# Patient Record
Sex: FEMALE | Race: WHITE | Employment: FULL TIME | URBAN - METROPOLITAN AREA
[De-identification: names, ages, dates, MRNs, and addresses within clinical notes are randomized per-mention and may not be internally consistent; named-entity substitution may affect disease eponyms.]

---

## 2017-04-27 RX ORDER — DOXEPIN HYDROCHLORIDE 10 MG/1
CAPSULE ORAL
Qty: 90 CAPSULE | Refills: 2 | Status: SHIPPED | OUTPATIENT
Start: 2017-04-27 | End: 2018-01-02 | Stop reason: SDUPTHER

## 2017-05-02 ENCOUNTER — TELEPHONE (OUTPATIENT)
Dept: FAMILY MEDICINE CLINIC | Age: 57
End: 2017-05-02

## 2017-05-03 LAB
AVERAGE GLUCOSE: 134 MG/DL (ref 66–114)
CHOLESTEROL/HDL RATIO: 3.6
CHOLESTEROL: 134 MG/DL
HBA1C MFR BLD: 6.3 % (ref 4.2–5.8)
HDLC SERPL-MCNC: 37 MG/DL (ref 40–60)
LDL CHOLESTEROL CALCULATED: 30 MG/DL
LDL/HDL RATIO: 0.8
TRIGL SERPL-MCNC: 336 MG/DL
VLDLC SERPL CALC-MCNC: 67 MG/DL

## 2017-05-08 ENCOUNTER — OFFICE VISIT (OUTPATIENT)
Dept: FAMILY MEDICINE CLINIC | Age: 57
End: 2017-05-08

## 2017-05-08 VITALS
HEIGHT: 69 IN | SYSTOLIC BLOOD PRESSURE: 136 MMHG | DIASTOLIC BLOOD PRESSURE: 76 MMHG | WEIGHT: 237.4 LBS | BODY MASS INDEX: 35.16 KG/M2 | HEART RATE: 84 BPM | RESPIRATION RATE: 14 BRPM

## 2017-05-08 DIAGNOSIS — E11.9 CONTROLLED TYPE 2 DIABETES MELLITUS WITHOUT COMPLICATION, UNSPECIFIED LONG TERM INSULIN USE STATUS: Primary | ICD-10-CM

## 2017-05-08 DIAGNOSIS — M99.01 CERVICAL SOMATIC DYSFUNCTION: ICD-10-CM

## 2017-05-08 DIAGNOSIS — K75.81 NASH (NONALCOHOLIC STEATOHEPATITIS): ICD-10-CM

## 2017-05-08 DIAGNOSIS — E78.5 HYPERLIPIDEMIA, UNSPECIFIED HYPERLIPIDEMIA TYPE: ICD-10-CM

## 2017-05-08 DIAGNOSIS — E55.9 VITAMIN D DEFICIENCY: ICD-10-CM

## 2017-05-08 DIAGNOSIS — Z72.0 TOBACCO ABUSE: ICD-10-CM

## 2017-05-08 DIAGNOSIS — E66.9 OBESITY, UNSPECIFIED OBESITY SEVERITY, UNSPECIFIED OBESITY TYPE: ICD-10-CM

## 2017-05-08 DIAGNOSIS — N60.99 ATYPICAL DUCTAL HYPERPLASIA, BREAST: ICD-10-CM

## 2017-05-08 PROCEDURE — 4004F PT TOBACCO SCREEN RCVD TLK: CPT | Performed by: FAMILY MEDICINE

## 2017-05-08 PROCEDURE — G8417 CALC BMI ABV UP PARAM F/U: HCPCS | Performed by: FAMILY MEDICINE

## 2017-05-08 PROCEDURE — 99214 OFFICE O/P EST MOD 30 MIN: CPT | Performed by: FAMILY MEDICINE

## 2017-05-08 PROCEDURE — 3014F SCREEN MAMMO DOC REV: CPT | Performed by: FAMILY MEDICINE

## 2017-05-08 PROCEDURE — 3044F HG A1C LEVEL LT 7.0%: CPT | Performed by: FAMILY MEDICINE

## 2017-05-08 PROCEDURE — 3017F COLORECTAL CA SCREEN DOC REV: CPT | Performed by: FAMILY MEDICINE

## 2017-05-08 PROCEDURE — 99999 PR OSTEOPATHIC MANIP,1-2 BODY REGN: CPT | Performed by: FAMILY MEDICINE

## 2017-05-08 PROCEDURE — G8427 DOCREV CUR MEDS BY ELIG CLIN: HCPCS | Performed by: FAMILY MEDICINE

## 2017-05-08 RX ORDER — VARENICLINE TARTRATE 25 MG
KIT ORAL
Qty: 53 TABLET | Refills: 0 | Status: SHIPPED | OUTPATIENT
Start: 2017-05-08 | End: 2017-11-08

## 2017-05-08 RX ORDER — VARENICLINE TARTRATE 1 MG/1
1 TABLET, FILM COATED ORAL 2 TIMES DAILY
Qty: 60 TABLET | Refills: 2 | Status: SHIPPED | OUTPATIENT
Start: 2017-05-08 | End: 2017-11-08

## 2017-05-08 ASSESSMENT — ENCOUNTER SYMPTOMS
RESPIRATORY NEGATIVE: 1
GASTROINTESTINAL NEGATIVE: 1

## 2017-05-08 ASSESSMENT — PATIENT HEALTH QUESTIONNAIRE - PHQ9
2. FEELING DOWN, DEPRESSED OR HOPELESS: 0
SUM OF ALL RESPONSES TO PHQ QUESTIONS 1-9: 0
1. LITTLE INTEREST OR PLEASURE IN DOING THINGS: 0
SUM OF ALL RESPONSES TO PHQ9 QUESTIONS 1 & 2: 0

## 2017-05-12 ENCOUNTER — TELEPHONE (OUTPATIENT)
Dept: FAMILY MEDICINE CLINIC | Age: 57
End: 2017-05-12

## 2017-10-31 ENCOUNTER — TELEPHONE (OUTPATIENT)
Dept: FAMILY MEDICINE CLINIC | Age: 57
End: 2017-10-31

## 2017-11-02 LAB
ALBUMIN SERPL-MCNC: 3.7 G/DL (ref 3.2–5.3)
ALK PHOSPHATASE: 75 IU/L (ref 35–121)
ALT SERPL-CCNC: 27 IU/L (ref 5–59)
ANION GAP SERPL CALCULATED.3IONS-SCNC: 21 MMOL/L
AST SERPL-CCNC: 32 IU/L (ref 10–42)
AVERAGE GLUCOSE: 126 MG/DL (ref 66–114)
BILIRUB SERPL-MCNC: 0.4 MG/DL (ref 0.2–1.3)
BUN BLDV-MCNC: 8 MG/DL (ref 10–20)
CALCIUM SERPL-MCNC: 9.2 MG/DL (ref 8.7–10.8)
CHLORIDE BLD-SCNC: 98 MMOL/L (ref 95–111)
CHOLESTEROL/HDL RATIO: 3.6
CHOLESTEROL: 141 MG/DL
CO2: 23 MMOL/L (ref 21–32)
CREAT SERPL-MCNC: 0.6 MG/DL (ref 0.5–1.3)
CREATINE, URINE: 57.2 MG/DL
EGFR AFRICAN AMERICAN: 124
EGFR IF NONAFRICAN AMERICAN: 103
GLUCOSE: 115 MG/DL (ref 70–100)
HBA1C MFR BLD: 6 % (ref 4.2–5.8)
HDLC SERPL-MCNC: 39 MG/DL (ref 40–60)
LDL CHOLESTEROL CALCULATED: 47 MG/DL
LDL/HDL RATIO: 1.2
MICROALBUMIN/CREAT 24H UR: 19.1 MG/DL
MICROALBUMIN/CREAT UR-RTO: 334 MCG/MG
POTASSIUM SERPL-SCNC: 4.7 MMOL/L (ref 3.5–5.4)
SODIUM BLD-SCNC: 137 MMOL/L (ref 134–147)
TOTAL PROTEIN: 6.5 G/DL (ref 5.8–8)
TRIGL SERPL-MCNC: 275 MG/DL
TSH SERPL DL<=0.05 MIU/L-ACNC: 3.23 UIU/ML (ref 0.4–4.4)
VLDLC SERPL CALC-MCNC: 55 MG/DL

## 2017-11-08 ENCOUNTER — OFFICE VISIT (OUTPATIENT)
Dept: FAMILY MEDICINE CLINIC | Age: 57
End: 2017-11-08
Payer: COMMERCIAL

## 2017-11-08 VITALS
HEIGHT: 68 IN | DIASTOLIC BLOOD PRESSURE: 58 MMHG | WEIGHT: 235 LBS | RESPIRATION RATE: 14 BRPM | HEART RATE: 90 BPM | SYSTOLIC BLOOD PRESSURE: 134 MMHG | TEMPERATURE: 98.1 F | OXYGEN SATURATION: 96 % | BODY MASS INDEX: 35.61 KG/M2

## 2017-11-08 DIAGNOSIS — K75.81 NASH (NONALCOHOLIC STEATOHEPATITIS): ICD-10-CM

## 2017-11-08 DIAGNOSIS — E78.5 HYPERLIPIDEMIA, UNSPECIFIED HYPERLIPIDEMIA TYPE: ICD-10-CM

## 2017-11-08 DIAGNOSIS — E55.9 VITAMIN D DEFICIENCY: ICD-10-CM

## 2017-11-08 DIAGNOSIS — Z72.0 TOBACCO ABUSE: ICD-10-CM

## 2017-11-08 DIAGNOSIS — Z12.11 SCREEN FOR COLON CANCER: ICD-10-CM

## 2017-11-08 DIAGNOSIS — E11.9 CONTROLLED TYPE 2 DIABETES MELLITUS WITHOUT COMPLICATION, WITHOUT LONG-TERM CURRENT USE OF INSULIN (HCC): Primary | ICD-10-CM

## 2017-11-08 DIAGNOSIS — Z11.4 SCREENING FOR HIV (HUMAN IMMUNODEFICIENCY VIRUS): ICD-10-CM

## 2017-11-08 DIAGNOSIS — Z11.59 ENCOUNTER FOR HEPATITIS C SCREENING TEST FOR LOW RISK PATIENT: ICD-10-CM

## 2017-11-08 PROCEDURE — G8417 CALC BMI ABV UP PARAM F/U: HCPCS | Performed by: FAMILY MEDICINE

## 2017-11-08 PROCEDURE — 3044F HG A1C LEVEL LT 7.0%: CPT | Performed by: FAMILY MEDICINE

## 2017-11-08 PROCEDURE — G8427 DOCREV CUR MEDS BY ELIG CLIN: HCPCS | Performed by: FAMILY MEDICINE

## 2017-11-08 PROCEDURE — 4004F PT TOBACCO SCREEN RCVD TLK: CPT | Performed by: FAMILY MEDICINE

## 2017-11-08 PROCEDURE — 99214 OFFICE O/P EST MOD 30 MIN: CPT | Performed by: FAMILY MEDICINE

## 2017-11-08 PROCEDURE — 3017F COLORECTAL CA SCREEN DOC REV: CPT | Performed by: FAMILY MEDICINE

## 2017-11-08 PROCEDURE — G8484 FLU IMMUNIZE NO ADMIN: HCPCS | Performed by: FAMILY MEDICINE

## 2017-11-08 PROCEDURE — 3014F SCREEN MAMMO DOC REV: CPT | Performed by: FAMILY MEDICINE

## 2017-11-08 RX ORDER — TAMOXIFEN CITRATE 20 MG/1
20 TABLET ORAL DAILY
COMMUNITY
End: 2020-12-15

## 2017-11-08 ASSESSMENT — ENCOUNTER SYMPTOMS
GASTROINTESTINAL NEGATIVE: 1
RESPIRATORY NEGATIVE: 1

## 2017-11-08 NOTE — PROGRESS NOTES
(LIPITOR) 40 MG tablet TAKE 1 TABLET NIGHTLY 11/3/16  Yes [facility] Plants, DO   Multiple Vitamins-Minerals (THERAPEUTIC MULTIVITAMIN-MINERALS) tablet Take 1 tablet by mouth daily   Yes Historical Provider, MD   aspirin 81 MG tablet Take 81 mg by mouth daily. Yes Historical Provider, MD   varenicline (CHANTIX STARTING MONTH PAK) 0.5 MG X 11 & 1 MG X 42 tablet Take as directed on package.  5/8/17   Keo Plants, DO   varenicline (CHANTIX CONTINUING MONTH PAK) 1 MG tablet Take 1 tablet by mouth 2 times daily 5/8/17   Keo Plants, DO       Lab Results   Component Value Date    LABA1C 6.0 (H) 11/01/2017     No results found for: EAG    No components found for: CHLPL  Lab Results   Component Value Date    TRIG 275 (H) 11/01/2017    TRIG 336 (H) 05/02/2017    TRIG 279 (H) 10/26/2016     Lab Results   Component Value Date    HDL 39 (L) 11/01/2017    HDL 37 (L) 05/02/2017    HDL 38 (L) 10/26/2016     Lab Results   Component Value Date    LDLCALC 47 11/01/2017    LDLCALC 30 05/02/2017    LDLCALC 60 10/26/2016     Lab Results   Component Value Date    LABVLDL 55 (H) 11/01/2017    LABVLDL 67 (H) 05/02/2017    LABVLDL 56 (H) 10/26/2016         Chemistry        Component Value Date/Time     11/01/2017 0956    K 4.7 11/01/2017 0956    CL 98 11/01/2017 0956    CO2 23 11/01/2017 0956    BUN 8 (L) 11/01/2017 0956    CREATININE 0.6 11/01/2017 0956        Component Value Date/Time    CALCIUM 9.2 11/01/2017 0956    ALKPHOS 75 11/01/2017 0956    ALKPHOS 86 12/21/2016 0951    AST 32 11/01/2017 0956    ALT 27 11/01/2017 0956    BILITOT 0.4 11/01/2017 0956            Lab Results   Component Value Date    TSH 3.230 11/01/2017       Lab Results   Component Value Date    WBC 8.6 12/21/2016    HGB 15.0 12/21/2016    HCT 45.4 (H) 12/21/2016    MCV 84.1 12/21/2016     12/21/2016         Health Maintenance   Topic Date Due    Hepatitis C screen  1960    HIV screen  01/06/1974    Colon cancer screen colonoscopy  01/06/2009    Cervical cancer screen  12/13/2016    Flu vaccine (1) 09/01/2017    Diabetic foot exam  11/03/2017    Diabetic retinal exam  08/15/2018    Diabetic hemoglobin A1C test  11/01/2018    Diabetic microalbuminuria test  11/01/2018    Lipid screen  11/01/2018    Breast cancer screen  12/14/2018    DTaP/Tdap/Td vaccine (2 - Td) 05/04/2025    Pneumococcal med risk  Completed       Immunization History   Administered Date(s) Administered    Pneumococcal Polysaccharide ([id number]) 11/02/2015    Tdap (Boostrix, Adacel) 05/04/2015           Review of Systems   Constitutional: Negative. HENT: Negative. Respiratory: Negative. Cardiovascular: Negative. Gastrointestinal: Negative. Musculoskeletal: Negative. All other systems reviewed and are negative. Objective:   Physical Exam   Constitutional: She is oriented to person, place, and time. She appears well-developed and well-nourished. HENT:   Head: Normocephalic and atraumatic. Right Ear: Tympanic membrane normal.   Left Ear: Tympanic membrane normal.   Mouth/Throat: Oropharynx is clear and moist and mucous membranes are normal.   Neck: Carotid bruit is not present. Cardiovascular: Normal rate, regular rhythm and normal heart sounds. No murmur heard. Pulmonary/Chest: Effort normal and breath sounds normal.   Abdominal: Soft. Bowel sounds are normal.   Musculoskeletal: She exhibits no edema. Neurological: She is alert and oriented to person, place, and time. Skin: Skin is warm and dry. Psychiatric: She has a normal mood and affect. Her behavior is normal.   Nursing note and vitals reviewed. Assessment:      1. Controlled type 2 diabetes mellitus without complication, without long-term current use of insulin ([facility])  HM DIABETES FOOT EXAM    Hemoglobin A1C   2. Hyperlipidemia, unspecified hyperlipidemia type     3. THORNTON (nonalcoholic steatohepatitis)     4. Tobacco abuse     5.  Vitamin D deficiency 6. Class 2 obesity due to excess calories with serious comorbidity and body mass index (BMI) of 35.0 to 35.9 in adult     7. Screening for HIV (human immunodeficiency virus)  HIV Screen   8. Encounter for hepatitis C screening test for low risk patient  Hepatitis C Antibody   9. Screen for colon cancer  [facility] [address] [facility] Jett Masters MD ([place])           Plan:      -  Chronic medical problems stable  -  Continue current medications  -  Follow up with specialists as scheduled  -  Labs reviewed, look fine  -  Refer to GI for colonoscopy  -  Recheck labs 6 mos  -  RTO 6 mos    Kaycee POLLOCK received counseling on the following healthy behaviors: tobacco cessation    Patient given educational materials on Smoking Cessation    I have instructed Kaycee POLLOCK to complete a self tracking handout on Smoking and instructed them to bring it with them to her next appointment. Discussed use, benefit, and side effects of prescribed medications. Barriers to medication compliance addressed. All patient questions answered. Pt voiced understanding.

## 2017-11-08 NOTE — PATIENT INSTRUCTIONS
Patient Education        Diabetes Foot Health: Care Instructions  Your Care Instructions    When you have diabetes, your feet need extra care and attention. Diabetes can damage the nerve endings and blood vessels in your feet, making you less likely to notice when your feet are injured. Diabetes also limits your body's ability to fight infection and get blood to areas that need it. If you get a minor foot injury, it could become an ulcer or a serious infection. With good foot care, you can prevent most of these problems. Caring for your feet can be quick and easy. Most of the care can be done when you are bathing or getting ready for bed. Follow-up care is a key part of your treatment and safety. Be sure to make and go to all appointments, and call your doctor if you are having problems. Its also a good idea to know your test results and keep a list of the medicines you take. How can you care for yourself at home? · Keep your blood sugar close to normal by watching what and how much you eat, monitoring blood sugar, taking medicines if prescribed, and getting regular exercise. · Do not smoke. Smoking affects blood flow and can make foot problems worse. If you need help quitting, talk to your doctor about stop-smoking programs and medicines. These can increase your chances of quitting for good. · Eat a diet that is low in fats. High fat intake can cause fat to build up in your blood vessels and decrease blood flow. · Inspect your feet daily for blisters, cuts, cracks, or sores. If you cannot see well, use a mirror or have someone help you. · Take care of your feet:  [facility] your feet every day. Use warm (not hot) water. Check the water temperature with your wrists or other part of your body, not your feet. ¨ Dry your feet well. Pat them dry. Do not rub the skin on your feet too hard. Dry well between your toes.  If the skin on your feet stays moist, bacteria or a fungus can grow, which can lead to infection. ¨ Keep your skin soft. Use moisturizing skin cream to keep the skin on your feet soft and prevent calluses and cracks. But do not put the cream between your toes, and stop using any cream that causes a rash. ¨ Clean underneath your toenails carefully. Do not use a sharp object to clean underneath your toenails. Use the blunt end of a nail file or other rounded tool. ¨ Trim and file your toenails straight across to prevent ingrown toenails. Use a nail clipper, not scissors. Use an emery board to smooth the edges. · Change socks daily. Socks without seams are best, because seams often rub the feet. You can find socks for people with diabetes from specialty catalogs. · Look inside your shoes every day for things like gravel or torn linings, which could cause blisters or sores. · Buy shoes that fit well:  ¨ Look for shoes that have plenty of space around the toes. This helps prevent bunions and blisters. ¨ Try on shoes while wearing the kind of socks you will usually wear with the shoes. ¨ Avoid plastic shoes. They may rub your feet and cause blisters. Good shoes should be made of materials that are flexible and breathable, such as leather or cloth. ¨ Break in new shoes slowly by wearing them for no more than an hour a day for several days. Take extra time to check your feet for red areas, blisters, or other problems after you wear new shoes. · Do not go barefoot. Do not wear sandals, and do not wear shoes with very thin soles. Thin soles are easy to puncture. They also do not protect your feet from hot pavement or cold weather. · Have your doctor check your feet during each visit. If you have a foot problem, see your doctor. Do not try to treat an early foot problem at home. Home remedies or treatments that you can buy without a prescription (such as corn removers) can be harmful. · Always get early treatment for foot problems.  A minor irritation can lead to a major problem if not properly cared

## 2017-11-21 DIAGNOSIS — E11.9 CONTROLLED TYPE 2 DIABETES MELLITUS WITHOUT COMPLICATION, UNSPECIFIED LONG TERM INSULIN USE STATUS: ICD-10-CM

## 2017-11-21 DIAGNOSIS — E78.5 HYPERLIPIDEMIA, UNSPECIFIED HYPERLIPIDEMIA TYPE: ICD-10-CM

## 2017-11-21 RX ORDER — LOSARTAN POTASSIUM 25 MG/1
TABLET ORAL
Qty: 90 TABLET | Refills: 3 | Status: SHIPPED | OUTPATIENT
Start: 2017-11-21 | End: 2019-01-07 | Stop reason: SDUPTHER

## 2017-11-21 RX ORDER — ATORVASTATIN CALCIUM 40 MG/1
TABLET, FILM COATED ORAL
Qty: 90 TABLET | Refills: 3 | Status: SHIPPED | OUTPATIENT
Start: 2017-11-21 | End: 2019-01-07 | Stop reason: SDUPTHER

## 2017-12-04 DIAGNOSIS — E11.9 CONTROLLED TYPE 2 DIABETES MELLITUS WITHOUT COMPLICATION, UNSPECIFIED LONG TERM INSULIN USE STATUS: ICD-10-CM

## 2018-01-02 RX ORDER — DOXEPIN HYDROCHLORIDE 10 MG/1
CAPSULE ORAL
Qty: 90 CAPSULE | Refills: 3 | Status: SHIPPED | OUTPATIENT
Start: 2018-01-02 | End: 2018-11-24 | Stop reason: SDUPTHER

## 2018-05-01 ENCOUNTER — TELEPHONE (OUTPATIENT)
Dept: FAMILY MEDICINE CLINIC | Age: 58
End: 2018-05-01

## 2018-05-03 LAB
AVERAGE GLUCOSE: 154 MG/DL (ref 66–114)
HBA1C MFR BLD: 7 % (ref 4.2–5.8)
HEPATITIS C ANTIBODY: NEGATIVE
HIV 1,2 COMBO ANTIGEN/ANTIBODY: NORMAL

## 2018-05-09 ENCOUNTER — OFFICE VISIT (OUTPATIENT)
Dept: FAMILY MEDICINE CLINIC | Age: 58
End: 2018-05-09
Payer: OTHER GOVERNMENT

## 2018-05-09 VITALS
BODY MASS INDEX: 36.92 KG/M2 | HEIGHT: 68 IN | HEART RATE: 80 BPM | DIASTOLIC BLOOD PRESSURE: 64 MMHG | SYSTOLIC BLOOD PRESSURE: 136 MMHG | WEIGHT: 243.6 LBS | RESPIRATION RATE: 20 BRPM

## 2018-05-09 DIAGNOSIS — E11.9 CONTROLLED TYPE 2 DIABETES MELLITUS WITHOUT COMPLICATION, WITHOUT LONG-TERM CURRENT USE OF INSULIN (HCC): ICD-10-CM

## 2018-05-09 DIAGNOSIS — E66.9 OBESITY (BMI 30-39.9): ICD-10-CM

## 2018-05-09 DIAGNOSIS — Z72.0 TOBACCO ABUSE: ICD-10-CM

## 2018-05-09 DIAGNOSIS — K75.81 NASH (NONALCOHOLIC STEATOHEPATITIS): ICD-10-CM

## 2018-05-09 DIAGNOSIS — E78.49 OTHER HYPERLIPIDEMIA: ICD-10-CM

## 2018-05-09 DIAGNOSIS — E55.9 VITAMIN D DEFICIENCY: ICD-10-CM

## 2018-05-09 DIAGNOSIS — L30.9 DERMATITIS: Primary | ICD-10-CM

## 2018-05-09 PROCEDURE — 99214 OFFICE O/P EST MOD 30 MIN: CPT | Performed by: FAMILY MEDICINE

## 2018-05-09 RX ORDER — FLUOCINONIDE 0.5 MG/G
OINTMENT TOPICAL
Qty: 30 G | Refills: 2 | Status: SHIPPED | OUTPATIENT
Start: 2018-05-09 | End: 2018-11-12

## 2018-05-09 ASSESSMENT — PATIENT HEALTH QUESTIONNAIRE - PHQ9
1. LITTLE INTEREST OR PLEASURE IN DOING THINGS: 0
SUM OF ALL RESPONSES TO PHQ9 QUESTIONS 1 & 2: 0
SUM OF ALL RESPONSES TO PHQ QUESTIONS 1-9: 0
2. FEELING DOWN, DEPRESSED OR HOPELESS: 0

## 2018-05-09 ASSESSMENT — ENCOUNTER SYMPTOMS
RESPIRATORY NEGATIVE: 1
GASTROINTESTINAL NEGATIVE: 1

## 2018-05-11 ENCOUNTER — TELEPHONE (OUTPATIENT)
Dept: FAMILY MEDICINE CLINIC | Age: 58
End: 2018-05-11

## 2018-11-05 ENCOUNTER — TELEPHONE (OUTPATIENT)
Dept: FAMILY MEDICINE CLINIC | Age: 58
End: 2018-11-05

## 2018-11-05 NOTE — TELEPHONE ENCOUNTER
Spoke with pt regarding her upcoming appt on 11/12/18 at 11am.  Confirmed appt and reminded pt to get labs done.

## 2018-11-07 LAB
ALBUMIN SERPL-MCNC: 3.9 G/DL (ref 3.5–5.2)
ALK PHOSPHATASE: 93 U/L (ref 30–121)
ALT SERPL-CCNC: 28 U/L (ref 5–40)
ANION GAP SERPL CALCULATED.3IONS-SCNC: 17 MEQ/L (ref 10–19)
AST SERPL-CCNC: 38 U/L (ref 9–40)
AVERAGE GLUCOSE: 128 MG/DL (ref 66–114)
BILIRUB SERPL-MCNC: 0.4 MG/DL
BUN BLDV-MCNC: 15 MG/DL (ref 8–23)
CALCIUM SERPL-MCNC: 9.1 MG/DL (ref 8.5–10.5)
CHLORIDE BLD-SCNC: 99 MEQ/L (ref 95–107)
CHOLESTEROL/HDL RATIO: 5
CHOLESTEROL: 132 MG/DL
CO2: 25 MEQ/L (ref 19–31)
CREAT SERPL-MCNC: 1.1 MG/DL (ref 0.6–1.3)
CREATINE, URINE: 43 MG/DL (ref 28–217)
EGFR AFRICAN AMERICAN: 63.6 ML/MIN/1.73 M2
EGFR IF NONAFRICAN AMERICAN: 54.9 ML/MIN/1.73 M2
GLUCOSE: 109 MG/DL (ref 70–99)
HBA1C MFR BLD: 6.1 %
HDLC SERPL-MCNC: 26.2 MG/DL
MICROALBUMIN/CREAT 24H UR: 8.4 MG/DL (ref 1.2–40)
MICROALBUMIN/CREAT UR-RTO: 195 MG/G
POTASSIUM SERPL-SCNC: 4.3 MEQ/L (ref 3.5–5.4)
SODIUM BLD-SCNC: 141 MEQ/L (ref 135–146)
TOTAL PROTEIN: 6.7 G/DL (ref 6.1–8.3)
TRIGL SERPL-MCNC: 439 MG/DL
TSH SERPL DL<=0.05 MIU/L-ACNC: 3.46 UIU/ML (ref 0.4–4.1)

## 2018-11-12 ENCOUNTER — OFFICE VISIT (OUTPATIENT)
Dept: FAMILY MEDICINE CLINIC | Age: 58
End: 2018-11-12
Payer: OTHER GOVERNMENT

## 2018-11-12 VITALS
HEART RATE: 92 BPM | RESPIRATION RATE: 20 BRPM | SYSTOLIC BLOOD PRESSURE: 102 MMHG | HEIGHT: 68 IN | DIASTOLIC BLOOD PRESSURE: 62 MMHG | WEIGHT: 233.2 LBS | BODY MASS INDEX: 35.34 KG/M2

## 2018-11-12 DIAGNOSIS — E11.9 CONTROLLED TYPE 2 DIABETES MELLITUS WITHOUT COMPLICATION, WITHOUT LONG-TERM CURRENT USE OF INSULIN (HCC): Primary | ICD-10-CM

## 2018-11-12 DIAGNOSIS — K75.81 NASH (NONALCOHOLIC STEATOHEPATITIS): ICD-10-CM

## 2018-11-12 DIAGNOSIS — Z72.0 TOBACCO ABUSE: ICD-10-CM

## 2018-11-12 DIAGNOSIS — E55.9 VITAMIN D DEFICIENCY: ICD-10-CM

## 2018-11-12 DIAGNOSIS — E66.9 OBESITY (BMI 30-39.9): ICD-10-CM

## 2018-11-12 DIAGNOSIS — E78.00 PURE HYPERCHOLESTEROLEMIA: ICD-10-CM

## 2018-11-12 PROCEDURE — 99214 OFFICE O/P EST MOD 30 MIN: CPT | Performed by: FAMILY MEDICINE

## 2018-11-12 RX ORDER — VARENICLINE TARTRATE 1 MG/1
1 TABLET, FILM COATED ORAL 2 TIMES DAILY
Qty: 60 TABLET | Refills: 2 | Status: SHIPPED | OUTPATIENT
Start: 2018-11-12 | End: 2020-08-11

## 2018-11-12 RX ORDER — FENOFIBRATE 160 MG/1
160 TABLET ORAL DAILY
Qty: 90 TABLET | Refills: 3 | Status: SHIPPED | OUTPATIENT
Start: 2018-11-12 | End: 2020-01-28

## 2018-11-12 RX ORDER — FENOFIBRATE 160 MG/1
160 TABLET ORAL DAILY
Qty: 90 TABLET | Refills: 3 | Status: SHIPPED | OUTPATIENT
Start: 2018-11-12 | End: 2018-11-12 | Stop reason: SDUPTHER

## 2018-11-12 ASSESSMENT — ENCOUNTER SYMPTOMS
GASTROINTESTINAL NEGATIVE: 1
RESPIRATORY NEGATIVE: 1

## 2018-11-12 NOTE — PROGRESS NOTES
Subjective:      Patient ID: Alonna Sandhoff is a 60 y.o. female. HPI:    Chief Complaint   Patient presents with    6 Month Follow-Up     DM     6 month eval.    Sugars very well controlled. Lab Results   Component Value Date    LABA1C 6.1 (H) 11/06/2018     BP looks great. BP Readings from Last 3 Encounters:   11/12/18 102/62   06/14/18 (!) 188/66   05/09/18 136/64     Down 10 lbs since May. Continues to eat better and has cut back on her portions. Wt Readings from Last 3 Encounters:   11/12/18 233 lb 3.2 oz (105.8 kg)   06/14/18 239 lb (108.4 kg)   05/09/18 243 lb 9.6 oz (110.5 kg)     Continues to smoke. She is interested in Chantix. Patient Active Problem List   Diagnosis    DM type 2 (diabetes mellitus, type 2) ([facility] [address]    Hyperlipemia    THORNTON (nonalcoholic steatohepatitis)    Obesity    Tobacco abuse    Vitamin D deficiency    Seborrheic keratosis    Dyshidrotic eczema     Past Surgical History:   Procedure Laterality Date    BLADDER SUSPENSION      HERNIA REPAIR  10/17/2016    KNEE SURGERY      Right knee    MASTECTOMY, PARTIAL Left     TUBAL LIGATION      WISDOM TOOTH EXTRACTION       Prior to Admission medications    Medication Sig Start Date End Date Taking?  Authorizing Provider   doxepin (SINEQUAN) 10 MG capsule TAKE 1 CAPSULE NIGHTLY 1/2/18  Yes Jo Skelton DO   metFORMIN (GLUCOPHAGE) 1000 MG tablet TAKE 1 TABLET TWICE A DAY WITH MEALS 12/4/17  Yes Jo Skelton DO   atorvastatin (LIPITOR) 40 MG tablet TAKE 1 TABLET NIGHTLY 11/21/17  Yes Jo Skelton DO   losartan (COZAAR) 25 MG tablet TAKE 1 TABLET DAILY 11/21/17  Yes Jo Skelton DO   tamoxifen (NOLVADEX) 20 MG tablet Take 20 mg by mouth daily   Yes Savanna Napoles MD   Multiple Vitamins-Minerals (HAIR SKIN AND NAILS FORMULA PO) Take 1 capsule by mouth daily   Yes Historical Provider, MD   KRILL OIL PO Take 350 mg by mouth daily   Yes Historical Provider, MD   Multiple Vitamins-Minerals

## 2018-11-26 RX ORDER — DOXEPIN HYDROCHLORIDE 10 MG/1
CAPSULE ORAL
Qty: 90 CAPSULE | Refills: 3 | Status: SHIPPED | OUTPATIENT
Start: 2018-11-26 | End: 2019-11-19 | Stop reason: SDUPTHER

## 2019-01-07 DIAGNOSIS — E78.5 HYPERLIPIDEMIA, UNSPECIFIED HYPERLIPIDEMIA TYPE: ICD-10-CM

## 2019-01-07 DIAGNOSIS — E11.9 CONTROLLED TYPE 2 DIABETES MELLITUS WITHOUT COMPLICATION (HCC): ICD-10-CM

## 2019-01-07 RX ORDER — LOSARTAN POTASSIUM 25 MG/1
TABLET ORAL
Qty: 90 TABLET | Refills: 3 | Status: SHIPPED | OUTPATIENT
Start: 2019-01-07 | End: 2019-12-15 | Stop reason: SDUPTHER

## 2019-01-07 RX ORDER — ATORVASTATIN CALCIUM 40 MG/1
TABLET, FILM COATED ORAL
Qty: 90 TABLET | Refills: 3 | Status: SHIPPED | OUTPATIENT
Start: 2019-01-07 | End: 2019-11-07

## 2019-04-30 ENCOUNTER — TELEPHONE (OUTPATIENT)
Dept: FAMILY MEDICINE CLINIC | Age: 59
End: 2019-04-30

## 2019-05-02 LAB
AVERAGE GLUCOSE: 128 MG/DL (ref 66–114)
CHOLESTEROL/HDL RATIO: 6.2 (ref 1–5)
CHOLESTEROL: 168 MG/DL (ref 150–200)
HBA1C MFR BLD: 6.1 %
HDLC SERPL-MCNC: 27 MG/DL
LDL CHOLESTEROL CALCULATED: ABNORMAL MG/DL
LDL CHOLESTEROL DIRECT: 89 MG/DL
LDL/HDL RATIO: ABNORMAL
TRIGL SERPL-MCNC: 469 MG/DL (ref 27–150)
VLDLC SERPL CALC-MCNC: 94 MG/DL (ref 0–30)

## 2019-05-07 ENCOUNTER — OFFICE VISIT (OUTPATIENT)
Dept: FAMILY MEDICINE CLINIC | Age: 59
End: 2019-05-07
Payer: OTHER GOVERNMENT

## 2019-05-07 VITALS
DIASTOLIC BLOOD PRESSURE: 60 MMHG | BODY MASS INDEX: 36.9 KG/M2 | SYSTOLIC BLOOD PRESSURE: 116 MMHG | WEIGHT: 242.7 LBS | HEART RATE: 84 BPM | RESPIRATION RATE: 16 BRPM

## 2019-05-07 DIAGNOSIS — E66.9 OBESITY (BMI 30-39.9): ICD-10-CM

## 2019-05-07 DIAGNOSIS — E55.9 VITAMIN D DEFICIENCY: ICD-10-CM

## 2019-05-07 DIAGNOSIS — Z72.0 TOBACCO ABUSE: ICD-10-CM

## 2019-05-07 DIAGNOSIS — E78.5 HYPERLIPIDEMIA, UNSPECIFIED HYPERLIPIDEMIA TYPE: ICD-10-CM

## 2019-05-07 DIAGNOSIS — E11.9 CONTROLLED TYPE 2 DIABETES MELLITUS WITHOUT COMPLICATION, WITHOUT LONG-TERM CURRENT USE OF INSULIN (HCC): Primary | ICD-10-CM

## 2019-05-07 DIAGNOSIS — K75.81 NASH (NONALCOHOLIC STEATOHEPATITIS): ICD-10-CM

## 2019-05-07 PROCEDURE — 99214 OFFICE O/P EST MOD 30 MIN: CPT | Performed by: FAMILY MEDICINE

## 2019-05-07 RX ORDER — VARENICLINE TARTRATE 25 MG
KIT ORAL
Qty: 53 TABLET | Refills: 0 | Status: SHIPPED | OUTPATIENT
Start: 2019-05-07 | End: 2020-08-11

## 2019-05-07 ASSESSMENT — ENCOUNTER SYMPTOMS
GASTROINTESTINAL NEGATIVE: 1
RESPIRATORY NEGATIVE: 1

## 2019-05-07 ASSESSMENT — PATIENT HEALTH QUESTIONNAIRE - PHQ9
SUM OF ALL RESPONSES TO PHQ QUESTIONS 1-9: 0
1. LITTLE INTEREST OR PLEASURE IN DOING THINGS: 0
SUM OF ALL RESPONSES TO PHQ QUESTIONS 1-9: 0
SUM OF ALL RESPONSES TO PHQ9 QUESTIONS 1 & 2: 0
2. FEELING DOWN, DEPRESSED OR HOPELESS: 0

## 2019-05-07 NOTE — PATIENT INSTRUCTIONS
You may receive a survey about your visit with us today. The feedback from our patients helps us identify what is working well and where the service to all patients can be enhanced. Thank you! Tobacco Cessation Programs     Telephonic behavior modification  Caryn Mane ([phone])   Counseling service for those who are ready to quit using tobacco.     Available for uninsured [place] residents, [place] recipients, pregnant women, or patients whose health plans or employers are members of the [address] [place] [address] behavior modification   http://[place]. Quitlogix. org   Online support program to help patients through each step of the quitting process. Available 24 hours a day 7 days a week. Provides up to date researched based tool, step-by-step guides, and motivational messages. Online behavior modification   www.lungusa.org/stop-smoking/how-to-quit   HelpLine: [facility] (844-5023)   Email questions to:  [email or web address] org    Website offers resources to help tobacco users figure out their reasons for quitting and then take the big step of quitting for good. Hypnosis   Location: [facility], [place] [employer] [place], [place]   Contact: Davis Brand, PhD at [phone]   Hypnosis for tobacco cessation   Cost $225 for the initial session and $175 for each session afterwards. Most patients require 6-8 sessions. There is the option to submit through the patients insurance. Hypnosis and behavior modification   Location: [place] [address],  [facility] [address] [place] [employer] [place], [place]   Contact: Rochelle Banuelos, PhD at [phone]  [address] [place] [address] Counseling and hypnosis for nicotine addition   Cost: For uninsured patients:  Please call above phone number  Cost for insured patients depends on patients insurance plan.     Behavior modification   Location: [facility] [place], [address]: [place] [address] include four one-on-one appointments between the patient and a respiratory therapist.  The four appointments span over three weeks. The respiratory therapist schedules one of the appointments to occur 48 hours after the patients quit date.  Cost $100 total for the four sessions.   Tobacco cessation products are not included in the cost and are not provided by [facility].     Castro Poe

## 2019-05-07 NOTE — PROGRESS NOTES
mouth daily   Yes Vicenta Terry MD   Multiple Vitamins-Minerals (HAIR SKIN AND NAILS FORMULA PO) Take 1 capsule by mouth daily   Yes Historical Provider, MD   KRILL OIL PO Take 350 mg by mouth daily   Yes Historical Provider, MD   Multiple Vitamins-Minerals (THERAPEUTIC MULTIVITAMIN-MINERALS) tablet Take 1 tablet by mouth daily   Yes Historical Provider, MD   aspirin 81 MG tablet Take 81 mg by mouth daily.      Yes Historical Provider, MD   varenicline (CHANTIX CONTINUING MONTH FAN) 1 MG tablet Take 1 tablet by mouth 2 times daily 11/12/18   Justyn Beckham DO       Lab Results   Component Value Date    LABA1C 6.1 (H) 05/01/2019     No results found for: EAG    No components found for: CHLPL  Lab Results   Component Value Date    TRIG 469 (H) 05/01/2019    TRIG 439 (H) 11/06/2018    TRIG 275 (H) 11/01/2017     Lab Results   Component Value Date    HDL 27 (L) 05/01/2019    HDL 26.2 (L) 11/06/2018    HDL 39 (L) 11/01/2017     Lab Results   Component Value Date    LDLCALC See Note 05/01/2019    LDLCALC 47 11/01/2017    LDLCALC 30 05/02/2017     Lab Results   Component Value Date    LABVLDL 94 (H) 05/01/2019    LABVLDL 55 (H) 11/01/2017    LABVLDL 67 (H) 05/02/2017         Chemistry        Component Value Date/Time     11/06/2018 0941    K 4.3 11/06/2018 0941    CL 99 11/06/2018 0941    CO2 25 11/06/2018 0941    BUN 15 11/06/2018 0941    CREATININE 1.1 11/06/2018 0941        Component Value Date/Time    CALCIUM 9.1 11/06/2018 0941    ALKPHOS 93 11/06/2018 0941    ALKPHOS 86 12/21/2016 0951    AST 38 11/06/2018 0941    ALT 28 11/06/2018 0941    BILITOT 0.4 11/06/2018 0941            Lab Results   Component Value Date    TSH 3.46 11/06/2018       Lab Results   Component Value Date    WBC 8.6 12/21/2016    HGB 15.0 12/21/2016    HCT 45.4 (H) 12/21/2016    MCV 84.1 12/21/2016     12/21/2016         Health Maintenance   Topic Date Due    Shingles Vaccine (1 of 2) 01/06/2009    Cervical cancer screen 12/13/2016    Low dose CT lung screening  01/15/2017    Diabetic foot exam  11/08/2018    Breast cancer screen  12/18/2018    Flu vaccine (Season Ended) 09/01/2019    Diabetic microalbuminuria test  11/06/2019    Potassium monitoring  11/06/2019    Creatinine monitoring  11/06/2019    Diabetic retinal exam  04/26/2020    A1C test (Diabetic or Prediabetic)  05/01/2020    Lipid screen  05/01/2020    DTaP/Tdap/Td vaccine (2 - Td) 05/04/2025    Colon cancer screen colonoscopy  07/20/2028    Pneumococcal 0-64 years Vaccine  Completed    Hepatitis C screen  Completed    HIV screen  Completed       Immunization History   Administered Date(s) Administered    Pneumococcal Polysaccharide ([id number]) 11/02/2015    Tdap (Boostrix, Adacel) 05/04/2015         Review of Systems   Constitutional: Negative. HENT: Negative. Respiratory: Negative. Cardiovascular: Negative. Gastrointestinal: Negative. Musculoskeletal: Negative. All other systems reviewed and are negative. Objective:   Physical Exam   Constitutional: She is oriented to person, place, and time. She appears well-developed and well-nourished. HENT:   Head: Normocephalic and atraumatic. Right Ear: Tympanic membrane normal.   Left Ear: Tympanic membrane normal.   Mouth/Throat: Oropharynx is clear and moist and mucous membranes are normal.   Neck: Carotid bruit is not present. Cardiovascular: Normal rate, regular rhythm and normal heart sounds. No murmur heard. Pulmonary/Chest: Effort normal and breath sounds normal.   Abdominal: Soft. Bowel sounds are normal.   Musculoskeletal: She exhibits no edema. Neurological: She is alert and oriented to person, place, and time. Skin: Skin is warm and dry. Psychiatric: She has a normal mood and affect. Her behavior is normal.   Nursing note and vitals reviewed. Assessment:       Diagnosis Orders   1.  Controlled type 2 diabetes mellitus without complication, without long-term current use of insulin (HCC)   DIABETES FOOT EXAM    Comprehensive Metabolic Panel    Hemoglobin A1C    Microalbumin / Creatinine Urine Ratio   2. Hyperlipidemia, unspecified hyperlipidemia type  Lipid Panel    Comprehensive Metabolic Panel    TSH with Reflex   3. Tobacco abuse  varenicline (CHANTIX STARTING MONTH FAN) 0.5 MG X 11 & 1 MG X 42 tablet    CBC Auto Differential   4. THORNTON (nonalcoholic steatohepatitis)     5. Obesity (BMI 30-39.9)     6. Vitamin D deficiency             Plan:      -  Chronic medical problems stable  -  Labs reviewed, ok overall.   TG's > 400  -  Continue current medications  -  Dietary changes discussed  -  Follow up with specialists as scheduled, has upcoming appt with Dr. Silverio Renee labs 6 mos  -  RTO 6 mos          Laine Crouch DO

## 2019-05-07 NOTE — PROGRESS NOTES
Breast cancer screen  12/18/2018    Flu vaccine (Season Ended) 09/01/2019    Diabetic microalbuminuria test  11/06/2019    Potassium monitoring  11/06/2019    Creatinine monitoring  11/06/2019    Diabetic retinal exam  04/26/2020    A1C test (Diabetic or Prediabetic)  05/01/2020    Lipid screen  05/01/2020    DTaP/Tdap/Td vaccine (2 - Td) 05/04/2025    Colon cancer screen colonoscopy  07/20/2028    Pneumococcal 0-64 years Vaccine  Completed    Hepatitis C screen  Completed    HIV screen  Completed

## 2019-06-07 ENCOUNTER — HOSPITAL ENCOUNTER (OUTPATIENT)
Dept: WOMENS IMAGING | Age: 59
Discharge: HOME | End: 2019-06-07
Payer: OTHER GOVERNMENT

## 2019-06-07 ENCOUNTER — HOSPITAL ENCOUNTER (OUTPATIENT)
Dept: MAMMOGRAPHY | Age: 59
Discharge: HOME | End: 2019-06-07
Payer: OTHER GOVERNMENT

## 2019-06-07 DIAGNOSIS — Z00.6 ENCOUNTER FOR EXAMINATION FOR NORMAL COMPARISON OR CONTROL IN CLINICAL RESEARCH PROGRAM: ICD-10-CM

## 2019-06-07 DIAGNOSIS — Z12.39 BREAST CANCER SCREENING: ICD-10-CM

## 2019-06-07 PROCEDURE — 3209999900 MAM COMPARISON OF OUTSIDE IMAGES

## 2019-06-07 PROCEDURE — 77063 BREAST TOMOSYNTHESIS BI: CPT

## 2019-06-10 ENCOUNTER — HOSPITAL ENCOUNTER (OUTPATIENT)
Dept: MRI IMAGING | Age: 59
Discharge: HOME | End: 2019-06-10
Payer: OTHER GOVERNMENT

## 2019-06-10 DIAGNOSIS — N60.99 BENIGN MAMMARY DYSPLASIA, UNSPECIFIED LATERALITY: ICD-10-CM

## 2019-06-10 LAB — POC CREATININE WHOLE BLOOD: 0.7 MG/DL (ref 0.5–1.2)

## 2019-06-10 PROCEDURE — 77049 MRI BREAST C-+ W/CAD BI: CPT

## 2019-06-10 PROCEDURE — 6360000004 HC RX CONTRAST MEDICATION: Performed by: SPECIALIST

## 2019-06-10 PROCEDURE — 82565 ASSAY OF CREATININE: CPT

## 2019-06-10 PROCEDURE — A9579 GAD-BASE MR CONTRAST NOS,1ML: HCPCS | Performed by: SPECIALIST

## 2019-06-10 RX ADMIN — GADOTERIDOL 20 ML: 279.3 INJECTION, SOLUTION INTRAVENOUS at 12:08

## 2019-06-12 ENCOUNTER — HOSPITAL ENCOUNTER (OUTPATIENT)
Dept: WOMENS IMAGING | Age: 59
Discharge: HOME | End: 2019-06-12
Payer: OTHER GOVERNMENT

## 2019-06-12 DIAGNOSIS — R92.8 ABNORMAL MRI, BREAST: ICD-10-CM

## 2019-06-12 PROCEDURE — 76642 ULTRASOUND BREAST LIMITED: CPT

## 2019-10-29 ENCOUNTER — TELEPHONE (OUTPATIENT)
Dept: FAMILY MEDICINE CLINIC | Age: 59
End: 2019-10-29

## 2019-11-02 LAB
ABSOLUTE BASO #: 0 X10E9/L (ref 0–0.9)
ABSOLUTE EOS #: 0.3 X10E9/L (ref 0–0.4)
ABSOLUTE LYMPH #: 2.2 X10E9/L (ref 1–3.5)
ABSOLUTE MONO #: 0.7 X10E9/L (ref 0–0.9)
ABSOLUTE NEUT #: 6.7 X10E9/L (ref 1.5–6.6)
ALBUMIN SERPL-MCNC: 4 G/DL (ref 3.2–5.3)
ALK PHOSPHATASE: 52 U/L (ref 39–130)
ALT SERPL-CCNC: 21 U/L (ref 0–31)
ANION GAP SERPL CALCULATED.3IONS-SCNC: 11 MMOL/L (ref 4–12)
AST SERPL-CCNC: 23 U/L (ref 0–41)
AVERAGE GLUCOSE: 128 MG/DL
BASOPHILS RELATIVE PERCENT: 0.1 %
BILIRUB SERPL-MCNC: 0.6 MG/DL (ref 0.3–1.2)
BUN BLDV-MCNC: 14 MG/DL (ref 5–23)
CALCIUM SERPL-MCNC: 9.6 MG/DL (ref 8.5–10.5)
CHLORIDE BLD-SCNC: 100 MMOL/L (ref 98–109)
CHOLESTEROL/HDL RATIO: 6.6 (ref 1–5)
CHOLESTEROL: 166 MG/DL (ref 150–200)
CO2: 26 MMOL/L (ref 22–32)
CREAT SERPL-MCNC: 0.86 MG/DL (ref 0.4–1)
CREATINE, URINE: 41.63 MG/DL
EGFR AFRICAN AMERICAN: >60 ML/MIN/1.73SQ.M
EGFR IF NONAFRICAN AMERICAN: >60 ML/MIN/1.73SQ.M
EOSINOPHILS RELATIVE PERCENT: 2.6 %
GLUCOSE: 112 MG/DL (ref 65–99)
HBA1C MFR BLD: 6.1 % (ref 4.4–6.4)
HCT VFR BLD CALC: 51.3 % (ref 35–47)
HDLC SERPL-MCNC: 25 MG/DL
HEMOGLOBIN: 16.8 G/DL (ref 11.7–16)
LDL CHOLESTEROL CALCULATED: ABNORMAL MG/DL
LDL CHOLESTEROL DIRECT: 100 MG/DL
LDL/HDL RATIO: ABNORMAL
LYMPHOCYTE %: 22.7 %
MCH RBC QN AUTO: 29.7 PG (ref 26–33.5)
MCHC RBC AUTO-ENTMCNC: 32.7 G/DL (ref 32–36)
MCV RBC AUTO: 91 FL (ref 81–100)
MICROALBUMIN/CREAT 24H UR: 7 MG/DL (ref 0–1.9)
MICROALBUMIN/CREAT UR-RTO: 168.1 MG/G CREAT (ref 0–30)
MONOCYTES # BLD: 7.3 %
NEUTROPHILS RELATIVE PERCENT: 67.3 %
PDW BLD-RTO: 19.7 % (ref 11.5–14.7)
PLATELETS: ABNORMAL X10E9/L (ref 150–450)
PMV BLD AUTO: ABNORMAL FL (ref 7–12)
POTASSIUM SERPL-SCNC: 4.7 MMOL/L (ref 3.5–5)
RBC: 5.64 X10E12/L (ref 3.8–5.2)
SODIUM BLD-SCNC: 137 MMOL/L (ref 134–146)
TOTAL PROTEIN: 6.9 G/DL (ref 6–8)
TRIGL SERPL-MCNC: 418 MG/DL (ref 27–150)
TSH SERPL DL<=0.05 MIU/L-ACNC: 3.3 UIU/ML (ref 0.49–4.67)
VLDLC SERPL CALC-MCNC: 84 MG/DL (ref 0–30)
WBC: 9.9 X10E9/L (ref 4.8–10.8)

## 2019-11-07 ENCOUNTER — OFFICE VISIT (OUTPATIENT)
Dept: FAMILY MEDICINE CLINIC | Age: 59
End: 2019-11-07
Payer: OTHER GOVERNMENT

## 2019-11-07 VITALS
BODY MASS INDEX: 38.55 KG/M2 | WEIGHT: 245.6 LBS | DIASTOLIC BLOOD PRESSURE: 70 MMHG | SYSTOLIC BLOOD PRESSURE: 124 MMHG | HEIGHT: 67 IN | RESPIRATION RATE: 20 BRPM | HEART RATE: 80 BPM

## 2019-11-07 DIAGNOSIS — E66.9 OBESITY (BMI 30-39.9): ICD-10-CM

## 2019-11-07 DIAGNOSIS — E78.5 HYPERLIPIDEMIA, UNSPECIFIED HYPERLIPIDEMIA TYPE: ICD-10-CM

## 2019-11-07 DIAGNOSIS — E55.9 VITAMIN D DEFICIENCY: ICD-10-CM

## 2019-11-07 DIAGNOSIS — E78.00 PURE HYPERCHOLESTEROLEMIA: ICD-10-CM

## 2019-11-07 DIAGNOSIS — K75.81 NASH (NONALCOHOLIC STEATOHEPATITIS): ICD-10-CM

## 2019-11-07 DIAGNOSIS — Z72.0 TOBACCO ABUSE: ICD-10-CM

## 2019-11-07 DIAGNOSIS — E11.9 CONTROLLED TYPE 2 DIABETES MELLITUS WITHOUT COMPLICATION, WITHOUT LONG-TERM CURRENT USE OF INSULIN (HCC): Primary | ICD-10-CM

## 2019-11-07 PROCEDURE — 99214 OFFICE O/P EST MOD 30 MIN: CPT | Performed by: FAMILY MEDICINE

## 2019-11-07 RX ORDER — ROSUVASTATIN CALCIUM 20 MG/1
20 TABLET, COATED ORAL NIGHTLY
Qty: 90 TABLET | Refills: 3 | Status: SHIPPED | OUTPATIENT
Start: 2019-11-07 | End: 2019-12-02

## 2019-11-07 ASSESSMENT — ENCOUNTER SYMPTOMS
RESPIRATORY NEGATIVE: 1
GASTROINTESTINAL NEGATIVE: 1

## 2019-11-19 RX ORDER — DOXEPIN HYDROCHLORIDE 10 MG/1
CAPSULE ORAL
Qty: 90 CAPSULE | Refills: 4 | Status: SHIPPED | OUTPATIENT
Start: 2019-11-19 | End: 2020-11-11

## 2019-12-02 ENCOUNTER — TELEPHONE (OUTPATIENT)
Dept: FAMILY MEDICINE CLINIC | Age: 59
End: 2019-12-02

## 2019-12-02 DIAGNOSIS — E78.5 HYPERLIPIDEMIA, UNSPECIFIED HYPERLIPIDEMIA TYPE: ICD-10-CM

## 2019-12-02 RX ORDER — ATORVASTATIN CALCIUM 40 MG/1
TABLET, FILM COATED ORAL
Qty: 90 TABLET | Refills: 3 | Status: SHIPPED | OUTPATIENT
Start: 2019-12-02 | End: 2020-11-04

## 2019-12-02 RX ORDER — ATORVASTATIN CALCIUM 40 MG/1
TABLET, FILM COATED ORAL
Qty: 10 TABLET | Refills: 0 | Status: SHIPPED | OUTPATIENT
Start: 2019-12-02 | End: 2019-12-02 | Stop reason: SDUPTHER

## 2019-12-15 DIAGNOSIS — E11.9 CONTROLLED TYPE 2 DIABETES MELLITUS WITHOUT COMPLICATION (HCC): ICD-10-CM

## 2019-12-16 RX ORDER — LOSARTAN POTASSIUM 25 MG/1
TABLET ORAL
Qty: 90 TABLET | Refills: 4 | Status: SHIPPED | OUTPATIENT
Start: 2019-12-16 | End: 2021-03-09

## 2020-01-28 RX ORDER — FENOFIBRATE 160 MG/1
TABLET ORAL
Qty: 90 TABLET | Refills: 4 | Status: SHIPPED | OUTPATIENT
Start: 2020-01-28 | End: 2021-04-22

## 2020-06-02 ENCOUNTER — TELEPHONE (OUTPATIENT)
Dept: FAMILY MEDICINE CLINIC | Age: 60
End: 2020-06-02

## 2020-06-06 LAB
AVERAGE GLUCOSE: 134 MG/DL
CHOLESTEROL/HDL RATIO: 7.9 (ref 1–5)
CHOLESTEROL: 173 MG/DL (ref 150–200)
HBA1C MFR BLD: 6.3 % (ref 4.4–6.4)
HDLC SERPL-MCNC: 22 MG/DL
LDL CHOLESTEROL CALCULATED: ABNORMAL MG/DL
LDL CHOLESTEROL DIRECT: 95 MG/DL
LDL/HDL RATIO: ABNORMAL
TRIGL SERPL-MCNC: 505 MG/DL (ref 27–150)
VLDLC SERPL CALC-MCNC: 101 MG/DL (ref 0–30)

## 2020-06-11 ENCOUNTER — HOSPITAL ENCOUNTER (OUTPATIENT)
Dept: MRI IMAGING | Age: 60
Discharge: HOME | End: 2020-06-11
Payer: OTHER GOVERNMENT

## 2020-06-11 LAB — POC CREATININE WHOLE BLOOD: 1 MG/DL (ref 0.5–1.2)

## 2020-06-11 PROCEDURE — 82565 ASSAY OF CREATININE: CPT

## 2020-06-11 PROCEDURE — A9579 GAD-BASE MR CONTRAST NOS,1ML: HCPCS | Performed by: SPECIALIST

## 2020-06-11 PROCEDURE — 6360000004 HC RX CONTRAST MEDICATION: Performed by: SPECIALIST

## 2020-06-11 PROCEDURE — 77049 MRI BREAST C-+ W/CAD BI: CPT

## 2020-06-11 RX ADMIN — GADOTERIDOL 20 ML: 279.3 INJECTION, SOLUTION INTRAVENOUS at 16:32

## 2020-06-12 ENCOUNTER — OFFICE VISIT (OUTPATIENT)
Dept: FAMILY MEDICINE CLINIC | Age: 60
End: 2020-06-12
Payer: OTHER GOVERNMENT

## 2020-06-12 VITALS
OXYGEN SATURATION: 97 % | BODY MASS INDEX: 38.98 KG/M2 | HEART RATE: 103 BPM | RESPIRATION RATE: 16 BRPM | DIASTOLIC BLOOD PRESSURE: 70 MMHG | WEIGHT: 248.9 LBS | SYSTOLIC BLOOD PRESSURE: 118 MMHG | TEMPERATURE: 98 F

## 2020-06-12 PROCEDURE — 99214 OFFICE O/P EST MOD 30 MIN: CPT | Performed by: FAMILY MEDICINE

## 2020-06-12 SDOH — ECONOMIC STABILITY: TRANSPORTATION INSECURITY
IN THE PAST 12 MONTHS, HAS THE LACK OF TRANSPORTATION KEPT YOU FROM MEDICAL APPOINTMENTS OR FROM GETTING MEDICATIONS?: NO

## 2020-06-12 SDOH — ECONOMIC STABILITY: FOOD INSECURITY: WITHIN THE PAST 12 MONTHS, YOU WORRIED THAT YOUR FOOD WOULD RUN OUT BEFORE YOU GOT MONEY TO BUY MORE.: NEVER TRUE

## 2020-06-12 SDOH — ECONOMIC STABILITY: TRANSPORTATION INSECURITY
IN THE PAST 12 MONTHS, HAS LACK OF TRANSPORTATION KEPT YOU FROM MEETINGS, WORK, OR FROM GETTING THINGS NEEDED FOR DAILY LIVING?: NO

## 2020-06-12 SDOH — ECONOMIC STABILITY: FOOD INSECURITY: WITHIN THE PAST 12 MONTHS, THE FOOD YOU BOUGHT JUST DIDN'T LAST AND YOU DIDN'T HAVE MONEY TO GET MORE.: NEVER TRUE

## 2020-06-12 SDOH — ECONOMIC STABILITY: INCOME INSECURITY: HOW HARD IS IT FOR YOU TO PAY FOR THE VERY BASICS LIKE FOOD, HOUSING, MEDICAL CARE, AND HEATING?: NOT HARD AT ALL

## 2020-06-12 ASSESSMENT — PATIENT HEALTH QUESTIONNAIRE - PHQ9
1. LITTLE INTEREST OR PLEASURE IN DOING THINGS: 0
SUM OF ALL RESPONSES TO PHQ9 QUESTIONS 1 & 2: 0
2. FEELING DOWN, DEPRESSED OR HOPELESS: 0
SUM OF ALL RESPONSES TO PHQ QUESTIONS 1-9: 0
SUM OF ALL RESPONSES TO PHQ QUESTIONS 1-9: 0

## 2020-06-12 ASSESSMENT — ENCOUNTER SYMPTOMS
GASTROINTESTINAL NEGATIVE: 1
RESPIRATORY NEGATIVE: 1

## 2020-06-12 NOTE — PROGRESS NOTES
mg by mouth daily   Yes Historical Provider, MD   Multiple Vitamins-Minerals (THERAPEUTIC MULTIVITAMIN-MINERALS) tablet Take 1 tablet by mouth daily   Yes Historical Provider, MD   aspirin 81 MG tablet Take 81 mg by mouth daily. Yes Historical Provider, MD   varenicline (CHANTIX STARTING MONTH PAK) 0.5 MG X 11 & 1 MG X 42 tablet Take as directed on package.   Patient not taking: Reported on 11/7/2019 5/7/19   Jani Parson DO   varenicline (CHANTIX CONTINUING MONTH FAN) 1 MG tablet Take 1 tablet by mouth 2 times daily  Patient not taking: Reported on 11/7/2019 11/12/18   Jani Parson, DO   Multiple Vitamins-Minerals (HAIR SKIN AND NAILS FORMULA PO) Take 1 capsule by mouth daily    Historical Provider, MD       Lab Results   Component Value Date    LABA1C 6.3 06/05/2020     No results found for: EAG    No components found for: CHLPL  Lab Results   Component Value Date    TRIG 505 (H) 06/05/2020    TRIG 418 (H) 11/01/2019    TRIG 469 (H) 05/01/2019     Lab Results   Component Value Date    HDL 22 (L) 06/05/2020    HDL 25 (L) 11/01/2019    HDL 27 (L) 05/01/2019     Lab Results   Component Value Date    LDLCALC See Note 06/05/2020    [address] See Note 11/01/2019    [address] See Note 05/01/2019     Lab Results   Component Value Date    LABVLDL 101 (H) 06/05/2020    LABVLDL 84 (H) 11/01/2019    LABVLDL 94 (H) 05/01/2019         Chemistry        Component Value Date/Time     11/01/2019 0955    K 4.7 11/01/2019 0955     11/01/2019 0955    CO2 26 11/01/2019 0955    BUN 14 11/01/2019 0955    CREATININE 0.86 11/01/2019 0955        Component Value Date/Time    CALCIUM 9.6 11/01/2019 0955    ALKPHOS 52 11/01/2019 0955    ALKPHOS 86 12/21/2016 0951    AST 23 11/01/2019 0955    ALT 21 11/01/2019 0955    BILITOT 0.6 11/01/2019 0955            Lab Results   Component Value Date    TSH 3.30 11/01/2019       Lab Results   Component Value Date    WBC 9.9 11/01/2019    HGB 16.8 (H) 11/01/2019    HCT 51.3 (H)

## 2020-06-12 NOTE — PROGRESS NOTES
04/26/2020    Diabetic foot exam  05/07/2020    Breast cancer screen  06/07/2020    Flu vaccine (Season Ended) 09/01/2020    Diabetic microalbuminuria test  11/01/2020    Potassium monitoring  11/01/2020    Creatinine monitoring  11/01/2020    A1C test (Diabetic or Prediabetic)  06/05/2021    Lipid screen  06/05/2021    DTaP/Tdap/Td vaccine (2 - Td) 05/04/2025    Colon cancer screen colonoscopy  07/20/2028    Pneumococcal 0-64 years Vaccine  Completed    Hepatitis C screen  Completed    HIV screen  Completed    Hepatitis A vaccine  Aged Out    Hib vaccine  Aged Out    Meningococcal (ACWY) vaccine  Aged Out       Tobacco Use Visit Information:          Current Tobacco Use    Social History     Tobacco Use   Smoking Status Current Every Day Smoker    Packs/day: 1.50    Years: 30.00    Pack years: 45.00    Types: Cigarettes   Smokeless Tobacco Never Used     Ready to quit: No  Counseling given: Yes     Are you motivated to quit? - no  If yes, have you set a date to quite? - no    Have you tried any anti-smoking aids in the past? -  yes - chantix      1-800-QUIT-NOW (2-724.379.1493)     Medical History Review  Past Medical, Family, and Social History reviewed and does contribute to the patient presenting condition    Health Maintenance   Topic Date Due    Shingles Vaccine (1 of 2) 01/06/2009    Cervical cancer screen  12/13/2016    Low dose CT lung screening  01/15/2017    Diabetic retinal exam  04/26/2020    Diabetic foot exam  05/07/2020    Breast cancer screen  06/07/2020    Flu vaccine (Season Ended) 09/01/2020    Diabetic microalbuminuria test  11/01/2020    Potassium monitoring  11/01/2020    Creatinine monitoring  11/01/2020    A1C test (Diabetic or Prediabetic)  06/05/2021    Lipid screen  06/05/2021    DTaP/Tdap/Td vaccine (2 - Td) 05/04/2025    Colon cancer screen colonoscopy  07/20/2028    Pneumococcal 0-64 years Vaccine  Completed    Hepatitis C screen  Completed    HIV

## 2020-07-06 ENCOUNTER — TELEPHONE (OUTPATIENT)
Dept: FAMILY MEDICINE CLINIC | Age: 60
End: 2020-07-06

## 2020-07-06 NOTE — TELEPHONE ENCOUNTER
Patient calling due to cut leg 1 1/2 to 2 weeks ago on a passenger vehicle and is questioning if she needs a tetanus  shot now. She denies pain, redness or streaking on leg. States it is a little swollen.   Please advise

## 2020-08-28 ENCOUNTER — NURSE ONLY (OUTPATIENT)
Dept: LAB | Age: 60
End: 2020-08-28

## 2020-09-11 ENCOUNTER — OFFICE VISIT (OUTPATIENT)
Dept: FAMILY MEDICINE CLINIC | Age: 60
End: 2020-09-11
Payer: OTHER GOVERNMENT

## 2020-09-11 VITALS
RESPIRATION RATE: 24 BRPM | BODY MASS INDEX: 37.69 KG/M2 | WEIGHT: 247.9 LBS | TEMPERATURE: 97.1 F | HEART RATE: 108 BPM | SYSTOLIC BLOOD PRESSURE: 134 MMHG | DIASTOLIC BLOOD PRESSURE: 70 MMHG

## 2020-09-11 PROCEDURE — 99213 OFFICE O/P EST LOW 20 MIN: CPT | Performed by: FAMILY MEDICINE

## 2020-09-11 RX ORDER — PREDNISONE 20 MG/1
TABLET ORAL
Qty: 25 TABLET | Refills: 0 | Status: SHIPPED | OUTPATIENT
Start: 2020-09-11 | End: 2020-11-10

## 2020-09-11 RX ORDER — HYDROCODONE BITARTRATE AND ACETAMINOPHEN 5; 325 MG/1; MG/1
1 TABLET ORAL EVERY 8 HOURS PRN
Qty: 15 TABLET | Refills: 0 | Status: SHIPPED | OUTPATIENT
Start: 2020-09-11 | End: 2020-09-16

## 2020-09-11 NOTE — PROGRESS NOTES
MD   SUPREP BOWEL PREP KIT 17.5-3.13-1.6 GM/177ML SOLN Take 1 kit by mouth See Admin Instructions Use as directed 8/11/20   Georgette Rosenberg MD         Review of Systems   Constitutional: Negative. HENT: Negative. Respiratory: Negative. Cardiovascular: Negative. Gastrointestinal: Negative. Musculoskeletal: Positive for arthralgias (right knee pain), gait problem and joint swelling. All other systems reviewed and are negative. Objective:   Physical Exam  Vitals signs and nursing note reviewed. Constitutional:       Appearance: She is well-developed. HENT:      Head: Normocephalic and atraumatic. Right Ear: Tympanic membrane normal.      Left Ear: Tympanic membrane normal.   Cardiovascular:      Rate and Rhythm: Normal rate and regular rhythm. Heart sounds: Normal heart sounds. No murmur. Pulmonary:      Effort: Pulmonary effort is normal.      Breath sounds: Normal breath sounds. Abdominal:      General: Bowel sounds are normal.      Palpations: Abdomen is soft. Musculoskeletal:      Right knee: She exhibits decreased range of motion, swelling, effusion and abnormal meniscus. Tenderness found. Medial joint line tenderness noted. Skin:     General: Skin is warm and dry. Neurological:      Mental Status: She is alert and oriented to person, place, and time. Psychiatric:         Behavior: Behavior normal.         Assessment:       Diagnosis Orders   1. Acute pain of right knee  HYDROcodone-acetaminophen (NORCO) 5-325 MG per tablet   2.  Knee effusion, right  predniSONE (DELTASONE) 20 MG tablet           Plan:      -  Orders above  -  Gentle home exercises  -  RICE  -  RTO prn        Kingston Pitt DO

## 2020-09-11 NOTE — PROGRESS NOTES
Chronic Disease Visit Information    BP Readings from Last 3 Encounters:   09/11/20 134/70   08/11/20 (!) 144/71   06/12/20 118/70          Hemoglobin A1C (%)   Date Value   06/05/2020 6.3   11/01/2019 6.1   05/01/2019 6.1 (H)     LDL Calculated (mg/dL)   Date Value   06/05/2020 See Note     HDL   Date Value   06/05/2020 22 mg/dL (L)   02/04/2012 44 mg/dl   02/04/2012 44 mg/dl     BUN (mg/dL)   Date Value   11/01/2019 14     CREATININE (mg/dL)   Date Value   11/01/2019 0.86     Glucose (mg/dL)   Date Value   11/01/2019 112 (H)            Have you changed or started any medications since your last visit including any over-the-counter medicines, vitamins, or herbal medicines? no   Are you having any side effects from any of your medications? -  no  Have you stopped taking any of your medications? Is so, why? -  no    Have you seen any other physician or provider since your last visit? Yes - Records Obtained  Have you had any other diagnostic tests since your last visit? Yes - Records Obtained  Have you been seen in the emergency room and/or had an admission to a hospital since we last saw you? Yes - Records Obtained  Have you had your annual diabetic retinal (eye) exam? Yes - Records Requested  Have you had your routine dental cleaning in the past 6 months? yes - 06/2020    Have you activated your [employer] account? If not, what are your barriers?  Yes     Patient Care Team:  Devon Arciniega DO as PCP - General (Family Medicine)  Devon Arciniega DO as PCP [facility]  Chao Stearns MD as Consulting Physician (Gastroenterology)  Merrill Recinos MD as Consulting Physician (Hematology and Oncology)  Joanne Galicia MD (Obstetrics & Gynecology)         Medical History Review  Past Medical, Family, and Social History reviewed and does not contribute to the patient presenting condition    Health Maintenance   Topic Date Due    Shingles Vaccine (1 of 2) 01/06/2009    Cervical cancer screen  12/13/2016  Low dose CT lung screening  01/15/2017    Breast cancer screen  06/07/2020    Flu vaccine (1) 09/01/2020    Diabetic microalbuminuria test  11/01/2020    Potassium monitoring  11/01/2020    Creatinine monitoring  11/01/2020    A1C test (Diabetic or Prediabetic)  06/05/2021    Lipid screen  06/05/2021    Diabetic foot exam  06/12/2021    Diabetic retinal exam  07/22/2021    DTaP/Tdap/Td vaccine (2 - Td) 05/04/2025    Colon cancer screen colonoscopy  08/28/2030    Pneumococcal 0-64 years Vaccine  Completed    Hepatitis C screen  Completed    HIV screen  Completed    Hepatitis A vaccine  Aged Out    Hib vaccine  Aged Out    Meningococcal (ACWY) vaccine  Aged Out

## 2020-09-12 ASSESSMENT — ENCOUNTER SYMPTOMS
RESPIRATORY NEGATIVE: 1
GASTROINTESTINAL NEGATIVE: 1

## 2020-09-25 ENCOUNTER — OFFICE VISIT (OUTPATIENT)
Dept: FAMILY MEDICINE CLINIC | Age: 60
End: 2020-09-25
Payer: OTHER GOVERNMENT

## 2020-09-25 VITALS
SYSTOLIC BLOOD PRESSURE: 126 MMHG | BODY MASS INDEX: 38.09 KG/M2 | TEMPERATURE: 97.1 F | RESPIRATION RATE: 24 BRPM | HEART RATE: 92 BPM | WEIGHT: 250.5 LBS | DIASTOLIC BLOOD PRESSURE: 64 MMHG

## 2020-09-25 PROCEDURE — 20610 DRAIN/INJ JOINT/BURSA W/O US: CPT | Performed by: FAMILY MEDICINE

## 2020-09-25 PROCEDURE — 99213 OFFICE O/P EST LOW 20 MIN: CPT | Performed by: FAMILY MEDICINE

## 2020-09-25 RX ORDER — METHYLPREDNISOLONE ACETATE 80 MG/ML
80 INJECTION, SUSPENSION INTRA-ARTICULAR; INTRALESIONAL; INTRAMUSCULAR; SOFT TISSUE ONCE
Status: COMPLETED | OUTPATIENT
Start: 2020-09-25 | End: 2020-09-25

## 2020-09-25 RX ADMIN — METHYLPREDNISOLONE ACETATE 80 MG: 80 INJECTION, SUSPENSION INTRA-ARTICULAR; INTRALESIONAL; INTRAMUSCULAR; SOFT TISSUE at 10:40

## 2020-09-25 NOTE — PROGRESS NOTES
Subjective:      Patient ID: Reed Son is a 63 y.o. female. HPI:    Chief Complaint   Patient presents with    Knee Pain     right     Pt here for follow up on her right knee. Was doing really well no the steroids but pain and swelling returned. Still with swelling, decreased flexion and pain with sitting and walking. Knee feels like it wants to give out. Patient Active Problem List   Diagnosis    DM type 2 (diabetes mellitus, type 2) ([facility] [address]    Hyperlipemia    THORNTON (nonalcoholic steatohepatitis)    Obesity    Tobacco abuse    Vitamin D deficiency    Seborrheic keratosis    Dyshidrotic eczema     Past Surgical History:   Procedure Laterality Date    BLADDER SUSPENSION      HERNIA REPAIR  10/17/2016    KNEE SURGERY      Right knee    MASTECTOMY, PARTIAL Left     TUBAL LIGATION      WISDOM TOOTH EXTRACTION       Prior to Admission medications    Medication Sig Start Date End Date Taking?  Authorizing Provider   predniSONE (DELTASONE) 20 MG tablet Take 3 po daily for 4 days, 2 po daily for 4 days, 1 po daily for 4 days, 1/2 po daily for 2 days 9/11/20  Yes Bisi Venegas DO   fenofibrate 160 MG tablet TAKE 1 TABLET DAILY 1/28/20  Yes Bisi Venegas DO   losartan (COZAAR) 25 MG tablet TAKE 1 TABLET DAILY 12/16/19  Yes Bisi Venegas DO   metFORMIN (GLUCOPHAGE) 1000 MG tablet TAKE 1 TABLET TWICE A DAY WITH MEALS 12/16/19  Yes Bisi Venegas DO   atorvastatin (LIPITOR) 40 MG tablet TAKE 1 TABLET NIGHTLY 12/2/19  Yes Bisi Venegas DO   doxepin (SINEQUAN) 10 MG capsule TAKE 1 CAPSULE NIGHTLY 11/19/19  Yes Bisi Venegas DO   acetaminophen (TYLENOL) 325 MG tablet Take 650 mg by mouth as needed for Pain   Yes Historical Provider, MD   tamoxifen (NOLVADEX) 20 MG tablet Take 20 mg by mouth daily   Yes Olegario Reza MD   KRILL OIL PO Take 350 mg by mouth daily   Yes Historical Provider, MD   Multiple Vitamins-Minerals (THERAPEUTIC MULTIVITAMIN-MINERALS) tablet Take 1 tablet by mouth daily   Yes Historical Provider, MD   aspirin 81 MG tablet Take 81 mg by mouth daily. Yes Historical Provider, MD   HYDROcodone-acetaminophen (NORCO)  MG per tablet Take 1 tablet by mouth every 6 hours as needed for Pain. Historical Provider, MD [address] KIT 17.5-3.13-1.6 GM/177ML SOLN Take 1 kit by mouth See Admin Instructions Use as directed  Patient not taking: Reported on 9/25/2020 8/11/20   Viral Akins MD         Review of Systems   Constitutional: Negative. HENT: Negative. Respiratory: Negative. Cardiovascular: Negative. Gastrointestinal: Negative. Musculoskeletal: Positive for arthralgias (right knee pain), gait problem and joint swelling. All other systems reviewed and are negative. Objective:   Physical Exam  Vitals signs and nursing note reviewed. Constitutional:       Appearance: She is well-developed. HENT:      Head: Normocephalic and atraumatic. Right Ear: Tympanic membrane normal.      Left Ear: Tympanic membrane normal.   Cardiovascular:      Rate and Rhythm: Normal rate and regular rhythm. Heart sounds: Normal heart sounds. No murmur. Pulmonary:      Effort: Pulmonary effort is normal.      Breath sounds: Normal breath sounds. Abdominal:      General: Bowel sounds are normal.      Palpations: Abdomen is soft. Musculoskeletal:      Right knee: She exhibits decreased range of motion, swelling, effusion and abnormal meniscus. Tenderness found. Medial joint line tenderness noted. Skin:     General: Skin is warm and dry. Neurological:      Mental Status: She is alert and oriented to person, place, and time. Psychiatric:         Behavior: Behavior normal.         Assessment:       Diagnosis Orders   1. Knee effusion, right  methylPREDNISolone acetate (DEPO-MEDROL) injection 80 mg    [address]10 - OR DRAIN/INJECT LARGE JOINT/BURSA   2. Acute pain of right knee             Plan:      -  Risks/benefits discussed.   After patient consent obtained, the Right infrapatellar region of the knee was cleansed and anesthetized with ethyl chloride. The knee was then injected with DepoMedrol 80mg mixed with 2cc of Marcaine.   Ice then applied for 10 minutes  -  RTO prn          Louie Walker DO

## 2020-09-25 NOTE — PROGRESS NOTES
01/15/2017    Breast cancer screen  06/07/2020    Flu vaccine (1) 09/01/2020    Diabetic microalbuminuria test  11/01/2020    Potassium monitoring  11/01/2020    Creatinine monitoring  11/01/2020    Statin Therapy  12/02/2020    A1C test (Diabetic or Prediabetic)  06/05/2021    Lipid screen  06/05/2021    Diabetic foot exam  06/12/2021    Diabetic retinal exam  07/22/2021    DTaP/Tdap/Td vaccine (2 - Td) 05/04/2025    Colon cancer screen colonoscopy  08/28/2030    Pneumococcal 0-64 years Vaccine  Completed    Hepatitis C screen  Completed    HIV screen  Completed    Hepatitis A vaccine  Aged Out    Hib vaccine  Aged Out    Meningococcal (ACWY) vaccine  Aged Out

## 2020-09-25 NOTE — PATIENT INSTRUCTIONS
You may receive a survey regarding the care you received during your visit. Your input is valuable to us. We encourage you to complete and return your survey. We hope you will choose us in the future for your healthcare needs. Tobacco Cessation Programs     Telephonic behavior modification  Lisa Junior ([phone])   Counseling service for those who are ready to quit using tobacco.     Available for uninsured [place] residents, [place] recipients, pregnant women, or patients whose health plans or employers are members of the [address] [place] [address] behavior modification   http://[place]. Quitlogix. org   Online support program to help patients through each step of the quitting process. Available 24 hours a day 7 days a week. Provides up to date researched based tool, step-by-step guides, and motivational messages. Online behavior modification   www.lungusa.org/stop-smoking/how-to-quit   HelpLine: [facility] (351-7516)   Email questions to:  Carly@[employer]. org    Website offers resources to help tobacco users figure out their reasons for quitting and then take the big step of quitting for good. Hypnosis   Location: [facility]"[employer] [place] [address], [place]   Contact: Lasha Pappas, PhD at [phone]   Hypnosis for tobacco cessation   Cost $225 for the initial session and $175 for each session afterwards. Most patients require 6-8 sessions. There is the option to submit through the patients insurance. Hypnosis and behavior modification   Location: [facility] [place], [place]   Contact: Shane Newberry, PhD at [phone]  [facility] Counseling and hypnosis for nicotine addition   Cost: For uninsured patients:  Please call above phone number  Cost for insured patients depends on patients insurance plan.     Behavior modification   Location: [facility], [address] [facility] [address] [place], [address]: [place] [address] include four one-on-one appointments between the patient and a respiratory therapist.  The four appointments span over three weeks. The respiratory therapist schedules one of the appointments to occur 48 hours after the patients quit date.  Cost $100 total for the four sessions.   Tobacco cessation products are not included in the cost and are not provided by [facility].     Berta Emanuel

## 2020-09-26 ENCOUNTER — HOSPITAL ENCOUNTER (EMERGENCY)
Age: 60
Discharge: HOME | End: 2020-09-26
Payer: OTHER GOVERNMENT

## 2020-09-26 VITALS
SYSTOLIC BLOOD PRESSURE: 192 MMHG | HEIGHT: 68 IN | OXYGEN SATURATION: 95 % | RESPIRATION RATE: 22 BRPM | WEIGHT: 250 LBS | TEMPERATURE: 97.2 F | DIASTOLIC BLOOD PRESSURE: 77 MMHG | BODY MASS INDEX: 37.89 KG/M2 | HEART RATE: 114 BPM

## 2020-09-26 PROCEDURE — 99212 OFFICE O/P EST SF 10 MIN: CPT

## 2020-09-26 PROCEDURE — 99213 OFFICE O/P EST LOW 20 MIN: CPT | Performed by: NURSE PRACTITIONER

## 2020-09-26 RX ORDER — HYDROCODONE BITARTRATE AND ACETAMINOPHEN 10; 325 MG/1; MG/1
1 TABLET ORAL EVERY 6 HOURS PRN
COMMUNITY
End: 2020-12-15

## 2020-09-26 ASSESSMENT — PAIN DESCRIPTION - PAIN TYPE: TYPE: ACUTE PAIN

## 2020-09-26 ASSESSMENT — ENCOUNTER SYMPTOMS
VOMITING: 0
NAUSEA: 0

## 2020-09-26 ASSESSMENT — PAIN SCALES - GENERAL: PAINLEVEL_OUTOF10: 10

## 2020-09-26 ASSESSMENT — PAIN DESCRIPTION - DESCRIPTORS: DESCRIPTORS: ACHING

## 2020-09-26 ASSESSMENT — PAIN DESCRIPTION - FREQUENCY: FREQUENCY: CONTINUOUS

## 2020-09-26 ASSESSMENT — PAIN DESCRIPTION - PROGRESSION: CLINICAL_PROGRESSION: NOT CHANGED

## 2020-09-26 ASSESSMENT — PAIN DESCRIPTION - ORIENTATION: ORIENTATION: RIGHT

## 2020-09-26 NOTE — ED TRIAGE NOTES
Patient presents to [facility] with complaints of right knee pain that's been on going for 2+ months. Patient reports she has hydrocodone that  prescribed and that its not working along with steroids. Patient reports getting an injection in her right knee yesterday.

## 2020-09-27 NOTE — ED PROVIDER NOTES
[address]  Urgent Care Encounter       CHIEF COMPLAINT       Chief Complaint   Patient presents with    Knee Pain     right       Nurses Notes reviewed and I agree except as noted in the HPI. HISTORY OF PRESENT ILLNESS   Sofi Dee is a 63 y.o. female who presents with complaints of right knee pain. Knee pain has been ongoing for the last 3 weeks. She initially saw her PCP who prescribed Norco and prednisone. Pain did resolve  after 2 or 3 days prednisone. She returned to her PCP yesterday due to return of the pain. She was given a corticosteroid injection in the knee at that time. She has continue to use the Norco and states that it is not helping with the pain. She does report increased pain with walking. The history is provided by the patient. REVIEW OF SYSTEMS     Review of Systems   Constitutional: Negative for fever. Gastrointestinal: Negative for nausea and vomiting. Musculoskeletal: Positive for arthralgias (Right knee pain). Skin: Negative for wound. Neurological: Negative for weakness and numbness. PAST MEDICAL HISTORY         Diagnosis Date    Hx of breast biopsy Jan 8 2016    Hyperlipidemia     Type II or unspecified type diabetes mellitus without mention of complication, not stated as uncontrolled        SURGICALHISTORY     Patient  has a past surgical history that includes knee surgery; Tubal ligation; bladder suspension; [place] tooth extraction; Mastectomy, partial (Left); and hernia repair (10/17/2016). CURRENT MEDICATIONS       Discharge Medication List as of 9/26/2020  7:16 PM      CONTINUE these medications which have NOT CHANGED    Details   HYDROcodone-acetaminophen (NORCO)  MG per tablet Take 1 tablet by mouth every 6 hours as needed for Pain. Historical Med      fenofibrate 160 MG tablet TAKE 1 TABLET DAILY, Disp-90 tablet, R-4Normal      losartan (COZAAR) 25 MG tablet TAKE 1 TABLET DAILY, Disp-90 tablet, R-4Normal 22, SpO2: 95 %,Estimated body mass index is 38.01 kg/m² as calculated from the following:    Height as of this encounter: 5' 8\" (1.727 m). Weight as of this encounter: 250 lb (113.4 kg). ,No LMP recorded. Patient is postmenopausal.    Physical Exam  Vitals signs and nursing note reviewed. Constitutional:       General: She is not in acute distress. Appearance: She is well-developed. HENT:      Head: Normocephalic and atraumatic. Pulmonary:      Effort: Pulmonary effort is normal. No respiratory distress. Musculoskeletal:      Right knee: She exhibits normal range of motion, no swelling, no LCL laxity and no MCL laxity. Tenderness found. Medial joint line tenderness noted. Skin:     General: Skin is warm and dry. Neurological:      General: No focal deficit present. Mental Status: She is alert and oriented to person, place, and time. Psychiatric:         Mood and Affect: Mood normal.         Speech: Speech normal.         Behavior: Behavior normal. Behavior is cooperative. DIAGNOSTIC RESULTS     Labs:No results found for this visit on 09/26/20. IMAGING:    No orders to display         EKG:      URGENT CARE COURSE:     Vitals:    09/26/20 1851   BP: (!) 192/77   Pulse: 114   Resp: 22   Temp: 97.2 °F (36.2 °C)   TempSrc: Tympanic   SpO2: 95%   Weight: 250 lb (113.4 kg)   Height: 5' 8\" (1.727 m)       Medications - No data to display         PROCEDURES:  None    FINAL IMPRESSION      1. Acute pain of right knee          DISPOSITION/ PLAN     Patient presents with acute pain of the right knee. According to her PCP note there is a meniscus injury suspected. Patient had a corticosteroid injection yesterday. Patient advised prosper these injections can take several days to take effectt and that she should continue the current management of Norco, ice and elevation. Patient also advised that she could use ibuprofen or naproxen as desired for several days to help with pain.   She does not

## 2020-09-28 ENCOUNTER — TELEPHONE (OUTPATIENT)
Dept: FAMILY MEDICINE CLINIC | Age: 60
End: 2020-09-28

## 2020-09-28 ASSESSMENT — ENCOUNTER SYMPTOMS
GASTROINTESTINAL NEGATIVE: 1
RESPIRATORY NEGATIVE: 1

## 2020-10-06 ENCOUNTER — PATIENT MESSAGE (OUTPATIENT)
Dept: FAMILY MEDICINE CLINIC | Age: 60
End: 2020-10-06

## 2020-10-06 NOTE — TELEPHONE ENCOUNTER
From: Prudence Gutierrez  To: Joshua Clarke DO  Sent: 10/6/2020 9:26 AM EDT  Subject: Visit Gearldine Bracken Dr Randeen Moritz    The shot didn't work as well as the pills. I had bad pain until last Wednesday, Tuesday night I did a firm massage on my knee which made it better till Saturday. Last night I had to take a pain pill just to get some sleep. So I guess it's time for the next step.

## 2020-10-07 ENCOUNTER — TELEPHONE (OUTPATIENT)
Dept: FAMILY MEDICINE CLINIC | Age: 60
End: 2020-10-07

## 2020-10-07 RX ORDER — OXYCODONE HYDROCHLORIDE AND ACETAMINOPHEN 5; 325 MG/1; MG/1
1 TABLET ORAL EVERY 8 HOURS PRN
Qty: 15 TABLET | Refills: 0 | Status: SHIPPED | OUTPATIENT
Start: 2020-10-07 | End: 2020-10-26 | Stop reason: SDUPTHER

## 2020-10-07 NOTE — TELEPHONE ENCOUNTER
Provider has ordered PT to be done at [facility]. Pt has humana miguel this required a PA.  form complete and faxed to [phone]. PA pending. Pt notified via my chart message.

## 2020-10-08 ENCOUNTER — HOSPITAL ENCOUNTER (OUTPATIENT)
Dept: GENERAL RADIOLOGY | Age: 60
Discharge: HOME | End: 2020-10-08
Payer: OTHER GOVERNMENT

## 2020-10-08 ENCOUNTER — HOSPITAL ENCOUNTER (OUTPATIENT)
Age: 60
Discharge: HOME | End: 2020-10-08
Payer: OTHER GOVERNMENT

## 2020-10-08 PROCEDURE — 73564 X-RAY EXAM KNEE 4 OR MORE: CPT

## 2020-10-08 NOTE — TELEPHONE ENCOUNTER
Fax received from [address] [place] [address] stating that PT visits do not require authorization.  Called and updated the patient, she stated that she has her eval on Monday 10/12/20

## 2020-10-26 ENCOUNTER — OFFICE VISIT (OUTPATIENT)
Dept: FAMILY MEDICINE CLINIC | Age: 60
End: 2020-10-26
Payer: OTHER GOVERNMENT

## 2020-10-26 ENCOUNTER — TELEPHONE (OUTPATIENT)
Dept: FAMILY MEDICINE CLINIC | Age: 60
End: 2020-10-26

## 2020-10-26 VITALS
RESPIRATION RATE: 20 BRPM | OXYGEN SATURATION: 98 % | HEART RATE: 113 BPM | TEMPERATURE: 96.4 F | DIASTOLIC BLOOD PRESSURE: 68 MMHG | SYSTOLIC BLOOD PRESSURE: 138 MMHG

## 2020-10-26 PROCEDURE — 96372 THER/PROPH/DIAG INJ SC/IM: CPT | Performed by: FAMILY MEDICINE

## 2020-10-26 PROCEDURE — 99214 OFFICE O/P EST MOD 30 MIN: CPT | Performed by: FAMILY MEDICINE

## 2020-10-26 RX ORDER — METHYLPREDNISOLONE ACETATE 80 MG/ML
80 INJECTION, SUSPENSION INTRA-ARTICULAR; INTRALESIONAL; INTRAMUSCULAR; SOFT TISSUE ONCE
Status: COMPLETED | OUTPATIENT
Start: 2020-10-26 | End: 2020-10-26

## 2020-10-26 RX ORDER — TIZANIDINE HYDROCHLORIDE 4 MG/1
4 CAPSULE, GELATIN COATED ORAL 3 TIMES DAILY PRN
Qty: 30 CAPSULE | Refills: 0 | Status: SHIPPED | OUTPATIENT
Start: 2020-10-26 | End: 2020-12-15

## 2020-10-26 RX ORDER — METHYLPREDNISOLONE ACETATE 40 MG/ML
40 INJECTION, SUSPENSION INTRA-ARTICULAR; INTRALESIONAL; INTRAMUSCULAR; SOFT TISSUE ONCE
Status: COMPLETED | OUTPATIENT
Start: 2020-10-26 | End: 2020-10-26

## 2020-10-26 RX ORDER — OXYCODONE HYDROCHLORIDE AND ACETAMINOPHEN 5; 325 MG/1; MG/1
1 TABLET ORAL EVERY 8 HOURS PRN
Qty: 15 TABLET | Refills: 0 | Status: SHIPPED | OUTPATIENT
Start: 2020-10-26 | End: 2020-11-09 | Stop reason: SDUPTHER

## 2020-10-26 RX ADMIN — METHYLPREDNISOLONE ACETATE 40 MG: 40 INJECTION, SUSPENSION INTRA-ARTICULAR; INTRALESIONAL; INTRAMUSCULAR; SOFT TISSUE at 12:21

## 2020-10-26 RX ADMIN — METHYLPREDNISOLONE ACETATE 80 MG: 80 INJECTION, SUSPENSION INTRA-ARTICULAR; INTRALESIONAL; INTRAMUSCULAR; SOFT TISSUE at 12:21

## 2020-10-26 ASSESSMENT — ENCOUNTER SYMPTOMS
BACK PAIN: 1
RESPIRATORY NEGATIVE: 1
GASTROINTESTINAL NEGATIVE: 1

## 2020-10-26 NOTE — TELEPHONE ENCOUNTER
Patient has a pinched nerve in her right hip. She stated it hurts to walk or lay down. She stated she had to sit on the side of the bed for the last two nights. Symptoms started Saturday and are getting worse.  Please advise

## 2020-10-26 NOTE — PATIENT INSTRUCTIONS
You may receive a survey regarding the care you received during your visit. Your input is valuable to us. We encourage you to complete and return your survey. We hope you will choose us in the future for your healthcare needs. Tobacco Cessation Programs     Telephonic behavior modification  Sonny Copeland ([phone])   Counseling service for those who are ready to quit using tobacco.     Available for uninsured [place] residents, [place] recipients, pregnant women, or patients whose health plans or employers are members of the [address] [place] [address] behavior modification   http://[place]. Quitlogix. org   Online support program to help patients through each step of the quitting process. Available 24 hours a day 7 days a week. Provides up to date researched based tool, step-by-step guides, and motivational messages. Online behavior modification   www.lungusa.org/stop-smoking/how-to-quit   HelpLine: 1-800-LUNG-[place] (280-0825)   Email questions to:  Adenike@[employer]. org    Website offers resources to help tobacco users figure out their reasons for quitting and then take the big step of quitting for good. Hypnosis   [facility] [address] [facility] [address], [place]   Contact: Vanessa Casarez, PhD at [phone]   Hypnosis for tobacco cessation   Cost $225 for the initial session and $175 for each session afterwards. Most patients require 6-8 sessions. There is the option to submit through the patients insurance. Hypnosis and behavior modification   Location: [place] [address],  [facility] [address] [place] [address] [address], [place]   Contact: Bautista Aldana, PhD at [phone]  [facility] Counseling and hypnosis for nicotine addition   Cost: For uninsured patients:  Please call above phone number  Cost for insured patients depends on patients insurance plan.     Behavior modification   Location: [facility], [address] [facility] [address] [place] [address], [address]: [address] four one-on-one

## 2020-10-26 NOTE — PROGRESS NOTES
Visit Information    Have you changed or started any medications since your last visit including any over-the-counter medicines, vitamins, or herbal medicines? Yes   Are you having any side effects from any of your medications? -  no  Have you stopped taking any of your medications? Is so, why? -  yes -     Have you seen any other physician or provider since your last visit? No  Have you had any other diagnostic tests since your last visit? No  Have you been seen in the emergency room and/or had an admission to a hospital since we last saw you? No  Have you had your routine dental cleaning in the past 6 months? na    Have you activated your [employer] account? If not, what are your barriers?  Yes     Patient Care Team:  Leanna Samuels DO as PCP - General (Family Medicine)  eLanna Samuels DO as PCP - [facility]  Jeremy Butler MD as Consulting Physician (Gastroenterology)  Humera Romano MD as Consulting Physician (Hematology and Oncology)  Jorge Luis Garrido MD (Obstetrics & Gynecology)    Medical History Review  Past Medical, Family, and Social History reviewed and does contribute to the patient presenting condition    Health Maintenance   Topic Date Due    Shingles Vaccine (1 of 2) 01/06/2009    Cervical cancer screen  12/13/2016    Low dose CT lung screening  01/15/2017    Breast cancer screen  06/07/2020    Flu vaccine (1) 09/01/2020    Diabetic microalbuminuria test  11/01/2020    Potassium monitoring  11/01/2020    Creatinine monitoring  11/01/2020    A1C test (Diabetic or Prediabetic)  06/05/2021    Lipid screen  06/05/2021    Diabetic foot exam  06/12/2021    Diabetic retinal exam  07/22/2021    DTaP/Tdap/Td vaccine (2 - Td) 05/04/2025    Colon cancer screen colonoscopy  08/28/2030    Pneumococcal 0-64 years Vaccine  Completed    Hepatitis C screen  Completed    HIV screen  Completed    Hepatitis A vaccine  Aged Out    Hib vaccine  Aged Out    Meningococcal (ACWY) vaccine  Aged Out       Per orders of Dr. Aster Silver, injection of Depo-Medrol 2 ML given IM in right dorsogluteal  by Angelita Menendez. Patient instructed to report any adverse reaction to me immediately. Administrations This Visit     methylPREDNISolone acetate (DEPO-MEDROL) injection 40 mg     Admin Date  10/26/2020  12:21 Action  Given Dose  40 mg Route  Intramuscular Site  Dorsogluteal Right Administered By  Didi Macdonald CMA ([address] [place] [address])    Ordering Provider:  Mahad Heaton DO    NDC:  1054-9489-28    Lot#:  [id number]    :  [employer]. Patient Supplied?:  No    Comments:  Ex date: 7/31/2021          methylPREDNISolone acetate (DEPO-MEDROL) injection 80 mg     Admin Date  10/26/2020  12:21 Action  Given Dose  80 mg Route  Intramuscular Site  Dorsogluteal Right Administered By  Didi Macdonald CMA ([facility])    Ordering Provider:  Mahad Heaton DO    NDC:  0375-5291-51    Lot#:  [id number]    :  [employer].     Patient Supplied?:  No    Comments:  Ex date: 8/31/2021

## 2020-10-26 NOTE — PROGRESS NOTES
Subjective:      Patient ID: Dea Bradshaw is a 63 y.o. female. HPI:    Chief Complaint   Patient presents with    Hip Pain     right side, positive for groin pain, also with low back back radiating into right thigh and foot, denies numbness/tingling  taking prescribed pain meds. PT also is doing PT for her right knee     Flu Vaccine     today      Pt here for right-sided low back pain, right groin pain, right knee pain for 2 days. Unable to bear weight without aid of crutch. No numbness or tingling noted. Pain will start in her back and radiate all the way down to her foot. Involves whole foot other than big toe. NKI. No recent fall. Pain started shortly after a PT session. Continues to have significant pain and swelling in her right knee. Taking Percocet for pain but this is not helping her sleep. Have to sit up at side of bed due to pain. Patient Active Problem List   Diagnosis    DM type 2 (diabetes mellitus, type 2) ([facility] [address]    Hyperlipemia    THORNTON (nonalcoholic steatohepatitis)    Obesity    Tobacco abuse    Vitamin D deficiency    Seborrheic keratosis    Dyshidrotic eczema     Past Surgical History:   Procedure Laterality Date    BLADDER SUSPENSION      COLONOSCOPY  08/28/2020    HERNIA REPAIR  10/17/2016    KNEE SURGERY      Right knee    MASTECTOMY, PARTIAL Left     TUBAL LIGATION      WISDOM TOOTH EXTRACTION       Prior to Admission medications    Medication Sig Start Date End Date Taking?  Authorizing Provider   fenofibrate 160 MG tablet TAKE 1 TABLET DAILY 1/28/20  Yes Shanon Bolus, DO   losartan (COZAAR) 25 MG tablet TAKE 1 TABLET DAILY 12/16/19  Yes Shanon Bolus, DO   metFORMIN (GLUCOPHAGE) 1000 MG tablet TAKE 1 TABLET TWICE A DAY WITH MEALS 12/16/19  Yes Shanon Bolus, DO   atorvastatin (LIPITOR) 40 MG tablet TAKE 1 TABLET NIGHTLY 12/2/19  Yes Shanon Bolus, DO   doxepin (SINEQUAN) 10 MG capsule TAKE 1 CAPSULE NIGHTLY 11/19/19  Yes Mahad Heaton DO   acetaminophen (TYLENOL) 325 MG tablet Take 650 mg by mouth as needed for Pain   Yes Historical Provider, MD   tamoxifen (NOLVADEX) 20 MG tablet Take 20 mg by mouth daily   Yes Savannah Bradford MD   KRILL OIL PO Take 350 mg by mouth daily   Yes Historical Provider, MD   Multiple Vitamins-Minerals (THERAPEUTIC MULTIVITAMIN-MINERALS) tablet Take 1 tablet by mouth daily   Yes Historical Provider, MD   aspirin 81 MG tablet Take 81 mg by mouth daily. Yes Historical Provider, MD   HYDROcodone-acetaminophen (NORCO)  MG per tablet Take 1 tablet by mouth every 6 hours as needed for Pain. Historical Provider, MD   predniSONE (DELTASONE) 20 MG tablet Take 3 po daily for 4 days, 2 po daily for 4 days, 1 po daily for 4 days, 1/2 po daily for 2 days  Patient not taking: Reported on 10/26/2020 9/11/20   Mahad Heaton DO         Review of Systems   Constitutional: Negative. HENT: Negative. Respiratory: Negative. Cardiovascular: Negative. Gastrointestinal: Negative. Musculoskeletal: Positive for arthralgias (right hip and right knee pain), back pain and gait problem. Neurological: Negative for weakness and numbness. All other systems reviewed and are negative. Objective:   Physical Exam  Vitals signs and nursing note reviewed. Constitutional:       General: She is not in acute distress. Appearance: Normal appearance. She is well-developed. HENT:      Head: Normocephalic and atraumatic. Right Ear: Tympanic membrane normal.      Left Ear: Tympanic membrane normal.   Eyes:      Conjunctiva/sclera: Conjunctivae normal.   Neck:      Musculoskeletal: Neck supple. Cardiovascular:      Rate and Rhythm: Normal rate and regular rhythm. Heart sounds: Normal heart sounds. No murmur. Pulmonary:      Effort: Pulmonary effort is normal.      Breath sounds: Normal breath sounds. No wheezing, rhonchi or rales. Abdominal:      General: There is no distension. Musculoskeletal:      Right hip: She exhibits decreased range of motion. She exhibits no bony tenderness and no deformity. Right knee: She exhibits decreased range of motion, swelling, effusion and abnormal meniscus. Tenderness found. Medial joint line and lateral joint line tenderness noted. Lumbar back: She exhibits decreased range of motion, tenderness and pain. Back:    Skin:     General: Skin is warm and dry. Findings: No rash (on exposed surfaces). Neurological:      General: No focal deficit present. Mental Status: She is alert. Psychiatric:         Attention and Perception: Attention normal.         Mood and Affect: Mood normal.         Speech: Speech normal.         Behavior: Behavior normal. Behavior is cooperative. Thought Content: Thought content normal.         Judgment: Judgment normal.         Assessment:       Diagnosis Orders   1. Acute right hip pain     2. Acute back pain with sciatica, right  methylPREDNISolone acetate (DEPO-MEDROL) injection 80 mg    methylPREDNISolone acetate (DEPO-MEDROL) injection 40 mg    tiZANidine (ZANAFLEX) 4 MG capsule   3. Acute pain of right knee  oxyCODONE-acetaminophen (PERCOCET) 5-325 MG per tablet    MRI KNEE RIGHT WO CONTRAST   4. Internal derangement of right knee  MRI KNEE RIGHT WO CONTRAST   5.  Knee effusion, right  MRI KNEE RIGHT WO CONTRAST           Plan:      -  Orders above  -  Continue PT  -  RTO prn for now        Lanette Garvey, DO

## 2020-10-28 ENCOUNTER — TELEPHONE (OUTPATIENT)
Dept: FAMILY MEDICINE CLINIC | Age: 60
End: 2020-10-28

## 2020-10-29 NOTE — TELEPHONE ENCOUNTER
[facility] fax received MRI right knee does NOT require a PA. Referral/approval will be scanned into media.

## 2020-10-29 NOTE — TELEPHONE ENCOUNTER
Pt informed via my chart message that MRI right knee requires no pa with [employer]/.   MRI right knee is scheduled for 11/9/2020 at 4:30 pm.

## 2020-11-04 RX ORDER — ATORVASTATIN CALCIUM 40 MG/1
TABLET, FILM COATED ORAL
Qty: 90 TABLET | Refills: 3 | Status: SHIPPED | OUTPATIENT
Start: 2020-11-04 | End: 2021-11-01

## 2020-11-09 ENCOUNTER — HOSPITAL ENCOUNTER (OUTPATIENT)
Dept: MRI IMAGING | Age: 60
Discharge: HOME | End: 2020-11-09
Payer: OTHER GOVERNMENT

## 2020-11-09 ENCOUNTER — PATIENT MESSAGE (OUTPATIENT)
Dept: FAMILY MEDICINE CLINIC | Age: 60
End: 2020-11-09

## 2020-11-09 PROCEDURE — 73721 MRI JNT OF LWR EXTRE W/O DYE: CPT

## 2020-11-09 RX ORDER — OXYCODONE HYDROCHLORIDE AND ACETAMINOPHEN 5; 325 MG/1; MG/1
1 TABLET ORAL EVERY 8 HOURS PRN
Qty: 15 TABLET | Refills: 0 | Status: SHIPPED | OUTPATIENT
Start: 2020-11-09 | End: 2020-12-03 | Stop reason: SDUPTHER

## 2020-11-09 NOTE — TELEPHONE ENCOUNTER
From: Denita Warner  To: Naz So DO  Sent: 11/9/2020 8:05 AM EST  Subject: Prescription Question    Can I get another prescription for the last pain medication you gave me. I take it at night when the pain will not let me sleep. I tried melatonin 5 mg it doesn't work neither does the Tylenol.

## 2020-11-10 ENCOUNTER — TELEPHONE (OUTPATIENT)
Dept: FAMILY MEDICINE CLINIC | Age: 60
End: 2020-11-10

## 2020-11-10 ENCOUNTER — NURSE TRIAGE (OUTPATIENT)
Dept: OTHER | Facility: CLINIC | Age: 60
End: 2020-11-10

## 2020-11-10 ENCOUNTER — VIRTUAL VISIT (OUTPATIENT)
Dept: FAMILY MEDICINE CLINIC | Age: 60
End: 2020-11-10
Payer: OTHER GOVERNMENT

## 2020-11-10 PROCEDURE — 99213 OFFICE O/P EST LOW 20 MIN: CPT | Performed by: FAMILY MEDICINE

## 2020-11-10 RX ORDER — CLONAZEPAM 1 MG/1
1 TABLET ORAL NIGHTLY
Qty: 10 TABLET | Refills: 0 | Status: SHIPPED | OUTPATIENT
Start: 2020-11-10 | End: 2020-12-15

## 2020-11-10 RX ORDER — METHYLPREDNISOLONE 4 MG/1
TABLET ORAL
Qty: 1 KIT | Refills: 0 | Status: SHIPPED | OUTPATIENT
Start: 2020-11-10 | End: 2020-11-16

## 2020-11-10 NOTE — TELEPHONE ENCOUNTER
Received call from [place] in [facility]. Call soft transferred to [facility] [place] in [address] to schedule appointment. Attention Provider: Thank you for allowing me to participate in the care of your patient. The  patient was connected to triage in response to information provided to the [facility]. Please do not respond through this encounter as the response is not directed to a shared pool. Reason for Disposition   Diabetes mellitus or a weak immune system (e.g., HIV positive, cancer chemotherapy, transplant patient)    Answer Assessment - Initial Assessment Questions  1. LOCATION: \"Which ear is involved? \"      Left ear    2. ONSET: \"When did the ear start hurting\"       Last night    3. SEVERITY: \"How bad is the pain? \"  (Scale 1-10; mild, moderate or severe)    - MILD (1-3): doesn't interfere with normal activities     - MODERATE (4-7): interferes with normal activities or awakens from sleep     - SEVERE (8-10): excruciating pain, unable to do any normal activities       7/10    4. URI SYMPTOMS: \"Do you have a runny nose or cough? \"      Smoker, chronic cough and chronic sinus    5. FEVER: \"Do you have a fever? \" If so, ask: \"What is your temperature, how was it measured, and when did it start? \"      Denies    6. CAUSE: \"Have you been swimming recently? \", \"How often do you use Q-TIPS? \", \"Have you had any recent air travel or scuba diving? \"      Unsure    7. OTHER SYMPTOMS: \"Do you have any other symptoms? \" (e.g., headache, stiff neck, dizziness, vomiting, runny nose, decreased hearing)      Hurts to open mouth, move jaw, to chew. 8. PREGNANCY: \"Is there any chance you are pregnant? \" \"When was your last menstrual period? \"      N/a    Protocols used: EARACHE-ADULT-[place]

## 2020-11-10 NOTE — PROGRESS NOTES
Subjective:      Patient ID: Iftikhar Robles is a 63 y.o. female. HPI:    Chief Complaint   Patient presents with    Jaw Pain       Kaycee MARIS Black (:  1960) has requested an audio/video evaluation for the following concern(s):    Pt presents today with c/o left jaw pain at the TMJ for the last 2 days. Denies clicking. The jaw region is swollen per pt. No fevers. No rash. No ST. No ear drainage. Does not grind her teeth that she is aware of. Patient Active Problem List   Diagnosis    DM type 2 (diabetes mellitus, type 2) ([facility] [address]    Hyperlipemia    THORNTON (nonalcoholic steatohepatitis)    Obesity    Tobacco abuse    Vitamin D deficiency    Seborrheic keratosis    Dyshidrotic eczema     Past Surgical History:   Procedure Laterality Date    BLADDER SUSPENSION      COLONOSCOPY  2020    HERNIA REPAIR  10/17/2016    KNEE SURGERY      Right knee    MASTECTOMY, PARTIAL Left     TUBAL LIGATION      WISDOM TOOTH EXTRACTION       Prior to Admission medications    Medication Sig Start Date End Date Taking? Authorizing Provider   methylPREDNISolone (MEDROL, FAN,) 4 MG tablet As directed 11/10/20 11/16/20 Yes Emilee Knapp DO   clonazePAM (KLONOPIN) 1 MG tablet Take 1 tablet by mouth nightly for 10 days. 11/10/20 11/20/20 Yes Emilee Knapp DO   oxyCODONE-acetaminophen (PERCOCET) 5-325 MG per tablet Take 1 tablet by mouth every 8 hours as needed for Pain for up to 5 days. 20  Emilee Knapp DO   atorvastatin (LIPITOR) 40 MG tablet TAKE 1 TABLET NIGHTLY 20   Emilee Knapp DO   tiZANidine (ZANAFLEX) 4 MG capsule Take 1 capsule by mouth 3 times daily as needed for Muscle spasms 10/26/20   Emilee Knapp DO   HYDROcodone-acetaminophen [facility])  MG per tablet Take 1 tablet by mouth every 6 hours as needed for Pain.     Historical Provider, MD   fenofibrate 160 MG tablet TAKE 1 TABLET DAILY 20   Emilee Knapp DO   losartan (COZAAR) 25 MG tablet TAKE 1 TABLET DAILY 12/16/19   [place] Flash, DO   metFORMIN (GLUCOPHAGE) 1000 MG tablet TAKE 1 TABLET TWICE A DAY WITH MEALS 12/16/19   [place] Flash, DO   doxepin [facility]) 10 MG capsule TAKE 1 CAPSULE NIGHTLY 11/19/19   [place] Flash, DO   acetaminophen (TYLENOL) 325 MG tablet Take 650 mg by mouth as needed for Pain    Historical Provider, MD   tamoxifen (NOLVADEX) 20 MG tablet Take 20 mg by mouth daily    Yessenia Bhatia MD   KRILL OIL PO Take 350 mg by mouth daily    Historical Provider, MD   Multiple Vitamins-Minerals (THERAPEUTIC MULTIVITAMIN-MINERALS) tablet Take 1 tablet by mouth daily    Historical Provider, MD   aspirin 81 MG tablet Take 81 mg by mouth daily. Historical Provider, MD         Review of Systems   Constitutional: Negative. HENT: Negative. Jaw pain     Respiratory: Negative. Cardiovascular: Negative. Gastrointestinal: Negative. Musculoskeletal: Negative. All other systems reviewed and are negative. Objective:   Physical Exam  Constitutional:       General: She is not in acute distress. Appearance: Normal appearance. She is well-developed. She is not ill-appearing. HENT:      Head: Normocephalic and atraumatic. Right Ear: External ear normal.      Left Ear: External ear normal.   Eyes:      Conjunctiva/sclera: Conjunctivae normal.   Pulmonary:      Effort: Pulmonary effort is normal. No respiratory distress. Skin:     Findings: No rash (on exposed surfaces). Neurological:      Mental Status: She is alert and oriented to person, place, and time. Psychiatric:         Mood and Affect: Mood normal.         Behavior: Behavior normal.         Thought Content: Thought content normal.         Judgment: Judgment normal.         Assessment:       Diagnosis Orders   1.  TMJ capsulitis  methylPREDNISolone (MEDROL, FAN,) 4 MG tablet    clonazePAM (KLONOPIN) 1 MG tablet           Plan:      -  Orders above  -  Soft foods  - Will see in office if no improvement    Due to this being a TeleHealth encounter (During [address] emergency), evaluation of the following organ systems was limited: Vitals/Constitutional/EENT/Resp/CV/GI//MS/Neuro/Skin/Heme-Lymph-Imm. Pursuant to the emergency declaration under the [facility], [address] [place] [address] and the [employer] Act, this Virtual Visit was conducted with patient's (and/or legal guardian's) consent, to reduce the patient's risk of exposure to COVID-19 and provide necessary medical care. The patient (and/or legal guardian) has also been advised to contact this office for worsening conditions or problems, and seek emergency medical treatment and/or call 911 if deemed necessary. Patient identification was verified at the start of the visit: Yes    Total time spent for this encounter: Not billed by time    Services were provided through a video synchronous discussion virtually to substitute for in-person clinic visit. Patient and provider were located at their individual homes.           Ben Rico DO

## 2020-11-10 NOTE — TELEPHONE ENCOUNTER
[facility] referral form complete and faxed to [facility] at [phone] for margarita BEE for OIO Dr. Luis Leo. Pt's aware that [facility] will call her to schedule on authorization is received.

## 2020-11-11 ASSESSMENT — ENCOUNTER SYMPTOMS
GASTROINTESTINAL NEGATIVE: 1
RESPIRATORY NEGATIVE: 1

## 2020-12-01 ENCOUNTER — TELEPHONE (OUTPATIENT)
Dept: FAMILY MEDICINE CLINIC | Age: 60
End: 2020-12-01

## 2020-12-01 NOTE — TELEPHONE ENCOUNTER
Spoke with pt she called [facility] who states they NEVER received out referral auth form. Explained to pt that referral was faxed 11/1/2020 and again on 11/23/2020. Referral faxed for the THIRD time to [facility] at [phone].

## 2020-12-01 NOTE — TELEPHONE ENCOUNTER
PATIENT: YAIMA GONCALVES   PROVIDER: [address] REFERRALS AND AUTHORIZATIONS   BPX:[phone]  OR THE WOMAN SAYS THAT YOU CAN ALSO SEND THROUGH THE PROVIDER PORTAL

## 2020-12-01 NOTE — TELEPHONE ENCOUNTER
Spoke with patient and I asked her to contact her insurance regarding referral to [address] [place], [place] [address]. Patient states she will go online and check on this. She said she saw Dr Anna Bragg a few weeks ago for her knee. She couldn't wait on insurance to approve this or not and is scheduled for upcoming surgery in January with him for her knee.

## 2020-12-02 RX ORDER — HYDROCODONE BITARTRATE AND ACETAMINOPHEN 10; 325 MG/1; MG/1
1 TABLET ORAL EVERY 6 HOURS PRN
OUTPATIENT
Start: 2020-12-02

## 2020-12-02 NOTE — TELEPHONE ENCOUNTER
Pt called office left vm stating she spoke with her insurance and was advised Dr. Toshia Saez is not in her network.

## 2020-12-02 NOTE — TELEPHONE ENCOUNTER
Patient requesting refill of [place] to [facility].   Please Refill if appropriate

## 2020-12-03 RX ORDER — OXYCODONE HYDROCHLORIDE AND ACETAMINOPHEN 5; 325 MG/1; MG/1
1 TABLET ORAL EVERY 8 HOURS PRN
Qty: 15 TABLET | Refills: 0 | Status: SHIPPED | OUTPATIENT
Start: 2020-12-03 | End: 2020-12-08

## 2020-12-11 LAB
ABSOLUTE BASO #: 0.1 X10E9/L (ref 0–0.2)
ABSOLUTE EOS #: 0.1 X10E9/L (ref 0–0.4)
ABSOLUTE LYMPH #: 1.8 X10E9/L (ref 1–3.5)
ABSOLUTE MONO #: 0.7 X10E9/L (ref 0–0.9)
ABSOLUTE NEUT #: 7.5 X10E9/L (ref 1.5–6.6)
ALBUMIN SERPL-MCNC: 3.5 G/DL (ref 3.2–5.3)
ALK PHOSPHATASE: 45 U/L (ref 39–130)
ALT SERPL-CCNC: 14 U/L (ref 0–31)
ANION GAP SERPL CALCULATED.3IONS-SCNC: 10 MMOL/L (ref 5–15)
AST SERPL-CCNC: 17 U/L (ref 0–41)
AVERAGE GLUCOSE: 123 MG/DL
BASOPHILS RELATIVE PERCENT: 0.9 %
BILIRUB SERPL-MCNC: 0.5 MG/DL (ref 0.3–1.2)
BUN BLDV-MCNC: 11 MG/DL (ref 5–27)
CALCIUM SERPL-MCNC: 9.3 MG/DL (ref 8.5–10.5)
CHLORIDE BLD-SCNC: 98 MMOL/L (ref 98–109)
CHOLESTEROL/HDL RATIO: 4.9 (ref 1–5)
CHOLESTEROL: 142 MG/DL (ref 150–200)
CO2: 28 MMOL/L (ref 22–32)
CREAT SERPL-MCNC: 0.64 MG/DL (ref 0.4–1)
EGFR AFRICAN AMERICAN: >60 ML/MIN/1.73SQ.M
EGFR IF NONAFRICAN AMERICAN: >60 ML/MIN/1.73SQ.M
EOSINOPHILS RELATIVE PERCENT: 1.1 %
GLUCOSE: 113 MG/DL (ref 65–99)
HBA1C MFR BLD: 5.9 % (ref 4.4–6.4)
HCT VFR BLD CALC: 47 % (ref 35–47)
HDLC SERPL-MCNC: 29 MG/DL
HEMOGLOBIN: 15.3 G/DL (ref 11.7–15.5)
LDL CHOLESTEROL CALCULATED: 60 MG/DL
LDL/HDL RATIO: 2.1
LYMPHOCYTE %: 17.6 %
MCH RBC QN AUTO: 27.1 PG (ref 27–34)
MCHC RBC AUTO-ENTMCNC: 32.4 G/DL (ref 32–36)
MCV RBC AUTO: 84 FL (ref 80–100)
MONOCYTES # BLD: 6.6 %
NEUTROPHILS RELATIVE PERCENT: 73.8 %
PDW BLD-RTO: 19.1 % (ref 11.5–15)
PLATELETS: ABNORMAL X10E9/L (ref 150–450)
PMV BLD AUTO: ABNORMAL FL (ref 7–12)
POTASSIUM SERPL-SCNC: 4.4 MMOL/L (ref 3.5–5)
RBC: 5.62 X10E12/L (ref 3.8–5.2)
SODIUM BLD-SCNC: 136 MMOL/L (ref 134–146)
TOTAL PROTEIN: 6.4 G/DL (ref 6–8)
TRIGL SERPL-MCNC: 266 MG/DL (ref 27–150)
TSH SERPL DL<=0.05 MIU/L-ACNC: 3.24 UIU/ML (ref 0.49–4.67)
VLDLC SERPL CALC-MCNC: 53 MG/DL (ref 0–30)
WBC: 10.2 X10E9/L (ref 4–11)

## 2020-12-15 ENCOUNTER — OFFICE VISIT (OUTPATIENT)
Dept: FAMILY MEDICINE CLINIC | Age: 60
End: 2020-12-15
Payer: OTHER GOVERNMENT

## 2020-12-15 VITALS
RESPIRATION RATE: 22 BRPM | WEIGHT: 236.7 LBS | SYSTOLIC BLOOD PRESSURE: 112 MMHG | HEART RATE: 108 BPM | BODY MASS INDEX: 35.99 KG/M2 | DIASTOLIC BLOOD PRESSURE: 56 MMHG | TEMPERATURE: 96.4 F

## 2020-12-15 PROCEDURE — 99214 OFFICE O/P EST MOD 30 MIN: CPT | Performed by: FAMILY MEDICINE

## 2020-12-15 PROCEDURE — 90686 IIV4 VACC NO PRSV 0.5 ML IM: CPT | Performed by: FAMILY MEDICINE

## 2020-12-15 PROCEDURE — 90471 IMMUNIZATION ADMIN: CPT | Performed by: FAMILY MEDICINE

## 2020-12-15 RX ORDER — OXYCODONE HYDROCHLORIDE AND ACETAMINOPHEN 5; 325 MG/1; MG/1
1 TABLET ORAL EVERY 8 HOURS PRN
COMMUNITY
End: 2020-12-15 | Stop reason: SDUPTHER

## 2020-12-15 RX ORDER — DOXEPIN HYDROCHLORIDE 10 MG/1
1 CAPSULE ORAL DAILY
COMMUNITY
Start: 2020-11-13 | End: 2021-02-11

## 2020-12-15 RX ORDER — TRIAMCINOLONE ACETONIDE 0.1 %
PASTE (GRAM) DENTAL
Qty: 5 G | Refills: 1 | Status: SHIPPED | OUTPATIENT
Start: 2020-12-15 | End: 2020-12-22

## 2020-12-15 RX ORDER — OXYCODONE HYDROCHLORIDE AND ACETAMINOPHEN 5; 325 MG/1; MG/1
1 TABLET ORAL EVERY 8 HOURS PRN
Qty: 40 TABLET | Refills: 0 | Status: SHIPPED | OUTPATIENT
Start: 2020-12-15 | End: 2021-10-07 | Stop reason: SDUPTHER

## 2020-12-15 ASSESSMENT — ENCOUNTER SYMPTOMS
RESPIRATORY NEGATIVE: 1
GASTROINTESTINAL NEGATIVE: 1

## 2020-12-15 NOTE — PROGRESS NOTES
Subjective:      Patient ID: Prudence Gutierrez is a 63 y.o. female. HPI:    Chief Complaint   Patient presents with    6 Month Follow-Up    Knee Pain     right    Diabetes    Discuss Labs    Flu Vaccine    Medication Refill     Percocet     6 month eval.      Pt continues to have issues with her right knee, has Ortho appt on 1/8/21. Continues to have pain and swelling. She continues to take the Percocet mainly at night. Sugars very well controlled. Lab Results   Component Value Date    LABA1C 5.9 12/10/2020    LABA1C 6.3 06/05/2020    LABA1C 6.1 11/01/2019     Lab Results   Component Value Date    GLUF 141 (H) 01/28/2016    LDLCALC 60 12/10/2020    CREATININE 0.64 12/10/2020     BP well controlled. BP Readings from Last 3 Encounters:   12/15/20 (!) 112/56   10/26/20 138/68   09/29/20 (!) 152/69     Weight is down 15 lbs, not hungry much due to her knee pain. Wt Readings from Last 3 Encounters:   12/15/20 236 lb 11.2 oz (107.4 kg)   09/29/20 251 lb (113.9 kg)   09/26/20 250 lb (113.4 kg)     Pt also has concerns about a lesion on her tongue that won't heal x 3 weeks. She is a smoker. Sees Dr. Adama Aggarwal on regular basis but he has not seen this spot. Patient Active Problem List   Diagnosis    DM type 2 (diabetes mellitus, type 2) ([facility] [address]    Hyperlipemia    THORNTON (nonalcoholic steatohepatitis)    Obesity    Tobacco abuse    Vitamin D deficiency    Seborrheic keratosis    Dyshidrotic eczema     Past Surgical History:   Procedure Laterality Date    BLADDER SUSPENSION      COLONOSCOPY  08/28/2020    HERNIA REPAIR  10/17/2016    KNEE SURGERY      Right knee    MASTECTOMY, PARTIAL Left     TUBAL LIGATION      WISDOM TOOTH EXTRACTION       Prior to Admission medications    Medication Sig Start Date End Date Taking?  Authorizing Provider   doxepin (SINEQUAN) 10 MG capsule Take 1 capsule by mouth daily 11/13/20  Yes Historical Provider, MD oxyCODONE-acetaminophen (PERCOCET) 5-325 MG per tablet Take 1 tablet by mouth every 8 hours as needed for Pain. Yes Historical Provider, MD   atorvastatin (LIPITOR) 40 MG tablet TAKE 1 TABLET NIGHTLY 11/4/20  Yes Arsenio Samaniego DO   HYDROcodone-acetaminophen [facility])  MG per tablet Take 1 tablet by mouth every 6 hours as needed for Pain. Yes Historical Provider, MD   fenofibrate 160 MG tablet TAKE 1 TABLET DAILY 1/28/20  Yes Arsenio Samaniego DO   losartan (COZAAR) 25 MG tablet TAKE 1 TABLET DAILY 12/16/19  Yes Arsenio Samaniego DO   metFORMIN (GLUCOPHAGE) 1000 MG tablet TAKE 1 TABLET TWICE A DAY WITH MEALS 12/16/19  Yes Arsenio Samaniego DO   acetaminophen (TYLENOL) 325 MG tablet Take 650 mg by mouth as needed for Pain   Yes Historical Provider, MD   KRILL OIL PO Take 350 mg by mouth daily   Yes Historical Provider, MD   Multiple Vitamins-Minerals (THERAPEUTIC MULTIVITAMIN-MINERALS) tablet Take 1 tablet by mouth daily   Yes Historical Provider, MD   aspirin 81 MG tablet Take 81 mg by mouth daily. Yes Historical Provider, MD   clonazePAM (KLONOPIN) 1 MG tablet Take 1 tablet by mouth nightly for 10 days.  11/10/20 11/20/20  Arsenio Samaniego DO   tiZANidine (ZANAFLEX) 4 MG capsule Take 1 capsule by mouth 3 times daily as needed for Muscle spasms  Patient not taking: Reported on 12/15/2020 10/26/20   Arsenio Samaniego DO   tamoxifen (NOLVADEX) 20 MG tablet Take 20 mg by mouth daily    Ari Calle MD     Patient Active Problem List   Diagnosis    DM type 2 (diabetes mellitus, type 2) ([facility] [address]    Hyperlipemia    THORNTON (nonalcoholic steatohepatitis)    Obesity    Tobacco abuse    Vitamin D deficiency    Seborrheic keratosis    Dyshidrotic eczema     Past Surgical History:   Procedure Laterality Date    BLADDER SUSPENSION      COLONOSCOPY  08/28/2020    HERNIA REPAIR  10/17/2016    KNEE SURGERY      Right knee    MASTECTOMY, PARTIAL Left     TUBAL LIGATION Lab Results   Component Value Date    HDL 29 (L) 12/10/2020    HDL 22 (L) 06/05/2020    HDL 25 (L) 11/01/2019     Lab Results   Component Value Date    LDLCALC 60 12/10/2020    [facility] See Note 06/05/2020    [facility] See Note 11/01/2019     Lab Results   Component Value Date    LABVLDL 53 (H) 12/10/2020    LABVLDL 101 (H) 06/05/2020    LABVLDL 84 (H) 11/01/2019         Chemistry        Component Value Date/Time     12/10/2020 1001    K 4.4 12/10/2020 1001    CL 98 12/10/2020 1001    CO2 28 12/10/2020 1001    BUN 11 12/10/2020 1001    CREATININE 0.64 12/10/2020 1001        Component Value Date/Time    CALCIUM 9.3 12/10/2020 1001    ALKPHOS 45 12/10/2020 1001    ALKPHOS 86 12/21/2016 0951    AST 17 12/10/2020 1001    ALT 14 12/10/2020 1001    BILITOT 0.5 12/10/2020 1001            Lab Results   Component Value Date    TSH 3.24 12/10/2020       Lab Results   Component Value Date    WBC 10.2 12/10/2020    HGB 15.3 12/10/2020    HCT 47.0 12/10/2020    MCV 84 12/10/2020    PLT SEE NOTE 12/10/2020         Health Maintenance   Topic Date Due    Shingles Vaccine (1 of 2) 01/06/2009    Cervical cancer screen  12/13/2016    Low dose CT lung screening  01/15/2017    Breast cancer screen  06/07/2020    Flu vaccine (1) 09/01/2020    Diabetic microalbuminuria test  11/01/2020    Diabetic foot exam  06/12/2021    Diabetic retinal exam  07/22/2021    A1C test (Diabetic or Prediabetic)  12/10/2021    Lipid screen  12/10/2021    Potassium monitoring  12/10/2021    Creatinine monitoring  12/10/2021    DTaP/Tdap/Td vaccine (2 - Td) 05/04/2025    Colon cancer screen colonoscopy  08/28/2030    Pneumococcal 0-64 years Vaccine  Completed    Hepatitis C screen  Completed    HIV screen  Completed    Hepatitis A vaccine  Aged Out    Hib vaccine  Aged Out    Meningococcal (ACWY) vaccine  Aged Out       Immunization History   Administered Date(s) Administered    Pneumococcal Polysaccharide ([id number]) 11/02/2015  Tdap (Boostrix, Adacel) 05/04/2015         Review of Systems   Constitutional: Negative. HENT: Negative. Oral lesion left side of tongue     Respiratory: Negative. Cardiovascular: Negative. Gastrointestinal: Negative. Musculoskeletal: Positive for arthralgias (right knee pain and swelling), gait problem and joint swelling. All other systems reviewed and are negative. Objective:   Physical Exam  Vitals signs and nursing note reviewed. Constitutional:       General: She is not in acute distress. Appearance: Normal appearance. She is well-developed. HENT:      Head: Normocephalic and atraumatic. Right Ear: Tympanic membrane normal.      Left Ear: Tympanic membrane normal.      Mouth/Throat:     Eyes:      Conjunctiva/sclera: Conjunctivae normal.   Neck:      Musculoskeletal: Neck supple. Cardiovascular:      Rate and Rhythm: Normal rate and regular rhythm. Heart sounds: Normal heart sounds. No murmur. Pulmonary:      Effort: Pulmonary effort is normal.      Breath sounds: Normal breath sounds. No wheezing, rhonchi or rales. Abdominal:      General: There is no distension. Musculoskeletal:      Right knee: She exhibits decreased range of motion, swelling, effusion and abnormal meniscus. Skin:     General: Skin is warm and dry. Findings: No rash (on exposed surfaces). Neurological:      General: No focal deficit present. Mental Status: She is alert. Psychiatric:         Attention and Perception: Attention normal.         Mood and Affect: Mood normal.         Speech: Speech normal.         Behavior: Behavior normal. Behavior is cooperative. Thought Content: Thought content normal.         Judgment: Judgment normal.         Assessment:       Diagnosis Orders   1. Internal derangement of right knee  oxyCODONE-acetaminophen (PERCOCET) 5-325 MG per tablet   2. Encounter for screening mammogram for breast cancer     3.  Knee effusion, right

## 2020-12-15 NOTE — PATIENT INSTRUCTIONS
You may receive a survey regarding the care you received during your visit. Your input is valuable to us. We encourage you to complete and return your survey. We hope you will choose us in the future for your healthcare needs. Tobacco Cessation Programs     Telephonic behavior modification  ? 1-800-QUIT-NOW (377-2303)  ? Counseling service for those who are ready to quit using tobacco.    ? Available for uninsured [place] residents, [place] recipients, pregnant women, or patients whose health plans or employers are members of the [address] [place] [address] behavior modification  ? http://[place]. Quitlogix. org  ? Online support program to help patients through each step of the quitting process. Available 24 hours a day 7 days a week. Provides up to date researched based tool, step-by-step guides, and motivational messages. Online behavior modification  ? www.lungusa.org/stop-smoking/how-to-quit  ? HelpLine: 1-800-LUNG-[place] (479-3402)  ? Email questions to:  Jarrett@[employer]. [employer]   ? Website offers resources to help tobacco users figure out their reasons for quitting and then take the big step of quitting for good. Hypnosis  ? Location: [address] [place] [address]  ? Contact: Michelle Sarkar, PhD at [phone]  ? Hypnosis for tobacco cessation  ? Cost $225 for the initial session and $175 for each session afterwards. Most patients require 6-8 sessions. There is the option to submit through the patients insurance. Hypnosis and behavior modification  ? [facility],  [facility] [place] [address]  ? Contact: Jomar Gr, PhD at [phone]  ? Counseling and hypnosis for nicotine addition  ? Cost: For uninsured patients:  Please call above phone number  Cost for insured patients depends on patients insurance plan. Behavior modification  ? Location: [address] [facility], [address] [place] [address]  ?  Contact: [phone] ? Services include four one-on-one appointments between the patient and a respiratory therapist.  The four appointments span over three weeks. The respiratory therapist schedules one of the appointments to occur 48 hours after the patients quit date. ? Cost $100 total for the four sessions.   Tobacco cessation products are not included in the cost and are not provided by [facility].     Dr. Edie Sarkar    [phone]

## 2020-12-15 NOTE — PROGRESS NOTES
Visit Information    Have you changed or started any medications since your last visit including any over-the-counter medicines, vitamins, or herbal medicines? yes - see updated med list   Are you having any side effects from any of your medications? -  no  Have you stopped taking any of your medications? Is so, why? -  yes - see updated med list    Have you seen any other physician or provider since your last visit? Yes - Records Requested  Have you had any other diagnostic tests since your last visit? Yes - Records Obtained  Have you been seen in the emergency room and/or had an admission to a hospital since we last saw you? No  Have you had your routine dental cleaning in the past 6 months? yes - 07/2020    Have you activated your [employer] account? If not, what are your barriers?  Yes     Patient Care Team:  Mahad Heaton DO as PCP - General (Family Medicine)  Mahad Heaton DO [facility]  Ronnie Romano MD as Consulting Physician (Gastroenterology)  Savannah Bradford MD as Consulting Physician (Hematology and Oncology)  Harmony Lemus MD (Obstetrics & Gynecology)    Medical History Review  Past Medical, Family, and Social History reviewed and does contribute to the patient presenting condition    Health Maintenance   Topic Date Due    Shingles Vaccine (1 of 2) 01/06/2009    Cervical cancer screen  12/13/2016    Low dose CT lung screening  01/15/2017    Breast cancer screen  06/07/2020    Flu vaccine (1) 09/01/2020    Diabetic microalbuminuria test  11/01/2020    Diabetic foot exam  06/12/2021    Diabetic retinal exam  07/22/2021    A1C test (Diabetic or Prediabetic)  12/10/2021    Lipid screen  12/10/2021    Potassium monitoring  12/10/2021    Creatinine monitoring  12/10/2021    DTaP/Tdap/Td vaccine (2 - Td) 05/04/2025    Colon cancer screen colonoscopy  08/28/2030    Pneumococcal 0-64 years Vaccine  Completed    Hepatitis C screen  Completed  HIV screen  Completed    Hepatitis A vaccine  Aged Out    Hib vaccine  Aged Out    Meningococcal (ACWY) vaccine  Aged Out       Immunizations Administered     Name Date Dose Route    Influenza, Quadv, IM, PF (6 mo and older Fluzone, Flulaval, Fluarix, and 3 yrs and older Afluria) 12/15/2020 0.5 mL Intramuscular    Site: Deltoid- Left    Lot: [id number]    NDC: 11526-521-91            After obtaining consent, and per orders of Dr. Janice Stephenson, injection of afluria 0.5ml given IM in Left deltoid by Tony Jones. Patient instructed to report any adverse reaction to me immediately. Patient tolerated well and without incident. VIS given to the patient.

## 2020-12-16 ENCOUNTER — HOSPITAL ENCOUNTER (OUTPATIENT)
Age: 60
Discharge: HOME | End: 2020-12-16
Payer: OTHER GOVERNMENT

## 2020-12-16 LAB
EKG ATRIAL RATE: 101 BPM
EKG P AXIS: 68 DEGREES
EKG P-R INTERVAL: 126 MS
EKG Q-T INTERVAL: 362 MS
EKG QRS DURATION: 116 MS
EKG QTC CALCULATION (BAZETT): 469 MS
EKG R AXIS: 77 DEGREES
EKG T AXIS: 45 DEGREES
EKG VENTRICULAR RATE: 101 BPM

## 2020-12-16 PROCEDURE — 93010 ELECTROCARDIOGRAM REPORT: CPT | Performed by: INTERNAL MEDICINE

## 2020-12-16 PROCEDURE — 93005 ELECTROCARDIOGRAM TRACING: CPT | Performed by: ORTHOPAEDIC SURGERY

## 2021-01-07 ENCOUNTER — APPOINTMENT (OUTPATIENT)
Dept: GENERAL RADIOLOGY | Age: 61
End: 2021-01-07
Payer: OTHER GOVERNMENT

## 2021-01-07 ENCOUNTER — HOSPITAL ENCOUNTER (EMERGENCY)
Age: 61
Discharge: HOME | End: 2021-01-07
Payer: OTHER GOVERNMENT

## 2021-01-07 VITALS
SYSTOLIC BLOOD PRESSURE: 130 MMHG | RESPIRATION RATE: 18 BRPM | DIASTOLIC BLOOD PRESSURE: 69 MMHG | TEMPERATURE: 97.5 F | OXYGEN SATURATION: 99 % | HEART RATE: 95 BPM

## 2021-01-07 DIAGNOSIS — R55 SYNCOPE AND COLLAPSE: ICD-10-CM

## 2021-01-07 DIAGNOSIS — S63.502A LEFT WRIST SPRAIN, INITIAL ENCOUNTER: Primary | ICD-10-CM

## 2021-01-07 PROCEDURE — 99213 OFFICE O/P EST LOW 20 MIN: CPT | Performed by: NURSE PRACTITIONER

## 2021-01-07 PROCEDURE — 99213 OFFICE O/P EST LOW 20 MIN: CPT

## 2021-01-07 PROCEDURE — L3809 WHFO W/O JOINTS PRE OTS: HCPCS

## 2021-01-07 PROCEDURE — 73110 X-RAY EXAM OF WRIST: CPT

## 2021-01-07 ASSESSMENT — ENCOUNTER SYMPTOMS
VOMITING: 0
NAUSEA: 0

## 2021-01-07 ASSESSMENT — PAIN DESCRIPTION - PROGRESSION: CLINICAL_PROGRESSION: NOT CHANGED

## 2021-01-07 ASSESSMENT — PAIN DESCRIPTION - DESCRIPTORS: DESCRIPTORS: SHARP

## 2021-01-07 ASSESSMENT — PAIN DESCRIPTION - PAIN TYPE: TYPE: ACUTE PAIN

## 2021-01-07 NOTE — ED PROVIDER NOTES
[address]  Urgent Care Encounter       CHIEF COMPLAINT       Chief Complaint   Patient presents with    Fall    Wrist Injury     left wrist, trying to catch herself falling from edge of bed - last night        Nurses Notes reviewed and I agree except as noted in the HPI. HISTORY OF PRESENT ILLNESS   Bri Caraballo is a 57 y.o. female who presents with a left wrist injury. The patient was sitting on the edge of her bed last night watching television and came lightheaded and had a brief syncopal episode causing her to fall off of the bed. She was sitting stationary for approximately 15 to 20 minutes prior to the episode. She states that everything went black for a brief second but she was aware prior to hitting the floor. She had meniscus surgery on the right knee 3 weeks ago. She does report taking a Percocet approximately 15 minutes prior to the incident. She was alert immediately and no reported seizure activity. There was no chest pain, pressure or fluttering in the chest prior to fall. No reports of headache or any more dizziness or lightheadedness. No extremity weakness other than related to the left wrist injury. The history is provided by the patient. REVIEW OF SYSTEMS     Review of Systems   Constitutional: Negative for chills and fever. Gastrointestinal: Negative for nausea and vomiting. Musculoskeletal:        Left wrist injury   Skin: Negative for wound. Neurological: Positive for dizziness and syncope. Negative for weakness, numbness and headaches.        PAST MEDICAL HISTORY         Diagnosis Date    Hx of breast biopsy Jan 8 2016    Hyperlipidemia     Type II or unspecified type diabetes mellitus without mention of complication, not stated as uncontrolled        SURGICALHISTORY Patient  has a past surgical history that includes knee surgery; Tubal ligation; bladder suspension; [place] tooth extraction; Mastectomy, partial (Left); hernia repair (10/17/2016); and Colonoscopy (08/28/2020). CURRENT MEDICATIONS       Discharge Medication List as of 1/7/2021 11:21 AM      CONTINUE these medications which have NOT CHANGED    Details   doxepin (SINEQUAN) 10 MG capsule Take 1 capsule by mouth dailyHistorical Med      atorvastatin (LIPITOR) 40 MG tablet TAKE 1 TABLET NIGHTLY, Disp-90 tablet,R-3Normal      fenofibrate 160 MG tablet TAKE 1 TABLET DAILY, Disp-90 tablet, R-4Normal      losartan (COZAAR) 25 MG tablet TAKE 1 TABLET DAILY, Disp-90 tablet, R-4Normal      metFORMIN (GLUCOPHAGE) 1000 MG tablet TAKE 1 TABLET TWICE A DAY WITH MEALS, Disp-180 tablet, R-4Normal      Acetaminophen 500 MG CAPS Take 1,000 mg by mouth as needed for Pain Historical Med      KRILL OIL PO Take 350 mg by mouth dailyHistorical Med      Multiple Vitamins-Minerals (THERAPEUTIC MULTIVITAMIN-MINERALS) tablet Take 1 tablet by mouth daily      aspirin 81 MG tablet Take 81 mg by mouth daily. ALLERGIES     Patient is is allergic to adhesive tape. Patients   Immunization History   Administered Date(s) Administered    Influenza, Quadv, IM, PF (6 mo and older Fluzone, Flulaval, Fluarix, and 3 yrs and older Afluria) 12/15/2020    Pneumococcal Polysaccharide ([id number]) 11/02/2015    Tdap (Boostrix, Adacel) 05/04/2015       FAMILY HISTORY     Patient's family history includes Breast Cancer in her sister; Cancer in her mother; Colon Polyps in her mother; Crohn's Disease in her mother; Dementia in her father and mother; Diabetes in her sister; Heart Disease in her brother; High Blood Pressure in her mother; High Cholesterol in her mother; Other in her father and mother; Pacemaker in her mother; Thyroid Disease in her mother.     SOCIAL HISTORY Patient  reports that she has been smoking cigarettes. She has a 45.00 pack-year smoking history. She has never used smokeless tobacco. She reports that she does not drink alcohol or use drugs. PHYSICAL EXAM     ED TRIAGE VITALS  BP: 130/69, Temp: 97.5 °F (36.4 °C), Pulse: 95, Resp: 18, SpO2: 99 %,Estimated body mass index is 35.99 kg/m² as calculated from the following:    Height as of 9/29/20: 5' 8\" (1.727 m). Weight as of 12/15/20: 236 lb 11.2 oz (107.4 kg). ,No LMP recorded. Patient is postmenopausal.    Physical Exam  Vitals signs and nursing note reviewed. Constitutional:       General: She is not in acute distress. Appearance: She is well-developed. HENT:      Head: Normocephalic and atraumatic. Pulmonary:      Effort: Pulmonary effort is normal. No respiratory distress. Musculoskeletal:      Left wrist: She exhibits decreased range of motion, tenderness and swelling. She exhibits no deformity. Skin:     General: Skin is warm and dry. Neurological:      General: No focal deficit present. Mental Status: She is alert and oriented to person, place, and time. Sensory: Sensation is intact. Motor: Motor function is intact. Comments: Ambulating with crutches    Psychiatric:         Mood and Affect: Mood normal.         Speech: Speech normal.         Behavior: Behavior normal. Behavior is cooperative. DIAGNOSTIC RESULTS     Labs:No results found for this visit on 01/07/21. IMAGING:    XR WRIST LEFT (MIN 3 VIEWS)   Final Result   Negative left wrist            **This report has been created using voice recognition software. It may contain minor errors which are inherent in voice recognition technology. **      Final report electronically signed by Dr. Jett Robin on 1/7/2021 11:01 AM            EKG:      URGENT CARE COURSE:     Vitals:    01/07/21 1013   BP: 130/69   Pulse: 95   Resp: 18   Temp: 97.5 °F (36.4 °C)   TempSrc: Temporal   SpO2: 99% Medications - No data to display         PROCEDURES:  None    FINAL IMPRESSION      1. Left wrist sprain, initial encounter    2. Syncope and collapse          DISPOSITION/ PLAN     Patient presents with a left wrist injury after a brief syncopal episode and fall yesterday. X-ray was negative for acute fracture dislocation. There was a area of concern and that I did speak directly to the radiologist to take a second look. Radiologist still believes area is not a fracture. Patient was placed in a Velcro wrist splint. Ice, elevate and rest.  Follow-up with family doctor or the [facility] [place] in the next week if not improved. Further instructions were outlined verbally and in the patient's discharge instructions. All the patient's questions were answered. The patient/parent agreed with the plan and was discharged from the [facility] in good condition.       PATIENT REFERRED TO:  DO Pamela Yu, [address] / [facility] [address]      DISCHARGE MEDICATIONS:  Discharge Medication List as of 1/7/2021 11:21 AM          Discharge Medication List as of 1/7/2021 11:21 AM          Discharge Medication List as of 1/7/2021 11:21 AM          DEREK Espino CNP    (Please note that portions of this note were completed with a voice recognition program. Efforts were made to edit the dictations but occasionally words are mis-transcribed.)         DEREK Espino CNP  01/08/21 0818

## 2021-01-08 ENCOUNTER — TELEPHONE (OUTPATIENT)
Dept: FAMILY MEDICINE CLINIC | Age: 61
End: 2021-01-08

## 2021-01-08 NOTE — TELEPHONE ENCOUNTER
Pt called she was seen at [facility] on 1/7/2021 given a dx of left wrist sprain. Pt states UC did not give her anything for pain. Asking if you will send a script to [facility] [address]. Pt will check with pharmacy after 4 pm today. Pt did receive a call regarding her left wrist xray ? DACC. Pt will call [facility] regarding. Pt also asking for PCP review. Pt aware that it will be Monday before a call back.

## 2021-01-17 ENCOUNTER — TELEPHONE (OUTPATIENT)
Dept: FAMILY MEDICINE CLINIC | Age: 61
End: 2021-01-17

## 2021-01-17 NOTE — TELEPHONE ENCOUNTER
On call note 1/16/2021- 1345. Pt called and reports that she is having ongoing issues with low back pain and sciatica. She reports that she had therapy yesterday and since then she can not sleep or rest because of the pain. She denies any bowel or bladder incontinence, but the pain is making her cry. Pt has been taking advil with no relief. Instructed pt to go to ER for evaluation since pain is worse and she is having trouble walking. Pt verbalized understanding and does have someone with her to take her.       Electronically signed by DEREK Cohen CNP on 1/17/2021 at 4:15 PM

## 2021-01-25 ENCOUNTER — OFFICE VISIT (OUTPATIENT)
Dept: FAMILY MEDICINE CLINIC | Age: 61
End: 2021-01-25
Payer: OTHER GOVERNMENT

## 2021-01-25 VITALS
HEART RATE: 72 BPM | SYSTOLIC BLOOD PRESSURE: 128 MMHG | RESPIRATION RATE: 16 BRPM | DIASTOLIC BLOOD PRESSURE: 62 MMHG | BODY MASS INDEX: 34.62 KG/M2 | TEMPERATURE: 97.1 F | WEIGHT: 227.7 LBS

## 2021-01-25 DIAGNOSIS — S60.221A CONTUSION OF RIGHT HAND, INITIAL ENCOUNTER: Primary | ICD-10-CM

## 2021-01-25 PROCEDURE — 99213 OFFICE O/P EST LOW 20 MIN: CPT | Performed by: FAMILY MEDICINE

## 2021-01-25 ASSESSMENT — PATIENT HEALTH QUESTIONNAIRE - PHQ9
2. FEELING DOWN, DEPRESSED OR HOPELESS: 1
1. LITTLE INTEREST OR PLEASURE IN DOING THINGS: 0
SUM OF ALL RESPONSES TO PHQ QUESTIONS 1-9: 1

## 2021-01-25 ASSESSMENT — ENCOUNTER SYMPTOMS
GASTROINTESTINAL NEGATIVE: 1
RESPIRATORY NEGATIVE: 1

## 2021-01-25 NOTE — PROGRESS NOTES
Visit Information    Have you changed or started any medications since your last visit including any over-the-counter medicines, vitamins, or herbal medicines? no   Are you having any side effects from any of your medications? -  no  Have you stopped taking any of your medications? Is so, why? -  no    Have you seen any other physician or provider since your last visit? No  Have you had any other diagnostic tests since your last visit? No  Have you been seen in the emergency room and/or had an admission to a hospital since we last saw you? No  Have you had your routine dental cleaning in the past 6 months? no    Have you activated your [email or web address] account? If not, what are your barriers?  Yes     Patient Care Team:  Rubio Hummel DO as PCP - General (Family Medicine)  Rubio Hummel DO as PCP - [facility] Provider  Donovan Waller MD as Consulting Physician (Gastroenterology)  Justin Faustin MD as Consulting Physician (Hematology and Oncology)  Alyssa Luna MD (Obstetrics & Gynecology)    Medical History Review  Past Medical, Family, and Social History reviewed and does not contribute to the patient presenting condition    Health Maintenance   Topic Date Due    Shingles Vaccine (1 of 2) 01/06/2009    Cervical cancer screen  12/13/2016    Low dose CT lung screening  01/15/2017    Breast cancer screen  06/07/2020    Diabetic microalbuminuria test  11/01/2020    Diabetic foot exam  06/12/2021    Diabetic retinal exam  07/22/2021    A1C test (Diabetic or Prediabetic)  12/10/2021    Lipid screen  12/10/2021    Potassium monitoring  12/10/2021    Creatinine monitoring  12/10/2021    DTaP/Tdap/Td vaccine (2 - Td) 05/04/2025    Colon cancer screen colonoscopy  08/28/2030    Flu vaccine  Completed    Pneumococcal 0-64 years Vaccine  Completed    Hepatitis C screen  Completed    HIV screen  Completed    Hepatitis A vaccine  Aged Out    Hib vaccine  Aged Out  Meningococcal (ACWY) vaccine  Aged Out

## 2021-01-25 NOTE — PROGRESS NOTES
Layne Varela (:  1960) is a 57 y.o. female,Established patient, here for evaluation of the following chief complaint(s):  Hand Problem (right, bug bite x 2, edema)        SUBJECTIVE/OBJECTIVE:  HPI:    Chief Complaint   Patient presents with    Hand Problem     right, bug bite x 2, edema     Pt here for possible bug bite x 2 on the right hand. This happened 2 days ago. Hand is noticeably swollen and bruised. It does not itch. Pt states that she stuck her hand down into her chair x 2 and felt a sharp pain. Did not look into it any further. Patient Active Problem List   Diagnosis    DM type 2 (diabetes mellitus, type 2) ([facility] [address]    Hyperlipemia    THORNTON (nonalcoholic steatohepatitis)    Obesity    Tobacco abuse    Vitamin D deficiency    Seborrheic keratosis    Dyshidrotic eczema     Past Surgical History:   Procedure Laterality Date    BLADDER SUSPENSION      COLONOSCOPY  2020    HERNIA REPAIR  10/17/2016    KNEE SURGERY      Right knee    MASTECTOMY, PARTIAL Left     TUBAL LIGATION      WISDOM TOOTH EXTRACTION       Prior to Admission medications    Medication Sig Start Date End Date Taking?  Authorizing Provider   doxepin (SINEQUAN) 10 MG capsule Take 1 capsule by mouth daily 20  Yes Historical Provider, MD   atorvastatin (LIPITOR) 40 MG tablet TAKE 1 TABLET NIGHTLY 20  Yes Roya Flattery, DO   fenofibrate 160 MG tablet TAKE 1 TABLET DAILY 20  Yes Roya Flattery, DO   losartan (COZAAR) 25 MG tablet TAKE 1 TABLET DAILY 19  Yes Roya Flattery, DO   metFORMIN (GLUCOPHAGE) 1000 MG tablet TAKE 1 TABLET TWICE A DAY WITH MEALS 19  Yes Roya Flattery, DO   Acetaminophen 500 MG CAPS Take 1,000 mg by mouth as needed for Pain    Yes Historical Provider, MD   KRILL OIL PO Take 350 mg by mouth daily   Yes Historical Provider, MD Multiple Vitamins-Minerals (THERAPEUTIC MULTIVITAMIN-MINERALS) tablet Take 1 tablet by mouth daily   Yes Historical Provider, MD   aspirin 81 MG tablet Take 81 mg by mouth daily. Yes Historical Provider, MD       Review of Systems   Constitutional: Negative. HENT: Negative. Respiratory: Negative. Cardiovascular: Negative. Gastrointestinal: Negative. Musculoskeletal: Positive for joint swelling (right hand swelling). All other systems reviewed and are negative. Physical Exam  Vitals signs and nursing note reviewed. Constitutional:       General: She is not in acute distress. Appearance: Normal appearance. She is well-developed. HENT:      Head: Normocephalic and atraumatic. Right Ear: Tympanic membrane normal.      Left Ear: Tympanic membrane normal.   Eyes:      Conjunctiva/sclera: Conjunctivae normal.   Neck:      Musculoskeletal: Neck supple. Cardiovascular:      Rate and Rhythm: Normal rate and regular rhythm. Heart sounds: Normal heart sounds. No murmur. Pulmonary:      Effort: Pulmonary effort is normal.      Breath sounds: Normal breath sounds. No wheezing, rhonchi or rales. Abdominal:      General: There is no distension. Musculoskeletal:      Right hand: She exhibits swelling (swelling and bruising noted to dorsal aspect of right hand. No erythema. No \"bite aixa\" noted. ). She exhibits normal range of motion, no tenderness and no bony tenderness. Skin:     General: Skin is warm and dry. Findings: No rash (on exposed surfaces). Neurological:      General: No focal deficit present. Mental Status: She is alert. Psychiatric:         Attention and Perception: Attention normal.         Mood and Affect: Mood normal.         Speech: Speech normal.         Behavior: Behavior normal. Behavior is cooperative. Thought Content:  Thought content normal.         Judgment: Judgment normal.     ASSESSMENT/PLAN: 1. Contusion of right hand, initial encounter    -  No sign of reaction to bug bite  -  Hand is swollen and bruised, likely hit hand on sharp object in the chair  -  Pt reassurance    Return if symptoms worsen or fail to improve. An electronic signature was used to authenticate this note.     --Candy Patterson, DO

## 2021-01-25 NOTE — PATIENT INSTRUCTIONS
You may receive a survey about your visit with us today. The feedback from our patients helps us identify what is working well and where the service to all patients can be enhanced. Thank you! Tobacco Cessation Programs     Telephonic behavior modification  ? 1-800-QUIT-NOW (734-1417)  ? Counseling service for those who are ready to quit using tobacco.    ? Available for uninsured [place] residents, [place] recipients, pregnant women, or patients whose health plans or employers are members of the [address] [place] [address] behavior modification  ? http://[place]. Quitlogix. org  ? Online support program to help patients through each step of the quitting process. Available 24 hours a day 7 days a week. Provides up to date researched based tool, step-by-step guides, and motivational messages. Online behavior modification  ? www.lungusa.org/stop-smoking/how-to-quit  ? HelpLine: 1-800-LUNG-[place] (338-0726)  ? Email questions to:  Vanessa@[employer]   ? Website offers resources to help tobacco users figure out their reasons for quitting and then take the big step of quitting for good. Hypnosis  ? Location: [address] [place] [address] [place]  ? Contact: Dalia Ortega, PhD at [phone]  ? Hypnosis for tobacco cessation  ? Cost $225 for the initial session and $175 for each session afterwards. Most patients require 6-8 sessions. There is the option to submit through the patients insurance. Hypnosis and behavior modification  ? Location: [place] [address],  [address] [place]  ? Contact: Janay Degroot, PhD at [phone]  ? Counseling and hypnosis for nicotine addition  ? Cost: For uninsured patients:  Please call above phone number  Cost for insured patients depends on patients insurance plan. Behavior modification  ? Location: [facility], [address] [place] [address] [address] [place]  ?  Contact: [phone] ? Services include four one-on-one appointments between the patient and a respiratory therapist.  The four appointments span over three weeks. The respiratory therapist schedules one of the appointments to occur 48 hours after the patients quit date. ? Cost $100 total for the four sessions.   Tobacco cessation products are not included in the cost and are not provided by [facility].

## 2021-02-11 RX ORDER — DOXEPIN HYDROCHLORIDE 10 MG/1
CAPSULE ORAL
Qty: 90 CAPSULE | Refills: 3 | Status: SHIPPED | OUTPATIENT
Start: 2021-02-11 | End: 2021-06-15 | Stop reason: SDUPTHER

## 2021-03-09 DIAGNOSIS — E11.9 CONTROLLED TYPE 2 DIABETES MELLITUS WITHOUT COMPLICATION (HCC): ICD-10-CM

## 2021-03-09 RX ORDER — LOSARTAN POTASSIUM 25 MG/1
TABLET ORAL
Qty: 90 TABLET | Refills: 3 | Status: SHIPPED | OUTPATIENT
Start: 2021-03-09 | End: 2021-08-21 | Stop reason: SDUPTHER

## 2021-03-18 ENCOUNTER — IMMUNIZATION (OUTPATIENT)
Dept: PRIMARY CARE CLINIC | Age: 61
End: 2021-03-18
Payer: OTHER GOVERNMENT

## 2021-03-18 PROCEDURE — 0001A PR IMM ADMN SARSCOV2 30MCG/0.3ML DIL RECON 1ST DOSE: CPT

## 2021-03-18 PROCEDURE — 91300 COVID-19, PFIZER VACCINE 30MCG/0.3ML DOSE: CPT

## 2021-03-19 ENCOUNTER — PATIENT MESSAGE (OUTPATIENT)
Dept: FAMILY MEDICINE CLINIC | Age: 61
End: 2021-03-19

## 2021-03-19 RX ORDER — METHYLPREDNISOLONE 4 MG/1
TABLET ORAL
Qty: 1 KIT | Refills: 0 | Status: SHIPPED | OUTPATIENT
Start: 2021-03-19 | End: 2021-03-25

## 2021-03-19 NOTE — TELEPHONE ENCOUNTER
From: Duke Armas  To: Herber Rosario DO  Sent: 3/19/2021 9:57 AM EDT  Subject: Non-Urgent Medical Question    I'm having problems with my right knee, the ortho told me with all the arthritis I may need steroid shots 4 times a year I was wondering if I couldn't try the pills first to see if that can get me over the hump I'm in my recovery.

## 2021-03-29 ENCOUNTER — OFFICE VISIT (OUTPATIENT)
Dept: FAMILY MEDICINE CLINIC | Age: 61
End: 2021-03-29
Payer: OTHER GOVERNMENT

## 2021-03-29 VITALS
HEART RATE: 104 BPM | TEMPERATURE: 96.4 F | SYSTOLIC BLOOD PRESSURE: 128 MMHG | OXYGEN SATURATION: 98 % | DIASTOLIC BLOOD PRESSURE: 78 MMHG | RESPIRATION RATE: 18 BRPM

## 2021-03-29 DIAGNOSIS — M17.0 BILATERAL PRIMARY OSTEOARTHRITIS OF KNEE: Primary | ICD-10-CM

## 2021-03-29 DIAGNOSIS — M51.36 DDD (DEGENERATIVE DISC DISEASE), LUMBAR: ICD-10-CM

## 2021-03-29 DIAGNOSIS — F51.01 PRIMARY INSOMNIA: ICD-10-CM

## 2021-03-29 DIAGNOSIS — M23.91 INTERNAL DERANGEMENT OF RIGHT KNEE: ICD-10-CM

## 2021-03-29 DIAGNOSIS — Z98.890 S/P ARTHROSCOPY OF RIGHT KNEE: ICD-10-CM

## 2021-03-29 PROCEDURE — 99214 OFFICE O/P EST MOD 30 MIN: CPT | Performed by: FAMILY MEDICINE

## 2021-03-29 PROCEDURE — 20610 DRAIN/INJ JOINT/BURSA W/O US: CPT | Performed by: FAMILY MEDICINE

## 2021-03-29 RX ORDER — METHYLPREDNISOLONE ACETATE 80 MG/ML
80 INJECTION, SUSPENSION INTRA-ARTICULAR; INTRALESIONAL; INTRAMUSCULAR; SOFT TISSUE ONCE
Status: COMPLETED | OUTPATIENT
Start: 2021-03-29 | End: 2021-03-29

## 2021-03-29 RX ADMIN — METHYLPREDNISOLONE ACETATE 80 MG: 80 INJECTION, SUSPENSION INTRA-ARTICULAR; INTRALESIONAL; INTRAMUSCULAR; SOFT TISSUE at 11:34

## 2021-03-29 RX ADMIN — METHYLPREDNISOLONE ACETATE 80 MG: 80 INJECTION, SUSPENSION INTRA-ARTICULAR; INTRALESIONAL; INTRAMUSCULAR; SOFT TISSUE at 11:35

## 2021-03-29 ASSESSMENT — ENCOUNTER SYMPTOMS
RESPIRATORY NEGATIVE: 1
GASTROINTESTINAL NEGATIVE: 1
BACK PAIN: 1

## 2021-03-29 NOTE — PROGRESS NOTES
Visit Information    Have you changed or started any medications since your last visit including any over-the-counter medicines, vitamins, or herbal medicines? no   Are you having any side effects from any of your medications? -  no  Have you stopped taking any of your medications? Is so, why? -  no    Have you seen any other physician or provider since your last visit? No  Have you had any other diagnostic tests since your last visit? No  Have you been seen in the emergency room and/or had an admission to a hospital since we last saw you? No  Have you had your routine dental cleaning in the past 6 months? na    Have you activated your [employer] account? If not, what are your barriers?  Yes     Patient Care Team:  Ryan Luna DO as PCP - General (Family Medicine)  Ryan Luna DO as PCP - [facility]  Jet Bahena MD as Consulting Physician (Gastroenterology)  Pee Hernandez MD as Consulting Physician (Hematology and Oncology)  Mariposa Rodney MD (Obstetrics & Gynecology)    Medical History Review  Past Medical, Family, and Social History reviewed and does contribute to the patient presenting condition    Health Maintenance   Topic Date Due    Shingles Vaccine (1 of 2) Never done    Cervical cancer screen  12/13/2016    Low dose CT lung screening  01/15/2017    Breast cancer screen  06/07/2020    Diabetic microalbuminuria test  11/01/2020    COVID-19 Vaccine (2 - Pfizer 2-dose series) 04/08/2021    Diabetic foot exam  06/12/2021    Diabetic retinal exam  07/22/2021    A1C test (Diabetic or Prediabetic)  12/10/2021    Lipid screen  12/10/2021    Potassium monitoring  12/10/2021    Creatinine monitoring  12/10/2021    DTaP/Tdap/Td vaccine (2 - Td) 05/04/2025    Colon cancer screen colonoscopy  08/28/2030    Flu vaccine  Completed    Pneumococcal 0-64 years Vaccine  Completed    Hepatitis C screen  Completed    HIV screen  Completed    Hepatitis A vaccine  Aged C/ Woodrow Saul 19 Hib vaccine  Aged Out    Meningococcal (ACWY) vaccine  Aged Out       After obtaining consent, [facility] Dr. Rosaura Romero performed an injection of 2 ml of Bupivacaine 0.5% NDC: 9548-6933-73, Lot -[id number], Ex date: 4/1/21 mixed with 1 ml of 80 mg Depo-Medrol each given in both left and right knee . Patient instructed to remain in clinic for 20 minutes afterwards, and to report any adverse reaction to me immediately. Pt tolerated injection well. Administrations This Visit     methylPREDNISolone acetate (DEPO-MEDROL) injection 80 mg     Admin Date  03/29/2021  11:34 Action  Given by Other Dose  80 mg Route  Intra-articular Site  Knee Left Administered By  Lisa Bright CMA ([address] [place] [address])    Ordering Provider: Melvin Menard DO    NDC: 4173-2975-52    Lot#: [id number]    : [employer]. Patient Supplied?: No           Admin Date  03/29/2021  11:35 Action  Given by Other Dose  80 mg Route  Intra-articular Site  Knee Right Administered By  Lisa Bright CMA ([facility])    Ordering Provider: Melvin Menard DO    NDC: 4353-4541-33    Lot#: [id number]    : [employer].     Patient Supplied?: No

## 2021-03-29 NOTE — PROGRESS NOTES
Sola No (:  1960) is a 57 y.o. female,Established patient, here for evaluation of the following chief complaint(s):  Lower Back Pain (with bilateral leg pain right worse then left, wakness,  present for past 6 months or so surgery on right knee 2020- pt taking OTC Tylenol and Aleeve prn ), Knee Pain (bilateral knees discuss steroid injections ), Discuss Medications (Melatonin ), and Orders (parking placard )      SUBJECTIVE/OBJECTIVE:  HPI:    Chief Complaint   Patient presents with    Lower Back Pain     with bilateral leg pain right worse then left, wakness,  present for past 6 months or so surgery on right knee 2020- pt taking OTC Tylenol and Aleeve prn     Knee Pain     bilateral knees discuss steroid injections     Discuss Medications     Melatonin     Orders     parking placard      Pt here with multiple concerns today. Pt has some LBP with radiation to both legs. This is a chronic issue but worse since her knee scope. Plans to see her Chiropractor today. Pain shoots down both legs, R>L. Some weakness noted. Pt continues to have bilateral knee pain. Recently had a scope on her right knee with little help. Some swelling noted. Not sleeping well. On doxepin and up to 20 mg of melatonin.     Patient Active Problem List   Diagnosis    DM type 2 (diabetes mellitus, type 2) ([facility] [address]    Hyperlipemia    THORNTON (nonalcoholic steatohepatitis)    Obesity    Tobacco abuse    Vitamin D deficiency    Seborrheic keratosis    Dyshidrotic eczema     Past Surgical History:   Procedure Laterality Date    BLADDER SUSPENSION      COLONOSCOPY  2020    HERNIA REPAIR  10/17/2016    KNEE SURGERY      Right knee    MASTECTOMY, PARTIAL Left     TUBAL LIGATION      WISDOM TOOTH EXTRACTION       Social History     Tobacco Use    Smoking status: Current Every Day Smoker     Packs/day: 1.50     Years: 30.00     Pack years: 45.00     Types: Cigarettes    Smokeless tobacco: Never Used Substance Use Topics    Alcohol use: No     Comment: monthly glass of wine    Drug use: No         Review of Systems   Constitutional: Negative. HENT: Negative. Respiratory: Negative. Cardiovascular: Negative. Gastrointestinal: Negative. Musculoskeletal: Positive for arthralgias (bl knee pain), back pain and gait problem. Psychiatric/Behavioral: Positive for sleep disturbance. All other systems reviewed and are negative. Physical Exam  Vitals signs and nursing note reviewed. Constitutional:       General: She is not in acute distress. Appearance: Normal appearance. She is well-developed. HENT:      Head: Normocephalic and atraumatic. Right Ear: Tympanic membrane normal.      Left Ear: Tympanic membrane normal.   Eyes:      Conjunctiva/sclera: Conjunctivae normal.   Neck:      Musculoskeletal: Neck supple. Cardiovascular:      Rate and Rhythm: Normal rate and regular rhythm. Heart sounds: Normal heart sounds. No murmur. Pulmonary:      Effort: Pulmonary effort is normal.      Breath sounds: Normal breath sounds. No wheezing, rhonchi or rales. Abdominal:      General: There is no distension. Musculoskeletal:      Right knee: She exhibits decreased range of motion and effusion. Tenderness found. Medial joint line tenderness noted. Left knee: She exhibits decreased range of motion and effusion. Tenderness found. Medial joint line tenderness noted. Lumbar back: She exhibits decreased range of motion, tenderness and pain. Skin:     General: Skin is warm and dry. Findings: No rash (on exposed surfaces). Neurological:      General: No focal deficit present. Mental Status: She is alert. Psychiatric:         Attention and Perception: Attention normal.         Mood and Affect: Mood normal.         Speech: Speech normal.         Behavior: Behavior normal. Behavior is cooperative. Thought Content:  Thought content normal.         Judgment: Judgment normal.         ASSESSMENT/PLAN:  1. Bilateral primary osteoarthritis of knee  -     methylPREDNISolone acetate (DEPO-MEDROL) injection 80 mg; 80 mg, Intra-articular, ONCE, Mon 3/29/21 at 1145, For 1 dose  -     methylPREDNISolone acetate (DEPO-MEDROL) injection 80 mg; 80 mg, Intra-articular, ONCE, Mon 3/29/21 at 1145, For 1 dose  -     39657 - VA DRAIN/INJECT LARGE JOINT/BURSA  2. DDD (degenerative disc disease), lumbar  -     Handicap placard  3. S/P arthroscopy of right knee  4. Internal derangement of right knee  5. Primary insomnia    -  Multiple concerns today, each addressed at length  -  Risks/benefits discussed. After patient consent obtained, the Bilateral infrapatellar region of the knee was cleansed and anesthetized with ethyl chloride. The knee was then injected with DepoMedrol 80mg mixed with 2cc of Marcaine. Ice then applied for 10 minutes  -  rx handicap placard  -  Recommend follow up with Ortho for knees if continues to cause her problems  -  Declines help with her back, has appt with her Chiropractor later today  -  In regards to #5, recommend limit melatonin to 3-5 mg nightly. Ok to continue doxepin. Recommend sleep eval, declines      Return if symptoms worsen or fail to improve. An electronic signature was used to authenticate this note.     --Andrew Calero DO

## 2021-04-08 ENCOUNTER — IMMUNIZATION (OUTPATIENT)
Dept: PRIMARY CARE CLINIC | Age: 61
End: 2021-04-08
Payer: OTHER GOVERNMENT

## 2021-04-08 PROCEDURE — 0002A COVID-19, PFIZER VACCINE 30MCG/0.3ML DOSE: CPT

## 2021-04-08 PROCEDURE — 91300 COVID-19, PFIZER VACCINE 30MCG/0.3ML DOSE: CPT

## 2021-04-22 DIAGNOSIS — E11.9 CONTROLLED TYPE 2 DIABETES MELLITUS WITHOUT COMPLICATION, WITHOUT LONG-TERM CURRENT USE OF INSULIN (HCC): ICD-10-CM

## 2021-04-22 RX ORDER — FENOFIBRATE 160 MG/1
TABLET ORAL
Qty: 90 TABLET | Refills: 3 | Status: SHIPPED | OUTPATIENT
Start: 2021-04-22 | End: 2022-04-18

## 2021-06-08 ENCOUNTER — TELEPHONE (OUTPATIENT)
Dept: FAMILY MEDICINE CLINIC | Age: 61
End: 2021-06-08

## 2021-06-08 NOTE — TELEPHONE ENCOUNTER
L/M for pt to call office. Called patient for pre visit planning. Pt has appt on 6/15/21 at 2pm.  Please remind pt of appt date and time and check to see if they had their lab work done.

## 2021-06-11 ENCOUNTER — HOSPITAL ENCOUNTER (OUTPATIENT)
Dept: MAMMOGRAPHY | Age: 61
Discharge: HOME | End: 2021-06-11
Payer: OTHER GOVERNMENT

## 2021-06-11 DIAGNOSIS — Z12.39 BREAST SCREENING: ICD-10-CM

## 2021-06-11 PROCEDURE — 77063 BREAST TOMOSYNTHESIS BI: CPT

## 2021-06-12 LAB
AVERAGE GLUCOSE: 100 MG/DL
CREATINE, URINE: 64.81 MG/DL
HBA1C MFR BLD: 5.1 % (ref 4.4–6.4)
MICROALBUMIN/CREAT 24H UR: 10 MG/DL (ref 0–1.9)
MICROALBUMIN/CREAT UR-RTO: 154.3 MG/G CREAT (ref 0–30)

## 2021-06-15 ENCOUNTER — OFFICE VISIT (OUTPATIENT)
Dept: FAMILY MEDICINE CLINIC | Age: 61
End: 2021-06-15
Payer: OTHER GOVERNMENT

## 2021-06-15 VITALS
DIASTOLIC BLOOD PRESSURE: 66 MMHG | BODY MASS INDEX: 30.26 KG/M2 | SYSTOLIC BLOOD PRESSURE: 124 MMHG | RESPIRATION RATE: 24 BRPM | WEIGHT: 199 LBS | HEART RATE: 92 BPM

## 2021-06-15 DIAGNOSIS — M51.36 DDD (DEGENERATIVE DISC DISEASE), LUMBAR: ICD-10-CM

## 2021-06-15 DIAGNOSIS — R25.2 MUSCLE CRAMPS: ICD-10-CM

## 2021-06-15 DIAGNOSIS — E11.9 CONTROLLED TYPE 2 DIABETES MELLITUS WITHOUT COMPLICATION, WITHOUT LONG-TERM CURRENT USE OF INSULIN (HCC): Primary | ICD-10-CM

## 2021-06-15 DIAGNOSIS — E78.5 DYSLIPIDEMIA: ICD-10-CM

## 2021-06-15 DIAGNOSIS — E55.9 VITAMIN D DEFICIENCY: ICD-10-CM

## 2021-06-15 DIAGNOSIS — G47.00 INSOMNIA, UNSPECIFIED TYPE: ICD-10-CM

## 2021-06-15 DIAGNOSIS — F51.01 PRIMARY INSOMNIA: ICD-10-CM

## 2021-06-15 DIAGNOSIS — M17.0 BILATERAL PRIMARY OSTEOARTHRITIS OF KNEE: ICD-10-CM

## 2021-06-15 DIAGNOSIS — Z72.0 TOBACCO ABUSE: ICD-10-CM

## 2021-06-15 DIAGNOSIS — K75.81 NASH (NONALCOHOLIC STEATOHEPATITIS): ICD-10-CM

## 2021-06-15 PROCEDURE — 99214 OFFICE O/P EST MOD 30 MIN: CPT | Performed by: FAMILY MEDICINE

## 2021-06-15 RX ORDER — LANOLIN ALCOHOL/MO/W.PET/CERES
3 CREAM (GRAM) TOPICAL DAILY
COMMUNITY

## 2021-06-15 RX ORDER — DOXEPIN HYDROCHLORIDE 10 MG/1
CAPSULE ORAL
Qty: 90 CAPSULE | Refills: 3
Start: 2021-06-15 | End: 2022-02-07

## 2021-06-15 SDOH — ECONOMIC STABILITY: FOOD INSECURITY: WITHIN THE PAST 12 MONTHS, YOU WORRIED THAT YOUR FOOD WOULD RUN OUT BEFORE YOU GOT MONEY TO BUY MORE.: NEVER TRUE

## 2021-06-15 SDOH — ECONOMIC STABILITY: FOOD INSECURITY: WITHIN THE PAST 12 MONTHS, THE FOOD YOU BOUGHT JUST DIDN'T LAST AND YOU DIDN'T HAVE MONEY TO GET MORE.: NEVER TRUE

## 2021-06-15 ASSESSMENT — SOCIAL DETERMINANTS OF HEALTH (SDOH): HOW HARD IS IT FOR YOU TO PAY FOR THE VERY BASICS LIKE FOOD, HOUSING, MEDICAL CARE, AND HEATING?: NOT HARD AT ALL

## 2021-06-15 ASSESSMENT — ENCOUNTER SYMPTOMS
RESPIRATORY NEGATIVE: 1
GASTROINTESTINAL NEGATIVE: 1

## 2021-06-15 NOTE — PROGRESS NOTES
Chronic Disease Visit Information    BP Readings from Last 3 Encounters:   03/29/21 128/78   01/25/21 128/62   01/07/21 130/69          Hemoglobin A1C (%)   Date Value   06/11/2021 5.1   12/10/2020 5.9   06/05/2020 6.3     LDL Calculated (mg/dL)   Date Value   12/10/2020 60     HDL   Date Value   12/10/2020 29 mg/dL (L)   02/04/2012 44 mg/dl   02/04/2012 44 mg/dl     BUN (mg/dL)   Date Value   12/10/2020 11     CREATININE (mg/dL)   Date Value   12/10/2020 0.64     Glucose (mg/dL)   Date Value   12/10/2020 113 (H)            Have you changed or started any medications since your last visit including any over-the-counter medicines, vitamins, or herbal medicines? Yes, see list  Are you having any side effects from any of your medications? -  no  Have you stopped taking any of your medications? Is so, why? -  no    Have you seen any other physician or provider since your last visit? No  Have you had any other diagnostic tests since your last visit? No  Have you been seen in the emergency room and/or had an admission to a hospital since we last saw you? No  Have you had your annual diabetic retinal (eye) exam? Yes - Records Obtained  Have you had your routine dental cleaning in the past 6 months? yes - 1/2021    Have you activated your [employer] account? If not, what are your barriers?  Yes    Patient Care Team:  Gisselle Vaughan DO as PCP - General (Family Medicine)  Gisselle Vaughan DO as PCP - [facility]  Rebekah Parada MD as Consulting Physician (Gastroenterology)  Dimitry Thakur MD as Consulting Physician (Hematology and Oncology)  Regino Bunch MD (Obstetrics & Gynecology)         Medical History Review  Past Medical, Family, and Social History reviewed and does contribute to the patient presenting condition    Health Maintenance   Topic Date Due    Shingles Vaccine (1 of 2) Never done    Cervical cancer screen  12/13/2016    Low dose CT lung screening  01/15/2017    Diabetic foot exam 06/12/2021    Diabetic retinal exam  07/22/2021    Lipid screen  12/10/2021    Potassium monitoring  12/10/2021    Creatinine monitoring  12/10/2021    A1C test (Diabetic or Prediabetic)  06/11/2022    Diabetic microalbuminuria test  06/11/2022    Breast cancer screen  06/11/2022    Pneumococcal 0-64 years Vaccine (2 of 2) 01/06/2024    DTaP/Tdap/Td vaccine (2 - Td or Tdap) 05/04/2025    Colon cancer screen colonoscopy  08/28/2030    Flu vaccine  Completed    COVID-19 Vaccine  Completed    Hepatitis C screen  Completed    HIV screen  Completed    Hepatitis A vaccine  Aged Out    Hib vaccine  Aged Out    Meningococcal (ACWY) vaccine  Aged Out

## 2021-06-15 NOTE — PROGRESS NOTES
6/15/2021    Kaycee Varela (:  1960) is a 57 y.o. female, here for a preventive medicine evaluation. Chief Complaint   Patient presents with    6 Month Follow-Up     6 month eval.  Doing well overall. Sugars well controlled. No lows. Lab Results   Component Value Date    LABA1C 5.1 2021    LABA1C 5.9 12/10/2020    LABA1C 6.3 2020     Lab Results   Component Value Date    GLUF 141 (H) 2016    LDLCALC 60 12/10/2020    CREATININE 0.64 12/10/2020     BP looks great. BP Readings from Last 3 Encounters:   06/15/21 124/66   21 128/78   21 128/62     Down 57 lbs so far. Has been trying to lose. Wt Readings from Last 3 Encounters:   06/15/21 199 lb (90.3 kg)   21 227 lb 11.2 oz (103.3 kg)   12/15/20 236 lb 11.2 oz (107.4 kg)       Patient Active Problem List   Diagnosis    DM type 2 (diabetes mellitus, type 2) ([facility] [address]    Hyperlipemia    THORNTON (nonalcoholic steatohepatitis)    Obesity    Tobacco abuse    Vitamin D deficiency    Seborrheic keratosis    Dyshidrotic eczema       Review of Systems   Constitutional: Negative. HENT: Negative. Respiratory: Negative. Cardiovascular: Negative. Gastrointestinal: Negative. Musculoskeletal: Negative. All other systems reviewed and are negative. Prior to Visit Medications    Medication Sig Taking?  Authorizing Provider   melatonin 3 MG TABS tablet Take 3 mg by mouth daily Yes Historical Provider, MD   Pyridoxine HCl (VITAMIN B6 PO) Take by mouth Yes Historical Provider, MD   MAGNESIUM PO Take by mouth Yes Historical Provider, MD   NONFORMULARY AF Beta Food Yes Historical Provider, MD   metFORMIN (GLUCOPHAGE) 1000 MG tablet TAKE 1/2 TABLET TWICE A DAY WITH MEALS Yes Janny Fernandez DO   doxepin (SINEQUAN) 10 MG capsule TAKE 2 CAPSULES NIGHTLY Yes Janny Fernandez DO   fenofibrate (TRIGLIDE) 160 MG tablet TAKE 1 TABLET DAILY Yes Janny Fernandez DO   losartan (COZAAR) 25 MG tablet TAKE 1 TABLET DAILY Yes Janny Fernandez DO   atorvastatin (LIPITOR) 40 MG tablet TAKE 1 TABLET NIGHTLY Yes Janny Fernandez DO   Acetaminophen 500 MG CAPS Take 1,000 mg by mouth as needed for Pain  Yes Historical Provider, MD   KRILL OIL PO Take 350 mg by mouth daily Yes Historical Provider, MD   Multiple Vitamins-Minerals (THERAPEUTIC MULTIVITAMIN-MINERALS) tablet Take 1 tablet by mouth daily Yes Historical Provider, MD   aspirin 81 MG tablet Take 81 mg by mouth daily.    Yes Historical Provider, MD        Allergies   Allergen Reactions    Adhesive Tape        Past Medical History:   Diagnosis Date    Atypical ductal hyperplasia, breast     left breast, 2016, lumpectomy, Tamoxifen, Barbara    Hx of breast biopsy Jan 8 2016    Hyperlipidemia     Type II or unspecified type diabetes mellitus without mention of complication, not stated as uncontrolled        Past Surgical History:   Procedure Laterality Date    BLADDER SUSPENSION      BREAST BIOPSY Left     atypical hyperlasia, 2016, Barbara    BREAST LUMPECTOMY Left     atypical hyperplasia, 2016, Barbara    COLONOSCOPY  08/28/2020    HERNIA REPAIR  10/17/2016    KNEE SURGERY      Right knee    TUBAL LIGATION      WISDOM TOOTH EXTRACTION         Social History     Socioeconomic History    Marital status:      Spouse name: Conner Salter Number of children: 1    Years of education: 12    Highest education level: Associate degree: academic program   Occupational History    Not on file   Tobacco Use    Smoking status: Current Every Day Smoker     Packs/day: 1.50     Years: 49.00     Pack years: 73.50     Types: Cigarettes    Smokeless tobacco: Never Used   Vaping Use    Vaping Use: Never used   Substance and Sexual Activity    Alcohol use: No     Comment: monthly glass of wine    Drug use: No    Sexual activity: Not on file   Other Topics Concern    Not on file   Social History Narrative    Not on file     Social Determinants of Health     Financial Resource Strain: Low Risk     Difficulty of Paying Living Expenses: Not hard at all   Food Insecurity: No Food Insecurity    Worried About Running Out of Food in the Last Year: Never true    [place] of Food in the Last Year: Never true   Transportation Needs:     Lack of Transportation (Medical):  Lack of Transportation (Non-Medical):    Physical Activity:     Days of Exercise per Week:     Minutes of Exercise per Session:    Stress:     Feeling of Stress :    Social Connections:     Frequency of Communication with Friends and Family:     Frequency of Social Gatherings with Friends and Family:     Attends [employer] Services:     Active Member of Clubs or Organizations:     Attends Club or Organization Meetings:     Marital Status:    Intimate Partner Violence:     Fear of Current or Ex-Partner:     Emotionally Abused:     Physically Abused:     Sexually Abused:         Family History   Problem Relation Age of Onset    High Cholesterol Mother     High Blood Pressure Mother     Pacemaker Mother     Other Mother         Sundowners syndrome    Crohn's Disease Mother     Cancer Mother         Uterine and cervical and vagina    Thyroid Disease Mother     Dementia Mother     Colon Polyps Mother     Other Father         Pneumonia    Dementia Father     Diabetes Sister     Breast Cancer Sister     Heart Disease Brother     Colon Cancer Neg Hx        ADVANCE DIRECTIVE: N, <no information>    Vitals:    06/15/21 1354   BP: 124/66   Site: Left Upper Arm   Position: Sitting   Cuff Size: Large Adult   Pulse: 92   Resp: 24   Weight: 199 lb (90.3 kg)     Estimated body mass index is 30.26 kg/m² as calculated from the following:    Height as of 9/29/20: 5' 8\" (1.727 m). Weight as of this encounter: 199 lb (90.3 kg). Physical Exam  Vitals and nursing note reviewed. Constitutional:       General: She is not in acute distress. Appearance: Normal appearance. She is well-developed.    HENT: Head: Normocephalic and atraumatic. Right Ear: Tympanic membrane normal.      Left Ear: Tympanic membrane normal.   Eyes:      Conjunctiva/sclera: Conjunctivae normal.   Cardiovascular:      Rate and Rhythm: Normal rate and regular rhythm. Heart sounds: Normal heart sounds. No murmur heard. Pulmonary:      Effort: Pulmonary effort is normal.      Breath sounds: Normal breath sounds. No wheezing, rhonchi or rales. Abdominal:      General: There is no distension. Musculoskeletal:      Cervical back: Neck supple. Skin:     General: Skin is warm and dry. Findings: No rash (on exposed surfaces). Neurological:      General: No focal deficit present. Mental Status: She is alert. Psychiatric:         Attention and Perception: Attention normal.         Mood and Affect: Mood normal.         Speech: Speech normal.         Behavior: Behavior normal. Behavior is cooperative. Thought Content: Thought content normal.         Judgment: Judgment normal.         No flowsheet data found.     Lab Results   Component Value Date    CHOL 142 12/10/2020    CHOL 173 06/05/2020    CHOL 166 11/01/2019    CHOL 173 04/29/2014    CHOL 158 04/29/2013    CHOL 212 11/03/2012    CHOL 212 11/03/2012    TRIG 266 12/10/2020    TRIG 505 06/05/2020    TRIG 418 11/01/2019    HDL 29 12/10/2020    HDL 22 06/05/2020    HDL 25 11/01/2019    HDL 44 02/04/2012    HDL 44 02/04/2012    HDL 48 08/05/2011    LDLCALC 60 12/10/2020    LDLCALC See Note 06/05/2020    [address] See Note 11/01/2019    GLUF 141 01/28/2016    GLUCOSE 113 12/10/2020    LABA1C 5.1 06/11/2021    LABA1C 5.9 12/10/2020    LABA1C 6.3 06/05/2020       The 10-year ASCVD risk score (Karlene Carrasquillo et al., 2013) is: 16%    Values used to calculate the score:      Age: 57 years      Sex: Female      Is Non- : No      Diabetic: Yes      Tobacco smoker: Yes      Systolic Blood Pressure: 351 mmHg      Is BP treated: No      HDL Cholesterol: 29 mg/dL      Total Cholesterol: 142 mg/dL    Immunization History   Administered Date(s) Administered    COVID-19, Pfizer, PF, 30mcg/0.3mL 03/18/2021, 04/08/2021    Influenza, Quadv, IM, PF (6 mo and older Fluzone, Flulaval, Fluarix, and 3 yrs and older Afluria) 12/15/2020    Pneumococcal Polysaccharide ([id number]) 11/02/2015    Tdap (Boostrix, Adacel) 05/04/2015       Health Maintenance   Topic Date Due    Shingles Vaccine (1 of 2) Never done    Cervical cancer screen  12/13/2016    Low dose CT lung screening  01/15/2017    Diabetic foot exam  06/12/2021    Diabetic retinal exam  07/22/2021    Lipid screen  12/10/2021    Potassium monitoring  12/10/2021    Creatinine monitoring  12/10/2021    A1C test (Diabetic or Prediabetic)  06/11/2022    Diabetic microalbuminuria test  06/11/2022    Breast cancer screen  06/11/2022    Pneumococcal 0-64 years Vaccine (2 of 2) 01/06/2024    DTaP/Tdap/Td vaccine (2 - Td or Tdap) 05/04/2025    Colon cancer screen colonoscopy  08/28/2030    Flu vaccine  Completed    COVID-19 Vaccine  Completed    Hepatitis C screen  Completed    HIV screen  Completed    Hepatitis A vaccine  Aged Out    Hib vaccine  Aged Out    Meningococcal (ACWY) vaccine  Aged Out          ASSESSMENT/PLAN:  1. Controlled type 2 diabetes mellitus without complication, without long-term current use of insulin (HCC)  -     Comprehensive Metabolic Panel; Future  -     Hemoglobin A1C; Future  -     Microalbumin / Creatinine Urine Ratio; Future  2. Primary insomnia  3. DDD (degenerative disc disease), lumbar  4. Bilateral primary osteoarthritis of knee  5. Tobacco abuse  6. THORNTON (nonalcoholic steatohepatitis)  -     Comprehensive Metabolic Panel; Future  7. Vitamin D deficiency  8. Controlled type 2 diabetes mellitus without complication (HCC)  -     metFORMIN (GLUCOPHAGE) 1000 MG tablet; TAKE 1/2 TABLET TWICE A DAY WITH MEALS, Disp-180 tablet, R-3Adjust Sig  9.  Muscle cramps  -     CBC Auto Differential; Future  -     Magnesium; Future  10. Dyslipidemia  -     Lipid Panel w/ Reflex Direct LDL; Future  -     Comprehensive Metabolic Panel; Future  -     TSH with Reflex; Future  11. Insomnia, unspecified type  -     doxepin (SINEQUAN) 10 MG capsule; TAKE 2 CAPSULES NIGHTLY, Disp-90 capsule, R-3Adjust Sig    -  Chronic medical problems stable  -  Labs reviewed, look fine  -  Continue current medications, will lower her metformin at this time due to A1C of 5.1  -  Increase doxepin at her request  -  Follow up with specialists as scheduled  -  Recheck labs 6 mos      Return in about 6 months (around 12/15/2021) for DM2. An electronic signature was used to authenticate this note.     --Preet Honeycutt,  on 6/15/2021 at 5:00 PM

## 2021-07-13 ENCOUNTER — PATIENT MESSAGE (OUTPATIENT)
Dept: FAMILY MEDICINE CLINIC | Age: 61
End: 2021-07-13

## 2021-07-13 RX ORDER — BACLOFEN 10 MG/1
10 TABLET ORAL DAILY PRN
Qty: 30 TABLET | Refills: 0 | Status: SHIPPED | OUTPATIENT
Start: 2021-07-13

## 2021-07-13 NOTE — TELEPHONE ENCOUNTER
From: Bhavani Stacy  To: Alicia Cabrera DO  Sent: 7/13/2021 6:20 AM EDT  Subject: Non-Urgent Medical Question    Is there any kind of muscle relaxer I can take for these leg spasms? I can't take the magnesium it gives me loose bowls. My left knee hurts so bad I can't hardly walk. I'm at my wits end here.  I've tried everything I can think of other than potassium I've exercised drank water nothing I've tried has worked

## 2021-08-21 DIAGNOSIS — E11.9 CONTROLLED TYPE 2 DIABETES MELLITUS WITHOUT COMPLICATION (HCC): ICD-10-CM

## 2021-08-23 ENCOUNTER — NURSE TRIAGE (OUTPATIENT)
Dept: OTHER | Facility: CLINIC | Age: 61
End: 2021-08-23

## 2021-08-23 RX ORDER — LOSARTAN POTASSIUM 25 MG/1
TABLET ORAL
Qty: 90 TABLET | Refills: 3 | Status: SHIPPED | OUTPATIENT
Start: 2021-08-23

## 2021-08-23 NOTE — TELEPHONE ENCOUNTER
Received call from Noble Crowley at [facility] with [facility]. Brief description of triage: diarrhea, told by doctor to make virtual visit to discuss. States it is just liquid diarrhea now. Triage indicates for patient to go to [facility] [address] now or to office with PCP approval. Office is currently closed, tried both NP's for 2nd level triage with no success. Advised patient to go to [facility]. She states she will try and go to urgent care. Care advice provided, patient verbalizes understanding; denies any other questions or concerns; instructed to call back for any new or worsening symptoms. Attention Provider: Thank you for allowing me to participate in the care of your patient. The patient was connected to triage in response to information provided to the ECC. Please do not respond through this encounter as the response is not directed to a shared pool. Reason for Disposition   SEVERE diarrhea (e.g., 7 or more times / day more than normal) and age > 60 years    Answer Assessment - Initial Assessment Questions  1. DIARRHEA SEVERITY: \"How bad is the diarrhea? \" \"How many extra stools have you had in the past 24 hours than normal?\"     - NO DIARRHEA (SCALE 0)    - MILD (SCALE 1-3): Few loose or mushy BMs; increase of 1-3 stools over normal daily number of stools; mild increase in ostomy output. -  MODERATE (SCALE 4-7): Increase of 4-6 stools daily over normal; moderate increase in ostomy output. * SEVERE (SCALE 8-10; OR 'WORST POSSIBLE'): Increase of 7 or more stools daily over normal; moderate increase in ostomy output; incontinence. At least 8 last night, 4 today    2. ONSET: \"When did the diarrhea begin? \"       2-3 days, worse at night    3. BM CONSISTENCY: \"How loose or watery is the diarrhea? \"       Watery    4. VOMITING: \"Are you also vomiting? \" If so, ask: \"How many times in the past 24 hours? \"       No    5. ABDOMINAL PAIN: Omie Helling you having any abdominal pain? \" If yes: \"What does it feel like? \" (e.g., crampy, dull, intermittent, constant)       A little bit of cramping    6. ABDOMINAL PAIN SEVERITY: If present, ask: \"How bad is the pain? \"  (e.g., Scale 1-10; mild, moderate, or severe)    - MILD (1-3): doesn't interfere with normal activities, abdomen soft and not tender to touch     - MODERATE (4-7): interferes with normal activities or awakens from sleep, tender to touch     - SEVERE (8-10): excruciating pain, doubled over, unable to do any normal activities        Mild    7. ORAL INTAKE: If vomiting, \"Have you been able to drink liquids? \" \"How much fluids have you had in the past 24 hours? \"      N/A    8. HYDRATION: \"Any signs of dehydration? \" (e.g., dry mouth [not just dry lips], too weak to stand, dizziness, new weight loss) \"When did you last urinate? \"      Weak, still able to walk to bathroom    9. EXPOSURE: \"Have you traveled to a foreign country recently? \" \"Have you been exposed to anyone with diarrhea? \" \"Could you have eaten any food that was spoiled? \"      No    10. ANTIBIOTIC USE: \"Are you taking antibiotics now or have you taken antibiotics in the past 2 months? \"        No    11. OTHER SYMPTOMS: \"Do you have any other symptoms? \" (e.g., fever, blood in stool)        No    12. PREGNANCY: \"Is there any chance you are pregnant? \" \"When was your last menstrual period? \"        N/A    Protocols used: [id number]

## 2021-08-26 ENCOUNTER — VIRTUAL VISIT (OUTPATIENT)
Dept: FAMILY MEDICINE CLINIC | Age: 61
End: 2021-08-26
Payer: OTHER GOVERNMENT

## 2021-08-26 DIAGNOSIS — R10.84 GENERALIZED ABDOMINAL PAIN: ICD-10-CM

## 2021-08-26 DIAGNOSIS — R19.5 LOOSE STOOLS: Primary | ICD-10-CM

## 2021-08-26 PROCEDURE — 99213 OFFICE O/P EST LOW 20 MIN: CPT | Performed by: FAMILY MEDICINE

## 2021-08-26 RX ORDER — DIPHENOXYLATE HYDROCHLORIDE AND ATROPINE SULFATE 2.5; .025 MG/1; MG/1
1 TABLET ORAL 4 TIMES DAILY PRN
Qty: 20 TABLET | Refills: 0 | Status: SHIPPED | OUTPATIENT
Start: 2021-08-26 | End: 2021-08-31

## 2021-08-26 ASSESSMENT — ENCOUNTER SYMPTOMS
CONSTIPATION: 0
NAUSEA: 0
BLOOD IN STOOL: 0
DIARRHEA: 1
RESPIRATORY NEGATIVE: 1
VOMITING: 0
ABDOMINAL PAIN: 1

## 2021-08-26 NOTE — PROGRESS NOTES
Dahiana Varela (:  1960) is a 57 y.o. female,Established patient, here for evaluation of the following chief complaint(s): Abdominal Pain and Diarrhea                SUBJECTIVE/OBJECTIVE:  HPI:    Chief Complaint   Patient presents with    Abdominal Pain    Diarrhea       Pt presents today with c/o persistent loose stools with mucus for the last month. Occasional abdominal pain. Denies blood in urine. Appetite fine, weight stable per pt. No [place]. No recent abx. No recent travel. Some relief with Pepto and Imodium. Patient Active Problem List   Diagnosis    DM type 2 (diabetes mellitus, type 2) [facility] [address]    Hyperlipemia    THORNTON (nonalcoholic steatohepatitis)    Obesity    Tobacco abuse    Vitamin D deficiency    Seborrheic keratosis    Dyshidrotic eczema     Past Surgical History:   Procedure Laterality Date    BLADDER SUSPENSION      BREAST BIOPSY Left     atypical hyperlasia, , [facility]    BREAST LUMPECTOMY Left     atypical hyperplasia, , [facility]    COLONOSCOPY  2020    HERNIA REPAIR  10/17/2016    KNEE SURGERY      Right knee    TUBAL LIGATION      WISDOM TOOTH EXTRACTION       Social History     Tobacco Use    Smoking status: Current Every Day Smoker     Packs/day: 1.50     Years: 49.00     Pack years: 73.50     Types: Cigarettes    Smokeless tobacco: Never Used   Vaping Use    Vaping Use: Never used   Substance Use Topics    Alcohol use: No     Comment: monthly glass of wine    Drug use: No         Review of Systems   Constitutional: Negative. Negative for appetite change, chills and fever. HENT: Negative. Respiratory: Negative. Cardiovascular: Negative. Gastrointestinal: Positive for abdominal pain and diarrhea. Negative for blood in stool, constipation, nausea and vomiting. Musculoskeletal: Negative. All other systems reviewed and are negative. No flowsheet data found.      Physical Exam  Constitutional:       General: She is not in acute distress. Appearance: Normal appearance. She is well-developed. She is not ill-appearing. HENT:      Head: Normocephalic and atraumatic. Right Ear: External ear normal.      Left Ear: External ear normal.   Eyes:      Conjunctiva/sclera: Conjunctivae normal.   Pulmonary:      Effort: Pulmonary effort is normal. No respiratory distress. Skin:     Findings: No rash (on exposed surfaces). Neurological:      Mental Status: She is alert and oriented to person, place, and time. Psychiatric:         Mood and Affect: Mood normal.         Behavior: Behavior normal.         Thought Content: Thought content normal.         Judgment: Judgment normal.     ASSESSMENT/PLAN:  1. Loose stools  -     diphenoxylate-atropine (LOMOTIL) 2.5-0.025 MG per tablet; Take 1 tablet by mouth 4 times daily as needed for Diarrhea for up to 5 days. , Disp-20 tablet, R-0Normal  2. Generalized abdominal pain    -  Doubt infectious etiology, question med-related vs bacterial overgrowth?  -  rx Lomotil  -  Limit sugar and dairy in diet    Return if symptoms worsen or fail to improve. Plan stool culture at that time if no improvement in a few days              [facility], was evaluated through a synchronous (real-time) audio-video encounter. The patient (or guardian if applicable) is aware that this is a billable service. Verbal consent to proceed has been obtained within the past 12 months. The visit was conducted pursuant to the emergency declaration under the [facility], [address] [place] [address] authority and the [employer] General Act. Patient identification was verified, and a caregiver was present when appropriate. The patient was located in a state where the provider was credentialed to provide care. An electronic signature was used to authenticate this note.     --Hanna Mckeon DO

## 2021-08-31 ENCOUNTER — PATIENT MESSAGE (OUTPATIENT)
Dept: FAMILY MEDICINE CLINIC | Age: 61
End: 2021-08-31

## 2021-08-31 DIAGNOSIS — R19.5 LOOSE STOOLS: Primary | ICD-10-CM

## 2021-08-31 NOTE — TELEPHONE ENCOUNTER
From: Maryanne Meeks  To: Hanna Mckeon DO  Sent: 8/31/2021 11:12 AM EDT  Subject: Non-Urgent Medical Question    The medication has slowed it down and provided me with some much needed rest. The stool and gas now has an odor that it didn't have before. It is trying to form solids and they are small and there is no floaters. If I have to have it tested is there any way I can do it from home and take it to the rapid test office. Both my knees are hurting and I'm back on crutches. It's very hard for me to get around.

## 2021-09-07 ENCOUNTER — HOSPITAL ENCOUNTER (OUTPATIENT)
Age: 61
Discharge: HOME | End: 2021-09-07
Payer: OTHER GOVERNMENT

## 2021-09-07 DIAGNOSIS — K75.81 NASH (NONALCOHOLIC STEATOHEPATITIS): ICD-10-CM

## 2021-09-07 DIAGNOSIS — R25.2 MUSCLE CRAMPS: ICD-10-CM

## 2021-09-07 DIAGNOSIS — E11.9 CONTROLLED TYPE 2 DIABETES MELLITUS WITHOUT COMPLICATION, WITHOUT LONG-TERM CURRENT USE OF INSULIN (HCC): ICD-10-CM

## 2021-09-07 DIAGNOSIS — E78.5 DYSLIPIDEMIA: ICD-10-CM

## 2021-09-07 DIAGNOSIS — R19.5 LOOSE STOOLS: ICD-10-CM

## 2021-09-07 LAB
ADENOVIRUS F 40 41 PCR: NOT DETECTED
ALBUMIN SERPL-MCNC: 3.5 G/DL (ref 3.5–5.1)
ALP BLD-CCNC: 66 U/L (ref 38–126)
ALT SERPL-CCNC: 11 U/L (ref 11–66)
ANION GAP SERPL CALCULATED.3IONS-SCNC: 12 MEQ/L (ref 8–16)
ANISOCYTOSIS: PRESENT
AST SERPL-CCNC: 20 U/L (ref 5–40)
ASTROVIRUS PCR: NOT DETECTED
AVERAGE GLUCOSE: 93 MG/DL (ref 70–126)
BASOPHILS # BLD: 0.4 %
BASOPHILS ABSOLUTE: 0 THOU/MM3 (ref 0–0.1)
BILIRUB SERPL-MCNC: 0.3 MG/DL (ref 0.3–1.2)
BUN BLDV-MCNC: 11 MG/DL (ref 7–22)
C DIFF TOXIN/ANTIGEN: NEGATIVE
CALCIUM SERPL-MCNC: 9.4 MG/DL (ref 8.5–10.5)
CAMPYLOBACTER PCR: NOT DETECTED
CHLORIDE BLD-SCNC: 100 MEQ/L (ref 98–111)
CHOLESTEROL, TOTAL: 114 MG/DL (ref 100–199)
CLOSTRIDIUM DIFFICILE, PCR: NORMAL
CO2: 24 MEQ/L (ref 23–33)
CREAT SERPL-MCNC: 0.4 MG/DL (ref 0.4–1.2)
CREATININE, URINE: 80.8 MG/DL
CRYPTOSPORIDIUM PCR: NOT DETECTED
CYCLOSPORA CAYETANENSIS PCR: NOT DETECTED
E COLI 0157 PCR: NORMAL
E COLI ENTEROAGGREGATIVE PCR: NOT DETECTED
E COLI ENTEROPATHOGENIC PCR: NOT DETECTED
E COLI ENTEROTOXIGENIC PCR: NOT DETECTED
E COLI SHIGA LIKE TOXIN PCR: NOT DETECTED
E COLI SHIGELLA/ENTEROINVASIVE PCR: NOT DETECTED
E HISTOLYTICA GI FILM ARRAY: NOT DETECTED
EOSINOPHIL # BLD: 2.4 %
EOSINOPHILS ABSOLUTE: 0.2 THOU/MM3 (ref 0–0.4)
ERYTHROCYTE [DISTWIDTH] IN BLOOD BY AUTOMATED COUNT: 23 % (ref 11.5–14.5)
ERYTHROCYTE [DISTWIDTH] IN BLOOD BY AUTOMATED COUNT: 61.2 FL (ref 35–45)
GFR SERPL CREATININE-BSD FRML MDRD: > 90 ML/MIN/1.73M2
GIARDIA LAMBLIA PCR: NOT DETECTED
GLUCOSE BLD-MCNC: 98 MG/DL (ref 70–108)
HBA1C MFR BLD: 5.1 % (ref 4.4–6.4)
HCT VFR BLD CALC: 45.4 % (ref 37–47)
HDLC SERPL-MCNC: 33 MG/DL
HEMOGLOBIN: 13.1 GM/DL (ref 12–16)
HYPOCHROMIA: PRESENT
IMMATURE GRANS (ABS): 0.03 THOU/MM3 (ref 0–0.07)
IMMATURE GRANULOCYTES: 0.3 %
LDL CHOLESTEROL CALCULATED: 54 MG/DL
LYMPHOCYTES # BLD: 16.1 %
LYMPHOCYTES ABSOLUTE: 1.5 THOU/MM3 (ref 1–4.8)
MAGNESIUM: 1.4 MG/DL (ref 1.6–2.4)
MCH RBC QN AUTO: 22.7 PG (ref 26–33)
MCHC RBC AUTO-ENTMCNC: 28.9 GM/DL (ref 32.2–35.5)
MCV RBC AUTO: 78.7 FL (ref 81–99)
MICROALBUMIN UR-MCNC: 10.05 MG/DL
MICROALBUMIN/CREAT UR-RTO: 124 MG/G (ref 0–30)
MONOCYTES # BLD: 8.6 %
MONOCYTES ABSOLUTE: 0.8 THOU/MM3 (ref 0.4–1.3)
NOROVIRUS GI GII PCR: NOT DETECTED
NUCLEATED RED BLOOD CELLS: 0 /100 WBC
PLATELET # BLD: 484 THOU/MM3 (ref 130–400)
PLATELET ESTIMATE: ABNORMAL
PLESIOMONAS SHIGELLOIDES PCR: NOT DETECTED
PMV BLD AUTO: ABNORMAL FL (ref 9.4–12.4)
POIKILOCYTES: ABNORMAL
POTASSIUM SERPL-SCNC: 4.3 MEQ/L (ref 3.5–5.2)
RBC # BLD: 5.77 MILL/MM3 (ref 4.2–5.4)
ROTAVIRUS A PCR: NOT DETECTED
SALMONELLA PCR: NOT DETECTED
SAPOVIRUS PCR: NOT DETECTED
SCAN OF BLOOD SMEAR: NORMAL
SEG NEUTROPHILS: 72.2 %
SEGMENTED NEUTROPHILS ABSOLUTE COUNT: 6.8 THOU/MM3 (ref 1.8–7.7)
SODIUM BLD-SCNC: 136 MEQ/L (ref 135–145)
TOTAL PROTEIN: 7.1 G/DL (ref 6.1–8)
TRIGL SERPL-MCNC: 136 MG/DL (ref 0–199)
TSH SERPL DL<=0.05 MIU/L-ACNC: 1.48 UIU/ML (ref 0.4–4.2)
VIBRIO CHOLERAE PCR: NOT DETECTED
VIBRIO PCR: NOT DETECTED
WBC # BLD: 9.4 THOU/MM3 (ref 4.8–10.8)
YERSINIA ENTEROCOLITICA PCR: NOT DETECTED

## 2021-09-07 PROCEDURE — 0097U HC GI PTHGN MULT REV TRANS & AMP PRB TECH 22 TRGT: CPT

## 2021-09-07 PROCEDURE — 85025 COMPLETE CBC W/AUTO DIFF WBC: CPT

## 2021-09-07 PROCEDURE — 36415 COLL VENOUS BLD VENIPUNCTURE: CPT

## 2021-09-07 PROCEDURE — 80053 COMPREHEN METABOLIC PANEL: CPT

## 2021-09-07 PROCEDURE — 84443 ASSAY THYROID STIM HORMONE: CPT

## 2021-09-07 PROCEDURE — 83735 ASSAY OF MAGNESIUM: CPT

## 2021-09-07 PROCEDURE — 80061 LIPID PANEL: CPT

## 2021-09-07 PROCEDURE — 83036 HEMOGLOBIN GLYCOSYLATED A1C: CPT

## 2021-09-07 PROCEDURE — 87449 NOS EACH ORGANISM AG IA: CPT

## 2021-09-07 PROCEDURE — 82043 UR ALBUMIN QUANTITATIVE: CPT

## 2021-09-14 ENCOUNTER — HOSPITAL ENCOUNTER (OUTPATIENT)
Age: 61
Discharge: HOME | End: 2021-09-14
Payer: OTHER GOVERNMENT

## 2021-09-14 LAB
ANION GAP SERPL CALCULATED.3IONS-SCNC: 11 MEQ/L (ref 8–16)
BACTERIA: ABNORMAL /HPF
BILIRUBIN URINE: NEGATIVE
BLOOD, URINE: NEGATIVE
BUN BLDV-MCNC: 15 MG/DL (ref 7–22)
CALCIUM SERPL-MCNC: 9.8 MG/DL (ref 8.5–10.5)
CASTS 2: ABNORMAL /LPF
CASTS UA: ABNORMAL /LPF
CHARACTER, URINE: CLEAR
CHLORIDE BLD-SCNC: 100 MEQ/L (ref 98–111)
CO2: 25 MEQ/L (ref 23–33)
COLOR: YELLOW
CREAT SERPL-MCNC: 0.4 MG/DL (ref 0.4–1.2)
CRYSTALS, UA: ABNORMAL
EKG ATRIAL RATE: 98 BPM
EKG ATRIAL RATE: 98 BPM
EKG P AXIS: 75 DEGREES
EKG P AXIS: 76 DEGREES
EKG P-R INTERVAL: 126 MS
EKG P-R INTERVAL: 138 MS
EKG Q-T INTERVAL: 346 MS
EKG Q-T INTERVAL: 358 MS
EKG QRS DURATION: 102 MS
EKG QRS DURATION: 112 MS
EKG QTC CALCULATION (BAZETT): 441 MS
EKG QTC CALCULATION (BAZETT): 457 MS
EKG R AXIS: 86 DEGREES
EKG R AXIS: 88 DEGREES
EKG T AXIS: 32 DEGREES
EKG T AXIS: 40 DEGREES
EKG VENTRICULAR RATE: 98 BPM
EKG VENTRICULAR RATE: 98 BPM
EPITHELIAL CELLS, UA: ABNORMAL /HPF
ERYTHROCYTE [DISTWIDTH] IN BLOOD BY AUTOMATED COUNT: 23 % (ref 11.5–14.5)
ERYTHROCYTE [DISTWIDTH] IN BLOOD BY AUTOMATED COUNT: 61.8 FL (ref 35–45)
GFR SERPL CREATININE-BSD FRML MDRD: > 90 ML/MIN/1.73M2
GLUCOSE BLD-MCNC: 99 MG/DL (ref 70–108)
GLUCOSE URINE: NEGATIVE MG/DL
HCT VFR BLD CALC: 45.1 % (ref 37–47)
HEMOGLOBIN: 13.5 GM/DL (ref 12–16)
KETONES, URINE: NEGATIVE
LEUKOCYTE ESTERASE, URINE: ABNORMAL
MCH RBC QN AUTO: 23.5 PG (ref 26–33)
MCHC RBC AUTO-ENTMCNC: 29.9 GM/DL (ref 32.2–35.5)
MCV RBC AUTO: 78.6 FL (ref 81–99)
MISCELLANEOUS 2: ABNORMAL
MRSA SCREEN RT-PCR: NEGATIVE
NITRITE, URINE: NEGATIVE
PH UA: 6 (ref 5–9)
PLATELET # BLD: 472 THOU/MM3 (ref 130–400)
PMV BLD AUTO: ABNORMAL FL (ref 9.4–12.4)
POTASSIUM SERPL-SCNC: 4.1 MEQ/L (ref 3.5–5.2)
PROTEIN UA: NEGATIVE
RBC # BLD: 5.74 MILL/MM3 (ref 4.2–5.4)
RBC URINE: ABNORMAL /HPF
RENAL EPITHELIAL, UA: ABNORMAL
SODIUM BLD-SCNC: 136 MEQ/L (ref 135–145)
SPECIFIC GRAVITY, URINE: 1.01 (ref 1–1.03)
UROBILINOGEN, URINE: 0.2 EU/DL (ref 0–1)
WBC # BLD: 9.3 THOU/MM3 (ref 4.8–10.8)
WBC UA: ABNORMAL /HPF
YEAST: ABNORMAL

## 2021-09-14 PROCEDURE — 36415 COLL VENOUS BLD VENIPUNCTURE: CPT

## 2021-09-14 PROCEDURE — 81001 URINALYSIS AUTO W/SCOPE: CPT

## 2021-09-14 PROCEDURE — 87641 MR-STAPH DNA AMP PROBE: CPT

## 2021-09-14 PROCEDURE — 80048 BASIC METABOLIC PNL TOTAL CA: CPT

## 2021-09-14 PROCEDURE — 85027 COMPLETE CBC AUTOMATED: CPT

## 2021-09-14 PROCEDURE — 99211 OFF/OP EST MAY X REQ PHY/QHP: CPT

## 2021-09-14 PROCEDURE — 93005 ELECTROCARDIOGRAM TRACING: CPT | Performed by: ORTHOPAEDIC SURGERY

## 2021-09-16 ENCOUNTER — PATIENT MESSAGE (OUTPATIENT)
Dept: FAMILY MEDICINE CLINIC | Age: 61
End: 2021-09-16

## 2021-09-16 DIAGNOSIS — F41.9 ANXIETY: Primary | ICD-10-CM

## 2021-09-17 RX ORDER — LORAZEPAM 0.5 MG/1
0.5 TABLET ORAL EVERY 8 HOURS PRN
Qty: 30 TABLET | Refills: 0 | Status: SHIPPED | OUTPATIENT
Start: 2021-09-17 | End: 2021-09-27

## 2021-09-17 NOTE — TELEPHONE ENCOUNTER
From: Chanel Barlow  To: Mahad Heaton DO  Sent: 9/16/2021 8:21 PM EDT  Subject: Non-Urgent Medical Question    I received news this morning that my son Natalie Santiago was killed in an industrial accident. Can you give me something to help me get through the next week please.

## 2021-09-27 ENCOUNTER — OFFICE VISIT (OUTPATIENT)
Dept: FAMILY MEDICINE CLINIC | Age: 61
End: 2021-09-27
Payer: OTHER GOVERNMENT

## 2021-09-27 VITALS — HEART RATE: 96 BPM | RESPIRATION RATE: 24 BRPM | SYSTOLIC BLOOD PRESSURE: 112 MMHG | DIASTOLIC BLOOD PRESSURE: 60 MMHG

## 2021-09-27 DIAGNOSIS — Z01.818 PRE-OP EVALUATION: Primary | ICD-10-CM

## 2021-09-27 DIAGNOSIS — K75.81 NASH (NONALCOHOLIC STEATOHEPATITIS): ICD-10-CM

## 2021-09-27 DIAGNOSIS — E11.9 CONTROLLED TYPE 2 DIABETES MELLITUS WITHOUT COMPLICATION, WITHOUT LONG-TERM CURRENT USE OF INSULIN (HCC): ICD-10-CM

## 2021-09-27 DIAGNOSIS — Z72.0 TOBACCO ABUSE: ICD-10-CM

## 2021-09-27 DIAGNOSIS — M17.12 LOCALIZED OSTEOARTHRITIS OF LEFT KNEE: ICD-10-CM

## 2021-09-27 DIAGNOSIS — M51.36 DDD (DEGENERATIVE DISC DISEASE), LUMBAR: ICD-10-CM

## 2021-09-27 PROCEDURE — 99214 OFFICE O/P EST MOD 30 MIN: CPT | Performed by: FAMILY MEDICINE

## 2021-09-27 ASSESSMENT — ENCOUNTER SYMPTOMS
GASTROINTESTINAL NEGATIVE: 1
RESPIRATORY NEGATIVE: 1

## 2021-09-27 NOTE — PROGRESS NOTES
Kaycee Varela (:  1960) is a 57 y.o. female,Established patient, here for evaluation of the following chief complaint(s):  Pre-op Exam (left total knee Dr. Darylene Simple 21)        Subjective   SUBJECTIVE/OBJECTIVE:  HPI:    Chief Complaint   Patient presents with    Pre-op Exam     left total knee Dr. Darylene Simple 21     Pre-op evaluation. Scheduled for left TKA on 21. Denies CP, chest tightness, SOB. Unable to perform 4 METs due to her joint pain. BPs controlled. BP Readings from Last 3 Encounters:   21 112/60   06/15/21 124/66   21 128/78     No hx of general anesthesia complications. Sugars very well controlled. Lab Results   Component Value Date    LABA1C 5.1 2021    LABA1C 5.1 2021    LABA1C 5.9 12/10/2020     Lab Results   Component Value Date    GLUF 141 (H) 2016    LABMICR 10.05 2021    LDLCALC 54 2021    CREATININE 0.4 2021       Patient Active Problem List   Diagnosis    DM type 2 (diabetes mellitus, type 2) ([facility] [address]    Hyperlipemia    THORNTON (nonalcoholic steatohepatitis)    Obesity    Tobacco abuse    Vitamin D deficiency    Seborrheic keratosis    Dyshidrotic eczema     Past Surgical History:   Procedure Laterality Date    BLADDER SUSPENSION      BREAST BIOPSY [facility]     [facility]    BREAST LUMPECTOMY Left     atypical hyperplasia, 2016, [facility]    COLONOSCOPY  2020    HERNIA REPAIR  10/17/2016    KNEE SURGERY      Right knee    TUBAL LIGATION      WISDOM TOOTH EXTRACTION       Social History     Tobacco Use    Smoking status: Current Every Day Smoker     Packs/day: 1.50     Years: 49.00     Pack years: 73.50     Types: Cigarettes    Smokeless tobacco: Never Used   Vaping Use    Vaping Use: Never used   Substance Use Topics    Alcohol use: No     Comment: monthly glass of wine    Drug use: No         Review of Systems   Constitutional: Negative. HENT: Negative. Respiratory: Negative. Cardiovascular: Negative. Gastrointestinal: Negative. Musculoskeletal: Positive for arthralgias. All other systems reviewed and are negative. Objective   Physical Exam  Vitals and nursing note reviewed. Constitutional:       General: She is not in acute distress. Appearance: Normal appearance. She is well-developed. HENT:      Head: Normocephalic and atraumatic. Right Ear: Tympanic membrane normal.      Left Ear: Tympanic membrane normal.   Eyes:      Conjunctiva/sclera: Conjunctivae normal.   Cardiovascular:      Rate and Rhythm: Normal rate and regular rhythm. Heart sounds: Normal heart sounds. No murmur heard. Pulmonary:      Effort: Pulmonary effort is normal.      Breath sounds: Normal breath sounds. No wheezing, rhonchi or rales. Abdominal:      General: There is no distension. Musculoskeletal:      Cervical back: Neck supple. Skin:     General: Skin is warm and dry. Findings: No rash (on exposed surfaces). Neurological:      General: No focal deficit present. Mental Status: She is alert. Psychiatric:         Attention and Perception: Attention normal.         Mood and Affect: Mood normal.         Speech: Speech normal.         Behavior: Behavior normal. Behavior is cooperative. Thought Content: Thought content normal.         Judgment: Judgment normal.               ASSESSMENT/PLAN:  1. Pre-op evaluation  2. Localized osteoarthritis of left knee  3. Controlled type 2 diabetes mellitus without complication, without long-term current use of insulin ([address]  4. THORNTON (nonalcoholic steatohepatitis)  5. Tobacco abuse  6. DDD (degenerative disc disease), lumbar    -  Labs, EKG reviewed  -  Pt acceptable risk for surgery, copy of this note will be sen to Dr. Malcolm Shrestha for Keep known appt. An electronic signature was used to authenticate this note.     --Art Soto DO

## 2021-10-07 DIAGNOSIS — M23.91 INTERNAL DERANGEMENT OF RIGHT KNEE: ICD-10-CM

## 2021-10-07 RX ORDER — OXYCODONE HYDROCHLORIDE AND ACETAMINOPHEN 5; 325 MG/1; MG/1
1 TABLET ORAL EVERY 8 HOURS PRN
Qty: 30 TABLET | Refills: 0 | Status: SHIPPED | OUTPATIENT
Start: 2021-10-07 | End: 2021-10-25 | Stop reason: SDUPTHER

## 2021-10-11 ENCOUNTER — PATIENT MESSAGE (OUTPATIENT)
Dept: FAMILY MEDICINE CLINIC | Age: 61
End: 2021-10-11

## 2021-10-12 NOTE — TELEPHONE ENCOUNTER
From: Amira Menard  To: Linda Sellers DO  Sent: 10/11/2021 6:28 PM EDT  Subject: Non-Urgent Medical Question    Can you get me a prescription for   Hydrocortisone cream usp 2.5% at [address] please.  I'm broke out all along my right side and across my lower back    Thank you

## 2021-10-19 ENCOUNTER — HOSPITAL ENCOUNTER (OUTPATIENT)
Age: 61
Discharge: HOME | End: 2021-10-19
Payer: OTHER GOVERNMENT

## 2021-10-19 ENCOUNTER — HOSPITAL ENCOUNTER (OUTPATIENT)
Dept: GENERAL RADIOLOGY | Age: 61
Discharge: HOME | End: 2021-10-19
Payer: OTHER GOVERNMENT

## 2021-10-19 DIAGNOSIS — M17.12 ARTHRITIS OF KNEE, LEFT: ICD-10-CM

## 2021-10-19 PROCEDURE — 71046 X-RAY EXAM CHEST 2 VIEWS: CPT

## 2021-10-25 ENCOUNTER — PATIENT MESSAGE (OUTPATIENT)
Dept: FAMILY MEDICINE CLINIC | Age: 61
End: 2021-10-25

## 2021-10-25 DIAGNOSIS — M23.91 INTERNAL DERANGEMENT OF RIGHT KNEE: ICD-10-CM

## 2021-10-25 RX ORDER — OXYCODONE HYDROCHLORIDE AND ACETAMINOPHEN 5; 325 MG/1; MG/1
1 TABLET ORAL EVERY 8 HOURS PRN
Qty: 9 TABLET | Refills: 0 | Status: SHIPPED | OUTPATIENT
Start: 2021-10-25 | End: 2021-10-28

## 2021-10-25 NOTE — TELEPHONE ENCOUNTER
From: Ree Ronquillo  To: Francheska George DO  Sent: 10/25/2021 6:46 AM EDT  Subject: Non-Urgent Medical Question    [facility] shorted me 9 percoset and they need a prescription for the 9 they owe me. Could you do that for me please I only take one when I can't stand the pain. Plus Could you get me a 30 day script for Metformin 500 MG so I can take it to [facility] for my surgery they need the bottle to see the doses. Don't think they will take my word for the split 1000 MG.  Thank you

## 2021-11-01 DIAGNOSIS — E78.5 HYPERLIPIDEMIA, UNSPECIFIED HYPERLIPIDEMIA TYPE: ICD-10-CM

## 2021-11-01 RX ORDER — ATORVASTATIN CALCIUM 40 MG/1
TABLET, FILM COATED ORAL
Qty: 90 TABLET | Refills: 3 | Status: SHIPPED | OUTPATIENT
Start: 2021-11-01 | End: 2022-10-25

## 2021-12-08 ENCOUNTER — HOSPITAL ENCOUNTER (OUTPATIENT)
Age: 61
Discharge: HOME | End: 2021-12-08
Payer: OTHER GOVERNMENT

## 2021-12-08 LAB
ALBUMIN SERPL-MCNC: 3.9 G/DL (ref 3.5–5.1)
ALP BLD-CCNC: 104 U/L (ref 38–126)
ALT SERPL-CCNC: 11 U/L (ref 11–66)
ANION GAP SERPL CALCULATED.3IONS-SCNC: 12 MEQ/L (ref 8–16)
AST SERPL-CCNC: 19 U/L (ref 5–40)
AVERAGE GLUCOSE: 99 MG/DL (ref 70–126)
BILIRUB SERPL-MCNC: 0.4 MG/DL (ref 0.3–1.2)
BUN BLDV-MCNC: 16 MG/DL (ref 7–22)
CALCIUM SERPL-MCNC: 9.9 MG/DL (ref 8.5–10.5)
CHLORIDE BLD-SCNC: 101 MEQ/L (ref 98–111)
CHOLESTEROL, TOTAL: 147 MG/DL (ref 100–199)
CO2: 25 MEQ/L (ref 23–33)
CREAT SERPL-MCNC: 0.5 MG/DL (ref 0.4–1.2)
GFR SERPL CREATININE-BSD FRML MDRD: > 90 ML/MIN/1.73M2
GLUCOSE BLD-MCNC: 93 MG/DL (ref 70–108)
HBA1C MFR BLD: 5.3 % (ref 4.4–6.4)
HDLC SERPL-MCNC: 38 MG/DL
LDL CHOLESTEROL CALCULATED: 84 MG/DL
MAGNESIUM: 1.7 MG/DL (ref 1.6–2.4)
POTASSIUM SERPL-SCNC: 4.6 MEQ/L (ref 3.5–5.2)
SCAN OF BLOOD SMEAR: NORMAL
SODIUM BLD-SCNC: 138 MEQ/L (ref 135–145)
TOTAL PROTEIN: 7.2 G/DL (ref 6.1–8)
TRIGL SERPL-MCNC: 126 MG/DL (ref 0–199)
TSH SERPL DL<=0.05 MIU/L-ACNC: 1.76 UIU/ML (ref 0.4–4.2)

## 2021-12-08 PROCEDURE — 84443 ASSAY THYROID STIM HORMONE: CPT

## 2021-12-08 PROCEDURE — 85025 COMPLETE CBC W/AUTO DIFF WBC: CPT

## 2021-12-08 PROCEDURE — 80053 COMPREHEN METABOLIC PANEL: CPT

## 2021-12-08 PROCEDURE — 80061 LIPID PANEL: CPT

## 2021-12-08 PROCEDURE — 36415 COLL VENOUS BLD VENIPUNCTURE: CPT

## 2021-12-08 PROCEDURE — 83735 ASSAY OF MAGNESIUM: CPT

## 2021-12-08 PROCEDURE — 83036 HEMOGLOBIN GLYCOSYLATED A1C: CPT

## 2021-12-09 LAB
ANISOCYTOSIS: PRESENT
BASOPHILIA: ABNORMAL
BASOPHILS # BLD: 0.4 %
BASOPHILS ABSOLUTE: 0 THOU/MM3 (ref 0–0.1)
DACROCYTES: ABNORMAL
ELLIPTOCYTES: ABNORMAL
EOSINOPHIL # BLD: 4 %
EOSINOPHILS ABSOLUTE: 0.3 THOU/MM3 (ref 0–0.4)
ERYTHROCYTE [DISTWIDTH] IN BLOOD BY AUTOMATED COUNT: 22.7 % (ref 11.5–14.5)
ERYTHROCYTE [DISTWIDTH] IN BLOOD BY AUTOMATED COUNT: 64.9 FL (ref 35–45)
HCT VFR BLD CALC: 46.1 % (ref 37–47)
HEMOGLOBIN: 13.5 GM/DL (ref 12–16)
IMMATURE GRANS (ABS): 0.06 THOU/MM3 (ref 0–0.07)
IMMATURE GRANULOCYTES: 0.7 %
LYMPHOCYTES # BLD: 21.9 %
LYMPHOCYTES ABSOLUTE: 1.8 THOU/MM3 (ref 1–4.8)
MCH RBC QN AUTO: 23.9 PG (ref 26–33)
MCHC RBC AUTO-ENTMCNC: 29.3 GM/DL (ref 32.2–35.5)
MCV RBC AUTO: 81.4 FL (ref 81–99)
MONOCYTES # BLD: 7.6 %
MONOCYTES ABSOLUTE: 0.6 THOU/MM3 (ref 0.4–1.3)
NUCLEATED RED BLOOD CELLS: 0 /100 WBC
PATHOLOGIST REVIEW: ABNORMAL
PLATELET # BLD: 401 THOU/MM3 (ref 130–400)
PLATELET ESTIMATE: ABNORMAL
PMV BLD AUTO: 11.5 FL (ref 9.4–12.4)
POIKILOCYTES: ABNORMAL
RBC # BLD: 5.66 MILL/MM3 (ref 4.2–5.4)
SEG NEUTROPHILS: 65.4 %
SEGMENTED NEUTROPHILS ABSOLUTE COUNT: 5.3 THOU/MM3 (ref 1.8–7.7)
SPHEROCYTES: ABNORMAL
WBC # BLD: 8.1 THOU/MM3 (ref 4.8–10.8)

## 2021-12-15 ENCOUNTER — OFFICE VISIT (OUTPATIENT)
Dept: FAMILY MEDICINE CLINIC | Age: 61
End: 2021-12-15
Payer: OTHER GOVERNMENT

## 2021-12-15 VITALS
BODY MASS INDEX: 26.96 KG/M2 | HEART RATE: 104 BPM | RESPIRATION RATE: 20 BRPM | SYSTOLIC BLOOD PRESSURE: 122 MMHG | DIASTOLIC BLOOD PRESSURE: 54 MMHG | WEIGHT: 177.9 LBS | HEIGHT: 68 IN

## 2021-12-15 DIAGNOSIS — F41.9 ANXIETY: ICD-10-CM

## 2021-12-15 DIAGNOSIS — E78.5 HYPERLIPIDEMIA, UNSPECIFIED HYPERLIPIDEMIA TYPE: ICD-10-CM

## 2021-12-15 DIAGNOSIS — E11.9 CONTROLLED TYPE 2 DIABETES MELLITUS WITHOUT COMPLICATION, WITHOUT LONG-TERM CURRENT USE OF INSULIN (HCC): Primary | ICD-10-CM

## 2021-12-15 DIAGNOSIS — M51.36 DDD (DEGENERATIVE DISC DISEASE), LUMBAR: ICD-10-CM

## 2021-12-15 DIAGNOSIS — K75.81 NASH (NONALCOHOLIC STEATOHEPATITIS): ICD-10-CM

## 2021-12-15 DIAGNOSIS — Z72.0 TOBACCO ABUSE: ICD-10-CM

## 2021-12-15 PROCEDURE — 99214 OFFICE O/P EST MOD 30 MIN: CPT | Performed by: FAMILY MEDICINE

## 2021-12-15 RX ORDER — CYCLOBENZAPRINE HCL 10 MG
1 TABLET ORAL 3 TIMES DAILY PRN
Status: ON HOLD | COMMUNITY
Start: 2021-12-08 | End: 2022-05-13 | Stop reason: HOSPADM

## 2021-12-15 RX ORDER — OXYCODONE HYDROCHLORIDE AND ACETAMINOPHEN 5; 325 MG/1; MG/1
1 TABLET ORAL EVERY 6 HOURS PRN
Status: ON HOLD | COMMUNITY
End: 2022-05-13 | Stop reason: HOSPADM

## 2021-12-15 RX ORDER — METFORMIN HYDROCHLORIDE 500 MG/1
500 TABLET, EXTENDED RELEASE ORAL
Qty: 90 TABLET | Refills: 3 | Status: SHIPPED | OUTPATIENT
Start: 2021-12-15

## 2021-12-15 ASSESSMENT — ENCOUNTER SYMPTOMS
GASTROINTESTINAL NEGATIVE: 1
RESPIRATORY NEGATIVE: 1

## 2021-12-15 ASSESSMENT — PATIENT HEALTH QUESTIONNAIRE - PHQ9
2. FEELING DOWN, DEPRESSED OR HOPELESS: 0
SUM OF ALL RESPONSES TO PHQ9 QUESTIONS 1 & 2: 0
1. LITTLE INTEREST OR PLEASURE IN DOING THINGS: 0
SUM OF ALL RESPONSES TO PHQ QUESTIONS 1-9: 0

## 2021-12-15 NOTE — PATIENT INSTRUCTIONS
You may receive a survey regarding the care you received during your visit. Your input is valuable to us. We encourage you to complete and return your survey. We hope you will choose us in the future for your healthcare needs. Tobacco Cessation Programs     Telephonic behavior modification  Razcatie Darrian ([phone])   Counseling service for those who are ready to quit using tobacco.     Available for uninsured [place] residents, [employer] recipients, pregnant women, or patients whose health plans or employers are members of the [address] [place] [address] behavior modification   http://[place]. Quitlogix. org   Online support program to help patients through each step of the quitting process. Available 24 hours a day 7 days a week. Provides up to date researched based tool, step-by-step guides, and motivational messages. Online behavior modification   www.lungusa.org/stop-smoking/how-to-quit   HelpLine: [facility] (320-1525)   Email questions to:  [email or web address]. [employer]    Website offers resources to help tobacco users figure out their reasons for quitting and then take the big step of quitting for good. Hypnosis   Location: [facility], [address] [place] [address]   Contact: Asher Granda, PhD at [phone]   Hypnosis for tobacco cessation   Cost $225 for the initial session and $175 for each session afterwards. Most patients require 6-8 sessions. There is the option to submit through the patients insurance. Hypnosis and behavior modification   Location: [place] [address],  [facility] [place] [address]   Contact: Winnie Barragan, PhD at [phone]  [facility] and hypnosis for nicotine addition   Cost: For uninsured patients:  Please call above phone number  Cost for insured patients depends on patients insurance plan.     Behavior modification   Location: [facility], [address] [facility] [address] [place] [address], [address] [place] [address] Road: [place] [address] include four one-on-one appointments between the patient and a respiratory therapist.  The four appointments span over three weeks. The respiratory therapist schedules one of the appointments to occur 48 hours after the patients quit date.  Cost $100 total for the four sessions.   Tobacco cessation products are not included in the cost and are not provided by [facility].     Bart Best

## 2021-12-15 NOTE — PROGRESS NOTES
Wilfredo Ventura (:  1960) is a 57 y.o. female,Established patient, here for evaluation of the following chief complaint(s):  6 Month Follow-Up and Discuss Labs        Subjective   SUBJECTIVE/OBJECTIVE:  HPI:    Chief Complaint   Patient presents with    6 Month Follow-Up    Discuss Labs     6 month eval.  Doing well overall. Sugars well controlled. Lab Results   Component Value Date    LABA1C 5.3 2021    LABA1C 5.1 2021    LABA1C 5.1 2021     Lab Results   Component Value Date    GLUF 141 (H) 2016    LABMICR 10.05 2021    LDLCALC 84 2021    CREATININE 0.5 2021     BPs controlled. BP Readings from Last 3 Encounters:   12/15/21 (!) 122/54   21 112/60   06/15/21 124/66     Weight is down 50 lbs since January. Stable lately. Wt Readings from Last 3 Encounters:   12/15/21 177 lb 14.4 oz (80.7 kg)   06/15/21 199 lb (90.3 kg)   21 227 lb 11.2 oz (103.3 kg)     Patient Active Problem List   Diagnosis    DM type 2 (diabetes mellitus, type 2) ([facility] [address]    Hyperlipemia    THORNTON (nonalcoholic steatohepatitis)    Obesity    Tobacco abuse    Vitamin D deficiency    Seborrheic keratosis    Dyshidrotic eczema     Past Surgical History:   Procedure Laterality Date    BLADDER SUSPENSION      BREAST BIOPSY Left     [facility]    BREAST LUMPECTOMY Left     atypical hyperplasia, 2016, [facility]    COLONOSCOPY  2020    HERNIA REPAIR  10/17/2016    KNEE SURGERY      Right knee    TUBAL LIGATION      WISDOM TOOTH EXTRACTION       Social History     Tobacco Use    Smoking status: Current Every Day Smoker     Packs/day: 1.50     Years: 49.00     Pack years: 73.50     Types: Cigarettes    Smokeless tobacco: Never Used   Vaping Use    Vaping Use: Never used   Substance Use Topics    Alcohol use: No     Comment: monthly glass of wine    Drug use: No         Review of Systems   Constitutional: Negative. HENT: Negative.     Respiratory: Negative. Cardiovascular: Negative. Gastrointestinal: Negative. Musculoskeletal: Negative. All other systems reviewed and are negative. Objective   Physical Exam  Vitals and nursing note reviewed. Constitutional:       General: She is not in acute distress. Appearance: Normal appearance. She is well-developed. HENT:      Head: Normocephalic and atraumatic. Right Ear: Tympanic membrane normal.      Left Ear: Tympanic membrane normal.   Eyes:      Conjunctiva/sclera: Conjunctivae normal.   Cardiovascular:      Rate and Rhythm: Normal rate and regular rhythm. Heart sounds: Normal heart sounds. No murmur heard. Pulmonary:      Effort: Pulmonary effort is normal.      Breath sounds: Normal breath sounds. No wheezing, rhonchi or rales. Abdominal:      General: There is no distension. Musculoskeletal:      Cervical back: Neck supple. Skin:     General: Skin is warm and dry. Findings: No rash (on exposed surfaces). Neurological:      General: No focal deficit present. Mental Status: She is alert. Psychiatric:         Attention and Perception: Attention normal.         Mood and Affect: Mood normal.         Speech: Speech normal.         Behavior: Behavior normal. Behavior is cooperative. Thought Content: Thought content normal.         Judgment: Judgment normal.               ASSESSMENT/PLAN:  1. Controlled type 2 diabetes mellitus without complication, without long-term current use of insulin ([facility])  -      DIABETES FOOT EXAM  -     metFORMIN (GLUCOPHAGE-XR) 500 MG extended release tablet; Take 1 tablet by mouth daily (with breakfast), Disp-90 tablet, R-3Normal  -     Hemoglobin A1C; Future  2. Hyperlipidemia, unspecified hyperlipidemia type  3. Anxiety  4. Tobacco abuse  5. THORNTON (nonalcoholic steatohepatitis)  6.  DDD (degenerative disc disease), lumbar    -  Chronic medical problems stable  -  Labs reviewed, look fine  -  Continue current medications, will lower her metformin to 500 mg daily ER  -  Follow up with specialists as scheduled  -  Recheck A1C 6 mos    Return in about 6 months (around 6/15/2022) for DM2. An electronic signature was used to authenticate this note.     --Melecio Cosby, DO

## 2022-02-07 DIAGNOSIS — G47.00 INSOMNIA, UNSPECIFIED TYPE: ICD-10-CM

## 2022-02-07 RX ORDER — DOXEPIN HYDROCHLORIDE 10 MG/1
CAPSULE ORAL
Qty: 90 CAPSULE | Refills: 3 | Status: SHIPPED | OUTPATIENT
Start: 2022-02-07 | End: 2022-04-04 | Stop reason: SDUPTHER

## 2022-04-04 DIAGNOSIS — G47.00 INSOMNIA, UNSPECIFIED TYPE: ICD-10-CM

## 2022-04-04 RX ORDER — DOXEPIN HYDROCHLORIDE 10 MG/1
CAPSULE ORAL
Qty: 180 CAPSULE | Refills: 3 | Status: SHIPPED | OUTPATIENT
Start: 2022-04-04

## 2022-04-08 ENCOUNTER — HOSPITAL ENCOUNTER (OUTPATIENT)
Age: 62
Discharge: HOME | End: 2022-04-08
Payer: OTHER GOVERNMENT

## 2022-04-08 LAB
ANION GAP SERPL CALCULATED.3IONS-SCNC: 12 MEQ/L (ref 8–16)
BACTERIA: ABNORMAL /HPF
BILIRUBIN URINE: NEGATIVE
BLOOD, URINE: NEGATIVE
BUN BLDV-MCNC: 15 MG/DL (ref 7–22)
CALCIUM SERPL-MCNC: 10 MG/DL (ref 8.5–10.5)
CASTS 2: ABNORMAL /LPF
CASTS UA: ABNORMAL /LPF
CHARACTER, URINE: CLEAR
CHLORIDE BLD-SCNC: 101 MEQ/L (ref 98–111)
CO2: 23 MEQ/L (ref 23–33)
COLOR: YELLOW
CREAT SERPL-MCNC: 0.4 MG/DL (ref 0.4–1.2)
CRYSTALS, UA: ABNORMAL
EPITHELIAL CELLS, UA: ABNORMAL /HPF
ERYTHROCYTE [DISTWIDTH] IN BLOOD BY AUTOMATED COUNT: 21.5 % (ref 11.5–14.5)
ERYTHROCYTE [DISTWIDTH] IN BLOOD BY AUTOMATED COUNT: 63.2 FL (ref 35–45)
GFR SERPL CREATININE-BSD FRML MDRD: > 90 ML/MIN/1.73M2
GLUCOSE BLD-MCNC: 97 MG/DL (ref 70–108)
GLUCOSE URINE: NEGATIVE MG/DL
HCT VFR BLD CALC: 46.7 % (ref 37–47)
HEMOGLOBIN: 14.5 GM/DL (ref 12–16)
KETONES, URINE: NEGATIVE
LEUKOCYTE ESTERASE, URINE: NEGATIVE
MCH RBC QN AUTO: 26.4 PG (ref 26–33)
MCHC RBC AUTO-ENTMCNC: 31 GM/DL (ref 32.2–35.5)
MCV RBC AUTO: 84.9 FL (ref 81–99)
MISCELLANEOUS 2: ABNORMAL
MRSA SCREEN RT-PCR: NEGATIVE
NITRITE, URINE: NEGATIVE
PH UA: 5.5 (ref 5–9)
PLATELET # BLD: 359 THOU/MM3 (ref 130–400)
PMV BLD AUTO: 10.6 FL (ref 9.4–12.4)
POTASSIUM SERPL-SCNC: 4.7 MEQ/L (ref 3.5–5.2)
PROTEIN UA: ABNORMAL
RBC # BLD: 5.5 MILL/MM3 (ref 4.2–5.4)
RBC URINE: ABNORMAL /HPF
RENAL EPITHELIAL, UA: ABNORMAL
SODIUM BLD-SCNC: 136 MEQ/L (ref 135–145)
SPECIFIC GRAVITY, URINE: 1.01 (ref 1–1.03)
UROBILINOGEN, URINE: 0.2 EU/DL (ref 0–1)
WBC # BLD: 8.7 THOU/MM3 (ref 4.8–10.8)
WBC UA: ABNORMAL /HPF
YEAST: ABNORMAL

## 2022-04-08 PROCEDURE — 87641 MR-STAPH DNA AMP PROBE: CPT

## 2022-04-08 PROCEDURE — 99211 OFF/OP EST MAY X REQ PHY/QHP: CPT

## 2022-04-08 PROCEDURE — 85027 COMPLETE CBC AUTOMATED: CPT

## 2022-04-08 PROCEDURE — 36415 COLL VENOUS BLD VENIPUNCTURE: CPT

## 2022-04-08 PROCEDURE — 81001 URINALYSIS AUTO W/SCOPE: CPT

## 2022-04-08 PROCEDURE — 80048 BASIC METABOLIC PNL TOTAL CA: CPT

## 2022-04-18 ENCOUNTER — OFFICE VISIT (OUTPATIENT)
Dept: FAMILY MEDICINE CLINIC | Age: 62
End: 2022-04-18
Payer: OTHER GOVERNMENT

## 2022-04-18 VITALS
DIASTOLIC BLOOD PRESSURE: 70 MMHG | RESPIRATION RATE: 16 BRPM | OXYGEN SATURATION: 97 % | SYSTOLIC BLOOD PRESSURE: 124 MMHG | TEMPERATURE: 97.8 F | WEIGHT: 190.6 LBS | HEART RATE: 94 BPM | BODY MASS INDEX: 29.41 KG/M2

## 2022-04-18 DIAGNOSIS — K75.81 NASH (NONALCOHOLIC STEATOHEPATITIS): ICD-10-CM

## 2022-04-18 DIAGNOSIS — E66.3 OVERWEIGHT: ICD-10-CM

## 2022-04-18 DIAGNOSIS — M17.11 PRIMARY OSTEOARTHRITIS OF RIGHT KNEE: ICD-10-CM

## 2022-04-18 DIAGNOSIS — Z72.0 TOBACCO ABUSE: ICD-10-CM

## 2022-04-18 DIAGNOSIS — R25.2 MUSCLE CRAMPS: ICD-10-CM

## 2022-04-18 DIAGNOSIS — E78.5 HYPERLIPIDEMIA, UNSPECIFIED HYPERLIPIDEMIA TYPE: ICD-10-CM

## 2022-04-18 DIAGNOSIS — E11.9 CONTROLLED TYPE 2 DIABETES MELLITUS WITHOUT COMPLICATION, WITHOUT LONG-TERM CURRENT USE OF INSULIN (HCC): ICD-10-CM

## 2022-04-18 DIAGNOSIS — G47.00 INSOMNIA, UNSPECIFIED TYPE: ICD-10-CM

## 2022-04-18 DIAGNOSIS — Z01.818 PRE-OP EVALUATION: Primary | ICD-10-CM

## 2022-04-18 PROCEDURE — 99214 OFFICE O/P EST MOD 30 MIN: CPT | Performed by: FAMILY MEDICINE

## 2022-04-18 RX ORDER — FENOFIBRATE 160 MG/1
TABLET ORAL
Qty: 90 TABLET | Refills: 3 | Status: SHIPPED | OUTPATIENT
Start: 2022-04-18

## 2022-04-18 RX ORDER — TIZANIDINE 2 MG/1
2 TABLET ORAL 3 TIMES DAILY PRN
Qty: 30 TABLET | Refills: 2 | Status: SHIPPED | OUTPATIENT
Start: 2022-04-18

## 2022-04-18 ASSESSMENT — ENCOUNTER SYMPTOMS
RESPIRATORY NEGATIVE: 1
GASTROINTESTINAL NEGATIVE: 1

## 2022-04-18 NOTE — PROGRESS NOTES
reid Carrasco (:  1960) is a 60 y.o. female,Established patient, here for evaluation of the following chief complaint(s):  Pre-op Exam (5/3/22 IOS Dr. Malcolm Liu right total knee replacement pre-op testing new labs using EKG 2021 and chest xray 10/2021)        Subjective   SUBJECTIVE/OBJECTIVE:  HPI:    Chief Complaint   Patient presents with   [place]     5/3/22 IOS Dr. Malcolm iLu right total knee replacement pre-op testing new labs using EKG 2021 and chest xray 10/2021     Pre-op evaluation. Scheduled for right total knee replacement with Dr. Malcolm Liu on 5/3. Pt denies CP, chest tightness, SOB. Unable to perform 4 METs due to her knee pain. No recent stress or echo. BP Readings from Last 3 Encounters:   22 124/70   12/15/21 (!) 122/54   21 112/60     Denies hx of general anesthesia complications. Sugars well controlled.   Lab Results   Component Value Date    LABA1C 5.3 2021    LABA1C 5.1 2021    LABA1C 5.1 2021     Lab Results   Component Value Date    GLUF 141 (H) 2016    LABMICR 10.05 2021    LDLCALC 84 2021    CREATININE 0.4 2022         Patient Active Problem List   Diagnosis    DM type 2 (diabetes mellitus, type 2) ([facility] [address]    Hyperlipemia    THORNTON (nonalcoholic steatohepatitis)    Obesity    Tobacco abuse    Vitamin D deficiency    Seborrheic keratosis    Dyshidrotic eczema     Past Surgical History:   Procedure Laterality Date    BLADDER SUSPENSION      BREAST BIOPSY Left     [facility]las[facility], 2016, [facility]    BREAST LUMPECTOMY Left     atypical hyperplasia, 2016, [facility]    COLONOSCOPY  2020    HERNIA REPAIR  10/17/2016    KNEE SURGERY      Right knee    TUBAL LIGATION      WISDOM TOOTH EXTRACTION       Social History     Tobacco Use    Smoking status: Current Every Day Smoker     Packs/day: 1.50     Years: 49.00     Pack years: 73.50     Types: Cigarettes    Smokeless tobacco: Never Used   Vaping Use    Vaping Use: Never used   Substance Use Topics    Alcohol use: No     Comment: monthly glass of wine    Drug use: No     Prior to Admission medications    Medication Sig Start Date End Date Taking? Authorizing Provider   fenofibrate (TRIGLIDE) 160 MG tablet TAKE 1 TABLET DAILY 4/18/22  Yes Noemi Samano DO   tiZANidine (ZANAFLEX) 2 MG tablet Take 1 tablet by mouth 3 times daily as needed (muscle cramps) 4/18/22  Yes Noemi Samano DO   doxepin [facility] 10 MG capsule TAKE 2 CAPSULES NIGHTLY 4/4/22  Yes Noemi Samano DO   hydrocortisone 2.5 % cream Apply topically 2 times daily. 4/4/22  Yes Noemi Samano DO   metFORMIN (GLUCOPHAGE-XR) 500 MG extended release tablet Take 1 tablet by mouth daily (with breakfast) 12/15/21  Yes Noemi Samano DO   atorvastatin (LIPITOR) 40 MG tablet TAKE 1 TABLET NIGHTLY 11/1/21  Yes Noemi Samano DO   losartan (COZAAR) 25 MG tablet TAKE 1 TABLET DAILY 8/23/21  Yes Noemi Samano DO   Acetaminophen 500 MG CAPS Take 1,000 mg by mouth as needed for Pain    Yes Historical Provider, MD   KRILL OIL PO Take 350 mg by mouth daily   Yes Historical Provider, MD   Multiple Vitamins-Minerals (THERAPEUTIC MULTIVITAMIN-MINERALS) tablet Take 1 tablet by mouth daily   Yes Historical Provider, MD   aspirin 81 MG tablet Take 81 mg by mouth daily. Yes Historical Provider, MD   cyclobenzaprine (FLEXERIL) 10 MG tablet Take 1 tablet by mouth 3 times daily as needed  Patient not taking: Reported on 4/18/2022 12/8/21   Historical Provider, MD   oxyCODONE-acetaminophen (PERCOCET) 5-325 MG per tablet Take 1 tablet by mouth every 6 hours as needed for Pain.   Patient not taking: Reported on 4/18/2022    Historical Provider, MD   baclofen (LIORESAL) 10 MG tablet Take 1 tablet by mouth daily as needed (muscle spasms)  Patient not taking: Reported on 4/18/2022 7/13/21   Noemi Samano DO   melatonin 3 MG TABS tablet Take 3 mg by mouth daily  Patient not taking: Reported on 4/18/2022    Historical Provider, MD   Pyridoxine HCl (VITAMIN B6 PO) Take by mouth  Patient not taking: Reported on 4/18/2022    Historical Provider, MD   MAGNESIUM PO Take by mouth  Patient not taking: Reported on 4/18/2022    Historical Provider, MD   NONFORMULARY AF Beta Food  Patient not taking: Reported on 4/18/2022    Historical Provider, MD         Review of Systems   Constitutional: Negative. HENT: Negative. Respiratory: Negative. Cardiovascular: Negative. Gastrointestinal: Negative. Musculoskeletal: Positive for arthralgias (knee pain). All other systems reviewed and are negative. Objective   Physical Exam  Vitals and nursing note reviewed. Constitutional:       General: She is not in acute distress. Appearance: Normal appearance. She is well-developed. HENT:      Head: Normocephalic and atraumatic. Right Ear: Tympanic membrane normal.      Left Ear: Tympanic membrane normal.   Eyes:      Conjunctiva/sclera: Conjunctivae normal.   Cardiovascular:      Rate and Rhythm: Normal rate and regular rhythm. Heart sounds: Normal heart sounds. No murmur heard. Pulmonary:      Effort: Pulmonary effort is normal.      Breath sounds: Normal breath sounds. No wheezing, rhonchi or rales. Abdominal:      General: There is no distension. Musculoskeletal:      Cervical back: Neck supple. Skin:     General: Skin is warm and dry. Findings: No rash (on exposed surfaces). Neurological:      General: No focal deficit present. Mental Status: She is alert. Psychiatric:         Attention and Perception: Attention normal.         Mood and Affect: Mood normal.         Speech: Speech normal.         Behavior: Behavior normal. Behavior is cooperative. Thought Content: Thought content normal.         Judgment: Judgment normal.               ASSESSMENT/PLAN:  1. Pre-op evaluation  2. Primary osteoarthritis of right knee  3.  Controlled type 2 diabetes mellitus without complication, without long-term current use of insulin ([facility] [address]  4. Hyperlipidemia, unspecified hyperlipidemia type  5. Insomnia, unspecified type  6. THORNTON (nonalcoholic steatohepatitis)  7. Tobacco abuse  8. Overweight  9. Muscle cramps  -     tiZANidine (ZANAFLEX) 2 MG tablet; Take 1 tablet by mouth 3 times daily as needed (muscle cramps), Disp-30 tablet, R-2Normal    -  Labs reviewed, look fine  -  Previous EKG and CXR also reviewed  -  Pt acceptable risk for surgery, copy of this note will be sent to Dr. Sanket Valente    Return for Keep known appt. An electronic signature was used to authenticate this note.     --Eckert Curet, DO

## 2022-05-09 ENCOUNTER — TELEPHONE (OUTPATIENT)
Dept: FAMILY MEDICINE CLINIC | Age: 62
End: 2022-05-09

## 2022-05-09 ENCOUNTER — HOSPITAL ENCOUNTER (INPATIENT)
Age: 62
LOS: 17 days | Discharge: NURSING, REHAB OR LONG-TERM CARE | DRG: 467 | End: 2022-05-26
Attending: EMERGENCY MEDICINE | Admitting: ORTHOPAEDIC SURGERY
Payer: OTHER GOVERNMENT

## 2022-05-09 ENCOUNTER — APPOINTMENT (OUTPATIENT)
Dept: GENERAL RADIOLOGY | Age: 62
DRG: 467 | End: 2022-05-09
Payer: OTHER GOVERNMENT

## 2022-05-09 DIAGNOSIS — S72.061A CLOSED DISPLACED ARTICULAR FRACTURE OF HEAD OF RIGHT FEMUR, INITIAL ENCOUNTER (HCC): Primary | ICD-10-CM

## 2022-05-09 DIAGNOSIS — M97.8XXA PERIPROSTHETIC SUPRACONDYLAR FRACTURE OF FEMUR, INITIAL ENCOUNTER: ICD-10-CM

## 2022-05-09 DIAGNOSIS — Z96.659 PERIPROSTHETIC SUPRACONDYLAR FRACTURE OF FEMUR, INITIAL ENCOUNTER: ICD-10-CM

## 2022-05-09 PROBLEM — S72.061S: Status: ACTIVE | Noted: 2022-05-09

## 2022-05-09 LAB
ABO: NORMAL
AMMONIA: 66 UMOL/L (ref 11–60)
ANION GAP SERPL CALCULATED.3IONS-SCNC: 13 MEQ/L (ref 8–16)
ANTIBODY SCREEN: NORMAL
BASOPHILS # BLD: 0.3 %
BASOPHILS ABSOLUTE: 0 THOU/MM3 (ref 0–0.1)
BUN BLDV-MCNC: 28 MG/DL (ref 7–22)
CALCIUM SERPL-MCNC: 9.3 MG/DL (ref 8.5–10.5)
CHLORIDE BLD-SCNC: 100 MEQ/L (ref 98–111)
CO2: 20 MEQ/L (ref 23–33)
CREAT SERPL-MCNC: 0.5 MG/DL (ref 0.4–1.2)
EKG ATRIAL RATE: 116 BPM
EKG P AXIS: 70 DEGREES
EKG P-R INTERVAL: 116 MS
EKG Q-T INTERVAL: 328 MS
EKG QRS DURATION: 110 MS
EKG QTC CALCULATION (BAZETT): 455 MS
EKG R AXIS: 81 DEGREES
EKG T AXIS: 20 DEGREES
EKG VENTRICULAR RATE: 116 BPM
EOSINOPHIL # BLD: 1.5 %
EOSINOPHILS ABSOLUTE: 0.2 THOU/MM3 (ref 0–0.4)
ERYTHROCYTE [DISTWIDTH] IN BLOOD BY AUTOMATED COUNT: 18.5 % (ref 11.5–14.5)
ERYTHROCYTE [DISTWIDTH] IN BLOOD BY AUTOMATED COUNT: 57.8 FL (ref 35–45)
GFR SERPL CREATININE-BSD FRML MDRD: > 90 ML/MIN/1.73M2
GLUCOSE BLD-MCNC: 105 MG/DL (ref 70–108)
GLUCOSE BLD-MCNC: 115 MG/DL (ref 70–108)
GLUCOSE BLD-MCNC: 131 MG/DL (ref 70–108)
HCT VFR BLD CALC: 40.8 % (ref 37–47)
HEMOGLOBIN: 12.5 GM/DL (ref 12–16)
IMMATURE GRANS (ABS): 0.05 THOU/MM3 (ref 0–0.07)
IMMATURE GRANULOCYTES: 0.4 %
LYMPHOCYTES # BLD: 8.7 %
LYMPHOCYTES ABSOLUTE: 1.1 THOU/MM3 (ref 1–4.8)
MAGNESIUM: 1.5 MG/DL (ref 1.6–2.4)
MCH RBC QN AUTO: 26.6 PG (ref 26–33)
MCHC RBC AUTO-ENTMCNC: 30.6 GM/DL (ref 32.2–35.5)
MCV RBC AUTO: 86.8 FL (ref 81–99)
MONOCYTES # BLD: 9.9 %
MONOCYTES ABSOLUTE: 1.3 THOU/MM3 (ref 0.4–1.3)
NUCLEATED RED BLOOD CELLS: 0 /100 WBC
PHOSPHORUS: 2.9 MG/DL (ref 2.4–4.7)
PLATELET # BLD: 411 THOU/MM3 (ref 130–400)
PMV BLD AUTO: 10.9 FL (ref 9.4–12.4)
POTASSIUM REFLEX MAGNESIUM: 4.6 MEQ/L (ref 3.5–5.2)
RBC # BLD: 4.7 MILL/MM3 (ref 4.2–5.4)
RH FACTOR: NORMAL
SEG NEUTROPHILS: 79.2 %
SEGMENTED NEUTROPHILS ABSOLUTE COUNT: 10.2 THOU/MM3 (ref 1.8–7.7)
SODIUM BLD-SCNC: 133 MEQ/L (ref 135–145)
TROPONIN T: < 0.01 NG/ML
WBC # BLD: 12.9 THOU/MM3 (ref 4.8–10.8)

## 2022-05-09 PROCEDURE — 80048 BASIC METABOLIC PNL TOTAL CA: CPT

## 2022-05-09 PROCEDURE — 83735 ASSAY OF MAGNESIUM: CPT

## 2022-05-09 PROCEDURE — 82948 REAGENT STRIP/BLOOD GLUCOSE: CPT

## 2022-05-09 PROCEDURE — 99285 EMERGENCY DEPT VISIT HI MDM: CPT

## 2022-05-09 PROCEDURE — 82140 ASSAY OF AMMONIA: CPT

## 2022-05-09 PROCEDURE — 86901 BLOOD TYPING SEROLOGIC RH(D): CPT

## 2022-05-09 PROCEDURE — 93005 ELECTROCARDIOGRAM TRACING: CPT

## 2022-05-09 PROCEDURE — 86850 RBC ANTIBODY SCREEN: CPT

## 2022-05-09 PROCEDURE — 85025 COMPLETE CBC W/AUTO DIFF WBC: CPT

## 2022-05-09 PROCEDURE — 36415 COLL VENOUS BLD VENIPUNCTURE: CPT

## 2022-05-09 PROCEDURE — 6360000002 HC RX W HCPCS: Performed by: NURSE PRACTITIONER

## 2022-05-09 PROCEDURE — 86900 BLOOD TYPING SEROLOGIC ABO: CPT

## 2022-05-09 PROCEDURE — 2580000003 HC RX 258: Performed by: NURSE PRACTITIONER

## 2022-05-09 PROCEDURE — 6360000002 HC RX W HCPCS: Performed by: PODIATRIST

## 2022-05-09 PROCEDURE — 84484 ASSAY OF TROPONIN QUANT: CPT

## 2022-05-09 PROCEDURE — 93010 ELECTROCARDIOGRAM REPORT: CPT | Performed by: NUCLEAR MEDICINE

## 2022-05-09 PROCEDURE — 1200000000 HC SEMI PRIVATE

## 2022-05-09 PROCEDURE — 84100 ASSAY OF PHOSPHORUS: CPT

## 2022-05-09 PROCEDURE — 6820000001 HC L2 TRAUMA SURGERY EVALUATION: Performed by: ORTHOPAEDIC SURGERY

## 2022-05-09 PROCEDURE — 73564 X-RAY EXAM KNEE 4 OR MORE: CPT

## 2022-05-09 PROCEDURE — 99255 IP/OBS CONSLTJ NEW/EST HI 80: CPT

## 2022-05-09 PROCEDURE — 6370000000 HC RX 637 (ALT 250 FOR IP)

## 2022-05-09 RX ORDER — HYDROCODONE BITARTRATE AND ACETAMINOPHEN 5; 325 MG/1; MG/1
1 TABLET ORAL EVERY 4 HOURS PRN
Status: DISCONTINUED | OUTPATIENT
Start: 2022-05-09 | End: 2022-05-26 | Stop reason: HOSPADM

## 2022-05-09 RX ORDER — DOXEPIN HYDROCHLORIDE 10 MG/1
10 CAPSULE ORAL NIGHTLY
Status: DISCONTINUED | OUTPATIENT
Start: 2022-05-09 | End: 2022-05-26 | Stop reason: HOSPADM

## 2022-05-09 RX ORDER — ONDANSETRON 2 MG/ML
4 INJECTION INTRAMUSCULAR; INTRAVENOUS EVERY 6 HOURS PRN
Status: DISCONTINUED | OUTPATIENT
Start: 2022-05-09 | End: 2022-05-26 | Stop reason: HOSPADM

## 2022-05-09 RX ORDER — MORPHINE SULFATE 4 MG/ML
4 INJECTION, SOLUTION INTRAMUSCULAR; INTRAVENOUS
Status: DISCONTINUED | OUTPATIENT
Start: 2022-05-09 | End: 2022-05-26 | Stop reason: HOSPADM

## 2022-05-09 RX ORDER — SODIUM CHLORIDE 0.9 % (FLUSH) 0.9 %
5-40 SYRINGE (ML) INJECTION EVERY 12 HOURS SCHEDULED
Status: DISCONTINUED | OUTPATIENT
Start: 2022-05-09 | End: 2022-05-26 | Stop reason: HOSPADM

## 2022-05-09 RX ORDER — DEXTROSE MONOHYDRATE 50 MG/ML
100 INJECTION, SOLUTION INTRAVENOUS PRN
Status: DISCONTINUED | OUTPATIENT
Start: 2022-05-09 | End: 2022-05-26 | Stop reason: HOSPADM

## 2022-05-09 RX ORDER — FENOFIBRATE 160 MG/1
160 TABLET ORAL DAILY
Refills: 3 | Status: DISCONTINUED | OUTPATIENT
Start: 2022-05-10 | End: 2022-05-26 | Stop reason: HOSPADM

## 2022-05-09 RX ORDER — INSULIN LISPRO 100 [IU]/ML
0-6 INJECTION, SOLUTION INTRAVENOUS; SUBCUTANEOUS
Status: DISCONTINUED | OUTPATIENT
Start: 2022-05-09 | End: 2022-05-26 | Stop reason: HOSPADM

## 2022-05-09 RX ORDER — LOSARTAN POTASSIUM 25 MG/1
25 TABLET ORAL DAILY
Status: DISCONTINUED | OUTPATIENT
Start: 2022-05-10 | End: 2022-05-26 | Stop reason: HOSPADM

## 2022-05-09 RX ORDER — ENOXAPARIN SODIUM 100 MG/ML
40 INJECTION SUBCUTANEOUS DAILY
Status: DISCONTINUED | OUTPATIENT
Start: 2022-05-09 | End: 2022-05-10

## 2022-05-09 RX ORDER — MORPHINE SULFATE 2 MG/ML
2 INJECTION, SOLUTION INTRAMUSCULAR; INTRAVENOUS
Status: DISCONTINUED | OUTPATIENT
Start: 2022-05-09 | End: 2022-05-09 | Stop reason: SDUPTHER

## 2022-05-09 RX ORDER — MORPHINE SULFATE 2 MG/ML
2 INJECTION, SOLUTION INTRAMUSCULAR; INTRAVENOUS
Status: DISCONTINUED | OUTPATIENT
Start: 2022-05-09 | End: 2022-05-26 | Stop reason: HOSPADM

## 2022-05-09 RX ORDER — ATORVASTATIN CALCIUM 40 MG/1
40 TABLET, FILM COATED ORAL NIGHTLY
Status: DISCONTINUED | OUTPATIENT
Start: 2022-05-09 | End: 2022-05-26 | Stop reason: HOSPADM

## 2022-05-09 RX ORDER — ONDANSETRON 4 MG/1
4 TABLET, ORALLY DISINTEGRATING ORAL EVERY 8 HOURS PRN
Status: DISCONTINUED | OUTPATIENT
Start: 2022-05-09 | End: 2022-05-26 | Stop reason: HOSPADM

## 2022-05-09 RX ORDER — HYDROCODONE BITARTRATE AND ACETAMINOPHEN 5; 325 MG/1; MG/1
2 TABLET ORAL EVERY 4 HOURS PRN
Status: DISCONTINUED | OUTPATIENT
Start: 2022-05-09 | End: 2022-05-26 | Stop reason: HOSPADM

## 2022-05-09 RX ORDER — SODIUM CHLORIDE 9 MG/ML
INJECTION, SOLUTION INTRAVENOUS CONTINUOUS
Status: DISCONTINUED | OUTPATIENT
Start: 2022-05-09 | End: 2022-05-10 | Stop reason: SDUPTHER

## 2022-05-09 RX ORDER — MORPHINE SULFATE 2 MG/ML
4 INJECTION, SOLUTION INTRAMUSCULAR; INTRAVENOUS
Status: DISCONTINUED | OUTPATIENT
Start: 2022-05-09 | End: 2022-05-09 | Stop reason: SDUPTHER

## 2022-05-09 RX ORDER — SODIUM CHLORIDE 9 MG/ML
INJECTION, SOLUTION INTRAVENOUS PRN
Status: DISCONTINUED | OUTPATIENT
Start: 2022-05-09 | End: 2022-05-26 | Stop reason: HOSPADM

## 2022-05-09 RX ORDER — SODIUM CHLORIDE 0.9 % (FLUSH) 0.9 %
5-40 SYRINGE (ML) INJECTION PRN
Status: DISCONTINUED | OUTPATIENT
Start: 2022-05-09 | End: 2022-05-26 | Stop reason: HOSPADM

## 2022-05-09 RX ORDER — INSULIN LISPRO 100 [IU]/ML
0-3 INJECTION, SOLUTION INTRAVENOUS; SUBCUTANEOUS NIGHTLY
Status: DISCONTINUED | OUTPATIENT
Start: 2022-05-09 | End: 2022-05-26 | Stop reason: HOSPADM

## 2022-05-09 RX ADMIN — MORPHINE SULFATE 4 MG: 4 INJECTION, SOLUTION INTRAMUSCULAR; INTRAVENOUS at 22:57

## 2022-05-09 RX ADMIN — SODIUM CHLORIDE: 9 INJECTION, SOLUTION INTRAVENOUS at 18:11

## 2022-05-09 RX ADMIN — MORPHINE SULFATE 2 MG: 2 INJECTION, SOLUTION INTRAMUSCULAR; INTRAVENOUS at 20:49

## 2022-05-09 RX ADMIN — DOXEPIN HYDROCHLORIDE 10 MG: 10 CAPSULE ORAL at 22:57

## 2022-05-09 RX ADMIN — MORPHINE SULFATE 4 MG: 2 INJECTION, SOLUTION INTRAMUSCULAR; INTRAVENOUS at 13:08

## 2022-05-09 RX ADMIN — SODIUM CHLORIDE, PRESERVATIVE FREE 10 ML: 5 INJECTION INTRAVENOUS at 22:58

## 2022-05-09 RX ADMIN — ATORVASTATIN CALCIUM 40 MG: 40 TABLET, FILM COATED ORAL at 22:57

## 2022-05-09 ASSESSMENT — PAIN DESCRIPTION - FREQUENCY: FREQUENCY: CONTINUOUS

## 2022-05-09 ASSESSMENT — PAIN DESCRIPTION - LOCATION: LOCATION: KNEE

## 2022-05-09 ASSESSMENT — PAIN DESCRIPTION - ONSET: ONSET: ON-GOING

## 2022-05-09 ASSESSMENT — PAIN DESCRIPTION - DESCRIPTORS: DESCRIPTORS: THROBBING

## 2022-05-09 ASSESSMENT — PAIN SCALES - GENERAL
PAINLEVEL_OUTOF10: 10
PAINLEVEL_OUTOF10: 8
PAINLEVEL_OUTOF10: 8

## 2022-05-09 ASSESSMENT — ENCOUNTER SYMPTOMS
GASTROINTESTINAL NEGATIVE: 1
EYES NEGATIVE: 1
RESPIRATORY NEGATIVE: 1
ALLERGIC/IMMUNOLOGIC NEGATIVE: 1

## 2022-05-09 ASSESSMENT — PAIN DESCRIPTION - ORIENTATION: ORIENTATION: RIGHT

## 2022-05-09 NOTE — CONSULTS
times daily. 60 g 0    cyclobenzaprine (FLEXERIL) 10 MG tablet Take 1 tablet by mouth 3 times daily as needed (Patient not taking: Reported on 4/18/2022)      oxyCODONE-acetaminophen (PERCOCET) 5-325 MG per tablet Take 1 tablet by mouth every 6 hours as needed for Pain. (Patient not taking: Reported on 4/18/2022)      metFORMIN (GLUCOPHAGE-XR) 500 MG extended release tablet Take 1 tablet by mouth daily (with breakfast) 90 tablet 3    atorvastatin (LIPITOR) 40 MG tablet TAKE 1 TABLET NIGHTLY 90 tablet 3    losartan (COZAAR) 25 MG tablet TAKE 1 TABLET DAILY 90 tablet 3    baclofen (LIORESAL) 10 MG tablet Take 1 tablet by mouth daily as needed (muscle spasms) (Patient not taking: Reported on 4/18/2022) 30 tablet 0    melatonin 3 MG TABS tablet Take 3 mg by mouth daily (Patient not taking: Reported on 4/18/2022)      Pyridoxine HCl (VITAMIN B6 PO) Take by mouth (Patient not taking: Reported on 4/18/2022)      MAGNESIUM PO Take by mouth (Patient not taking: Reported on 4/18/2022)      NONFORMULARY AF Beta Food (Patient not taking: Reported on 4/18/2022)      Acetaminophen 500 MG CAPS Take 1,000 mg by mouth as needed for Pain       KRILL OIL PO Take 350 mg by mouth daily      Multiple Vitamins-Minerals (THERAPEUTIC MULTIVITAMIN-MINERALS) tablet Take 1 tablet by mouth daily      aspirin 81 MG tablet Take 81 mg by mouth daily. Allergies:    Adhesive tape    Social History:    reports that she has been smoking cigarettes. She has a 73.50 pack-year smoking history. She has never used smokeless tobacco. She reports that she does not drink alcohol and does not use drugs.     Family History:       Problem Relation Age of Onset    High Cholesterol Mother     High Blood Pressure Mother    Bethany Verduzco Pacemaker Mother     Other Mother         Sundowners syndrome    Crohn's Disease Mother     Cancer Mother         Uterine and cervical and vagina    Thyroid Disease Mother     Dementia Mother     Colon Polyps Mother     Other Father         Pneumonia    Dementia Father     Diabetes Sister     Breast Cancer Sister     Heart Disease Brother     Colon Cancer Neg Hx        Diet:  ADULT DIET; Regular  Diet NPO    Review of systems:   Pertinent positives as noted in the HPI. All other systems reviewed and negative. PHYSICAL EXAM:  /82   Pulse 58   Temp 98.4 °F (36.9 °C) (Oral)   Resp 18   Ht 5' 7.52\" (1.715 m)   Wt 195 lb (88.5 kg)   SpO2 93%   BMI 30.07 kg/m²   General appearance: No apparent distress, appears stated age and cooperative. HEENT: Normal cephalic, atraumatic without obvious deformity. Pupils equal, round, and reactive to light. Extra ocular muscles intact. Conjunctivae/corneas clear. Neck: Supple, with full range of motion. No jugular venous distention. Trachea midline. Respiratory:  Normal respiratory effort. Clear to auscultation, bilaterally without Rales/Wheezes/Rhonchi. Cardiovascular: Regular rate and rhythm with normal S1/S2 without murmurs, rubs or gallops. Abdomen: Soft, non-tender, non-distended with normal bowel sounds. Musculoskeletal:  No clubbing, cyanosis or edema bilaterally. Skin: Skin color, texture, turgor normal.  No rashes or lesions. Neurologic:  Neurovascularly intact without any focal sensory/motor deficits. Cranial nerves: II-XII intact, grossly non-focal.Finger to nose test WNL. Psychiatric: Alert and oriented, thought content appropriate, normal insight  Capillary Refill: Brisk,< 3 seconds   Peripheral Pulses: +2 palpable, equal bilaterally     Labs:   Recent Labs     05/09/22  1305   WBC 12.9*   HGB 12.5   HCT 40.8   *     Recent Labs     05/09/22  1305   *   K 4.6      CO2 20*   BUN 28*   CREATININE 0.5   CALCIUM 9.3     No results for input(s): AST, ALT, BILIDIR, BILITOT, ALKPHOS in the last 72 hours. No results for input(s): INR in the last 72 hours.   No results for input(s): Tiesha Thomas in the last 72 hours.    Urinalysis:    Lab Results   Component Value Date    NITRU NEGATIVE 04/08/2022    WBCUA 2-4 04/08/2022    BACTERIA FEW 04/08/2022    RBCUA 0-2 04/08/2022    BLOODU NEGATIVE 04/08/2022    GLUCOSEU NEGATIVE 04/08/2022       Radiology:   XR KNEE RIGHT (MIN 4 VIEWS)   Final Result   1. No acute displaced oblique fracture of the distal femur is seen. 2. Status post total right knee arthroplasty. **This report has been created using voice recognition software. It may contain minor errors which are inherent in voice recognition technology. **      Final report electronically signed by Dr Kenyetta Ingram on 5/9/2022 12:15 PM        XR KNEE RIGHT (MIN 4 VIEWS)    Result Date: 5/9/2022  PROCEDURE: XR KNEE RIGHT (MIN 4 VIEWS) CLINICAL INFORMATION: fall COMPARISON: Right knee radiographs 10/8/2020 TECHNIQUE: 4 views of the right knee FINDINGS: A total right knee arthroplasty has been performed. There is a displaced oblique fracture of the distal femur. Recent postsurgical changes of total knee arthroplasty is seen. Surgical staples are seen. The bones appear mildly demineralized. .     1. No acute displaced oblique fracture of the distal femur is seen. 2. Status post total right knee arthroplasty. **This report has been created using voice recognition software. It may contain minor errors which are inherent in voice recognition technology. ** Final report electronically signed by Dr Kenyetta Ingram on 5/9/2022 12:15 PM        [address]    Electronically signed by Gomez Villarreal PA-C on 5/9/2022 at 6:18 PM

## 2022-05-09 NOTE — ED PROVIDER NOTES
[place] ENCOUNTER          Pt Name: Nayana Patton  MRN: [id number]  [place] 1960  Date of evaluation: 5/9/2022  Treating Resident Physician: Tip Parsons DPM  Supervising Physician: Dr. Nay Finn MD    [address] [place] [address]       Chief Complaint   Patient presents with    Knee Pain     right      History obtained from the patient. HISTORY OF PRESENT ILLNESS    HPI  Nayana Patton is a 60 y.o. female who presents to the emergency department for evaluation of right knee pain. Patient states she underwent a right total knee arthroplasty with OIO under Dr. Hayden Benson last Tuesday. She states that yesterday she fell out of bed while transferring to her bedside commode. She states she heard a loud crack and has had 10/10 pain in her right knee since then. She denies numbness, tingling, chest pain, shortness of breath. The patient has no other acute complaints at this time. REVIEW OF SYSTEMS   Review of Systems   Constitutional: Negative. HENT: Negative. Eyes: Negative. Respiratory: Negative. Cardiovascular: Negative. Gastrointestinal: Negative. Endocrine: Negative. Genitourinary: Negative. Musculoskeletal: Positive for gait problem and joint swelling. Allergic/Immunologic: Negative. Hematological: Negative. Psychiatric/Behavioral: Negative.           PAST MEDICAL AND SURGICAL HISTORY     Past Medical History:   Diagnosis Date    Atypical ductal hyperplasia, breast     left breast, 2016, lumpectomy, Tamoxifen, [facility]    Hx of breast biopsy Jan 8 2016    Hyperlipidemia     Type II or unspecified type diabetes mellitus without mention of complication, not stated as uncontrolled      Past Surgical History:   Procedure Laterality Date    BLADDER SUSPENSION      BREAST BIOPSY Left     [facility]lasia[facility] 2016, [facility]    BREAST LUMPECTOMY Left     atypical hyperplasia, 2016, [facility]    COLONOSCOPY  08/28/2020    HERNIA REPAIR  10/17/2016    KNEE SURGERY      Right knee    TUBAL LIGATION      WISDOM TOOTH EXTRACTION           MEDICATIONS     Current Facility-Administered Medications:     morphine (PF) injection 2 mg, 2 mg, IntraVENous, Q2H PRN **OR** morphine (PF) injection 4 mg, 4 mg, IntraVENous, Q2H PRN, Anton Fontana, DPM, 4 mg at 05/09/22 1308    Current Outpatient Medications:     fenofibrate (TRIGLIDE) 160 MG tablet, TAKE 1 TABLET DAILY, Disp: 90 tablet, Rfl: 3    tiZANidine (ZANAFLEX) 2 MG tablet, Take 1 tablet by mouth 3 times daily as needed (muscle cramps), Disp: 30 tablet, Rfl: 2    doxepin (SINEQUAN) 10 MG capsule, TAKE 2 CAPSULES NIGHTLY, Disp: 180 capsule, Rfl: 3    hydrocortisone 2.5 % cream, Apply topically 2 times daily. , Disp: 60 g, Rfl: 0    cyclobenzaprine (FLEXERIL) 10 MG tablet, Take 1 tablet by mouth 3 times daily as needed (Patient not taking: Reported on 4/18/2022), Disp: , Rfl:     oxyCODONE-acetaminophen (PERCOCET) 5-325 MG per tablet, Take 1 tablet by mouth every 6 hours as needed for Pain.  (Patient not taking: Reported on 4/18/2022), Disp: , Rfl:     metFORMIN (GLUCOPHAGE-XR) 500 MG extended release tablet, Take 1 tablet by mouth daily (with breakfast), Disp: 90 tablet, Rfl: 3    atorvastatin (LIPITOR) 40 MG tablet, TAKE 1 TABLET NIGHTLY, Disp: 90 tablet, Rfl: 3    losartan (COZAAR) 25 MG tablet, TAKE 1 TABLET DAILY, Disp: 90 tablet, Rfl: 3    baclofen (LIORESAL) 10 MG tablet, Take 1 tablet by mouth daily as needed (muscle spasms) (Patient not taking: Reported on 4/18/2022), Disp: 30 tablet, Rfl: 0    melatonin 3 MG TABS tablet, Take 3 mg by mouth daily (Patient not taking: Reported on 4/18/2022), Disp: , Rfl:     Pyridoxine HCl (VITAMIN B6 PO), Take by mouth (Patient not taking: Reported on 4/18/2022), Disp: , Rfl:     MAGNESIUM PO, Take by mouth (Patient not taking: Reported on 4/18/2022), Disp: , Rfl:     NONFORMULARY, AF Beta Food (Patient not taking: Reported on 4/18/2022), Disp: , Rfl:     Acetaminophen 500 MG CAPS, Take 1,000 mg by mouth as needed for Pain , Disp: , Rfl:     KRILL OIL PO, Take 350 mg by mouth daily, Disp: , Rfl:     Multiple Vitamins-Minerals (THERAPEUTIC MULTIVITAMIN-MINERALS) tablet, Take 1 tablet by mouth daily, Disp: , Rfl:     aspirin 81 MG tablet, Take 81 mg by mouth daily. , Disp: , Rfl:       SOCIAL HISTORY     Social History     Social History Narrative    Not on file     Social History     Tobacco Use    Smoking status: Current Every Day Smoker     Packs/day: 1.50     Years: 49.00     Pack years: 73.50     Types: Cigarettes    Smokeless tobacco: Never Used   Vaping Use    Vaping Use: Never used   Substance Use Topics    Alcohol use: No     Comment: monthly glass of wine    Drug use: No         ALLERGIES     Allergies   Allergen Reactions    Adhesive Tape          FAMILY HISTORY     Family History   Problem Relation Age of Onset    High Cholesterol Mother     High Blood Pressure Mother     Pacemaker Mother     Other Mother         Sundowners syndrome    Crohn's Disease Mother     Cancer Mother         Uterine and cervical and vagina    Thyroid Disease Mother     Dementia Mother     Colon Polyps Mother     Other Father         Pneumonia    Dementia Father     Diabetes Sister     Breast Cancer Sister     Heart Disease Brother     Colon Cancer Neg Hx          PREVIOUS RECORDS   Previous records reviewed: This is this patient's first visit to [facility] ED, no previous records available on EMR. .        PHYSICAL EXAM     ED Triage Vitals [05/09/22 1140]   BP Temp Temp Source Pulse Resp SpO2 Height Weight   (!) 147/64 98.5 °F (36.9 °C) Oral 63 17 99 % -- --     Initial vital signs and nursing assessment reviewed and abnormal from elevated BP. There is no height or weight on file to calculate BMI. Pulsoximetry is normal per my interpretation.     Additional Vital Signs:  Vitals:    05/09/22 1307   BP: (!) 141/60   Pulse: 90   Resp: 20   Temp: SpO2: 93%       Physical Exam  Constitutional:       Appearance: Normal appearance. HENT:      Head: Normocephalic and atraumatic. Nose: Nose normal.      Mouth/Throat:      Mouth: Mucous membranes are moist.      Pharynx: Oropharynx is clear. Eyes:      Extraocular Movements: Extraocular movements intact. Conjunctiva/sclera: Conjunctivae normal.      Pupils: Pupils are equal, round, and reactive to light. Cardiovascular:      Rate and Rhythm: Normal rate and regular rhythm. Pulses: Normal pulses. Heart sounds: Normal heart sounds. Pulmonary:      Effort: Pulmonary effort is normal.      Breath sounds: Normal breath sounds. Abdominal:      Palpations: Abdomen is soft. Tenderness: There is no abdominal tenderness. Musculoskeletal:      Cervical back: Normal range of motion and neck supple. Right knee: Swelling present. Left knee: Normal.        Legs:       Comments: Well coapt surgical incision noted to anterior aspect of right knee. Intact staples noted. No evidence of dehiscence. Skin:     General: Skin is warm and dry. Neurological:      Mental Status: She is alert. MEDICAL DECISION MAKING   Initial Assessment:   1. Closed acute fracture of right femur, displaced. Post-operative sequela of recent total knee arthroplasty on right. Plan:    Morphine 4mg injection for pain.    CBC, BMP, EKG   XR Knee right   Knee immobilizer applied to RLE   Ad[facility] to [facility], [facility]        ED RESULTS   Laboratory results:  Labs Reviewed   CBC WITH AUTO DIFFERENTIAL - Abnormal; Notable for the following components:       Result Value    WBC 12.9 (*)     MCHC 30.6 (*)     RDW-CV 18.5 (*)     RDW-SD 57.8 (*)     Platelets 214 (*)     Segs Absolute 10.2 (*)     All other components within normal limits   BASIC METABOLIC PANEL W/ REFLEX TO MG FOR LOW K - Abnormal; Notable for the following components:    Sodium 133 (*)     CO2 20 (*)     BUN 28 (*)     All other components within normal limits   ANION GAP   GLOMERULAR FILTRATION RATE, ESTIMATED   TYPE AND SCREEN       Radiologic studies results:  XR KNEE RIGHT (MIN 4 VIEWS)   Final Result   1. No acute displaced oblique fracture of the distal femur is seen. 2. Status post total right knee arthroplasty. **This report has been created using voice recognition software. It may contain minor errors which are inherent in voice recognition technology. **      Final report electronically signed by Dr Gabriela Byrnes on 5/9/2022 12:15 PM          ED Medications administered this visit:   Medications   morphine (PF) injection 2 mg ( IntraVENous See Alternative 5/9/22 1308)     Or   morphine (PF) injection 4 mg (4 mg IntraVENous Given 5/9/22 1308)         ED COURSE        Admit to inpatient under Dr. Radha Brown. MEDICATION CHANGES     New Prescriptions    No medications on file         FINAL DISPOSITION     Final diagnoses:   Closed displaced articular fracture of head of right femur, initial encounter ([facility] [address]     Condition: condition: fair  Dispo: Admit to med/surg floor      This transcription was electronically signed. Parts of this transcriptions may have been dictated by use of voice recognition software and electronically transcribed, and parts may have been transcribed with the assistance of an ED scribe. The transcription may contain errors not detected in proofreading. Please refer to my supervising physician's documentation if my documentation differs. Electronically Signed: Xin Aggarwal DPM, 05/09/22, 2:08 PM         Margarito Mayers DPM  Resident  05/09/22 7643    Attending Supervising [de-identified] Attestation Statement  I performed a history and physical examination on the patient and discussed the management with the resident physician. I reviewed and agree with the findings and plan as documented in his note unless described otherwise below.   Pt presents to the ER after mechanical

## 2022-05-09 NOTE — ED NOTES
Pt to ED via EMS with c/o right knee pain. Pt reports they had a knee replacement 5/03 at [facility]. Pt reports they fell in their bathroom an hour PTA trying to get on their commode. Pt reports they heard a \"clicking\" in their knee. Pt VSS. Call light in reach. Will continue to monitor.       Sha Meléndez RN  05/09/22 [id number]

## 2022-05-09 NOTE — ED NOTES
Pt medicated for pain. External patterson placed. Knee immobilizer placed on pt. Pt updated on POC.      Gonzalo Corrales RN  05/09/22 0660

## 2022-05-09 NOTE — TELEPHONE ENCOUNTER
Pt called office stating she had a right total knee done by Dr. Kaylee Luke last week on Tuesday 5/3/22. She just fell in the bathroom \"and my knee popped out\". Pt was asking if she should go to [facility] ED or back to [facility]. Pt is going to [facility] right now to their UC for eval. STALIN.

## 2022-05-09 NOTE — PLAN OF CARE
Pt asleep, stated she was not in any pain, IV fluids started and VSS. Offered fluids and food, pt stated she will eat later. Some confusion reported to PA, however pt was aox3. [place] and bedding and gown changed. Will continue to monitor.

## 2022-05-09 NOTE — ED NOTES
Patient transported off the floor in stable condition to [facility]. Floor contacted prior.      Lauren Wilde  05/09/22 1548

## 2022-05-10 ENCOUNTER — ANESTHESIA (OUTPATIENT)
Dept: OPERATING ROOM | Age: 62
DRG: 467 | End: 2022-05-10
Payer: OTHER GOVERNMENT

## 2022-05-10 ENCOUNTER — APPOINTMENT (OUTPATIENT)
Dept: GENERAL RADIOLOGY | Age: 62
DRG: 467 | End: 2022-05-10
Payer: OTHER GOVERNMENT

## 2022-05-10 ENCOUNTER — ANESTHESIA EVENT (OUTPATIENT)
Dept: OPERATING ROOM | Age: 62
DRG: 467 | End: 2022-05-10
Payer: OTHER GOVERNMENT

## 2022-05-10 VITALS — SYSTOLIC BLOOD PRESSURE: 148 MMHG | TEMPERATURE: 98.6 F | OXYGEN SATURATION: 97 % | DIASTOLIC BLOOD PRESSURE: 70 MMHG

## 2022-05-10 LAB
ANION GAP SERPL CALCULATED.3IONS-SCNC: 14 MEQ/L (ref 8–16)
BASOPHILS # BLD: 0.3 %
BASOPHILS ABSOLUTE: 0 THOU/MM3 (ref 0–0.1)
BUN BLDV-MCNC: 19 MG/DL (ref 7–22)
CALCIUM SERPL-MCNC: 9.1 MG/DL (ref 8.5–10.5)
CHLORIDE BLD-SCNC: 102 MEQ/L (ref 98–111)
CO2: 20 MEQ/L (ref 23–33)
CREAT SERPL-MCNC: 0.4 MG/DL (ref 0.4–1.2)
EKG ATRIAL RATE: 116 BPM
EKG P AXIS: 58 DEGREES
EKG P-R INTERVAL: 122 MS
EKG Q-T INTERVAL: 344 MS
EKG QRS DURATION: 106 MS
EKG QTC CALCULATION (BAZETT): 478 MS
EKG R AXIS: 67 DEGREES
EKG T AXIS: 51 DEGREES
EKG VENTRICULAR RATE: 116 BPM
EOSINOPHIL # BLD: 1.9 %
EOSINOPHILS ABSOLUTE: 0.2 THOU/MM3 (ref 0–0.4)
ERYTHROCYTE [DISTWIDTH] IN BLOOD BY AUTOMATED COUNT: 18.3 % (ref 11.5–14.5)
ERYTHROCYTE [DISTWIDTH] IN BLOOD BY AUTOMATED COUNT: 57.4 FL (ref 35–45)
GFR SERPL CREATININE-BSD FRML MDRD: > 90 ML/MIN/1.73M2
GLUCOSE BLD-MCNC: 107 MG/DL (ref 70–108)
GLUCOSE BLD-MCNC: 107 MG/DL (ref 70–108)
GLUCOSE BLD-MCNC: 112 MG/DL (ref 70–108)
GLUCOSE BLD-MCNC: 122 MG/DL (ref 70–108)
HCT VFR BLD CALC: 40.4 % (ref 37–47)
HEMOGLOBIN: 12.3 GM/DL (ref 12–16)
IMMATURE GRANS (ABS): 0.06 THOU/MM3 (ref 0–0.07)
IMMATURE GRANULOCYTES: 0.6 %
LV EF: 65 %
LVEF MODALITY: NORMAL
LYMPHOCYTES # BLD: 10.6 %
LYMPHOCYTES ABSOLUTE: 1.1 THOU/MM3 (ref 1–4.8)
MCH RBC QN AUTO: 26.5 PG (ref 26–33)
MCHC RBC AUTO-ENTMCNC: 30.4 GM/DL (ref 32.2–35.5)
MCV RBC AUTO: 87.1 FL (ref 81–99)
MONOCYTES # BLD: 10.7 %
MONOCYTES ABSOLUTE: 1.1 THOU/MM3 (ref 0.4–1.3)
NUCLEATED RED BLOOD CELLS: 0 /100 WBC
PLATELET # BLD: 364 THOU/MM3 (ref 130–400)
PMV BLD AUTO: 10.6 FL (ref 9.4–12.4)
POTASSIUM SERPL-SCNC: 4.1 MEQ/L (ref 3.5–5.2)
RBC # BLD: 4.64 MILL/MM3 (ref 4.2–5.4)
SEG NEUTROPHILS: 75.9 %
SEGMENTED NEUTROPHILS ABSOLUTE COUNT: 7.6 THOU/MM3 (ref 1.8–7.7)
SODIUM BLD-SCNC: 136 MEQ/L (ref 135–145)
WBC # BLD: 10 THOU/MM3 (ref 4.8–10.8)

## 2022-05-10 PROCEDURE — 6360000002 HC RX W HCPCS: Performed by: NURSE PRACTITIONER

## 2022-05-10 PROCEDURE — 99232 SBSQ HOSP IP/OBS MODERATE 35: CPT

## 2022-05-10 PROCEDURE — 3700000001 HC ADD 15 MINUTES (ANESTHESIA): Performed by: ORTHOPAEDIC SURGERY

## 2022-05-10 PROCEDURE — 80048 BASIC METABOLIC PNL TOTAL CA: CPT

## 2022-05-10 PROCEDURE — 0SPC0JZ REMOVAL OF SYNTHETIC SUBSTITUTE FROM RIGHT KNEE JOINT, OPEN APPROACH: ICD-10-PCS | Performed by: ORTHOPAEDIC SURGERY

## 2022-05-10 PROCEDURE — 2500000003 HC RX 250 WO HCPCS: Performed by: REGISTERED NURSE

## 2022-05-10 PROCEDURE — 36415 COLL VENOUS BLD VENIPUNCTURE: CPT

## 2022-05-10 PROCEDURE — 2500000003 HC RX 250 WO HCPCS: Performed by: ORTHOPAEDIC SURGERY

## 2022-05-10 PROCEDURE — A4216 STERILE WATER/SALINE, 10 ML: HCPCS | Performed by: ORTHOPAEDIC SURGERY

## 2022-05-10 PROCEDURE — 6360000002 HC RX W HCPCS: Performed by: STUDENT IN AN ORGANIZED HEALTH CARE EDUCATION/TRAINING PROGRAM

## 2022-05-10 PROCEDURE — 3700000000 HC ANESTHESIA ATTENDED CARE: Performed by: ORTHOPAEDIC SURGERY

## 2022-05-10 PROCEDURE — 3600000014 HC SURGERY LEVEL 4 ADDTL 15MIN: Performed by: ORTHOPAEDIC SURGERY

## 2022-05-10 PROCEDURE — 2580000003 HC RX 258: Performed by: NURSE PRACTITIONER

## 2022-05-10 PROCEDURE — 82948 REAGENT STRIP/BLOOD GLUCOSE: CPT

## 2022-05-10 PROCEDURE — 2580000003 HC RX 258: Performed by: REGISTERED NURSE

## 2022-05-10 PROCEDURE — 2720000010 HC SURG SUPPLY STERILE: Performed by: ORTHOPAEDIC SURGERY

## 2022-05-10 PROCEDURE — C1776 JOINT DEVICE (IMPLANTABLE): HCPCS | Performed by: ORTHOPAEDIC SURGERY

## 2022-05-10 PROCEDURE — 6370000000 HC RX 637 (ALT 250 FOR IP): Performed by: NURSE PRACTITIONER

## 2022-05-10 PROCEDURE — C1713 ANCHOR/SCREW BN/BN,TIS/BN: HCPCS | Performed by: ORTHOPAEDIC SURGERY

## 2022-05-10 PROCEDURE — 1200000000 HC SEMI PRIVATE

## 2022-05-10 PROCEDURE — 85025 COMPLETE CBC W/AUTO DIFF WBC: CPT

## 2022-05-10 PROCEDURE — 6360000002 HC RX W HCPCS: Performed by: ORTHOPAEDIC SURGERY

## 2022-05-10 PROCEDURE — 6360000002 HC RX W HCPCS: Performed by: REGISTERED NURSE

## 2022-05-10 PROCEDURE — 7100000001 HC PACU RECOVERY - ADDTL 15 MIN: Performed by: ORTHOPAEDIC SURGERY

## 2022-05-10 PROCEDURE — 2709999900 HC NON-CHARGEABLE SUPPLY: Performed by: ORTHOPAEDIC SURGERY

## 2022-05-10 PROCEDURE — 2500000003 HC RX 250 WO HCPCS: Performed by: STUDENT IN AN ORGANIZED HEALTH CARE EDUCATION/TRAINING PROGRAM

## 2022-05-10 PROCEDURE — 3600000004 HC SURGERY LEVEL 4 BASE: Performed by: ORTHOPAEDIC SURGERY

## 2022-05-10 PROCEDURE — 7100000000 HC PACU RECOVERY - FIRST 15 MIN: Performed by: ORTHOPAEDIC SURGERY

## 2022-05-10 PROCEDURE — 73560 X-RAY EXAM OF KNEE 1 OR 2: CPT

## 2022-05-10 PROCEDURE — 93010 ELECTROCARDIOGRAM REPORT: CPT | Performed by: NUCLEAR MEDICINE

## 2022-05-10 PROCEDURE — 0SRC0J9 REPLACEMENT OF RIGHT KNEE JOINT WITH SYNTHETIC SUBSTITUTE, CEMENTED, OPEN APPROACH: ICD-10-PCS | Performed by: ORTHOPAEDIC SURGERY

## 2022-05-10 PROCEDURE — 2580000003 HC RX 258: Performed by: ORTHOPAEDIC SURGERY

## 2022-05-10 PROCEDURE — 93306 TTE W/DOPPLER COMPLETE: CPT

## 2022-05-10 DEVICE — BEARING TIB THK16MM KNEE UHMWPE ORTH SALV SYS: Type: IMPLANTABLE DEVICE | Site: KNEE | Status: FUNCTIONAL

## 2022-05-10 DEVICE — BUSHING FEM KNEE ARCM POLY LO FRIC INTFACE AXLE AND REINF: Type: IMPLANTABLE DEVICE | Site: KNEE | Status: FUNCTIONAL

## 2022-05-10 DEVICE — IMPLANTABLE DEVICE: Type: IMPLANTABLE DEVICE | Site: KNEE | Status: FUNCTIONAL

## 2022-05-10 DEVICE — AXLE FEM STD KNEE OSS: Type: IMPLANTABLE DEVICE | Site: KNEE | Status: FUNCTIONAL

## 2022-05-10 DEVICE — PIN FIX POLY LOK OSS: Type: IMPLANTABLE DEVICE | Site: KNEE | Status: FUNCTIONAL

## 2022-05-10 DEVICE — COMPONENT FEM KNEE YOKE REINF ORTH SALV SYS: Type: IMPLANTABLE DEVICE | Site: KNEE | Status: FUNCTIONAL

## 2022-05-10 DEVICE — BUSHING TIB POLY LO FRIC INTFACE OSS: Type: IMPLANTABLE DEVICE | Site: KNEE | Status: FUNCTIONAL

## 2022-05-10 DEVICE — RALLY MV AB BONE CEMENT 40 GRAMS
Type: IMPLANTABLE DEVICE | Site: KNEE | Status: FUNCTIONAL
Brand: RALLY

## 2022-05-10 RX ORDER — CEFAZOLIN SODIUM 1 G/3ML
INJECTION, POWDER, FOR SOLUTION INTRAMUSCULAR; INTRAVENOUS PRN
Status: DISCONTINUED | OUTPATIENT
Start: 2022-05-10 | End: 2022-05-10 | Stop reason: SDUPTHER

## 2022-05-10 RX ORDER — FENTANYL CITRATE 50 UG/ML
INJECTION, SOLUTION INTRAMUSCULAR; INTRAVENOUS PRN
Status: DISCONTINUED | OUTPATIENT
Start: 2022-05-10 | End: 2022-05-10 | Stop reason: SDUPTHER

## 2022-05-10 RX ORDER — VANCOMYCIN HYDROCHLORIDE 1 G/20ML
INJECTION, POWDER, LYOPHILIZED, FOR SOLUTION INTRAVENOUS PRN
Status: DISCONTINUED | OUTPATIENT
Start: 2022-05-10 | End: 2022-05-10 | Stop reason: ALTCHOICE

## 2022-05-10 RX ORDER — ROCURONIUM BROMIDE 10 MG/ML
INJECTION, SOLUTION INTRAVENOUS PRN
Status: DISCONTINUED | OUTPATIENT
Start: 2022-05-10 | End: 2022-05-10 | Stop reason: SDUPTHER

## 2022-05-10 RX ORDER — TRANEXAMIC ACID 100 MG/ML
INJECTION, SOLUTION INTRAVENOUS PRN
Status: DISCONTINUED | OUTPATIENT
Start: 2022-05-10 | End: 2022-05-10 | Stop reason: ALTCHOICE

## 2022-05-10 RX ORDER — SODIUM CHLORIDE 9 MG/ML
INJECTION, SOLUTION INTRAVENOUS PRN
Status: DISCONTINUED | OUTPATIENT
Start: 2022-05-10 | End: 2022-05-10 | Stop reason: HOSPADM

## 2022-05-10 RX ORDER — ONDANSETRON 2 MG/ML
4 INJECTION INTRAMUSCULAR; INTRAVENOUS EVERY 6 HOURS PRN
Status: DISCONTINUED | OUTPATIENT
Start: 2022-05-10 | End: 2022-05-10 | Stop reason: SDUPTHER

## 2022-05-10 RX ORDER — MEPERIDINE HYDROCHLORIDE 25 MG/ML
12.5 INJECTION INTRAMUSCULAR; INTRAVENOUS; SUBCUTANEOUS EVERY 5 MIN PRN
Status: DISCONTINUED | OUTPATIENT
Start: 2022-05-10 | End: 2022-05-10 | Stop reason: HOSPADM

## 2022-05-10 RX ORDER — ONDANSETRON 2 MG/ML
4 INJECTION INTRAMUSCULAR; INTRAVENOUS
Status: DISCONTINUED | OUTPATIENT
Start: 2022-05-10 | End: 2022-05-10 | Stop reason: HOSPADM

## 2022-05-10 RX ORDER — FENTANYL CITRATE 50 UG/ML
50 INJECTION, SOLUTION INTRAMUSCULAR; INTRAVENOUS EVERY 5 MIN PRN
Status: DISCONTINUED | OUTPATIENT
Start: 2022-05-10 | End: 2022-05-10 | Stop reason: HOSPADM

## 2022-05-10 RX ORDER — DEXAMETHASONE SODIUM PHOSPHATE 10 MG/ML
INJECTION, EMULSION INTRAMUSCULAR; INTRAVENOUS PRN
Status: DISCONTINUED | OUTPATIENT
Start: 2022-05-10 | End: 2022-05-10 | Stop reason: SDUPTHER

## 2022-05-10 RX ORDER — HYDRALAZINE HYDROCHLORIDE 20 MG/ML
10 INJECTION INTRAMUSCULAR; INTRAVENOUS
Status: DISCONTINUED | OUTPATIENT
Start: 2022-05-10 | End: 2022-05-10 | Stop reason: HOSPADM

## 2022-05-10 RX ORDER — SODIUM CHLORIDE 0.9 % (FLUSH) 0.9 %
5-40 SYRINGE (ML) INJECTION PRN
Status: DISCONTINUED | OUTPATIENT
Start: 2022-05-10 | End: 2022-05-10 | Stop reason: HOSPADM

## 2022-05-10 RX ORDER — PROPOFOL 10 MG/ML
INJECTION, EMULSION INTRAVENOUS PRN
Status: DISCONTINUED | OUTPATIENT
Start: 2022-05-10 | End: 2022-05-10 | Stop reason: SDUPTHER

## 2022-05-10 RX ORDER — CYCLOBENZAPRINE HCL 10 MG
10 TABLET ORAL 3 TIMES DAILY PRN
Status: DISCONTINUED | OUTPATIENT
Start: 2022-05-10 | End: 2022-05-26 | Stop reason: HOSPADM

## 2022-05-10 RX ORDER — KETOROLAC TROMETHAMINE 30 MG/ML
15 INJECTION, SOLUTION INTRAMUSCULAR; INTRAVENOUS EVERY 6 HOURS
Status: DISPENSED | OUTPATIENT
Start: 2022-05-10 | End: 2022-05-12

## 2022-05-10 RX ORDER — SODIUM CHLORIDE 9 MG/ML
INJECTION, SOLUTION INTRAVENOUS CONTINUOUS
Status: DISCONTINUED | OUTPATIENT
Start: 2022-05-10 | End: 2022-05-18

## 2022-05-10 RX ORDER — ONDANSETRON 2 MG/ML
INJECTION INTRAMUSCULAR; INTRAVENOUS PRN
Status: DISCONTINUED | OUTPATIENT
Start: 2022-05-10 | End: 2022-05-10 | Stop reason: SDUPTHER

## 2022-05-10 RX ORDER — TRANEXAMIC ACID 100 MG/ML
INJECTION, SOLUTION INTRAVENOUS PRN
Status: DISCONTINUED | OUTPATIENT
Start: 2022-05-10 | End: 2022-05-10 | Stop reason: SDUPTHER

## 2022-05-10 RX ORDER — SODIUM CHLORIDE 0.9 % (FLUSH) 0.9 %
5-40 SYRINGE (ML) INJECTION EVERY 12 HOURS SCHEDULED
Status: DISCONTINUED | OUTPATIENT
Start: 2022-05-10 | End: 2022-05-10 | Stop reason: HOSPADM

## 2022-05-10 RX ORDER — SODIUM CHLORIDE, SODIUM LACTATE, POTASSIUM CHLORIDE, CALCIUM CHLORIDE 600; 310; 30; 20 MG/100ML; MG/100ML; MG/100ML; MG/100ML
INJECTION, SOLUTION INTRAVENOUS CONTINUOUS PRN
Status: DISCONTINUED | OUTPATIENT
Start: 2022-05-10 | End: 2022-05-10 | Stop reason: SDUPTHER

## 2022-05-10 RX ORDER — DIPHENHYDRAMINE HYDROCHLORIDE 50 MG/ML
12.5 INJECTION INTRAMUSCULAR; INTRAVENOUS
Status: DISCONTINUED | OUTPATIENT
Start: 2022-05-10 | End: 2022-05-10 | Stop reason: HOSPADM

## 2022-05-10 RX ADMIN — Medication 60 MG: at 13:26

## 2022-05-10 RX ADMIN — SUGAMMADEX 200 MG: 100 INJECTION, SOLUTION INTRAVENOUS at 14:41

## 2022-05-10 RX ADMIN — ONDANSETRON 4 MG: 2 INJECTION INTRAMUSCULAR; INTRAVENOUS at 13:32

## 2022-05-10 RX ADMIN — SODIUM CHLORIDE, POTASSIUM CHLORIDE, SODIUM LACTATE AND CALCIUM CHLORIDE: 600; 310; 30; 20 INJECTION, SOLUTION INTRAVENOUS at 14:10

## 2022-05-10 RX ADMIN — DEXAMETHASONE SODIUM PHOSPHATE 5 MG: 10 INJECTION, EMULSION INTRAMUSCULAR; INTRAVENOUS at 13:32

## 2022-05-10 RX ADMIN — FENTANYL CITRATE 50 MCG: 50 INJECTION, SOLUTION INTRAMUSCULAR; INTRAVENOUS at 14:46

## 2022-05-10 RX ADMIN — FENTANYL CITRATE 50 MCG: 50 INJECTION, SOLUTION INTRAMUSCULAR; INTRAVENOUS at 13:47

## 2022-05-10 RX ADMIN — KETOROLAC TROMETHAMINE 15 MG: 30 INJECTION, SOLUTION INTRAMUSCULAR; INTRAVENOUS at 20:55

## 2022-05-10 RX ADMIN — PROPOFOL 150 MG: 10 INJECTION, EMULSION INTRAVENOUS at 13:26

## 2022-05-10 RX ADMIN — SODIUM CHLORIDE, PRESERVATIVE FREE 10 ML: 5 INJECTION INTRAVENOUS at 20:54

## 2022-05-10 RX ADMIN — TRANEXAMIC ACID 1000 MG: 1 INJECTION, SOLUTION INTRAVENOUS at 13:35

## 2022-05-10 RX ADMIN — SODIUM CHLORIDE: 9 INJECTION, SOLUTION INTRAVENOUS at 17:08

## 2022-05-10 RX ADMIN — MORPHINE SULFATE 4 MG: 4 INJECTION, SOLUTION INTRAMUSCULAR; INTRAVENOUS at 04:57

## 2022-05-10 RX ADMIN — KETOROLAC TROMETHAMINE 15 MG: 30 INJECTION, SOLUTION INTRAMUSCULAR; INTRAVENOUS at 17:10

## 2022-05-10 RX ADMIN — ROCURONIUM BROMIDE 40 MG: 50 INJECTION, SOLUTION INTRAVENOUS at 13:26

## 2022-05-10 RX ADMIN — FENTANYL CITRATE 50 MCG: 50 INJECTION, SOLUTION INTRAMUSCULAR; INTRAVENOUS at 15:29

## 2022-05-10 RX ADMIN — CEFAZOLIN 2000 MG: 1 INJECTION, POWDER, FOR SOLUTION INTRAMUSCULAR; INTRAVENOUS at 13:44

## 2022-05-10 RX ADMIN — MORPHINE SULFATE 4 MG: 4 INJECTION, SOLUTION INTRAMUSCULAR; INTRAVENOUS at 07:47

## 2022-05-10 RX ADMIN — ROCURONIUM BROMIDE 20 MG: 50 INJECTION, SOLUTION INTRAVENOUS at 13:53

## 2022-05-10 RX ADMIN — ROCURONIUM BROMIDE 10 MG: 50 INJECTION, SOLUTION INTRAVENOUS at 14:07

## 2022-05-10 RX ADMIN — FENTANYL CITRATE 50 MCG: 50 INJECTION, SOLUTION INTRAMUSCULAR; INTRAVENOUS at 15:12

## 2022-05-10 RX ADMIN — Medication 2000 MG: at 21:35

## 2022-05-10 RX ADMIN — ATORVASTATIN CALCIUM 40 MG: 40 TABLET, FILM COATED ORAL at 20:54

## 2022-05-10 RX ADMIN — ROCURONIUM BROMIDE 20 MG: 50 INJECTION, SOLUTION INTRAVENOUS at 14:27

## 2022-05-10 RX ADMIN — DOXEPIN HYDROCHLORIDE 10 MG: 10 CAPSULE ORAL at 20:54

## 2022-05-10 ASSESSMENT — PAIN SCALES - GENERAL
PAINLEVEL_OUTOF10: 8
PAINLEVEL_OUTOF10: 8
PAINLEVEL_OUTOF10: 7
PAINLEVEL_OUTOF10: 6

## 2022-05-10 ASSESSMENT — PULMONARY FUNCTION TESTS
PIF_VALUE: 20
PIF_VALUE: 5
PIF_VALUE: 20
PIF_VALUE: 19
PIF_VALUE: 21
PIF_VALUE: 12
PIF_VALUE: 22
PIF_VALUE: 13
PIF_VALUE: 12
PIF_VALUE: 21
PIF_VALUE: 19
PIF_VALUE: 17
PIF_VALUE: 1
PIF_VALUE: 20
PIF_VALUE: 20
PIF_VALUE: 21
PIF_VALUE: 20
PIF_VALUE: 0
PIF_VALUE: 20
PIF_VALUE: 21
PIF_VALUE: 20
PIF_VALUE: 1
PIF_VALUE: 1
PIF_VALUE: 20
PIF_VALUE: 20
PIF_VALUE: 21
PIF_VALUE: 17
PIF_VALUE: 20
PIF_VALUE: 21
PIF_VALUE: 21
PIF_VALUE: 20
PIF_VALUE: 19
PIF_VALUE: 20
PIF_VALUE: 1
PIF_VALUE: 20
PIF_VALUE: 1
PIF_VALUE: 24
PIF_VALUE: 20
PIF_VALUE: 6
PIF_VALUE: 19
PIF_VALUE: 1
PIF_VALUE: 20
PIF_VALUE: 14
PIF_VALUE: 0
PIF_VALUE: 20
PIF_VALUE: 20
PIF_VALUE: 19
PIF_VALUE: 19
PIF_VALUE: 20
PIF_VALUE: 20
PIF_VALUE: 19
PIF_VALUE: 21
PIF_VALUE: 20
PIF_VALUE: 21
PIF_VALUE: 1
PIF_VALUE: 21
PIF_VALUE: 20
PIF_VALUE: 20
PIF_VALUE: 25
PIF_VALUE: 20
PIF_VALUE: 17
PIF_VALUE: 6
PIF_VALUE: 20
PIF_VALUE: 10
PIF_VALUE: 23
PIF_VALUE: 20
PIF_VALUE: 20

## 2022-05-10 ASSESSMENT — PAIN DESCRIPTION - ORIENTATION: ORIENTATION: RIGHT

## 2022-05-10 ASSESSMENT — PAIN DESCRIPTION - LOCATION: LOCATION: KNEE

## 2022-05-10 ASSESSMENT — LIFESTYLE VARIABLES: SMOKING_STATUS: 1

## 2022-05-10 ASSESSMENT — PAIN DESCRIPTION - DESCRIPTORS: DESCRIPTORS: ACHING

## 2022-05-10 NOTE — OP NOTE
NONMODULAR ORTH - [id number]  BASEPLATE TIB Q45FE LNG KNEE UHMWPE STEM NONMODULAR ORTH  [employer] [id number] Right 1 Implanted   PIN FIX POLY AUGUST OSS - [id number]  PIN FIX POLY AUGUST OSS  [employer] [id number] Right 1 Implanted         Drains: * No LDAs found *    Findings: Confirmed    Detailed Description of Procedure: Indications  This is a 65-year-old female history of a fall. She had a previous total knee done a couple weeks ago. She sustained a supracondylar femur fracture. I was asked to take over her care. Discussed with the initial surgeon. Stated the bone quality is very poor. His recommendation would be a hinged implant. I agree. Discussed with patient elected to proceed. She also has a significant flexion contracture on both sides. The hope was to shorten up a little bit to allow that to be addressed as well. Narrative  Patient was taken the operating room underwent a general anesthetic. Right lower extremity was prepped draped normal sterile fashion. Timeout was taken consent was confirmed. He received 2 g of Ancef. Staples were removed. Deep skin sutures were removed as well. The patellar tendon had avulsed off the tibial tubercle as well. This we then finished the previous arthrotomy through the previous repair site. Identified the screw from the [facility] repair and that was removed. The bearing surface was then removed. We then remove the distal femoral component with the retained bone. We then freshen the cut on the femur. Made a salt cut around the tibial component and tapped this out as well. Elected to go with a 67 1 piece tibia. We tapped the trial into position. We then prepped the canal with a 14 mm reamer and placed a 13 x 150 stem on a 3 cm distal femoral replacement. Full extension was obtained with a 16mm bearing surface. Felt this to be satisfactory. We then took a #5 FiberWire weaved through the patellar tendon.   Made a drill hole

## 2022-05-10 NOTE — PROGRESS NOTES
Hospitalist Progress Note      Patient:  Aspen Varela    Unit/Bed:[facility] OR (General) POOL R*  YOB: 1960  MRN: [id number]   Acct: [de-identified]   PCP: Leilani Puri DO  Date of Admission: 5/9/2022    Assessment/Plan:    1. Displaced articular fracture of head of right femur:  · Patient is s/p right TKA 1 week ago. Primary to manage. Right total knee arthroplasty revision performed today. Pain control. 2. Preop risk assessment:  ? Patient with right distal femur fracture, set for surgery tomorrow with orthopedics. RCRI score was calculated she is a class I risk, 3.9% risk of developing cardiac arrest, MI, death 30 days following surgery. I discussed these risk with patient. Patient accepts these risk for surgery. ? Patient is intermittently confused on examination. However, neuro examination is WNL and patient is A&O x4 at the end of the exam.  Continue to assess mentation, may need MRI brain. Will obtain ammonia level with pt's history of THORNTON. ? EKG obtained revealing irregular rate and rhythm, may be artifact. Repeat EKG without signs of ischemia, troponin negative. Pt denies chest pain or SOB. Echo obtained. I recommend primary obtain cardiology consult before moving forward with surgical intervention. 3. NIDDMII:  · Continue holding home metformin, low-dose SSI. Hypoglycemia protocol in place, carb controlled diet. Accu-Cheks. 4.  Intermittent confusion, resolved:  · Patient's mentation appears to be much improved today, A&O x4, neurovascularly intact prior to surgery. Electrolytes WNL Patient's  at bedside noting that patient is usually not confused at home and that he feels that the pain medications that were initiated on admission are causing this intermittent confusion. Ammonia level was obtained, mildly elevated. Continue to monitor patient's mental status during course of stay.       Chief Complaint: Right knee  sodium chloride      dextrose       Scheduled Medications    sodium chloride flush  5-40 mL IntraVENous 2 times per day    ketorolac  15 mg IntraVENous Q6H    sodium chloride flush  5-40 mL IntraVENous 2 times per day    [Held by provider] enoxaparin  40 mg SubCUTAneous Daily    atorvastatin  40 mg Oral Nightly    doxepin  10 mg Oral Nightly    fenofibrate  160 mg Oral Daily    losartan  25 mg Oral Daily    insulin lispro  0-6 Units SubCUTAneous TID WC    insulin lispro  0-3 Units SubCUTAneous Nightly     PRN Meds: sodium chloride flush, sodium chloride, meperidine, HYDROmorphone, fentanNYL, ondansetron, diphenhydrAMINE, hydrALAZINE, cyclobenzaprine, sodium chloride flush, sodium chloride, ondansetron **OR** ondansetron, morphine **OR** morphine, HYDROcodone 5 mg - acetaminophen **OR** HYDROcodone 5 mg - acetaminophen, glucose, dextrose bolus **OR** dextrose bolus, glucagon (rDNA), dextrose      Intake/Output Summary (Last 24 hours) at 5/10/2022 1517  Last data filed at 5/10/2022 1459  Gross per 24 hour   Intake 1300 ml   Output 750 ml   Net 550 ml       Diet:  Diet NPO    Exam:  BP (!) 151/67   Pulse 101   Temp 97.3 °F (36.3 °C) (Temporal)   Resp 14   Ht 5' 7.52\" (1.715 m)   Wt 195 lb (88.5 kg)   SpO2 95%   BMI 30.07 kg/m²   General appearance: No apparent distress, appears stated age and cooperative. HEENT: Pupils equal, round, and reactive to light. Conjunctivae/corneas clear. Neck: Supple, with full range of motion. No jugular venous distention. Trachea midline. Respiratory:  Normal respiratory effort. Clear to auscultation, bilaterally without Rales/Wheezes/Rhonchi. Cardiovascular: Regular rate and rhythm with normal S1/S2 without murmurs, rubs or gallops. Abdomen: Soft, non-tender, non-distended with normal bowel sounds. Musculoskeletal: passive and active ROM x 4 extremities. Skin: Skin color, texture, turgor normal.  No rashes or lesions.   Neurologic:  Neurovascularly intact without any focal sensory/motor deficits. Cranial nerves: II-XII intact, grossly non-focal.  Psychiatric: Alert and oriented, thought content appropriate, normal insight  Capillary Refill: Brisk,< 3 seconds   Peripheral Pulses: +2 palpable, equal bilaterally     Labs:   Recent Labs     05/09/22  1305 05/10/22  0856   WBC 12.9* 10.0   HGB 12.5 12.3   HCT 40.8 40.4   * 364     Recent Labs     05/09/22  1305 05/09/22  2124 05/10/22  0856   *  --  136   K 4.6  --  4.1     --  102   CO2 20*  --  20*   BUN 28*  --  19   CREATININE 0.5  --  0.4   CALCIUM 9.3  --  9.1   PHOS  --  2.9  --      No results for input(s): AST, ALT, BILIDIR, BILITOT, ALKPHOS in the last 72 hours. No results for input(s): INR in the last 72 hours. No results for input(s): Johnsie Breath in the last 72 hours. Microbiology:    Blood culture #1: No results found for: BC    Blood culture #2:No results found for: Yamiletcolin Espinoza    Organism:No results found for: ORG    No results found for: LABGRAM    MRSA culture only:No results found for: [address] [place] [address]    Urine culture: No results found for: LABURIN    Respiratory culture: No results found for: CULTRESP    Aerobic and Anaerobic :  No results found for: LABAERO  No results found for: LABANAE    Urinalysis:      Lab Results   Component Value Date    NITRU NEGATIVE 04/08/2022    WBCUA 2-4 04/08/2022    BACTERIA FEW 04/08/2022    RBCUA 0-2 04/08/2022    BLOODU NEGATIVE 04/08/2022    GLUCOSEU NEGATIVE 04/08/2022       Radiology:  XR KNEE RIGHT (MIN 4 VIEWS)   Final Result   1. No acute displaced oblique fracture of the distal femur is seen. 2. Status post total right knee arthroplasty. **This report has been created using voice recognition software. It may contain minor errors which are inherent in voice recognition technology. **      Final report electronically signed by Dr Kenyetta Ingram on 5/9/2022 12:15 PM      XR KNEE RIGHT (1-2 VIEWS)    (Results Pending)     XR KNEE RIGHT (MIN 4 VIEWS)    Result Date: 5/9/2022  PROCEDURE: XR KNEE RIGHT (MIN 4 VIEWS) CLINICAL INFORMATION: fall COMPARISON: Right knee radiographs 10/8/2020 TECHNIQUE: 4 views of the right knee FINDINGS: A total right knee arthroplasty has been performed. There is a displaced oblique fracture of the distal femur. Recent postsurgical changes of total knee arthroplasty is seen. Surgical staples are seen. The bones appear mildly demineralized. .     1. No acute displaced oblique fracture of the distal femur is seen. 2. Status post total right knee arthroplasty. **This report has been created using voice recognition software. It may contain minor errors which are inherent in voice recognition technology. ** Final report electronically signed by Dr Kenyetta Ingram on 5/9/2022 12:15 PM      Electronically signed by Gomez Villarreal PA-C on 5/10/2022 at 3:17 PM

## 2022-05-10 NOTE — ANESTHESIA PRE PROCEDURE
Department of Anesthesiology  Preprocedure Note       Name:  Kuamr Patton   Age:  60 y.o.  :  1960                                          MRN:  [id number]         Date:  5/10/2022      Surgeon: Felipe Duong):  Joseph Fuller MD    Procedure: Procedure(s):  RIGHT KNEE TOTAL ARTHROPLASTY REVISION    Medications prior to admission:   Prior to Admission medications    Medication Sig Start Date End Date Taking? Authorizing Provider   fenofibrate (TRIGLIDE) 160 MG tablet TAKE 1 TABLET DAILY 22   Ltanya Liming, DO   tiZANidine (ZANAFLEX) 2 MG tablet Take 1 tablet by mouth 3 times daily as needed (muscle cramps) 22   Ltanya Liming, DO   doxepin [facility]) 10 MG capsule TAKE 2 CAPSULES NIGHTLY 22   Ltanya Liming, DO   hydrocortisone 2.5 % cream Apply topically 2 times daily. 22   Ltanya Liming, DO   cyclobenzaprine (FLEXERIL) 10 MG tablet Take 1 tablet by mouth 3 times daily as needed  Patient not taking: Reported on 2022   Historical Provider, MD   oxyCODONE-acetaminophen (PERCOCET) 5-325 MG per tablet Take 1 tablet by mouth every 6 hours as needed for Pain.   Patient not taking: Reported on 2022    Historical Provider, MD   metFORMIN (GLUCOPHAGE-XR) 500 MG extended release tablet Take 1 tablet by mouth daily (with breakfast) 12/15/21   Ltanya Liming, DO   atorvastatin (LIPITOR) 40 MG tablet TAKE 1 TABLET NIGHTLY 21   Ltanya Liming, DO   losartan (COZAAR) 25 MG tablet TAKE 1 TABLET DAILY 21   Ltanya Liming, DO   baclofen (LIORESAL) 10 MG tablet Take 1 tablet by mouth daily as needed (muscle spasms)  Patient not taking: Reported on 2022   Ltanya Liming, DO   melatonin 3 MG TABS tablet Take 3 mg by mouth daily  Patient not taking: Reported on 2022    Historical Provider, MD   Pyridoxine HCl (VITAMIN B6 PO) Take by mouth  Patient not taking: Reported on 2022    Historical Provider, MD   MAGNESIUM PO Take by mouth  Patient not taking: Reported on 4/18/2022    Historical Provider, MD   NONFORMULARY AF Beta Food  Patient not taking: Reported on 4/18/2022    Historical Provider, MD   Acetaminophen 500 MG CAPS Take 1,000 mg by mouth as needed for Pain     Historical Provider, MD   KRILL OIL PO Take 350 mg by mouth daily    Historical Provider, MD   Multiple Vitamins-Minerals (THERAPEUTIC MULTIVITAMIN-MINERALS) tablet Take 1 tablet by mouth daily    Historical Provider, MD   aspirin 81 MG tablet Take 81 mg by mouth daily.       Historical Provider, MD       Current medications:    Current Facility-Administered Medications   Medication Dose Route Frequency Provider Last Rate Last Admin    0.9 % sodium chloride infusion   IntraVENous Continuous Rico Raine, APRN -  mL/hr at 05/09/22 1811 New Bag at 05/09/22 1811    sodium chloride flush 0.9 % injection 5-40 mL  5-40 mL IntraVENous 2 times per day Rico Raine, APRN - CNP   10 mL at 05/09/22 2258    sodium chloride flush 0.9 % injection 5-40 mL  5-40 mL IntraVENous PRN Rico Raine, APRN - CNP        0.9 % sodium chloride infusion   IntraVENous PRN Rico Raine, APRN - CNP        ondansetron (ZOFRAN-ODT) disintegrating tablet 4 mg  4 mg Oral Q8H PRN Rico Raine, APRN - CNP        Or    ondansetron [facility]) injection 4 mg  4 mg IntraVENous Q6H PRN Rico Raine, APRN - CNP        [Held by provider] enoxaparin (LOVENOX) injection 40 mg  40 mg SubCUTAneous Daily Rico Raine, APRN - CNP        morphine (PF) injection 2 mg  2 mg IntraVENous Q2H PRN Rico Raine, APRN - CNP   2 mg at 05/09/22 2049    Or    morphine injection 4 mg  4 mg IntraVENous Q2H PRN Rico Raine, APRN - CNP   4 mg at 05/10/22 0747    HYDROcodone-acetaminophen (NORCO) 5-325 MG per tablet 1 tablet  1 tablet Oral Q4H PRN Rico Raine, APRN - CNP        Or    HYDROcodone-acetaminophen (NORCO) 5-325 MG per tablet 2 tablet  2 tablet Oral Q4H PRN Rico Raine, APRN - CNP        atorvastatin (LIPITOR) tablet 40 mg  40 mg Oral Nightly Rowland Frannie, PA-C   40 mg at 05/09/22 2257    doxepin (SINEQUAN) capsule 10 mg  10 mg Oral Nightly [facility]   10 mg at 05/09/22 2257    fenofibrate (TRIGLIDE) tablet 160 mg  160 mg Oral Daily [facility]        losartan (COZAAR) tablet 25 mg  25 mg Oral Daily [facility]        insulin lispro (HUMALOG) injection vial 0-6 Units  0-6 Units SubCUTAneous TID WC [facility]        insulin lispro (HUMALOG) injection vial 0-3 Units  0-3 Units SubCUTAneous Nightly [facility]        glucose chewable tablet 16 g  4 tablet Oral PRN [facility]        dextrose bolus 10% 125 mL  125 mL IntraVENous PRN [facility]        Or    dextrose bolus 10% 250 mL  250 mL IntraVENous PRN [facility]        glucagon (rDNA) injection 1 mg  1 mg IntraMUSCular PRN [facility]        dextrose 5 % solution  100 mL/hr IntraVENous PRN [facility]           Allergies: Allergies   Allergen Reactions    Adhesive Tape        Problem List:    Patient Active Problem List   Diagnosis Code    DM type 2 (diabetes mellitus, type 2) ([facility] [address] E11.9    Hyperlipemia E78.5    THORNTON (nonalcoholic steatohepatitis) K75.81    Obesity E66.9    Tobacco abuse Z72.0    Vitamin D deficiency E55.9    Seborrheic keratosis L82.1    Dyshidrotic eczema L30.1    Displaced articular fracture of head of right femur, sequela S72.061S    Neida-prosthetic supracondylar fracture of femur [id number]. Doug Monge       Past Medical History:        Diagnosis Date    Atypical ductal hyperplasia, breast     left breast, 2016, lumpectomy, Tamoxifen, LMH    Hx of breast biopsy Jan 8 2016    Hyperlipidemia     Type II or unspecified type diabetes mellitus without mention of complication, not stated as uncontrolled        Past Surgical History:        Procedure Laterality Date    BLADDER SUSPENSION      BREAST BIOPSY Left     atypical hyperlasia, 2016, [facility]    BREAST LUMPECTOMY Left     atypical hyperplasia, 2016, [facility]    COLONOSCOPY  08/28/2020    HERNIA REPAIR  10/17/2016    KNEE SURGERY      Right knee    TUBAL LIGATION      WISDOM TOOTH EXTRACTION         Social History:    Social History     Tobacco Use    Smoking status: Current Every Day Smoker     Packs/day: 1.50     Years: 49.00     Pack years: 73.50     Types: Cigarettes    Smokeless tobacco: Never Used   Substance Use Topics    Alcohol use: No     Comment: monthly glass of wine                                Ready to quit: Not Answered  Counseling given: Not Answered      Vital Signs (Current):   Vitals:    05/09/22 2327 05/10/22 0455 05/10/22 0527 05/10/22 0730   BP:  (!) 143/61  118/83   Pulse:  112  110   Resp: 20 18 18 18   Temp:  97.6 °F (36.4 °C)  99.3 °F (37.4 °C)   TempSrc:  Oral  Oral   SpO2:  97%  97%   Weight:       Height:                                                  BP Readings from Last 3 Encounters:   05/10/22 118/83   04/18/22 124/70   12/15/21 (!) 122/54       NPO Status:                                                                                 BMI:   Wt Readings from Last 3 Encounters:   05/09/22 195 lb (88.5 kg)   04/18/22 190 lb 9.6 oz (86.5 kg)   12/15/21 177 lb 14.4 oz (80.7 kg)     Body mass index is 30.07 kg/m².     CBC:   Lab Results   Component Value Date    WBC 10.0 05/10/2022    RBC 4.64 05/10/2022    RBC 5.62 12/10/2020    HGB 12.3 05/10/2022    HCT 40.4 05/10/2022    MCV 87.1 05/10/2022    RDW 19.1 12/10/2020     05/10/2022       CMP:   Lab Results   Component Value Date     05/10/2022    K 4.1 05/10/2022    K 4.6 05/09/2022     05/10/2022    CO2 20 05/10/2022    BUN 19 05/10/2022    CREATININE 0.4 05/10/2022    AGRATIO 1.8 12/21/2016    LABGLOM >90 05/10/2022    GLUCOSE 107 05/10/2022    GLUCOSE 113 12/10/2020    PROT 7.2 12/08/2021    CALCIUM 9.1 05/10/2022    BILITOT 0.4 12/08/2021    ALKPHOS 104 12/08/2021    AST 19 12/08/2021    ALT 11 12/08/2021       POC Tests:   Recent Labs     05/10/22  1050   POCGLU 112*       Coags:   Lab Results   Component Value Date    PROTIME 11.0 10/05/2016    INR 0.90 10/05/2016       HCG (If Applicable): No results found for: PREGTESTUR, PREGSERUM, HCG, HCGQUANT     ABGs: No results found for: PHART, PO2ART, RXS3IRC, XBG0DOT, BEART, T6CCTZQD     Type & Screen (If Applicable):  Lab Results   Component Value Date    LABRH POS 05/09/2022       Drug/Infectious Status (If Applicable):  Lab Results   Component Value Date    HEPCAB NEGATIVE 05/02/2018       COVID-19 Screening (If Applicable): No results found for: COVID19        Anesthesia Evaluation  Patient summary reviewed no history of anesthetic complications:   Airway: Mallampati: II  TM distance: >3 FB   Neck ROM: full  Mouth opening: > = 3 FB Dental:          Pulmonary:normal exam    (+) current smoker (1-2 ppd)    (-) COPD and asthma                           Cardiovascular:  Exercise tolerance: good (>4 METS),       (-) hypertension, past MI and CAD                Neuro/Psych:      (-) seizures and CVA           GI/Hepatic/Renal:   (+) GERD: well controlled, liver disease (THORNTON):,      (-) no renal disease       Endo/Other:    (+) DiabetesType II DM, , .    (-) hypothyroidism, hyperthyroidism               Abdominal:   (+) obese,           Vascular:     - DVT. Other Findings:             Anesthesia Plan      general     ASA 2     (GETA. PIV. Additional access can be obtained after induction if needed. Standard ASA monitors. IV/PO opioids and other adjuncts as needed for pain control. PACU post op for recovery.     Possible anesthetics complications were discussed with the patient, including but not limited to: PONV, damage to the airway and surrounding structures (teeth, lips, gums, tongue, etc.), adverse reactions to medicine, cardiac complications (MI, CHF, arrhythmias, etc.), respiratory complications (post-op ventilation, respiratory failure, etc.), neurologic complications (nerve damage, stroke, seizure), and death. The patient was given the opportunity to ask questions and all questions were answered to the patient's satisfaction. The patient is in agreement with the anesthetic plan.  )  Induction: intravenous. Anesthetic plan and risks discussed with patient. Plan discussed with CRNA.                   Julia Garcia DO   5/10/2022

## 2022-05-10 NOTE — H&P
(FLEXERIL) 10 MG tablet Take 1 tablet by mouth 3 times daily as needed  Patient not taking: Reported on 4/18/2022 12/8/21   Historical Provider, MD   oxyCODONE-acetaminophen (PERCOCET) 5-325 MG per tablet Take 1 tablet by mouth every 6 hours as needed for Pain. Patient not taking: Reported on 4/18/2022    Historical Provider, MD   metFORMIN (GLUCOPHAGE-XR) 500 MG extended release tablet Take 1 tablet by mouth daily (with breakfast) 12/15/21   Marely Clarke, DO   atorvastatin (LIPITOR) 40 MG tablet TAKE 1 TABLET NIGHTLY 11/1/21   Marely Sauls, DO   losartan (COZAAR) 25 MG tablet TAKE 1 TABLET DAILY 8/23/21   Marely Clarke, DO   baclofen (LIORESAL) 10 MG tablet Take 1 tablet by mouth daily as needed (muscle spasms)  Patient not taking: Reported on 4/18/2022 7/13/21   Marely Clarke, DO   melatonin 3 MG TABS tablet Take 3 mg by mouth daily  Patient not taking: Reported on 4/18/2022    Historical Provider, MD   Pyridoxine HCl (VITAMIN B6 PO) Take by mouth  Patient not taking: Reported on 4/18/2022    Historical Provider, MD   MAGNESIUM PO Take by mouth  Patient not taking: Reported on 4/18/2022    Historical Provider, MD   NONFORMULARY AF Beta Food  Patient not taking: Reported on 4/18/2022    Historical Provider, MD   Acetaminophen 500 MG CAPS Take 1,000 mg by mouth as needed for Pain     Historical Provider, MD   KRILL OIL PO Take 350 mg by mouth daily    Historical Provider, MD   Multiple Vitamins-Minerals (THERAPEUTIC MULTIVITAMIN-MINERALS) tablet Take 1 tablet by mouth daily    Historical Provider, MD   aspirin 81 MG tablet Take 81 mg by mouth daily.       Historical Provider, MD    Scheduled Meds:   sodium chloride flush  5-40 mL IntraVENous 2 times per day    [Held by provider] enoxaparin  40 mg SubCUTAneous Daily    atorvastatin  40 mg Oral Nightly    doxepin  10 mg Oral Nightly    fenofibrate  160 mg Oral Daily    losartan  25 mg Oral Daily    insulin lispro  0-6 Units SubCUTAneous TID WC    insulin lispro  0-3 Units SubCUTAneous Nightly     Continuous Infusions:   sodium chloride 100 mL/hr at 05/09/22 1811    sodium chloride      dextrose       PRN Meds:.sodium chloride flush, sodium chloride, ondansetron **OR** ondansetron, morphine **OR** morphine, HYDROcodone 5 mg - acetaminophen **OR** HYDROcodone 5 mg - acetaminophen, glucose, dextrose bolus **OR** dextrose bolus, glucagon (rDNA), dextrose    Allergies:  Adhesive tape    Social History:   Social History     Socioeconomic History    Marital status:      Spouse name: Olman Chen Number of children: 1    Years of education: 12    Highest education level: Associate degree: academic program   Occupational History    None   Tobacco Use    Smoking status: Current Every Day Smoker     Packs/day: 1.50     Years: 49.00     Pack years: 73.50     Types: Cigarettes    Smokeless tobacco: Never Used   Vaping Use    Vaping Use: Never used   Substance and Sexual Activity    Alcohol use: No     Comment: monthly glass of wine    Drug use: No    Sexual activity: None   Other Topics Concern    None   Social History Narrative    None     Social Determinants of Health     Financial Resource Strain: Low Risk     Difficulty of Paying Living Expenses: Not hard at all   Food Insecurity: No Food Insecurity    Worried About Running Out of Food in the Last Year: Never true    [place] of Food in the Last Year: Never true   Transportation Needs:     Lack of Transportation (Medical): Not on file    Lack of Transportation (Non-Medical):  Not on file   Physical Activity:     Days of Exercise per Week: Not on file    Minutes of Exercise per Session: Not on file   Stress:     Feeling of Stress : Not on file   Social Connections:     Frequency of Communication with Friends and Family: Not on file    Frequency of Social Gatherings with Friends and Family: Not on file    Attends Gnosticist Services: Not on file   [employer] of Clubs or Organizations: Not on file    Attends Club or Organization Meetings: Not on file    Marital Status: Not on file   Intimate Partner Violence:     Fear of Current or Ex-Partner: Not on file    Emotionally Abused: Not on file    Physically Abused: Not on file    Sexually Abused: Not on file   Housing Stability:     Unable to Pay for Housing in the Last Year: Not on file    Number of [place] in the Last Year: Not on file    Unstable Housing in the Last Year: Not on file     Social History     Tobacco Use   Smoking Status Current Every Day Smoker    Packs/day: 1.50    Years: 49.00    Pack years: 73.50    Types: Cigarettes   Smokeless Tobacco Never Used     Social History     Substance and Sexual Activity   Alcohol Use No    Comment: monthly glass of wine     Social History     Substance and Sexual Activity   Drug Use No       Family History:  Family History   Problem Relation Age of Onset    High Cholesterol Mother     High Blood Pressure Mother     Pacemaker Mother     Other Mother         Sundowners syndrome    Crohn's Disease Mother     Cancer Mother         Uterine and cervical and vagina    Thyroid Disease Mother     Dementia Mother     Colon Polyps Mother     Other Father         Pneumonia    Dementia Father     Diabetes Sister     Breast Cancer Sister     Heart Disease Brother     Colon Cancer Neg Hx        REVIEW OF SYSTEMS:  Constitutional: Denies any fever, chills. Derm: Denies any rash or skin color change. Eyes: Denies blurred or decreased in vision. Ent: Denies any tinnitus or vertigo. Resp: Denies any cough or shortness of breath. CV: Denies any syncope, palpitations or chest pain. GI:  Denies any abdominal pain, nausea, vomiting, constipation or diarrhea. : Denies any hematuria, hesitancy, or dysuria. Heme/Lymph: Denies any bleeding. Musculoskeletal: Positive for right knee pain  Neuro: Denies any dizziness, paresthesia or weakness.     PHYSICAL EXAM:  Patient Vitals for the past 24 hrs:   BP Temp Temp src Pulse Resp SpO2 Height Weight   05/10/22 0527     18      05/10/22 0455 (!) 143/61 97.6 °F (36.4 °C) Oral 112 18 97 %     05/09/22 2327     20      05/09/22 2256 (!) 153/66 98 °F (36.7 °C) Oral 110 20 92 %     05/09/22 2119     20      05/09/22 2030 (!) 109/91 98.6 °F (37 °C) Oral 65 18 95 %     05/09/22 1728       5' 7.52\" (1.715 m) 195 lb (88.5 kg)   05/09/22 1727        195 lb (88.5 kg)   05/09/22 1600 133/82 98.4 °F (36.9 °C) Oral 58 18      05/09/22 1400      93 %     05/09/22 1307 (!) 141/60   90 20 93 %     05/09/22 1140 (!) 147/64 98.5 °F (36.9 °C) Oral 63 17 99 %       General appearance:  Alert and oriented x 3. No apparent distress, appears stated age and cooperative. HEENT:  Normal cephalic, atraumatic without obvious deformity. Pupils equal, round, and reactive to light. Conjunctivae/corneas clear. Neck: Supple, with full range of motion. Trachea midline. Respiratory:  Normal respiratory effort. No audible Wheezes or Rhonchi. Cardiovascular:  Regular rate and rhythm. Musculoskeletal:  RLE flexed, wound approximated without drainage or erythema. Flex and extends toes and ankle. DP 2+. SILT. Skin: Skin color, texture, turgor normal.  No rashes or lesions. Neurologic:  Neurovascularly intact without any focal sensory/motor deficits. Sensation intact.        DATA:  CBC:   Lab Results   Component Value Date    WBC 12.9 05/09/2022    HGB 12.5 05/09/2022     05/09/2022     BMP:    Lab Results   Component Value Date     05/09/2022    K 4.6 05/09/2022     05/09/2022    CO2 20 05/09/2022    BUN 28 05/09/2022    CREATININE 0.5 05/09/2022    CALCIUM 9.3 05/09/2022    GLUCOSE 105 05/09/2022    GLUCOSE 113 12/10/2020     PT/INR:    Lab Results   Component Value Date    PROTIME 11.0 10/05/2016    INR 0.90 10/05/2016     Troponin:  No results found for: TROPONINI  No results for input(s): LIPASE, AMYLASE in the last 72 hours. No results for input(s): AST, ALT, BILIDIR, BILITOT, ALKPHOS in the last 72 hours. Radiology:  XR KNEE RIGHT (MIN 4 VIEWS)    Result Date: 5/9/2022  PROCEDURE: XR KNEE RIGHT (MIN 4 VIEWS) CLINICAL INFORMATION: fall COMPARISON: Right knee radiographs 10/8/2020 TECHNIQUE: 4 views of the right knee FINDINGS: A total right knee arthroplasty has been performed. There is a displaced oblique fracture of the distal femur. Recent postsurgical changes of total knee arthroplasty is seen. Surgical staples are seen. The bones appear mildly demineralized. .     1. No acute displaced oblique fracture of the distal femur is seen. 2. Status post total right knee arthroplasty. **This report has been created using voice recognition software. It may contain minor errors which are inherent in voice recognition technology. ** Final report electronically signed by Dr Sandra Edouard on 5/9/2022 12:15 PM      ASSESSMENT:Principal Problem:    Displaced articular fracture of head of right femur, sequela  Active Problems:    Neida-prosthetic supracondylar fracture of femur  Resolved Problems:    * No resolved hospital problems. *       PLAN as discussed with Dr. Princess Hoffmann:  Right knee periprosthetic supracondylar distal femur fracture s/p right TKA one week ago. RBAs of surgery discussed with the patient and her spouse. Medicine has ordered cardiology consult, however the patient and spouse do not think cardiology clearance is necessary prior to surgery. Patient is a 2 ppd smoker currently. Discussed risks of surgery including MI, pulmonary, bleeding, and death. Patient accepts the risks and would like to proceed with surgery for early ambulation and pain control. Continue NPO  Consent for revision right TKA.          Electronically signed by DEREK Eid Aas, CNP on 5/10/2022 at 7:29 AM

## 2022-05-10 NOTE — PROGRESS NOTES
Pt in preop holding area. Temp 97.4F, bilateral nares swabbed per protocol. Pt IV flushed and running.

## 2022-05-10 NOTE — PLAN OF CARE
Problem: Discharge Planning  Goal: Discharge to home or other facility with appropriate resources  Outcome: Progressing  Note: Pt plans home at discharge. Care manager and social working helping with discharge needs. Problem: Pain  Goal: Verbalizes/displays adequate comfort level or baseline comfort level  Outcome: Not Progressing  Note: Pt report pain at 5 on scale. Pt states oral/iv/im medication helping to achieve pain goal of a 0 on scale. Problem: Skin/Tissue Integrity  Goal: Absence of new skin breakdown  Description: 1. Monitor for areas of redness and/or skin breakdown  2. Assess vascular access sites hourly  3. Every 4-6 hours minimum:  Change oxygen saturation probe site  4. Every 4-6 hours:  If on nasal continuous positive airway pressure, respiratory therapy assess nares and determine need for appliance change or resting period. Outcome: Progressing  Note: No new skin issues identified during assessment this shift. Problem: Safety - Adult  Goal: Free from fall injury  Outcome: Progressing  Note: Pt using call light appropriately to call for assistance with ambulation to the bathroom and to chair. Pt is also compliant with use of non-skid slippers. Pt reports understanding of fall prevention when discussed. Care plan reviewed with patient. Patient verbalizes understanding of the plan of care and contribute to goal setting.

## 2022-05-10 NOTE — PROGRESS NOTES
1501 pt arrived to PACU, awakens to voice and denies pain. VSS  1506 accucheck 107  1512 c/o pain 8/10, medicated with 50 mcg fentanyl. XRAY at bedside  1517 pt resting, resp easy  1522 pt awake in bed moaning, states pain 3/10 and tolerable then easily drifts back to sleep.  VSS  1529 c/o pain 7/10, medicated with 50 mcg fentanyl  1534 pt resting, resp easy  1549 report given to Melany Seo transport to [facility]  pt resting, resp easy  1619 pt resting, resp easy  1634 transported to [facility] in stable condition

## 2022-05-10 NOTE — PROGRESS NOTES
Pt resting comfortably in rm on 3L NC, toradol given, pt sleeping in room, VSS, will continue to monitor

## 2022-05-11 LAB
ANION GAP SERPL CALCULATED.3IONS-SCNC: 11 MEQ/L (ref 8–16)
BASOPHILS # BLD: 0.3 %
BASOPHILS ABSOLUTE: 0 THOU/MM3 (ref 0–0.1)
BUN BLDV-MCNC: 28 MG/DL (ref 7–22)
CALCIUM SERPL-MCNC: 8.3 MG/DL (ref 8.5–10.5)
CHLORIDE BLD-SCNC: 105 MEQ/L (ref 98–111)
CO2: 21 MEQ/L (ref 23–33)
CREAT SERPL-MCNC: 0.6 MG/DL (ref 0.4–1.2)
EOSINOPHIL # BLD: 1.1 %
EOSINOPHILS ABSOLUTE: 0.1 THOU/MM3 (ref 0–0.4)
ERYTHROCYTE [DISTWIDTH] IN BLOOD BY AUTOMATED COUNT: 18 % (ref 11.5–14.5)
ERYTHROCYTE [DISTWIDTH] IN BLOOD BY AUTOMATED COUNT: 58.4 FL (ref 35–45)
GFR SERPL CREATININE-BSD FRML MDRD: > 90 ML/MIN/1.73M2
GLUCOSE BLD-MCNC: 102 MG/DL (ref 70–108)
GLUCOSE BLD-MCNC: 89 MG/DL (ref 70–108)
GLUCOSE BLD-MCNC: 94 MG/DL (ref 70–108)
GLUCOSE BLD-MCNC: 97 MG/DL (ref 70–108)
GLUCOSE BLD-MCNC: 97 MG/DL (ref 70–108)
HCT VFR BLD CALC: 32.2 % (ref 37–47)
HEMOGLOBIN: 9.8 GM/DL (ref 12–16)
IMMATURE GRANS (ABS): 0.06 THOU/MM3 (ref 0–0.07)
IMMATURE GRANULOCYTES: 0.7 %
LYMPHOCYTES # BLD: 15.2 %
LYMPHOCYTES ABSOLUTE: 1.4 THOU/MM3 (ref 1–4.8)
MCH RBC QN AUTO: 27.1 PG (ref 26–33)
MCHC RBC AUTO-ENTMCNC: 30.4 GM/DL (ref 32.2–35.5)
MCV RBC AUTO: 89 FL (ref 81–99)
MONOCYTES # BLD: 10.7 %
MONOCYTES ABSOLUTE: 1 THOU/MM3 (ref 0.4–1.3)
NUCLEATED RED BLOOD CELLS: 0 /100 WBC
PLATELET # BLD: 333 THOU/MM3 (ref 130–400)
PMV BLD AUTO: 10.6 FL (ref 9.4–12.4)
POTASSIUM SERPL-SCNC: 4.3 MEQ/L (ref 3.5–5.2)
RBC # BLD: 3.62 MILL/MM3 (ref 4.2–5.4)
SEG NEUTROPHILS: 72 %
SEGMENTED NEUTROPHILS ABSOLUTE COUNT: 6.5 THOU/MM3 (ref 1.8–7.7)
SODIUM BLD-SCNC: 137 MEQ/L (ref 135–145)
TROPONIN T: < 0.01 NG/ML
WBC # BLD: 9 THOU/MM3 (ref 4.8–10.8)

## 2022-05-11 PROCEDURE — 6360000002 HC RX W HCPCS: Performed by: NURSE PRACTITIONER

## 2022-05-11 PROCEDURE — 97530 THERAPEUTIC ACTIVITIES: CPT

## 2022-05-11 PROCEDURE — 97162 PT EVAL MOD COMPLEX 30 MIN: CPT

## 2022-05-11 PROCEDURE — 82948 REAGENT STRIP/BLOOD GLUCOSE: CPT

## 2022-05-11 PROCEDURE — 1200000000 HC SEMI PRIVATE

## 2022-05-11 PROCEDURE — 36415 COLL VENOUS BLD VENIPUNCTURE: CPT

## 2022-05-11 PROCEDURE — 6370000000 HC RX 637 (ALT 250 FOR IP): Performed by: NURSE PRACTITIONER

## 2022-05-11 PROCEDURE — 2580000003 HC RX 258: Performed by: NURSE PRACTITIONER

## 2022-05-11 PROCEDURE — 97110 THERAPEUTIC EXERCISES: CPT

## 2022-05-11 PROCEDURE — 99232 SBSQ HOSP IP/OBS MODERATE 35: CPT | Performed by: PHYSICIAN ASSISTANT

## 2022-05-11 PROCEDURE — 97166 OT EVAL MOD COMPLEX 45 MIN: CPT

## 2022-05-11 PROCEDURE — 84484 ASSAY OF TROPONIN QUANT: CPT

## 2022-05-11 PROCEDURE — 80048 BASIC METABOLIC PNL TOTAL CA: CPT

## 2022-05-11 PROCEDURE — 85025 COMPLETE CBC W/AUTO DIFF WBC: CPT

## 2022-05-11 RX ADMIN — DOXEPIN HYDROCHLORIDE 10 MG: 10 CAPSULE ORAL at 21:27

## 2022-05-11 RX ADMIN — RIVAROXABAN 10 MG: 10 TABLET, FILM COATED ORAL at 05:55

## 2022-05-11 RX ADMIN — KETOROLAC TROMETHAMINE 15 MG: 30 INJECTION, SOLUTION INTRAMUSCULAR; INTRAVENOUS at 16:48

## 2022-05-11 RX ADMIN — CYCLOBENZAPRINE 10 MG: 10 TABLET, FILM COATED ORAL at 21:27

## 2022-05-11 RX ADMIN — KETOROLAC TROMETHAMINE 15 MG: 30 INJECTION, SOLUTION INTRAMUSCULAR; INTRAVENOUS at 08:40

## 2022-05-11 RX ADMIN — KETOROLAC TROMETHAMINE 15 MG: 30 INJECTION, SOLUTION INTRAMUSCULAR; INTRAVENOUS at 03:45

## 2022-05-11 RX ADMIN — SODIUM CHLORIDE, PRESERVATIVE FREE 10 ML: 5 INJECTION INTRAVENOUS at 08:41

## 2022-05-11 RX ADMIN — MORPHINE SULFATE 4 MG: 4 INJECTION, SOLUTION INTRAMUSCULAR; INTRAVENOUS at 01:17

## 2022-05-11 RX ADMIN — CYCLOBENZAPRINE 10 MG: 10 TABLET, FILM COATED ORAL at 08:41

## 2022-05-11 RX ADMIN — SODIUM CHLORIDE, PRESERVATIVE FREE 10 ML: 5 INJECTION INTRAVENOUS at 21:27

## 2022-05-11 RX ADMIN — CYCLOBENZAPRINE 10 MG: 10 TABLET, FILM COATED ORAL at 01:17

## 2022-05-11 RX ADMIN — ATORVASTATIN CALCIUM 40 MG: 40 TABLET, FILM COATED ORAL at 21:27

## 2022-05-11 RX ADMIN — HYDROCODONE BITARTRATE AND ACETAMINOPHEN 2 TABLET: 5; 325 TABLET ORAL at 21:26

## 2022-05-11 RX ADMIN — FENOFIBRATE 160 MG: 160 TABLET ORAL at 08:40

## 2022-05-11 RX ADMIN — Medication 2000 MG: at 05:55

## 2022-05-11 RX ADMIN — SODIUM CHLORIDE, PRESERVATIVE FREE 10 ML: 5 INJECTION INTRAVENOUS at 16:48

## 2022-05-11 RX ADMIN — LOSARTAN POTASSIUM 25 MG: 25 TABLET, FILM COATED ORAL at 08:40

## 2022-05-11 ASSESSMENT — PAIN SCALES - GENERAL
PAINLEVEL_OUTOF10: 0
PAINLEVEL_OUTOF10: 4
PAINLEVEL_OUTOF10: 7
PAINLEVEL_OUTOF10: 6
PAINLEVEL_OUTOF10: 6

## 2022-05-11 ASSESSMENT — PAIN DESCRIPTION - LOCATION
LOCATION: HIP
LOCATION: KNEE

## 2022-05-11 ASSESSMENT — PAIN DESCRIPTION - DESCRIPTORS: DESCRIPTORS: ACHING;PRESSURE

## 2022-05-11 ASSESSMENT — PAIN DESCRIPTION - ORIENTATION
ORIENTATION: RIGHT
ORIENTATION: RIGHT

## 2022-05-11 NOTE — ANESTHESIA POSTPROCEDURE EVALUATION
Department of Anesthesiology  Postprocedure Note    Patient: Chanel Barlow  MRN: [id number]  YOB: 1960  Date of evaluation: 5/11/2022  Time:  9:18 AM     Procedure Summary     Date: 05/10/22 Room / Location: [address] [facility]    Anesthesia Start: 8309 Anesthesia Stop: 5749    Procedure: RIGHT KNEE TOTAL ARTHROPLASTY REVISION (Right Knee) Diagnosis: (Right Distal Femur Fracture)    Surgeons: Pablo Vincent MD Responsible Provider: Sherrine Koyanagi, DO    Anesthesia Type: general ASA Status: 2          Anesthesia Type: No value filed. Gary Phase I: Gary Score: 9    Gary Phase II:      Last vitals: Reviewed and per EMR flowsheets.        Anesthesia Post Evaluation    Patient location during evaluation: bedside  Patient participation: complete - patient participated  Level of consciousness: awake and alert  Airway patency: patent  Nausea & Vomiting: no vomiting and no nausea  Complications: no  Cardiovascular status: hemodynamically stable  Respiratory status: acceptable  Hydration status: stable

## 2022-05-11 NOTE — PLAN OF CARE
Problem: Discharge Planning  Goal: Discharge to home or other facility with appropriate resources  Outcome: Progressing  Flowsheets (Taken 5/11/2022 0237)  Discharge to home or other facility with appropriate resources:   Identify barriers to discharge with patient and caregiver   Refer to discharge planning if patient needs post-hospital services based on physician order or complex needs related to functional status, cognitive ability or social support system     Problem: Pain  Goal: Verbalizes/displays adequate comfort level or baseline comfort level  Outcome: Progressing  Flowsheets (Taken 5/11/2022 0237)  Verbalizes/displays adequate comfort level or baseline comfort level:   Encourage patient to monitor pain and request assistance   Assess pain using appropriate pain scale   Administer analgesics based on type and severity of pain and evaluate response     Problem: Skin/Tissue Integrity  Goal: Absence of new skin breakdown  Description: 1. Monitor for areas of redness and/or skin breakdown  2. Assess vascular access sites hourly  3. Every 4-6 hours minimum:  Change oxygen saturation probe site  4. Every 4-6 hours:  If on nasal continuous positive airway pressure, respiratory therapy assess nares and determine need for appliance change or resting period. Outcome: Progressing  Note: No new skin issues identified this shift.       Problem: Safety - Adult  Goal: Free from fall injury  Outcome: Progressing  Flowsheets (Taken 5/11/2022 0237)  Free From Fall Injury:   Instruct family/caregiver on patient safety   Based on caregiver fall risk screen, instruct family/caregiver to ask for assistance with transferring infant if caregiver noted to have fall risk factors     Problem: ABCDS Injury Assessment  Goal: Absence of physical injury  Outcome: Progressing  Flowsheets (Taken 5/11/2022 0237)  Absence of Physical Injury: Implement safety measures based on patient assessment     Problem: Chronic Conditions and Co-morbidities  Goal: Patient's chronic conditions and co-morbidity symptoms are monitored and maintained or improved  Outcome: Progressing  Flowsheets (Taken 5/11/2022 0237)  Care Plan - Patient's Chronic Conditions and Co-Morbidity Symptoms are Monitored and Maintained or Improved: Monitor and assess patient's chronic conditions and comorbid symptoms for stability, deterioration, or improvement   Care plan reviewed with patient. Patient verbalizes understanding of the plan of care and contribute to goal setting.

## 2022-05-11 NOTE — PROGRESS NOTES
Reviewed chart for SBIRT per trauma service. Per [facility], pt is Pt is intermittently confused. Unable to complete.

## 2022-05-11 NOTE — PROGRESS NOTES
[address] [id number]  INPATIENT PHYSICAL THERAPY  EVALUATION  [facility] ORTHOPEDICS 7K - 7K-02/002-A    Time In: 1016  Time Out: 1059  Timed Code Treatment Minutes: 28 Minutes  Minutes: 43          Date: 2022  Patient Name: Sharol Mcburney,  Gender:  female        MRN: [id number]  : 1960  (60 y.o.)      Referring Practitioner: DEREK Marie CNP  Diagnosis: displaced articular fracture of head of R femur  Additional Pertinent Hx: Per EMR \"The patient is a 60 y.o. medically comorbid female  who presents with right knee pain after a fall in her home. Patient underwent a right TKA a week ago, has been doing well. She was noted to have fallen out of her bed at home. She denies other injuries and no LOC. She denies SOB and CP. X-rays demonstrate the fracture. Spouse states that she has been ambulating on crutches for the past 8 months and primarily stays in her bed throughout the day. He also relates that she does not get out of bed for eating or bathroom. She uses a depends. Patient admits to 2 ppd smoking history. \" Pt is s/p RIGHT KNEE TOTAL ARTHROPLASTY REVISION tibial and femoral components completed by Dr. Werner Ritchie on 5/10. Restrictions/Precautions:  Restrictions/Precautions: General Precautions,Fall Risk,Weight Bearing  Right Lower Extremity Weight Bearing: Weight Bearing As Tolerated  Position Activity Restriction  Other position/activity restrictions: L knee flexion contracture    Subjective:  Chart Reviewed: Yes  Patient assessed for rehabilitation services?: Yes  Family / Caregiver Present: No  Subjective: Ok to see pt per nursing. Pt in bed with call light on, reports she needs to have a BM, educated on use of commode, pt agreed. Pt reports she had already went, however, willing to try and get to commode.     General:  Overall Orientation Status: Within Functional Limits  Orientation Level: Oriented X4  Overall Cognitive Status: Exceptions  Vision Exceptions: Wears glasses at all times  Vision  Vision: Impaired  Vision Exceptions: Wears glasses at all times  Hearing  Hearing: Within functional limits  Hearing: Within functional limits       Pain: 4/10: R knee    Vitals: Vitals not assessed per clinical judgement, see nursing flowsheet    Social/Functional History:    Lives With: Spouse  Type of Home: House  Home Layout: One level  Home Access: Stairs to enter with rails  Entrance Stairs - Number of Steps: 3 FABIO  Entrance Stairs - Rails: Left  Home Equipment: Crutches,Walker, rolling,Cane             ADL Assistance: Independent  Homemaking Assistance: Independent  Ambulation Assistance: Independent  Transfer Assistance: Independent    Active : No     Additional Comments: Pt reports prior to knee issues she was IND with all tasks, no use of AD. Pt recently required crutches, has been laying in bed more, and  has been completing cooking and cleaning tasks. OBJECTIVE:  Range of Motion:  Right Lower Extremity: Impaired - knee immobilizer applied, not assessed  Left Lower Extremity: Impaired - knee flexion contracture >30 degrees from extension    Strength:  Bilateral Lower Extremity: Impaired - grossly deconditioned, not assessed MMT    Balance:  Static Sitting Balance:  Supervision, Stand By Assistance, sitting on commode and EOB, increased time for completion of BM  Dynamic Sitting Balance: Stand By Assistance  Static Standing Balance: Moderate Assistance, X 1, with cues for safety, with verbal cues , with increased time for completion, standing to help doff brief prior to sitting, and donning brief after BM. Pt very FWD flexed posture with head over front of RW, very unsafe, max cues for improved upright posture with poor demo. Pt L knee flexion in standing and decreased WS onto R LE due to pain.       Bed Mobility:  Rolling to Right: Moderate Assistance, X 1, with head of bed flat, with rail, with verbal cues    Supine to Sit: Maximum Assistance, X 1, with head of bed raised, with rail, with verbal cues , with increased time for completion  Assist required for B LE and trunk support for sitting on EOB. Pt required mod A-CGA for scooting self to EOB to prepare for mobility  Transfers:  Sit to Stand: Moderate Assistance, Maximum Assistance, X 2, with increased time for completion, cues for hand placement, with verbal cues  Stand to Sit:Minimal Assistance, Moderate Assistance, X 1, with increased time for completion, cues for hand placement, with verbal cues  RW for support, max cues safety, increased time as pt finding reasons as to not stand during session. Pt with poor initiation of movement, once skilled nursing up, able to help assist to end of stance. Pt completed transfers from various surfaces and heights. Pt amb to chair, not able to advance R LE back enough to feel surface, tech helped assist with movement of chair towards pt to sit safely and decrease falls. Ambulation:  Moderate Assistance, X 1, + CGA-min A x1 with cues for safety, with verbal cues , with increased time for completion  Distance: 3 feet x2  Surface: Level Tile  Device:Rolling Walker  Gait Deviations: Forward Flexed Posture, Slow Dianna, Decreased Step Length Bilaterally, Decreased Weight Shift Right, Decreased Gait Speed, Decreased Heel Strike Bilaterally, Unsteady Gait and Decreased Terminal Knee Extension on L with knee flexion contracture  Max cues for safety, fair weight shift noted and assist required    Functional Outcome Measures: Completed  AM-[facility] Inpatient Mobility Raw Score : 9  AM-[facility] Inpatient T-Scale Score : 30.55    ASSESSMENT:  Activity Tolerance:  Patient tolerance of  treatment: fair. Treatment Initiated: Treatment and education initiated within context of evaluation.   Evaluation time included review of current medical information, gathering information related to past medical, social and functional history, completion of standardized testing, formal and informal observation of tasks, assessment of data and development of plan of care and goals. Treatment time included skilled education and facilitation of tasks to increase safety and independence with functional mobility for improved independence and quality of life. Assessment: Body Structures, Functions, Activity Limitations Requiring Skilled Therapeutic Intervention: Decreased functional mobility ,Decreased cognition,Decreased endurance,Increased pain,Decreased ROM,Decreased balance,Decreased posture,Decreased strength,Decreased safe awareness,Decreased body mechanics,Decreased coordination  Assessment: Pt IND with mobility prior with knee concerns. Pt reports recently has been in bed. Pt s/p R knee revision and has a L  knee flexion contracture limiting mobility. Pt requires 2 person assist for mobility tasks with use of RW for support. Pt is not safe to return home at this time, would benefit from therapy services following stay. Therapy Prognosis: Fair    Requires PT Follow-Up: Yes    Discharge Recommendations:  Discharge Recommendations: [address]    Patient Education:      .     Patient Education  Education Given To: Patient  Education Provided: Role of Therapy,Plan of Care,Equipment,Transfer Training,Fall Prevention Strategies,Precautions  Education Provided Comments: gradually straightening L knee  Education Method: Verbal  Education Outcome: Continued education needed,Verbalized understanding       Equipment Recommendations:  Equipment Needed: No    Plan:  Current Treatment Recommendations: Strengthening,Equipment evaluation, education, & procurement,Balance training,Gait training,Functional mobility training,Stair training,Neuromuscular re-education,Transfer training,Endurance training,Patient/Caregiver education & training,Therapeutic activities,Safety education & training,Home exercise program  Plan:  (6x O)    Goals:  Patient goals : \"get back to normal\"  Short Term Goals  Time Frame for Short term goals: by discharge  Short term goal 1: Pt will demo min A for bed mobility tasks with bed flat to progress towards PLOF. Short term goal 2: Pt will demo min A x2 for transfers with  RW for support and improved upright posture alignment to progress towards PLOF. Short term goal 3: Pt will amb for >20 feet with min A x1 and use of RW for support to progress towards PLOF. Short term goal 4: Pt will tolerate 10-20 reps of ther ex to increase overall mobility and improve ROM to improve gait mechanics. Short term goal 5: Pt will tolerate 2 min of standing tasks with improved upright posture with progress with overall mobillity and improve endurance. Long Term Goals  Time Frame for Long term goals : NA due to short ELOS    Following session, patient left in safe position with all fall risk precautions in place. In chair, all needs in reach, alarm on.

## 2022-05-11 NOTE — PROGRESS NOTES
Orthopaedic Progress Note      SUBJECTIVE:    Chief Complaint   Patient presents with    Knee Pain     right      POD 1 s/p revision right TKA  Seen in bed, knee immobilizer intact. Left leg flexion contracture.   hgb 9.9        Physical    Vitals:    05/11/22 0829   BP: (!) 119/52   Pulse: 81   Resp: 18   Temp: 98.6 °F (37 °C)   SpO2: 95%         OBJECTIVE  RLE dressing c/d/i. Soft compartments.  DP 2+ SILT      Data  CBC:   Lab Results   Component Value Date    WBC 9.0 05/11/2022    RBC 3.62 05/11/2022    RBC 5.62 12/10/2020    HGB 9.8 05/11/2022    HCT 32.2 05/11/2022    MCV 89.0 05/11/2022    MCH 27.1 05/11/2022    MCHC 30.4 05/11/2022    RDW 19.1 12/10/2020     05/11/2022    MPV 10.6 05/11/2022     BMP:    Lab Results   Component Value Date     05/11/2022    K 4.3 05/11/2022    K 4.6 05/09/2022     05/11/2022    CO2 21 05/11/2022    BUN 28 05/11/2022    LABALBU 3.9 12/08/2021    CREATININE 0.6 05/11/2022    CALCIUM 8.3 05/11/2022    LABGLOM >90 05/11/2022    GLUCOSE 89 05/11/2022    GLUCOSE 113 12/10/2020     Uric Acid:  No components found for: URIC  PT/INR:    Lab Results   Component Value Date    PROTIME 11.0 10/05/2016    INR 0.90 10/05/2016     PTT:  No results found for: APTT, PTT[APTT  Troponin:  No results found for: TROPONINI  Urine Culture:  No components found for: CURINE    Current Inpatient Medications    Current Facility-Administered Medications: 0.9 % sodium chloride infusion, , IntraVENous, Continuous  ketorolac (TORADOL) injection 15 mg, 15 mg, IntraVENous, Q6H  cyclobenzaprine (FLEXERIL) tablet 10 mg, 10 mg, Oral, TID PRN  rivaroxaban (XARELTO) tablet 10 mg, 10 mg, Oral, Daily  sodium chloride flush 0.9 % injection 5-40 mL, 5-40 mL, IntraVENous, 2 times per day  sodium chloride flush 0.9 % injection 5-40 mL, 5-40 mL, IntraVENous, PRN  0.9 % sodium chloride infusion, , IntraVENous, PRN  ondansetron (ZOFRAN-ODT) disintegrating tablet 4 mg, 4 mg, Oral, Q8H PRN **OR** ondansetron (ZOFRAN) injection 4 mg, 4 mg, IntraVENous, Q6H PRN  morphine (PF) injection 2 mg, 2 mg, IntraVENous, Q2H PRN **OR** morphine injection 4 mg, 4 mg, IntraVENous, Q2H PRN  HYDROcodone-acetaminophen (NORCO) 5-325 MG per tablet 1 tablet, 1 tablet, Oral, Q4H PRN **OR** HYDROcodone-acetaminophen (NORCO) 5-325 MG per tablet 2 tablet, 2 tablet, Oral, Q4H PRN  atorvastatin (LIPITOR) tablet 40 mg, 40 mg, Oral, Nightly  doxepin (SINEQUAN) capsule 10 mg, 10 mg, Oral, Nightly  fenofibrate (TRIGLIDE) tablet 160 mg, 160 mg, Oral, Daily  losartan (COZAAR) tablet 25 mg, 25 mg, Oral, Daily  insulin lispro (HUMALOG) injection vial 0-6 Units, 0-6 Units, SubCUTAneous, TID WC  insulin lispro (HUMALOG) injection vial 0-3 Units, 0-3 Units, SubCUTAneous, Nightly  glucose chewable tablet 16 g, 4 tablet, Oral, PRN  dextrose bolus 10% 125 mL, 125 mL, IntraVENous, PRN **OR** dextrose bolus 10% 250 mL, 250 mL, IntraVENous, PRN  glucagon (rDNA) injection 1 mg, 1 mg, IntraMUSCular, PRN  dextrose 5 % solution, 100 mL/hr, IntraVENous, PRN    . ASSESSMENT AND PLAN  SNF placement  WBAT RLE  Therapy  Pain control. DVT ppx.

## 2022-05-11 NOTE — PROGRESS NOTES
[address] 59  INPATIENT OCCUPATIONAL THERAPY  [facility] ORTHOPEDICS 7K  EVALUATION    Time:    Time In: 2733  Time Out: 1502  Timed Code Treatment Minutes: 14 Minutes  Minutes: 29          Date: 2022  Patient Name: Vilma Bailye,   Gender: female      MRN: [id number]  : 1960  (60 y.o.)  Referring Practitioner: XI Bowles CNP  Diagnosis: displaced articular fracture of head of R femur  Additional Pertinent Hx: Per EMR \"The patient is a 60 y.o. medically comorbid female  who presents with right knee pain after a fall in her home. Patient underwent a right TKA a week ago, has been doing well. She was noted to have fallen out of her bed at home. She denies other injuries and no LOC. She denies SOB and CP. X-rays demonstrate the fracture. Spouse states that she has been ambulating on crutches for the past 8 months and primarily stays in her bed throughout the day. He also relates that she does not get out of bed for eating or bathroom. She uses a depends. Patient admits to 2 ppd smoking history. \" Pt is s/p RIGHT KNEE TOTAL ARTHROPLASTY REVISION tibial and femoral components completed by Dr. Sarabjit Abdul on 5/10.     Restrictions/Precautions:  Restrictions/Precautions: General Precautions,Fall Risk,Weight Bearing  Right Lower Extremity Weight Bearing: Weight Bearing As Tolerated  Required Braces or Orthoses  Right Lower Extremity Brace: Knee Immobilizer  Position Activity Restriction  Other position/activity restrictions: L knee flexion contracture    Subjective  Chart Reviewed: Yes,Orders,Progress Notes,History and Physical  Patient assessed for rehabilitation services?: Yes  Family / Caregiver Present: Yes    Subjective: up in recliner & ready for back to bed    Pain: no number given/10: in R knee    Vitals: Vitals not assessed per clinical judgement, see nursing flowsheet    Social/Functional History:  Lives With: Spouse  Type of Home: House  Home Layout: One level  Home Access: Stairs to enter with rails  Entrance Stairs - Number of Steps: 3 FABIO  Entrance Stairs - Rails: Left  Home Equipment: Crutches,Walker, rolling,Cane (\"stand up walker\" to stand up taller)   Bathroom Shower/Tub: Tub/Shower unit  Bathroom Toilet: Standard  Bathroom Equipment: Shower chair       ADL Assistance: Needs assistance (A for LE dressing & bathing)  Homemaking Assistance: Independent  Ambulation Assistance: Independent  Transfer Assistance: Independent    Active : No  Patient's  Info:  provides transportation services     Additional Comments: Pt reports prior to knee issues she was IND with all tasks, no use of AD. Pt recently required crutches (house is too tight for a walker), has been laying in bed more, and  has been completing cooking and cleaning tasks. 2 year hx of B knee issues    VISION:WFL    HEARING:  WFL    COGNITION: Decreased Insight, Decreased Problem Solving and Decreased Safety Awareness    RANGE OF MOTION:  Bilateral Upper Extremity:  Impaired - shoulder flexion limited to 90 degrees, distal WFL    STRENGTH:  4-/5 grossly    SENSATION:   WFL    ADL:   Lower Extremity Dressing: Dependent. for adjusting slipper socks. BALANCE:  Sitting Balance:  Contact Guard Assistance. in prep for laying back down  Standing Balance: Moderate Assistance, X 2. initially then min A x 1 + CGA x 1    BED MOBILITY:  Sit to Supine: Moderate Assistance      TRANSFERS:  Sit to Stand: Moderate Assistance, X 2. from recliner  Stand to Sit: Minimal Assistance, X 1. to EOB  **noted heavy reliance on arm rests of chair  **extra time for 1/2 stand to 3/4 (forward flexed stand)    FUNCTIONAL MOBILITY:  Assistive Device: Rolling Walker  Assist Level: Moderate Assistance and X 1. Distance: 3ft from recliner to EOB   SBA of another for safety     Activity Tolerance:  Patient tolerance of  treatment: fair.         Assessment:  Assessment: Pt demo decreased ADL & functional mobility status over PLOF s/p fall with femur fx. Continued OT recommended to faciliate improvements in these performance areas & educate on adaptive strategies for increasing indep & safety with returning to ADL tasks. Performance deficits / Impairments: Decreased functional mobility ,Decreased endurance,Decreased ADL status,Decreased safe awareness,Decreased balance  Prognosis: Fair  REQUIRES OT FOLLOW-UP: Yes  Decision Making: Medium Complexity    Treatment Initiated: Treatment and education initiated within context of evaluation. Evaluation time included review of current medical information, gathering information related to past medical, social and functional history, completion of standardized testing, formal and informal observation of tasks, assessment of data and development of plan of care and goals. Treatment time included skilled education and facilitation of tasks to increase safety and independence with ADL's for improved functional independence and quality of life. Discharge Recommendations:  Subacute/Skilled Nursing Facility    Patient Education:     Patient Education  Education Given To: Susana Aceves  Education Provided: Role of Therapy,Plan of Care,Transfer Training,ADL Adaptive Strategies  Education Method: Demonstration,Verbal  Barriers to Learning: None  Education Outcome: Continued education needed    Equipment Recommendations: Other: Monitor pending progress    Plan:  Times per Week: 6x  Current Treatment Recommendations: Balance training,Functional mobility training,Endurance training,Safety education & training,Patient/Caregiver education & training,Self-Care / ADL. See long-term goal time frame for expected duration of plan of care. If no long-term goals established, a short length of stay is anticipated.     Goals:  Patient goals : go home  Short Term Goals  Time Frame for Short term goals: until discharge  Short Term Goal 1: Pt will complete various t/fs including BSC with mod A x 1  Short Term Goal 2: Pt will tolerate standing 2 min with CGA for increased ease of toileting routine  Short Term Goal 3: Pt will complete mobility to [facility] or bedside chair with RW, Min A, & min vcs for technique  Short Term Goal 4: Pt will complete LE dressing with mod A & LH AE  Long Term Goals  Time Frame for Long term goals : No LTG set d/t short ELOS         Following session, patient left in safe position with all fall risk precautions in place.

## 2022-05-11 NOTE — PLAN OF CARE
Problem: Discharge Planning  Goal: Discharge to home or other facility with appropriate resources  5/11/2022 1425 by Ernestina Gottlieb RN  Outcome: Progressing  Flowsheets (Taken 5/11/2022 0237 by Abigail Coon RN)  Discharge to home or other facility with appropriate resources:   Identify barriers to discharge with patient and caregiver   Refer to discharge planning if patient needs post-hospital services based on physician order or complex needs related to functional status, cognitive ability or social support system     Problem: Pain  Goal: Verbalizes/displays adequate comfort level or baseline comfort level  5/11/2022 1425 by Ernestina Gottlieb RN  Outcome: Progressing  Flowsheets (Taken 5/11/2022 1425)  Verbalizes/displays adequate comfort level or baseline comfort level:   Encourage patient to monitor pain and request assistance   Assess pain using appropriate pain scale     Problem: Skin/Tissue Integrity  Goal: Absence of new skin breakdown  Description: 1. Monitor for areas of redness and/or skin breakdown  2. Assess vascular access sites hourly  3. Every 4-6 hours minimum:  Change oxygen saturation probe site  4. Every 4-6 hours:  If on nasal continuous positive airway pressure, respiratory therapy assess nares and determine need for appliance change or resting period. 5/11/2022 1425 by Ernestina Gottlieb RN  Outcome: Progressing  Note: No new skin issues noted during this shift. See skin assessment. Patient requires assistance from staff to reposition every 2 hours as recommended.       Problem: Safety - Adult  Goal: Free from fall injury  5/11/2022 1425 by Ernestina Gottlieb RN  Outcome: Progressing  Flowsheets (Taken 5/11/2022 1425)  Free From Fall Injury: Prakash Cohen family/caregiver on patient safety     Problem: ABCDS Injury Assessment  Goal: Absence of physical injury  5/11/2022 1425 by Ernestina Gottlieb RN  Outcome: Progressing  Flowsheets (Taken 5/11/2022 1425)  Absence of Physical Injury: Implement safety measures based on patient assessment     Problem: Chronic Conditions and Co-morbidities  Goal: Patient's chronic conditions and co-morbidity symptoms are monitored and maintained or improved  5/11/2022 1425 by Tita Stein RN  Outcome: Progressing  Flowsheets (Taken 5/11/2022 1425)  Care Plan - Patient's Chronic Conditions and Co-Morbidity Symptoms are Monitored and Maintained or Improved: Monitor and assess patient's chronic conditions and comorbid symptoms for stability, deterioration, or improvement     Care plan reviewed with patient. Patient verbalize understanding of the plan of care and contribute to goal setting.

## 2022-05-11 NOTE — PROGRESS NOTES
Hospitalist Progress Note      Patient:  Elayne Clark    Unit/Bed:7K-02/002-A  YOB: 1960  MRN: [id number]   Acct: [de-identified]   PCP: Julio César Torre DO  Date of Admission: 5/9/2022    Assessment/Plan:    1. Right distal femur fracture, periprosthetic: S/P RKA revision, right. POD1. Pain Management per primary. PT/OT/SW. Pt recently had TKA primary one week ago. 2. NIDDMII: BS controlled; latest BS 94. Continue SSI. 3. Intermittent confusion, resolved. Hospitalist team will sign off at this time. Please feel free to contact us with any questions or concerns. Chief Complaint: Right knee pain     Subjective (past 24 hours):   No acute events overnight. Pt stated that she was having severe muscle spasms. Pt had just recently had Flexeril. Pt denies any SOB, CP, HA. Past medical history, family history, social history and allergies reviewed again and is unchanged since admission. ROS (All review of systems completed. Pertinent positives noted.  Otherwise All other systems reviewed and negative.)     Medications:  Reviewed    Infusion Medications    sodium chloride 100 mL/hr at 05/10/22 1708    sodium chloride      dextrose       Scheduled Medications    ketorolac  15 mg IntraVENous Q6H    rivaroxaban  10 mg Oral Daily    sodium chloride flush  5-40 mL IntraVENous 2 times per day    atorvastatin  40 mg Oral Nightly    doxepin  10 mg Oral Nightly    fenofibrate  160 mg Oral Daily    losartan  25 mg Oral Daily    insulin lispro  0-6 Units SubCUTAneous TID WC    insulin lispro  0-3 Units SubCUTAneous Nightly     PRN Meds: cyclobenzaprine, sodium chloride flush, sodium chloride, ondansetron **OR** ondansetron, morphine **OR** morphine, HYDROcodone 5 mg - acetaminophen **OR** HYDROcodone 5 mg - acetaminophen, glucose, dextrose bolus **OR** dextrose bolus, glucagon (rDNA), dextrose      Intake/Output Summary (Last 24 hours) at 5/11/2022 1228  Last data filed at 5/10/2022 2150  Gross per 24 hour   Intake 1520 ml   Output 300 ml   Net 1220 ml       Diet:  ADULT DIET; Regular; 4 carb choices (60 gm/meal)    Exam:  BP (!) 119/52   Pulse 81   Temp 98.6 °F (37 °C) (Oral)   Resp 18   Ht 5' 7.52\" (1.715 m)   Wt 195 lb (88.5 kg)   SpO2 95%   BMI 30.07 kg/m²   General appearance: No apparent distress, appears stated age and cooperative. HEENT: Pupils equal, round, and reactive to light. Conjunctivae/corneas clear. Neck: Supple, with full range of motion. No jugular venous distention. Trachea midline. Respiratory:  Normal respiratory effort. Clear to auscultation, bilaterally without Rales/Wheezes/Rhonchi. Cardiovascular: Regular rate and rhythm with normal S1/S2 without murmurs, rubs or gallops. Abdomen: Soft, non-tender, non-distended with normal bowel sounds. Musculoskeletal: passive and active ROM x 3 extremities. Dressing and brace intact. Skin: Skin color, texture, turgor normal.  No rashes or lesions. Neurologic:  Neurovascularly intact without any focal sensory/motor deficits.  Cranial nerves: II-XII intact, grossly non-focal.  Psychiatric: Alert and oriented, thought content appropriate, normal insight  Capillary Refill: Brisk,< 3 seconds   Peripheral Pulses: +2 palpable, equal bilaterally     Labs:   Recent Labs     05/09/22  1305 05/10/22  0856 05/11/22  0628   WBC 12.9* 10.0 9.0   HGB 12.5 12.3 9.8*   HCT 40.8 40.4 32.2*   * 364 333     Recent Labs     05/09/22  1305 05/09/22  2124 05/10/22  0856 05/11/22  0628   *  --  136 137   K 4.6  --  4.1 4.3     --  102 105   CO2 20*  --  20* 21*   BUN 28*  --  19 28*   CREATININE 0.5  --  0.4 0.6   CALCIUM 9.3  --  9.1 8.3*   PHOS  --  2.9  --   --      Urinalysis:      Lab Results   Component Value Date    NITRU NEGATIVE 04/08/2022    WBCUA 2-4 04/08/2022    BACTERIA FEW 04/08/2022    RBCUA 0-2 04/08/2022    BLOODU NEGATIVE 04/08/2022    GLUCOSEU NEGATIVE 04/08/2022       Radiology:  XR KNEE RIGHT (1-2 VIEWS)   Final Result       1. Revision of the right knee replacement with a new longstem prosthesis. 2. Small amount of air and fluid within the suprapatellar bursa consistent with recent surgery. .               **This report has been created using voice recognition software. It may contain minor errors which are inherent in voice recognition technology. **      Final report electronically signed by DR Katiuska Merino on 5/10/2022 4:00 PM      XR KNEE RIGHT (MIN 4 VIEWS)   Final Result   1. No acute displaced oblique fracture of the distal femur is seen. 2. Status post total right knee arthroplasty. **This report has been created using voice recognition software. It may contain minor errors which are inherent in voice recognition technology. **      Final report electronically signed by Dr Gabriela Byrnes on 5/9/2022 12:15 PM        Electronically signed by Elinor Fothergill, PA on 5/11/2022 at 12:28 PM

## 2022-05-12 LAB
ANION GAP SERPL CALCULATED.3IONS-SCNC: 13 MEQ/L (ref 8–16)
BUN BLDV-MCNC: 27 MG/DL (ref 7–22)
CALCIUM SERPL-MCNC: 8.7 MG/DL (ref 8.5–10.5)
CHLORIDE BLD-SCNC: 102 MEQ/L (ref 98–111)
CO2: 20 MEQ/L (ref 23–33)
CREAT SERPL-MCNC: 0.5 MG/DL (ref 0.4–1.2)
GFR SERPL CREATININE-BSD FRML MDRD: > 90 ML/MIN/1.73M2
GLUCOSE BLD-MCNC: 105 MG/DL (ref 70–108)
GLUCOSE BLD-MCNC: 110 MG/DL (ref 70–108)
GLUCOSE BLD-MCNC: 115 MG/DL (ref 70–108)
GLUCOSE BLD-MCNC: 72 MG/DL (ref 70–108)
GLUCOSE BLD-MCNC: 99 MG/DL (ref 70–108)
POTASSIUM SERPL-SCNC: 4 MEQ/L (ref 3.5–5.2)
SODIUM BLD-SCNC: 135 MEQ/L (ref 135–145)
TROPONIN T: < 0.01 NG/ML

## 2022-05-12 PROCEDURE — 6370000000 HC RX 637 (ALT 250 FOR IP): Performed by: NURSE PRACTITIONER

## 2022-05-12 PROCEDURE — 80048 BASIC METABOLIC PNL TOTAL CA: CPT

## 2022-05-12 PROCEDURE — 97535 SELF CARE MNGMENT TRAINING: CPT

## 2022-05-12 PROCEDURE — 6360000002 HC RX W HCPCS: Performed by: NURSE PRACTITIONER

## 2022-05-12 PROCEDURE — 1200000000 HC SEMI PRIVATE

## 2022-05-12 PROCEDURE — 36415 COLL VENOUS BLD VENIPUNCTURE: CPT

## 2022-05-12 PROCEDURE — 82948 REAGENT STRIP/BLOOD GLUCOSE: CPT

## 2022-05-12 PROCEDURE — 2580000003 HC RX 258: Performed by: NURSE PRACTITIONER

## 2022-05-12 PROCEDURE — 97530 THERAPEUTIC ACTIVITIES: CPT

## 2022-05-12 PROCEDURE — 97110 THERAPEUTIC EXERCISES: CPT

## 2022-05-12 PROCEDURE — 84484 ASSAY OF TROPONIN QUANT: CPT

## 2022-05-12 RX ADMIN — MORPHINE SULFATE 4 MG: 4 INJECTION, SOLUTION INTRAMUSCULAR; INTRAVENOUS at 05:47

## 2022-05-12 RX ADMIN — LOSARTAN POTASSIUM 25 MG: 25 TABLET, FILM COATED ORAL at 09:59

## 2022-05-12 RX ADMIN — DOXEPIN HYDROCHLORIDE 10 MG: 10 CAPSULE ORAL at 20:12

## 2022-05-12 RX ADMIN — HYDROCODONE BITARTRATE AND ACETAMINOPHEN 2 TABLET: 5; 325 TABLET ORAL at 01:51

## 2022-05-12 RX ADMIN — SODIUM CHLORIDE, PRESERVATIVE FREE 10 ML: 5 INJECTION INTRAVENOUS at 09:59

## 2022-05-12 RX ADMIN — KETOROLAC TROMETHAMINE 15 MG: 30 INJECTION, SOLUTION INTRAMUSCULAR; INTRAVENOUS at 09:59

## 2022-05-12 RX ADMIN — FENOFIBRATE 160 MG: 160 TABLET ORAL at 09:59

## 2022-05-12 RX ADMIN — RIVAROXABAN 10 MG: 10 TABLET, FILM COATED ORAL at 18:46

## 2022-05-12 RX ADMIN — SODIUM CHLORIDE, PRESERVATIVE FREE 10 ML: 5 INJECTION INTRAVENOUS at 20:12

## 2022-05-12 RX ADMIN — CYCLOBENZAPRINE 10 MG: 10 TABLET, FILM COATED ORAL at 01:51

## 2022-05-12 RX ADMIN — KETOROLAC TROMETHAMINE 15 MG: 30 INJECTION, SOLUTION INTRAMUSCULAR; INTRAVENOUS at 03:57

## 2022-05-12 RX ADMIN — HYDROCODONE BITARTRATE AND ACETAMINOPHEN 2 TABLET: 5; 325 TABLET ORAL at 18:48

## 2022-05-12 RX ADMIN — ATORVASTATIN CALCIUM 40 MG: 40 TABLET, FILM COATED ORAL at 20:12

## 2022-05-12 RX ADMIN — CYCLOBENZAPRINE 10 MG: 10 TABLET, FILM COATED ORAL at 18:48

## 2022-05-12 ASSESSMENT — PAIN SCALES - GENERAL
PAINLEVEL_OUTOF10: 4
PAINLEVEL_OUTOF10: 3

## 2022-05-12 NOTE — FLOWSHEET NOTE
Pt is a 62y. o. female, lying on her side in bed and in some distress, in 7K-02. Oleksandr Mariee was in considerable discomfort and pain; thus our visit was short. She shared that she had a knee replacement a few days ago, fell, broke her leg and needed the replacement, replaced again. She was teary-eyed as she briefly explained and then said 'I'm just uncomfortable'.  provided brief conversation, encouragement, nurtured hope and prayer-which was met with gratitude by Oleksandr Mariee.     05/11/22 2045   Encounter Summary   Encounter Overview/Reason  Initial Encounter   Service Provided For: Patient   Referral/Consult From: [facility]   Support System Unknown   Last Encounter  05/11/22   Complexity of Encounter Low   Begin Time 1930   End Time  1940   Total Time Calculated 10 min   Encounter    Type Initial Screen/Assessment   Assessment/Intervention/Outcome   Assessment Anxious; Coping;Concerns with suffering   Intervention Active listening;Prayer (assurance of)/[place]; Discussed illness injury and its impact   Outcome Expressed Gratitude;Coping;Receptive

## 2022-05-12 NOTE — PROGRESS NOTES
[facility]  Occupational Therapy  Daily Note  Time:   Time In: 3493  Time Out: 0809  Timed Code Treatment Minutes: 23 Minutes  Minutes: 23          Date: 2022  Patient Name: Sera Bautista,   Gender: female      Room: [facility]-A  MRN: [id number]  : 1960  (60 y.o.)  Referring Practitioner: XI Richter CNP  Diagnosis: displaced articular fracture of head of R femur  Additional Pertinent Hx: Per EMR \"The patient is a 60 y.o. medically comorbid female  who presents with right knee pain after a fall in her home. Patient underwent a right TKA a week ago, has been doing well. She was noted to have fallen out of her bed at home. She denies other injuries and no LOC. She denies SOB and CP. X-rays demonstrate the fracture. Spouse states that she has been ambulating on crutches for the past 8 months and primarily stays in her bed throughout the day. He also relates that she does not get out of bed for eating or bathroom. She uses a depends. Patient admits to 2 ppd smoking history. \" Pt is s/p RIGHT KNEE TOTAL ARTHROPLASTY REVISION tibial and femoral components completed by Dr. Kyra Back on 5/10. Restrictions/Precautions:  Restrictions/Precautions: General Precautions,Fall Risk,Weight Bearing  Right Lower Extremity Weight Bearing: Weight Bearing As Tolerated  Required Braces or Orthoses  Right Lower Extremity Brace: Knee Immobilizer  Position Activity Restriction  Other position/activity restrictions: L knee flexion contracture     SUBJECTIVE: Pt laying in bed upon arrival, pt agreeable to OT session, RN gave verbal approval for session     PAIN: 5/10: knee    Vitals: Nurse checked vitals prior to session    COGNITION: Slow Processing and Impaired Memory    ADL:   Grooming: Stand By Assistance. with combing hair sitting up in recliner  Bathing: Moderate Assistance. with completing franklin area while standing   Upper Extremity Dressing: Stand By Assistance.   to don/doff gown sitting at the EOB  Toileting: Dependent. due to pt incontinent of urine and using brief to urinate in, with pt requiring max A to don/doff brief. BALANCE:  Standing Balance: Minimal Assistance. with pt cued for forward flexed posture, with pt standing for 30 second increments     BED MOBILITY:  Supine to Sit: Moderate Assistance with the HOB lowered, and pt using railing to exit    TRANSFERS:  Sit to Stand: Moderate Assistance. with vc's to push off of the bed, and for flexed posture  Stand to Sit: Minimal Assistance. with vc's to shoot hips back into recliner    FUNCTIONAL MOBILITY:  Assistive Device: Rolling Walker  Assist Level:  Contact Guard Assistance. Distance: from the EOB to the recliner    ASSESSMENT:     Activity Tolerance:  Patient tolerance of  treatment: good. Discharge Recommendations: Continue to assess pending progress  Equipment Recommendations: Other: Monitor pending progress  Plan: Times per Week: 6x  Current Treatment Recommendations: Balance training,Functional mobility training,Endurance training,Safety education & training,Patient/Caregiver education & training,Self-Care / ADL    Patient Education  Patient Education: ADL's    Goals  Short Term Goals  Time Frame for Short term goals: until discharge  Short Term Goal 1: Pt will complete various t/fs including BSC with mod A x 1  Short Term Goal 2: Pt will tolerate standing 2 min with CGA for increased ease of toileting routine  Short Term Goal 3: Pt will complete mobility to [facility] or bedside chair with RW, Min A, & min vcs for technique  Short Term Goal 4: Pt will complete LE dressing with mod A & LH AE  Long Term Goals  Time Frame for Long term goals : No LTG set d/t short ELOS    Following session, patient left in safe position with all fall risk precautions in place.

## 2022-05-12 NOTE — PROGRESS NOTES
has been completing cooking and cleaning tasks. 2 year hx of B knee issues    Restrictions/Precautions:  Restrictions/Precautions: General Precautions,Fall Risk,Weight Bearing  Right Lower Extremity Weight Bearing: Weight Bearing As Tolerated  Required Braces or Orthoses  Right Lower Extremity Brace: Knee Immobilizer  Position Activity Restriction  Other position/activity restrictions: L knee flexion contracture     SUBJECTIVE: RN approved session. Patient sitting in bedside chair upon arrival and agreeable to therapy. Educated on knee immobilizer, POC and decreasing knee flexion contractures. PAIN: denies in sitting, reported knee immobilizer discomfort    Vitals: Vitals not assessed per clinical judgement, see nursing flowsheet    OBJECTIVE:  Bed Mobility:  Not Tested    Transfers:  Sit to Stand: Moderate Assistance, with increased time for completion, cues for hand placement, with verbal cues, from chair completed x3 trials, very forward flexed posture initially, left knee flexion contracture noted  Stand to Sit:Contact Guard Assistance    Ambulation:  Contact Guard Assistance  Distance: 4ft x2  Surface: Level Tile  Device:Rolling Walker  Gait Deviations: Forward Flexed Posture, Slow Dianna, Decreased Step Length Bilaterally, Decreased Weight Shift Right, Decreased Gait Speed, Decreased Heel Strike Bilaterally, Unsteady Gait and Decreased Terminal Knee Extension, left knee flexion contracture    Balance:  Static Standing Balance: Contact Guard Assistance  Dynamic Standing Balance: Contact Guard Assistance    *BUE support at RW, heavy reliance of support, able to complete standing weight shifts, heel raises, SLS on L and hip flexion on R    Exercise:  Patient was guided in 1 set(s) 10 reps of exercise to both lower extremities. Glut sets, Quad sets, Hip abduction/adduction, Straight leg raises, Standing heel/toe raises and (Standing hip flexion R only).   Exercises were completed for increased independence with functional mobility. Functional Outcome Measures: Completed  -PAC Inpatient Mobility Raw Score : 12  AM-PAC Inpatient T-Scale Score : 35.33    ASSESSMENT:  Assessment: Patient progressing toward established goals. Activity Tolerance:  Patient tolerance of  treatment: fair. Requires frequent rest breaks. And encouragement. Equipment Recommendations:Equipment Needed: No  Discharge Recommendations: Continue to assess pending progress, Subacte/Skilled Nursing Facility and Patient would benefit from continued PT at discharge  Plan: Current Treatment Recommendations: Strengthening,Equipment evaluation, education, & procurement,Balance training,Gait training,Functional mobility training,Stair training,Neuromuscular re-education,Transfer training,Endurance training,Patient/Caregiver education & training,Therapeutic activities,Safety education & training,Home exercise program  Plan:  (6x O)    Patient Education  Patient Education: Plan of Care, Precautions/Restrictions, Transfers, Reviewed Prior Education, Gait, Up in Chair for Madina Aguero, Verbal Exercise Instruction    Goals:  Patient goals : \"get back to normal\"  Short Term Goals  Time Frame for Short term goals: by discharge  Short term goal 1: Pt will demo min A for bed mobility tasks with bed flat to progress towards PLOF. Short term goal 2: Pt will demo min A x2 for transfers with  RW for support and improved upright posture alignment to progress towards PLOF. Short term goal 3: Pt will amb for >20 feet with min A x1 and use of RW for support to progress towards PLOF. Short term goal 4: Pt will tolerate 10-20 reps of ther ex to increase overall mobility and improve ROM to improve gait mechanics. Short term goal 5: Pt will tolerate 2 min of standing tasks with improved upright posture with progress with overall mobillity and improve endurance.   Long Term Goals  Time Frame for Long term goals : NA due to short ELOS    Following session, patient left in safe position with all fall risk precautions in place.

## 2022-05-12 NOTE — PLAN OF CARE
Problem: Discharge Planning  Goal: Discharge to home or other facility with appropriate resources  5/12/2022 0222 by Ingrid Perez RN  Outcome: Progressing  Flowsheets (Taken 5/11/2022 2000)  Discharge to home or other facility with appropriate resources:   Identify barriers to discharge with patient and caregiver   Identify discharge learning needs (meds, wound care, etc)     Problem: Pain  Goal: Verbalizes/displays adequate comfort level or baseline comfort level  5/12/2022 0222 by Ingrid Perez RN  Outcome: Progressing  Flowsheets (Taken 5/12/2022 0222)  Verbalizes/displays adequate comfort level or baseline comfort level:   Encourage patient to monitor pain and request assistance   Assess pain using appropriate pain scale   Administer analgesics based on type and severity of pain and evaluate response     Problem: Skin/Tissue Integrity  Goal: Absence of new skin breakdown  Description: 1. Monitor for areas of redness and/or skin breakdown  2. Assess vascular access sites hourly  3. Every 4-6 hours minimum:  Change oxygen saturation probe site  4. Every 4-6 hours:  If on nasal continuous positive airway pressure, respiratory therapy assess nares and determine need for appliance change or resting period. 5/12/2022 0222 by Ingrid Perez RN  Outcome: Progressing  Note: No new skin issues noted this shift.      Problem: Safety - Adult  Goal: Free from fall injury  5/12/2022 0222 by Ingrid Perez RN  Outcome: Progressing  Flowsheets (Taken 5/12/2022 0222)  Free From Fall Injury:   Instruct family/caregiver on patient safety   Based on caregiver fall risk screen, instruct family/caregiver to ask for assistance with transferring infant if caregiver noted to have fall risk factors     Problem: ABCDS Injury Assessment  Goal: Absence of physical injury  5/12/2022 0222 by Ingrid Perez RN  Outcome: Progressing  Flowsheets (Taken 5/12/2022 0222)  Absence of Physical Injury: Implement safety measures based on patient assessment     Problem: Chronic Conditions and Co-morbidities  Goal: Patient's chronic conditions and co-morbidity symptoms are monitored and maintained or improved  5/12/2022 0222 by Jitendra Carolina RN  Outcome: Progressing  Flowsheets (Taken 5/11/2022 2000)  Care Plan - Patient's Chronic Conditions and Co-Morbidity Symptoms are Monitored and Maintained or Improved: Monitor and assess patient's chronic conditions and comorbid symptoms for stability, deterioration, or improvement   Care plan reviewed with patient. Patient verbalizes understanding of the plan of care and contribute to goal setting.

## 2022-05-12 NOTE — CARE COORDINATION
5/12/22, 3:02 PM EDT    DISCHARGE PLANNING EVALUATION    Referral to [address] [place] [address] are out of network. [facility] and [facility] are out of network. Made a referral to [facility] patient's next choice.

## 2022-05-12 NOTE — PROGRESS NOTES
Orthopaedic Progress Note      SUBJECTIVE   Ms. Ann Holly is post op day # 2 s/p revision right TKA  Pt seen in chair , working with PT  Immobilizer in place to R knee , pt to wear when up ambulating, may remove in bed  Pt noted pain to R knee  Satisfactory post-op x-rays   Pt planning to go to ECF when accepted       Allergies:     Allergies as of 05/09/2022 - Fully Reviewed 05/09/2022   Allergen Reaction Noted    Adhesive tape  03/15/2017     Current Inpatient Medications:  Current Facility-Administered Medications: 0.9 % sodium chloride infusion, , IntraVENous, Continuous  ketorolac (TORADOL) injection 15 mg, 15 mg, IntraVENous, Q6H  cyclobenzaprine (FLEXERIL) tablet 10 mg, 10 mg, Oral, TID PRN  rivaroxaban (XARELTO) tablet 10 mg, 10 mg, Oral, Daily  sodium chloride flush 0.9 % injection 5-40 mL, 5-40 mL, IntraVENous, 2 times per day  sodium chloride flush 0.9 % injection 5-40 mL, 5-40 mL, IntraVENous, PRN  0.9 % sodium chloride infusion, , IntraVENous, PRN  ondansetron (ZOFRAN-ODT) disintegrating tablet 4 mg, 4 mg, Oral, Q8H PRN **OR** ondansetron (ZOFRAN) injection 4 mg, 4 mg, IntraVENous, Q6H PRN  morphine (PF) injection 2 mg, 2 mg, IntraVENous, Q2H PRN **OR** morphine injection 4 mg, 4 mg, IntraVENous, Q2H PRN  HYDROcodone-acetaminophen (NORCO) 5-325 MG per tablet 1 tablet, 1 tablet, Oral, Q4H PRN **OR** HYDROcodone-acetaminophen (NORCO) 5-325 MG per tablet 2 tablet, 2 tablet, Oral, Q4H PRN  atorvastatin (LIPITOR) tablet 40 mg, 40 mg, Oral, Nightly  doxepin (SINEQUAN) capsule 10 mg, 10 mg, Oral, Nightly  fenofibrate (TRIGLIDE) tablet 160 mg, 160 mg, Oral, Daily  losartan (COZAAR) tablet 25 mg, 25 mg, Oral, Daily  insulin lispro (HUMALOG) injection vial 0-6 Units, 0-6 Units, SubCUTAneous, TID WC  insulin lispro (HUMALOG) injection vial 0-3 Units, 0-3 Units, SubCUTAneous, Nightly  glucose chewable tablet 16 g, 4 tablet, Oral, PRN  dextrose bolus 10% 125 mL, 125 mL, IntraVENous, PRN **OR** dextrose bolus 10% 250 mL, 250 mL, IntraVENous, PRN  glucagon (rDNA) injection 1 mg, 1 mg, IntraMUSCular, PRN  dextrose 5 % solution, 100 mL/hr, IntraVENous, PRN    REVIEW OF SYSTEMS:  Constitutional: Denies any fever, chills. Derm: Denies any rash or skin color change. Musculoskeletal: Denies numbness and tingling , Pain to R knee. Neuro: Denies any dizziness, paresthesia or weakness. OBJECTIVE    Patient Vitals for the past 24 hrs:   BP Temp Temp src Pulse Resp SpO2   05/12/22 0843 (!) 128/57 98.1 °F (36.7 °C) Oral 81 16 97 %   05/12/22 0544 128/69 98 °F (36.7 °C) Oral 66 18 95 %   05/12/22 0221     16    05/11/22 2156     20    05/11/22 2125 (!) 115/43 98.2 °F (36.8 °C) Oral 83 20 93 %   05/11/22 1646 132/60 98.4 °F (36.9 °C) Oral 91 16 98 %     INTAKE/OUTPUT:    Intake/Output Summary (Last 24 hours) at 5/12/2022 1007  Last data filed at 5/12/2022 0957  Gross per 24 hour   Intake 780 ml   Output    Net 780 ml     I/O last 3 completed shifts: In: 880 [P.O.:880]  Out: -     PHYSICAL EXAM:  General appearance:  Alert and oriented x 3. No apparent distress, appears stated age and cooperative. Musculoskeletal: RLE:  Denies calf pain to palpation. Pt can flex and extend right toes. Right knee drsg dry and intact. No redness or drainage noted from incision site. Skin: Skin color normal.  No rashes or lesions. Neurologic:  Neurovascularly intact without any focal sensory/motor deficits. Sensation intact.        Data  CBC:   Lab Results   Component Value Date    WBC 9.0 05/11/2022    HGB 9.8 05/11/2022     05/11/2022     BMP:    Lab Results   Component Value Date     05/11/2022    K 4.3 05/11/2022    K 4.6 05/09/2022     05/11/2022    CO2 21 05/11/2022    BUN 28 05/11/2022    CREATININE 0.6 05/11/2022    CALCIUM 8.3 05/11/2022    GLUCOSE 89 05/11/2022    GLUCOSE 113 12/10/2020     Uric Acid:  No components found for: URIC  PT/INR:    Lab Results   Component Value Date    PROTIME 11.0 10/05/2016    INR 0.90 10/05/2016     Troponin:  No results found for: TROPONINI  Urine Culture:  No components found for: JULIA    ASSESSMENT:  Active Hospital Problems    Diagnosis Date Noted    Displaced articular fracture of head of right femur, sequela [S72.061S] 05/09/2022     Priority: Medium    Neida-prosthetic supracondylar fracture of femur [O79. [address]XX[address], [address] 05/09/2022     Priority: Medium       PLAN  1. Dry drsg changes as needed   2. WBAT RLE in knee immobilizer for 8 weeks   3. PT/OT to eval and treat   4. Continue current medical management   5. [id number] for discharge  When ok by other medical providers   6.  F/U in 3 weeks         Electronically signed by DEREK Joiner CNP on 5/12/2022 at 10:04 AM

## 2022-05-12 NOTE — CARE COORDINATION
DISCHARGE/PLANNING EVALUATION  5/12/22, 12:51 PM EDT    Reason for Referral: Needs ECF  Mental Status: Alert and oriented  Decision Making: Makes own decisions  Family/Social/Home Environment: Patient stated that she resides at home with her spouse. She stated that her home is one floor with a couple steps inside. She stated that she has a crutches, walker and cane that she uses. She stated that she has tub/shower with a seat and grab bars. Current Services including food security, transportation and housekeeping:  He stated that her spouse drives and does household tasks. Current Equipment: shower chair, walker, cane, crutches  Payment Source: [address] [place] [address]  Concerns or Barriers to Discharge: NA  Post acute provider list with quality measures, geographic area and applicable managed care information provided. Questions regarding selection process answered: Declined list    Teach Back Method used with Kaycee regarding care plan and options for discharge. Patient verbalize understanding of the plan of care and contribute to goal setting. Patient goals, treatment preferences and discharge plan: Patient stated that she plans an ECF stay. She stated that she would prefer [place] [id number], [facility].   Referral made to Heike at [facility] and [place].      Electronically signed by DYLAN Walter on 5/12/2022 at 12:51 PM

## 2022-05-13 LAB
ANION GAP SERPL CALCULATED.3IONS-SCNC: 12 MEQ/L (ref 8–16)
BUN BLDV-MCNC: 16 MG/DL (ref 7–22)
CALCIUM SERPL-MCNC: 8.3 MG/DL (ref 8.5–10.5)
CHLORIDE BLD-SCNC: 103 MEQ/L (ref 98–111)
CO2: 21 MEQ/L (ref 23–33)
CREAT SERPL-MCNC: 0.4 MG/DL (ref 0.4–1.2)
GFR SERPL CREATININE-BSD FRML MDRD: > 90 ML/MIN/1.73M2
GLUCOSE BLD-MCNC: 102 MG/DL (ref 70–108)
GLUCOSE BLD-MCNC: 131 MG/DL (ref 70–108)
GLUCOSE BLD-MCNC: 88 MG/DL (ref 70–108)
GLUCOSE BLD-MCNC: 90 MG/DL (ref 70–108)
GLUCOSE BLD-MCNC: 94 MG/DL (ref 70–108)
POTASSIUM SERPL-SCNC: 4.2 MEQ/L (ref 3.5–5.2)
SODIUM BLD-SCNC: 136 MEQ/L (ref 135–145)

## 2022-05-13 PROCEDURE — 97116 GAIT TRAINING THERAPY: CPT

## 2022-05-13 PROCEDURE — 2580000003 HC RX 258: Performed by: NURSE PRACTITIONER

## 2022-05-13 PROCEDURE — 97530 THERAPEUTIC ACTIVITIES: CPT

## 2022-05-13 PROCEDURE — 6370000000 HC RX 637 (ALT 250 FOR IP): Performed by: NURSE PRACTITIONER

## 2022-05-13 PROCEDURE — 82948 REAGENT STRIP/BLOOD GLUCOSE: CPT

## 2022-05-13 PROCEDURE — 97535 SELF CARE MNGMENT TRAINING: CPT

## 2022-05-13 PROCEDURE — 80048 BASIC METABOLIC PNL TOTAL CA: CPT

## 2022-05-13 PROCEDURE — 1200000000 HC SEMI PRIVATE

## 2022-05-13 PROCEDURE — 36415 COLL VENOUS BLD VENIPUNCTURE: CPT

## 2022-05-13 RX ORDER — RIVAROXABAN 10 MG/1
10 TABLET, FILM COATED ORAL EVERY 24 HOURS
Qty: 18 TABLET | Refills: 0 | Status: ON HOLD | OUTPATIENT
Start: 2022-05-13 | End: 2022-06-15 | Stop reason: SDUPTHER

## 2022-05-13 RX ORDER — HYDROCODONE BITARTRATE AND ACETAMINOPHEN 5; 325 MG/1; MG/1
1-2 TABLET ORAL
Qty: 42 TABLET | Refills: 0 | Status: SHIPPED | OUTPATIENT
Start: 2022-05-13 | End: 2022-05-20

## 2022-05-13 RX ADMIN — CYCLOBENZAPRINE 10 MG: 10 TABLET, FILM COATED ORAL at 04:10

## 2022-05-13 RX ADMIN — CYCLOBENZAPRINE 10 MG: 10 TABLET, FILM COATED ORAL at 15:12

## 2022-05-13 RX ADMIN — HYDROCODONE BITARTRATE AND ACETAMINOPHEN 1 TABLET: 5; 325 TABLET ORAL at 15:55

## 2022-05-13 RX ADMIN — HYDROCODONE BITARTRATE AND ACETAMINOPHEN 2 TABLET: 5; 325 TABLET ORAL at 00:24

## 2022-05-13 RX ADMIN — SODIUM CHLORIDE, PRESERVATIVE FREE 10 ML: 5 INJECTION INTRAVENOUS at 08:48

## 2022-05-13 RX ADMIN — CYCLOBENZAPRINE 10 MG: 10 TABLET, FILM COATED ORAL at 23:26

## 2022-05-13 RX ADMIN — LOSARTAN POTASSIUM 25 MG: 25 TABLET, FILM COATED ORAL at 08:48

## 2022-05-13 RX ADMIN — HYDROCODONE BITARTRATE AND ACETAMINOPHEN 1 TABLET: 5; 325 TABLET ORAL at 11:41

## 2022-05-13 RX ADMIN — SODIUM CHLORIDE, PRESERVATIVE FREE 10 ML: 5 INJECTION INTRAVENOUS at 20:19

## 2022-05-13 RX ADMIN — HYDROCODONE BITARTRATE AND ACETAMINOPHEN 2 TABLET: 5; 325 TABLET ORAL at 04:10

## 2022-05-13 RX ADMIN — HYDROCODONE BITARTRATE AND ACETAMINOPHEN 2 TABLET: 5; 325 TABLET ORAL at 23:26

## 2022-05-13 RX ADMIN — ATORVASTATIN CALCIUM 40 MG: 40 TABLET, FILM COATED ORAL at 20:19

## 2022-05-13 RX ADMIN — RIVAROXABAN 10 MG: 10 TABLET, FILM COATED ORAL at 16:43

## 2022-05-13 RX ADMIN — FENOFIBRATE 160 MG: 160 TABLET ORAL at 08:48

## 2022-05-13 RX ADMIN — DOXEPIN HYDROCHLORIDE 10 MG: 10 CAPSULE ORAL at 20:19

## 2022-05-13 ASSESSMENT — PAIN SCALES - GENERAL
PAINLEVEL_OUTOF10: 9
PAINLEVEL_OUTOF10: 5
PAINLEVEL_OUTOF10: 4
PAINLEVEL_OUTOF10: 7
PAINLEVEL_OUTOF10: 5
PAINLEVEL_OUTOF10: 9

## 2022-05-13 NOTE — CARE COORDINATION
5/13/22, 3:53 PM EDT    DISCHARGE ON GOING Karely Gamez 58 day: 4  Location: -02/002-A Reason for admit: Displaced articular fracture of head of right femur, sequela [S72.061S]  Closed displaced art[facility] of head of right [facility] [S72.061A]  Periprosthetic supracondylar fracture of femur, initial encounter [A80. Trenton Fishman   Procedure: 5/10 - RIGHT KNEE TOTAL ARTHROPLASTY REVISION tibial and femoral components by Dr Nellie Saldaña  Barriers to Discharge:  POD #3, total knee precautions, PT/OT, pain control. PCP: Stephanie Hutchinson DO  Readmission Risk Score: 12.3 ( )%  Patient Goals/Plan/Treatment Preferences: SW assisting with SNF placement. see Edgar Simpson note. Patient will be a precert.

## 2022-05-13 NOTE — CARE COORDINATION
5/13/22, 11:43 AM EDT    DISCHARGE PLANNING EVALUATION    SVEN received call from [address] with [facility], they are in network with pts insurance, however pts coverage is very minimal.  SVEN was advised that pts out-of-pocket could be up to or more than $300 per day. VSEN updated pt, discussed alternative POC. Pt states she cannot go home. Pt is aware of out of pocket cost and is willing to pay. SVEN updated Pat with [facility], they will discuss financials with her. Pt will require pre-cert.

## 2022-05-13 NOTE — PROGRESS NOTES
[facility]  INPATIENT PHYSICAL THERAPY  DAILY NOTE  [facility] ORTHOPEDICS 7K - 7K-02002-A    Time In: 1019  Time Out: 1045  Timed Code Treatment Minutes: 26 Minutes  Minutes: 26          Date: 2022  Patient Name: Jorge Obrien,  Gender:  female        MRN: [id number]  : 1960  (60 y.o.)     Referring Practitioner: DEREK Maldonado CNP  Diagnosis: displaced articular fracture of head of R femur  Additional Pertinent Hx: Per EMR \"The patient is a 60 y.o. medically comorbid female  who presents with right knee pain after a fall in her home. Patient underwent a right TKA a week ago, has been doing well. She was noted to have fallen out of her bed at home. She denies other injuries and no LOC. She denies SOB and CP. X-rays demonstrate the fracture. Spouse states that she has been ambulating on crutches for the past 8 months and primarily stays in her bed throughout the day. He also relates that she does not get out of bed for eating or bathroom. She uses a depends. Patient admits to 2 ppd smoking history. \" Pt is s/p RIGHT KNEE TOTAL ARTHROPLASTY REVISION tibial and femoral components completed by Dr. José Miguel Goldberg on 5/10. Prior Level of Function:  Lives With: Spouse  Type of Home: House  Home Layout: One level  Home Access: Stairs to enter with rails  Entrance Stairs - Number of Steps: 3 FABIO  Entrance Stairs - Rails: Left  Home Equipment: Crutches,Walker, rolling,Cane (\"stand up walker\" to stand up taller)   Bathroom Shower/Tub: Tub/Shower unit  Bathroom Toilet: Standard  Bathroom Equipment: Shower chair    ADL Assistance: Needs assistance (A for LE dressing & bathing)  Homemaking Assistance: Independent  Ambulation Assistance: Independent  Transfer Assistance: Independent  Active : No  Additional Comments: Pt reports prior to knee issues she was IND with all tasks, no use of AD.  Pt recently required crutches (house is too tight for a walker), has been laying in bed more, and  has been completing cooking and cleaning tasks. 2 year hx of B knee issues    Restrictions/Precautions:  Restrictions/Precautions: General Precautions,Fall Risk,Weight Bearing  Right Lower Extremity Weight Bearing: Weight Bearing As Tolerated  Required Braces or Orthoses  Right Lower Extremity Brace: Knee Immobilizer  Position Activity Restriction  Other position/activity restrictions: L knee flexion contracture     SUBJECTIVE: RN approved session. Patient in bed at arrival and agreeable to therapy. Patient had knee immobilizer donned at arrival.     PAIN: 3-4/10: L knee, increased with weight bearing however pain rating not given. Vitals: Vitals not assessed per clinical judgement, see nursing flowsheet    OBJECTIVE:  Bed Mobility:  Rolling to Left: Minimal Assistance, with head of bed raised, with rail, with increased time for completion, assist proved at left LE    Supine to Sit: Minimal Assistance, with head of bed raised, with rail, with increased time for completion, assist provided at left LE     Transfers:  Sit to Stand: Minimal Assistance, Moderate Assistance, with increased time for completion, cues for hand placement, x3 trials, patient had difficulty completing on first trial required 3-4 graded lifts prior to standing with mod assist x1. Patient was able to complete rest of trials with minimal assist, patient initatially stands with trunk flexion however is able to correct with cues. Stand to Sit:Minimal Assistance, X 1, with increased time for completion, cues for hand placement, Cues needed to slowly descend into chair with minimal return demonstration. Ambulation:  Contact Guard Assistance  Distance: 4'x2  Surface: Level Tile  Device:Rolling Walker  Gait Deviations: Forward Flexed Posture, Slow Dianna, Decreased Step Length Bilaterally, Decreased Weight Shift Right, Decreased Heel Strike Bilaterally, Unsteady Gait and left knee flexion contracture.      Balance:  Static Sitting Balance: Contact Guard Assistance  Dynamic Sitting Balance: Contact Guard Assistance, with weight shifting and cues to relax bilateral shoulders. *Patient completed 2 trials of standing balance at 1 minute and 1 minute and 30 seconds. Rest breaks needed between trials. Functional Outcome Measures: Completed  -PAC Inpatient Mobility Raw Score : 12  AM-PAC Inpatient T-Scale Score : 35.33    ASSESSMENT:  Assessment: Patient progressing toward established goals. Activity Tolerance:  Patient tolerance of  treatment: fair. Equipment Recommendations:Equipment Needed: No  Discharge Recommendations: Continue to assess pending progress, Subacte/Skilled Nursing Facility and Patient would benefit from continued PT at discharge  Plan: Current Treatment Recommendations: Strengthening,Equipment evaluation, education, & procurement,Balance training,Gait training,Functional mobility training,Stair training,Neuromuscular re-education,Transfer training,Endurance training,Patient/Caregiver education & training,Therapeutic activities,Safety education & training,Home exercise program  Plan:  (6x O)    Patient Education  Patient Education: Plan of Care    Goals:  Patient goals : \"get back to normal\"  Short Term Goals  Time Frame for Short term goals: by discharge  Short term goal 1: Pt will demo min A for bed mobility tasks with bed flat to progress towards PLOF. Short term goal 2: Pt will demo min A x2 for transfers with  RW for support and improved upright posture alignment to progress towards PLOF. Short term goal 3: Pt will amb for >20 feet with min A x1 and use of RW for support to progress towards PLOF. Short term goal 4: Pt will tolerate 10-20 reps of ther ex to increase overall mobility and improve ROM to improve gait mechanics. Short term goal 5: Pt will tolerate 2 min of standing tasks with improved upright posture with progress with overall mobillity and improve endurance.   Long Term Goals  Time Frame for Long term goals : NA due to short ELOS    Following session, patient left in safe position with all fall risk precautions in place.

## 2022-05-13 NOTE — PROGRESS NOTES
[facility]  Occupational Therapy  Daily Note  Time:    Time In: 0810  Time Out: 5066  Timed Code Treatment Minutes: 28 Minutes  Minutes: 28          Date: 2022  Patient Name: Mariaelena Jimenez,   Gender: female      [facility]  MRN: [id number]  : 1960  (60 y.o.)  Referring Practitioner: XI Burleson CNP  Diagnosis: displaced articular fracture of head of R femur  Additional Pertinent Hx: Per EMR \"The patient is a 60 y.o. medically comorbid female  who presents with right knee pain after a fall in her home. Patient underwent a right TKA a week ago, has been doing well. She was noted to have fallen out of her bed at home. She denies other injuries and no LOC. She denies SOB and CP. X-rays demonstrate the fracture. Spouse states that she has been ambulating on crutches for the past 8 months and primarily stays in her bed throughout the day. He also relates that she does not get out of bed for eating or bathroom. She uses a depends. Patient admits to 2 ppd smoking history. \" Pt is s/p RIGHT KNEE TOTAL ARTHROPLASTY REVISION tibial and femoral components completed by Dr. Mario Truong on 5/10. Restrictions/Precautions:  Restrictions/Precautions: General Precautions,Fall Risk,Weight Bearing  Right Lower Extremity Weight Bearing: Weight Bearing As Tolerated  Required Braces or Orthoses  Right Lower Extremity Brace: Knee Immobilizer  Position Activity Restriction  Other position/activity restrictions: L knee flexion contracture       SUBJECTIVE: Pt sitting EOB finishing up breakfast. Pt did not have knee immobilizer on.  When asked regarding, pt stating it was taken off d/t her getting sore on the back of her legs from the rods     PAIN: did not state/10: right knee, Pt had incraesed pain when attempting to don knee immobilizer while pt supine in bed d/t pt having difficulty extending hher knee enough probably from sitting EOB with knee flexed     Vitals: Vitals not assessed various t/fs including BSC with mod A x 1  Short Term Goal 2: Pt will tolerate standing 2 min with CGA for increased ease of toileting routine  Short Term Goal 3: Pt will complete mobility to [facility] or bedside chair with RW, Min A, & min vcs for technique  Short Term Goal 4: Pt will complete LE dressing with mod A & LH AE  Long Term Goals  Time Frame for Long term goals : No LTG set d/t short ELOS    Following session, patient left in safe position with all fall risk precautions in place.

## 2022-05-13 NOTE — DISCHARGE INSTR - COC
Continuity of Care Form    Patient Name: Ana Roman   :  1960  MRN:  [id number]    Admit date:  2022  Discharge date:  2022    Code Status Order: Full Code   Advance Directives:      Admitting Physician:  Michael Kimble MD  PCP: Jaimee Suarez DO    Discharging Nurse: JOSE EWIGN [facility] Unit/Room#: 7K-02/002-A  Discharging Unit Phone Number: [phone]    Emergency Contact:   Extended Emergency Contact Information  Primary Emergency Contact: Jose Roberto Solomon  Address: [address] [place] [address] [address]           [place], [place] [address] [address] Phone: [phone]  Relation: Spouse    Past Surgical History:  Past Surgical History:   Procedure Laterality Date    BLADDER SUSPENSION      BREAST BIOPSY Left     atypical hyperlasia, 2016, [facility]    BREAST LUMPECTOMY Left     atypical hyperplasia, 2016, [facility]    COLONOSCOPY  2020    HERNIA REPAIR  10/17/2016    KNEE SURGERY      Right knee    REVISION TOTAL KNEE ARTHROPLASTY Right 5/10/2022    RIGHT KNEE TOTAL ARTHROPLASTY REVISION performed by Chirstina Marie MD at [address] [facility] EXTRACTION         Immunization History:   Immunization History   Administered Date(s) Administered    COVID-19, Pfizer Purple top, DILUTE for use, 12+ yrs, 30mcg/0.3mL dose 2021, 2021, 2022    Influenza, Quadv, IM, PF (6 mo and older Fluzone, Flulaval, Fluarix, and 3 yrs and older Afluria) 12/15/2020    Pneumococcal Polysaccharide ([id number]) 2015    Tdap (Boostrix, Adacel) 2015       Active Problems:  Patient Active Problem List   Diagnosis Code    DM type 2 (diabetes mellitus, type 2) ([facility] [address] E11.9    Hyperlipemia E78.5    THORNTON (nonalcoholic steatohepatitis) K75.81    Obesity E66.9    Tobacco abuse Z72.0    Vitamin D deficiency E55.9    Seborrheic keratosis L82.1    Dyshidrotic eczema L30.1    Displaced articular fracture of head of right femur, sequela S72.061S    Neida-prosthetic supracondylar fracture of femur M97. 8WIH, [id number]       Isolation/Infection:   Isolation            No Isolation          Patient Infection Status       Infection Onset Added Last Indicated Last Indicated By Review Planned Expiration Resolved Resolved By    None active    Resolved    C-diff Rule Out 09/07/21 09/07/21 09/07/21 Clostridium Difficile Toxin/Antigen (Ordered)   09/07/21 Rule-Out Test Resulted            Nurse Assessment:  Last Vital Signs: BP (!) 116/52   Pulse 89   Temp 98.2 °F (36.8 °C) (Oral)   Resp 18   Ht 5' 7.52\" (1.715 m)   Wt 195 lb (88.5 kg)   SpO2 97%   BMI 30.07 kg/m²     Last documented pain score (0-10 scale): Pain Level: 9  Last Weight:   Wt Readings from Last 1 Encounters:   05/09/22 195 lb (88.5 kg)     Mental Status:  oriented and alert    IV Access:  - None    Nursing Mobility/ADLs:  Walking   Assisted  Transfer  Assisted  Bathing  Assisted  Dressing  Assisted  Toileting  Assisted  Feeding  Independent  Med Admin  Assisted  Med Delivery   whole    Wound Care Documentation and Therapy:  Incision 05/10/22 Knee Anterior;Right (Active)   Dressing Status Clean;Dry; Intact 05/13/22 0848   Incision Cleansed Cleansed with saline; Wound cleanser 05/10/22 1441   Dressing/Treatment Gauze dressing/dressing sponge; Ace wrap 05/13/22 0848   Closure Other (Comment) 05/12/22 0843   Margins Approximated 05/10/22 1700   Drainage Amount None 05/13/22 0848   Odor None 05/13/22 0848   Neida-incision Assessment Other (Comment) 05/12/22 0843   Number of days: 2        Elimination:  Continence: Bowel: Yes  Bladder: No  Urinary Catheter: None   Colostomy/Ileostomy/Ileal Conduit: No       Date of Last BM: 05/21/2022    Intake/Output Summary (Last 24 hours) at 5/13/2022 1002  Last data filed at 5/13/2022 0912  Gross per 24 hour   Intake 460 ml   Output --   Net 460 ml     I/O last 3 completed shifts:   In: 940 [P.O.:940]  Out: -     Safety Concerns:     History of Falls (last 30 days) and At Risk for Falls    Impairments/Disabilities:      None    Nutrition Therapy:  Current Nutrition Therapy:   - Oral Diet:  Carb Control 4 carbs/meal (1800kcals/day)    Routes of Feeding: Oral  Liquids: No Restrictions  Daily Fluid Restriction: no  Last Modified Barium Swallow with Video (Video Swallowing Test): not done    Treatments at the Time of Hospital Discharge:   Respiratory Treatments: NA  Oxygen Therapy:  is not on home oxygen therapy. Ventilator:    - No ventilator support    Rehab Therapies: Physical Therapy and Occupational Therapy  Weight Bearing Status/Restrictions: No weight bearing restrictions; knee immobilizer on when ambulating  Other Medical Equipment (for information only, NOT a DME order):  walker  Other Treatments: dry dressing changes daily and PRN    Patient's personal belongings (please select all that are sent with patient):  Glasses    RN SIGNATURE:  Electronically signed by Lamin Reyes RN on 5/26/22 at 12:06 PM EDT    CASE MANAGEMENT/SOCIAL WORK SECTION    Inpatient Status Date: 5/9/22    Readmission Risk Assessment Score:  Readmission Risk              Risk of Unplanned Readmission:  15           Discharging to Facility/ Agency   Name:  [facility]   Address: [address] [place] [address]  Phone: [phone]  Fax:[phone]    Dialysis Facility (if applicable)   Name:  Address:  Dialysis Schedule:  Phone:  Fax:    / signature: Electronically signed by DYLAN Rodas on 5/19/2022 at 10:22 AM     PHYSICIAN SECTION    Prognosis: Good    Condition at Discharge: Stable    Rehab Potential (if transferring to Rehab): Good    Recommended Labs or Other Treatments After Discharge: none    Physician Certification: I certify the above information and transfer of Fam Kraus  is necessary for the continuing treatment of the diagnosis listed and that she requires [facility] for less 30 days.      Update Admission H&P: No change in H&P    PHYSICIAN SIGNATURE:  Electronically signed by Chalo Larson PA-C on 5/26/22 at 10:02 AM EDT

## 2022-05-13 NOTE — PLAN OF CARE
Problem: Discharge Planning  Goal: Discharge to home or other facility with appropriate resources  Outcome: Progressing     Problem: Pain  Goal: Verbalizes/displays adequate comfort level or baseline comfort level  Outcome: Progressing  Flowsheets (Taken 5/13/2022 0924)  Verbalizes/displays adequate comfort level or baseline comfort level:   Encourage patient to monitor pain and request assistance   Assess pain using appropriate pain scale   Administer analgesics based on type and severity of pain and evaluate response   Notify Licensed Independent Practitioner if interventions unsuccessful or patient reports new pain   Consider cultural and social influences on pain and pain management   Implement non-pharmacological measures as appropriate and evaluate response  Note: Pt report pain at 4 on scale. Pt states oral/iv/im medication helping to achieve pain goal of a 2 on scale. Problem: Skin/Tissue Integrity  Goal: Absence of new skin breakdown  Description: 1. Monitor for areas of redness and/or skin breakdown  2. Assess vascular access sites hourly  3. Every 4-6 hours minimum:  Change oxygen saturation probe site  4. Every 4-6 hours:  If on nasal continuous positive airway pressure, respiratory therapy assess nares and determine need for appliance change or resting period.   Outcome: Progressing     Problem: Safety - Adult  Goal: Free from fall injury  Outcome: Progressing  Flowsheets (Taken 5/13/2022 0924)  Free From Fall Injury:   Instruct family/caregiver on patient safety   Based on caregiver fall risk screen, instruct family/caregiver to ask for assistance with transferring infant if caregiver noted to have fall risk factors     Problem: ABCDS Injury Assessment  Goal: Absence of physical injury  Outcome: Progressing

## 2022-05-13 NOTE — PROGRESS NOTES
Orthopaedic Progress Note      SUBJECTIVE   Ms. Agnieszka Sweeney is post op day # 3 s/p revision right TKA  Pt seen in chair , working with PT  Immobilizer in place to R knee , pt to wear when up ambulating, may remove in bed  Pt noted pain to R knee  Satisfactory post-op x-rays   Pt planning to go to ECF when accepted       Allergies:     Allergies as of 05/09/2022 - Fully Reviewed 05/09/2022   Allergen Reaction Noted    Adhesive tape  03/15/2017     Current Inpatient Medications:  Current Facility-Administered Medications: 0.9 % sodium chloride infusion, , IntraVENous, Continuous  cyclobenzaprine (FLEXERIL) tablet 10 mg, 10 mg, Oral, TID PRN  rivaroxaban (XARELTO) tablet 10 mg, 10 mg, Oral, Daily  sodium chloride flush 0.9 % injection 5-40 mL, 5-40 mL, IntraVENous, 2 times per day  sodium chloride flush 0.9 % injection 5-40 mL, 5-40 mL, IntraVENous, PRN  0.9 % sodium chloride infusion, , IntraVENous, PRN  ondansetron (ZOFRAN-ODT) disintegrating tablet 4 mg, 4 mg, Oral, Q8H PRN **OR** ondansetron (ZOFRAN) injection 4 mg, 4 mg, IntraVENous, Q6H PRN  morphine (PF) injection 2 mg, 2 mg, IntraVENous, Q2H PRN **OR** morphine injection 4 mg, 4 mg, IntraVENous, Q2H PRN  HYDROcodone-acetaminophen (NORCO) 5-325 MG per tablet 1 tablet, 1 tablet, Oral, Q4H PRN **OR** HYDROcodone-acetaminophen (NORCO) 5-325 MG per tablet 2 tablet, 2 tablet, Oral, Q4H PRN  atorvastatin (LIPITOR) tablet 40 mg, 40 mg, Oral, Nightly  doxepin (SINEQUAN) capsule 10 mg, 10 mg, Oral, Nightly  fenofibrate (TRIGLIDE) tablet 160 mg, 160 mg, Oral, Daily  losartan (COZAAR) tablet 25 mg, 25 mg, Oral, Daily  insulin lispro (HUMALOG) injection vial 0-6 Units, 0-6 Units, SubCUTAneous, TID WC  insulin lispro (HUMALOG) injection vial 0-3 Units, 0-3 Units, SubCUTAneous, Nightly  glucose chewable tablet 16 g, 4 tablet, Oral, PRN  dextrose bolus 10% 125 mL, 125 mL, IntraVENous, PRN **OR** dextrose bolus 10% 250 mL, 250 mL, IntraVENous, PRN  glucagon (rDNA) injection 1 mg, 1 mg, IntraMUSCular, PRN  dextrose 5 % solution, 100 mL/hr, IntraVENous, PRN    REVIEW OF SYSTEMS:  Constitutional: Denies any fever, chills. Derm: Denies any rash or skin color change. Musculoskeletal: Denies numbness and tingling , Pain to R knee. Neuro: Denies any dizziness, paresthesia or weakness. OBJECTIVE    Patient Vitals for the past 24 hrs:   BP Temp Temp src Pulse Resp SpO2   05/13/22 0845 (!) 116/52 98.2 °F (36.8 °C) Oral 89 18 97 %   05/13/22 0440     16    05/13/22 0400 125/70 98.1 °F (36.7 °C) Oral 81 18 94 %   05/13/22 0054     16    05/12/22 1950 (!) 123/47 98.2 °F (36.8 °C) Oral 90 18 92 %   05/12/22 1918     18    05/12/22 1605 (!) 118/58 97.5 °F (36.4 °C) Oral 84 16 96 %   05/12/22 1155 (!) 143/55   92 16 94 %     INTAKE/OUTPUT:    Intake/Output Summary (Last 24 hours) at 5/13/2022 1001  Last data filed at 5/13/2022 0912  Gross per 24 hour   Intake 460 ml   Output    Net 460 ml     I/O last 3 completed shifts: In: 940 [P.O.:940]  Out: -     PHYSICAL EXAM:  General appearance:  Alert and oriented x 3. No apparent distress, appears stated age and cooperative. Musculoskeletal:  Denies calf pain to palpation. Pt can flex and extend right toes. Right knee drsg dry and intact. No redness or drainage noted from incision site. Skin: Skin color normal.  No rashes or lesions. Neurologic:  Neurovascularly intact without any focal sensory/motor deficits. Sensation intact.        Data  CBC:   Lab Results   Component Value Date    WBC 9.0 05/11/2022    HGB 9.8 05/11/2022     05/11/2022     BMP:    Lab Results   Component Value Date     05/13/2022    K 4.2 05/13/2022    K 4.6 05/09/2022     05/13/2022    CO2 21 05/13/2022    BUN 16 05/13/2022    CREATININE 0.4 05/13/2022    CALCIUM 8.3 05/13/2022    GLUCOSE 88 05/13/2022    GLUCOSE 113 12/10/2020     Uric Acid:  No components found for: URIC  PT/INR:    Lab Results   Component Value Date    PROTIME 11.0 10/05/2016 INR 0.90 10/05/2016     Troponin:  No results found for: TROPONINI  Urine Culture:  No components found for: JULIA    ASSESSMENT:  Active Hospital Problems    Diagnosis Date Noted    Displaced articular fracture of head of right femur, sequela [S72.061S] 05/09/2022     Priority: Medium    Neida-prosthetic supracondylar fracture of femur [O92. 8XXA, [address] 05/09/2022     Priority: Medium       PLAN  1. Dry drsg changes as needed           2. WBAT RLE in knee immobilizer for 8 weeks   3. PT/OT to eval and treat   4. Continue current medical management   5. [id number] for discharge  When accepted   6.  F/U in 3 weeks       Electronically signed by DEREK Orourke CNP on 5/13/2022 at 10:00 AM

## 2022-05-14 LAB
ANION GAP SERPL CALCULATED.3IONS-SCNC: 10 MEQ/L (ref 8–16)
BUN BLDV-MCNC: 10 MG/DL (ref 7–22)
CALCIUM SERPL-MCNC: 8.8 MG/DL (ref 8.5–10.5)
CHLORIDE BLD-SCNC: 101 MEQ/L (ref 98–111)
CO2: 26 MEQ/L (ref 23–33)
CREAT SERPL-MCNC: 0.4 MG/DL (ref 0.4–1.2)
GFR SERPL CREATININE-BSD FRML MDRD: > 90 ML/MIN/1.73M2
GLUCOSE BLD-MCNC: 101 MG/DL (ref 70–108)
GLUCOSE BLD-MCNC: 104 MG/DL (ref 70–108)
GLUCOSE BLD-MCNC: 125 MG/DL (ref 70–108)
GLUCOSE BLD-MCNC: 134 MG/DL (ref 70–108)
GLUCOSE BLD-MCNC: 93 MG/DL (ref 70–108)
POTASSIUM SERPL-SCNC: 3.7 MEQ/L (ref 3.5–5.2)
SODIUM BLD-SCNC: 137 MEQ/L (ref 135–145)
TROPONIN T: < 0.01 NG/ML

## 2022-05-14 PROCEDURE — 6370000000 HC RX 637 (ALT 250 FOR IP): Performed by: NURSE PRACTITIONER

## 2022-05-14 PROCEDURE — 1200000000 HC SEMI PRIVATE

## 2022-05-14 PROCEDURE — 97116 GAIT TRAINING THERAPY: CPT

## 2022-05-14 PROCEDURE — 84484 ASSAY OF TROPONIN QUANT: CPT

## 2022-05-14 PROCEDURE — 97110 THERAPEUTIC EXERCISES: CPT

## 2022-05-14 PROCEDURE — 36415 COLL VENOUS BLD VENIPUNCTURE: CPT

## 2022-05-14 PROCEDURE — 2580000003 HC RX 258: Performed by: NURSE PRACTITIONER

## 2022-05-14 PROCEDURE — 82948 REAGENT STRIP/BLOOD GLUCOSE: CPT

## 2022-05-14 PROCEDURE — 80048 BASIC METABOLIC PNL TOTAL CA: CPT

## 2022-05-14 RX ADMIN — LOSARTAN POTASSIUM 25 MG: 25 TABLET, FILM COATED ORAL at 08:59

## 2022-05-14 RX ADMIN — RIVAROXABAN 10 MG: 10 TABLET, FILM COATED ORAL at 18:37

## 2022-05-14 RX ADMIN — DOXEPIN HYDROCHLORIDE 10 MG: 10 CAPSULE ORAL at 20:35

## 2022-05-14 RX ADMIN — ATORVASTATIN CALCIUM 40 MG: 40 TABLET, FILM COATED ORAL at 20:35

## 2022-05-14 RX ADMIN — HYDROCODONE BITARTRATE AND ACETAMINOPHEN 2 TABLET: 5; 325 TABLET ORAL at 05:17

## 2022-05-14 RX ADMIN — HYDROCODONE BITARTRATE AND ACETAMINOPHEN 2 TABLET: 5; 325 TABLET ORAL at 22:47

## 2022-05-14 RX ADMIN — SODIUM CHLORIDE, PRESERVATIVE FREE 10 ML: 5 INJECTION INTRAVENOUS at 08:59

## 2022-05-14 RX ADMIN — HYDROCODONE BITARTRATE AND ACETAMINOPHEN 2 TABLET: 5; 325 TABLET ORAL at 10:44

## 2022-05-14 RX ADMIN — FENOFIBRATE 160 MG: 160 TABLET ORAL at 08:59

## 2022-05-14 RX ADMIN — CYCLOBENZAPRINE 10 MG: 10 TABLET, FILM COATED ORAL at 10:44

## 2022-05-14 ASSESSMENT — PAIN SCALES - GENERAL
PAINLEVEL_OUTOF10: 4
PAINLEVEL_OUTOF10: 4
PAINLEVEL_OUTOF10: 3
PAINLEVEL_OUTOF10: 7
PAINLEVEL_OUTOF10: 4
PAINLEVEL_OUTOF10: 9
PAINLEVEL_OUTOF10: 3
PAINLEVEL_OUTOF10: 8

## 2022-05-14 ASSESSMENT — PAIN DESCRIPTION - LOCATION
LOCATION: KNEE;HIP
LOCATION: HIP
LOCATION: KNEE;HIP
LOCATION: KNEE;HIP

## 2022-05-14 ASSESSMENT — PAIN DESCRIPTION - ORIENTATION
ORIENTATION: RIGHT
ORIENTATION: RIGHT

## 2022-05-14 NOTE — PROGRESS NOTES
Orthopaedic Progress Note      SUBJECTIVE   Ms. Mac Collins is post op day # 4     Patient s/p revision right TKA. Patient seen resting in bed. Pain slowly improving. No new ortho concerns. Awaiting ECF precert. OBJECTIVE      Physical    VITALS:  BP (!) 127/59   Pulse 83   Temp 98.1 °F (36.7 °C) (Oral)   Resp 16   Ht 5' 7.52\" (1.715 m)   Wt 195 lb (88.5 kg)   SpO2 98%   BMI 30.07 kg/m²   I/O last 3 completed shifts: In: 1050 [P.O.:1050]  Out: 350 [Urine:350]      RLE:  Dressing dry and intact. Stiffness to right knee noted with difficulty getting to full extension. Calf soft and nontender. NVI. Intact distal pulses.       Data  CBC:   Lab Results   Component Value Date    WBC 9.0 05/11/2022    HGB 9.8 05/11/2022     05/11/2022     BMP:    Lab Results   Component Value Date     05/14/2022    K 3.7 05/14/2022    K 4.6 05/09/2022     05/14/2022    CO2 26 05/14/2022    BUN 10 05/14/2022    CREATININE 0.4 05/14/2022    CALCIUM 8.8 05/14/2022    GLUCOSE 101 05/14/2022    GLUCOSE 113 12/10/2020     Uric Acid:  No components found for: URIC  PT/INR:    Lab Results   Component Value Date    PROTIME 11.0 10/05/2016    INR 0.90 10/05/2016     Troponin:  No results found for: TROPONINI  Urine Culture:  No components found for: CURINE      Current Inpatient Medications    Current Facility-Administered Medications: 0.9 % sodium chloride infusion, , IntraVENous, Continuous  cyclobenzaprine (FLEXERIL) tablet 10 mg, 10 mg, Oral, TID PRN  rivaroxaban (XARELTO) tablet 10 mg, 10 mg, Oral, Daily  sodium chloride flush 0.9 % injection 5-40 mL, 5-40 mL, IntraVENous, 2 times per day  sodium chloride flush 0.9 % injection 5-40 mL, 5-40 mL, IntraVENous, PRN  0.9 % sodium chloride infusion, , IntraVENous, PRN  ondansetron (ZOFRAN-ODT) disintegrating tablet 4 mg, 4 mg, Oral, Q8H PRN **OR** ondansetron (ZOFRAN) injection 4 mg, 4 mg, IntraVENous, Q6H PRN  morphine (PF) injection 2 mg, 2 mg, IntraVENous, Q2H PRN **OR** morphine injection 4 mg, 4 mg, IntraVENous, Q2H PRN  HYDROcodone-acetaminophen (NORCO) 5-325 MG per tablet 1 tablet, 1 tablet, Oral, Q4H PRN **OR** HYDROcodone-acetaminophen (NORCO) 5-325 MG per tablet 2 tablet, 2 tablet, Oral, Q4H PRN  atorvastatin (LIPITOR) tablet 40 mg, 40 mg, Oral, Nightly  doxepin (SINEQUAN) capsule 10 mg, 10 mg, Oral, Nightly  fenofibrate (TRIGLIDE) tablet 160 mg, 160 mg, Oral, Daily  losartan (COZAAR) tablet 25 mg, 25 mg, Oral, Daily  insulin lispro (HUMALOG) injection vial 0-6 Units, 0-6 Units, SubCUTAneous, TID WC  insulin lispro (HUMALOG) injection vial 0-3 Units, 0-3 Units, SubCUTAneous, Nightly  glucose chewable tablet 16 g, 4 tablet, Oral, PRN  dextrose bolus 10% 125 mL, 125 mL, IntraVENous, PRN **OR** dextrose bolus 10% 250 mL, 250 mL, IntraVENous, PRN  glucagon (rDNA) injection 1 mg, 1 mg, IntraMUSCular, PRN  dextrose 5 % solution, 100 mL/hr, IntraVENous, PRN        PLAN    1)  Cont with current medical management  2)  When in bed, try to keep knee straight, if wanting to lay on side and unable to keep knee extended, then may need to wear knee immobilizer at those times.  Do not place anything under the knee when in bed  3)  Ice and elevation PRN  4)  PT/OT  5)  WBAT RLE  6)  Anticipating ECF upon discharge, awaiting precert    Rafael Lopez PA-C

## 2022-05-14 NOTE — PLAN OF CARE
Problem: Discharge Planning  Goal: Discharge to home or other facility with appropriate resources  Outcome: Progressing  Flowsheets (Taken 5/12/2022 0843 by Lena Cordero RN)  Discharge to home or other facility with appropriate resources: Arrange for needed discharge resources and transportation as appropriate     Problem: Pain  Goal: Verbalizes/displays adequate comfort level or baseline comfort level  Outcome: Progressing  Flowsheets (Taken 5/13/2022 0924 by Prem Larios RN)  Verbalizes/displays adequate comfort level or baseline comfort level:   Encourage patient to monitor pain and request assistance   Assess pain using appropriate pain scale   Administer analgesics based on type and severity of pain and evaluate response   Notify Licensed Independent Practitioner if interventions unsuccessful or patient reports new pain   Consider cultural and social influences on pain and pain management   Implement non-pharmacological measures as appropriate and evaluate response     Problem: Skin/Tissue Integrity  Goal: Absence of new skin breakdown  Description: 1. Monitor for areas of redness and/or skin breakdown  2. Assess vascular access sites hourly  3. Every 4-6 hours minimum:  Change oxygen saturation probe site  4. Every 4-6 hours:  If on nasal continuous positive airway pressure, respiratory therapy assess nares and determine need for appliance change or resting period.   Outcome: Progressing  Note: No new skin breakdown noted     Problem: Safety - Adult  Goal: Free from fall injury  Outcome: Progressing  Flowsheets (Taken 5/13/2022 0924 by Prem Larios RN)  Free From Fall Injury:   Instruct family/caregiver on patient safety   Based on caregiver fall risk screen, instruct family/caregiver to ask for assistance with transferring infant if caregiver noted to have fall risk factors     Problem: ABCDS Injury Assessment  Goal: Absence of physical injury  Outcome: Progressing  Flowsheets (Taken 5/12/2022 1030 by Oc Cherry, RN)  Absence of Physical Injury: Implement safety measures based on patient assessment     Problem: Chronic Conditions and Co-morbidities  Goal: Patient's chronic conditions and co-morbidity symptoms are monitored and maintained or improved  Outcome: Progressing  Flowsheets (Taken 5/12/2022 0843 by Oc Cherry, RN)  Care Plan - Patient's Chronic Conditions and Co-Morbidity Symptoms are Monitored and Maintained or Improved: Monitor and assess patient's chronic conditions and comorbid symptoms for stability, deterioration, or improvement

## 2022-05-14 NOTE — PROGRESS NOTES
[facility]  INPATIENT PHYSICAL THERAPY  DAILY NOTE  [facility] ORTHOPEDICS 7K - 7K-02/002-A    Time In: 9811  Time Out: 1238  Timed Code Treatment Minutes: 35 Minutes  Minutes: 33          Date: 2022  Patient Name: Ángel Pollard,  Gender:  female        MRN: [id number]  : 1960  (60 y.o.)     Referring Practitioner: DEREK Barajas CNP  Diagnosis: displaced articular fracture of head of R femur  Additional Pertinent Hx: Per EMR \"The patient is a 60 y.o. medically comorbid female  who presents with right knee pain after a fall in her home. Patient underwent a right TKA a week ago, has been doing well. She was noted to have fallen out of her bed at home. She denies other injuries and no LOC. She denies SOB and CP. X-rays demonstrate the fracture. Spouse states that she has been ambulating on crutches for the past 8 months and primarily stays in her bed throughout the day. He also relates that she does not get out of bed for eating or bathroom. She uses a depends. Patient admits to 2 ppd smoking history. \" Pt is s/p RIGHT KNEE TOTAL ARTHROPLASTY REVISION tibial and femoral components completed by Dr. Nickolas Calix on 5/10. Prior Level of Function:  Lives With: Spouse  Type of Home: House  Home Layout: One level  Home Access: Stairs to enter with rails  Entrance Stairs - Number of Steps: 3 FABIO  Entrance Stairs - Rails: Left  Home Equipment: Crutches,Walker, rolling,Cane (\"stand up walker\" to stand up taller)   Bathroom Shower/Tub: Tub/Shower unit  Bathroom Toilet: Standard  Bathroom Equipment: Shower chair    ADL Assistance: Needs assistance (A for LE dressing & bathing)  Homemaking Assistance: Independent  Ambulation Assistance: Independent  Transfer Assistance: Independent  Active : No  Additional Comments: Pt reports prior to knee issues she was IND with all tasks, no use of AD.  Pt recently required crutches (house is too tight for a walker), has been laying in bed more, and  has been completing cooking and cleaning tasks. 2 year hx of B knee issues    Restrictions/Precautions:  Restrictions/Precautions: General Precautions,Fall Risk,Weight Bearing  Right Lower Extremity Weight Bearing: Weight Bearing As Tolerated  Required Braces or Orthoses  Right Lower Extremity Brace: Knee Immobilizer  Position Activity Restriction  Other position/activity restrictions: L knee flexion contracture     SUBJECTIVE: RN approved session. Patient at EOB upon arrival and agreeable to therapy. During session pt needed dressing change. Following dressing change pt was placed in supine and knee immobilizer was placed. PAIN: Not Rated    Vitals: Vitals not assessed per clinical judgement, see nursing flowsheet    OBJECTIVE:  Bed Mobility:  Supine to Sit: Minimal Assistance, with head of bed flat, with increased time for completion, assist needed with RLE  Sit to Supine: Minimal Assistance, with head of bed flat, assist needed with RLE   Scooting: Stand By Assistance    Transfers:  Sit to Stand: Minimal Assistance, Moderate Assistance, with increased time for completion, cues for hand placement, x3 trials with Mod A needed initially, following inital stand pt only needed Min A  Stand to Sit:Contact [employer] Assistance    Ambulation:  [address], with verbal cues   Distance: 10 ft in room  Surface: Level Tile  Device:Rolling Walker  Gait Deviations: Forward Flexed Posture, Slow Dianna, Decreased Step Length Bilaterally, Decreased Weight Shift Bilaterally, Decreased Gait Speed and Decreased Heel Strike Bilaterally, Decreased Terminal Knee Extension Bilaterally with brace placed on RLE    Balance:  Static Standing Balance: Contact Guard Assistance, with verbal cues    -tactile and verbal cues needed for gluteal activation as well as knee extension    Exercise:  Patient was guided in 1 set(s) 15 reps of exercise to both lower extremities. Seated heel/toe raises and Long arc quads.   Exercises were completed for increased independence with functional mobility. Functional Outcome Measures: Completed  AM-PAC Inpatient Mobility Raw Score : 13  AM-PAC Inpatient T-Scale Score : 36.74    ASSESSMENT:  Assessment: Patient progressing toward established goals. Activity Tolerance:  Patient tolerance of  treatment: good. Equipment Recommendations:Equipment Needed: No  Discharge Recommendations: Continue to assess pending progress, Subacte/Skilled Nursing Facility and Patient would benefit from continued PT at discharge  Plan: Current Treatment Recommendations: Strengthening,Equipment evaluation, education, & procurement,Balance training,Gait training,Functional mobility training,Stair training,Neuromuscular re-education,Transfer training,Endurance training,Patient/Caregiver education & training,Therapeutic activities,Safety education & training,Home exercise program  Plan:  (6x O)    Patient Education  Patient Education: Plan of Care, Bed Mobility, Transfers, Gait, Verbal Exercise Instruction    Goals:  Patient goals : \"get back to normal\"  Short Term Goals  Time Frame for Short term goals: by discharge  Short term goal 1: Pt will demo min A for bed mobility tasks with bed flat to progress towards PLOF. Short term goal 2: Pt will demo min A x2 for transfers with  RW for support and improved upright posture alignment to progress towards PLOF. Short term goal 3: Pt will amb for >20 feet with min A x1 and use of RW for support to progress towards PLOF. Short term goal 4: Pt will tolerate 10-20 reps of ther ex to increase overall mobility and improve ROM to improve gait mechanics. Short term goal 5: Pt will tolerate 2 min of standing tasks with improved upright posture with progress with overall mobillity and improve endurance. Long Term Goals  Time Frame for Long term goals : NA due to short ELOS    Following session, patient left in safe position with all fall risk precautions in place.

## 2022-05-15 LAB
ANION GAP SERPL CALCULATED.3IONS-SCNC: 10 MEQ/L (ref 8–16)
BUN BLDV-MCNC: 10 MG/DL (ref 7–22)
CALCIUM SERPL-MCNC: 8.5 MG/DL (ref 8.5–10.5)
CHLORIDE BLD-SCNC: 101 MEQ/L (ref 98–111)
CO2: 22 MEQ/L (ref 23–33)
CREAT SERPL-MCNC: 0.5 MG/DL (ref 0.4–1.2)
GFR SERPL CREATININE-BSD FRML MDRD: > 90 ML/MIN/1.73M2
GLUCOSE BLD-MCNC: 108 MG/DL (ref 70–108)
GLUCOSE BLD-MCNC: 115 MG/DL (ref 70–108)
GLUCOSE BLD-MCNC: 122 MG/DL (ref 70–108)
GLUCOSE BLD-MCNC: 128 MG/DL (ref 70–108)
GLUCOSE BLD-MCNC: 273 MG/DL (ref 70–108)
POTASSIUM SERPL-SCNC: 4.1 MEQ/L (ref 3.5–5.2)
SODIUM BLD-SCNC: 133 MEQ/L (ref 135–145)
TROPONIN T: < 0.01 NG/ML

## 2022-05-15 PROCEDURE — 84484 ASSAY OF TROPONIN QUANT: CPT

## 2022-05-15 PROCEDURE — 6370000000 HC RX 637 (ALT 250 FOR IP): Performed by: NURSE PRACTITIONER

## 2022-05-15 PROCEDURE — 1200000000 HC SEMI PRIVATE

## 2022-05-15 PROCEDURE — 80048 BASIC METABOLIC PNL TOTAL CA: CPT

## 2022-05-15 PROCEDURE — 2580000003 HC RX 258: Performed by: NURSE PRACTITIONER

## 2022-05-15 PROCEDURE — 82948 REAGENT STRIP/BLOOD GLUCOSE: CPT

## 2022-05-15 PROCEDURE — 36415 COLL VENOUS BLD VENIPUNCTURE: CPT

## 2022-05-15 RX ADMIN — HYDROCODONE BITARTRATE AND ACETAMINOPHEN 2 TABLET: 5; 325 TABLET ORAL at 16:10

## 2022-05-15 RX ADMIN — LOSARTAN POTASSIUM 25 MG: 25 TABLET, FILM COATED ORAL at 07:53

## 2022-05-15 RX ADMIN — HYDROCODONE BITARTRATE AND ACETAMINOPHEN 2 TABLET: 5; 325 TABLET ORAL at 03:33

## 2022-05-15 RX ADMIN — SODIUM CHLORIDE, PRESERVATIVE FREE 10 ML: 5 INJECTION INTRAVENOUS at 07:53

## 2022-05-15 RX ADMIN — CYCLOBENZAPRINE 10 MG: 10 TABLET, FILM COATED ORAL at 08:58

## 2022-05-15 RX ADMIN — FENOFIBRATE 160 MG: 160 TABLET ORAL at 07:54

## 2022-05-15 RX ADMIN — HYDROCODONE BITARTRATE AND ACETAMINOPHEN 2 TABLET: 5; 325 TABLET ORAL at 12:00

## 2022-05-15 RX ADMIN — INSULIN LISPRO 3 UNITS: 100 INJECTION, SOLUTION INTRAVENOUS; SUBCUTANEOUS at 17:10

## 2022-05-15 RX ADMIN — ATORVASTATIN CALCIUM 40 MG: 40 TABLET, FILM COATED ORAL at 21:28

## 2022-05-15 RX ADMIN — HYDROCODONE BITARTRATE AND ACETAMINOPHEN 2 TABLET: 5; 325 TABLET ORAL at 07:54

## 2022-05-15 RX ADMIN — CYCLOBENZAPRINE 10 MG: 10 TABLET, FILM COATED ORAL at 17:12

## 2022-05-15 RX ADMIN — DOXEPIN HYDROCHLORIDE 10 MG: 10 CAPSULE ORAL at 21:28

## 2022-05-15 RX ADMIN — RIVAROXABAN 10 MG: 10 TABLET, FILM COATED ORAL at 17:11

## 2022-05-15 ASSESSMENT — PAIN DESCRIPTION - ORIENTATION: ORIENTATION: RIGHT

## 2022-05-15 ASSESSMENT — PAIN SCALES - GENERAL
PAINLEVEL_OUTOF10: 3
PAINLEVEL_OUTOF10: 5
PAINLEVEL_OUTOF10: 8
PAINLEVEL_OUTOF10: 7
PAINLEVEL_OUTOF10: 3
PAINLEVEL_OUTOF10: 3
PAINLEVEL_OUTOF10: 4
PAINLEVEL_OUTOF10: 4

## 2022-05-15 ASSESSMENT — PAIN DESCRIPTION - LOCATION: LOCATION: KNEE

## 2022-05-15 ASSESSMENT — PAIN - FUNCTIONAL ASSESSMENT
PAIN_FUNCTIONAL_ASSESSMENT: PREVENTS OR INTERFERES SOME ACTIVE ACTIVITIES AND ADLS
PAIN_FUNCTIONAL_ASSESSMENT: 0-10

## 2022-05-15 ASSESSMENT — PAIN DESCRIPTION - DESCRIPTORS: DESCRIPTORS: SHARP

## 2022-05-15 NOTE — PROGRESS NOTES
Orthopaedic Progress Note      SUBJECTIVE   Ms. Sarai Flores is post op day # 5     Patient s/p revision right TKA. Patient seen resting at side of bed, family at bedside. Pain slowly improving. No new ortho concerns. Awaiting ECF precert.         OBJECTIVE      Physical    VITALS:  BP (!) 133/50   Pulse 94   Temp 97.7 °F (36.5 °C) (Oral)   Resp 18   Ht 5' 7.52\" (1.715 m)   Wt 195 lb (88.5 kg)   SpO2 97%   BMI 30.07 kg/m²   I/O last 3 completed shifts: In: 4406 [P.O.:1750]  Out: 7245 [Urine:1750]      RLE:  Dressing dry and intact. Stiffness to right knee noted with difficulty getting to full extension. Calf soft and nontender. NVI. Intact distal pulses.     Data  CBC:   Lab Results   Component Value Date    WBC 9.0 05/11/2022    HGB 9.8 05/11/2022     05/11/2022     BMP:    Lab Results   Component Value Date     05/15/2022    K 4.1 05/15/2022    K 4.6 05/09/2022     05/15/2022    CO2 22 05/15/2022    BUN 10 05/15/2022    CREATININE 0.5 05/15/2022    CALCIUM 8.5 05/15/2022    GLUCOSE 122 05/15/2022    GLUCOSE 113 12/10/2020     Uric Acid:  No components found for: URIC  PT/INR:    Lab Results   Component Value Date    PROTIME 11.0 10/05/2016    INR 0.90 10/05/2016     Troponin:  No results found for: TROPONINI  Urine Culture:  No components found for: CURINE      Current Inpatient Medications    Current Facility-Administered Medications: 0.9 % sodium chloride infusion, , IntraVENous, Continuous  cyclobenzaprine (FLEXERIL) tablet 10 mg, 10 mg, Oral, TID PRN  rivaroxaban (XARELTO) tablet 10 mg, 10 mg, Oral, Daily  sodium chloride flush 0.9 % injection 5-40 mL, 5-40 mL, IntraVENous, 2 times per day  sodium chloride flush 0.9 % injection 5-40 mL, 5-40 mL, IntraVENous, PRN  0.9 % sodium chloride infusion, , IntraVENous, PRN  ondansetron (ZOFRAN-ODT) disintegrating tablet 4 mg, 4 mg, Oral, Q8H PRN **OR** ondansetron (ZOFRAN) injection 4 mg, 4 mg, IntraVENous, Q6H PRN  morphine (PF) injection 2 mg, 2 mg, IntraVENous, Q2H PRN **OR** morphine injection 4 mg, 4 mg, IntraVENous, Q2H PRN  HYDROcodone-acetaminophen (NORCO) 5-325 MG per tablet 1 tablet, 1 tablet, Oral, Q4H PRN **OR** HYDROcodone-acetaminophen (NORCO) 5-325 MG per tablet 2 tablet, 2 tablet, Oral, Q4H PRN  atorvastatin (LIPITOR) tablet 40 mg, 40 mg, Oral, Nightly  doxepin (SINEQUAN) capsule 10 mg, 10 mg, Oral, Nightly  fenofibrate (TRIGLIDE) tablet 160 mg, 160 mg, Oral, Daily  losartan (COZAAR) tablet 25 mg, 25 mg, Oral, Daily  insulin lispro (HUMALOG) injection vial 0-6 Units, 0-6 Units, SubCUTAneous, TID WC  insulin lispro (HUMALOG) injection vial 0-3 Units, 0-3 Units, SubCUTAneous, Nightly  glucose chewable tablet 16 g, 4 tablet, Oral, PRN  dextrose bolus 10% 125 mL, 125 mL, IntraVENous, PRN **OR** dextrose bolus 10% 250 mL, 250 mL, IntraVENous, PRN  glucagon (rDNA) injection 1 mg, 1 mg, IntraMUSCular, PRN  dextrose 5 % solution, 100 mL/hr, IntraVENous, PRN        PLAN    1)  Cont with current medical management  2)  Cont to encourage keeping leg straight when sleeping or when resting in bed  3)  Ice and elevation PRN  4)  PT/OT  5)  WBAT RLE  6)  Anticipating ECF upon discharge, awaiting precert     Carley Beaver PA-C

## 2022-05-15 NOTE — PLAN OF CARE
Problem: Discharge Planning  Goal: Discharge to home or other facility with appropriate resources  Outcome: Progressing  Flowsheets (Taken 5/15/2022 1509)  Discharge to home or other facility with appropriate resources: Arrange for needed discharge resources and transportation as appropriate  Note: Patient planning discharge to [facility] pending precert.  assisting patient with discharge needs. Problem: Pain  Goal: Verbalizes/displays adequate comfort level or baseline comfort level  Outcome: Progressing  Flowsheets (Taken 5/15/2022 1509)  Verbalizes/displays adequate comfort level or baseline comfort level:   Assess pain using appropriate pain scale   Encourage patient to monitor pain and request assistance   Administer analgesics based on type and severity of pain and evaluate response   Implement non-pharmacological measures as appropriate and evaluate response  Note: Patient is able to use 0-10 scale to verbalize pain. Patient verbalizing pain to right knee this shift. Patient taking flexeril and norco PRN and voices relief of pain. Ice applied to right knee PRN. Problem: Skin/Tissue Integrity  Goal: Absence of new skin breakdown  Description: 1. Monitor for areas of redness and/or skin breakdown  2. Assess vascular access sites hourly  3. Every 4-6 hours minimum:  Change oxygen saturation probe site  4. Every 4-6 hours:  If on nasal continuous positive airway pressure, respiratory therapy assess nares and determine need for appliance change or resting period. Outcome: Progressing  Note: No new skin breakdown noted to patient this shift. Patient with redness and excoriation to buttocks and franklin area. Patient assisted with franklin care and zinc paste applied to buttocks throughout the shift. Patient turned in bed every 2 hours when agreeable to prevent skin breakdown.       Problem: Chronic Conditions and Co-morbidities  Goal: Patient's chronic conditions and co-morbidity symptoms are monitored and maintained or improved  Outcome: Progressing  Flowsheets (Taken 5/15/2022 1509)  Care Plan - Patient's Chronic Conditions and Co-Morbidity Symptoms are Monitored and Maintained or Improved:   Monitor and assess patient's chronic conditions and comorbid symptoms for stability, deterioration, or improvement   Collaborate with multidisciplinary team to address chronic and comorbid conditions and prevent exacerbation or deterioration   Update acute care plan with appropriate goals if chronic or comorbid symptoms are exacerbated and prevent overall improvement and discharge     Problem: Safety - Adult  Goal: Free from fall injury  Outcome: Adequate for Discharge  Flowsheets (Taken 5/15/2022 1509)  Free From Fall Injury:   Based on caregiver fall risk screen, instruct family/caregiver to ask for assistance with transferring infant if caregiver noted to have fall risk factors   Instruct family/caregiver on patient safety  Note: Patient has not had any falls this shift. Patient compliant with using call light to ask for assistance. Hourly rounding in place. Bed alarm on. Problem: ABCDS Injury Assessment  Goal: Absence of physical injury  Outcome: Completed  Flowsheets (Taken 5/15/2022 1504)  Absence of Physical Injury: Implement safety measures based on patient assessment  Note: Patient has not had any physical injury this admission. Bed in lowest position with the wheels locked and call light in reach. Care plan reviewed with patient. Patient verbalizes understanding of plan of care and contributes to goal setting.

## 2022-05-16 LAB
ANION GAP SERPL CALCULATED.3IONS-SCNC: 19 MEQ/L (ref 8–16)
BUN BLDV-MCNC: 9 MG/DL (ref 7–22)
CALCIUM SERPL-MCNC: 9.4 MG/DL (ref 8.5–10.5)
CHLORIDE BLD-SCNC: 99 MEQ/L (ref 98–111)
CO2: 19 MEQ/L (ref 23–33)
CREAT SERPL-MCNC: 0.5 MG/DL (ref 0.4–1.2)
GFR SERPL CREATININE-BSD FRML MDRD: > 90 ML/MIN/1.73M2
GLUCOSE BLD-MCNC: 107 MG/DL (ref 70–108)
GLUCOSE BLD-MCNC: 121 MG/DL (ref 70–108)
GLUCOSE BLD-MCNC: 123 MG/DL (ref 70–108)
GLUCOSE BLD-MCNC: 124 MG/DL (ref 70–108)
GLUCOSE BLD-MCNC: 149 MG/DL (ref 70–108)
POTASSIUM SERPL-SCNC: 4.6 MEQ/L (ref 3.5–5.2)
SODIUM BLD-SCNC: 137 MEQ/L (ref 135–145)

## 2022-05-16 PROCEDURE — 6370000000 HC RX 637 (ALT 250 FOR IP): Performed by: NURSE PRACTITIONER

## 2022-05-16 PROCEDURE — 80048 BASIC METABOLIC PNL TOTAL CA: CPT

## 2022-05-16 PROCEDURE — 82948 REAGENT STRIP/BLOOD GLUCOSE: CPT

## 2022-05-16 PROCEDURE — 97110 THERAPEUTIC EXERCISES: CPT

## 2022-05-16 PROCEDURE — 1200000000 HC SEMI PRIVATE

## 2022-05-16 PROCEDURE — 94760 N-INVAS EAR/PLS OXIMETRY 1: CPT

## 2022-05-16 PROCEDURE — 97535 SELF CARE MNGMENT TRAINING: CPT

## 2022-05-16 PROCEDURE — 36415 COLL VENOUS BLD VENIPUNCTURE: CPT

## 2022-05-16 PROCEDURE — 97530 THERAPEUTIC ACTIVITIES: CPT

## 2022-05-16 RX ADMIN — FENOFIBRATE 160 MG: 160 TABLET ORAL at 10:07

## 2022-05-16 RX ADMIN — RIVAROXABAN 10 MG: 10 TABLET, FILM COATED ORAL at 17:58

## 2022-05-16 RX ADMIN — DOXEPIN HYDROCHLORIDE 10 MG: 10 CAPSULE ORAL at 20:24

## 2022-05-16 RX ADMIN — CYCLOBENZAPRINE 10 MG: 10 TABLET, FILM COATED ORAL at 20:24

## 2022-05-16 RX ADMIN — LOSARTAN POTASSIUM 25 MG: 25 TABLET, FILM COATED ORAL at 10:07

## 2022-05-16 RX ADMIN — HYDROCODONE BITARTRATE AND ACETAMINOPHEN 2 TABLET: 5; 325 TABLET ORAL at 04:10

## 2022-05-16 RX ADMIN — ATORVASTATIN CALCIUM 40 MG: 40 TABLET, FILM COATED ORAL at 20:24

## 2022-05-16 RX ADMIN — HYDROCODONE BITARTRATE AND ACETAMINOPHEN 2 TABLET: 5; 325 TABLET ORAL at 20:17

## 2022-05-16 ASSESSMENT — PAIN SCALES - GENERAL
PAINLEVEL_OUTOF10: 7
PAINLEVEL_OUTOF10: 0
PAINLEVEL_OUTOF10: 3
PAINLEVEL_OUTOF10: 7
PAINLEVEL_OUTOF10: 10
PAINLEVEL_OUTOF10: 3

## 2022-05-16 ASSESSMENT — PAIN DESCRIPTION - ORIENTATION
ORIENTATION: RIGHT
ORIENTATION: RIGHT

## 2022-05-16 ASSESSMENT — PAIN DESCRIPTION - ONSET
ONSET: ON-GOING
ONSET: ON-GOING

## 2022-05-16 ASSESSMENT — PAIN DESCRIPTION - LOCATION
LOCATION: KNEE
LOCATION: KNEE

## 2022-05-16 ASSESSMENT — PAIN DESCRIPTION - FREQUENCY
FREQUENCY: INTERMITTENT
FREQUENCY: INTERMITTENT

## 2022-05-16 ASSESSMENT — PAIN DESCRIPTION - PAIN TYPE: TYPE: SURGICAL PAIN

## 2022-05-16 ASSESSMENT — PAIN - FUNCTIONAL ASSESSMENT
PAIN_FUNCTIONAL_ASSESSMENT: ACTIVITIES ARE NOT PREVENTED
PAIN_FUNCTIONAL_ASSESSMENT: ACTIVITIES ARE NOT PREVENTED

## 2022-05-16 ASSESSMENT — PAIN DESCRIPTION - DESCRIPTORS
DESCRIPTORS: SHOOTING;ACHING
DESCRIPTORS: SHOOTING;ACHING

## 2022-05-16 NOTE — CARE COORDINATION
5/16/22, 2:32 PM EDT    DISCHARGE PLANNING EVALUATION    SW confirmed with Kate Gale with [facility], they have accepted pt and started pre-cert 7/16/62. Kate Gale stated that they have not heard from insurance.

## 2022-05-16 NOTE — PROGRESS NOTES
[facility]  INPATIENT PHYSICAL THERAPY  DAILY NOTE  [facility] ORTHOPEDICS 7K - 7K-02/002-A    Time In: 8684  Time Out: 0825  Timed Code Treatment Minutes: 34 Minutes  Minutes: 29          Date: 2022  Patient Name: Mariaelena Jimenez,  Gender:  female        MRN: [id number]  : 1960  (60 y.o.)     Referring Practitioner: DEREK Hernandez CNP  Diagnosis: displaced articular fracture of head of R femur  Additional Pertinent Hx: Per EMR \"The patient is a 60 y.o. medically comorbid female  who presents with right knee pain after a fall in her home. Patient underwent a right TKA a week ago, has been doing well. She was noted to have fallen out of her bed at home. She denies other injuries and no LOC. She denies SOB and CP. X-rays demonstrate the fracture. Spouse states that she has been ambulating on crutches for the past 8 months and primarily stays in her bed throughout the day. He also relates that she does not get out of bed for eating or bathroom. She uses a depends. Patient admits to 2 ppd smoking history. \" Pt is s/p RIGHT KNEE TOTAL ARTHROPLASTY REVISION tibial and femoral components completed by Dr. Mario Truong on 5/10. Prior Level of Function:  Lives With: Spouse  Type of Home: House  Home Layout: One level  Home Access: Stairs to enter with rails  Entrance Stairs - Number of Steps: 3 FABIO  Entrance Stairs - Rails: Left  Home Equipment: Crutches,Walker, rolling,Cane (\"stand up walker\" to stand up taller)   Bathroom Shower/Tub: Tub/Shower unit  Bathroom Toilet: Standard  Bathroom Equipment: Shower chair    ADL Assistance: Needs assistance (A for LE dressing & bathing)  Homemaking Assistance: Independent  Ambulation Assistance: Independent  Transfer Assistance: Independent  Active : No  Additional Comments: Pt reports prior to knee issues she was IND with all tasks, no use of AD.  Pt recently required crutches (house is too tight for a walker), has been laying in bed more, and  has been completing cooking and cleaning tasks. 2 year hx of B knee issues    Restrictions/Precautions:  Restrictions/Precautions: General Precautions,Fall Risk,Weight Bearing  Right Lower Extremity Weight Bearing: Weight Bearing As Tolerated  Required Braces or Orthoses  Right Lower Extremity Brace: Knee Immobilizer  Position Activity Restriction  Other position/activity restrictions: L knee flexion contracture     SUBJECTIVE: RN approved session. Patient standing with nursing staff upon arrival and agreeable to therapy. Patient had just finished with BS upon arrival, nursing staff sat patient on bed and this therapist took over from there. PAIN: 7/10: RLE    Vitals: Vitals not assessed per clinical judgement, see nursing flowsheet    OBJECTIVE:  Bed Mobility:  Not Tested    Transfers:  Sit to Stand: Moderate Assistance, X 1, with increased time for completion, cues for hand placement, with verbal cues  Stand to Sit:Minimal Assistance   *very forward flexed posture upon standing    Ambulation:  Contact Guard Assistance  Distance: ~8ft x2  Surface: Level Tile  Device:Rolling Walker  Gait Deviations: Forward Flexed Posture, Slow Dianna, Decreased Step Length on Left, Decreased Weight Shift Right, Decreased Gait Speed, Decreased Heel Strike Bilaterally, Unsteady Gait and Decreased Terminal Knee Extension    Exercise:  Patient was guided in 1 set(s) 10 reps of exercise to both lower extremities. Ankle pumps, Glut sets, Quad sets, Heelslides (L only), Hip abduction/adduction and Straight leg raises. Exercises were completed for increased independence with functional mobility. Functional Outcome Measures: Completed  -PAC Inpatient Mobility Raw Score : 13  -PAC Inpatient T-Scale Score : 36.74    ASSESSMENT:  Assessment: Patient progressing toward established goals. Activity Tolerance:  Patient tolerance of  treatment: fair.       Equipment Recommendations:Equipment Needed: No  Discharge Recommendations: Subacte/Skilled Nursing Facility and Patient would benefit from continued PT at discharge  Plan: Current Treatment Recommendations: Strengthening,Equipment evaluation, education, & procurement,Balance training,Gait training,Functional mobility training,Stair training,Neuromuscular re-education,Transfer training,Endurance training,Patient/Caregiver education & training,Therapeutic activities,Safety education & training,Home exercise program  Plan:  (6x O)    Patient Education  Patient Education: Plan of Care, Home Exercise Program, Precautions/Restrictions, Transfers, Gait, Up in Chair for All Meals, Verbal Exercise Instruction    Goals:  Patient goals : \"get back to normal\"  Short Term Goals  Time Frame for Short term goals: by discharge  Short term goal 1: Pt will demo min A for bed mobility tasks with bed flat to progress towards PLOF. Short term goal 2: Pt will demo min A x2 for transfers with  RW for support and improved upright posture alignment to progress towards PLOF. Short term goal 3: Pt will amb for >20 feet with min A x1 and use of RW for support to progress towards PLOF. Short term goal 4: Pt will tolerate 10-20 reps of ther ex to increase overall mobility and improve ROM to improve gait mechanics. Short term goal 5: Pt will tolerate 2 min of standing tasks with improved upright posture with progress with overall mobillity and improve endurance. Long Term Goals  Time Frame for Long term goals : NA due to short ELOS    Following session, patient left in safe position with all fall risk precautions in place.

## 2022-05-16 NOTE — PROGRESS NOTES
[facility]  Occupational Therapy  Daily Note  Time:    Time In: 09  Time Out: 1493  Timed Code Treatment Minutes: 24 Minutes  Minutes: 24          Date: 2022  Patient Name: Jorge Obrien,   Gender: female      Room: [facility]002-A  MRN: [id number]  : 1960  (60 y.o.)  Referring Practitioner: XI Crowley CNP  Diagnosis: displaced articular fracture of head of R femur  Additional Pertinent Hx: Per EMR \"The patient is a 60 y.o. medically comorbid female  who presents with right knee pain after a fall in her home. Patient underwent a right TKA a week ago, has been doing well. She was noted to have fallen out of her bed at home. She denies other injuries and no LOC. She denies SOB and CP. X-rays demonstrate the fracture. Spouse states that she has been ambulating on crutches for the past 8 months and primarily stays in her bed throughout the day. He also relates that she does not get out of bed for eating or bathroom. She uses a depends. Patient admits to 2 ppd smoking history. \" Pt is s/p RIGHT KNEE TOTAL ARTHROPLASTY REVISION tibial and femoral components completed by Dr. José Miguel Goldberg on 5/10. Restrictions/Precautions:  Restrictions/Precautions: General Precautions,Fall Risk,Weight Bearing  Right Lower Extremity Weight Bearing: Weight Bearing As Tolerated  Required Braces or Orthoses  Right Lower Extremity Brace: Knee Immobilizer  Position Activity Restriction  Other position/activity restrictions: L knee flexion contracture      SUBJECTIVE: Pt sitting at edge of bedside chair stating her right leg and foot were going numb when her feet were up so she had to put them down. Pt educated on completed ankle pumps to get blood circulating with symptoms lessening to increase participation in ADL tasks.      PAIN: did not state /10: right LE with pt requesting pain medication if she was allowed to have them     Vitals: Vitals not assessed per clinical judgement, see nursing flowsheet    COGNITION: WNL    ADL:   Grooming: Stand By Assistance. seated in chair after pt declining to complete in a standing position stating she didn't think she could stand that long without hanging on with both hands. Lower Extremity Dressing: Moderate Assistance. initiated education on use of LHAE - reacher and sock aid- to complete LB dressing of donning shorts and socks. Pt requiring assitance to complete . Pt stating her spouse will assist her at home to complete ADL tasks    BALANCE:  Sitting Balance:  Supervision. seated at edge of chair  Standing Balance: Minimal Assistance. with bilateral UE support on walker. Pt requiring increased time to bring trunk itno a upright position pushing with bilateral UEs and bringing right LE under her. Progressing to [address] when proper posture achieved. BED MOBILITY:  Not Tested    TRANSFERS:  Sit to Stand: Moderate Assistance. from bedside  Stand to Sit: Minimal Assistance. into chair    FUNCTIONAL MOBILITY:  Assistive Device: Rolling Walker  Assist Level:  Minimal Assistance. Distance: 3 steps fwd/bwd   Pt with increased complaints of pain causing decreased weight bearing on right LE        ASSESSMENT:     Activity Tolerance:  Patient tolerance of  treatment: fair. Increased pain iwht movement and weight bearing on right LE      Discharge Recommendations: Subacute/skilled nursing facility  Equipment Recommendations:  Other: Monitor pending progress  Plan: Times per Week: 6x  Current Treatment Recommendations: Balance training,Functional mobility training,Endurance training,Safety education & training,Patient/Caregiver education & training,Self-Care / ADL    Patient Education  Patient Education: use of LHAE     Goals  Short Term Goals  Time Frame for Short term goals: until discharge  Short Term Goal 1: Pt will complete various t/fs including BSC with mod A x 1  Short Term Goal 2: Pt will tolerate standing 2 min with CGA for increased ease of toileting routine  Short Term Goal 3: Pt will complete mobility to [facility] or bedside chair with RW, Min A, & min vcs for technique  Short Term Goal 4: Pt will complete LE dressing with mod A & LH AE  Long Term Goals  Time Frame for Long term goals : No LTG set d/t short ELOS    Following session, patient left in safe position with all fall risk precautions in place. normal sinus rhythm

## 2022-05-16 NOTE — CARE COORDINATION
5/16/22, 3:37 PM EDT    DISCHARGE ON GOING [facility]are[facility] Gamez 58 day: 7  7K02  Procedure: 5/10 - RIGHT KNEE TOTAL ARTHROPLASTY REVISION tibial and femoral components by Dr Sarabjit Abdul    Barriers to Discharge: POD # 6 await precert, PT/OT. Pain control.      Patient Goals/Plan/Treatment Preferences: await precert for Psychiatric

## 2022-05-16 NOTE — PLAN OF CARE
Problem: Discharge Planning  Goal: Discharge to home or other facility with appropriate resources  5/15/2022 2241 by ASHA Bui  Outcome: Progressing     Problem: Pain  Goal: Verbalizes/displays adequate comfort level or baseline comfort level  5/15/2022 2241 by ASHA Bui  Outcome: Progressing     Problem: Skin/Tissue Integrity  Goal: Absence of new skin breakdown  Description: 1. Monitor for areas of redness and/or skin breakdown  2. Assess vascular access sites hourly  3. Every 4-6 hours minimum:  Change oxygen saturation probe site  4. Every 4-6 hours:  If on nasal continuous positive airway pressure, respiratory therapy assess nares and determine need for appliance change or resting period. 5/15/2022 2241 by ASHA Bui  Outcome: Progressing     Problem: Safety - Adult  Goal: Free from fall injury  5/15/2022 2241 by ASHA Bui  Outcome: Progressing    Care plan reviewed with patient. Patient verbalize understanding of the plan of care and contribute to goal setting.

## 2022-05-16 NOTE — PLAN OF CARE
Problem: Discharge Planning  Goal: Discharge to home or other facility with appropriate resources  5/16/2022 1805 by Phillip Barragan RN  Outcome: Progressing  Flowsheets (Taken 5/16/2022 1805)  Discharge to home or other facility with appropriate resources: Identify barriers to discharge with patient and caregiver     Problem: Pain  Goal: Verbalizes/displays adequate comfort level or baseline comfort level  5/16/2022 1805 by Phillip Barragan RN  Outcome: Progressing  Flowsheets (Taken 5/16/2022 1805)  Verbalizes/displays adequate comfort level or baseline comfort level: Administer analgesics based on type and severity of pain and evaluate response     Problem: Skin/Tissue Integrity  Goal: Absence of new skin breakdown  Description: 1. Monitor for areas of redness and/or skin breakdown  2. Assess vascular access sites hourly  3. Every 4-6 hours minimum:  Change oxygen saturation probe site  4. Every 4-6 hours:  If on nasal continuous positive airway pressure, respiratory therapy assess nares and determine need for appliance change or resting period.   5/16/2022 1805 by Phillip Barragan RN  Outcome: Progressing  Note: No new skin breakdown this shift     Problem: Safety - Adult  Goal: Free from fall injury  5/16/2022 1805 by Phillip Barragan RN  Outcome: Progressing  Flowsheets (Taken 5/15/2022 1509 by Kristyn Quezada RN)  Free From Fall Injury:   Based on caregiver fall risk screen, instruct family/caregiver to ask for assistance with transferring infant if caregiver noted to have fall risk factors   Instruct family/caregiver on patient safety     Problem: Chronic Conditions and Co-morbidities  Goal: Patient's chronic conditions and co-morbidity symptoms are monitored and maintained or improved  5/16/2022 1805 by Phillip Barragan RN  Outcome: Progressing  Flowsheets (Taken 5/15/2022 1509 by Kristyn Quezada RN)  Care Plan - Patient's Chronic Conditions and Co-Morbidity Symptoms are Monitored and Maintained or Improved:   Monitor and assess patient's chronic conditions and comorbid symptoms for stability, deterioration, or improvement   Collaborate with multidisciplinary team to address chronic and comorbid conditions and prevent exacerbation or deterioration   Update acute care plan with appropriate goals if chronic or comorbid symptoms are exacerbated and prevent overall improvement and discharge   Care plan reviewed with patient. Patient verbalize understanding of the plan of care and contribute to goal setting.

## 2022-05-16 NOTE — DISCHARGE SUMMARY
Patient ID:  Ángel Pollard  [id number]  60 y.o.  1960    Admit date: 5/9/2022    Discharge date and time: No discharge date for patient encounter. Admitting Physician: Liz Gonzalez MD     Discharge Physician: Liz Gonzalez MD    Admission Diagnoses: Displaced articular fracture of head of right femur, sequela [S72.061S]  Closed displaced articular fracture of head of right femur, initial encounter ([facility] [address] [S72.061A]  Periprosthetic supracondylar fracture of femur, initial encounter [[address]    Discharge Diagnoses: Displaced articular fracture of head of right femur, sequela [S72.061S]  Closed displaced articular fracture of head of right femur, initial encounter ([facility] [address] [S72.061A]  Periprosthetic supracondylar fracture of femur, initial encounter [F82. Juanito Cruz Course: shawn is a 60year old female with a history of a fall sustaining a supracondylar right distal femur fracture. She was medically optimized for surgery and underwent a revision right TKA without complications. Post-operatively she worked with therapy and her pain was well controlled. She is ready for DC to SNF    Consults:  IM    Disposition: stable/good    Patient Instructions:      Medication List      START taking these medications    HYDROcodone-acetaminophen 5-325 MG per tablet  Commonly known as: Norco  Take 1-2 tablets by mouth every 4-6 hours as needed for Pain for up to 7 days.      Xarelto 10 MG Tabs tablet  Generic drug: rivaroxaban  Take 1 tablet by mouth every 24 hours        CONTINUE taking these medications    Acetaminophen 500 MG Caps     aspirin 81 MG tablet     atorvastatin 40 MG tablet  Commonly known as: LIPITOR  TAKE 1 TABLET NIGHTLY     baclofen 10 MG tablet  Commonly known as: LIORESAL  Take 1 tablet by mouth daily as needed (muscle spasms)     doxepin 10 MG capsule  Commonly known as: SINEQUAN  TAKE 2 CAPSULES NIGHTLY     fenofibrate 160 MG tablet  Commonly known as: TRIGLIDE  TAKE 1 TABLET DAILY     hydrocortisone 2.5 % cream  Apply topically 2 times daily.      KRILL OIL PO     losartan 25 MG tablet  Commonly known as: COZAAR  TAKE 1 TABLET DAILY     melatonin 3 MG Tabs tablet     metFORMIN 500 MG extended release tablet  Commonly known as: GLUCOPHAGE-XR  Take 1 tablet by mouth daily (with breakfast)     therapeutic multivitamin-minerals tablet     tiZANidine 2 MG tablet  Commonly known as: ZANAFLEX  Take 1 tablet by mouth 3 times daily as needed (muscle cramps)        STOP taking these medications    cyclobenzaprine 10 MG tablet  Commonly known as: FLEXERIL     MAGNESIUM PO     NONFORMULARY     oxyCODONE-acetaminophen 5-325 MG per tablet  Commonly known as: PERCOCET     VITAMIN B6 PO           Where to Get Your Medications      You can get these medications from any pharmacy    Bring a paper prescription for each of these medications  · HYDROcodone-acetaminophen 5-325 MG per tablet  · Xarelto 10 MG Tabs tablet       Activity: WBAT  Diet: regular  Wound Care: dry dressings prn    Follow-up Dr. José Miguel Goldberg    Signed:  DEREK Hutchinson - CNP  5/18/22  5009

## 2022-05-17 LAB
ANION GAP SERPL CALCULATED.3IONS-SCNC: 11 MEQ/L (ref 8–16)
BUN BLDV-MCNC: 10 MG/DL (ref 7–22)
CALCIUM SERPL-MCNC: 8.8 MG/DL (ref 8.5–10.5)
CHLORIDE BLD-SCNC: 104 MEQ/L (ref 98–111)
CO2: 22 MEQ/L (ref 23–33)
CREAT SERPL-MCNC: 0.4 MG/DL (ref 0.4–1.2)
GFR SERPL CREATININE-BSD FRML MDRD: > 90 ML/MIN/1.73M2
GLUCOSE BLD-MCNC: 120 MG/DL (ref 70–108)
GLUCOSE BLD-MCNC: 123 MG/DL (ref 70–108)
GLUCOSE BLD-MCNC: 129 MG/DL (ref 70–108)
GLUCOSE BLD-MCNC: 268 MG/DL (ref 70–108)
GLUCOSE BLD-MCNC: 97 MG/DL (ref 70–108)
POTASSIUM SERPL-SCNC: 4.9 MEQ/L (ref 3.5–5.2)
SODIUM BLD-SCNC: 137 MEQ/L (ref 135–145)
TROPONIN T: < 0.01 NG/ML

## 2022-05-17 PROCEDURE — 97535 SELF CARE MNGMENT TRAINING: CPT

## 2022-05-17 PROCEDURE — 97110 THERAPEUTIC EXERCISES: CPT

## 2022-05-17 PROCEDURE — 36415 COLL VENOUS BLD VENIPUNCTURE: CPT

## 2022-05-17 PROCEDURE — 82948 REAGENT STRIP/BLOOD GLUCOSE: CPT

## 2022-05-17 PROCEDURE — 84484 ASSAY OF TROPONIN QUANT: CPT

## 2022-05-17 PROCEDURE — 6370000000 HC RX 637 (ALT 250 FOR IP): Performed by: NURSE PRACTITIONER

## 2022-05-17 PROCEDURE — 94760 N-INVAS EAR/PLS OXIMETRY 1: CPT

## 2022-05-17 PROCEDURE — 80048 BASIC METABOLIC PNL TOTAL CA: CPT

## 2022-05-17 PROCEDURE — 97116 GAIT TRAINING THERAPY: CPT

## 2022-05-17 PROCEDURE — 1200000000 HC SEMI PRIVATE

## 2022-05-17 PROCEDURE — 97530 THERAPEUTIC ACTIVITIES: CPT

## 2022-05-17 RX ADMIN — HYDROCODONE BITARTRATE AND ACETAMINOPHEN 1 TABLET: 5; 325 TABLET ORAL at 23:02

## 2022-05-17 RX ADMIN — DOXEPIN HYDROCHLORIDE 10 MG: 10 CAPSULE ORAL at 21:05

## 2022-05-17 RX ADMIN — ATORVASTATIN CALCIUM 40 MG: 40 TABLET, FILM COATED ORAL at 21:05

## 2022-05-17 RX ADMIN — HYDROCODONE BITARTRATE AND ACETAMINOPHEN 2 TABLET: 5; 325 TABLET ORAL at 18:33

## 2022-05-17 RX ADMIN — CYCLOBENZAPRINE 10 MG: 10 TABLET, FILM COATED ORAL at 06:41

## 2022-05-17 RX ADMIN — FENOFIBRATE 160 MG: 160 TABLET ORAL at 09:05

## 2022-05-17 RX ADMIN — INSULIN LISPRO 3 UNITS: 100 INJECTION, SOLUTION INTRAVENOUS; SUBCUTANEOUS at 16:23

## 2022-05-17 RX ADMIN — HYDROCODONE BITARTRATE AND ACETAMINOPHEN 2 TABLET: 5; 325 TABLET ORAL at 10:18

## 2022-05-17 RX ADMIN — RIVAROXABAN 10 MG: 10 TABLET, FILM COATED ORAL at 18:41

## 2022-05-17 RX ADMIN — HYDROCODONE BITARTRATE AND ACETAMINOPHEN 2 TABLET: 5; 325 TABLET ORAL at 03:55

## 2022-05-17 RX ADMIN — HYDROCODONE BITARTRATE AND ACETAMINOPHEN 2 TABLET: 5; 325 TABLET ORAL at 14:36

## 2022-05-17 RX ADMIN — CYCLOBENZAPRINE 10 MG: 10 TABLET, FILM COATED ORAL at 23:02

## 2022-05-17 ASSESSMENT — PAIN DESCRIPTION - ORIENTATION
ORIENTATION: RIGHT

## 2022-05-17 ASSESSMENT — PAIN DESCRIPTION - ONSET: ONSET: ON-GOING

## 2022-05-17 ASSESSMENT — PAIN DESCRIPTION - LOCATION
LOCATION: LEG
LOCATION: KNEE
LOCATION: KNEE

## 2022-05-17 ASSESSMENT — PAIN DESCRIPTION - DESCRIPTORS
DESCRIPTORS: ACHING
DESCRIPTORS: ACHING;SHOOTING

## 2022-05-17 ASSESSMENT — PAIN SCALES - GENERAL
PAINLEVEL_OUTOF10: 6
PAINLEVEL_OUTOF10: 7
PAINLEVEL_OUTOF10: 5
PAINLEVEL_OUTOF10: 7
PAINLEVEL_OUTOF10: 7
PAINLEVEL_OUTOF10: 10
PAINLEVEL_OUTOF10: 3
PAINLEVEL_OUTOF10: 3

## 2022-05-17 ASSESSMENT — PAIN - FUNCTIONAL ASSESSMENT
PAIN_FUNCTIONAL_ASSESSMENT: ACTIVITIES ARE NOT PREVENTED
PAIN_FUNCTIONAL_ASSESSMENT: PREVENTS OR INTERFERES WITH MANY ACTIVE NOT PASSIVE ACTIVITIES

## 2022-05-17 ASSESSMENT — PAIN DESCRIPTION - PAIN TYPE: TYPE: ACUTE PAIN;SURGICAL PAIN

## 2022-05-17 ASSESSMENT — PAIN DESCRIPTION - FREQUENCY: FREQUENCY: CONTINUOUS

## 2022-05-17 NOTE — PLAN OF CARE
Problem: Discharge Planning  Goal: Discharge to home or other facility with appropriate resources  5/17/2022 0421 by Deyanira Lee RN  Outcome: Progressing  Flowsheets (Taken 5/16/2022 2016)  Discharge to home or other facility with appropriate resources: Identify barriers to discharge with patient and caregiver  5/16/2022 1805 by Mary Baldwin RN  Outcome: Progressing  Flowsheets (Taken 5/16/2022 1805)  Discharge to home or other facility with appropriate resources: Identify barriers to discharge with patient and caregiver  5/16/2022 1804 by Mary Baldwin RN  Outcome: Progressing     Problem: Pain  Goal: Verbalizes/displays adequate comfort level or baseline comfort level  5/17/2022 0421 by Deyanira Lee RN  Outcome: Progressing  Flowsheets (Taken 5/16/2022 1805 by Mary Baldwin RN)  Verbalizes/displays adequate comfort level or baseline comfort level: Administer analgesics based on type and severity of pain and evaluate response  5/16/2022 1805 by Mary Baldwin RN  Outcome: Progressing  Flowsheets (Taken 5/16/2022 1805)  Verbalizes/displays adequate comfort level or baseline comfort level: Administer analgesics based on type and severity of pain and evaluate response  5/16/2022 1804 by Mary Baldwin RN  Outcome: Progressing     Problem: Skin/Tissue Integrity  Goal: Absence of new skin breakdown  Description: 1. Monitor for areas of redness and/or skin breakdown  2. Assess vascular access sites hourly  3. Every 4-6 hours minimum:  Change oxygen saturation probe site  4. Every 4-6 hours:  If on nasal continuous positive airway pressure, respiratory therapy assess nares and determine need for appliance change or resting period.   5/17/2022 0421 by Deyanira Lee RN  Outcome: Progressing  5/16/2022 1805 by Mary Baldwin RN  Outcome: Progressing  Note: No new skin breakdown this shift  5/16/2022 1804 by Mary Baldwin RN  Outcome: Progressing     Problem: Safety - Adult  Goal: Free from fall injury  5/17/2022 0421 by Fernando Lawrence RN  Outcome: Progressing  Flowsheets (Taken 5/15/2022 1509 by Beau Taylor, RN)  Free From Fall Injury:   Based on caregiver fall risk screen, instruct family/caregiver to ask for assistance with transferring infant if caregiver noted to have fall risk factors   Instruct family/caregiver on patient safety  5/16/2022 1805 by Rowena Chirinos RN  Outcome: Progressing  [address] (Taken 5/15/2022 1509 by Beau Taylor, RN)  Free From Fall Injury:   Based on caregiver fall risk screen, instruct family/caregiver to ask for assistance with transferring infant if caregiver noted to have fall risk factors   Instruct family/caregiver on patient safety  5/16/2022 1804 by Rowena Chirinos RN  Outcome: Progressing     Problem: Chronic Conditions and Co-morbidities  Goal: Patient's chronic conditions and co-morbidity symptoms are monitored and maintained or improved  5/17/2022 0421 by Fernando Lawrence RN  Outcome: Progressing  Flowsheets (Taken 5/16/2022 2016)  Care Plan - Patient's Chronic Conditions and Co-Morbidity Symptoms are Monitored and Maintained or Improved: Monitor and assess patient's chronic conditions and comorbid symptoms for stability, deterioration, or improvement  5/16/2022 1805 by Rowena Chirinos RN  Outcome: Progressing  Flowsheets (Taken 5/15/2022 1509 by Beau Taylor, RN)  Care Plan - Patient's Chronic Conditions and Co-Morbidity Symptoms are Monitored and Maintained or Improved:   Monitor and assess patient's chronic conditions and comorbid symptoms for stability, deterioration, or improvement   Collaborate with multidisciplinary team to address chronic and comorbid conditions and prevent exacerbation or deterioration   Update acute care plan with appropriate goals if chronic or comorbid symptoms are exacerbated and prevent overall improvement and discharge  5/16/2022 1804 by Rowena Chirinos RN  Outcome: Progressing   Care plan reviewed with patient. Patient verbalize understanding of the plan of care and contribute to goal setting.

## 2022-05-17 NOTE — PROGRESS NOTES
Orthopedic Surgery      Orthopaedic Attending Note    Patient seen and evaluated  POD 7 s/p revision right TKA  Seen in bed  DC cancelled yesterday, awaiting insurance precert for SNF started on 5/13/22. No new ortho issues. Will continue current treatment plan and course.    Electronically signed by DEREK Doty CNP on 5/17/2022 at 8:52 AM

## 2022-05-17 NOTE — PROGRESS NOTES
overall. Maximum A to don knee immobilizer & Stand By Assistance pt used sock aid to don sock with min verbal cues provided for technique . BALANCE:  Sitting Balance:  Modified independent. EOB   Standing Balance: Contact Guard Assistance. With 2 UE support on RW     BED MOBILITY:  Supine to Sit: Minimal Assistance, X 1 A for RLE   Sit to Supine: Minimal Assistance, X 1 A for RLE     TRANSFERS:  Sit to Stand:  Minimal Assistance, X 1.    Stand to Sit: Minimal Assistance, X 1.      FUNCTIONAL MOBILITY:  Assistive Device: Rolling Walker  Assist Level:  Minimal Assistance    Distance: to/from bathroom door. No LOB noted      ASSESSMENT:     Activity Tolerance:  Patient tolerance of  treatment: fair. Discharge Recommendations: Subacute/skilled nursing facility and Patient would benefit from continued OT at discharge  Equipment Recommendations: Other: Monitor pending progress  Plan: Times per Week: 6x  Current Treatment Recommendations: Balance training,Functional mobility training,Endurance training,Safety education & training,Patient/Caregiver education & training,Self-Care / ADL    Patient Education  Patient Education: ADL's, Precautions and Importance of Increasing Activity    Goals  Short Term Goals  Time Frame for Short term goals: until discharge  Short Term Goal 1: Pt will complete various t/fs including BSC with mod A x 1  Short Term Goal 2: Pt will tolerate standing 2 min with CGA for increased ease of toileting routine  Short Term Goal 3: Pt will complete mobility to [facility] or bedside chair with RW, Min A, & min vcs for technique  Short Term Goal 4: Pt will complete LE dressing with mod A & LH AE  Long Term Goals  Time Frame for Long term goals : No LTG set d/t short ELOS    Following session, patient left in safe position with all fall risk precautions in place.

## 2022-05-17 NOTE — PROGRESS NOTES
has been completing cooking and cleaning tasks. 2 year hx of B knee issues    Restrictions/Precautions:  Restrictions/Precautions: General Precautions,Fall Risk,Weight Bearing  Right Lower Extremity Weight Bearing: Weight Bearing As Tolerated  Required Braces or Orthoses  Right Lower Extremity Brace: Knee Immobilizer  Position Activity Restriction  Other position/activity restrictions: L knee flexion contracture    SUBJECTIVE: RN approved session, pt is seated in recliner, agreeable to PT. PAIN: denies    Vitals: Vitals not assessed per clinical judgement, see nursing flowsheet    OBJECTIVE:  Bed Mobility:  Sit to Supine: Moderate Assistance     Transfers:  Sit to Stand: Minimal Assistance, X 1, with increased time for completion  Stand to Sit:Minimal Assistance, X 1    Ambulation:  Contact Guard Assistance  Distance: 12 feet  Surface: Level Tile  Device:Rolling Walker  Gait Deviations: Forward Flexed Posture, Slow Dianna, Decreased Step Length Bilaterally and Decreased Gait Speed  **R knee immobilizer on, L knee flexion throughout due to contracture (TKA last fall)    Exercise:  Patient was guided in 1 set(s) 10 reps of exercise to right lower extremities. Min A for hip abd/add, heel slides and max A for SLR. Ankle pumps, Quad sets, Heelslides, Hip abduction/adduction and Straight leg raises. Exercises were completed for increased independence with functional mobility.   Multiple verbal cues to initiate increased R quad activation during quad set, improvement noted, however, limited activation during SLR, static B knee ext stretching with manual pressure, R knee lacks ~10 degrees, gentle joint mob to L knee, pt ed on importance of positioning and stretching to improve flexibility    Functional Outcome Measures: Completed  AM-PAC Inpatient Mobility without Stair Climbing Raw Score : 14  AM-PAC Inpatient without Stair Climbing T-Scale Score : 40.85    ASSESSMENT:  Assessment: Patient progressing toward

## 2022-05-17 NOTE — CARE COORDINATION
5/17/22, 1:44 PM EDT    DISCHARGE ON GOING Karely Gamez 58 day: 8  Location: -02/002-A Reason for admit: Displaced articular fracture of head of right femur, sequela [S72.061S]  Closed displaced articular fracture of head of right femur, initial encounter ([facility].) [S72.061A]  Periprosthetic supracondylar fracture of femur, initial encounter [N37. 8XXA, Z96.659]   Procedure:   5/10 - RIGHT KNEE TOTAL ARTHROPLASTY REVISION tibial and femoral components by Dr Princess Hoffmann  Barriers to Discharge: POD 7. Await precert for ECF. PCP: Hortensia Buitrago DO  Readmission Risk Score: 12.7 ( )%  Patient Goals/Plan/Treatment Preferences: Plan [facility] if precert approved.

## 2022-05-18 LAB
ANION GAP SERPL CALCULATED.3IONS-SCNC: 15 MEQ/L (ref 8–16)
BUN BLDV-MCNC: 14 MG/DL (ref 7–22)
CALCIUM SERPL-MCNC: 9.3 MG/DL (ref 8.5–10.5)
CHLORIDE BLD-SCNC: 101 MEQ/L (ref 98–111)
CO2: 18 MEQ/L (ref 23–33)
CREAT SERPL-MCNC: 0.4 MG/DL (ref 0.4–1.2)
GFR SERPL CREATININE-BSD FRML MDRD: > 90 ML/MIN/1.73M2
GLUCOSE BLD-MCNC: 101 MG/DL (ref 70–108)
GLUCOSE BLD-MCNC: 105 MG/DL (ref 70–108)
GLUCOSE BLD-MCNC: 120 MG/DL (ref 70–108)
GLUCOSE BLD-MCNC: 299 MG/DL (ref 70–108)
GLUCOSE BLD-MCNC: 95 MG/DL (ref 70–108)
POTASSIUM SERPL-SCNC: 4.7 MEQ/L (ref 3.5–5.2)
SODIUM BLD-SCNC: 134 MEQ/L (ref 135–145)
TROPONIN T: < 0.01 NG/ML

## 2022-05-18 PROCEDURE — 97535 SELF CARE MNGMENT TRAINING: CPT

## 2022-05-18 PROCEDURE — 82948 REAGENT STRIP/BLOOD GLUCOSE: CPT

## 2022-05-18 PROCEDURE — 2500000003 HC RX 250 WO HCPCS: Performed by: ORTHOPAEDIC SURGERY

## 2022-05-18 PROCEDURE — 2580000003 HC RX 258: Performed by: NURSE PRACTITIONER

## 2022-05-18 PROCEDURE — 80048 BASIC METABOLIC PNL TOTAL CA: CPT

## 2022-05-18 PROCEDURE — 6370000000 HC RX 637 (ALT 250 FOR IP): Performed by: NURSE PRACTITIONER

## 2022-05-18 PROCEDURE — 1200000000 HC SEMI PRIVATE

## 2022-05-18 PROCEDURE — 84484 ASSAY OF TROPONIN QUANT: CPT

## 2022-05-18 PROCEDURE — 36415 COLL VENOUS BLD VENIPUNCTURE: CPT

## 2022-05-18 RX ADMIN — SODIUM CHLORIDE, PRESERVATIVE FREE 10 ML: 5 INJECTION INTRAVENOUS at 07:59

## 2022-05-18 RX ADMIN — ATORVASTATIN CALCIUM 40 MG: 40 TABLET, FILM COATED ORAL at 20:21

## 2022-05-18 RX ADMIN — CYCLOBENZAPRINE 10 MG: 10 TABLET, FILM COATED ORAL at 20:21

## 2022-05-18 RX ADMIN — HYDROCODONE BITARTRATE AND ACETAMINOPHEN 1 TABLET: 5; 325 TABLET ORAL at 20:21

## 2022-05-18 RX ADMIN — CYCLOBENZAPRINE 10 MG: 10 TABLET, FILM COATED ORAL at 13:23

## 2022-05-18 RX ADMIN — RIVAROXABAN 10 MG: 10 TABLET, FILM COATED ORAL at 18:48

## 2022-05-18 RX ADMIN — MICONAZOLE NITRATE: 20 POWDER TOPICAL at 18:28

## 2022-05-18 RX ADMIN — HYDROCODONE BITARTRATE AND ACETAMINOPHEN 1 TABLET: 5; 325 TABLET ORAL at 13:21

## 2022-05-18 RX ADMIN — DOXEPIN HYDROCHLORIDE 10 MG: 10 CAPSULE ORAL at 20:21

## 2022-05-18 RX ADMIN — LOSARTAN POTASSIUM 25 MG: 25 TABLET, FILM COATED ORAL at 07:58

## 2022-05-18 RX ADMIN — HYDROCODONE BITARTRATE AND ACETAMINOPHEN 2 TABLET: 5; 325 TABLET ORAL at 08:00

## 2022-05-18 RX ADMIN — INSULIN LISPRO 3 UNITS: 100 INJECTION, SOLUTION INTRAVENOUS; SUBCUTANEOUS at 18:53

## 2022-05-18 RX ADMIN — MICONAZOLE NITRATE: 20 POWDER TOPICAL at 22:47

## 2022-05-18 RX ADMIN — FENOFIBRATE 160 MG: 160 TABLET ORAL at 07:59

## 2022-05-18 ASSESSMENT — PAIN SCALES - GENERAL
PAINLEVEL_OUTOF10: 5
PAINLEVEL_OUTOF10: 4
PAINLEVEL_OUTOF10: 7
PAINLEVEL_OUTOF10: 5
PAINLEVEL_OUTOF10: 2
PAINLEVEL_OUTOF10: 2
PAINLEVEL_OUTOF10: 7

## 2022-05-18 ASSESSMENT — PAIN DESCRIPTION - DIRECTION: RADIATING_TOWARDS: R HIP

## 2022-05-18 ASSESSMENT — PAIN DESCRIPTION - LOCATION
LOCATION: LEG

## 2022-05-18 ASSESSMENT — PAIN DESCRIPTION - DESCRIPTORS
DESCRIPTORS: ACHING
DESCRIPTORS: ACHING

## 2022-05-18 ASSESSMENT — PAIN DESCRIPTION - ORIENTATION
ORIENTATION: RIGHT

## 2022-05-18 ASSESSMENT — PAIN DESCRIPTION - PAIN TYPE
TYPE: ACUTE PAIN;SURGICAL PAIN
TYPE: ACUTE PAIN;SURGICAL PAIN

## 2022-05-18 ASSESSMENT — PAIN - FUNCTIONAL ASSESSMENT
PAIN_FUNCTIONAL_ASSESSMENT: ACTIVITIES ARE NOT PREVENTED
PAIN_FUNCTIONAL_ASSESSMENT: PREVENTS OR INTERFERES SOME ACTIVE ACTIVITIES AND ADLS

## 2022-05-18 ASSESSMENT — PAIN DESCRIPTION - FREQUENCY
FREQUENCY: CONTINUOUS
FREQUENCY: CONTINUOUS

## 2022-05-18 ASSESSMENT — PAIN DESCRIPTION - ONSET: ONSET: ON-GOING

## 2022-05-18 NOTE — CARE COORDINATION
5/18/22, 1:19 PM EDT    DISCHARGE PLANNING EVALUATION      Spoke with [facility] admissions. [facility] has found that their [employer] has lapsed. [facility] may be an option. Will discuss with patient.

## 2022-05-18 NOTE — PROGRESS NOTES
Pulled two Norco and returned one Norco and gave one Earna Pack wanted this to be a waste and Elvira BARRY verified of the return. Call and spoke with the pharmacist concerning this issue.

## 2022-05-18 NOTE — CARE COORDINATION
5/18/22, 11:39 AM EDT    DISCHARGE ON GOING Karely Gamez 58 day: 9  7K02  Procedure: 5/10 - RIGHT KNEE TOTAL ARTHROPLASTY REVISION tibial and femoral components by Dr Elie Najera  Barriers to Discharge: continue with PT/OT      Patient Goals/Plan/Treatment Preferences: await precert for [facility]; hopefully today.

## 2022-05-18 NOTE — FLOWSHEET NOTE
05/18/22 1845   Treatment Team Notification   Reason for Communication Evaluate   [facility] [address]   Treatment Team Role Physician Assistant   Method of Communication Secure Message   Response Waiting for response   Notification Time [phone]   Patients right lower leg is red and warm to the touch and swollen with 2+ pitting edema with dressing change

## 2022-05-18 NOTE — PROGRESS NOTES
[address] [facility]  Occupational Therapy  Daily Note  Time:   Time In: 9292  Time Out: 4754  Timed Code Treatment Minutes: 28 Minutes  Minutes: 35          Date: 2022  Patient Name: Vilma Bailey,   Gender: female      Room: Pending sale to [facility]002-A  MRN: [id number]  : 1960  (60 y.o.)  Referring Practitioner: XI Bowles CNP  Diagnosis: displaced articular fracture of head of R femur  Additional Pertinent Hx: Per EMR \"The patient is a 60 y.o. medically comorbid female  who presents with right knee pain after a fall in her home. Patient underwent a right TKA a week ago, has been doing well. She was noted to have fallen out of her bed at home. She denies other injuries and no LOC. She denies SOB and CP. X-rays demonstrate the fracture. Spouse states that she has been ambulating on crutches for the past 8 months and primarily stays in her bed throughout the day. He also relates that she does not get out of bed for eating or bathroom. She uses a depends. Patient admits to 2 ppd smoking history. \" Pt is s/p RIGHT KNEE TOTAL ARTHROPLASTY REVISION tibial and femoral components completed by Dr. Sarabjit Abdul on 5/10. Restrictions/Precautions:  Restrictions/Precautions: General Precautions,Fall Risk,Weight Bearing  Right Lower Extremity Weight Bearing: Weight Bearing As Tolerated  Required Braces or Orthoses  Right Lower Extremity Brace: Knee Immobilizer  Position Activity Restriction  Other position/activity restrictions: L knee flexion contracture     SUBJECTIVE: RN okayed OT session. Pt. In room in bed agreeable to OT session. Pt. Incontinent of urine. PAIN: 7/10: RLE    Vitals: Nurse checked vitals prior to session    COGNITION: Decreased Insight, Decreased Problem Solving and Decreased Safety Awareness    ADL:   Grooming: Stand By Assistance and with set-up.  to complete oral care, comb hair, and wash face seated up in chair  Bathing: Stand By Assistance, Dependent and with set-up. Dependent to complete posterior franklin care, SBA to complete anterior franklin care while supine in chair  Upper Extremity Dressing: Minimal Assistance. Lower Extremity Dressing: Total Assistance to don knee immobilizer on RLE     Toileting:incontinent of urine. BALANCE:  Sitting Balance:  Stand By Assistance. while seated at EOB  Standing Balance: [address], Minimal Assistance, with cues for safety. BED MOBILITY:  Supine to Sit: Minimal Assistance, with verbal cues , with increased time for completion, assistance provided to guide RLE    Scooting: Stand By Assistance      TRANSFERS:  Sit to Stand:  Minimal Assistance, with increased time for completion, cues for hand placement, with verbal cues. from bed and recliner  Stand to Sit: [address], with increased time for completion, cues for hand placement, with verbal cues. FUNCTIONAL MOBILITY:  Assistive Device: Rolling Walker  Assist Level:  Contact Guard Assistance, with verbal cues  and with increased time for completion. Distance: ~ 3 feet slow pace, flexed posture         ASSESSMENT:     Activity Tolerance:  Patient tolerance of  treatment: fair. Discharge Recommendations: Subacute/skilled nursing facility and Patient would benefit from continued OT at discharge  Equipment Recommendations: Other: Monitor pending progress  Plan: Times per Week: 6x  Current Treatment Recommendations: Balance training,Functional mobility training,Endurance training,Safety education & training,Patient/Caregiver education & training,Self-Care / ADL    Patient Education  Patient Education: ADL's and Assistive Device Safety and safety with functional mobility and transfers.     Goals  Short Term Goals  Time Frame for Short term goals: until discharge  Short Term Goal 1: Pt will complete various t/fs including BSC with mod A x 1  Short Term Goal 2: Pt will tolerate standing 2 min with CGA for increased ease of toileting routine  Short Term Goal 3: Pt will complete mobility to [facility] or bedside chair with RW, Min A, & min vcs for technique  Short Term Goal 4: Pt will complete LE dressing with mod A & LH AE  Long Term Goals  Time Frame for Long term goals : No LTG set d/t short ELOS    Following session, patient left in safe position with all fall risk precautions in place.

## 2022-05-18 NOTE — PLAN OF CARE
Problem: Discharge Planning  Goal: Discharge to home or other facility with appropriate resources  5/18/2022 1422 by Iva Olivera RN  Outcome: Progressing  Note: Patient was asking about what facility she would be discharging to,  noted  and will be coming to speak with patient      Problem: Skin/Tissue Integrity  Goal: Absence of new skin breakdown  Description: 1. Monitor for areas of redness and/or skin breakdown  2. Assess vascular access sites hourly  3. Every 4-6 hours minimum:  Change oxygen saturation probe site  4. Every 4-6 hours:  If on nasal continuous positive airway pressure, respiratory therapy assess nares and determine need for appliance change or resting period. 5/18/2022 1422 by Iva Olivera RN  Outcome: Progressing  Note: No new skin breakdown noted     Problem: Safety - Adult  Goal: Free from fall injury  5/18/2022 1422 by Iva Olivera RN  Outcome: Progressing  Note: Patient uses call light appropriately, belongings with bedside table is within reach.  Uses gait belt, non skid socks and walker when up to bedside comode     Problem: Pain  Goal: Verbalizes/displays adequate comfort level or baseline comfort level  5/18/2022 1422 by Iva Olivera RN  Outcome: Adequate for Discharge  Note: Patient mainly has the most discomfort with stand and pivoting to bedside commode, Norco given and was effective  5/18/2022 0215 by Gaylin Lesches, RN  Outcome: Progressing  Flowsheets (Taken 5/18/2022 0215)  Verbalizes/displays adequate comfort level or baseline comfort level:   Encourage patient to monitor pain and request assistance   Assess pain using appropriate pain scale   Administer analgesics based on type and severity of pain and evaluate response

## 2022-05-18 NOTE — PLAN OF CARE
Problem: Discharge Planning  Goal: Discharge to home or other facility with appropriate resources  Outcome: Progressing  Flowsheets (Taken 5/18/2022 0215)  Discharge to home or other facility with appropriate resources:   Identify barriers to discharge with patient and caregiver   Arrange for needed discharge resources and transportation as appropriate   Identify discharge learning needs (meds, wound care, etc)     Problem: Pain  Goal: Verbalizes/displays adequate comfort level or baseline comfort level  Outcome: Progressing  Flowsheets (Taken 5/18/2022 0215)  Verbalizes/displays adequate comfort level or baseline comfort level:   Encourage patient to monitor pain and request assistance   Assess pain using appropriate pain scale   Administer analgesics based on type and severity of pain and evaluate response     Problem: Skin/Tissue Integrity  Goal: Absence of new skin breakdown  Description: 1. Monitor for areas of redness and/or skin breakdown  2. Assess vascular access sites hourly  3. Every 4-6 hours minimum:  Change oxygen saturation probe site  4. Every 4-6 hours:  If on nasal continuous positive airway pressure, respiratory therapy assess nares and determine need for appliance change or resting period. Outcome: Progressing     Problem: Safety - Adult  Goal: Free from fall injury  Outcome: Progressing    Care plan reviewed with patient and   Patient verbalize understanding of the plan of care and contribute to goal setting.

## 2022-05-19 ENCOUNTER — APPOINTMENT (OUTPATIENT)
Dept: INTERVENTIONAL RADIOLOGY/VASCULAR | Age: 62
DRG: 467 | End: 2022-05-19
Payer: OTHER GOVERNMENT

## 2022-05-19 LAB
ANION GAP SERPL CALCULATED.3IONS-SCNC: 11 MEQ/L (ref 8–16)
BUN BLDV-MCNC: 13 MG/DL (ref 7–22)
CALCIUM SERPL-MCNC: 8.9 MG/DL (ref 8.5–10.5)
CHLORIDE BLD-SCNC: 100 MEQ/L (ref 98–111)
CO2: 25 MEQ/L (ref 23–33)
CREAT SERPL-MCNC: 0.4 MG/DL (ref 0.4–1.2)
GFR SERPL CREATININE-BSD FRML MDRD: > 90 ML/MIN/1.73M2
GLUCOSE BLD-MCNC: 101 MG/DL (ref 70–108)
GLUCOSE BLD-MCNC: 108 MG/DL (ref 70–108)
GLUCOSE BLD-MCNC: 130 MG/DL (ref 70–108)
GLUCOSE BLD-MCNC: 93 MG/DL (ref 70–108)
GLUCOSE BLD-MCNC: 99 MG/DL (ref 70–108)
POTASSIUM SERPL-SCNC: 4.4 MEQ/L (ref 3.5–5.2)
SODIUM BLD-SCNC: 136 MEQ/L (ref 135–145)

## 2022-05-19 PROCEDURE — 97110 THERAPEUTIC EXERCISES: CPT

## 2022-05-19 PROCEDURE — 80048 BASIC METABOLIC PNL TOTAL CA: CPT

## 2022-05-19 PROCEDURE — 6370000000 HC RX 637 (ALT 250 FOR IP): Performed by: NURSE PRACTITIONER

## 2022-05-19 PROCEDURE — 1200000000 HC SEMI PRIVATE

## 2022-05-19 PROCEDURE — 97530 THERAPEUTIC ACTIVITIES: CPT

## 2022-05-19 PROCEDURE — 36415 COLL VENOUS BLD VENIPUNCTURE: CPT

## 2022-05-19 PROCEDURE — 97535 SELF CARE MNGMENT TRAINING: CPT

## 2022-05-19 PROCEDURE — 93970 EXTREMITY STUDY: CPT

## 2022-05-19 PROCEDURE — 82948 REAGENT STRIP/BLOOD GLUCOSE: CPT

## 2022-05-19 PROCEDURE — 97116 GAIT TRAINING THERAPY: CPT

## 2022-05-19 RX ADMIN — HYDROCODONE BITARTRATE AND ACETAMINOPHEN 1 TABLET: 5; 325 TABLET ORAL at 03:59

## 2022-05-19 RX ADMIN — DOXEPIN HYDROCHLORIDE 10 MG: 10 CAPSULE ORAL at 20:48

## 2022-05-19 RX ADMIN — ATORVASTATIN CALCIUM 40 MG: 40 TABLET, FILM COATED ORAL at 20:48

## 2022-05-19 RX ADMIN — RIVAROXABAN 10 MG: 10 TABLET, FILM COATED ORAL at 18:38

## 2022-05-19 RX ADMIN — HYDROCODONE BITARTRATE AND ACETAMINOPHEN 2 TABLET: 5; 325 TABLET ORAL at 18:38

## 2022-05-19 RX ADMIN — MICONAZOLE NITRATE: 20 POWDER TOPICAL at 20:48

## 2022-05-19 RX ADMIN — MICONAZOLE NITRATE: 20 POWDER TOPICAL at 08:49

## 2022-05-19 RX ADMIN — LOSARTAN POTASSIUM 25 MG: 25 TABLET, FILM COATED ORAL at 08:49

## 2022-05-19 RX ADMIN — FENOFIBRATE 160 MG: 160 TABLET ORAL at 08:49

## 2022-05-19 ASSESSMENT — PAIN DESCRIPTION - FREQUENCY: FREQUENCY: CONTINUOUS

## 2022-05-19 ASSESSMENT — PAIN DESCRIPTION - LOCATION: LOCATION: KNEE

## 2022-05-19 ASSESSMENT — PAIN SCALES - GENERAL
PAINLEVEL_OUTOF10: 0
PAINLEVEL_OUTOF10: 3
PAINLEVEL_OUTOF10: 5
PAINLEVEL_OUTOF10: 4

## 2022-05-19 ASSESSMENT — PAIN DESCRIPTION - PAIN TYPE: TYPE: ACUTE PAIN;SURGICAL PAIN

## 2022-05-19 ASSESSMENT — PAIN DESCRIPTION - DESCRIPTORS: DESCRIPTORS: ACHING

## 2022-05-19 ASSESSMENT — PAIN DESCRIPTION - ONSET: ONSET: ON-GOING

## 2022-05-19 ASSESSMENT — PAIN DESCRIPTION - ORIENTATION: ORIENTATION: LEFT

## 2022-05-19 ASSESSMENT — PAIN - FUNCTIONAL ASSESSMENT: PAIN_FUNCTIONAL_ASSESSMENT: PREVENTS OR INTERFERES WITH MANY ACTIVE NOT PASSIVE ACTIVITIES

## 2022-05-19 NOTE — PLAN OF CARE
injury  5/18/2022 1422 by Hannah Cesar, RN  Outcome: Progressing  Note: Patient uses call light appropriately, belongings with bedside table is within reach. Uses gait belt, non skid socks and walker when up to bedside comode       Care plan reviewed with patient and  Patient  verbalize understanding of the plan of care and contribute to goal setting.

## 2022-05-19 NOTE — PROGRESS NOTES
[facility]  Occupational Therapy  Daily Note  Time:   Time In: 826  Time Out: 4076  Timed Code Treatment Minutes: 30 Minutes  Minutes: 30          Date: 2022  Patient Name: Magnus Warner,   Gender: female      [facility]  MRN: [id number]  : 1960  (60 y.o.)  Referring Practitioner: XI Rushing CNP  Diagnosis: displaced articular fracture of head of R femur  Additional Pertinent Hx: Per EMR \"The patient is a 60 y.o. medically comorbid female  who presents with right knee pain after a fall in her home. Patient underwent a right TKA a week ago, has been doing well. She was noted to have fallen out of her bed at home. She denies other injuries and no LOC. She denies SOB and CP. X-rays demonstrate the fracture. Spouse states that she has been ambulating on crutches for the past 8 months and primarily stays in her bed throughout the day. He also relates that she does not get out of bed for eating or bathroom. She uses a depends. Patient admits to 2 ppd smoking history. \" Pt is s/p RIGHT KNEE TOTAL ARTHROPLASTY REVISION tibial and femoral components completed by Dr. Michela Finch on 5/10. Restrictions/Precautions:  Restrictions/Precautions: General Precautions,Fall Risk,Weight Bearing  Right Lower Extremity Weight Bearing: Weight Bearing As Tolerated  Required Braces or Orthoses  Right Lower Extremity Brace: Knee Immobilizer  Position Activity Restriction  Other position/activity restrictions: L knee flexion contracture     SUBJECTIVE: RN okayed OT session. Pt. In bed upon arrival pleasant and agreeable to participate. PAIN: no pain at rest, pain voiced in B knees with movement not rated RN aware    Vitals: Vitals not assessed per clinical judgement, see nursing flowsheet    COGNITION: Decreased Insight, Decreased Problem Solving and Decreased Safety Awareness    ADL:   Grooming: Stand By Assistance.   seated at EOB to complete washing face and to complete oral care and comb hair  Bathing: Stand By Assistance and with set-up.  while seated at EOB, Pt. completed front of franklin care while supine and total A required with posterior franklin care  Upper Extremity Dressing: Minimal Assistance. Lower Extremity Dressing: Maximum Assistance. to don depend. BALANCE:  Sitting Balance:  Stand By Assistance. while seated on EOB  Standing Balance: Contact Guard Assistance, with cues for safety, with verbal cues . BED MOBILITY:  Rolling to Left: Stand By Assistance    Rolling to Right: Stand By Assistance    Supine to Sit: Moderate Assistance to guide LE's out of bed  Sit to Supine: Moderate Assistance to guide LE's into bed  Scooting: Stand By Assistance, with increased time for completion      TRANSFERS:  Sit to Stand:  [employer], cues for hand placement, with verbal cues. Stand to Sit: Contact Guard Assistance. FUNCTIONAL MOBILITY:  RN requested Pt. To remain in bed awaiting vascular test      ASSESSMENT:     Activity Tolerance:  Patient tolerance of  treatment: fair. Discharge Recommendations: Subacute/skilled nursing facility and Patient would benefit from continued OT at discharge  Equipment Recommendations:  Other: Monitor pending progress  Plan: Times per Week: 6x  Current Treatment Recommendations: Balance training,Functional mobility training,Endurance training,Safety education & training,Patient/Caregiver education & training,Self-Care / ADL    Patient Education  Patient Education: ADL's and safety with mobility    Goals  Short Term Goals  Time Frame for Short term goals: until discharge  Short Term Goal 1: Pt will complete various t/fs including BSC with mod A x 1  Short Term Goal 2: Pt will tolerate standing 2 min with CGA for increased ease of toileting routine  Short Term Goal 3: Pt will complete mobility to [facility] or bedside chair with RW, Min A, & min vcs for technique  Short Term Goal 4: Pt will complete LE dressing with mod A & LH

## 2022-05-19 NOTE — CARE COORDINATION
5/19/22, 9:37 AM EDT    DISCHARGE PLANNING EVALUATION      Discussed ecf options with patient, she is requesting [facility]. Referral made, facility will start precert.

## 2022-05-19 NOTE — PROGRESS NOTES
Orthopaedic Progress Note      SUBJECTIVE   Ms. Mustapha Butler is post op day # 8     Patient notes revision right TKA history of preop contraction of bilateral knees . Noted to have retraction of bilateral knees at time of eval today     Awaiting insurance approval, possibly leonard dee       OBJECTIVE      Physical    VITALS:  BP (!) 120/50   Pulse 100   Temp 97.7 °F (36.5 °C) (Oral)   Resp 16   Ht 5' 7.52\" (1.715 m)   Wt 195 lb (88.5 kg)   SpO2 99%   BMI 30.07 kg/m²   I/O last 3 completed shifts: In: 1150 [P.O.:1140;  I.V.:10]  Out: 800 [Urine:800]      Dressing dry and intact   Swelling noted to right leg  Able to flex and ext toes and ankle         Data  CBC:   Lab Results   Component Value Date    WBC 9.0 05/11/2022    HGB 9.8 05/11/2022     05/11/2022     BMP:    Lab Results   Component Value Date     05/19/2022    K 4.4 05/19/2022    K 4.6 05/09/2022     05/19/2022    CO2 25 05/19/2022    BUN 13 05/19/2022    CREATININE 0.4 05/19/2022    CALCIUM 8.9 05/19/2022    GLUCOSE 101 05/19/2022    GLUCOSE 113 12/10/2020     Uric Acid:  No components found for: URIC  PT/INR:    Lab Results   Component Value Date    PROTIME 11.0 10/05/2016    INR 0.90 10/05/2016     Troponin:  No results found for: TROPONINI  Urine Culture:  No components found for: CURINE      Current Inpatient Medications    Current Facility-Administered Medications: miconazole (MICOTIN) 2 % powder, , Topical, BID  cyclobenzaprine (FLEXERIL) tablet 10 mg, 10 mg, Oral, TID PRN  rivaroxaban (XARELTO) tablet 10 mg, 10 mg, Oral, Daily  sodium chloride flush 0.9 % injection 5-40 mL, 5-40 mL, IntraVENous, 2 times per day  sodium chloride flush 0.9 % injection 5-40 mL, 5-40 mL, IntraVENous, PRN  0.9 % sodium chloride infusion, , IntraVENous, PRN  ondansetron (ZOFRAN-ODT) disintegrating tablet 4 mg, 4 mg, Oral, Q8H PRN **OR** ondansetron (ZOFRAN) injection 4 mg, 4 mg, IntraVENous, Q6H PRN  morphine (PF) injection 2 mg, 2 mg, IntraVENous,

## 2022-05-19 NOTE — PROGRESS NOTES
[facility]  INPATIENT PHYSICAL THERAPY  DAILY NOTE  [facility] ORTHOPEDICS 7K - 7K-02/002-A      Time In: 9885  Time Out: 1238  Timed Code Treatment Minutes: 44 Minutes  Minutes: 39          Date: 2022  Patient Name: Janelle Damon,  Gender:  female        MRN: [id number]  : 1960  (60 y.o.)     Referring Practitioner: DEREK Mckenzie CNP  Diagnosis: displaced articular fracture of head of R femur  Additional Pertinent Hx: Per EMR \"The patient is a 60 y.o. medically comorbid female  who presents with right knee pain after a fall in her home. Patient underwent a right TKA a week ago, has been doing well. She was noted to have fallen out of her bed at home. She denies other injuries and no LOC. She denies SOB and CP. X-rays demonstrate the fracture. Spouse states that she has been ambulating on crutches for the past 8 months and primarily stays in her bed throughout the day. He also relates that she does not get out of bed for eating or bathroom. She uses a depends. Patient admits to 2 ppd smoking history. \" Pt is s/p RIGHT KNEE TOTAL ARTHROPLASTY REVISION tibial and femoral components and patellar tendon repair completed by Dr. Yandel Welsh on 5/10. Prior Level of Function:  Lives With: Spouse  Type of Home: House  Home Layout: One level  Home Access: Stairs to enter with rails  Entrance Stairs - Number of Steps: 3 FABIO  Entrance Stairs - Rails: Left  Home Equipment: Crutches,Walker, rolling,Cane (\"stand up walker\" to stand up taller)   Bathroom Shower/Tub: Tub/Shower unit  Bathroom Toilet: Standard  Bathroom Equipment: Shower chair    ADL Assistance: Needs assistance (A for LE dressing & bathing)  Homemaking Assistance: Independent  Ambulation Assistance: Independent  Transfer Assistance: Independent  Active : No  Additional Comments: Pt reports prior to knee issues she was IND with all tasks, no use of AD.  Pt recently required crutches (house is too tight for a walker), has been laying in bed more, and  has been completing cooking and cleaning tasks. 2 year hx of B knee issues    Restrictions/Precautions:  Restrictions/Precautions: General Precautions,Fall Risk,Weight Bearing  Right Lower Extremity Weight Bearing: Weight Bearing As Tolerated  Required Braces or Orthoses  Right Lower Extremity Brace: Knee Immobilizer  Position Activity Restriction  Other position/activity restrictions: 5/19- ortho contacted for clarification of ROM at right LE due to patellar tendon repair - ok to work on extension however avoid flexion- per ortho ok to remove knee immobilizer while in bed- left knee flexion contracture       SUBJECTIVE: nursing ok'd therapy- he did clarify ROM restrictions with ortho and due to her patellar tendon repair they want to avoid knee flexion but ok'd to work on knee extension and she is ok to remove knee immobilizer while in bed  -on arrival pt on her side in a fetal position (we discussed importance of positioning and to try to avoid flexion of her right knee,     PAIN: 8-10/10: knees and w/ bed mobility she c/o of back pain     Vitals: Vitals not assessed per clinical judgement, see nursing flowsheet    OBJECTIVE:  Bed Mobility:  Rolling to Left: Minimal Assistance, with increased time for completion, and rolling from right sidelying to her back    Rolling to Right: Minimal Assistance, with rail, with increased time for completion   Supine to Sit: Moderate Assistance, with head of bed raised, with rail, with increased time for completion, encouragement for her to assist as much as she can   Scooting: Contact Guard Assistance    Transfers:  Sit to Stand: Moderate Assistance, from bed and min assist from commode   Stand to [address] for hand placement and to control descend    Ambulation:  Minimal Assistance  Distance: 5x1, 8x1  Surface: Level Tile  Device:Rolling Walker right knee immobilizer   Gait Deviations:   Forward Flexed Posture at hips and left mobility tasks with bed flat to progress towards PLOF. Short term goal 2: Pt will demo min A x2 for transfers with  RW for support and improved upright posture alignment to progress towards PLOF. Short term goal 3: Pt will amb for >20 feet with min A x1 and use of RW for support to progress towards PLOF. Short term goal 4: Pt will tolerate 10-20 reps of ther ex to increase overall mobility and improve ROM to improve gait mechanics. Short term goal 5: Pt will tolerate 2 min of standing tasks with improved upright posture with progress with overall mobillity and improve endurance. Long Term Goals  Time Frame for Long term goals : NA due to short ELOS    Following session, patient left in safe position with all fall risk precautions in place.

## 2022-05-20 LAB
ANION GAP SERPL CALCULATED.3IONS-SCNC: 10 MEQ/L (ref 8–16)
BUN BLDV-MCNC: 11 MG/DL (ref 7–22)
CALCIUM SERPL-MCNC: 9.2 MG/DL (ref 8.5–10.5)
CHLORIDE BLD-SCNC: 101 MEQ/L (ref 98–111)
CO2: 25 MEQ/L (ref 23–33)
CREAT SERPL-MCNC: 0.5 MG/DL (ref 0.4–1.2)
GFR SERPL CREATININE-BSD FRML MDRD: > 90 ML/MIN/1.73M2
GLUCOSE BLD-MCNC: 104 MG/DL (ref 70–108)
GLUCOSE BLD-MCNC: 111 MG/DL (ref 70–108)
GLUCOSE BLD-MCNC: 125 MG/DL (ref 70–108)
GLUCOSE BLD-MCNC: 135 MG/DL (ref 70–108)
GLUCOSE BLD-MCNC: 93 MG/DL (ref 70–108)
POTASSIUM SERPL-SCNC: 4.5 MEQ/L (ref 3.5–5.2)
SODIUM BLD-SCNC: 136 MEQ/L (ref 135–145)
TROPONIN T: < 0.01 NG/ML

## 2022-05-20 PROCEDURE — 97535 SELF CARE MNGMENT TRAINING: CPT

## 2022-05-20 PROCEDURE — 6370000000 HC RX 637 (ALT 250 FOR IP): Performed by: NURSE PRACTITIONER

## 2022-05-20 PROCEDURE — 97116 GAIT TRAINING THERAPY: CPT

## 2022-05-20 PROCEDURE — 80048 BASIC METABOLIC PNL TOTAL CA: CPT

## 2022-05-20 PROCEDURE — 84484 ASSAY OF TROPONIN QUANT: CPT

## 2022-05-20 PROCEDURE — 36415 COLL VENOUS BLD VENIPUNCTURE: CPT

## 2022-05-20 PROCEDURE — 97110 THERAPEUTIC EXERCISES: CPT

## 2022-05-20 PROCEDURE — 1200000000 HC SEMI PRIVATE

## 2022-05-20 PROCEDURE — 82948 REAGENT STRIP/BLOOD GLUCOSE: CPT

## 2022-05-20 PROCEDURE — 97530 THERAPEUTIC ACTIVITIES: CPT

## 2022-05-20 RX ADMIN — RIVAROXABAN 10 MG: 10 TABLET, FILM COATED ORAL at 18:03

## 2022-05-20 RX ADMIN — HYDROCODONE BITARTRATE AND ACETAMINOPHEN 2 TABLET: 5; 325 TABLET ORAL at 22:02

## 2022-05-20 RX ADMIN — CYCLOBENZAPRINE 10 MG: 10 TABLET, FILM COATED ORAL at 00:52

## 2022-05-20 RX ADMIN — ATORVASTATIN CALCIUM 40 MG: 40 TABLET, FILM COATED ORAL at 20:22

## 2022-05-20 RX ADMIN — HYDROCODONE BITARTRATE AND ACETAMINOPHEN 1 TABLET: 5; 325 TABLET ORAL at 04:21

## 2022-05-20 RX ADMIN — MICONAZOLE NITRATE: 20 POWDER TOPICAL at 20:23

## 2022-05-20 RX ADMIN — HYDROCODONE BITARTRATE AND ACETAMINOPHEN 2 TABLET: 5; 325 TABLET ORAL at 14:03

## 2022-05-20 RX ADMIN — DOXEPIN HYDROCHLORIDE 10 MG: 10 CAPSULE ORAL at 20:22

## 2022-05-20 RX ADMIN — FENOFIBRATE 160 MG: 160 TABLET ORAL at 08:28

## 2022-05-20 RX ADMIN — CYCLOBENZAPRINE 10 MG: 10 TABLET, FILM COATED ORAL at 20:22

## 2022-05-20 RX ADMIN — MICONAZOLE NITRATE: 20 POWDER TOPICAL at 08:28

## 2022-05-20 RX ADMIN — LOSARTAN POTASSIUM 25 MG: 25 TABLET, FILM COATED ORAL at 14:04

## 2022-05-20 ASSESSMENT — PAIN - FUNCTIONAL ASSESSMENT
PAIN_FUNCTIONAL_ASSESSMENT: PREVENTS OR INTERFERES SOME ACTIVE ACTIVITIES AND ADLS

## 2022-05-20 ASSESSMENT — PAIN DESCRIPTION - DESCRIPTORS
DESCRIPTORS: ACHING
DESCRIPTORS: ACHING;SHARP;SORE
DESCRIPTORS: CRAMPING;SORE

## 2022-05-20 ASSESSMENT — PAIN DESCRIPTION - LOCATION
LOCATION: KNEE
LOCATION: KNEE;HIP
LOCATION: KNEE

## 2022-05-20 ASSESSMENT — PAIN DESCRIPTION - ORIENTATION
ORIENTATION: RIGHT

## 2022-05-20 ASSESSMENT — PAIN DESCRIPTION - DIRECTION
RADIATING_TOWARDS: DOWN LEG
RADIATING_TOWARDS: RIGHT HIP

## 2022-05-20 ASSESSMENT — PAIN SCALES - GENERAL
PAINLEVEL_OUTOF10: 6
PAINLEVEL_OUTOF10: 7
PAINLEVEL_OUTOF10: 0
PAINLEVEL_OUTOF10: 4
PAINLEVEL_OUTOF10: 9
PAINLEVEL_OUTOF10: 1

## 2022-05-20 ASSESSMENT — PAIN DESCRIPTION - ONSET
ONSET: ON-GOING

## 2022-05-20 ASSESSMENT — PAIN DESCRIPTION - FREQUENCY
FREQUENCY: INTERMITTENT

## 2022-05-20 ASSESSMENT — PAIN DESCRIPTION - PAIN TYPE
TYPE: SURGICAL PAIN

## 2022-05-20 NOTE — PROGRESS NOTES
Orthopaedic Progress Note      SUBJECTIVE   Ms. Hollis Soriano is post op day # 9 s/p  revision right TKA history of preop contraction of bilateral knees . Noted to have retraction of bilateral knees   Pt seen in chair, states pain is improving  Pt improving with therapy  Still awaiting transfer to [facility] for further rehab  Pt may discharge when accepted    Pt tolerating knee immobilizer     Allergies:     Allergies as of 05/09/2022 - Fully Reviewed 05/09/2022   Allergen Reaction Noted    Adhesive tape  03/15/2017     Current Inpatient Medications:  Current Facility-Administered Medications: miconazole (MICOTIN) 2 % powder, , Topical, BID  cyclobenzaprine (FLEXERIL) tablet 10 mg, 10 mg, Oral, TID PRN  rivaroxaban (XARELTO) tablet 10 mg, 10 mg, Oral, Daily  sodium chloride flush 0.9 % injection 5-40 mL, 5-40 mL, IntraVENous, 2 times per day  sodium chloride flush 0.9 % injection 5-40 mL, 5-40 mL, IntraVENous, PRN  0.9 % sodium chloride infusion, , IntraVENous, PRN  ondansetron (ZOFRAN-ODT) disintegrating tablet 4 mg, 4 mg, Oral, Q8H PRN **OR** ondansetron (ZOFRAN) injection 4 mg, 4 mg, IntraVENous, Q6H PRN  morphine (PF) injection 2 mg, 2 mg, IntraVENous, Q2H PRN **OR** morphine injection 4 mg, 4 mg, IntraVENous, Q2H PRN  HYDROcodone-acetaminophen (NORCO) 5-325 MG per tablet 1 tablet, 1 tablet, Oral, Q4H PRN **OR** HYDROcodone-acetaminophen (NORCO) 5-325 MG per tablet 2 tablet, 2 tablet, Oral, Q4H PRN  atorvastatin (LIPITOR) tablet 40 mg, 40 mg, Oral, Nightly  doxepin (SINEQUAN) capsule 10 mg, 10 mg, Oral, Nightly  fenofibrate (TRIGLIDE) tablet 160 mg, 160 mg, Oral, Daily  losartan (COZAAR) tablet 25 mg, 25 mg, Oral, Daily  insulin lispro (HUMALOG) injection vial 0-6 Units, 0-6 Units, SubCUTAneous, TID WC  insulin lispro (HUMALOG) injection vial 0-3 Units, 0-3 Units, SubCUTAneous, Nightly  glucose chewable tablet 16 g, 4 tablet, Oral, PRN  dextrose bolus 10% 125 mL, 125 mL, IntraVENous, PRN **OR** dextrose bolus 10% 250 mL, 250 mL, IntraVENous, PRN  glucagon (rDNA) injection 1 mg, 1 mg, IntraMUSCular, PRN  dextrose 5 % solution, 100 mL/hr, IntraVENous, PRN    REVIEW OF SYSTEMS:  Constitutional: Denies any fever, chills. Derm: Denies any rash or skin color change. Musculoskeletal: Denies numbness and tingling RLE, Pain to R knee. Neuro: Denies any dizziness, paresthesia or weakness. OBJECTIVE    Patient Vitals for the past 24 hrs:   BP Temp Temp src Pulse Resp SpO2   05/20/22 0825 (!) 119/47 98 °F (36.7 °C) Oral 94 18 98 %   05/20/22 0326 (!) 100/53 98.1 °F (36.7 °C) Oral 77 18 95 %   05/19/22 1927 (!) 133/56 98.2 °F (36.8 °C) Oral 98 17 95 %   05/19/22 1527 (!) 126/46 98.2 °F (36.8 °C) Oral 106 18 100 %     INTAKE/OUTPUT:    Intake/Output Summary (Last 24 hours) at 5/20/2022 1214  Last data filed at 5/20/2022 0052  Gross per 24 hour   Intake 300 ml   Output 950 ml   Net -650 ml     I/O last 3 completed shifts: In: 700 [P.O.:700]  Out: 950 [Urine:950]    PHYSICAL EXAM:  General appearance:  Alert and oriented x 3. No apparent distress, appears stated age and cooperative. Musculoskeletal: RLE:  Denies calf pain to palpation. Pain to palpation R knee. Pt can flex and extend right toes. right knee drsg dry and intact. No redness or drainage noted from incision site. Skin: Skin color normal.  No rashes or lesions. Neurologic:  Neurovascularly intact without any focal sensory/motor deficits. Sensation intact.        Data  CBC:   Lab Results   Component Value Date    WBC 9.0 05/11/2022    HGB 9.8 05/11/2022     05/11/2022     BMP:    Lab Results   Component Value Date     05/20/2022    K 4.5 05/20/2022    K 4.6 05/09/2022     05/20/2022    CO2 25 05/20/2022    BUN 11 05/20/2022    CREATININE 0.5 05/20/2022    CALCIUM 9.2 05/20/2022    GLUCOSE 104 05/20/2022    GLUCOSE 113 12/10/2020     Uric Acid:  No components found for: URIC  PT/INR:    Lab Results   Component Value Date    PROTIME 11.0 10/05/2016    INR 0.90 10/05/2016     Troponin:  No results found for: TROPONINI  Urine Culture:  No components found for: JULIA    ASSESSMENT:  Active Hospital Problems    Diagnosis Date Noted    Displaced articular fracture of head of right femur, sequela [S72.061S] 05/09/2022     Priority: Medium    Neida-prosthetic supracondylar fracture of femur [U70. 8XXA, [address] 05/09/2022     Priority: Medium       PLAN  1. Dry drsg changes as needed   2. WBAT in knee immobilizer for 8 weeks   3. PT/OT to eval and treat   4. Continue current medical management   5. Ok for dc to ECF when accepted   6.  F/u 2 weeks       Electronically signed by DEREK Soto CNP on 5/20/2022 at 11:42 AM

## 2022-05-20 NOTE — PLAN OF CARE
Problem: Discharge Planning  Goal: Discharge to home or other facility with appropriate resources  Outcome: Progressing  Flowsheets (Taken 5/20/2022 0825)  Discharge to home or other facility with appropriate resources:   Identify discharge learning needs (meds, wound care, etc)   Identify barriers to discharge with patient and caregiver   Arrange for needed discharge resources and transportation as appropriate     Problem: Pain  Goal: Verbalizes/displays adequate comfort level or baseline comfort level  Outcome: Progressing  Flowsheets (Taken 5/19/2022 2111 by Deonte Rosales RN)  Verbalizes/displays adequate comfort level or baseline comfort level:   Encourage patient to monitor pain and request assistance   Assess pain using appropriate pain scale   Administer analgesics based on type and severity of pain and evaluate response     Problem: Skin/Tissue Integrity  Goal: Absence of new skin breakdown  Description: 1. Monitor for areas of redness and/or skin breakdown  2. Assess vascular access sites hourly  3. Every 4-6 hours minimum:  Change oxygen saturation probe site  4. Every 4-6 hours:  If on nasal continuous positive airway pressure, respiratory therapy assess nares and determine need for appliance change or resting period.   Outcome: Progressing     Problem: Safety - Adult  Goal: Free from fall injury  Outcome: Progressing  Flowsheets (Taken 5/20/2022 1540)  Free From Fall Injury: Instruct family/caregiver on patient safety     Problem: ABCDS Injury Assessment  Goal: Absence of physical injury  Recent Flowsheet Documentation  Taken 5/20/2022 1540 by Kyara Najera RN  Absence of Physical Injury: Implement safety measures based on patient assessment     Problem: Chronic Conditions and Co-morbidities  Goal: Patient's chronic conditions and co-morbidity symptoms are monitored and maintained or improved  Outcome: Progressing  Flowsheets (Taken 5/20/2022 0825)  Care Plan - Patient's Chronic Conditions and Co-Morbidity Symptoms are Monitored and Maintained or Improved:   Monitor and assess patient's chronic conditions and comorbid symptoms for stability, deterioration, or improvement   Collaborate with multidisciplinary team to address chronic and comorbid conditions and prevent exacerbation or deterioration   Update acute care plan with appropriate goals if chronic or comorbid symptoms are exacerbated and prevent overall improvement and discharge     Problem: Nutrition Deficit:  Goal: Optimize nutritional status  5/20/2022 1541 by Lisa Sanchez RN  Outcome: Progressing  Flowsheets (Taken 5/20/2022 1541)  Nutrient intake appropriate for improving, restoring, or maintaining nutritional needs:   Assess nutritional status and recommend course of action   Monitor oral intake, labs, and treatment plans   Recommend appropriate diets, oral nutritional supplements, and vitamin/mineral supplements  5/20/2022 1403 by Gian Delatorre RD, LD  Outcome: Progressing     Care plan reviewed with patient. Patient verbalizes understanding of the plan of care and contribute to goal setting.

## 2022-05-20 NOTE — PROGRESS NOTES
Comprehensive Nutrition Assessment    Type and Reason for Visit:  Initial,RD Nutrition Re-Screen/LOS    Nutrition Recommendations/Plan:   1. Weigh patient as able  2. Consider MVI  3. Diet as ordered  4. Additional protein daily (hard boiled eggs at breakfast)     Malnutrition Assessment:  Malnutrition Status: At risk for malnutrition (Comment) (05/20/22 1400)    Context:  Acute Illness     Findings of the 6 clinical characteristics of malnutrition:  Energy Intake:  Mild decrease in energy intake (Comment) (variable intake since admit)  Weight Loss:  Unable to assess (20% loss in 3 weeks however question if admission weight is stated or actual)     Body Fat Loss:  No significant body fat loss     Muscle Mass Loss:  No significant muscle mass loss    Fluid Accumulation:  Unable to assess     Strength:  Not Performed    Nutrition Assessment:      Pt. nutritionally compromised AEB poor appetite/ intake since admission. At risk for further nutrition compromise r/t increased nutrient needs to aid in surgical healing s/p right knee surgery and underlying medical condition (DM, HLD). Nutrition Related Findings:    Pt. Report/Treatments/Miscellaneous: Patient seen earlier today sitting in chair, reports poor intake of meals since admit, typically eats 3 meals/ day PTA and fairly good appetite/ intake, reports not fond of hospital food and all the food tastes the same, avoids aspartame because it causes her to vomit, reviewed importance of carbohydrate control diet, agreeable to additional protein source (hard boiled eggs)  GI Status: BM x 1 on 5/19  Pertinent Labs: Glucose 104, BUN 11, Creatinine 0.5, Potassium 4.5  Pertinent Meds: triglide, humalog    Wound Type: Surgical Incision (5/10/22 right knee total arthroplasty revision)       Current Nutrition Intake & Therapies:    Average Meal Intake: 26-50%,51-75%     ADULT DIET;  Regular; 4 carb choices (60 gm/meal)  ADULT ORAL NUTRITION SUPPLEMENT; Breakfast; Other Oral Supplement; Two Hard Boiled Eggs at Breakfast daily    Anthropometric Measures:  Height: 5' 7.52\" (171.5 cm)  Ideal Body Weight (IBW): 138 lbs (63 kg)    Admission Body Weight: 195 lb (88.5 kg) (5/9; ? actual versus stated weight; +2 pitting edema)  Current Body Weight: 195 lb (88.5 kg),     Current BMI (kg/m2): 30.1  Usual Body Weight:  (paitent reports UBW had been 256# until Fall 2019 when had tongue lesions and weight dropped to 170-185# and continues in that weight range)                       BMI Categories: Obese Class 1 (BMI 30.0-34. 9)    Estimated Daily Nutrient Needs:  Energy Requirements Based On: Kcal/kg  Weight Used for Energy Requirements: Admission (88kgm on 5/9)  Energy (kcal/day): 2245-2056 kcals (18-20)  Weight Used for Protein Requirements: Ideal (63kgm)  Protein (g/day):  grams (1.2-2)       Nutrition Diagnosis:   · Inadequate oral intake related to inadequate protein-energy intake as evidenced by intake 51-75% (or less of most meals since admit)      Nutrition Interventions:   Food and/or Nutrient Delivery: Continue Current Diet,Start Oral Nutrition Supplement  Nutrition Education/Counseling: Education initiated (reviewed importance of carbohydrate control diet to aid in blood sugar control, encouraged protein sources to aid in recovery process)  Coordination of Nutrition Care: Continue to monitor while inpatient       Goals:  Previous Goal Met: Progressing toward Goal(s)  Goals: PO intake 75% or greater,by next RD assessment       Nutrition Monitoring and Evaluation:   Behavioral-Environmental Outcomes: None Identified  Food/Nutrient Intake Outcomes: Food and Nutrient Intake  Physical Signs/Symptoms Outcomes: Biochemical Data,Fluid Status or Edema,Meal Time Behavior,Nutrition Focused Physical Findings,Skin,Weight,GI Status    Discharge Planning:     Too soon to determine     Karmen Schirmer, RD, LD  Contact: [phone]

## 2022-05-20 NOTE — PROGRESS NOTES
[address] [address]  INPATIENT PHYSICAL THERAPY  DAILY NOTE  [facility] ORTHOPEDICS 7K - 7K-02/002-A    Time In: 9836  Time Out: 0461  Timed Code Treatment Minutes: 45 Minutes  Minutes: 38          Date: 2022  Patient Name: Kali Trinh,  Gender:  female        MRN: [id number]  : 1960  (60 y.o.)     Referring Practitioner: DEREK Machado CNP  Diagnosis: displaced articular fracture of head of R femur  Additional Pertinent Hx: Per EMR \"The patient is a 60 y.o. medically comorbid female  who presents with right knee pain after a fall in her home. Patient underwent a right TKA a week ago, has been doing well. She was noted to have fallen out of her bed at home. She denies other injuries and no LOC. She denies SOB and CP. X-rays demonstrate the fracture. Spouse states that she has been ambulating on crutches for the past 8 months and primarily stays in her bed throughout the day. He also relates that she does not get out of bed for eating or bathroom. She uses a depends. Patient admits to 2 ppd smoking history. \" Pt is s/p RIGHT KNEE TOTAL ARTHROPLASTY REVISION tibial and femoral components and patellar tendon repair completed by Dr. Paul Huffman on 5/10. Prior Level of Function:  Lives With: Spouse  Type of Home: House  Home Layout: One level  Home Access: Stairs to enter with rails  Entrance Stairs - Number of Steps: 3 FABIO  Entrance Stairs - Rails: Left  Home Equipment: Crutches,Walker, rolling,Cane (\"stand up walker\" to stand up taller)   Bathroom Shower/Tub: Tub/Shower unit  Bathroom Toilet: Standard  Bathroom Equipment: Shower chair    ADL Assistance: Needs assistance (A for LE dressing & bathing)  Homemaking Assistance: Independent  Ambulation Assistance: Independent  Transfer Assistance: Independent  Active : No  Additional Comments: Pt reports prior to knee issues she was IND with all tasks, no use of AD.  Pt recently required crutches (house is too tight for a walker), has been laying in bed more, and  has been completing cooking and cleaning tasks. 2 year hx of B knee issues    Restrictions/Precautions:  Restrictions/Precautions: General Precautions,Fall Risk,Weight Bearing  Right Lower Extremity Weight Bearing: Weight Bearing As Tolerated  Required Braces or Orthoses  Right Lower Extremity Brace: Knee Immobilizer  Position Activity Restriction  Other position/activity restrictions: 5/19- ortho contacted for clarification of ROM at right LE due to patellar tendon repair - ok to work on extension however avoid flexion- ok to remove knee immobilizer while in bed- left knee flexion contracture     SUBJECTIVE: RN approved session. Pt sitting EOB with R knee flexed ~ 90deg upon entry. Much time spent educating pt on importance of keeping knee extended and wearing knee immobilizer to prevent further flexion contracture of R knee. Incr time spent educating on exercises to complete to assist with R knee extension ROM.      PAIN: 0/10: denies pain initially, does increase with activity but does not rate    Vitals: Vitals not assessed per clinical judgement, see nursing flowsheet    OBJECTIVE:  Bed Mobility:  Supine to Sit: Stand By Assistance, X 1, with head of bed flat  Sit to Supine: Stand By Assistance, X 1, with head of bed flat    *pt using LLE to assist RLE in/out of bed     Transfers:  Sit to Stand: [employer], X 1, with verbal cues  Stand to Sit:Contact Guard Assistance, X 1, with verbal cues    Ambulation:  Contact Guard Assistance, X 1, with verbal cues , with increased time for completion  Distance: 20'   Surface: Level Tile  Device:Rolling Walker  Gait Deviations:  Decreased Step Length on Left, Decreased Weight Shift Right, Decreased Gait Speed and Decreased Terminal Knee Extension  *Facilitation at RLE to encourage full knee extension with WBing    Balance:  Static Sitting Balance:  Supervision  Static Standing Balance: Contact Guard transfers with  RW for support and improved upright posture alignment to progress towards PLOF. Short term goal 3: Pt will amb for >20 feet with min A x1 and use of RW for support to progress towards PLOF. Short term goal 4: Pt will tolerate 10-20 reps of ther ex to increase overall mobility and improve ROM to improve gait mechanics. Short term goal 5: Pt will tolerate 2 min of standing tasks with improved upright posture with progress with overall mobillity and improve endurance. Long Term Goals  Time Frame for Long term goals : NA due to short ELOS    Following session, patient left in safe position with all fall risk precautions in place.     Jacklyn Corral PT, DPT

## 2022-05-20 NOTE — PLAN OF CARE
Nutrition Problem #1: Inadequate oral intake  Intervention: Food and/or Nutrient Delivery: Continue Current Diet,Start Oral Nutrition Supplement  Problem: Nutrition Deficit:  Goal: Optimize nutritional status  Outcome: Progressing

## 2022-05-20 NOTE — PLAN OF CARE
Problem: Discharge Planning  Goal: Discharge to home or other facility with appropriate resources  5/19/2022 2111 by Ngoc Montes RN  Outcome: Progressing  Flowsheets (Taken 5/19/2022 2111)  Discharge to home or other facility with appropriate resources:   Identify barriers to discharge with patient and caregiver   Arrange for needed discharge resources and transportation as appropriate   Identify discharge learning needs (meds, wound care, etc)  5/19/2022 1154 by Isaiah Benites RN  Outcome: Progressing  Flowsheets (Taken 5/18/2022 2039 by Ngoc Montes RN)  Discharge to home or other facility with appropriate resources:   Identify barriers to discharge with patient and caregiver   Arrange for needed discharge resources and transportation as appropriate   Identify discharge learning needs (meds, wound care, etc)     Problem: Pain  Goal: Verbalizes/displays adequate comfort level or baseline comfort level  5/19/2022 2111 by Ngoc Montes RN  Outcome: Progressing  Flowsheets (Taken 5/19/2022 2111)  Verbalizes/displays adequate comfort level or baseline comfort level:   Encourage patient to monitor pain and request assistance   Assess pain using appropriate pain scale   Administer analgesics based on type and severity of pain and evaluate response  5/19/2022 1154 by Isaiah Benites RN  Outcome: Progressing  Flowsheets (Taken 5/18/2022 2039 by Ngoc Montes RN)  Verbalizes/displays adequate comfort level or baseline comfort level:   Encourage patient to monitor pain and request assistance   Assess pain using appropriate pain scale   Administer analgesics based on type and severity of pain and evaluate response     Problem: Skin/Tissue Integrity  Goal: Absence of new skin breakdown  Description: 1. Monitor for areas of redness and/or skin breakdown  2. Assess vascular access sites hourly  3. Every 4-6 hours minimum:  Change oxygen saturation probe site  4.   Every 4-6 hours:  If on nasal continuous positive airway pressure, respiratory therapy assess nares and determine need for appliance change or resting period. 5/19/2022 2111 by Cecy Piña RN  Outcome: Progressing  Note: Keep skin dry and intact, apply anti moisture cream  5/19/2022 1154 by Ed Davila RN  Outcome: Progressing  Note: No new skin breakdown noted. External patterson when able. Micotin powder and epc cream to groin, buttocks continues. Turn q2 and prn. Problem: Safety - Adult  Goal: Free from fall injury  5/19/2022 2111 by Cecy Piña RN  Outcome: Progressing  Note: Bed alarm on , call light and bedside table within reach, educate patient on the importance of using the call light  5/19/2022 1154 by Ed Davila RN  Outcome: Progressing  Flowsheets (Taken 5/15/2022 1509 by Salvador Pina RN)  Free From Fall Injury:   Based on caregiver fall risk screen, instruct family/caregiver to ask for assistance with transferring infant if caregiver noted to have fall risk factors   Instruct family/caregiver on patient safety     Problem: Chronic Conditions and Co-morbidities  Goal: Patient's chronic conditions and co-morbidity symptoms are monitored and maintained or improved  5/19/2022 1154 by Ed Davila RN  Outcome: Progressing  Flowsheets (Taken 5/16/2022 2016 by Taran Kirkland RN)  Care Plan - Patient's Chronic Conditions and Co-Morbidity Symptoms are Monitored and Maintained or Improved: Monitor and assess patient's chronic conditions and comorbid symptoms for stability, deterioration, or improvement     Care plan reviewed with patient and   Patient verbalize understanding of the plan of care and contribute to goal setting.

## 2022-05-20 NOTE — CARE COORDINATION
5/20/22, 9:26 AM EDT    DISCHARGE ON GOING Karely Gamez 58 day: 11  7K02  Procedure: POD # 10 RIGHT KNEE TOTAL ARTHROPLASTY REVISION tibial and femoral components by Dr Christelle Hager  Barriers to Discharge: precert restarted for [facility] since [facility] had no current [address] [place] [address] contract. PT/OT continues. Patient Goals/Plan/Treatment Preferences: await precert to come back SW following.

## 2022-05-20 NOTE — PROGRESS NOTES
Solving and Decreased Safety Awareness    ADL:   Grooming: Stand By Assistance and with set-up.  to complete oral care and to comb hair  Lower Extremity Dressing: Stand By Assistance, with set-up and with verbal cues . with LHAE instruction. BALANCE:  Sitting Balance:  Stand By Assistance. seated on edge of recliner  Standing Balance: Contact Guard Assistance. stood x 2 mins Pt. demo difficulty attempting to complete unilateral reach    BED MOBILITY:  Not Tested    TRANSFERS:  Sit to Stand:  Contact Guard Assistance. Stand to Sit: Contact Guard Assistance. FUNCTIONAL MOBILITY:  Assistive Device: Rolling Walker  Assist Level:  Contact Guard Assistance and with verbal cues . Distance: to/from bathroom door, slow pace no LOB flexed posture vcs for upright stance       ADDITIONAL ACTIVITIES:  Pt. Instructed on LHAE use and trialed with LB dressing to don/doff shorts and socks. Pt. Demo good recall of education provided. ASSESSMENT:     Activity Tolerance:  Patient tolerance of  treatment: good. Discharge Recommendations: Subacute/skilled nursing facility and Patient would benefit from continued OT at discharge  Equipment Recommendations: Other: Monitor pending progress  Plan: Times per Week: 6x  Current Treatment Recommendations: Balance training,Functional mobility training,Endurance training,Safety education & training,Patient/Caregiver education & training,Self-Care / ADL    Patient Education  Patient Education: ADL's, Equipment Education, Importance of Increasing Activity and Assistive Device Safety and safety with functional mobility and transfers.      Goals  Short Term Goals  Time Frame for Short term goals: until discharge  Short Term Goal 1: Pt will complete various t/fs including BSC with mod A x 1  Short Term Goal 2: Pt will tolerate standing 2 min with CGA for increased ease of toileting routine  Short Term Goal 3: Pt will complete mobility to [facility] or bedside chair with RW, Min A, & min vcs for technique  Short Term Goal 4: Pt will complete LE dressing with mod A & LH AE  Long Term Goals  Time Frame for Long term goals : No LTG set d/t short ELOS    Following session, patient left in safe position with all fall risk precautions in place.

## 2022-05-21 LAB
ANION GAP SERPL CALCULATED.3IONS-SCNC: 13 MEQ/L (ref 8–16)
BUN BLDV-MCNC: 12 MG/DL (ref 7–22)
CALCIUM SERPL-MCNC: 9.2 MG/DL (ref 8.5–10.5)
CHLORIDE BLD-SCNC: 100 MEQ/L (ref 98–111)
CO2: 23 MEQ/L (ref 23–33)
CREAT SERPL-MCNC: 0.4 MG/DL (ref 0.4–1.2)
GFR SERPL CREATININE-BSD FRML MDRD: > 90 ML/MIN/1.73M2
GLUCOSE BLD-MCNC: 102 MG/DL (ref 70–108)
GLUCOSE BLD-MCNC: 115 MG/DL (ref 70–108)
GLUCOSE BLD-MCNC: 127 MG/DL (ref 70–108)
GLUCOSE BLD-MCNC: 158 MG/DL (ref 70–108)
GLUCOSE BLD-MCNC: 99 MG/DL (ref 70–108)
POTASSIUM SERPL-SCNC: 4.2 MEQ/L (ref 3.5–5.2)
SODIUM BLD-SCNC: 136 MEQ/L (ref 135–145)
TROPONIN T: < 0.01 NG/ML

## 2022-05-21 PROCEDURE — 82948 REAGENT STRIP/BLOOD GLUCOSE: CPT

## 2022-05-21 PROCEDURE — 1200000000 HC SEMI PRIVATE

## 2022-05-21 PROCEDURE — 84484 ASSAY OF TROPONIN QUANT: CPT

## 2022-05-21 PROCEDURE — 97110 THERAPEUTIC EXERCISES: CPT

## 2022-05-21 PROCEDURE — 6370000000 HC RX 637 (ALT 250 FOR IP): Performed by: NURSE PRACTITIONER

## 2022-05-21 PROCEDURE — 94760 N-INVAS EAR/PLS OXIMETRY 1: CPT

## 2022-05-21 PROCEDURE — 36415 COLL VENOUS BLD VENIPUNCTURE: CPT

## 2022-05-21 PROCEDURE — 80048 BASIC METABOLIC PNL TOTAL CA: CPT

## 2022-05-21 PROCEDURE — 97116 GAIT TRAINING THERAPY: CPT

## 2022-05-21 RX ADMIN — HYDROCODONE BITARTRATE AND ACETAMINOPHEN 1 TABLET: 5; 325 TABLET ORAL at 20:24

## 2022-05-21 RX ADMIN — FENOFIBRATE 160 MG: 160 TABLET ORAL at 10:37

## 2022-05-21 RX ADMIN — MICONAZOLE NITRATE: 20 POWDER TOPICAL at 20:21

## 2022-05-21 RX ADMIN — DOXEPIN HYDROCHLORIDE 10 MG: 10 CAPSULE ORAL at 20:21

## 2022-05-21 RX ADMIN — ATORVASTATIN CALCIUM 40 MG: 40 TABLET, FILM COATED ORAL at 20:21

## 2022-05-21 RX ADMIN — HYDROCODONE BITARTRATE AND ACETAMINOPHEN 1 TABLET: 5; 325 TABLET ORAL at 10:45

## 2022-05-21 RX ADMIN — INSULIN LISPRO 1 UNITS: 100 INJECTION, SOLUTION INTRAVENOUS; SUBCUTANEOUS at 17:40

## 2022-05-21 RX ADMIN — RIVAROXABAN 10 MG: 10 TABLET, FILM COATED ORAL at 17:39

## 2022-05-21 ASSESSMENT — PAIN DESCRIPTION - DESCRIPTORS
DESCRIPTORS: ACHING
DESCRIPTORS: ACHING

## 2022-05-21 ASSESSMENT — PAIN DESCRIPTION - FREQUENCY: FREQUENCY: CONTINUOUS

## 2022-05-21 ASSESSMENT — PAIN DESCRIPTION - ONSET: ONSET: ON-GOING

## 2022-05-21 ASSESSMENT — PAIN - FUNCTIONAL ASSESSMENT: PAIN_FUNCTIONAL_ASSESSMENT: PREVENTS OR INTERFERES SOME ACTIVE ACTIVITIES AND ADLS

## 2022-05-21 ASSESSMENT — PAIN SCALES - GENERAL
PAINLEVEL_OUTOF10: 2
PAINLEVEL_OUTOF10: 4
PAINLEVEL_OUTOF10: 2
PAINLEVEL_OUTOF10: 1

## 2022-05-21 ASSESSMENT — PAIN DESCRIPTION - ORIENTATION: ORIENTATION: RIGHT

## 2022-05-21 ASSESSMENT — PAIN DESCRIPTION - PAIN TYPE: TYPE: SURGICAL PAIN

## 2022-05-21 ASSESSMENT — PAIN DESCRIPTION - LOCATION
LOCATION: KNEE
LOCATION: HEAD

## 2022-05-21 ASSESSMENT — PAIN DESCRIPTION - DIRECTION: RADIATING_TOWARDS: DOWN LEG

## 2022-05-21 NOTE — PLAN OF CARE
Problem: Discharge Planning  Goal: Discharge to home or other facility with appropriate resources  Outcome: Progressing  Flowsheets (Taken 5/21/2022 1030)  Discharge to home or other facility with appropriate resources:   Identify barriers to discharge with patient and caregiver   Arrange for needed discharge resources and transportation as appropriate   Identify discharge learning needs (meds, wound care, etc)     Problem: Pain  Goal: Verbalizes/displays adequate comfort level or baseline comfort level  Outcome: Progressing  Flowsheets (Taken 5/21/2022 1030)  Verbalizes/displays adequate comfort level or baseline comfort level:   Encourage patient to monitor pain and request assistance   Assess pain using appropriate pain scale     Problem: Skin/Tissue Integrity  Goal: Absence of new skin breakdown  Description: 1. Monitor for areas of redness and/or skin breakdown  2. Assess vascular access sites hourly  3. Every 4-6 hours minimum:  Change oxygen saturation probe site  4. Every 4-6 hours:  If on nasal continuous positive airway pressure, respiratory therapy assess nares and determine need for appliance change or resting period.   Outcome: Progressing     Problem: Safety - Adult  Goal: Free from fall injury  Outcome: Progressing  Flowsheets (Taken 5/21/2022 1820)  Free From Fall Injury: Instruct family/caregiver on patient safety     Problem: Chronic Conditions and Co-morbidities  Goal: Patient's chronic conditions and co-morbidity symptoms are monitored and maintained or improved  Outcome: Progressing  Flowsheets (Taken 5/21/2022 1030)  Care Plan - Patient's Chronic Conditions and Co-Morbidity Symptoms are Monitored and Maintained or Improved:   Monitor and assess patient's chronic conditions and comorbid symptoms for stability, deterioration, or improvement   Collaborate with multidisciplinary team to address chronic and comorbid conditions and prevent exacerbation or deterioration   Update acute care plan with appropriate goals if chronic or comorbid symptoms are exacerbated and prevent overall improvement and discharge     Problem: Nutrition Deficit:  Goal: Optimize nutritional status  Outcome: Progressing     Problem: ABCDS Injury Assessment  Goal: Absence of physical injury  Recent Flowsheet Documentation  Taken 5/21/2022 1820 by Tamika Funez RN  Absence of Physical Injury: Implement safety measures based on patient assessment     Care plan reviewed with patient. Patient verbalizes understanding of the care plan and contributed to goal setting.

## 2022-05-21 NOTE — PLAN OF CARE
Pain controlled with PRN medications as stated by patient. Fall protocols remains in place with pt in agreement. Immobilizer remains in place as ordered. Pt turning and repositioning at tolerated and need to reduce pressure risk to bony prominences. Resting quietly in bed. Will continue to monitor.      Problem: Discharge Planning  Goal: Discharge to home or other facility with appropriate resources  5/21/2022 0048 by Diann Ingram RN  Outcome: Progressing  Flowsheets (Taken 5/20/2022 2024)  Discharge to home or other facility with appropriate resources:   Identify barriers to discharge with patient and caregiver   Arrange for needed discharge resources and transportation as appropriate  5/20/2022 1541 by Layla Mcgrath RN  Outcome: Progressing  Flowsheets (Taken 5/20/2022 0825)  Discharge to home or other facility with appropriate resources:   Identify discharge learning needs (meds, wound care, etc)   Identify barriers to discharge with patient and caregiver   Arrange for needed discharge resources and transportation as appropriate     Problem: Pain  Goal: Verbalizes/displays adequate comfort level or baseline comfort level  5/21/2022 0048 by Diann Ingram RN  Outcome: Progressing  Flowsheets (Taken 5/20/2022 2024)  Verbalizes/displays adequate comfort level or baseline comfort level:   Encourage patient to monitor pain and request assistance   Assess pain using appropriate pain scale   Administer analgesics based on type and severity of pain and evaluate response   Implement non-pharmacological measures as appropriate and evaluate response  5/20/2022 1541 by Layla Mcgrath RN  Outcome: Progressing  [address] (Taken 5/19/2022 2111 by Agustin Mcmanus RN)  Verbalizes/displays adequate comfort level or baseline comfort level:   Encourage patient to monitor pain and request assistance   Assess pain using appropriate pain scale   Administer analgesics based on type and severity of pain and evaluate response Problem: Skin/Tissue Integrity  Goal: Absence of new skin breakdown  Description: 1. Monitor for areas of redness and/or skin breakdown  2. Assess vascular access sites hourly  3. Every 4-6 hours minimum:  Change oxygen saturation probe site  4. Every 4-6 hours:  If on nasal continuous positive airway pressure, respiratory therapy assess nares and determine need for appliance change or resting period.   5/21/2022 0048 by Marge Holm RN  Outcome: Progressing  5/20/2022 1541 by Mayuri Martinez RN  Outcome: Progressing     Problem: Safety - Adult  Goal: Free from fall injury  5/21/2022 0048 by Marge Holm RN  Outcome: Progressing  Flowsheets (Taken 5/20/2022 2344)  Free From Fall Injury:   Tereza Galvan family/caregiver on patient safety   Based on caregiver fall risk screen, instruct family/caregiver to ask for assistance with transferring infant if caregiver noted to have fall risk factors  5/20/2022 1541 by Mayuri Martinez RN  Outcome: Progressing  Flowsheets (Taken 5/20/2022 1540)  Free From Fall Injury: Instruct family/caregiver on patient safety     Problem: Chronic Conditions and Co-morbidities  Goal: Patient's chronic conditions and co-morbidity symptoms are monitored and maintained or improved  5/21/2022 0048 by Marge Holm RN  Outcome: Progressing  Flowsheets (Taken 5/20/2022 2024)  Care Plan - Patient's Chronic Conditions and Co-Morbidity Symptoms are Monitored and Maintained or Improved:   Monitor and assess patient's chronic conditions and comorbid symptoms for stability, deterioration, or improvement   Collaborate with multidisciplinary team to address chronic and comorbid conditions and prevent exacerbation or deterioration   Update acute care plan with appropriate goals if chronic or comorbid symptoms are exacerbated and prevent overall improvement and discharge  5/20/2022 1541 by Mayuri Martinez RN  Outcome: Progressing  Flowsheets (Taken 5/20/2022 0825)  Care Plan - Patient's Chronic Conditions and Co-Morbidity Symptoms are Monitored and Maintained or Improved:   Monitor and assess patient's chronic conditions and comorbid symptoms for stability, deterioration, or improvement   Collaborate with multidisciplinary team to address chronic and comorbid conditions and prevent exacerbation or deterioration   Update acute care plan with appropriate goals if chronic or comorbid symptoms are exacerbated and prevent overall improvement and discharge     Problem: Nutrition Deficit:  Goal: Optimize nutritional status  5/21/2022 0048 by Herschel Klinefelter, RN  Outcome: Progressing  5/20/2022 1541 by Ryan Girard RN  Outcome: Progressing  Flowsheets (Taken 5/20/2022 1541)  Nutrient intake appropriate for improving, restoring, or maintaining nutritional needs:   Assess nutritional status and recommend course of action   Monitor oral intake, labs, and treatment plans   Recommend appropriate diets, oral nutritional supplements, and vitamin/mineral supplements  5/20/2022 1403 by Kiesha Sorensen, RD, LD  Outcome: Progressing

## 2022-05-21 NOTE — PROGRESS NOTES
Orthopaedic Progress Note      SUBJECTIVE     Ms. Decker January is post op day # 10 s/p  revision right TKA history of preop contraction of bilateral knees . Noted to have retraction of bilateral knees   Pt seen in bed, states pain is improving  Pt improving with therapy  Still awaiting transfer to [facility] for further rehab  Pt may discharge when accepted    Pt tolerating knee immobilizer       OBJECTIVE      Physical    VITALS:  BP (!) 111/52   Pulse 92   Temp 98 °F (36.7 °C) (Oral)   Resp 16   Ht 5' 7.52\" (1.715 m)   Wt 195 lb (88.5 kg)   SpO2 98%   BMI 30.07 kg/m²   I/O last 3 completed shifts:   In: 200 [P.O.:200]  Out: 1400 [Urine:1400]      Right knee with scant drainage on ace wrap, n/v intact to the right foot      Data  CBC:   Lab Results   Component Value Date    WBC 9.0 05/11/2022    HGB 9.8 05/11/2022     05/11/2022     BMP:    Lab Results   Component Value Date     05/21/2022    K 4.2 05/21/2022    K 4.6 05/09/2022     05/21/2022    CO2 23 05/21/2022    BUN 12 05/21/2022    CREATININE 0.4 05/21/2022    CALCIUM 9.2 05/21/2022    GLUCOSE 102 05/21/2022    GLUCOSE 113 12/10/2020     Uric Acid:  No components found for: URIC  PT/INR:    Lab Results   Component Value Date    PROTIME 11.0 10/05/2016    INR 0.90 10/05/2016     Troponin:  No results found for: TROPONINI  Urine Culture:  No components found for: CURINE      Current Inpatient Medications    Current Facility-Administered Medications: miconazole (MICOTIN) 2 % powder, , Topical, BID  cyclobenzaprine (FLEXERIL) tablet 10 mg, 10 mg, Oral, TID PRN  rivaroxaban (XARELTO) tablet 10 mg, 10 mg, Oral, Daily  sodium chloride flush 0.9 % injection 5-40 mL, 5-40 mL, IntraVENous, 2 times per day  sodium chloride flush 0.9 % injection 5-40 mL, 5-40 mL, IntraVENous, PRN  0.9 % sodium chloride infusion, , IntraVENous, PRN  ondansetron (ZOFRAN-ODT) disintegrating tablet 4 mg, 4 mg, Oral, Q8H PRN **OR** ondansetron (ZOFRAN) injection 4 mg, 4 mg, IntraVENous, Q6H PRN  morphine (PF) injection 2 mg, 2 mg, IntraVENous, Q2H PRN **OR** morphine injection 4 mg, 4 mg, IntraVENous, Q2H PRN  HYDROcodone-acetaminophen (NORCO) 5-325 MG per tablet 1 tablet, 1 tablet, Oral, Q4H PRN **OR** HYDROcodone-acetaminophen (NORCO) 5-325 MG per tablet 2 tablet, 2 tablet, Oral, Q4H PRN  atorvastatin (LIPITOR) tablet 40 mg, 40 mg, Oral, Nightly  doxepin (SINEQUAN) capsule 10 mg, 10 mg, Oral, Nightly  fenofibrate (TRIGLIDE) tablet 160 mg, 160 mg, Oral, Daily  losartan (COZAAR) tablet 25 mg, 25 mg, Oral, Daily  insulin lispro (HUMALOG) injection vial 0-6 Units, 0-6 Units, SubCUTAneous, TID WC  insulin lispro (HUMALOG) injection vial 0-3 Units, 0-3 Units, SubCUTAneous, Nightly  glucose chewable tablet 16 g, 4 tablet, Oral, PRN  dextrose bolus 10% 125 mL, 125 mL, IntraVENous, PRN **OR** dextrose bolus 10% 250 mL, 250 mL, IntraVENous, PRN  glucagon (rDNA) injection 1 mg, 1 mg, IntraMUSCular, PRN  dextrose 5 % solution, 100 mL/hr, IntraVENous, PRN        PLAN    Up with PT, WBAT  Awaiting precert for ECF  change ace wrap dressing today

## 2022-05-21 NOTE — PROGRESS NOTES
[address] [address]  INPATIENT PHYSICAL THERAPY  DAILY NOTE  [facility] ORTHOPEDICS 7K - 7K-02/002-A  Time In: 5212  Time Out: 8448  Timed Code Treatment Minutes: 34 Minutes  Minutes: 29          Date: 2022  Patient Name: Ezra Garcia,  Gender:  female        MRN: [id number]  : 1960  (60 y.o.)     Referring Practitioner: DEREK Carrington CNP  Diagnosis: displaced articular fracture of head of R femur  Additional Pertinent Hx: Per EMR \"The patient is a 60 y.o. medically comorbid female  who presents with right knee pain after a fall in her home. Patient underwent a right TKA a week ago, has been doing well. She was noted to have fallen out of her bed at home. She denies other injuries and no LOC. She denies SOB and CP. X-rays demonstrate the fracture. Spouse states that she has been ambulating on crutches for the past 8 months and primarily stays in her bed throughout the day. He also relates that she does not get out of bed for eating or bathroom. She uses a depends. Patient admits to 2 ppd smoking history. \" Pt is s/p RIGHT KNEE TOTAL ARTHROPLASTY REVISION tibial and femoral components and patellar tendon repair completed by Dr. Huyen Cesar on 5/10. Prior Level of Function:  Lives With: Spouse  Type of Home: House  Home Layout: One level  Home Access: Stairs to enter with rails  Entrance Stairs - Number of Steps: 3 FABIO  Entrance Stairs - Rails: Left  Home Equipment: Crutches,Walker, rolling,Cane (\"stand up walker\" to stand up taller)   Bathroom Shower/Tub: Tub/Shower unit  Bathroom Toilet: Standard  Bathroom Equipment: Shower chair    ADL Assistance: Needs assistance (A for LE dressing & bathing)  Homemaking Assistance: Independent  Ambulation Assistance: Independent  Transfer Assistance: Independent  Active : No  Additional Comments: Pt reports prior to knee issues she was IND with all tasks, no use of AD.  Pt recently required crutches (house is too tight for a walker), has been laying in bed more, and  has been completing cooking and cleaning tasks. 2 year hx of B knee issues    Restrictions/Precautions:  Restrictions/Precautions: General Precautions,Fall Risk,Weight Bearing  Right Lower Extremity Weight Bearing: Weight Bearing As Tolerated  Required Braces or Orthoses  Right Lower Extremity Brace: Knee Immobilizer  Position Activity Restriction  Other position/activity restrictions: 5/19- ortho contacted for clarification of ROM at right LE due to patellar tendon repair - ok to work on extension however avoid flexion- ok to remove knee immobilizer while in bed- left knee flexion contracture     SUBJECTIVE: RN approved PT tx session. Pt in chair upon PT arrival, agreeable to therapy. Pt states her \"inner thighs feel tight. \" Pt requested ice at end of session. PAIN: \"not that bad\"     Vitals: Vitals not assessed per clinical judgement, see nursing flowsheet    OBJECTIVE:  Bed Mobility:  Scooting: Stand By Assistance, with increased time for completion, for seated scooting    Transfers:  Sit to Stand: Minimal Assistance, X 1, with increased time for completion, with verbal cues, use of momentum  Stand to [address] [address], X 1, with increased time for completion, to/from chair with arms    Ambulation:  Contact Guard Assistance, X 1, with cues for safety, with verbal cues , with increased time for completion  Distance: 35'  Surface: Level Tile  Device:Rolling Walker  Gait Deviations:   Forward Flexed Posture, Slow Dianna, Decreased Step Length Bilaterally, Decreased Gait Speed, Unsteady Gait and Decreased Terminal Knee Extension  *several cues for upright posture--pt very kyphotic     Balance:  Static Sitting Balance:  Independent  Dynamic Sitting Balance: Independent  Static Standing Balance: Contact Guard Assistance  Dynamic Standing Balance: Contact Guard Assistance, with cues for safety, with verbal cues , with increased time for completion    Exercise:  Patient was guided in 1 set(s) 15 reps of exercise to both lower extremities. Ankle pumps, Quad sets with overpressure and hip adductor stretch (2 sets each x 30sec hold). Exercises were completed for increased independence with functional mobility. Encouraged pt to perform these exercises as HEP to improve outcomes. *towel roll left under bilateral ankles to promote increased knee extension     Functional Outcome Measures: Completed  AM-PAC Inpatient Mobility Raw Score : 15  AM-PAC Inpatient T-Scale Score : 39.45    ASSESSMENT:  Assessment: Patient progressing toward established goals. Activity Tolerance:  Patient tolerance of  treatment: good. Equipment Recommendations:Equipment Needed: No  Discharge Recommendations: Subacte/Skilled Nursing Facility  Plan: Current Treatment Recommendations: Strengthening,Equipment evaluation, education, & procurement,Balance training,Gait training,Functional mobility training,Stair training,Neuromuscular re-education,Transfer training,Endurance training,Patient/Caregiver education & training,Therapeutic activities,Safety education & training,Home exercise program  Plan:  (6x O)    Patient Education  Patient Education: Transfers, Reviewed Prior Education, Gait, Verbal Exercise Instruction,  - Patient Verbalized Understanding, - Patient Requires Continued Education    Goals:  Patient goals : \"get back to normal\"  Short Term Goals  Time Frame for Short term goals: by discharge  Short term goal 1: Pt will demo min A for bed mobility tasks with bed flat to progress towards PLOF. Short term goal 2: Pt will demo min A x2 for transfers with  RW for support and improved upright posture alignment to progress towards PLOF. Short term goal 3: Pt will amb for >20 feet with min A x1 and use of RW for support to progress towards PLOF. Short term goal 4: Pt will tolerate 10-20 reps of ther ex to increase overall mobility and improve ROM to improve gait mechanics.   Short term goal 5: Pt will tolerate 2 min of standing tasks with improved upright posture with progress with overall mobillity and improve endurance. Long Term Goals  Time Frame for Long term goals : NA due to short ELOS    Following session, patient left in safe position with all fall risk precautions in place.     Margarito Redd, PT, DPT

## 2022-05-22 LAB
ANION GAP SERPL CALCULATED.3IONS-SCNC: 12 MEQ/L (ref 8–16)
BUN BLDV-MCNC: 15 MG/DL (ref 7–22)
CALCIUM SERPL-MCNC: 8.8 MG/DL (ref 8.5–10.5)
CHLORIDE BLD-SCNC: 99 MEQ/L (ref 98–111)
CO2: 22 MEQ/L (ref 23–33)
CREAT SERPL-MCNC: 0.5 MG/DL (ref 0.4–1.2)
GFR SERPL CREATININE-BSD FRML MDRD: > 90 ML/MIN/1.73M2
GLUCOSE BLD-MCNC: 106 MG/DL (ref 70–108)
GLUCOSE BLD-MCNC: 111 MG/DL (ref 70–108)
GLUCOSE BLD-MCNC: 111 MG/DL (ref 70–108)
GLUCOSE BLD-MCNC: 152 MG/DL (ref 70–108)
GLUCOSE BLD-MCNC: 97 MG/DL (ref 70–108)
POTASSIUM SERPL-SCNC: 4.7 MEQ/L (ref 3.5–5.2)
SODIUM BLD-SCNC: 133 MEQ/L (ref 135–145)

## 2022-05-22 PROCEDURE — 6370000000 HC RX 637 (ALT 250 FOR IP): Performed by: NURSE PRACTITIONER

## 2022-05-22 PROCEDURE — 2580000003 HC RX 258: Performed by: NURSE PRACTITIONER

## 2022-05-22 PROCEDURE — 94760 N-INVAS EAR/PLS OXIMETRY 1: CPT

## 2022-05-22 PROCEDURE — 36415 COLL VENOUS BLD VENIPUNCTURE: CPT

## 2022-05-22 PROCEDURE — 1200000000 HC SEMI PRIVATE

## 2022-05-22 PROCEDURE — 82948 REAGENT STRIP/BLOOD GLUCOSE: CPT

## 2022-05-22 PROCEDURE — 97530 THERAPEUTIC ACTIVITIES: CPT

## 2022-05-22 PROCEDURE — 80048 BASIC METABOLIC PNL TOTAL CA: CPT

## 2022-05-22 RX ADMIN — DOXEPIN HYDROCHLORIDE 10 MG: 10 CAPSULE ORAL at 21:01

## 2022-05-22 RX ADMIN — RIVAROXABAN 10 MG: 10 TABLET, FILM COATED ORAL at 18:12

## 2022-05-22 RX ADMIN — FENOFIBRATE 160 MG: 160 TABLET ORAL at 08:40

## 2022-05-22 RX ADMIN — CYCLOBENZAPRINE 10 MG: 10 TABLET, FILM COATED ORAL at 03:03

## 2022-05-22 RX ADMIN — SODIUM CHLORIDE, PRESERVATIVE FREE 10 ML: 5 INJECTION INTRAVENOUS at 08:41

## 2022-05-22 RX ADMIN — MICONAZOLE NITRATE: 20 POWDER TOPICAL at 20:00

## 2022-05-22 RX ADMIN — HYDROCODONE BITARTRATE AND ACETAMINOPHEN 1 TABLET: 5; 325 TABLET ORAL at 21:01

## 2022-05-22 RX ADMIN — MICONAZOLE NITRATE: 20 POWDER TOPICAL at 08:40

## 2022-05-22 RX ADMIN — CYCLOBENZAPRINE 10 MG: 10 TABLET, FILM COATED ORAL at 15:49

## 2022-05-22 RX ADMIN — HYDROCODONE BITARTRATE AND ACETAMINOPHEN 2 TABLET: 5; 325 TABLET ORAL at 02:42

## 2022-05-22 RX ADMIN — ATORVASTATIN CALCIUM 40 MG: 40 TABLET, FILM COATED ORAL at 21:01

## 2022-05-22 ASSESSMENT — PAIN DESCRIPTION - DESCRIPTORS
DESCRIPTORS: ACHING

## 2022-05-22 ASSESSMENT — PAIN DESCRIPTION - LOCATION
LOCATION: KNEE

## 2022-05-22 ASSESSMENT — PAIN SCALES - GENERAL
PAINLEVEL_OUTOF10: 8
PAINLEVEL_OUTOF10: 8
PAINLEVEL_OUTOF10: 7
PAINLEVEL_OUTOF10: 0
PAINLEVEL_OUTOF10: 5
PAINLEVEL_OUTOF10: 4
PAINLEVEL_OUTOF10: 2

## 2022-05-22 ASSESSMENT — PAIN DESCRIPTION - ORIENTATION
ORIENTATION: RIGHT

## 2022-05-22 ASSESSMENT — PAIN DESCRIPTION - ONSET
ONSET: ON-GOING
ONSET: ON-GOING

## 2022-05-22 ASSESSMENT — PAIN DESCRIPTION - FREQUENCY
FREQUENCY: CONTINUOUS
FREQUENCY: CONTINUOUS

## 2022-05-22 ASSESSMENT — PAIN DESCRIPTION - PAIN TYPE
TYPE: SURGICAL PAIN
TYPE: SURGICAL PAIN

## 2022-05-22 NOTE — PLAN OF CARE
Problem: Discharge Planning  Goal: Discharge to home or other facility with appropriate resources  5/22/2022 0956 by Julianna Catalan RN  Outcome: Progressing  Flowsheets (Taken 5/22/2022 0800)  Discharge to home or other facility with appropriate resources: Identify barriers to discharge with patient and caregiver  5/21/2022 2220 by Quiana Littlejohn RN  Outcome: Progressing  Flowsheets  Taken 5/21/2022 2220  Discharge to home or other facility with appropriate resources: Identify barriers to discharge with patient and caregiver  Taken 5/21/2022 2013  Discharge to home or other facility with appropriate resources: Identify barriers to discharge with patient and caregiver     Problem: Pain  Goal: Verbalizes/displays adequate comfort level or baseline comfort level  5/22/2022 0956 by Julianna Catalan RN  Outcome: Progressing  5/21/2022 2220 by Quiana Littlejohn RN  Outcome: Progressing  Flowsheets  Taken 5/21/2022 2220  Verbalizes/displays adequate comfort level or baseline comfort level: Encourage patient to monitor pain and request assistance  Taken 5/21/2022 2013  Verbalizes/displays adequate comfort level or baseline comfort level: Encourage patient to monitor pain and request assistance     Problem: Skin/Tissue Integrity  Goal: Absence of new skin breakdown  Description: 1. Monitor for areas of redness and/or skin breakdown  2. Assess vascular access sites hourly  3. Every 4-6 hours minimum:  Change oxygen saturation probe site  4. Every 4-6 hours:  If on nasal continuous positive airway pressure, respiratory therapy assess nares and determine need for appliance change or resting period. 5/22/2022 0956 by Julianna Catalan RN  Outcome: Progressing  5/21/2022 2220 by Quiana Littlejohn RN  Outcome: Progressing  Note: No new skin damage noted. Pt able to reposition self in bed.       Problem: Safety - Adult  Goal: Free from fall injury  5/22/2022 0956 by Julianna Catalan RN  Outcome: Progressing  5/21/2022 2220 by

## 2022-05-22 NOTE — PROGRESS NOTES
PRN  ondansetron (ZOFRAN-ODT) disintegrating tablet 4 mg, 4 mg, Oral, Q8H PRN **OR** ondansetron (ZOFRAN) injection 4 mg, 4 mg, IntraVENous, Q6H PRN  morphine (PF) injection 2 mg, 2 mg, IntraVENous, Q2H PRN **OR** morphine injection 4 mg, 4 mg, IntraVENous, Q2H PRN  HYDROcodone-acetaminophen (NORCO) 5-325 MG per tablet 1 tablet, 1 tablet, Oral, Q4H PRN **OR** HYDROcodone-acetaminophen (NORCO) 5-325 MG per tablet 2 tablet, 2 tablet, Oral, Q4H PRN  atorvastatin (LIPITOR) tablet 40 mg, 40 mg, Oral, Nightly  doxepin (SINEQUAN) capsule 10 mg, 10 mg, Oral, Nightly  fenofibrate (TRIGLIDE) tablet 160 mg, 160 mg, Oral, Daily  [Held by provider] losartan (COZAAR) tablet 25 mg, 25 mg, Oral, Daily  insulin lispro (HUMALOG) injection vial 0-6 Units, 0-6 Units, SubCUTAneous, TID WC  insulin lispro (HUMALOG) injection vial 0-3 Units, 0-3 Units, SubCUTAneous, Nightly  glucose chewable tablet 16 g, 4 tablet, Oral, PRN  dextrose bolus 10% 125 mL, 125 mL, IntraVENous, PRN **OR** dextrose bolus 10% 250 mL, 250 mL, IntraVENous, PRN  glucagon (rDNA) injection 1 mg, 1 mg, IntraMUSCular, PRN  dextrose 5 % solution, 100 mL/hr, IntraVENous, PRN        PLAN    Awaiting ECF

## 2022-05-22 NOTE — PLAN OF CARE
Plan - Patient's Chronic Conditions and Co-Morbidity Symptoms are Monitored and Maintained or Improved: Monitor and assess patient's chronic conditions and comorbid symptoms for stability, deterioration, or improvement  Taken 5/21/2022 2013  Care Plan - Patient's Chronic Conditions and Co-Morbidity Symptoms are Monitored and Maintained or Improved: Monitor and assess patient's chronic conditions and comorbid symptoms for stability, deterioration, or improvement     Problem: Nutrition Deficit:  Goal: Optimize nutritional status  5/21/2022 2220 by Cesar Nguyen RN  Outcome: Progressing  Flowsheets (Taken 5/21/2022 2220)  Nutrient intake appropriate for improving, restoring, or maintaining nutritional needs:   Assess nutritional status and recommend course of action   Monitor oral intake, labs, and treatment plans   Care plan reviewed with patient. Patient verbalizes understanding of the plan of care and contribute to goal setting.

## 2022-05-22 NOTE — PROGRESS NOTES
flowsheet    COGNITION: WFL    ADL:   Lower Extremity Dressing: Moderate Assistance. for removing sling while in bed. BALANCE:  Standing Balance: Contact Guard Assistance. with RW, and BUE support on the walker with vc's for forward flexed posture    BED MOBILITY:  Sit to Supine: Stand By Assistance with pt using leg  to get back into bed    TRANSFERS:  Sit to Stand: Moderate Assistance. from the recliner  Stand to Sit: [address] to the EOB      FUNCTIONAL MOBILITY:  Assistive Device: Rolling Walker  Assist Level:  Contact Guard Assistance. Distance: HH distances       ADDITIONAL ACTIVITIES:  Pt noted to have ACE bandage heavily soiled from drainage from incision, with pt's RN notified, with assistance provided on changing dressing and re-wrapping leg    ASSESSMENT:     Activity Tolerance:  Patient tolerance of  treatment: good. Discharge Recommendations: Continue to assess pending progress  Equipment Recommendations: Other: Monitor pending progress  Plan: Times per Week: 6x  Current Treatment Recommendations: Balance training,Functional mobility training,Endurance training,Safety education & training,Patient/Caregiver education & training,Self-Care / ADL    Patient Education  Patient Education: ADL's    Goals  Short Term Goals  Time Frame for Short term goals: until discharge  Short Term Goal 1: Pt will complete various t/fs including BSC with mod A x 1  Short Term Goal 2: Pt will tolerate standing 2 min with CGA for increased ease of toileting routine  Short Term Goal 3: Pt will complete mobility to [facility] or bedside chair with RW, Min A, & min vcs for technique  Short Term Goal 4: Pt will complete LE dressing with mod A & LH AE  Long Term Goals  Time Frame for Long term goals : No LTG set d/t short ELOS    Following session, patient left in safe position with all fall risk precautions in place.

## 2022-05-23 LAB
ANION GAP SERPL CALCULATED.3IONS-SCNC: 13 MEQ/L (ref 8–16)
BUN BLDV-MCNC: 12 MG/DL (ref 7–22)
CALCIUM SERPL-MCNC: 9.2 MG/DL (ref 8.5–10.5)
CHLORIDE BLD-SCNC: 101 MEQ/L (ref 98–111)
CO2: 21 MEQ/L (ref 23–33)
CREAT SERPL-MCNC: 0.4 MG/DL (ref 0.4–1.2)
GFR SERPL CREATININE-BSD FRML MDRD: > 90 ML/MIN/1.73M2
GLUCOSE BLD-MCNC: 102 MG/DL (ref 70–108)
GLUCOSE BLD-MCNC: 108 MG/DL (ref 70–108)
GLUCOSE BLD-MCNC: 95 MG/DL (ref 70–108)
POTASSIUM SERPL-SCNC: 4.3 MEQ/L (ref 3.5–5.2)
SODIUM BLD-SCNC: 135 MEQ/L (ref 135–145)
TROPONIN T: < 0.01 NG/ML

## 2022-05-23 PROCEDURE — 6370000000 HC RX 637 (ALT 250 FOR IP): Performed by: NURSE PRACTITIONER

## 2022-05-23 PROCEDURE — 84484 ASSAY OF TROPONIN QUANT: CPT

## 2022-05-23 PROCEDURE — 97116 GAIT TRAINING THERAPY: CPT

## 2022-05-23 PROCEDURE — 2580000003 HC RX 258: Performed by: NURSE PRACTITIONER

## 2022-05-23 PROCEDURE — 97110 THERAPEUTIC EXERCISES: CPT

## 2022-05-23 PROCEDURE — 1200000000 HC SEMI PRIVATE

## 2022-05-23 PROCEDURE — 36415 COLL VENOUS BLD VENIPUNCTURE: CPT

## 2022-05-23 PROCEDURE — 82948 REAGENT STRIP/BLOOD GLUCOSE: CPT

## 2022-05-23 PROCEDURE — 97535 SELF CARE MNGMENT TRAINING: CPT

## 2022-05-23 PROCEDURE — 80048 BASIC METABOLIC PNL TOTAL CA: CPT

## 2022-05-23 RX ADMIN — HYDROCODONE BITARTRATE AND ACETAMINOPHEN 1 TABLET: 5; 325 TABLET ORAL at 11:09

## 2022-05-23 RX ADMIN — CYCLOBENZAPRINE 10 MG: 10 TABLET, FILM COATED ORAL at 23:00

## 2022-05-23 RX ADMIN — DOXEPIN HYDROCHLORIDE 10 MG: 10 CAPSULE ORAL at 21:01

## 2022-05-23 RX ADMIN — SODIUM CHLORIDE, PRESERVATIVE FREE 10 ML: 5 INJECTION INTRAVENOUS at 20:49

## 2022-05-23 RX ADMIN — HYDROCODONE BITARTRATE AND ACETAMINOPHEN 1 TABLET: 5; 325 TABLET ORAL at 21:00

## 2022-05-23 RX ADMIN — CYCLOBENZAPRINE 10 MG: 10 TABLET, FILM COATED ORAL at 00:10

## 2022-05-23 RX ADMIN — ATORVASTATIN CALCIUM 40 MG: 40 TABLET, FILM COATED ORAL at 21:00

## 2022-05-23 RX ADMIN — FENOFIBRATE 160 MG: 160 TABLET ORAL at 08:25

## 2022-05-23 RX ADMIN — MICONAZOLE NITRATE: 20 POWDER TOPICAL at 21:01

## 2022-05-23 RX ADMIN — HYDROCODONE BITARTRATE AND ACETAMINOPHEN 2 TABLET: 5; 325 TABLET ORAL at 04:34

## 2022-05-23 RX ADMIN — MICONAZOLE NITRATE: 20 POWDER TOPICAL at 08:25

## 2022-05-23 RX ADMIN — RIVAROXABAN 10 MG: 10 TABLET, FILM COATED ORAL at 18:29

## 2022-05-23 ASSESSMENT — PAIN DESCRIPTION - ORIENTATION
ORIENTATION: RIGHT;LEFT
ORIENTATION: LEFT;RIGHT
ORIENTATION: LOWER;MID

## 2022-05-23 ASSESSMENT — PAIN DESCRIPTION - DESCRIPTORS
DESCRIPTORS: ACHING;SHOOTING
DESCRIPTORS: ACHING
DESCRIPTORS: ACHING

## 2022-05-23 ASSESSMENT — PAIN SCALES - GENERAL
PAINLEVEL_OUTOF10: 1
PAINLEVEL_OUTOF10: 6
PAINLEVEL_OUTOF10: 4
PAINLEVEL_OUTOF10: 3
PAINLEVEL_OUTOF10: 2
PAINLEVEL_OUTOF10: 5
PAINLEVEL_OUTOF10: 5
PAINLEVEL_OUTOF10: 8
PAINLEVEL_OUTOF10: 4

## 2022-05-23 ASSESSMENT — PAIN DESCRIPTION - LOCATION
LOCATION: BACK
LOCATION: HIP
LOCATION: BACK;HIP
LOCATION: HIP;KNEE

## 2022-05-23 ASSESSMENT — PAIN DESCRIPTION - ONSET
ONSET: ON-GOING
ONSET: ON-GOING

## 2022-05-23 ASSESSMENT — PAIN DESCRIPTION - FREQUENCY
FREQUENCY: CONTINUOUS
FREQUENCY: CONTINUOUS

## 2022-05-23 ASSESSMENT — PAIN DESCRIPTION - PAIN TYPE
TYPE: ACUTE PAIN;SURGICAL PAIN
TYPE: ACUTE PAIN;SURGICAL PAIN

## 2022-05-23 NOTE — DISCHARGE INSTR - DIET

## 2022-05-23 NOTE — PLAN OF CARE
Problem: Discharge Planning  Goal: Discharge to home or other facility with appropriate resources  5/22/2022 2138 by Jyoti Snyder RN  Outcome: Progressing  Flowsheets (Taken 5/22/2022 2137)  Discharge to home or other facility with appropriate resources:   Identify barriers to discharge with patient and caregiver   Arrange for needed discharge resources and transportation as appropriate   Identify discharge learning needs (meds, wound care, etc)   Arrange for interpreters to assist at discharge as needed   Refer to discharge planning if patient needs post-hospital services based on physician order or complex needs related to functional status, cognitive ability or social support system     Problem: Pain  Goal: Verbalizes/displays adequate comfort level or baseline comfort level  5/22/2022 2138 by Jyoti Snyder RN  Outcome: Progressing  Flowsheets (Taken 5/22/2022 2137)  Verbalizes/displays adequate comfort level or baseline comfort level:   Encourage patient to monitor pain and request assistance   Assess pain using appropriate pain scale   Administer analgesics based on type and severity of pain and evaluate response   Implement non-pharmacological measures as appropriate and evaluate response   Consider cultural and social influences on pain and pain management   Notify Licensed Independent Practitioner if interventions unsuccessful or patient reports new pain     Problem: Skin/Tissue Integrity  Goal: Absence of new skin breakdown  Description: 1. Monitor for areas of redness and/or skin breakdown  2. Assess vascular access sites hourly  3. Every 4-6 hours minimum:  Change oxygen saturation probe site  4. Every 4-6 hours:  If on nasal continuous positive airway pressure, respiratory therapy assess nares and determine need for appliance change or resting period. 5/22/2022 2138 by Jyoti Snyder RN  Outcome: Progressing  Note: No new s/s of skin breakdown. Redness and excoriation to groin area.  Area cleansed with soap and water and micotin powder applied per orders. Pt voiced understanding of frequent position changes to prevent further skin breakdown. Problem: Safety - Adult  Goal: Free from fall injury  5/22/2022 2138 by Candy Mcnulty RN  Outcome: Progressing  Flowsheets (Taken 5/21/2022 2220 by Meghana Angulo RN)  Free From Fall Injury: Instruct family/caregiver on patient safety     Problem: Chronic Conditions and Co-morbidities  Goal: Patient's chronic conditions and co-morbidity symptoms are monitored and maintained or improved  5/22/2022 2138 by Candy Mcnulty RN  Outcome: Progressing  Flowsheets (Taken 5/22/2022 2138)  Care Plan - Patient's Chronic Conditions and Co-Morbidity Symptoms are Monitored and Maintained or Improved:   Monitor and assess patient's chronic conditions and comorbid symptoms for stability, deterioration, or improvement   Update acute care plan with appropriate goals if chronic or comorbid symptoms are exacerbated and prevent overall improvement and discharge    Care plan reviewed with patient. Patient verbalize understanding of the plan of care and contribute to goal setting.

## 2022-05-23 NOTE — PROGRESS NOTES
[facility]  Occupational Therapy  Daily Note  Time:   Time In: 403  Time Out: 0908  Timed Code Treatment Minutes: 26 Minutes  Minutes: 26          Date: 2022  Patient Name: Monico Ozuna,   Gender: female      Room: [facility]002-A  MRN: [id number]  : 1960  (60 y.o.)  Referring Practitioner: XI Cruz CNP  Diagnosis: displaced articular fracture of head of R femur  Additional Pertinent Hx: Per EMR \"The patient is a 60 y.o. medically comorbid female  who presents with right knee pain after a fall in her home. Patient underwent a right TKA a week ago, has been doing well. She was noted to have fallen out of her bed at home. She denies other injuries and no LOC. She denies SOB and CP. X-rays demonstrate the fracture. Spouse states that she has been ambulating on crutches for the past 8 months and primarily stays in her bed throughout the day. He also relates that she does not get out of bed for eating or bathroom. She uses a depends. Patient admits to 2 ppd smoking history. \" Pt is s/p RIGHT KNEE TOTAL ARTHROPLASTY REVISION tibial and femoral components completed by Dr. Davida Chowdhury on 5/10. Restrictions/Precautions:  Restrictions/Precautions: General Precautions,Fall Risk,Weight Bearing  Right Lower Extremity Weight Bearing: Weight Bearing As Tolerated  Required Braces or Orthoses  Right Lower Extremity Brace: Knee Immobilizer  Position Activity Restriction  Other position/activity restrictions: - ortho contacted for clarification of ROM at right LE due to patellar tendon repair - ok to work on extension however avoid flexion- ok to remove knee immobilizer while in bed- left knee flexion contracture     SUBJECTIVE: Patient seated EOB without knee immobilizer and R knee flexed. Patient educated regarding importance of knee immobilizer with patient stating \"I don't know what I am supposed to do because everyone tells me different\".   This therapist reiterated when patient should wear knee immobilizer as well as reasoning; verbalizing understanding; nursing notified regarding patient OOB without knee immobilizer. Patient agreeable to therapy this date. PAIN: 0/10:     Vitals: Vitals not assessed per clinical judgement, see nursing flowsheet    COGNITION: Decreased Recall, Decreased Insight, Decreased Problem Solving and Decreased Safety Awareness    ADL:   Lower Extremity Dressing: Maximum Assistance, X 1 and with increased time for completion. mohamud Knee immobilizer of R LE, demonstrates contracture of R knee, maximal assistance in order to mohamud brief  Toileting: Contact Guard Assistance, X 1, with set-up, with verbal cues  and with increased time for completion. verbal cues to initiate clothining management, rear periarea  Toilet Transfer: [employer]. BSC. BALANCE:  Sitting Balance:  Stand By Assistance, X 1, with cues for safety, with verbal cues , with increased time for completion. Standing Balance: Contact Guard Assistance, X 1, with cues for safety. RW, no LOB    BED MOBILITY:  Supine to Sit: Stand By Assistance, X 1, with head of bed flat, with rail    Sit to Supine: Moderate Assistance, X 1, with head of bed flat, with rail, with verbal cues , with increased time for completion trunk to upright position    TRANSFERS:  Sit to Stand:  Minimal Assistance, X 1, with increased time for completion, cues for hand placement, with verbal cues, bed elevated to stand, increased trunk/knee flexion to upright posture with verbal cues. Stand to Sit: Contact Guard Assistance, X 1, cues for hand placement, with verbal cues, to/from chair with arms. FUNCTIONAL MOBILITY:  Assistive Device: Rolling Walker  Assist Level:  Contact Guard Assistance, X 1, with verbal cues  and with increased time for completion.    Distance: x 2 feet x 2 trials  Unsteadiness, B knee flexion, no LOB     ADDITIONAL ACTIVITIES:  Patient completed BUE strengthening exercises with skilled education on HEP: completed x15 reps x1 set with a minimal resistance band in all joints and all planes in order to improve UE strength and activity tolerance required for BADL routine and toilet / shower transfers. Patient tolerated good, requiring minimal rest breaks. Patient also required minimal verbal/visual cues for technique. ASSESSMENT:     Activity Tolerance:  Patient tolerance of  treatment: fair. Discharge Recommendations: Subacute/skilled nursing facility  Equipment Recommendations: Other: Monitor pending progress  Plan: Times per Week: 6x  Current Treatment Recommendations: Balance training,Functional mobility training,Endurance training,Safety education & training,Patient/Caregiver education & training,Self-Care / ADL    Patient Education  Patient Education: Role of OT, Plan of Care, ADL's, Home Exercise Program, Precautions, Equipment Education, Reviewed Prior Education, Home Safety, Importance of Increasing Activity and Assistive Device Safety    Goals  Short Term Goals  Time Frame for Short term goals: until discharge  Short Term Goal 1: Pt will complete various t/fs including BSC with mod A x 1  Short Term Goal 2: Pt will tolerate standing 2 min with CGA for increased ease of toileting routine  Short Term Goal 3: Pt will complete mobility to [facility] or bedside chair with RW, Min A, & min vcs for technique  Short Term Goal 4: Pt will complete LE dressing with mod A & LH AE  Long Term Goals  Time Frame for Long term goals : No LTG set d/t short ELOS    Following session, patient left in safe position with all fall risk precautions in place.

## 2022-05-23 NOTE — PROGRESS NOTES
[address] [address]  INPATIENT PHYSICAL THERAPY  DAILY NOTE  [facility] ORTHOPEDICS 7K - 7K-02002-A    Time In: 1025  Time Out: 1048  Timed Code Treatment Minutes: 23 Minutes  Minutes: 23          Date: 2022  Patient Name: Eric Fontana,  Gender:  female        MRN: [id number]  : 1960  (60 y.o.)     Referring Practitioner: DEREK Orourke - CNP  Diagnosis: displaced articular fracture of head of R femur  Additional Pertinent Hx: Per EMR \"The patient is a 60 y.o. medically comorbid female  who presents with right knee pain after a fall in her home. Patient underwent a right TKA a week ago, has been doing well. She was noted to have fallen out of her bed at home. She denies other injuries and no LOC. She denies SOB and CP. X-rays demonstrate the fracture. Spouse states that she has been ambulating on crutches for the past 8 months and primarily stays in her bed throughout the day. He also relates that she does not get out of bed for eating or bathroom. She uses a depends. Patient admits to 2 ppd smoking history. \" Pt is s/p RIGHT KNEE TOTAL ARTHROPLASTY REVISION tibial and femoral components and patellar tendon repair completed by Dr. Elba Nazario on 5/10. Prior Level of Function:  Lives With: Spouse  Type of Home: House  Home Layout: One level  Home Access: Stairs to enter with rails  Entrance Stairs - Number of Steps: 3 FABIO  Entrance Stairs - Rails: Left  Home Equipment: Crutches,Walker, rolling,Cane (\"stand up walker\" to stand up taller)   Bathroom Shower/Tub: Tub/Shower unit  Bathroom Toilet: Standard  Bathroom Equipment: Shower chair    ADL Assistance: Needs assistance (A for LE dressing & bathing)  Homemaking Assistance: Independent  Ambulation Assistance: Independent  Transfer Assistance: Independent  Active : No  Additional Comments: Pt reports prior to knee issues she was IND with all tasks, no use of AD.  Pt recently required crutches (house is too tight for a walker), has been 3x 30 second holds w/ overpressure from PT. Functional Outcome Measures: Completed  AM-PAC Inpatient Mobility Raw Score : 15  AM-PAC Inpatient T-Scale Score : 39.45    ASSESSMENT:  Assessment: Patient progressing toward established goals. Activity Tolerance:  Patient tolerance of  treatment: good. Equipment Recommendations:Equipment Needed: No  Discharge Recommendations: Subacte/Skilled Nursing Facility  Plan: Current Treatment Recommendations: Strengthening,Equipment evaluation, education, & procurement,Balance training,Gait training,Functional mobility training,Stair training,Neuromuscular re-education,Transfer training,Endurance training,Patient/Caregiver education & training,Therapeutic activities,Safety education & training,Home exercise program  Plan:  (6x O)    Patient Education  Patient Education: Plan of Care, Precautions/Restrictions, Transfers    Goals:  Patient goals : \"get back to normal\"  Short Term Goals  Time Frame for Short term goals: by discharge  Short term goal 1: Pt will demo min A for bed mobility tasks with bed flat to progress towards PLOF. Short term goal 2: Pt will demo min A x2 for transfers with  RW for support and improved upright posture alignment to progress towards PLOF. Short term goal 3: Pt will amb for >20 feet with min A x1 and use of RW for support to progress towards PLOF. Short term goal 4: Pt will tolerate 10-20 reps of ther ex to increase overall mobility and improve ROM to improve gait mechanics. Short term goal 5: Pt will tolerate 2 min of standing tasks with improved upright posture with progress with overall mobillity and improve endurance. Long Term Goals  Time Frame for Long term goals : NA due to short ELOS    Following session, patient left in safe position with all fall risk precautions in place.

## 2022-05-23 NOTE — PROGRESS NOTES
Orthopedic progress note    Postop day #12 secondary to revision right total knee arthroplasty. Patient noted to have preop contractures of bilateral knees. Pain is improved. Awaiting ECF transfer. Plan for discharge today.     Will have patient follow-up next week for staple removal.

## 2022-05-23 NOTE — DISCHARGE SUMMARY
Physician Discharge Summary     Patient ID:  Sera Bautista  [id number]  60 y.o.  1960    Admit date: 5/9/2022    Discharge date and time: No discharge date for patient encounter. Admitting Physician: Gabriela Conway MD     Discharge Physician: Gabriela Conway MD    Admission Diagnoses: Displaced articular fracture of head of right femur, sequela [S72.061S]  Closed displaced articular fracture of head of right femur, initial encounter ([address] [S72.061A]  Periprosthetic supracondylar fracture of femur, initial encounter [[address]    Discharge Diagnoses: Displaced articular fracture of head of right femur, sequela [S72.061S]  Closed displaced articular fracture of head of right femur, initial encounter ([facility] [address] [S72.061A]  Periprosthetic supracondylar fracture of femur, initial encounter [[address], [address] Course: Patient was admitted on 5/10/2022 secondary to right knee periprosthetic femur fracture. Patient noted to have a past medical history of type 2 diabetes, hypertension. Patient underwent revision right total knee arthroplasty tibia and femoral components. Patellar tendon avulsion. Patient is noted to have contractures of bilateral knees. She did work with therapy postoperatively. Patient is to be weightbearing as tolerated in the knee immobilizer. The patient has a significant contracture. Patient refuses to wear. Consults: Internal medicine secondary to type 2 diabetes and hypertension.     Condition: Improved, revision right total knee    Treatments: Revision right total knee    Disposition: ECF at discharge    Patient Instructions:      Medication List      START taking these medications    Xarelto 10 MG Tabs tablet  Generic drug: rivaroxaban  Take 1 tablet by mouth every 24 hours        CONTINUE taking these medications    Acetaminophen 500 MG Caps     aspirin 81 MG tablet     atorvastatin 40 MG tablet  Commonly known as: LIPITOR  TAKE 1 TABLET NIGHTLY     baclofen 10 MG tablet  Commonly known as: LIORESAL  Take 1 tablet by mouth daily as needed (muscle spasms)     doxepin 10 MG capsule  Commonly known as: SINEQUAN  TAKE 2 CAPSULES NIGHTLY     fenofibrate 160 MG tablet  Commonly known as: TRIGLIDE  TAKE 1 TABLET DAILY     hydrocortisone 2.5 % cream  Apply topically 2 times daily. KRILL OIL PO     losartan 25 MG tablet  Commonly known as: COZAAR  TAKE 1 TABLET DAILY     melatonin 3 MG Tabs tablet     metFORMIN 500 MG extended release tablet  Commonly known as: GLUCOPHAGE-XR  Take 1 tablet by mouth daily (with breakfast)     therapeutic multivitamin-minerals tablet     tiZANidine 2 MG tablet  Commonly known as: ZANAFLEX  Take 1 tablet by mouth 3 times daily as needed (muscle cramps)        STOP taking these medications    cyclobenzaprine 10 MG tablet  Commonly known as: FLEXERIL     MAGNESIUM PO     NONFORMULARY     oxyCODONE-acetaminophen 5-325 MG per tablet  Commonly known as: PERCOCET     VITAMIN B6 PO        ASK your doctor about these medications    HYDROcodone-acetaminophen 5-325 MG per tablet  Commonly known as: Norco  Take 1-2 tablets by mouth every 4-6 hours as needed for Pain for up to 7 days. Ask about: Should I take this medication? Where to Get Your Medications      You can get these medications from any pharmacy    Bring a paper prescription for each of these medications  · HYDROcodone-acetaminophen 5-325 MG per tablet  · Xarelto 10 MG Tabs tablet       Activity: Weightbearing as tolerated in the immobilizer. Diet: regular  Wound Care:  Follow-up 3 weeks postoperatively change dressing daily and as needed    Follow-up 3 weeks  Signed:  Meryle Berthold, PA-C  5/23/2022  8:48 AM

## 2022-05-24 LAB
ANION GAP SERPL CALCULATED.3IONS-SCNC: 10 MEQ/L (ref 8–16)
BUN BLDV-MCNC: 13 MG/DL (ref 7–22)
CALCIUM SERPL-MCNC: 9.1 MG/DL (ref 8.5–10.5)
CHLORIDE BLD-SCNC: 102 MEQ/L (ref 98–111)
CO2: 24 MEQ/L (ref 23–33)
CREAT SERPL-MCNC: 0.4 MG/DL (ref 0.4–1.2)
GFR SERPL CREATININE-BSD FRML MDRD: > 90 ML/MIN/1.73M2
GLUCOSE BLD-MCNC: 101 MG/DL (ref 70–108)
GLUCOSE BLD-MCNC: 110 MG/DL (ref 70–108)
GLUCOSE BLD-MCNC: 112 MG/DL (ref 70–108)
GLUCOSE BLD-MCNC: 84 MG/DL (ref 70–108)
GLUCOSE BLD-MCNC: 95 MG/DL (ref 70–108)
POTASSIUM SERPL-SCNC: 4.2 MEQ/L (ref 3.5–5.2)
SODIUM BLD-SCNC: 136 MEQ/L (ref 135–145)

## 2022-05-24 PROCEDURE — 6370000000 HC RX 637 (ALT 250 FOR IP): Performed by: NURSE PRACTITIONER

## 2022-05-24 PROCEDURE — 80048 BASIC METABOLIC PNL TOTAL CA: CPT

## 2022-05-24 PROCEDURE — 97110 THERAPEUTIC EXERCISES: CPT

## 2022-05-24 PROCEDURE — 82948 REAGENT STRIP/BLOOD GLUCOSE: CPT

## 2022-05-24 PROCEDURE — 1200000000 HC SEMI PRIVATE

## 2022-05-24 PROCEDURE — 97116 GAIT TRAINING THERAPY: CPT

## 2022-05-24 PROCEDURE — 36415 COLL VENOUS BLD VENIPUNCTURE: CPT

## 2022-05-24 PROCEDURE — 97535 SELF CARE MNGMENT TRAINING: CPT

## 2022-05-24 RX ADMIN — DOXEPIN HYDROCHLORIDE 10 MG: 10 CAPSULE ORAL at 21:11

## 2022-05-24 RX ADMIN — HYDROCODONE BITARTRATE AND ACETAMINOPHEN 1 TABLET: 5; 325 TABLET ORAL at 13:41

## 2022-05-24 RX ADMIN — FENOFIBRATE 160 MG: 160 TABLET ORAL at 08:18

## 2022-05-24 RX ADMIN — MICONAZOLE NITRATE: 20 POWDER TOPICAL at 08:18

## 2022-05-24 RX ADMIN — MICONAZOLE NITRATE: 20 POWDER TOPICAL at 21:14

## 2022-05-24 RX ADMIN — HYDROCODONE BITARTRATE AND ACETAMINOPHEN 2 TABLET: 5; 325 TABLET ORAL at 21:11

## 2022-05-24 RX ADMIN — HYDROCODONE BITARTRATE AND ACETAMINOPHEN 2 TABLET: 5; 325 TABLET ORAL at 04:01

## 2022-05-24 RX ADMIN — ATORVASTATIN CALCIUM 40 MG: 40 TABLET, FILM COATED ORAL at 21:11

## 2022-05-24 RX ADMIN — CYCLOBENZAPRINE 10 MG: 10 TABLET, FILM COATED ORAL at 10:41

## 2022-05-24 RX ADMIN — RIVAROXABAN 10 MG: 10 TABLET, FILM COATED ORAL at 17:36

## 2022-05-24 ASSESSMENT — PAIN DESCRIPTION - FREQUENCY
FREQUENCY: CONTINUOUS
FREQUENCY: CONTINUOUS

## 2022-05-24 ASSESSMENT — PAIN DESCRIPTION - LOCATION
LOCATION: KNEE
LOCATION: KNEE

## 2022-05-24 ASSESSMENT — PAIN SCALES - GENERAL
PAINLEVEL_OUTOF10: 2
PAINLEVEL_OUTOF10: 2
PAINLEVEL_OUTOF10: 0
PAINLEVEL_OUTOF10: 7
PAINLEVEL_OUTOF10: 6
PAINLEVEL_OUTOF10: 8
PAINLEVEL_OUTOF10: 8

## 2022-05-24 ASSESSMENT — PAIN - FUNCTIONAL ASSESSMENT
PAIN_FUNCTIONAL_ASSESSMENT: PREVENTS OR INTERFERES SOME ACTIVE ACTIVITIES AND ADLS
PAIN_FUNCTIONAL_ASSESSMENT: ACTIVITIES ARE NOT PREVENTED

## 2022-05-24 ASSESSMENT — PAIN DESCRIPTION - ORIENTATION
ORIENTATION: RIGHT
ORIENTATION: RIGHT

## 2022-05-24 ASSESSMENT — PAIN DESCRIPTION - ONSET
ONSET: ON-GOING
ONSET: ON-GOING

## 2022-05-24 ASSESSMENT — PAIN DESCRIPTION - PAIN TYPE
TYPE: ACUTE PAIN;SURGICAL PAIN
TYPE: ACUTE PAIN;SURGICAL PAIN

## 2022-05-24 ASSESSMENT — PAIN DESCRIPTION - DESCRIPTORS
DESCRIPTORS: ACHING
DESCRIPTORS: SPASM
DESCRIPTORS: ACHING

## 2022-05-24 NOTE — PROGRESS NOTES
[facility]  INPATIENT PHYSICAL THERAPY  DAILY NOTE  [facility] ORTHOPEDICS 7K - 7K-02002-A    Time In: 0820  Time Out: 4733  Timed Code Treatment Minutes: 30 Minutes  Minutes: 30          Date: 2022  Patient Name: Jorge Obrien,  Gender:  female        MRN: [id number]  : 1960  (60 y.o.)     Referring Practitioner: DEREK Maldonado CNP  Diagnosis: displaced articular fracture of head of R femur  Additional Pertinent Hx: Per EMR \"The patient is a 60 y.o. medically comorbid female  who presents with right knee pain after a fall in her home. Patient underwent a right TKA a week ago, has been doing well. She was noted to have fallen out of her bed at home. She denies other injuries and no LOC. She denies SOB and CP. X-rays demonstrate the fracture. Spouse states that she has been ambulating on crutches for the past 8 months and primarily stays in her bed throughout the day. He also relates that she does not get out of bed for eating or bathroom. She uses a depends. Patient admits to 2 ppd smoking history. \" Pt is s/p RIGHT KNEE TOTAL ARTHROPLASTY REVISION tibial and femoral components and patellar tendon repair completed by Dr. José Miguel Goldberg on 5/10. Prior Level of Function:  Lives With: Spouse  Type of Home: House  Home Layout: One level  Home Access: Stairs to enter with rails  Entrance Stairs - Number of Steps: 3 FABIO  Entrance Stairs - Rails: Left  Home Equipment: Crutches,Walker, rolling,Cane (\"stand up walker\" to stand up taller)   Bathroom Shower/Tub: Tub/Shower unit  Bathroom Toilet: Standard  Bathroom Equipment: Shower chair    ADL Assistance: Needs assistance (A for LE dressing & bathing)  Homemaking Assistance: Independent  Ambulation Assistance: Independent  Transfer Assistance: Independent  Active : No  Additional Comments: Pt reports prior to knee issues she was IND with all tasks, no use of AD.  Pt recently required crutches (house is too tight for a walker), has been laying in bed more, and  has been completing cooking and cleaning tasks. 2 year hx of B knee issues    Restrictions/Precautions:  Restrictions/Precautions: General Precautions,Fall Risk,Weight Bearing  Right Lower Extremity Weight Bearing: Weight Bearing As Tolerated  Required Braces or Orthoses  Right Lower Extremity Brace: Knee Immobilizer  Position Activity Restriction  Other position/activity restrictions: 5/19- ortho contacted for clarification of ROM at right LE due to patellar tendon repair - ok to work on extension however avoid flexion- ok to remove knee immobilizer while in bed- left knee flexion contracture     SUBJECTIVE: RN approved session. Pt in bed pleasant and agreeable to therapy. Pt stated that she has no complaints this day. PAIN: 0/10: pt denied pain. Vitals: Vitals not assessed per clinical judgement, see nursing flowsheet    OBJECTIVE:  Bed Mobility:  Supine to Sit: Moderate Assistance, X 1, with head of bed raised, with rail, with verbal cues , with increased time for completion    Transfers:  Sit to Stand: Moderate Assistance, X 1, with increased time for completion  Stand to Sit:Minimal Assistance, X 1    Ambulation:  Contact Guard Assistance  Distance: 35'  Surface: Level Tile  Device:Rolling Walker  Gait Deviations:  Slow Dianna, Decreased Step Length Bilaterally, Decreased Weight Shift Right, Decreased Gait Speed, Decreased Heel Strike Bilaterally and Mild Path Deviations    Balance:  Static Sitting Balance:  Independent  Static Standing Balance: Contact Guard Assistance    Exercise:  *Terminal knee extension was performed to the R LE 5 x w/ 30 second holds. Heel slides were performed w/ the L LE 1 x 10 w/ 3 sec holds at extension. Functional Outcome Measures: Completed  AM-PAC Inpatient Mobility Raw Score : 14  AM-PAC Inpatient T-Scale Score : 38.1    ASSESSMENT:  Assessment: Patient progressing toward established goals.   Activity Tolerance:  Patient tolerance of treatment: good. Equipment Recommendations:Equipment Needed: No  Discharge Recommendations: Subacte/Skilled Nursing Facility  Plan: Current Treatment Recommendations: Strengthening,Equipment evaluation, education, & procurement,Balance training,Gait training,Functional mobility training,Stair training,Neuromuscular re-education,Transfer training,Endurance training,Patient/Caregiver education & training,Therapeutic activities,Safety education & training,Home exercise program  Plan:  (6x O)    Patient Education  Patient Education: Plan of Care, Home Exercise Program, Precautions/Restrictions, Bed Mobility, Transfers    Goals:  Patient goals : \"get back to normal\"  Short Term Goals  Time Frame for Short term goals: by discharge  Short term goal 1: Pt will demo min A for bed mobility tasks with bed flat to progress towards PLOF. Short term goal 2: Pt will demo min A x2 for transfers with  RW for support and improved upright posture alignment to progress towards PLOF. Short term goal 3: Pt will amb for >20 feet with min A x1 and use of RW for support to progress towards PLOF. Short term goal 4: Pt will tolerate 10-20 reps of ther ex to increase overall mobility and improve ROM to improve gait mechanics. Short term goal 5: Pt will tolerate 2 min of standing tasks with improved upright posture with progress with overall mobillity and improve endurance. Long Term Goals  Time Frame for Long term goals : NA due to short ELOS    Following session, patient left in safe position with all fall risk precautions in place.

## 2022-05-24 NOTE — PROGRESS NOTES
Right leg noted to be bent while lying on right side, attempt made to straighten leg, pt refused stating increased pain.

## 2022-05-24 NOTE — PROGRESS NOTES
Knees noted bent under pt during assessment encouraged to extend right leg into a straight position. Pt able to straighten with significant effort.

## 2022-05-24 NOTE — FLOWSHEET NOTE
Pt is a 62y. o. female, lying on her side in some discomfort, in 7K-02. Kaycee shared that the last two years have been rough. She shared that she lost her son to a work accident, had work on both knees and now the fall; she tearfully stated 'I wonder when this will stop, I want to live again'.  provided active listening, nurtured hope, encouragement, conversation around shared acquaintances and prayer. Which was met with tearful gratitude. 05/24/22 1914   Encounter Summary   Encounter Overview/Reason  Spiritual/Emotional Needs   Service Provided For: Patient   Referral/Consult From: [facility]   Support System Spouse; Family members   Complexity of Encounter Moderate   Begin Time 1840   End Time  1853   Total Time Calculated 13 min   Assessment/Intervention/Outcome   Assessment Coping;Concerns with suffering; Anxious   Intervention Active listening;Discussed illness injury and its impact; Explored/Affirmed feelings, thoughts, concerns;Prayer (assurance of)/[place];Nurtured Hope   Outcome Receptive; Expressed Gratitude;Engaged in conversation;Coping

## 2022-05-24 NOTE — PROGRESS NOTES
[facility] [facility]  Occupational Therapy  Daily Note  Time:   Time In: 903  Time Out: 926  Timed Code Treatment Minutes: 23 Minutes  Minutes: 23          Date: 2022  Patient Name: Parul Martínez,   Gender: female      Room: [facility]002-A  MRN: [id number]  : 1960  (60 y.o.)  Referring Practitioner: XI Johns CNP  Diagnosis: displaced articular fracture of head of R femur  Additional Pertinent Hx: Per EMR \"The patient is a 60 y.o. medically comorbid female  who presents with right knee pain after a fall in her home. Patient underwent a right TKA a week ago, has been doing well. She was noted to have fallen out of her bed at home. She denies other injuries and no LOC. She denies SOB and CP. X-rays demonstrate the fracture. Spouse states that she has been ambulating on crutches for the past 8 months and primarily stays in her bed throughout the day. He also relates that she does not get out of bed for eating or bathroom. She uses a depends. Patient admits to 2 ppd smoking history. \" Pt is s/p RIGHT KNEE TOTAL ARTHROPLASTY REVISION tibial and femoral components completed by Dr. Elie Najera on 5/10. Restrictions/Precautions:  Restrictions/Precautions: General Precautions,Fall Risk,Weight Bearing  Right Lower Extremity Weight Bearing: Weight Bearing As Tolerated  Required Braces or Orthoses  Right Lower Extremity Brace: Knee Immobilizer  Position Activity Restriction  Other position/activity restrictions: - ortho contacted for clarification of ROM at right LE due to patellar tendon repair - ok to work on extension however avoid flexion- ok to remove knee immobilizer while in bed- left knee flexion contracture     SUBJECTIVE: RN okayed OT session Pt. In room seated in chair and agreeable to particiapte.      PAIN: Denies    Vitals: Vitals not assessed per clinical judgement, see nursing flowsheet    COGNITION: Decreased Insight, Decreased Problem Solving and Decreased Safety Awareness    ADL:   Grooming: Contact Guard Assistance and with increased time for completion. Pt. stood at sink while completing grooming task x 4 mins Pt. completed o9ral care and combed veronica. BALANCE:  Standing Balance: Contact Guard Assistance. BED MOBILITY:  Not Tested    TRANSFERS:  Sit to Stand:  Contact Guard Assistance. Stand to Sit: Contact Guard Assistance. FUNCTIONAL MOBILITY:  Assistive Device: Rolling Walker  Assist Level:  Contact Guard Assistance. Distance: To and from bathroom  No LOB, flexed postture    ADDITIONAL ACTIVITIES:  Pt completed B UE exs with lightresistance band x 12 reps, x 1 set, with good  tolerance of exs, with  RBs throughout, and visual and verbal cues for tech with resistance band to improve strength and overall activity tolerance to support ADLs. ASSESSMENT:     Activity Tolerance:  Patient tolerance of  treatment: good. Discharge Recommendations: Subacute/skilled nursing facility  Equipment Recommendations: Other: Monitor pending progress  Plan: Times per Week: 6x  Current Treatment Recommendations: Balance training,Functional mobility training,Endurance training,Safety education & training,Patient/Caregiver education & training,Self-Care / ADL    Patient Education  Patient Education: ADL's, Home Exercise Program, Importance of Increasing Activity and Assistive Device Safety and safety with functional mobility and transfers.      Goals  Short Term Goals  Time Frame for Short term goals: until discharge  Short Term Goal 1: Pt will complete various t/fs including BSC with mod A x 1  Short Term Goal 2: Pt will tolerate standing 2 min with CGA for increased ease of toileting routine  Short Term Goal 3: Pt will complete mobility to [facility] or bedside chair with RW, Min A, & min vcs for technique  Short Term Goal 4: Pt will complete LE dressing with mod A & LH AE  Long Term Goals  Time Frame for Long term goals : No LTG set d/t short ELOS    Following session,

## 2022-05-24 NOTE — PROGRESS NOTES
Orthopaedic Progress Note      SUBJECTIVE   Ms. Brittany Vallejo is post op day # 15  S/p RIGHT KNEE TOTAL ARTHROPLASTY REVISION tibial and femoral components   Weightbearing as tolerated in the knee immobilizer for 8 weeks   Noted to have pre-op bilateral knee contractures   Swelling to right knee, incision dry and intact  Pt tolerating brace, working well with therapy   Pt awaiting ECF acceptance   She may go when accepted    Allergies:     Allergies as of 05/09/2022 - Fully Reviewed 05/09/2022   Allergen Reaction Noted    Adhesive tape  03/15/2017     Current Inpatient Medications:  Current Facility-Administered Medications: miconazole (MICOTIN) 2 % powder, , Topical, BID  cyclobenzaprine (FLEXERIL) tablet 10 mg, 10 mg, Oral, TID PRN  rivaroxaban (XARELTO) tablet 10 mg, 10 mg, Oral, Daily  sodium chloride flush 0.9 % injection 5-40 mL, 5-40 mL, IntraVENous, 2 times per day  sodium chloride flush 0.9 % injection 5-40 mL, 5-40 mL, IntraVENous, PRN  0.9 % sodium chloride infusion, , IntraVENous, PRN  ondansetron (ZOFRAN-ODT) disintegrating tablet 4 mg, 4 mg, Oral, Q8H PRN **OR** ondansetron (ZOFRAN) injection 4 mg, 4 mg, IntraVENous, Q6H PRN  morphine (PF) injection 2 mg, 2 mg, IntraVENous, Q2H PRN **OR** morphine injection 4 mg, 4 mg, IntraVENous, Q2H PRN  HYDROcodone-acetaminophen (NORCO) 5-325 MG per tablet 1 tablet, 1 tablet, Oral, Q4H PRN **OR** HYDROcodone-acetaminophen (NORCO) 5-325 MG per tablet 2 tablet, 2 tablet, Oral, Q4H PRN  atorvastatin (LIPITOR) tablet 40 mg, 40 mg, Oral, Nightly  doxepin (SINEQUAN) capsule 10 mg, 10 mg, Oral, Nightly  fenofibrate (TRIGLIDE) tablet 160 mg, 160 mg, Oral, Daily  [Held by provider] losartan (COZAAR) tablet 25 mg, 25 mg, Oral, Daily  insulin lispro (HUMALOG) injection vial 0-6 Units, 0-6 Units, SubCUTAneous, TID WC  insulin lispro (HUMALOG) injection vial 0-3 Units, 0-3 Units, SubCUTAneous, Nightly  glucose chewable tablet 16 g, 4 tablet, Oral, PRN  dextrose bolus 10% 125 mL, 125 13 05/24/2022    CREATININE 0.4 05/24/2022    CALCIUM 9.1 05/24/2022    GLUCOSE 101 05/24/2022    GLUCOSE 113 12/10/2020     Uric Acid:  No components found for: URIC  PT/INR:    Lab Results   Component Value Date    PROTIME 11.0 10/05/2016    INR 0.90 10/05/2016     Troponin:  No results found for: TROPONINI  Urine Culture:  No components found for: JULIA    ASSESSMENT:  Active Hospital Problems    Diagnosis Date Noted    Displaced articular fracture of head of right femur, sequela [S72.061S] 05/09/2022     Priority: Medium    Neida-prosthetic supracondylar fracture of femur [Z53. 8XXA, [address] 05/09/2022     Priority: Medium       PLAN  1. Dry drsg changes as needed   2. WBAT RLE in knee immobilizer   3. PT/OT to eval and treat   4. Continue current medical management   5. Discharge to [facility] when accepted  6.  Staples out in 1 weeks- f/u with Dr. Rebeca Bacon in 1 week       Electronically signed by DEREK Petersen - CNP on 5/24/2022 at 12:55 PM

## 2022-05-25 LAB
ANION GAP SERPL CALCULATED.3IONS-SCNC: 11 MEQ/L (ref 8–16)
BUN BLDV-MCNC: 11 MG/DL (ref 7–22)
CALCIUM SERPL-MCNC: 9.3 MG/DL (ref 8.5–10.5)
CHLORIDE BLD-SCNC: 102 MEQ/L (ref 98–111)
CO2: 24 MEQ/L (ref 23–33)
CREAT SERPL-MCNC: 0.4 MG/DL (ref 0.4–1.2)
GFR SERPL CREATININE-BSD FRML MDRD: > 90 ML/MIN/1.73M2
GLUCOSE BLD-MCNC: 104 MG/DL (ref 70–108)
GLUCOSE BLD-MCNC: 116 MG/DL (ref 70–108)
GLUCOSE BLD-MCNC: 116 MG/DL (ref 70–108)
GLUCOSE BLD-MCNC: 80 MG/DL (ref 70–108)
GLUCOSE BLD-MCNC: 95 MG/DL (ref 70–108)
POTASSIUM SERPL-SCNC: 4.4 MEQ/L (ref 3.5–5.2)
REASON FOR REJECTION: NORMAL
REJECTED TEST: NORMAL
SODIUM BLD-SCNC: 137 MEQ/L (ref 135–145)

## 2022-05-25 PROCEDURE — 97110 THERAPEUTIC EXERCISES: CPT

## 2022-05-25 PROCEDURE — 6370000000 HC RX 637 (ALT 250 FOR IP): Performed by: NURSE PRACTITIONER

## 2022-05-25 PROCEDURE — 1200000000 HC SEMI PRIVATE

## 2022-05-25 PROCEDURE — 82948 REAGENT STRIP/BLOOD GLUCOSE: CPT

## 2022-05-25 PROCEDURE — 97116 GAIT TRAINING THERAPY: CPT

## 2022-05-25 PROCEDURE — 36415 COLL VENOUS BLD VENIPUNCTURE: CPT

## 2022-05-25 PROCEDURE — 80048 BASIC METABOLIC PNL TOTAL CA: CPT

## 2022-05-25 RX ADMIN — CYCLOBENZAPRINE 10 MG: 10 TABLET, FILM COATED ORAL at 14:20

## 2022-05-25 RX ADMIN — DOXEPIN HYDROCHLORIDE 10 MG: 10 CAPSULE ORAL at 21:42

## 2022-05-25 RX ADMIN — ATORVASTATIN CALCIUM 40 MG: 40 TABLET, FILM COATED ORAL at 21:42

## 2022-05-25 RX ADMIN — HYDROCODONE BITARTRATE AND ACETAMINOPHEN 1 TABLET: 5; 325 TABLET ORAL at 10:27

## 2022-05-25 RX ADMIN — MICONAZOLE NITRATE: 20 POWDER TOPICAL at 10:27

## 2022-05-25 RX ADMIN — HYDROCODONE BITARTRATE AND ACETAMINOPHEN 1 TABLET: 5; 325 TABLET ORAL at 14:20

## 2022-05-25 RX ADMIN — HYDROCODONE BITARTRATE AND ACETAMINOPHEN 2 TABLET: 5; 325 TABLET ORAL at 02:37

## 2022-05-25 RX ADMIN — FENOFIBRATE 160 MG: 160 TABLET ORAL at 10:28

## 2022-05-25 RX ADMIN — HYDROCODONE BITARTRATE AND ACETAMINOPHEN 2 TABLET: 5; 325 TABLET ORAL at 21:39

## 2022-05-25 RX ADMIN — RIVAROXABAN 10 MG: 10 TABLET, FILM COATED ORAL at 17:16

## 2022-05-25 ASSESSMENT — PAIN SCALES - GENERAL
PAINLEVEL_OUTOF10: 3
PAINLEVEL_OUTOF10: 8
PAINLEVEL_OUTOF10: 6
PAINLEVEL_OUTOF10: 5
PAINLEVEL_OUTOF10: 0
PAINLEVEL_OUTOF10: 7
PAINLEVEL_OUTOF10: 3
PAINLEVEL_OUTOF10: 5
PAINLEVEL_OUTOF10: 0
PAINLEVEL_OUTOF10: 4
PAINLEVEL_OUTOF10: 0

## 2022-05-25 NOTE — PROGRESS NOTES
[facility]  INPATIENT PHYSICAL THERAPY  DAILY NOTE  [facility]TH[facility] 7K - [id number]-[id number]    Time In: 0761  Time Out: 1010  Timed Code Treatment Minutes: 27 Minutes  Minutes: 27          Date: 2022  Patient Name: Jorge Obrien,  Gender:  female        MRN: [id number]  : 1960  (60 y.o.)     Referring Practitioner: DEREK Maldonado CNP  Diagnosis: displaced articular fracture of head of R femur  Additional Pertinent Hx: Per EMR \"The patient is a 60 y.o. medically comorbid female  who presents with right knee pain after a fall in her home. Patient underwent a right TKA a week ago, has been doing well. She was noted to have fallen out of her bed at home. She denies other injuries and no LOC. She denies SOB and CP. X-rays demonstrate the fracture. Spouse states that she has been ambulating on crutches for the past 8 months and primarily stays in her bed throughout the day. He also relates that she does not get out of bed for eating or bathroom. She uses a depends. Patient admits to 2 ppd smoking history. \" Pt is s/p RIGHT KNEE TOTAL ARTHROPLASTY REVISION tibial and femoral components and patellar tendon repair completed by Dr. José Miguel Goldberg on 5/10. Prior Level of Function:  Lives With: Spouse  Type of Home: House  Home Layout: One level  Home Access: Stairs to enter with rails  Entrance Stairs - Number of Steps: 3 FABIO  Entrance Stairs - Rails: Left  Home Equipment: Crutches,Walker, rolling,Cane (\"stand up walker\" to stand up taller)   Bathroom Shower/Tub: Tub/Shower unit  Bathroom Toilet: Standard  Bathroom Equipment: Shower chair    ADL Assistance: Needs assistance (A for LE dressing & bathing)  Homemaking Assistance: Independent  Ambulation Assistance: Independent  Transfer Assistance: Independent  Active : No  Additional Comments: Pt reports prior to knee issues she was IND with all tasks, no use of AD.  Pt recently required crutches (house is too tight for a walker), has been laying in bed more, and  has been completing cooking and cleaning tasks. 2 year hx of B knee issues    Restrictions/Precautions:  Restrictions/Precautions: General Precautions,Fall Risk,Weight Bearing  Right Lower Extremity Weight Bearing: Weight Bearing As Tolerated  Required Braces or Orthoses  Right Lower Extremity Brace: Knee Immobilizer  Position Activity Restriction  Other position/activity restrictions: 5/19- ortho contacted for clarification of ROM at right LE due to patellar tendon repair - ok to work on extension however avoid flexion- ok to remove knee immobilizer while in bed- left knee flexion contracture     SUBJECTIVE: RN approved session. Pt in bed pleasant and agreeable to therapy. Pt reported no complaints this date. Pt's immobilizer was not on upon arrival, but stated that she was trying to straighten her leg this AM.    PAIN: 0/10: denies pain    Vitals: Vitals not assessed per clinical judgement, see nursing flowsheet    OBJECTIVE:  Bed Mobility:  Supine to Sit: Moderate Assistance, with head of bed raised, with rail, with increased time for completion    Transfers:  Sit to Stand: Minimal Assistance, with verbal cues  Stand to [place] [id number], with verbal cues    Ambulation:  Contact Guard Assistance  Distance: 35'  Surface: Level Tile  Device:Rolling Walker  Gait Deviations:  Slow Dianna, Decreased Step Length Bilaterally, Decreased Weight Shift Bilaterally, Decreased Gait Speed and Unsteady Gait    Balance:  Static Sitting Balance:  Independent  Static Standing Balance: Stand By Assistance    Exercise:  *1 set of 5 w/ 10 sec holds heelslides to L LE. *1 set of quad sets w/ 10 second holds to R LE.  *R knee extension stretches held for 30 second each 3x    Functional Outcome Measures: Completed  AM-PAC Inpatient Mobility Raw Score : 14  AM-PAC Inpatient T-Scale Score : 38.1    ASSESSMENT:  Assessment: Patient progressing toward established goals.   Activity Tolerance: Patient tolerance of  treatment: good. Reported fatigue in TIANNA LE at the end of session. Equipment Recommendations:Equipment Needed: No  Discharge Recommendations: Subacte/Skilled Nursing Facility  Plan: Current Treatment Recommendations: Strengthening,Equipment evaluation, education, & procurement,Balance training,Gait training,Functional mobility training,Stair training,Neuromuscular re-education,Transfer training,Endurance training,Patient/Caregiver education & training,Therapeutic activities,Safety education & training,Home exercise program  Plan:  (6x O)    Patient Education  Patient Education: Plan of Care, Precautions/Restrictions, Reviewed Prior Education    Goals:  Patient goals : \"get back to normal\"  Short Term Goals  Time Frame for Short term goals: by discharge  Short term goal 1: Pt will demo min A for bed mobility tasks with bed flat to progress towards PLOF. Short term goal 2: Pt will demo min A x2 for transfers with  RW for support and improved upright posture alignment to progress towards PLOF. Short term goal 3: Pt will amb for >20 feet with min A x1 and use of RW for support to progress towards PLOF. Short term goal 4: Pt will tolerate 10-20 reps of ther ex to increase overall mobility and improve ROM to improve gait mechanics. Short term goal 5: Pt will tolerate 2 min of standing tasks with improved upright posture with progress with overall mobillity and improve endurance. Long Term Goals  Time Frame for Long term goals : NA due to short ELOS    Following session, patient left in safe position with all fall risk precautions in place.

## 2022-05-25 NOTE — PROGRESS NOTES
Orthopaedic Progress Note      SUBJECTIVE   Ms. Sarai Flores is post op day # 15     Patient notes status post right revision total knee surgery was 5/10/2022. Still awaiting pre-CERT approval for admission at [facility] last note with a  was 5/23/2022  Patient is improving with physical therapy. Patient is to remain in knee immobilizer. Patient was noted to have flexion contractures preoperatively. Of bilateral knees. Patient noted to be in bed with knees flexed. Again had discussion with patient about continuing to use the knee immobilizer. OBJECTIVE      Physical    VITALS:  BP (!) 113/54   Pulse 75   Temp 97.8 °F (36.6 °C) (Oral)   Resp 16   Ht 5' 7.52\" (1.715 m)   Wt 195 lb (88.5 kg)   SpO2 94%   BMI 30.07 kg/m²   I/O last 3 completed shifts: In: 1240 [P.O.:1240]  Out: 650 [Urine:650]      Patient is neurovascular tach sensation intact dressing is dry and intact. No pain to palpation of the calf. Able to flex and extend toes at this time.       Data  CBC:   Lab Results   Component Value Date    WBC 9.0 05/11/2022    HGB 9.8 05/11/2022     05/11/2022     BMP:    Lab Results   Component Value Date     05/24/2022    K 4.2 05/24/2022    K 4.6 05/09/2022     05/24/2022    CO2 24 05/24/2022    BUN 13 05/24/2022    CREATININE 0.4 05/24/2022    CALCIUM 9.1 05/24/2022    GLUCOSE 101 05/24/2022    GLUCOSE 113 12/10/2020     Uric Acid:  No components found for: URIC  PT/INR:    Lab Results   Component Value Date    PROTIME 11.0 10/05/2016    INR 0.90 10/05/2016     Troponin:  No results found for: TROPONINI  Urine Culture:  No components found for: CURINE      Current Inpatient Medications    Current Facility-Administered Medications: miconazole (MICOTIN) 2 % powder, , Topical, BID  cyclobenzaprine (FLEXERIL) tablet 10 mg, 10 mg, Oral, TID PRN  rivaroxaban (XARELTO) tablet 10 mg, 10 mg, Oral, Daily  sodium chloride flush 0.9 % injection 5-40 mL, 5-40 mL, IntraVENous, 2 times per day  sodium chloride flush 0.9 % injection 5-40 mL, 5-40 mL, IntraVENous, PRN  0.9 % sodium chloride infusion, , IntraVENous, PRN  ondansetron (ZOFRAN-ODT) disintegrating tablet 4 mg, 4 mg, Oral, Q8H PRN **OR** ondansetron (ZOFRAN) injection 4 mg, 4 mg, IntraVENous, Q6H PRN  morphine (PF) injection 2 mg, 2 mg, IntraVENous, Q2H PRN **OR** morphine injection 4 mg, 4 mg, IntraVENous, Q2H PRN  HYDROcodone-acetaminophen (NORCO) 5-325 MG per tablet 1 tablet, 1 tablet, Oral, Q4H PRN **OR** HYDROcodone-acetaminophen (NORCO) 5-325 MG per tablet 2 tablet, 2 tablet, Oral, Q4H PRN  atorvastatin (LIPITOR) tablet 40 mg, 40 mg, Oral, Nightly  doxepin (SINEQUAN) capsule 10 mg, 10 mg, Oral, Nightly  fenofibrate (TRIGLIDE) tablet 160 mg, 160 mg, Oral, Daily  [Held by provider] losartan (COZAAR) tablet 25 mg, 25 mg, Oral, Daily  insulin lispro (HUMALOG) injection vial 0-6 Units, 0-6 Units, SubCUTAneous, TID WC  insulin lispro (HUMALOG) injection vial 0-3 Units, 0-3 Units, SubCUTAneous, Nightly  glucose chewable tablet 16 g, 4 tablet, Oral, PRN  dextrose bolus 10% 125 mL, 125 mL, IntraVENous, PRN **OR** dextrose bolus 10% 250 mL, 250 mL, IntraVENous, PRN  glucagon (rDNA) injection 1 mg, 1 mg, IntraMUSCular, PRN  dextrose 5 % solution, 100 mL/hr, IntraVENous, PRN        PLAN    Awaiting pre-CERT  Continue PT and OT  Plan for dressing change tomorrow.

## 2022-05-25 NOTE — PROGRESS NOTES
[facility]  Occupational Therapy  Daily Note  Time:   Time In: 2832  Time Out: 1500  Timed Code Treatment Minutes: 14 Minutes  Minutes: 14          Date: 2022  Patient Name: Fam Kraus,   Gender: female      Room: [facility]-A  MRN: [id number]  : 1960  (60 y.o.)  Referring Practitioner: XI Pathak CNP  Diagnosis: displaced articular fracture of head of R femur  Additional Pertinent Hx: Per EMR \"The patient is a 60 y.o. medically comorbid female  who presents with right knee pain after a fall in her home. Patient underwent a right TKA a week ago, has been doing well. She was noted to have fallen out of her bed at home. She denies other injuries and no LOC. She denies SOB and CP. X-rays demonstrate the fracture. Spouse states that she has been ambulating on crutches for the past 8 months and primarily stays in her bed throughout the day. He also relates that she does not get out of bed for eating or bathroom. She uses a depends. Patient admits to 2 ppd smoking history. \" Pt is s/p RIGHT KNEE TOTAL ARTHROPLASTY REVISION tibial and femoral components completed by Dr. Rebeca Bacon on 5/10. Restrictions/Precautions:  Restrictions/Precautions: General Precautions,Fall Risk,Weight Bearing  Right Lower Extremity Weight Bearing: Weight Bearing As Tolerated  Required Braces or Orthoses  Right Lower Extremity Brace: Knee Immobilizer  Position Activity Restriction  Other position/activity restrictions: - ortho contacted for clarification of ROM at right LE due to patellar tendon repair - ok to work on extension however avoid flexion- ok to remove knee immobilizer while in bed- left knee flexion contracture     SUBJECTIVE: RN okayed OT session. Pt. In room and agreeable to OT treatment. Pt. Reports recently returned to bed agreeable to in bed there ex. Only at this time.     PAIN: varies with movement no rated    Vitals: Vitals not assessed per clinical judgement, see nursing flowsheet    COGNITION: Decreased Insight, Decreased Problem Solving and Decreased Safety Awareness    ADL:   No ADL's completed this session. .      BED MOBILITY:  Rolling to Left: Stand By Assistance    Rolling to Right: Stand By Assistance    Supine to Sit: Stand By Assistance    Sit to Supine: Stand By Assistance        ADDITIONAL ACTIVITIES:  Pt completed B UE exs with light resistance band x 15 reps, x 1 set, with good  tolerance of exs, with  RBs throughout, and visual and verbal cues for tech with resistance band to increase strength and overall activity tolerance to support basic ADls. ASSESSMENT:     Activity Tolerance:  Patient tolerance of  treatment: good. Discharge Recommendations: Continue to assess pending progress and Subacute/skilled nursing facility  Equipment Recommendations: Other: Monitor pending progress  Plan: Times per Week: 6x  Current Treatment Recommendations: Balance training,Functional mobility training,Endurance training,Safety education & training,Patient/Caregiver education & training,Self-Care / ADL    Patient Education  Patient Education: Home Exercise Program and Importance of Increasing Activity       Goals  Short Term Goals  Time Frame for Short term goals: until discharge  Short Term Goal 1: Pt will complete various t/fs including BSC with mod A x 1  Short Term Goal 2: Pt will tolerate standing 2 min with CGA for increased ease of toileting routine  Short Term Goal 3: Pt will complete mobility to [facility] or bedside chair with RW, Min A, & min vcs for technique  Short Term Goal 4: Pt will complete LE dressing with mod A & LH AE  Long Term Goals  Time Frame for Long term goals : No LTG set d/t short ELOS    Following session, patient left in safe position with all fall risk precautions in place.

## 2022-05-26 VITALS
WEIGHT: 195 LBS | HEART RATE: 97 BPM | OXYGEN SATURATION: 95 % | SYSTOLIC BLOOD PRESSURE: 103 MMHG | DIASTOLIC BLOOD PRESSURE: 56 MMHG | TEMPERATURE: 97.7 F | HEIGHT: 68 IN | RESPIRATION RATE: 16 BRPM | BODY MASS INDEX: 29.55 KG/M2

## 2022-05-26 LAB
ANION GAP SERPL CALCULATED.3IONS-SCNC: 13 MEQ/L (ref 8–16)
BUN BLDV-MCNC: 10 MG/DL (ref 7–22)
CALCIUM SERPL-MCNC: 9 MG/DL (ref 8.5–10.5)
CHLORIDE BLD-SCNC: 103 MEQ/L (ref 98–111)
CO2: 21 MEQ/L (ref 23–33)
CREAT SERPL-MCNC: 0.5 MG/DL (ref 0.4–1.2)
GFR SERPL CREATININE-BSD FRML MDRD: > 90 ML/MIN/1.73M2
GLUCOSE BLD-MCNC: 133 MG/DL (ref 70–108)
GLUCOSE BLD-MCNC: 91 MG/DL (ref 70–108)
GLUCOSE BLD-MCNC: 94 MG/DL (ref 70–108)
POTASSIUM SERPL-SCNC: 4.1 MEQ/L (ref 3.5–5.2)
SODIUM BLD-SCNC: 137 MEQ/L (ref 135–145)

## 2022-05-26 PROCEDURE — 97110 THERAPEUTIC EXERCISES: CPT

## 2022-05-26 PROCEDURE — 80048 BASIC METABOLIC PNL TOTAL CA: CPT

## 2022-05-26 PROCEDURE — 97535 SELF CARE MNGMENT TRAINING: CPT

## 2022-05-26 PROCEDURE — 36415 COLL VENOUS BLD VENIPUNCTURE: CPT

## 2022-05-26 PROCEDURE — 82948 REAGENT STRIP/BLOOD GLUCOSE: CPT

## 2022-05-26 PROCEDURE — 97116 GAIT TRAINING THERAPY: CPT

## 2022-05-26 PROCEDURE — 6370000000 HC RX 637 (ALT 250 FOR IP): Performed by: NURSE PRACTITIONER

## 2022-05-26 RX ADMIN — MICONAZOLE NITRATE: 20 POWDER TOPICAL at 10:33

## 2022-05-26 RX ADMIN — HYDROCODONE BITARTRATE AND ACETAMINOPHEN 2 TABLET: 5; 325 TABLET ORAL at 03:28

## 2022-05-26 RX ADMIN — FENOFIBRATE 160 MG: 160 TABLET ORAL at 10:33

## 2022-05-26 RX ADMIN — HYDROCODONE BITARTRATE AND ACETAMINOPHEN 2 TABLET: 5; 325 TABLET ORAL at 14:08

## 2022-05-26 RX ADMIN — CYCLOBENZAPRINE 10 MG: 10 TABLET, FILM COATED ORAL at 14:08

## 2022-05-26 RX ADMIN — CYCLOBENZAPRINE 10 MG: 10 TABLET, FILM COATED ORAL at 00:47

## 2022-05-26 ASSESSMENT — PAIN SCALES - GENERAL
PAINLEVEL_OUTOF10: 10
PAINLEVEL_OUTOF10: 0
PAINLEVEL_OUTOF10: 7

## 2022-05-26 NOTE — PROGRESS NOTES
Orthopaedic Progress Note      SUBJECTIVE   Ms. Dara Castro is post op day # 12 s/p RIGHT KNEE TOTAL ARTHROPLASTY REVISION tibial and femoral components 5-10-22  Pt seen asleep in bed    Weightbearing as tolerated in the knee immobilizer for 8 weeks   Noted to have pre-op bilateral knee contractures   Swelling to right knee, incision dry and intact  Pt tolerating brace, working well with therapy   Pt awaiting ECF acceptance    She may go when accepted      Allergies:     Allergies as of 05/09/2022 - Fully Reviewed 05/09/2022   Allergen Reaction Noted    Adhesive tape  03/15/2017     Current Inpatient Medications:  Current Facility-Administered Medications: miconazole (MICOTIN) 2 % powder, , Topical, BID  cyclobenzaprine (FLEXERIL) tablet 10 mg, 10 mg, Oral, TID PRN  rivaroxaban (XARELTO) tablet 10 mg, 10 mg, Oral, Daily  sodium chloride flush 0.9 % injection 5-40 mL, 5-40 mL, IntraVENous, 2 times per day  sodium chloride flush 0.9 % injection 5-40 mL, 5-40 mL, IntraVENous, PRN  0.9 % sodium chloride infusion, , IntraVENous, PRN  ondansetron (ZOFRAN-ODT) disintegrating tablet 4 mg, 4 mg, Oral, Q8H PRN **OR** ondansetron (ZOFRAN) injection 4 mg, 4 mg, IntraVENous, Q6H PRN  morphine (PF) injection 2 mg, 2 mg, IntraVENous, Q2H PRN **OR** morphine injection 4 mg, 4 mg, IntraVENous, Q2H PRN  HYDROcodone-acetaminophen (NORCO) 5-325 MG per tablet 1 tablet, 1 tablet, Oral, Q4H PRN **OR** HYDROcodone-acetaminophen (NORCO) 5-325 MG per tablet 2 tablet, 2 tablet, Oral, Q4H PRN  atorvastatin (LIPITOR) tablet 40 mg, 40 mg, Oral, Nightly  doxepin (SINEQUAN) capsule 10 mg, 10 mg, Oral, Nightly  fenofibrate (TRIGLIDE) tablet 160 mg, 160 mg, Oral, Daily  [Held by provider] losartan (COZAAR) tablet 25 mg, 25 mg, Oral, Daily  insulin lispro (HUMALOG) injection vial 0-6 Units, 0-6 Units, SubCUTAneous, TID WC  insulin lispro (HUMALOG) injection vial 0-3 Units, 0-3 Units, SubCUTAneous, Nightly  glucose chewable tablet 16 g, 4 tablet, Oral, PRN  dextrose bolus 10% 125 mL, 125 mL, IntraVENous, PRN **OR** dextrose bolus 10% 250 mL, 250 mL, IntraVENous, PRN  glucagon (rDNA) injection 1 mg, 1 mg, IntraMUSCular, PRN  dextrose 5 % solution, 100 mL/hr, IntraVENous, PRN    REVIEW OF SYSTEMS:  Constitutional: Denies any fever, chills. Derm: Denies any rash or skin color change. Musculoskeletal: Denies numbness and tingling RLE, Pain to R knee. Neuro: Denies any dizziness, paresthesia or weakness. OBJECTIVE    Patient Vitals for the past 24 hrs:   BP Temp Temp src Pulse Resp SpO2   05/26/22 0358     16    05/26/22 0331 (!) 146/62 98.1 °F (36.7 °C) Oral 67 16    05/26/22 0044 (!) 135/49 98.1 °F (36.7 °C) Oral 89 16 100 %   05/25/22 2209     16    05/25/22 2026 (!) 119/58 97.7 °F (36.5 °C) Oral 88 16 100 %   05/25/22 1713 (!) 147/61 97.7 °F (36.5 °C) Oral 96 18 98 %   05/25/22 0915 126/60 97.9 °F (36.6 °C) Oral 85 16 97 %     INTAKE/OUTPUT:    Intake/Output Summary (Last 24 hours) at 5/26/2022 0813  Last data filed at 5/26/2022 0504  Gross per 24 hour   Intake 1050 ml   Output 500 ml   Net 550 ml     I/O last 3 completed shifts: In: 1400 [P.O.:1400]  Out: 1150 [Urine:1150]    PHYSICAL EXAM:  General appearance:  Alert and oriented x 3. No apparent distress, appears stated age and cooperative. Musculoskeletal: RLE:  Denies calf pain to palpation. good range of motion without deformity. Pt can flex and extend right toes. Right knee drsg dry and intact. No redness or drainage noted from incision site. Skin: Skin color normal.  No rashes or lesions. Neurologic:  Neurovascularly intact without any focal sensory/motor deficits. Sensation intact.        Data  CBC:   Lab Results   Component Value Date    WBC 9.0 05/11/2022    HGB 9.8 05/11/2022     05/11/2022     BMP:    Lab Results   Component Value Date     05/26/2022    K 4.1 05/26/2022    K 4.6 05/09/2022     05/26/2022    CO2 21 05/26/2022    BUN 10 05/26/2022    CREATININE 0.5 05/26/2022    CALCIUM 9.0 05/26/2022    GLUCOSE 94 05/26/2022    GLUCOSE 113 12/10/2020     Uric Acid:  No components found for: URIC  PT/INR:    Lab Results   Component Value Date    PROTIME 11.0 10/05/2016    INR 0.90 10/05/2016     Troponin:  No results found for: TROPONINI  Urine Culture:  No components found for: CURINE    ASSESSMENT:  Active Hospital Problems    Diagnosis Date Noted    Displaced articular fracture of head of right femur, sequela [S72.061S] 05/09/2022     Priority: Medium    Neida-prosthetic supracondylar fracture of femur [M65. 8XXA, [address] 05/09/2022     Priority: Medium       PLAN  1. Dry drsg changes as needed           2. WBAT RLE in knee immobilizer   3. PT/OT to eval and treat   4. Continue current medical management   5. Discharge to [facility] when accepted  6.  Staples out in 1 weeks- f/u with Dr. Trudi Jacobs in 1 week       Electronically signed by DEREK Robbins CNP on 5/26/2022 at 8:10 AM

## 2022-05-26 NOTE — PROGRESS NOTES
[facility]  Occupational Therapy  Daily Note  Time:   Time In: 957  Time Out: 1022  Timed Code Treatment Minutes: 25 Minutes  Minutes: 25          Date: 2022  Patient Name: Romeo Shaikh,   Gender: female      Room: [facility]-A  MRN: [id number]  : 1960  (60 y.o.)  Referring Practitioner: XI Urena CNP  Diagnosis: displaced articular fracture of head of R femur  Additional Pertinent Hx: Per EMR \"The patient is a 60 y.o. medically comorbid female  who presents with right knee pain after a fall in her home. Patient underwent a right TKA a week ago, has been doing well. She was noted to have fallen out of her bed at home. She denies other injuries and no LOC. She denies SOB and CP. X-rays demonstrate the fracture. Spouse states that she has been ambulating on crutches for the past 8 months and primarily stays in her bed throughout the day. He also relates that she does not get out of bed for eating or bathroom. She uses a depends. Patient admits to 2 ppd smoking history. \" Pt is s/p RIGHT KNEE TOTAL ARTHROPLASTY REVISION tibial and femoral components completed by Dr. Davey Christiansen on 5/10. Restrictions/Precautions:  Restrictions/Precautions: General Precautions,Fall Risk,Weight Bearing  Right Lower Extremity Weight Bearing: Weight Bearing As Tolerated  Required Braces or Orthoses  Right Lower Extremity Brace: Knee Immobilizer  Position Activity Restriction  Other position/activity restrictions: - ortho contacted for clarification of ROM at right LE due to patellar tendon repair - ok to work on extension however avoid flexion- ok to remove knee immobilizer while in bed- left knee flexion contracture     SUBJECTIVE: ASHA okayed OT session. Pt. In room and agreeable to participate.       PAIN: 3/10: BLE's    Vitals: Vitals not assessed per clinical judgement, see nursing flowsheet    COGNITION: Decreased Insight, Decreased Problem Solving and Decreased Safety Awareness    ADL:   Grooming: Contact Guard Assistance. to stand at sink x 4 mins to complete oral care and to comb hair with unilaterl reach, no LOB noated  Toileting: Moderate Assistance. to manage clothing and hygiene  Toilet Transfer: [address] Shawna Shukla BALANCE:  Sitting Balance:  Stand By Assistance. while seated on EOB  Standing Balance: Contact Guard Assistance. BED MOBILITY:  Supine to Sit: Stand By Assistance      TRANSFERS:  Sit to Stand:  Contact Guard Assistance/Minimal Assistance   Stand to Sit: [address] FUNCTIONAL MOBILITY:  Assistive Device: Rolling Walker  Assist Level:  Contact Guard Assistance and with verbal cues . Distance: To and from bathroom      ADDITIONAL ACTIVITIES:Pt completed B UE exs with light resistance band x 15 reps, x 1 set, with good tolerance of exs, with  RBs throughout, and verbal and visual cues for tech with resistance band to improve strength and activity tolerance to support basic ADls. ASSESSMENT:     Activity Tolerance:  Patient tolerance of  treatment: good. Discharge Recommendations: Subacute/skilled nursing facility  Equipment Recommendations: Other: Monitor pending progress  Plan: Times per Week: 6x  Current Treatment Recommendations: Balance training,Functional mobility training,Endurance training,Safety education & training,Patient/Caregiver education & training,Self-Care / ADL    Patient Education  Patient Education: ADL's, Home Exercise Program and Assistive Device Safety and safety with functional mobility and transfers.      Goals  Short Term Goals  Time Frame for Short term goals: until discharge  Short Term Goal 1: Pt will complete various t/fs including BSC with mod A x 1  Short Term Goal 2: Pt will tolerate standing 2 min with CGA for increased ease of toileting routine  Short Term Goal 3: Pt will complete mobility to [facility] or bedside chair with RW, Min A, & min vcs for technique  Short Term Goal 4: Pt will complete LE dressing with mod A & LH AE  Long Term Goals  Time Frame for Long term goals : No LTG set d/t short ELOS  Revised Short-Term Goals  Short Term Goals  Time Frame for Short term goals: until discharge  Short Term Goal 1: Pt will complete various t/fs including BSC with Stand By Assistance x 1  Short Term Goal 2: Pt will tolerate standing 5-6  min with CGA for increased ease of toileting routine  Short Term Goal 3: Pt will complete mobility to bathroom or bedside chair with RW, with Stand By Assistance & no vcs for technique  Short Term Goal 4: Pt will complete LE dressing with minimal A & LH AE  Long Term Goals  Time Frame for Long term goals : No LTG set d/t short ELOS  Following session, patient left in safe position with all fall risk precautions in place.

## 2022-05-26 NOTE — PROGRESS NOTES
Comprehensive Nutrition Assessment    Type and Reason for Visit:  Reassess,RD Nutrition Re-Screen/LOS    Nutrition Recommendations/Plan:   1. Weight patient as able   2. Continue diet as ordered  3. Continue hard boiled eggs at breakfast      Malnutrition Assessment:  Malnutrition Status: At risk for malnutrition (Comment) (PO intake has increased but remains varied) (05/26/22 0115)    Context:  Acute Illness     Findings of the 6 clinical characteristics of malnutrition:  Energy Intake:  No significant decrease in energy intake (since admit)  Weight Loss:  Unable to assess (stated weight 5/9)     Body Fat Loss:  No significant body fat loss     Muscle Mass Loss:  No significant muscle mass loss    Fluid Accumulation:  Unable to assess     Strength:  Not Performed    Nutrition Assessment:      Pt. nutritionally compromised AEB varied intake since admit. At risk for further nutrition compromise r/t increased nutrient needs to aid in surgical healing s/p right knee surgery and underlying medical condition (DM, HLD). Nutrition Related Findings:    Pt. Report/Treatments/Miscellaneous: Pt awaiting ECF acceptance per NP note. PO intake has remained varied throughout stay but has seemed to improve some with average intakes between 51-75% to %. (note: no updated weight since 5/9 stated weight). GI Status: BM 5/24  Pertinent Labs: 5/25-26 Glucose , BUN 10, Creatinine 0.5   Pertinent Meds: Lipitor, Humalog, triglide      Wound Type: Surgical Incision (5/10/22 right knee total arthroplasty revision)       Current Nutrition Intake & Therapies:    Average Meal Intake: %,51-75%,26-50%     ADULT DIET; Regular; 4 carb choices (60 gm/meal)  ADULT ORAL NUTRITION SUPPLEMENT; Breakfast; Other Oral Supplement;  Two Hard Boiled Eggs at Breakfast daily    Anthropometric Measures:  Height: 5' 7.52\" (171.5 cm)  Ideal Body Weight (IBW): 138 lbs (63 kg)    Admission Body Weight: 195 lb 1.7 oz (88.5 kg)  Current Body Weight: 195 lb 1.7 oz (88.5 kg) (RLE +1; LLE +2), 141.4 % IBW. Weight Source: Stated  Current BMI (kg/m2): 30.1  Usual Body Weight:  (sandrat reports UBW had been 256# until Fall 2019 when had tongue lesions and weight dropped to 170-185# and continues in that weight range)     Weight Adjustment For: No Adjustment                 BMI Categories: Obese Class 1 (BMI 30.0-34. 9)    Estimated Daily Nutrient Needs:  Energy Requirements Based On: Kcal/kg  Weight Used for Energy Requirements: Other (Comment) (stated 5/9 88.5 kg)  Energy (kcal/day): 7524-9906 (18-20 kcal/kg)  Weight Used for Protein Requirements:  (5/9 stated 88.5 kg)  Protein (g/day):  (1.2-2 g/kg)     Fluid (ml/day):      Nutrition Diagnosis:   · Inadequate oral intake related to inadequate protein-energy intake as evidenced by intake 51-75% (varied since admit from 1-25% to %)      Nutrition Interventions:   Food and/or Nutrient Delivery: Continue Oral Nutrition Supplement,Continue Current Diet  Nutrition Education/Counseling: No recommendation at this time  Coordination of Nutrition Care: Continue to monitor while inpatient       Goals:  Previous Goal Met: Progressing toward Goal(s)  Goals: PO intake 75% or greater,by next RD assessment       Nutrition Monitoring and Evaluation:   Behavioral-Environmental Outcomes: None Identified  Food/Nutrient Intake Outcomes: Food and Nutrient Intake,Vitamin/Mineral Intake  Physical Signs/Symptoms Outcomes: Biochemical Data,GI Status,Fluid Status or Edema,Skin,Weight    Discharge Planning:    No discharge needs at this time     Emily Burnett, [address]: [phone]

## 2022-05-26 NOTE — PROGRESS NOTES
[facility]  INPATIENT PHYSICAL THERAPY  DAILY NOTE  [facility] ORTHOPEDICS 7K - 7K-02002-A    Time In: 6344  Time Out: 1143  Timed Code Treatment Minutes: 24 Minutes  Minutes: 24          Date: 2022  Patient Name: Shira Brenner,  Gender:  female        MRN: [id number]  : 1960  (60 y.o.)     Referring Practitioner: DEREK Farley - CNP  Diagnosis: displaced articular fracture of head of R femur  Additional Pertinent Hx: Per EMR \"The patient is a 60 y.o. medically comorbid female  who presents with right knee pain after a fall in her home. Patient underwent a right TKA a week ago, has been doing well. She was noted to have fallen out of her bed at home. She denies other injuries and no LOC. She denies SOB and CP. X-rays demonstrate the fracture. Spouse states that she has been ambulating on crutches for the past 8 months and primarily stays in her bed throughout the day. He also relates that she does not get out of bed for eating or bathroom. She uses a depends. Patient admits to 2 ppd smoking history. \" Pt is s/p RIGHT KNEE TOTAL ARTHROPLASTY REVISION tibial and femoral components and patellar tendon repair completed by Dr. Anahi Sanchez on 5/10. Prior Level of Function:  Lives With: Spouse  Type of Home: House  Home Layout: One level  Home Access: Stairs to enter with rails  Entrance Stairs - Number of Steps: 3 FABIO  Entrance Stairs - Rails: Left  Home Equipment: Crutches,Walker, rolling,Cane (\"stand up walker\" to stand up taller)   Bathroom Shower/Tub: Tub/Shower unit  Bathroom Toilet: Standard  Bathroom Equipment: Shower chair    ADL Assistance: Needs assistance (A for LE dressing & bathing)  Homemaking Assistance: Independent  Ambulation Assistance: Independent  Transfer Assistance: Independent  Active : No  Additional Comments: Pt reports prior to knee issues she was IND with all tasks, no use of AD.  Pt recently required crutches (house is too tight for a walker), has been laying in bed more, and  has been completing cooking and cleaning tasks. 2 year hx of B knee issues    Restrictions/Precautions:  Restrictions/Precautions: General Precautions,Fall Risk,Weight Bearing  Right Lower Extremity Weight Bearing: Weight Bearing As Tolerated  Required Braces or Orthoses  Right Lower Extremity Brace: Knee Immobilizer  Position Activity Restriction  Other position/activity restrictions: 5/19- ortho contacted for clarification of ROM at right LE due to patellar tendon repair - ok to work on extension however avoid flexion- ok to remove knee immobilizer while in bed- left knee flexion contracture     SUBJECTIVE: RN approved session. Pt in chair pleasant and agreeable to therapy. Pt reported spasms in low back and hip and slight discomfort in TIANNA knees. PAIN: -/10: did not rate pain    Vitals: Vitals not assessed per clinical judgement, see nursing flowsheet    OBJECTIVE:  Bed Mobility:  Sit to Supine: Contact Guard Assistance, with head of bed raised, with rail, with increased time for completion     Transfers:  Sit to Stand: Minimal Assistance, with increased time for completion, with verbal cues  Stand to [place] [id number], cues for hand placement, uncontrolled descent    Ambulation:  Contact Guard Assistance  Distance: 40'  Surface: Level Tile  Device:Rolling Walker  Gait Deviations: Forward Flexed Posture, Slow Dianna, Decreased Step Length Bilaterally, Decreased Weight Shift Right, Decreased Gait Speed, Decreased Heel Strike Bilaterally and Unsteady Gait    Balance:  Static Sitting Balance:  Independent  Static Standing Balance: Stand By Assistance    Exercise:  Patient was guided in 1 set(s) 15 reps of exercise to both lower extremities. Heelslides, Seated marches, Seated heel/toe raises, Long arc quads, Seated isometric hip adduction and Seated abduction/adduction. Exercises were completed for increased independence with functional mobility.   *heel slides done on only L limb due to restrictions  *5x30 sec hold knee extension stretch of R knee. Functional Outcome Measures: Completed  AM-PAC Inpatient Mobility Raw Score : 13  AM-PAC Inpatient T-Scale Score : 36.74    ASSESSMENT:  Assessment: Patient progressing toward established goals. Activity Tolerance:  Patient tolerance of  treatment: good. Tolerated session well, reported spasms in low back and R hip during knee extension exercises. Equipment Recommendations:Equipment Needed: No  Discharge Recommendations: Subacte/Skilled Nursing Facility  Plan: Current Treatment Recommendations: Strengthening,Equipment evaluation, education, & procurement,Balance training,Gait training,Functional mobility training,Stair training,Neuromuscular re-education,Transfer training,Endurance training,Patient/Caregiver education & training,Therapeutic activities,Safety education & training,Home exercise program  Plan:  (6x O)    Patient Education  Patient Education: Plan of Care, Home Exercise Program, Precautions/Restrictions, Transfers    Goals:  Patient goals : \"get back to normal\"  Short Term Goals  Time Frame for Short term goals: by discharge  Short term goal 1: Pt will demo min A for bed mobility tasks with bed flat to progress towards PLOF. Short term goal 2: Pt will demo min A x2 for transfers with  RW for support and improved upright posture alignment to progress towards PLOF. Short term goal 3: Pt will amb for >20 feet with min A x1 and use of RW for support to progress towards PLOF. Short term goal 4: Pt will tolerate 10-20 reps of ther ex to increase overall mobility and improve ROM to improve gait mechanics. Short term goal 5: Pt will tolerate 2 min of standing tasks with improved upright posture with progress with overall mobillity and improve endurance.   Long Term Goals  Time Frame for Long term goals : NA due to short ELOS    Following session, patient left in safe position with all fall risk precautions in place.

## 2022-05-27 NOTE — ADT AUTH CERT
Neurologic:  Neurovascularly intact without any focal sensory/motor deficits. Sensation intact. MD Consults/Assessments & Plans:   1. Dry drsg changes as needed            2. WBAT RLE in knee immobilizer    3. PT/OT to eval and treat    4. Continue current medical management    5. Discharge to [facility] when accepted   6. Staples out in 1 weeks- f/u with Dr. Michela Finch in 1 week       Medications:   miconazole (MICOTIN) 2 % powder, , Topical, BID   cyclobenzaprine (FLEXERIL) tablet 10 mg, 10 mg, Oral, PRN x 1   rivaroxaban (XARELTO) tablet 10 mg, 10 mg, Oral, Daily   HYDROcodone-acetaminophen (NORCO) 5-325 MG per tablet 1 tablet, 1 tablet, Oral, PRN  x 1   HYDROcodone-acetaminophen (NORCO) 5-325 MG per tablet 2 tablet, 2 tablet, Oral, PRN x 2   atorvastatin (LIPITOR) tablet 40 mg, 40 mg, Oral, Nightly   doxepin (SINEQUAN) capsule 10 mg, 10 mg, Oral, Nightly   fenofibrate (TRIGLIDE) tablet 160 mg, 160 mg, Oral, Daily   [Held by provider] losartan (COZAAR) tablet 25 mg, 25 mg, Oral, Daily   insulin lispro (HUMALOG) injection vial 0-6 Units, 0-6 Units, SubCUTAneous, TID WC   insulin lispro (HUMALOG) injection vial 0-3 Units, 0-3 Units, SubCUTAneous, Nightly         PT: AM-PAC Inpatient Mobility Raw Score : 14   AM-PAC Inpatient T-Scale Score : 38.1       ASSESSMENT:   Assessment: Patient progressing toward established goals.    Activity Tolerance:  Patient tolerance of  treatment: good.       Equipment Recommendations:Equipment Needed: No   Discharge Recommendations: Subacte/Skilled Nursing Facility   /OT: Discharge Recommendations: Subacute/skilled nursing facility   /SLP/CM Assessments or Notes: none this date                 Knee Arthroplasty, Total - Care Day 12 (5/21/2022) by Julius Noble RN       Review Status Review Entered   Completed 5/27/2022 13:01      Criteria Review      Care Day: 12 Care Date: 5/21/2022 Level of Care: Inpatient Floor    Guideline Day 3    Clinical Status    (X) * Hemodynamic stability (X) * Adequate flexion    (X) * No evidence of lower extremity vascular or neurologic compromise    (X) * No evidence of postoperative or surgical site infection    (X) * Pain absent or managed    (X) * Voids urine    ( ) * Discharge plans and education understood    Activity    (X) * Ambulatory or acceptable for next level of care    5/27/2022 1:01 PM EDT by Jason Villareal      PT recommends SNF AM-PAC score 15    Routes    (X) * Oral hydration    (X) * Oral medications or regimen acceptable for next level of care    (X) * Oral diet or acceptable for next level of care    Interventions    (X) Physical therapy    (X) DVT prophylaxis    Medications    (X) * DVT prophylaxis for next level of care arranged    5/27/2022 1:01 PM EDT by Jason Villareal      Xarelto 10 mg po daily    (X) Oral analgesics as indicated    * Milestone   Additional Notes   DATE: 5/21/22      Vitals: 98.2      HR  99     R 17     111/52      98%       5/21/2022 06:29   Sodium: 136   Potassium: 4.2   Chloride: 100   CO2: 23   BUN,BUNPL: 12         Physical Exam:   Right knee with scant drainage on ace wrap, n/v intact to the right foot      MD Consults/Assessments & Plans:   Ms. Varela is post op day # 10 s/p  revision right TKA history of preop contraction of bilateral knees .  Noted to have retraction of bilateral knees    Pt seen in bed, states pain is improving   Pt improving with therapy   Still awaiting transfer to [facility] for further rehab   Pt may discharge when accepted     Pt tolerating knee immobilizer       Up with PT, WBAT   Awaiting precert for ECF   change ace wrap dressing today      Medications:   miconazole (MICOTIN) 2 % powder, , Topical, BID   cyclobenzaprine (FLEXERIL) tablet 10 mg, 10 mg, Oral, TID PRN   rivaroxaban (XARELTO) tablet 10 mg, 10 mg, Oral, Daily   HYDROcodone-acetaminophen (NORCO) 5-325 MG per tablet 1 tablet, 1 tablet, Oral, Q4H PRN  x 2   atorvastatin (LIPITOR) tablet 40 mg, 40 mg, Oral, Nightly   doxepin (SINEQUAN) capsule 10 mg, 10 mg, Oral, Nightly   fenofibrate (TRIGLIDE) tablet 160 mg, 160 mg, Oral, Daily   losartan (COZAAR) tablet 25 mg, 25 mg, Oral, Daily   insulin lispro (HUMALOG) injection vial 0-6 Units, 0-6 Units, SubCUTAneous, TID WC   insulin lispro (HUMALOG) injection vial 0-3 Units, 0-3 Units, SubCUTAneous, Nightly       PT: Device:Rolling Walker   Gait Deviations:  Forward Flexed Posture, Slow Dianna, Decreased Step Length Bilaterally, Decreased Gait Speed, Unsteady Gait and Decreased Terminal Knee Extension   *several cues for upright posture--pt very kyphotic

## 2022-06-09 PROBLEM — I10 PRIMARY HYPERTENSION: Status: ACTIVE | Noted: 2022-06-09

## 2022-06-10 ENCOUNTER — HOSPITAL ENCOUNTER (INPATIENT)
Age: 62
LOS: 12 days | Discharge: NURSING, REHAB OR LONG-TERM CARE | DRG: 463 | End: 2022-06-22
Attending: EMERGENCY MEDICINE | Admitting: ORTHOPAEDIC SURGERY
Payer: OTHER GOVERNMENT

## 2022-06-10 DIAGNOSIS — M00.9 INFECTION OF LEFT KNEE (HCC): Primary | ICD-10-CM

## 2022-06-10 DIAGNOSIS — Z96.651 HISTORY OF TOTAL KNEE REPLACEMENT, RIGHT: ICD-10-CM

## 2022-06-10 PROBLEM — T84.53XA INFECTION OF TOTAL RIGHT KNEE REPLACEMENT (HCC): Status: ACTIVE | Noted: 2022-06-10

## 2022-06-10 LAB
ANION GAP SERPL CALCULATED.3IONS-SCNC: 15 MEQ/L (ref 8–16)
BASOPHILS # BLD: 0.1 %
BASOPHILS ABSOLUTE: 0 THOU/MM3 (ref 0–0.1)
BUN BLDV-MCNC: 18 MG/DL (ref 7–22)
C-REACTIVE PROTEIN: 27 MG/DL (ref 0–1)
CALCIUM SERPL-MCNC: 9.3 MG/DL (ref 8.5–10.5)
CHLORIDE BLD-SCNC: 100 MEQ/L (ref 98–111)
CO2: 21 MEQ/L (ref 23–33)
CREAT SERPL-MCNC: 0.8 MG/DL (ref 0.4–1.2)
EOSINOPHIL # BLD: 0 %
EOSINOPHILS ABSOLUTE: 0 THOU/MM3 (ref 0–0.4)
ERYTHROCYTE [DISTWIDTH] IN BLOOD BY AUTOMATED COUNT: 17.2 % (ref 11.5–14.5)
ERYTHROCYTE [DISTWIDTH] IN BLOOD BY AUTOMATED COUNT: 53.6 FL (ref 35–45)
GFR SERPL CREATININE-BSD FRML MDRD: 72 ML/MIN/1.73M2
GLUCOSE BLD-MCNC: 91 MG/DL (ref 70–108)
HCT VFR BLD CALC: 42.2 % (ref 37–47)
HEMOGLOBIN: 13.2 GM/DL (ref 12–16)
IMMATURE GRANS (ABS): 0.06 THOU/MM3 (ref 0–0.07)
IMMATURE GRANULOCYTES: 0.4 %
LACTIC ACID, SEPSIS: 1.6 MMOL/L (ref 0.5–1.9)
LACTIC ACID, SEPSIS: 2.8 MMOL/L (ref 0.5–1.9)
LYMPHOCYTES # BLD: 7.5 %
LYMPHOCYTES ABSOLUTE: 1.1 THOU/MM3 (ref 1–4.8)
MCH RBC QN AUTO: 26.7 PG (ref 26–33)
MCHC RBC AUTO-ENTMCNC: 31.3 GM/DL (ref 32.2–35.5)
MCV RBC AUTO: 85.4 FL (ref 81–99)
MONOCYTES # BLD: 7.4 %
MONOCYTES ABSOLUTE: 1.1 THOU/MM3 (ref 0.4–1.3)
NUCLEATED RED BLOOD CELLS: 0 /100 WBC
OSMOLALITY CALCULATION: 273.4 MOSMOL/KG (ref 275–300)
PLATELET # BLD: 286 THOU/MM3 (ref 130–400)
PMV BLD AUTO: 11.6 FL (ref 9.4–12.4)
POTASSIUM SERPL-SCNC: 3.2 MEQ/L (ref 3.5–5.2)
RBC # BLD: 4.94 MILL/MM3 (ref 4.2–5.4)
SEDIMENTATION RATE, ERYTHROCYTE: 49 MM/HR (ref 0–20)
SEG NEUTROPHILS: 84.6 %
SEGMENTED NEUTROPHILS ABSOLUTE COUNT: 12.7 THOU/MM3 (ref 1.8–7.7)
SODIUM BLD-SCNC: 136 MEQ/L (ref 135–145)
WBC # BLD: 15 THOU/MM3 (ref 4.8–10.8)

## 2022-06-10 PROCEDURE — 2580000003 HC RX 258: Performed by: EMERGENCY MEDICINE

## 2022-06-10 PROCEDURE — 36415 COLL VENOUS BLD VENIPUNCTURE: CPT

## 2022-06-10 PROCEDURE — 87147 CULTURE TYPE IMMUNOLOGIC: CPT

## 2022-06-10 PROCEDURE — 80048 BASIC METABOLIC PNL TOTAL CA: CPT

## 2022-06-10 PROCEDURE — 87186 SC STD MICRODIL/AGAR DIL: CPT

## 2022-06-10 PROCEDURE — 83605 ASSAY OF LACTIC ACID: CPT

## 2022-06-10 PROCEDURE — 89050 BODY FLUID CELL COUNT: CPT

## 2022-06-10 PROCEDURE — 1200000000 HC SEMI PRIVATE

## 2022-06-10 PROCEDURE — 87040 BLOOD CULTURE FOR BACTERIA: CPT

## 2022-06-10 PROCEDURE — 86140 C-REACTIVE PROTEIN: CPT

## 2022-06-10 PROCEDURE — 6360000002 HC RX W HCPCS: Performed by: PHYSICIAN ASSISTANT

## 2022-06-10 PROCEDURE — 87205 SMEAR GRAM STAIN: CPT

## 2022-06-10 PROCEDURE — 93005 ELECTROCARDIOGRAM TRACING: CPT

## 2022-06-10 PROCEDURE — 6360000002 HC RX W HCPCS: Performed by: EMERGENCY MEDICINE

## 2022-06-10 PROCEDURE — 87070 CULTURE OTHR SPECIMN AEROBIC: CPT

## 2022-06-10 PROCEDURE — 0S9C3ZX DRAINAGE OF RIGHT KNEE JOINT, PERCUTANEOUS APPROACH, DIAGNOSTIC: ICD-10-PCS | Performed by: ORTHOPAEDIC SURGERY

## 2022-06-10 PROCEDURE — 85651 RBC SED RATE NONAUTOMATED: CPT

## 2022-06-10 PROCEDURE — 6370000000 HC RX 637 (ALT 250 FOR IP): Performed by: PHYSICIAN ASSISTANT

## 2022-06-10 PROCEDURE — 96365 THER/PROPH/DIAG IV INF INIT: CPT

## 2022-06-10 PROCEDURE — 89060 EXAM SYNOVIAL FLUID CRYSTALS: CPT

## 2022-06-10 PROCEDURE — 99254 IP/OBS CNSLTJ NEW/EST MOD 60: CPT

## 2022-06-10 PROCEDURE — 2580000003 HC RX 258: Performed by: PHYSICIAN ASSISTANT

## 2022-06-10 PROCEDURE — 87077 CULTURE AEROBIC IDENTIFY: CPT

## 2022-06-10 PROCEDURE — 87075 CULTR BACTERIA EXCEPT BLOOD: CPT

## 2022-06-10 PROCEDURE — 99285 EMERGENCY DEPT VISIT HI MDM: CPT

## 2022-06-10 PROCEDURE — 6370000000 HC RX 637 (ALT 250 FOR IP): Performed by: EMERGENCY MEDICINE

## 2022-06-10 PROCEDURE — 85025 COMPLETE CBC W/AUTO DIFF WBC: CPT

## 2022-06-10 RX ORDER — POTASSIUM CHLORIDE 20 MEQ/1
40 TABLET, EXTENDED RELEASE ORAL ONCE
Status: COMPLETED | OUTPATIENT
Start: 2022-06-10 | End: 2022-06-10

## 2022-06-10 RX ORDER — SODIUM CHLORIDE 9 MG/ML
INJECTION, SOLUTION INTRAVENOUS CONTINUOUS
Status: DISCONTINUED | OUTPATIENT
Start: 2022-06-10 | End: 2022-06-22 | Stop reason: HOSPADM

## 2022-06-10 RX ORDER — FENOFIBRATE 160 MG/1
160 TABLET ORAL DAILY
Refills: 3 | Status: DISCONTINUED | OUTPATIENT
Start: 2022-06-11 | End: 2022-06-22 | Stop reason: HOSPADM

## 2022-06-10 RX ORDER — MULTIVITAMIN WITH IRON
1 TABLET ORAL DAILY
Status: DISCONTINUED | OUTPATIENT
Start: 2022-06-11 | End: 2022-06-22 | Stop reason: HOSPADM

## 2022-06-10 RX ORDER — DOXEPIN HYDROCHLORIDE 10 MG/1
20 CAPSULE ORAL NIGHTLY
Status: DISCONTINUED | OUTPATIENT
Start: 2022-06-11 | End: 2022-06-22 | Stop reason: HOSPADM

## 2022-06-10 RX ORDER — ATORVASTATIN CALCIUM 40 MG/1
40 TABLET, FILM COATED ORAL NIGHTLY
Status: DISCONTINUED | OUTPATIENT
Start: 2022-06-11 | End: 2022-06-22 | Stop reason: HOSPADM

## 2022-06-10 RX ORDER — TIZANIDINE 4 MG/1
2 TABLET ORAL 3 TIMES DAILY PRN
Status: DISCONTINUED | OUTPATIENT
Start: 2022-06-10 | End: 2022-06-12 | Stop reason: SDUPTHER

## 2022-06-10 RX ORDER — ACETAMINOPHEN 325 MG/1
650 TABLET ORAL EVERY 4 HOURS PRN
Status: DISCONTINUED | OUTPATIENT
Start: 2022-06-10 | End: 2022-06-22 | Stop reason: HOSPADM

## 2022-06-10 RX ORDER — OXYCODONE HYDROCHLORIDE AND ACETAMINOPHEN 5; 325 MG/1; MG/1
2 TABLET ORAL EVERY 6 HOURS PRN
Status: DISCONTINUED | OUTPATIENT
Start: 2022-06-10 | End: 2022-06-22 | Stop reason: HOSPADM

## 2022-06-10 RX ORDER — LOSARTAN POTASSIUM 25 MG/1
25 TABLET ORAL DAILY
Status: DISCONTINUED | OUTPATIENT
Start: 2022-06-11 | End: 2022-06-22 | Stop reason: HOSPADM

## 2022-06-10 RX ORDER — OXYCODONE HYDROCHLORIDE AND ACETAMINOPHEN 5; 325 MG/1; MG/1
1 TABLET ORAL EVERY 6 HOURS PRN
Status: DISCONTINUED | OUTPATIENT
Start: 2022-06-10 | End: 2022-06-22 | Stop reason: HOSPADM

## 2022-06-10 RX ADMIN — POTASSIUM CHLORIDE 40 MEQ: 1500 TABLET, EXTENDED RELEASE ORAL at 17:28

## 2022-06-10 RX ADMIN — PIPERACILLIN AND TAZOBACTAM 3375 MG: 3; .375 INJECTION, POWDER, LYOPHILIZED, FOR SOLUTION INTRAVENOUS at 17:50

## 2022-06-10 RX ADMIN — ACETAMINOPHEN 650 MG: 325 TABLET ORAL at 21:32

## 2022-06-10 RX ADMIN — SODIUM CHLORIDE: 9 INJECTION, SOLUTION INTRAVENOUS at 15:41

## 2022-06-10 RX ADMIN — SODIUM CHLORIDE: 9 INJECTION, SOLUTION INTRAVENOUS at 21:29

## 2022-06-10 RX ADMIN — VANCOMYCIN HYDROCHLORIDE 1250 MG: 5 INJECTION, POWDER, LYOPHILIZED, FOR SOLUTION INTRAVENOUS at 23:39

## 2022-06-10 ASSESSMENT — PAIN DESCRIPTION - ORIENTATION
ORIENTATION: RIGHT
ORIENTATION: RIGHT

## 2022-06-10 ASSESSMENT — ENCOUNTER SYMPTOMS
VOMITING: 0
BACK PAIN: 0
SHORTNESS OF BREATH: 0
SORE THROAT: 0
CHEST TIGHTNESS: 0
SINUS PRESSURE: 0
RHINORRHEA: 0
NAUSEA: 0
WHEEZING: 0
ABDOMINAL PAIN: 0
COUGH: 0
CONSTIPATION: 0
VOICE CHANGE: 0
DIARRHEA: 0
TROUBLE SWALLOWING: 0

## 2022-06-10 ASSESSMENT — PAIN SCALES - GENERAL
PAINLEVEL_OUTOF10: 0
PAINLEVEL_OUTOF10: 6
PAINLEVEL_OUTOF10: 2

## 2022-06-10 ASSESSMENT — PAIN DESCRIPTION - DESCRIPTORS
DESCRIPTORS: ACHING;DULL
DESCRIPTORS: ACHING

## 2022-06-10 ASSESSMENT — PAIN DESCRIPTION - LOCATION
LOCATION: KNEE
LOCATION: KNEE

## 2022-06-10 ASSESSMENT — PAIN - FUNCTIONAL ASSESSMENT: PAIN_FUNCTIONAL_ASSESSMENT: 0-10

## 2022-06-10 NOTE — ED TRIAGE NOTES
Pt to ED via EMS from [facility] for further evaluation of R knee pain, swelling and drainage. Pt rprts had knee replacement surgery ont May 2nd and May 10th. Has started physical therapy and bearing weight and today noticed increase swelling and drainage. Pt rprts pain is 2/10 at this time. Rprts received a norco prior to coming to ER. Pt alert and oriented x4. Breathing easy and unlabored on RA.

## 2022-06-10 NOTE — ED NOTES
Pt requesting update on POC. DC or admit? Dr. Guerline Jarvis updated and now at bedside to update.       Lorie Escamilla RN  06/10/22 3242

## 2022-06-10 NOTE — ED PROVIDER NOTES
[address] [facility]        Room # 08/008A    CHIEF COMPLAINT    Chief Complaint   Patient presents with    Knee Pain    Wound Check       Nurses Notes reviewed and I agree except as noted in the HPI. HPI    Gisela Daniel is a 60 y.o. female who presents for evaluation of right knee drainage worse for the past 2 days. Patient had a right TKR 5/10/2022 by Dr. Amberly Roberts and the patient states that she been having some minimal drainage since surgery however it gotten worse after the staples have been taken out 2 weeks ago. Denies any fever no chills. Patient's having more pain on the right knee. For the past 2 days will drain has been worse and saturating even the knee immobilizers. Denies any shortness of breath or chest pain      REVIEW OF SYSTEMS    Review of Systems   Constitutional: Negative for appetite change, chills, diaphoresis, fatigue and fever. HENT: Negative for congestion, ear pain, postnasal drip, rhinorrhea, sinus pressure, sneezing, sore throat, trouble swallowing and voice change. Respiratory: Negative for cough, chest tightness, shortness of breath and wheezing. Cardiovascular: Negative for chest pain, palpitations and leg swelling. Gastrointestinal: Negative for abdominal pain, constipation, diarrhea, nausea and vomiting. Musculoskeletal: Negative for arthralgias, back pain, joint swelling, myalgias, neck pain and neck stiffness. Right knee pain with drainage   Neurological: Negative for dizziness, syncope, weakness, light-headedness, numbness and headaches. PAST MEDICAL HISTORY     has a past medical history of Atypical ductal hyperplasia, breast, Hx of breast biopsy, Hyperlipidemia, and Type II or unspecified type diabetes mellitus without mention of complication, not stated as uncontrolled. SURGICAL HISTORY   has a past surgical history that includes knee surgery;  Tubal ligation; bladder suspension; [place] tooth extraction; hernia repair (10/17/2016); Colonoscopy (2020); Breast lumpectomy (Left); Breast biopsy (Left); and Revision total knee arthroplasty (Right, 5/10/2022). CURRENT MEDICATIONS    Previous Medications    ACETAMINOPHEN 500 MG CAPS    Take 1,000 mg by mouth as needed for Pain     ASPIRIN 81 MG TABLET    Take 81 mg by mouth daily. ATORVASTATIN (LIPITOR) 40 MG TABLET    TAKE 1 TABLET NIGHTLY    BACLOFEN (LIORESAL) 10 MG TABLET    Take 1 tablet by mouth daily as needed (muscle spasms)    DOXEPIN (SINEQUAN) 10 MG CAPSULE    TAKE 2 CAPSULES NIGHTLY    FENOFIBRATE (TRIGLIDE) 160 MG TABLET    TAKE 1 TABLET DAILY    HYDROCORTISONE 2.5 % CREAM    Apply topically 2 times daily. KRILL OIL PO    Take 350 mg by mouth daily    LOSARTAN (COZAAR) 25 MG TABLET    TAKE 1 TABLET DAILY    MELATONIN 3 MG TABS TABLET    Take 3 mg by mouth daily     METFORMIN (GLUCOPHAGE-XR) 500 MG EXTENDED RELEASE TABLET    Take 1 tablet by mouth daily (with breakfast)    MULTIPLE VITAMINS-MINERALS (THERAPEUTIC MULTIVITAMIN-MINERALS) TABLET    Take 1 tablet by mouth daily    RIVAROXABAN (XARELTO) 10 MG TABS TABLET    Take 1 tablet by mouth every 24 hours    TIZANIDINE (ZANAFLEX) 2 MG TABLET    Take 1 tablet by mouth 3 times daily as needed (muscle cramps)       ALLERGIES    is allergic to adhesive tape. FAMILY HISTORY    She indicated that her mother is . She indicated that her father is . She indicated that both of her sisters are alive. She indicated that her brother is alive. She indicated that the status of her neg hx is unknown.   family history includes Breast Cancer in her sister; Cancer in her mother; Colon Polyps in her mother; Crohn's Disease in her mother; Dementia in her father and mother; Diabetes in her sister; Heart Disease in her brother; High Blood Pressure in her mother; High Cholesterol in her mother; Other in her father and mother; Pacemaker in her mother;  Thyroid Disease in her mother. SOCIAL HISTORY     reports that she has been smoking cigarettes. She has a 73.50 pack-year smoking history. She has never used smokeless tobacco. She reports that she does not drink alcohol and does not use drugs. PHYSICAL EXAM      INITIAL VITALS: BP (!) 94/42   Pulse 88   Temp 98.9 °F (37.2 °C) (Oral)   Resp 17   Ht 5' 7\" (1.702 m)   Wt 180 lb (81.6 kg)   SpO2 95%   BMI 28.19 kg/m² Estimated body mass index is 28.19 kg/m² as calculated from the following:    Height as of this encounter: 5' 7\" (1.702 m). Weight as of this encounter: 180 lb (81.6 kg). Physical Exam  Vitals reviewed. Constitutional:       Appearance: She is well-developed. HENT:      Head: Normocephalic and atraumatic. Right Ear: External ear normal.      Left Ear: External ear normal.      Nose: Nose normal.   Eyes:      General: No scleral icterus. Conjunctiva/sclera: Conjunctivae normal.      Pupils: Pupils are equal, round, and reactive to light. Neck:      Thyroid: No thyromegaly. Vascular: No JVD. Cardiovascular:      Rate and Rhythm: Normal rate and regular rhythm. Heart sounds: No murmur heard. No friction rub. Pulmonary:      Effort: Pulmonary effort is normal.      Breath sounds: Normal breath sounds. No wheezing or rales. Chest:      Chest wall: No tenderness. Abdominal:      General: Bowel sounds are normal.      Palpations: Abdomen is soft. There is no mass. Tenderness: There is no abdominal tenderness. Musculoskeletal:      Cervical back: Normal range of motion and neck supple. Lymphadenopathy:      Cervical: No cervical adenopathy. Skin:     Findings: No rash. Neurological:      Mental Status: She is alert and oriented to person, place, and time. Psychiatric:         Behavior: Behavior is cooperative.              MEDICAL DECISION MAKING    DIFFERENTIAL DIAGNOSIS:  Status post left total knee replacement, infected left knee, history of diabetes DIAGNOSTIC RESULTS      RADIOLOGY:    No orders to display       LABS:   Labs Reviewed   CBC WITH AUTO DIFFERENTIAL - Abnormal; Notable for the following components:       Result Value    WBC 15.0 (*)     MCHC 31.3 (*)     RDW-CV 17.2 (*)     RDW-SD 53.6 (*)     Segs Absolute 12.7 (*)     All other components within normal limits   BASIC METABOLIC PANEL - Abnormal; Notable for the following components:    Potassium 3.2 (*)     CO2 21 (*)     All other components within normal limits   LACTATE, SEPSIS - Abnormal; Notable for the following components:    Lactic Acid, Sepsis 2.8 (*)     All other components within normal limits   C-REACTIVE PROTEIN - Abnormal; Notable for the following components:    CRP 27.00 (*)     All other components within normal limits   SEDIMENTATION RATE - Abnormal; Notable for the following components:    Sed Rate 49 (*)     All other components within normal limits   OSMOLALITY - Abnormal; Notable for the following components:    Osmolality Calc 273.4 (*)     All other components within normal limits   GLOMERULAR FILTRATION RATE, ESTIMATED - Abnormal; Notable for the following components:    Est, Glom Filt Rate 72 (*)     All other components within normal limits   CULTURE, BLOOD 1   CULTURE, BLOOD 2   CULTURE, AEROBIC    Narrative:     Source: knee       Site: right          Current Antibiotics: none   LACTATE, SEPSIS   ANION GAP     All other unresulted laboratory test above are normal:    Vitals:    Vitals:    06/10/22 1409 06/10/22 1500 06/10/22 1600 06/10/22 1726   BP: (!) 100/46 (!) 112/46 (!) 104/48 (!) 94/42   Pulse: 56   88   Resp: 16   17   Temp: 98.9 °F (37.2 °C)      TempSrc: Oral      SpO2: 94% 94% 96% 95%   Weight: 180 lb (81.6 kg)      Height: 5' 7\" (1.702 m)          EMERGENCY DEPARTMENT COURSE:    Medications   0.9 % sodium chloride infusion ( IntraVENous Restarted 6/10/22 1727)   potassium chloride (KLOR-CON M) extended release tablet 40 mEq (40 mEq Oral Given 6/10/22 1728)   piperacillin-tazobactam (ZOSYN) 3,375 mg in dextrose 5 % 50 mL IVPB (mini-bag) (3,375 mg IntraVENous New Bag 6/10/22 1750)       The pt was seen and evaluated by me. Within the department, I observed the pt's vitalsigns to be within acceptable range. Laboratory studies were performed, results were reviewed with the patient. Within the department, the pt was treated with fluid hydration, potassium replacement and Zosyn. Patient while in the emergency room he has 1 diarrhea. The case was referred to orthopedic surgery, CRISTAL Bonilla came to the emergency room and admitted the patient. I observed the pt's condition to be hemodynamically stable during the duration of their stay. I explained my proposed course of treatment to the pt, and they were amenable to my decision. The patient is admitted to the orthopedic services    Controlled Substances Monitoring:        CRITICAL CARE:   None. CONSULTS:  CRISTAL Bonilla    PROCEDURES:  None. FINAL IMPRESSION       1. Infection of left knee ([address]    2. History of total knee replacement, right          DISPOSITION/PLAN  PATIENT REFERRED TO: Patient is admitted to orthopedic surgery by CRISTAL Bonilla        (Please note that portions of this note were completed with a voice recognition program and electronically transcribed. Efforts were [facility] edit the dictations but occasionally words are mis-transcribed . The transcription may contain errors not detected in proofreading.   This transcription was electronically signed.)     06/10/22 7:38 PM      Zollie Leyden, MD      Emergency room physician           Zollie Leyden, MD  06/10/22 1782

## 2022-06-10 NOTE — ED NOTES
Pt rprts increased pain to IV site. Some swelling noted. IV fluids stopped. Knee culture obtained and sent to lab.      Melvina Patel RN  06/10/22 3400

## 2022-06-10 NOTE — H&P
Department of Orthopedic Surgery  Physician Assistant Consult Note        Reason for Consult:  Rule right knee septic arthritis  Requesting Physician:  Lewayne Siemens, MD    CHIEF COMPLAINT:  Right knee pain and drainage from wound    History Obtained From:  patient, electronic medical record    HISTORY OF PRESENT ILLNESS:                The patient is a 60 y.o. female who presents with pain and increased drainage from her right knee incision. She is about 4 weeks out from her revision total knee arthroplasty after periprosthetic femur fracture. Patient states that the wound opened up a bit when they took out the staples. It has continued to drain from the wound and increased drainage and pain in the past 3-4 days. She is unsure of any fevers, denies chills. She is currently in a nursing home for rehab and is working in therapy. Wearing knee immobilizer as instructed. We were consulted for rule out of septic knee. Culture swabs from the wound were done by ED staff.      Past Medical History:        Diagnosis Date    Atypical ductal hyperplasia, breast     left breast, 2016, lumpectomy, Tamoxifen, LMH    Hx of breast biopsy Jan 8 2016    Hyperlipidemia     Type II or unspecified type diabetes mellitus without mention of complication, not stated as uncontrolled      Past Surgical History:        Procedure Laterality Date    BLADDER SUSPENSION      BREAST BIOPSY Left     atypical hyperlasia, 2016, [facility]    BREAST LUMPECTOMY Left     atypical hyperplasia, 2016, [facility]    COLONOSCOPY  08/28/2020    HERNIA REPAIR  10/17/2016    KNEE SURGERY      Right knee    REVISION TOTAL KNEE ARTHROPLASTY Right 5/10/2022    RIGHT KNEE TOTAL ARTHROPLASTY REVISION performed by Chris Moses MD at [address] [place] [address]       Current Medications:   Current Facility-Administered Medications: 0.9 % sodium chloride infusion, , IntraVENous, Continuous  Allergies:  Adhesive tape    Social History: TOBACCO:  Currently smokes. ETOH:  Never drank alcohol  Family History:       Problem Relation Age of Onset    High Cholesterol Mother     High Blood Pressure Mother    Jancarolyn Outhouse Pacemaker Mother     Other Mother         Sundowners syndrome    Crohn's Disease Mother     Cancer Mother         Uterine and cervical and vagina    Thyroid Disease Mother     Dementia Mother     Colon Polyps Mother     Other Father         Pneumonia    Dementia Father     Diabetes Sister     Breast Cancer Sister     Heart Disease Brother     Colon Cancer Neg Hx      REVIEW OF SYSTEMS:    CONSTITUTIONAL:  negative  MUSCULOSKELETAL:  positive for  arthralgias, pain, joint swelling and stiff joints    PHYSICAL EXAM:    VITALS:  BP (!) 94/42   Pulse 88   Temp 98.9 °F (37.2 °C) (Oral)   Resp 17   Ht 5' 7\" (1.702 m)   Wt 180 lb (81.6 kg)   SpO2 95%   BMI 28.19 kg/m²   CONSTITUTIONAL:  awake, alert, cooperative, mild to moderate distress and appears stated age  MUSCULOSKELETAL:  On exam of right knee there is a 2cm wound at the inferior aspect of the patient's total knee incision. There is active exudative drainage from the wound. There is a mild to moderate joint effusion with significant pain with any palpation generalized throughout the knee. There minimal pain with small arc ROM of right knee. Patient has a 30 degree flexion contracture right knee with some guarding at the terminal ranges of motion. Negative calf tenderness bilaterally. DF, PF, EHL 5/5 bilaterally. Pedal pulses palpable bilaterally.     DATA:    CBC:   Lab Results   Component Value Date    WBC 15.0 06/10/2022    RBC 4.94 06/10/2022    RBC 5.62 12/10/2020    HGB 13.2 06/10/2022    HCT 42.2 06/10/2022    MCV 85.4 06/10/2022    MCH 26.7 06/10/2022    MCHC 31.3 06/10/2022    RDW 19.1 12/10/2020     06/10/2022    MPV 11.6 06/10/2022     CMP:    Lab Results   Component Value Date     06/10/2022    K 3.2 06/10/2022    K 4.6 05/09/2022     06/10/2022 CO2 21 06/10/2022    BUN 18 06/10/2022    CREATININE 0.8 06/10/2022    AGRATIO 1.8 12/21/2016    LABGLOM 72 06/10/2022    GLUCOSE 91 06/10/2022    GLUCOSE 113 12/10/2020    PROT 7.2 12/08/2021    LABALBU 3.9 12/08/2021    CALCIUM 9.3 06/10/2022    BILITOT 0.4 12/08/2021    ALKPHOS 104 12/08/2021    AST 19 12/08/2021    ALT 11 12/08/2021     U/A:    Lab Results   Component Value Date    COLORU YELLOW 04/08/2022    PROTEINU TRACE 04/08/2022    PHUR 5.5 04/08/2022    WBCUA 2-4 04/08/2022    RBCUA 0-2 04/08/2022    YEAST NONE SEEN 04/08/2022    BACTERIA FEW 04/08/2022    LEUKOCYTESUR NEGATIVE 04/08/2022    UROBILINOGEN 0.2 04/08/2022    BILIRUBINUR NEGATIVE 04/08/2022    BLOODU NEGATIVE 04/08/2022    GLUCOSEU NEGATIVE 04/08/2022     Blood Culture:  No components found for: CBLOOD, CFUNGUSBL  Wound Culture:    Culture, Aerobic  Order: [id number]   Status: In process     Visible to patient: No (not released)     Next appt: None    Specimen Information: Joint, Knee         0 Result Notes    Component 6/10/22 1600   Gram Stain Result No segmented neutrophils observed. No epithelial cells observed. No organisms observed. Resulting Agency [facility]             Narrative  Performed by: Marcos Reis.: knee       Site: right          Current Antibiotics: none      Specimen Collected: 06/10/22 16:00 Last Resulted: 06/10/22 17:51                  IMPRESSION/RECOMMENDATIONS:    Right knee pain and draining wound s/p right revision total knee arthroplasty    Discussed patient findings with Dr. Basia Eckert. He agreed to admit patient. I will perform aspiration of right knee for aerobic and anaerobic cultures, gram stain, cell count, and crystals. No urgent surgical intervention warranted. Empiric antibiotics will be administered. Hospitalist will be consulted. Will await results of aspiration for further treatment recommendations. I discussed this patient and family.  She is amenable to the aspiration. Discussed possible need for further surgical intervention pending results. This will serve as patient's preoperative H&P in the event of a surgery.

## 2022-06-11 ENCOUNTER — ANESTHESIA EVENT (OUTPATIENT)
Dept: OPERATING ROOM | Age: 62
DRG: 463 | End: 2022-06-11
Payer: OTHER GOVERNMENT

## 2022-06-11 LAB
ANION GAP SERPL CALCULATED.3IONS-SCNC: 11 MEQ/L (ref 8–16)
BASOPHILS # BLD: 0.2 %
BASOPHILS ABSOLUTE: 0 THOU/MM3 (ref 0–0.1)
BUN BLDV-MCNC: 16 MG/DL (ref 7–22)
CALCIUM SERPL-MCNC: 8.7 MG/DL (ref 8.5–10.5)
CHARACTER,SYN.FL: ABNORMAL
CHLORIDE BLD-SCNC: 105 MEQ/L (ref 98–111)
CO2: 20 MEQ/L (ref 23–33)
COLOR FLUID: ABNORMAL
CREAT SERPL-MCNC: 0.4 MG/DL (ref 0.4–1.2)
CRYSTALS, FLUID: ABNORMAL
EKG ATRIAL RATE: 95 BPM
EKG P AXIS: 73 DEGREES
EKG P-R INTERVAL: 132 MS
EKG Q-T INTERVAL: 370 MS
EKG QRS DURATION: 128 MS
EKG QTC CALCULATION (BAZETT): 464 MS
EKG R AXIS: 69 DEGREES
EKG T AXIS: 48 DEGREES
EKG VENTRICULAR RATE: 95 BPM
EOSINOPHIL # BLD: 0.4 %
EOSINOPHILS ABSOLUTE: 0 THOU/MM3 (ref 0–0.4)
ERYTHROCYTE [DISTWIDTH] IN BLOOD BY AUTOMATED COUNT: 17.3 % (ref 11.5–14.5)
ERYTHROCYTE [DISTWIDTH] IN BLOOD BY AUTOMATED COUNT: 55.4 FL (ref 35–45)
GFR SERPL CREATININE-BSD FRML MDRD: > 90 ML/MIN/1.73M2
GLUCOSE BLD-MCNC: 114 MG/DL (ref 70–108)
GLUCOSE BLD-MCNC: 84 MG/DL (ref 70–108)
GLUCOSE BLD-MCNC: 94 MG/DL (ref 70–108)
GLUCOSE BLD-MCNC: 98 MG/DL (ref 70–108)
HCT VFR BLD CALC: 37.7 % (ref 37–47)
HEMOGLOBIN: 11.2 GM/DL (ref 12–16)
IMMATURE GRANS (ABS): 0.05 THOU/MM3 (ref 0–0.07)
IMMATURE GRANULOCYTES: 0.5 %
LYMPHOCYTES # BLD: 10.2 %
LYMPHOCYTES ABSOLUTE: 1 THOU/MM3 (ref 1–4.8)
MAGNESIUM: 1.4 MG/DL (ref 1.6–2.4)
MCH RBC QN AUTO: 26.2 PG (ref 26–33)
MCHC RBC AUTO-ENTMCNC: 29.7 GM/DL (ref 32.2–35.5)
MCV RBC AUTO: 88.1 FL (ref 81–99)
MONOCYTES # BLD: 7.9 %
MONOCYTES ABSOLUTE: 0.8 THOU/MM3 (ref 0.4–1.3)
MONONUCLEAR CELLS SYNOVIAL FLUID: 12.4 % (ref 0–74)
NUCLEATED RED BLOOD CELLS: 0 /100 WBC
PATHOLOGIST REVIEW: ABNORMAL
PLATELET # BLD: 210 THOU/MM3 (ref 130–400)
PMV BLD AUTO: 11.2 FL (ref 9.4–12.4)
POLYMORPHONUCLEAR CELLS SYNOVIAL FLUID: 87.6 % (ref 0–24)
POTASSIUM SERPL-SCNC: 3.3 MEQ/L (ref 3.5–5.2)
RBC # BLD: 4.28 MILL/MM3 (ref 4.2–5.4)
SEG NEUTROPHILS: 80.8 %
SEGMENTED NEUTROPHILS ABSOLUTE COUNT: 7.7 THOU/MM3 (ref 1.8–7.7)
SODIUM BLD-SCNC: 136 MEQ/L (ref 135–145)
TOTAL NUCLEATED CELLS SYNOVIAL: ABNORMAL /CUMM (ref 0–150)
TOTAL VOLUME RECEIVED SYNOVIAL: 15 ML
TROPONIN T: < 0.01 NG/ML
WBC # BLD: 9.5 THOU/MM3 (ref 4.8–10.8)

## 2022-06-11 PROCEDURE — 6360000002 HC RX W HCPCS: Performed by: PHYSICIAN ASSISTANT

## 2022-06-11 PROCEDURE — 6360000002 HC RX W HCPCS

## 2022-06-11 PROCEDURE — 6370000000 HC RX 637 (ALT 250 FOR IP): Performed by: PHYSICIAN ASSISTANT

## 2022-06-11 PROCEDURE — 99232 SBSQ HOSP IP/OBS MODERATE 35: CPT | Performed by: FAMILY MEDICINE

## 2022-06-11 PROCEDURE — 82948 REAGENT STRIP/BLOOD GLUCOSE: CPT

## 2022-06-11 PROCEDURE — 6370000000 HC RX 637 (ALT 250 FOR IP)

## 2022-06-11 PROCEDURE — 2580000003 HC RX 258: Performed by: EMERGENCY MEDICINE

## 2022-06-11 PROCEDURE — 2580000003 HC RX 258: Performed by: PHYSICIAN ASSISTANT

## 2022-06-11 PROCEDURE — 80048 BASIC METABOLIC PNL TOTAL CA: CPT

## 2022-06-11 PROCEDURE — 36415 COLL VENOUS BLD VENIPUNCTURE: CPT

## 2022-06-11 PROCEDURE — 85025 COMPLETE CBC W/AUTO DIFF WBC: CPT

## 2022-06-11 PROCEDURE — 93307 TTE W/O DOPPLER COMPLETE: CPT

## 2022-06-11 PROCEDURE — 84484 ASSAY OF TROPONIN QUANT: CPT

## 2022-06-11 PROCEDURE — 1200000000 HC SEMI PRIVATE

## 2022-06-11 PROCEDURE — 93010 ELECTROCARDIOGRAM REPORT: CPT | Performed by: INTERNAL MEDICINE

## 2022-06-11 PROCEDURE — 83735 ASSAY OF MAGNESIUM: CPT

## 2022-06-11 RX ORDER — DEXTROSE MONOHYDRATE 50 MG/ML
100 INJECTION, SOLUTION INTRAVENOUS PRN
Status: DISCONTINUED | OUTPATIENT
Start: 2022-06-11 | End: 2022-06-22 | Stop reason: HOSPADM

## 2022-06-11 RX ORDER — LACTOBACILLUS RHAMNOSUS GG 10B CELL
1 CAPSULE ORAL
Status: DISCONTINUED | OUTPATIENT
Start: 2022-06-12 | End: 2022-06-22 | Stop reason: HOSPADM

## 2022-06-11 RX ORDER — POTASSIUM CHLORIDE 20 MEQ/1
40 TABLET, EXTENDED RELEASE ORAL PRN
Status: DISCONTINUED | OUTPATIENT
Start: 2022-06-11 | End: 2022-06-22 | Stop reason: HOSPADM

## 2022-06-11 RX ORDER — INSULIN LISPRO 100 [IU]/ML
0-3 INJECTION, SOLUTION INTRAVENOUS; SUBCUTANEOUS NIGHTLY
Status: DISCONTINUED | OUTPATIENT
Start: 2022-06-11 | End: 2022-06-22 | Stop reason: HOSPADM

## 2022-06-11 RX ORDER — POTASSIUM CHLORIDE 7.45 MG/ML
10 INJECTION INTRAVENOUS PRN
Status: DISCONTINUED | OUTPATIENT
Start: 2022-06-11 | End: 2022-06-22 | Stop reason: HOSPADM

## 2022-06-11 RX ORDER — INSULIN LISPRO 100 [IU]/ML
0-6 INJECTION, SOLUTION INTRAVENOUS; SUBCUTANEOUS
Status: DISCONTINUED | OUTPATIENT
Start: 2022-06-11 | End: 2022-06-22 | Stop reason: HOSPADM

## 2022-06-11 RX ORDER — MAGNESIUM SULFATE IN WATER 40 MG/ML
2000 INJECTION, SOLUTION INTRAVENOUS ONCE
Status: COMPLETED | OUTPATIENT
Start: 2022-06-11 | End: 2022-06-11

## 2022-06-11 RX ADMIN — ACETAMINOPHEN 650 MG: 325 TABLET ORAL at 04:34

## 2022-06-11 RX ADMIN — FENOFIBRATE 160 MG: 160 TABLET ORAL at 11:39

## 2022-06-11 RX ADMIN — DOXEPIN HYDROCHLORIDE 20 MG: 10 CAPSULE ORAL at 01:03

## 2022-06-11 RX ADMIN — ATORVASTATIN CALCIUM 40 MG: 40 TABLET, FILM COATED ORAL at 20:29

## 2022-06-11 RX ADMIN — ATORVASTATIN CALCIUM 40 MG: 40 TABLET, FILM COATED ORAL at 01:03

## 2022-06-11 RX ADMIN — SODIUM CHLORIDE: 9 INJECTION, SOLUTION INTRAVENOUS at 11:41

## 2022-06-11 RX ADMIN — OXYCODONE AND ACETAMINOPHEN 2 TABLET: 5; 325 TABLET ORAL at 17:33

## 2022-06-11 RX ADMIN — OXYCODONE AND ACETAMINOPHEN 1 TABLET: 5; 325 TABLET ORAL at 01:48

## 2022-06-11 RX ADMIN — MAGNESIUM SULFATE HEPTAHYDRATE 2000 MG: 40 INJECTION, SOLUTION INTRAVENOUS at 07:22

## 2022-06-11 RX ADMIN — TIZANIDINE 2 MG: 4 TABLET ORAL at 01:47

## 2022-06-11 RX ADMIN — SODIUM CHLORIDE: 9 INJECTION, SOLUTION INTRAVENOUS at 23:18

## 2022-06-11 RX ADMIN — POTASSIUM CHLORIDE 40 MEQ: 1500 TABLET, EXTENDED RELEASE ORAL at 06:36

## 2022-06-11 RX ADMIN — VANCOMYCIN HYDROCHLORIDE 1250 MG: 5 INJECTION, POWDER, LYOPHILIZED, FOR SOLUTION INTRAVENOUS at 17:29

## 2022-06-11 RX ADMIN — OXYCODONE AND ACETAMINOPHEN 2 TABLET: 5; 325 TABLET ORAL at 11:38

## 2022-06-11 RX ADMIN — DOXEPIN HYDROCHLORIDE 20 MG: 10 CAPSULE ORAL at 20:29

## 2022-06-11 ASSESSMENT — PAIN SCALES - GENERAL
PAINLEVEL_OUTOF10: 3
PAINLEVEL_OUTOF10: 3
PAINLEVEL_OUTOF10: 9
PAINLEVEL_OUTOF10: 7
PAINLEVEL_OUTOF10: 6
PAINLEVEL_OUTOF10: 3

## 2022-06-11 ASSESSMENT — PAIN DESCRIPTION - DESCRIPTORS
DESCRIPTORS: ACHING
DESCRIPTORS: ACHING

## 2022-06-11 ASSESSMENT — PAIN DESCRIPTION - LOCATION
LOCATION: KNEE
LOCATION: KNEE

## 2022-06-11 ASSESSMENT — PAIN DESCRIPTION - ORIENTATION
ORIENTATION: RIGHT
ORIENTATION: RIGHT

## 2022-06-11 NOTE — PROGRESS NOTES
This RN and Sherrie yancey, in to clean patient. During bath, patient is tearful and crying and states, \"I don't want to do this anymore. I don't want to live any more. \"  Asked patient if she has any thoughts of wanting to hurt herself or end her life. Patient denies and states she is just frustrated with her current situation.

## 2022-06-11 NOTE — PROGRESS NOTES
[facility] Pharmacokinetic Monitoring Service - Vancomycin     Lisbeth Kirkland is a 60 y.o. female starting on vancomycin therapy for right knee post-op infection. Pharmacy consulted by Edgar De León PA-C for monitoring and adjustment. Target Concentration: Goal trough of 10-15 mg/L and AUC/CHETNA <500 mg*hr/L    Additional Antimicrobials: Zosyn x 1 in ED only    Pertinent Laboratory Values: Wt Readings from Last 1 Encounters:   06/10/22 180 lb (81.6 kg)     Temp Readings from Last 1 Encounters:   06/10/22 100.2 °F (37.9 °C) (Oral)     Estimated Creatinine Clearance: 79 mL/min (based on SCr of 0.8 mg/dL). Recent Labs     06/10/22  1428   CREATININE 0.8   WBC 15.0*     Procalcitonin: na    Pertinent Cultures:  Culture Date Source Results   6/10/22 Synovial fluid - rt knee Gr + cocci   6/10/22 Blood x 2    MRSA Nasal Swab: N/A. Non-respiratory infection. Plan:  Dosing recommendations based on Bayesian software  Start vancomycin 1250mg q18h  Anticipated AUC of 433 and trough concentration of 12.4 at steady state  Renal labs as indicated   Vancomycin concentration not ordered yet.   Pharmacy will continue to monitor patient and adjust therapy as indicated    Thank you for the consult,  Librada Snellen, [facility]  6/10/2022 10:14 PM

## 2022-06-11 NOTE — FLOWSHEET NOTE
18- Dr. Jacoby Walls sent [employer] message updating her that patient has been tearful this shift and stated she \"[doesn't] want to do this any more. \" Notified her that patient denies any suicidal ideation. This RN requested an order for a medication to help with these feelings. Also notified Dr. Jacoby Walls that the patient's family expressed concerns about her mental state as well. 26- Dr. Jacoby Walls returned message with the following: \" Unfortunately antidepressants take weeks to start working and we rarely start in hospital. I agree I thought she seemed down this morning but it was my first time meeting her. Let me think about it and look through her chart. Do you know if by chance she's ever taken anything that's worked? Does she even want a med for it? \"    9430- Updated Dr. Jacoby Walls that patient's PCP is Dr. Rober Sahni and that the patient states she has never taken any medication for that but is open to trying medication. This RN also requested an order for a probiotic for the patient, since she is on IV antibiotics. 56- Dr. Jacoby Walls entered order for probiotic. No other orders at this time.

## 2022-06-11 NOTE — FLOWSHEET NOTE
7339- Dr. Rico Nazario contacted regarding administration of scheduled losartan. Notified her that patient's blood pressure is 106/55.     3841- Dr. Rico Nazario returned message. Orders given to hold 0900 dose and she will add parameters.

## 2022-06-11 NOTE — CONSULTS
Hospitalist Consult Note        Patient:  Nilson Palma  YOB: 1960  Date of Service: 6/10/2022  MRN: [id number]   Acct:  [de-identified]   Primary Care Physician: Cherelle Kidd DO    Chief Complaint: Right knee drainage and pain  Reason for consult Medical management and cardiac risk assessment    Date of Service: Pt seen/examined in consultation on 6/10/2022     History Of Present Illness:      Paulo Himanshu y.o. female who we are asked to see/evaluate by Antonette Tate MD for medical management of hyperlipidemia and diabetes mellitus. Patient presents to [facility] ER with complaints of increased drainage from her right knee incision as well as worsening pain. Patient had a revision total knee arthroplasty approximately 4 weeks ago. Patient reports that she has had some drainage from the wound since that time, which did worsen over the last week after the staples were removed. Pain and drainage have significantly increased over the last 3 to 4 days. Patient therefore presented to [facility] ER and was admitted under the orthopedic service. Assessment and Plan:-  1. Postop surgical site infection s/p right revision TKA: Lactic acid 2.8 down to 1.6. WBC 15, CRP 27, sed rate 49. Patient was given Zosyn in ER. Preliminary gram stain showing gram positive cocci occurring singly and in pairs. Vancomycin started empirically by primary service. 2. Cardiac risk assessment:  EKG showing sinus rhythm with a right bundle branch block. New T wave inversions in V1 through V3 compared to EKG from May 10. Patient denies any chest pain or shortness of breath. Will obtain limited echo to evaluate LV function. If elevation in troponin or change on echo noted recommend cardiology consult for further cardiac clearance  3. Mild lactic acidosis, resolved: Lactic acid 2.8 on arrival.  Patient received IV fluids and improved to 1.6.  4. Mild hypokalemia: Potassium 3.2 on arrival.  Replacement ordered in ER. We will repeat BMP and replace as needed. Patient received IV fluids and repeat is down to 1.6.  5. NIDDMII: Glucose on arrival 91. Hemoglobin A1c from December 2021 is 5.3. Hold home metformin. SSI as needed. Hypoglycemia treatment protocol ordered. 6. History of hyperlipidemia: Continue home medications. Past Medical History:        Diagnosis Date    Atypical ductal hyperplasia, breast     left breast, 2016, lumpectomy, Tamoxifen, LMH    Hx of breast biopsy Jan 8 2016    Hyperlipidemia     Type II or unspecified type diabetes mellitus without mention of complication, not stated as uncontrolled        Past Surgical History:        Procedure Laterality Date    BLADDER SUSPENSION      BREAST BIOPSY Left     atypical hyperlasia, 2016, [facility]    BREAST LUMPECTOMY Left     atypical hyperplasia, 2016, [facility]    COLONOSCOPY  08/28/2020    HERNIA REPAIR  10/17/2016    KNEE SURGERY      Right knee    REVISION TOTAL KNEE ARTHROPLASTY Right 5/10/2022    RIGHT KNEE TOTAL ARTHROPLASTY REVISION performed by Husam Hopkins MD at [place] [address] Medications:   No current facility-administered medications on file prior to encounter. Current Outpatient Medications on File Prior to Encounter   Medication Sig Dispense Refill    rivaroxaban (XARELTO) 10 MG TABS tablet Take 1 tablet by mouth every 24 hours 18 tablet 0    fenofibrate (TRIGLIDE) 160 MG tablet TAKE 1 TABLET DAILY 90 tablet 3    tiZANidine (ZANAFLEX) 2 MG tablet Take 1 tablet by mouth 3 times daily as needed (muscle cramps) 30 tablet 2    doxepin (SINEQUAN) 10 MG capsule TAKE 2 CAPSULES NIGHTLY 180 capsule 3    hydrocortisone 2.5 % cream Apply topically 2 times daily.  60 g 0    metFORMIN (GLUCOPHAGE-XR) 500 MG extended release tablet Take 1 tablet by mouth daily (with breakfast) 90 tablet 3    atorvastatin (LIPITOR) 40 MG tablet TAKE 1 TABLET NIGHTLY 90 tablet 3    losartan (COZAAR) 25 MG tablet TAKE 1 TABLET DAILY 90 tablet 3    baclofen (LIORESAL) 10 MG tablet Take 1 tablet by mouth daily as needed (muscle spasms) 30 tablet 0    melatonin 3 MG TABS tablet Take 3 mg by mouth daily       Acetaminophen 500 MG CAPS Take 1,000 mg by mouth as needed for Pain       KRILL OIL PO Take 350 mg by mouth daily      Multiple Vitamins-Minerals (THERAPEUTIC MULTIVITAMIN-MINERALS) tablet Take 1 tablet by mouth daily      aspirin 81 MG tablet Take 81 mg by mouth daily. Allergies:    Adhesive tape    Social History:    reports that she has been smoking cigarettes. She has a 73.50 pack-year smoking history. She has never used smokeless tobacco. She reports that she does not drink alcohol and does not use drugs. Family History:       Problem Relation Age of Onset    High Cholesterol Mother     High Blood Pressure Mother    [employer] Pacemaker Mother     Other Mother         Sundowners syndrome    Crohn's Disease Mother     Cancer Mother         Uterine and cervical and vagina    Thyroid Disease Mother     Dementia Mother     Colon Polyps Mother     Other Father         Pneumonia    Dementia Father     Diabetes Sister     Breast Cancer Sister     Heart Disease Brother     Colon Cancer Neg Hx        Diet:  Diet NPO    Review of systems:   Pertinent positives as noted in the HPI. All other systems reviewed and negative. PHYSICAL EXAM:  BP (!) 125/57   Pulse 94   Temp 98.6 °F (37 °C) (Axillary)   Resp 16   Ht 5' 7\" (1.702 m)   Wt 180 lb (81.6 kg)   SpO2 95%   BMI 28.19 kg/m²   General appearance: No apparent distress, appears stated age and cooperative. HEENT: Normal cephalic, atraumatic without obvious deformity. Pupils equal, round, and reactive to light. Conjunctivae/corneas clear. Neck: Supple, with full range of motion. No jugular venous distention. Trachea midline. Respiratory:  Normal respiratory effort.  Clear to auscultation, bilaterally without Rales/Wheezes/Rhonchi. Cardiovascular: Regular rate and rhythm with normal S1/S2 without murmurs, rubs or gallops. Abdomen: Soft, non-tender, non-distended with normal bowel sounds. Musculoskeletal: Dressing to right knee is clean dry intact  Skin: Skin color, texture, turgor normal.  No rashes or lesions. Neurologic:  Neurovascularly intact without any focal sensory/motor deficits. Psychiatric: Alert and oriented, thought content appropriate, normal insight  Capillary Refill: Brisk,< 3 seconds   Peripheral Pulses: +2 palpable, equal bilaterally     Labs:   Recent Labs     06/10/22  1428   WBC 15.0*   HGB 13.2   HCT 42.2        Recent Labs     06/10/22  1428      K 3.2*      CO2 21*   BUN 18   CREATININE 0.8   CALCIUM 9.3     No results for input(s): AST, ALT, BILIDIR, BILITOT, ALKPHOS in the last 72 hours. No results for input(s): INR in the last 72 hours. No results for input(s): Margette Dec in the last 72 hours. Urinalysis:    Lab Results   Component Value Date    NITRU NEGATIVE 04/08/2022    WBCUA 2-4 04/08/2022    BACTERIA FEW 04/08/2022    RBCUA 0-2 04/08/2022    BLOODU NEGATIVE 04/08/2022    GLUCOSEU NEGATIVE 04/08/2022       Radiology:   No orders to display     No results found. EKG: Sinus rhythm with right bundle branch block      THANK YOU FOR THE CONSULT    Electronically signed by DEREK Durham CNP on 6/10/2022 at 11:45 PM    Patients clinical record, labs and radiological imaging reviewed. I discussed with the CNP in details. I agree with clinical findings , provisional diagnosis and management plan.       Miguel Bah MD.

## 2022-06-11 NOTE — PROCEDURES
Patient Name: Simi Cervantes Record Number: [id number]  Date: 6/10/2022   Time: 8:26 PM   Room/Bed: 08/[facility]  Arthrocentesis Procedure Note  Indication: Joint pain, joint swelling and to obtain joint fluid for diagnostic testing    Consent: The patient provided verbal consent for this procedure. Procedure: The right knee was positioned appropriately and the landmarks were identified. Local anesthesia was not performed. The area was then prepped and draped in the usual sterile fashion. A needle was then introduced into the joint space at which point 9 cc of bloody, turbid fluid was aspirated. A joint injection was not performed. The aspirated fluid was sent to the lab for appropriate testing, Aerobic and anaerobic cultures, gram stain, cell count, and crystals. A sterile dressing was then applied to the site. The patient tolerated the procedure well.     Complications: None    Electronically Signed by: @[email or web address]@

## 2022-06-11 NOTE — PROGRESS NOTES
Orthopaedic Progress Note      SUBJECTIVE   Ms. Samuel Adler is hospital stay day #2. Patient seen resting in bed. Cultures so far are show staph species. Anticipating surgery in AM for I&D possible stage I revision right knee. Medicine evaluating and clearing. No new ortho concerns. OBJECTIVE      Physical    VITALS:  BP (!) 116/57   Pulse 96   Temp 97.4 °F (36.3 °C) (Oral)   Resp 20   Ht 5' 7\" (1.702 m)   Wt 180 lb (81.6 kg)   SpO2 94%   BMI 28.19 kg/m²   No intake/output data recorded. RLE:  Slight effusion and swelling noted right knee. Generalized tenderness throughout palpation of the knee as well as increased pain with any attempt at ROM or manipulation of the knee. Calf soft and nontender. ROM ankle/toes intact. NVI. Intact distal pulses.       Data  CBC:   Lab Results   Component Value Date    WBC 9.5 06/11/2022    HGB 11.2 06/11/2022     06/11/2022     BMP:    Lab Results   Component Value Date     06/11/2022    K 3.3 06/11/2022    K 4.6 05/09/2022     06/11/2022    CO2 20 06/11/2022    BUN 16 06/11/2022    CREATININE 0.4 06/11/2022    CALCIUM 8.7 06/11/2022    GLUCOSE 94 06/11/2022    GLUCOSE 113 12/10/2020     Uric Acid:  No components found for: URIC  PT/INR:    Lab Results   Component Value Date    PROTIME 11.0 10/05/2016    INR 0.90 10/05/2016     Troponin:  No results found for: TROPONINI  Urine Culture:  No components found for: CURINE      Current Inpatient Medications    Current Facility-Administered Medications: insulin lispro (HUMALOG) injection vial 0-6 Units, 0-6 Units, SubCUTAneous, TID WC  insulin lispro (HUMALOG) injection vial 0-3 Units, 0-3 Units, SubCUTAneous, Nightly  glucose chewable tablet 16 g, 4 tablet, Oral, PRN  dextrose bolus 10% 125 mL, 125 mL, IntraVENous, PRN **OR** dextrose bolus 10% 250 mL, 250 mL, IntraVENous, PRN  glucagon (rDNA) injection 1 mg, 1 mg, IntraMUSCular, PRN  dextrose 5 % solution, 100 mL/hr, IntraVENous, PRN  potassium chloride (KLOR-CON M) extended release tablet 40 mEq, 40 mEq, Oral, PRN **OR** potassium bicarb-citric acid (EFFER-K) effervescent tablet 40 mEq, 40 mEq, Oral, PRN **OR** potassium chloride 10 mEq/100 mL IVPB (Peripheral Line), 10 mEq, IntraVENous, PRN  0.9 % sodium chloride infusion, , IntraVENous, Continuous  oxyCODONE-acetaminophen (PERCOCET) 5-325 MG per tablet 1 tablet, 1 tablet, Oral, Q6H PRN **OR** oxyCODONE-acetaminophen (PERCOCET) 5-325 MG per tablet 2 tablet, 2 tablet, Oral, Q6H PRN  acetaminophen (TYLENOL) tablet 650 mg, 650 mg, Oral, Q4H PRN  vancomycin (VANCOCIN) intermittent dosing (placeholder), , Other, RX Placeholder  vancomycin (VANCOCIN) 1250 mg in dextrose 5 % 250 mL IVPB, 1,250 mg, IntraVENous, Q18H  atorvastatin (LIPITOR) tablet 40 mg, 40 mg, Oral, Nightly  doxepin (SINEQUAN) capsule 20 mg, 20 mg, Oral, Nightly  fenofibrate (TRIGLIDE) tablet 160 mg, 160 mg, Oral, Daily  losartan (COZAAR) tablet 25 mg, 25 mg, Oral, Daily  multivitamin 1 tablet, 1 tablet, Oral, Daily  tiZANidine (ZANAFLEX) tablet 2 mg, 2 mg, Oral, TID PRN        PLAN    1)  Cont with current medical management  2)  NWB RLE for now  3)  Medicine to treat and optimize for surgery  4)  NPO after midnight for I&D right knee vs Stage I Revision Right knee    Jo Schneider PA-C

## 2022-06-11 NOTE — ED NOTES
Physician at bedside to aspirate R knee. Pt tolerating w/discomfort. Specimen syring obtained, labeled per phys and sent to lab.      Xavier Thorne RN  06/10/22 7736

## 2022-06-11 NOTE — PLAN OF CARE
Problem: Discharge Planning  Goal: Discharge to home or other facility with appropriate resources  Outcome: Progressing  Flowsheets  Taken 6/10/2022 2154  Discharge to home or other facility with appropriate resources: Identify barriers to discharge with patient and caregiver  Taken 6/10/2022 2137  Discharge to home or other facility with appropriate resources: Identify barriers to discharge with patient and caregiver     Problem: Pain  Goal: Verbalizes/displays adequate comfort level or baseline comfort level  Outcome: Progressing  Flowsheets (Taken 6/11/2022 0356)  Verbalizes/displays adequate comfort level or baseline comfort level:   Encourage patient to monitor pain and request assistance   Assess pain using appropriate pain scale   Administer analgesics based on type and severity of pain and evaluate response     Problem: Safety - Adult  Goal: Free from fall injury  Outcome: Progressing  Flowsheets (Taken 6/11/2022 0356)  Free From Fall Injury:   Instruct family/caregiver on patient safety   Based on caregiver fall risk screen, instruct family/caregiver to ask for assistance with transferring infant if caregiver noted to have fall risk factors     Problem: ABCDS Injury Assessment  Goal: Absence of physical injury  Outcome: Progressing  Flowsheets (Taken 6/10/2022 2149)  Absence of Physical Injury: Implement safety measures based on patient assessment   Care plan reviewed with patient.   Patient  verbalize understanding of the plan of care and contribute to goal setting.  '

## 2022-06-11 NOTE — PROGRESS NOTES
This RN found patient sitting at the edge of the bed in stool. When this RN told the patient she needed changed because she was sitting in stool, the patient stated, \"Oh yeah, I know. We can wait a little before we change it, right? \"  Educated patient on the importance of keeping skin clean and dry in order to prevent skin breakdown. Patient cleaned and a new brief applied.

## 2022-06-11 NOTE — PROGRESS NOTES
PROGRESS NOTE      Patient:  Andres Garay      Unit/Bed:7K-14/014-A    YOB: 1960    MRN: [id number]       Acct: [de-identified]     PCP: Scott Campbell DO    Date of Admission: 6/10/2022      Assessment/Plan:    Reason for consult: medical management and cardiac risk assessment    Active Hospital Problems    Diagnosis Date Noted    Infection of total right knee replacement [facility]) Chio Mccarthy 06/10/2022     Priority: Medium       Postop surgical site infection s/p right revision TKA: Lactic acid 2.8 down to 1.6. WBC 15, CRP 27, sed rate 49. Patient was given Zosyn in ER. Preliminary gram stain showing gram positive cocci occurring singly and in pairs. Vancomycin started empirically by primary service. -continue abx per primary     Cardiac risk assessment:  EKG showing sinus rhythm with a right bundle branch block. New T wave inversions in V1 through V3 compared to EKG from May 10. Patient denies any chest pain or shortness of breath. Will obtain limited echo to evaluate LV function. If elevation in troponin or change on echo noted recommend cardiology consult for further cardiac clearance  -trop negative, echo pending    Mild lactic acidosis, resolved: Lactic acid 2.8 on arrival.  Patient received IV fluids and improved to 1.6. Mild hypokalemia: Potassium 3.2 on arrival.  Replacement protocol placed. We will repeat BMP and replace as needed. Patient received IV fluids and repeat is down to 1.6.  -trend 3.2->3.3 6/11    NIDDMII: Glucose on arrival 91. Hemoglobin A1c from December 2021 is 5.3. Hold home metformin. SSI as needed. Hypoglycemia treatment protocol ordered. History of hyperlipidemia: Continue home medications.         Chief Complaint: right knee drainage and pain    Hospital Course:     HPI on consult H&P: Erlin Díaz y.o. female who we are asked to see/evaluate by Charis Hua MD for medical management of hyperlipidemia and diabetes mellitus.   Patient presents to [facility] ER with complaints of increased drainage from her right knee incision as well as worsening pain. Patient had a revision total knee arthroplasty approximately 4 weeks ago. Patient reports that she has had some drainage from the wound since that time, which did worsen over the last week after the staples were removed. Pain and drainage have significantly increased over the last 3 to 4 days. Patient therefore presented to [facility] ER and was admitted under the orthopedic service. Subjective:    Patient states she is feeling very tired today. She has no pain to report at this time. She is tolerating a regular diet without nausea, vomiting, diarrhea, or constipation. She is to be NPO tonight at midnight for procedure. She has no other concerns at this time. Medications:  Reviewed    Infusion Medications    dextrose      sodium chloride 100 mL/hr at 06/10/22 2129     Scheduled Medications    insulin lispro  0-6 Units SubCUTAneous TID WC    insulin lispro  0-3 Units SubCUTAneous Nightly    magnesium sulfate  2,000 mg IntraVENous Once    vancomycin (VANCOCIN) intermittent dosing (placeholder)   Other RX Placeholder    vancomycin  1,250 mg IntraVENous Q18H    atorvastatin  40 mg Oral Nightly    doxepin  20 mg Oral Nightly    fenofibrate  160 mg Oral Daily    losartan  25 mg Oral Daily    multivitamin  1 tablet Oral Daily     PRN Meds: glucose, dextrose bolus **OR** dextrose bolus, glucagon (rDNA), dextrose, potassium chloride **OR** potassium alternative oral replacement **OR** potassium chloride, oxyCODONE-acetaminophen **OR** oxyCODONE-acetaminophen, acetaminophen, tiZANidine    No intake or output data in the 24 hours ending 06/11/22 0900    Diet:  ADULT DIET;  Regular  Diet NPO    Exam:  BP (!) 130/46   Pulse 65   Temp 98.5 °F (36.9 °C) (Oral)   Resp 18   Ht 5' 7\" (1.702 m)   Wt 180 lb (81.6 kg)   SpO2 96%   BMI 28.19 kg/m²     General appearance: resting in bed, no apparent distress, appears stated age and cooperative. HEENT: Normal cephalic, atraumatic without obvious deformity. Pupils equal, round, and reactive to light. Conjunctivae/corneas clear. Neck: Supple. No jugular venous distention. Trachea midline. Respiratory:  Normal respiratory effort. Clear to auscultation, bilaterally without Rales/Wheezes/Rhonchi. Cardiovascular: Regular rate and rhythm with normal S1/S2 without murmurs, rubs or gallops. Abdomen: Soft, non-tender, non-distended with normal bowel sounds. Musculoskeletal: Dressing to right knee is clean dry intact  Skin: Skin color, texture, turgor normal.  No rashes or lesions. Neurologic:  Neurovascularly intact without any focal sensory/motor deficits. Psychiatric: Alert and oriented, thought content appropriate, normal insight  Peripheral Pulses: distal pulses palpable bilaterally      Labs:   Recent Labs     06/10/22  1428 06/11/22  0543   WBC 15.0* 9.5   HGB 13.2 11.2*   HCT 42.2 37.7    210     Recent Labs     06/10/22  1428 06/11/22  0543    136   K 3.2* 3.3*    105   CO2 21* 20*   BUN 18 16   CREATININE 0.8 0.4   CALCIUM 9.3 8.7     No results for input(s): AST, ALT, BILIDIR, BILITOT, ALKPHOS in the last 72 hours. No results for input(s): INR in the last 72 hours. No results for input(s): Shellia Bugler in the last 72 hours. Urinalysis:      Lab Results   Component Value Date    NITRU NEGATIVE 04/08/2022    WBCUA 2-4 04/08/2022    BACTERIA FEW 04/08/2022    RBCUA 0-2 04/08/2022    BLOODU NEGATIVE 04/08/2022    GLUCOSEU NEGATIVE 04/08/2022       Radiology:  No orders to display       Diet: ADULT DIET;  Regular  Diet NPO    DVT prophylaxis: [] Lovenox                                 [x] SCDs                                 [] SQ Heparin                                 [] Encourage ambulation           [] Already on Anticoagulation     Disposition:    [x] Home       [] TCU       [] Rehab       [] Psych       [] SNF       [] Long Term Care Facility       [] Other-    Code Status: Prior    PT/OT Eval Status: per primary      Electronically signed by Laney Taylor MD on 6/11/2022 at 9:00 AM

## 2022-06-11 NOTE — PLAN OF CARE
Problem: Discharge Planning  Goal: Discharge to home or other facility with appropriate resources  Outcome: Progressing  Flowsheets (Taken 6/11/2022 1833)  Discharge to home or other facility with appropriate resources:   Identify barriers to discharge with patient and caregiver   Identify discharge learning needs (meds, wound care, etc)   Refer to discharge planning if patient needs post-hospital services based on physician order or complex needs related to functional status, cognitive ability or social support system  Note: Discharge planning in process.  to assist patient with discharge needs. Problem: Pain  Goal: Verbalizes/displays adequate comfort level or baseline comfort level  Outcome: Progressing  Flowsheets (Taken 6/11/2022 1833)  Verbalizes/displays adequate comfort level or baseline comfort level:   Encourage patient to monitor pain and request assistance   Administer analgesics based on type and severity of pain and evaluate response   Consider cultural and social influences on pain and pain management   Assess pain using appropriate pain scale   Implement non-pharmacological measures as appropriate and evaluate response   Notify Licensed Independent Practitioner if interventions unsuccessful or patient reports new pain  Note: Patient able to use 0-10 scale to verbalize pain. Patient verbalizing pain to right knee this shift. Patient taking percocet every 6 hours PRN for pain. Patient voices relief of pain with PO PRN pain medication. Patient repositioned in bed PRN for comfort.       Problem: Chronic Conditions and Co-morbidities  Goal: Patient's chronic conditions and co-morbidity symptoms are monitored and maintained or improved  Outcome: Progressing  Flowsheets (Taken 6/11/2022 1833)  Care Plan - Patient's Chronic Conditions and Co-Morbidity Symptoms are Monitored and Maintained or Improved:   Monitor and assess patient's chronic conditions and comorbid symptoms for stability, deterioration, or improvement   Collaborate with multidisciplinary team to address chronic and comorbid conditions and prevent exacerbation or deterioration   Update acute care plan with appropriate goals if chronic or comorbid symptoms are exacerbated and prevent overall improvement and discharge  Note: Co-morbidities being monitored      Problem: Skin/Tissue Integrity  Goal: Absence of new skin breakdown  Description: 1. Monitor for areas of redness and/or skin breakdown  2. Assess vascular access sites hourly  3. Every 4-6 hours minimum:  Change oxygen saturation probe site  4. Every 4-6 hours:  If on nasal continuous positive airway pressure, respiratory therapy assess nares and determine need for appliance change or resting period. Outcome: Progressing  Note: No new skin breakdown noted to patient this shift. Patient is able to reposition herself in bed in order to prevent skin breakdown. Problem: Safety - Adult  Goal: Free from fall injury  Outcome: Adequate for Discharge  Flowsheets (Taken 6/11/2022 1863)  Free From Fall Injury: Instruct family/caregiver on patient safety  Note: Patient has not had any falls this shift. Bed in lowest position with wheels locked and call light in reach. Hourly rounding in place and bed alarm on. Problem: ABCDS Injury Assessment  Goal: Absence of physical injury  Outcome: Completed  Flowsheets (Taken 6/11/2022 3283)  Absence of Physical Injury: Implement safety measures based on patient assessment  Note: No physical injury to patient this shift. Care plan reviewed with patient. Patient verbalizes understanding of plan of care and contributes to goal setting.

## 2022-06-11 NOTE — ED NOTES
Neida care and linens changed. Pt rprts loose stools. Pt repositioned on cot w/one assist. Hospitalist updated and now at bedside.       Melisa Parson RN  06/10/22 4743

## 2022-06-11 NOTE — ED NOTES
Patient transferred to [facility] room 014 nurse informed.       Behzad Wiley  06/10/22 0337

## 2022-06-12 ENCOUNTER — ANESTHESIA (OUTPATIENT)
Dept: OPERATING ROOM | Age: 62
DRG: 463 | End: 2022-06-12
Payer: OTHER GOVERNMENT

## 2022-06-12 LAB
AEROBIC CULTURE: ABNORMAL
AEROBIC CULTURE: ABNORMAL
ANION GAP SERPL CALCULATED.3IONS-SCNC: 10 MEQ/L (ref 8–16)
BUN BLDV-MCNC: 11 MG/DL (ref 7–22)
CALCIUM SERPL-MCNC: 8.4 MG/DL (ref 8.5–10.5)
CHLORIDE BLD-SCNC: 105 MEQ/L (ref 98–111)
CO2: 21 MEQ/L (ref 23–33)
CREAT SERPL-MCNC: 0.3 MG/DL (ref 0.4–1.2)
GFR SERPL CREATININE-BSD FRML MDRD: > 90 ML/MIN/1.73M2
GLUCOSE BLD-MCNC: 102 MG/DL (ref 70–108)
GLUCOSE BLD-MCNC: 128 MG/DL (ref 70–108)
GLUCOSE BLD-MCNC: 138 MG/DL (ref 70–108)
GLUCOSE BLD-MCNC: 148 MG/DL (ref 70–108)
GLUCOSE BLD-MCNC: 79 MG/DL (ref 70–108)
GRAM STAIN RESULT: ABNORMAL
MAGNESIUM: 1.3 MG/DL (ref 1.6–2.4)
ORGANISM: ABNORMAL
POTASSIUM SERPL-SCNC: 4 MEQ/L (ref 3.5–5.2)
SODIUM BLD-SCNC: 136 MEQ/L (ref 135–145)
VANCOMYCIN RANDOM: 6.7 UG/ML (ref 0.1–39.9)

## 2022-06-12 PROCEDURE — 6370000000 HC RX 637 (ALT 250 FOR IP): Performed by: ORTHOPAEDIC SURGERY

## 2022-06-12 PROCEDURE — 3700000000 HC ANESTHESIA ATTENDED CARE: Performed by: ORTHOPAEDIC SURGERY

## 2022-06-12 PROCEDURE — 2500000003 HC RX 250 WO HCPCS: Performed by: ORTHOPAEDIC SURGERY

## 2022-06-12 PROCEDURE — 80048 BASIC METABOLIC PNL TOTAL CA: CPT

## 2022-06-12 PROCEDURE — 2709999900 HC NON-CHARGEABLE SUPPLY: Performed by: ORTHOPAEDIC SURGERY

## 2022-06-12 PROCEDURE — 7100000000 HC PACU RECOVERY - FIRST 15 MIN: Performed by: ORTHOPAEDIC SURGERY

## 2022-06-12 PROCEDURE — 0SUT09Z SUPPLEMENT RIGHT KNEE JOINT, FEMORAL SURFACE WITH LINER, OPEN APPROACH: ICD-10-PCS | Performed by: ORTHOPAEDIC SURGERY

## 2022-06-12 PROCEDURE — 6360000002 HC RX W HCPCS

## 2022-06-12 PROCEDURE — 83735 ASSAY OF MAGNESIUM: CPT

## 2022-06-12 PROCEDURE — 0SBC0ZZ EXCISION OF RIGHT KNEE JOINT, OPEN APPROACH: ICD-10-PCS | Performed by: ORTHOPAEDIC SURGERY

## 2022-06-12 PROCEDURE — 2580000003 HC RX 258: Performed by: ORTHOPAEDIC SURGERY

## 2022-06-12 PROCEDURE — 6360000002 HC RX W HCPCS: Performed by: STUDENT IN AN ORGANIZED HEALTH CARE EDUCATION/TRAINING PROGRAM

## 2022-06-12 PROCEDURE — 3600000004 HC SURGERY LEVEL 4 BASE: Performed by: ORTHOPAEDIC SURGERY

## 2022-06-12 PROCEDURE — 2500000003 HC RX 250 WO HCPCS: Performed by: STUDENT IN AN ORGANIZED HEALTH CARE EDUCATION/TRAINING PROGRAM

## 2022-06-12 PROCEDURE — 36415 COLL VENOUS BLD VENIPUNCTURE: CPT

## 2022-06-12 PROCEDURE — 1200000003 HC TELEMETRY R&B

## 2022-06-12 PROCEDURE — C1776 JOINT DEVICE (IMPLANTABLE): HCPCS | Performed by: ORTHOPAEDIC SURGERY

## 2022-06-12 PROCEDURE — 82948 REAGENT STRIP/BLOOD GLUCOSE: CPT

## 2022-06-12 PROCEDURE — 3600000014 HC SURGERY LEVEL 4 ADDTL 15MIN: Performed by: ORTHOPAEDIC SURGERY

## 2022-06-12 PROCEDURE — 2580000003 HC RX 258: Performed by: EMERGENCY MEDICINE

## 2022-06-12 PROCEDURE — 0SPC09Z REMOVAL OF LINER FROM RIGHT KNEE JOINT, OPEN APPROACH: ICD-10-PCS | Performed by: ORTHOPAEDIC SURGERY

## 2022-06-12 PROCEDURE — 6360000002 HC RX W HCPCS: Performed by: ORTHOPAEDIC SURGERY

## 2022-06-12 PROCEDURE — 6360000002 HC RX W HCPCS: Performed by: ANESTHESIOLOGY

## 2022-06-12 PROCEDURE — 80202 ASSAY OF VANCOMYCIN: CPT

## 2022-06-12 PROCEDURE — 3700000001 HC ADD 15 MINUTES (ANESTHESIA): Performed by: ORTHOPAEDIC SURGERY

## 2022-06-12 PROCEDURE — 6370000000 HC RX 637 (ALT 250 FOR IP): Performed by: PHYSICIAN ASSISTANT

## 2022-06-12 PROCEDURE — 99232 SBSQ HOSP IP/OBS MODERATE 35: CPT | Performed by: FAMILY MEDICINE

## 2022-06-12 PROCEDURE — 1200000000 HC SEMI PRIVATE

## 2022-06-12 PROCEDURE — 7100000001 HC PACU RECOVERY - ADDTL 15 MIN: Performed by: ORTHOPAEDIC SURGERY

## 2022-06-12 PROCEDURE — 2720000010 HC SURG SUPPLY STERILE: Performed by: ORTHOPAEDIC SURGERY

## 2022-06-12 DEVICE — BUSHING FEM KNEE ARCM POLY LO FRIC INTFACE AXLE AND REINF: Type: IMPLANTABLE DEVICE | Site: KNEE | Status: FUNCTIONAL

## 2022-06-12 DEVICE — COMPONENT FEM KNEE YOKE REINF ORTH SALV SYS: Type: IMPLANTABLE DEVICE | Site: KNEE | Status: FUNCTIONAL

## 2022-06-12 DEVICE — BUSHING TIB POLY LO FRIC INTFACE OSS: Type: IMPLANTABLE DEVICE | Site: KNEE | Status: FUNCTIONAL

## 2022-06-12 DEVICE — AXLE FEM STD KNEE OSS: Type: IMPLANTABLE DEVICE | Site: KNEE | Status: FUNCTIONAL

## 2022-06-12 DEVICE — BEARING TIB THK16MM KNEE UHMWPE ORTH SALV SYS: Type: IMPLANTABLE DEVICE | Site: KNEE | Status: FUNCTIONAL

## 2022-06-12 DEVICE — PIN FIX POLY LOK OSS: Type: IMPLANTABLE DEVICE | Site: KNEE | Status: FUNCTIONAL

## 2022-06-12 RX ORDER — FENTANYL CITRATE 50 UG/ML
50 INJECTION, SOLUTION INTRAMUSCULAR; INTRAVENOUS EVERY 5 MIN PRN
Status: COMPLETED | OUTPATIENT
Start: 2022-06-12 | End: 2022-06-12

## 2022-06-12 RX ORDER — TRANEXAMIC ACID 100 MG/ML
INJECTION, SOLUTION INTRAVENOUS PRN
Status: DISCONTINUED | OUTPATIENT
Start: 2022-06-12 | End: 2022-06-12 | Stop reason: ALTCHOICE

## 2022-06-12 RX ORDER — ROCURONIUM BROMIDE 10 MG/ML
INJECTION, SOLUTION INTRAVENOUS PRN
Status: DISCONTINUED | OUTPATIENT
Start: 2022-06-12 | End: 2022-06-12 | Stop reason: SDUPTHER

## 2022-06-12 RX ORDER — SODIUM CHLORIDE 0.9 % (FLUSH) 0.9 %
5-40 SYRINGE (ML) INJECTION PRN
Status: DISCONTINUED | OUTPATIENT
Start: 2022-06-12 | End: 2022-06-12 | Stop reason: HOSPADM

## 2022-06-12 RX ORDER — DIPHENHYDRAMINE HYDROCHLORIDE 50 MG/ML
12.5 INJECTION INTRAMUSCULAR; INTRAVENOUS
Status: DISCONTINUED | OUTPATIENT
Start: 2022-06-12 | End: 2022-06-12 | Stop reason: HOSPADM

## 2022-06-12 RX ORDER — ONDANSETRON 2 MG/ML
INJECTION INTRAMUSCULAR; INTRAVENOUS PRN
Status: DISCONTINUED | OUTPATIENT
Start: 2022-06-12 | End: 2022-06-12 | Stop reason: SDUPTHER

## 2022-06-12 RX ORDER — MORPHINE SULFATE 2 MG/ML
2 INJECTION, SOLUTION INTRAMUSCULAR; INTRAVENOUS
Status: DISCONTINUED | OUTPATIENT
Start: 2022-06-12 | End: 2022-06-22 | Stop reason: HOSPADM

## 2022-06-12 RX ORDER — DEXAMETHASONE SODIUM PHOSPHATE 10 MG/ML
INJECTION, EMULSION INTRAMUSCULAR; INTRAVENOUS PRN
Status: DISCONTINUED | OUTPATIENT
Start: 2022-06-12 | End: 2022-06-12 | Stop reason: SDUPTHER

## 2022-06-12 RX ORDER — MEPERIDINE HYDROCHLORIDE 25 MG/ML
12.5 INJECTION INTRAMUSCULAR; INTRAVENOUS; SUBCUTANEOUS EVERY 5 MIN PRN
Status: DISCONTINUED | OUTPATIENT
Start: 2022-06-12 | End: 2022-06-12 | Stop reason: HOSPADM

## 2022-06-12 RX ORDER — LIDOCAINE HYDROCHLORIDE 20 MG/ML
INJECTION, SOLUTION INTRAVENOUS PRN
Status: DISCONTINUED | OUTPATIENT
Start: 2022-06-12 | End: 2022-06-12 | Stop reason: SDUPTHER

## 2022-06-12 RX ORDER — VANCOMYCIN HYDROCHLORIDE 1 G/20ML
INJECTION, POWDER, LYOPHILIZED, FOR SOLUTION INTRAVENOUS PRN
Status: DISCONTINUED | OUTPATIENT
Start: 2022-06-12 | End: 2022-06-12 | Stop reason: ALTCHOICE

## 2022-06-12 RX ORDER — CYCLOBENZAPRINE HCL 10 MG
10 TABLET ORAL 3 TIMES DAILY PRN
Status: DISCONTINUED | OUTPATIENT
Start: 2022-06-12 | End: 2022-06-22 | Stop reason: HOSPADM

## 2022-06-12 RX ORDER — ASPIRIN 325 MG
325 TABLET ORAL DAILY
Status: DISCONTINUED | OUTPATIENT
Start: 2022-06-13 | End: 2022-06-22 | Stop reason: HOSPADM

## 2022-06-12 RX ORDER — MAGNESIUM SULFATE IN WATER 40 MG/ML
2000 INJECTION, SOLUTION INTRAVENOUS PRN
Status: DISCONTINUED | OUTPATIENT
Start: 2022-06-12 | End: 2022-06-22 | Stop reason: HOSPADM

## 2022-06-12 RX ORDER — TRANEXAMIC ACID 100 MG/ML
INJECTION, SOLUTION INTRAVENOUS PRN
Status: DISCONTINUED | OUTPATIENT
Start: 2022-06-12 | End: 2022-06-12 | Stop reason: SDUPTHER

## 2022-06-12 RX ORDER — SODIUM CHLORIDE 9 MG/ML
INJECTION, SOLUTION INTRAVENOUS PRN
Status: DISCONTINUED | OUTPATIENT
Start: 2022-06-12 | End: 2022-06-12 | Stop reason: HOSPADM

## 2022-06-12 RX ORDER — FENTANYL CITRATE 50 UG/ML
INJECTION, SOLUTION INTRAMUSCULAR; INTRAVENOUS PRN
Status: DISCONTINUED | OUTPATIENT
Start: 2022-06-12 | End: 2022-06-12 | Stop reason: SDUPTHER

## 2022-06-12 RX ORDER — ONDANSETRON 2 MG/ML
4 INJECTION INTRAMUSCULAR; INTRAVENOUS
Status: DISCONTINUED | OUTPATIENT
Start: 2022-06-12 | End: 2022-06-12 | Stop reason: HOSPADM

## 2022-06-12 RX ORDER — PROPOFOL 10 MG/ML
INJECTION, EMULSION INTRAVENOUS PRN
Status: DISCONTINUED | OUTPATIENT
Start: 2022-06-12 | End: 2022-06-12 | Stop reason: SDUPTHER

## 2022-06-12 RX ORDER — SODIUM CHLORIDE 0.9 % (FLUSH) 0.9 %
5-40 SYRINGE (ML) INJECTION EVERY 12 HOURS SCHEDULED
Status: DISCONTINUED | OUTPATIENT
Start: 2022-06-12 | End: 2022-06-12 | Stop reason: HOSPADM

## 2022-06-12 RX ORDER — FENTANYL CITRATE 50 UG/ML
INJECTION, SOLUTION INTRAMUSCULAR; INTRAVENOUS
Status: COMPLETED
Start: 2022-06-12 | End: 2022-06-12

## 2022-06-12 RX ORDER — BUPIVACAINE HYDROCHLORIDE 5 MG/ML
INJECTION, SOLUTION PERINEURAL PRN
Status: DISCONTINUED | OUTPATIENT
Start: 2022-06-12 | End: 2022-06-12 | Stop reason: ALTCHOICE

## 2022-06-12 RX ADMIN — DOXEPIN HYDROCHLORIDE 20 MG: 10 CAPSULE ORAL at 21:36

## 2022-06-12 RX ADMIN — CYCLOBENZAPRINE 10 MG: 10 TABLET, FILM COATED ORAL at 21:36

## 2022-06-12 RX ADMIN — FENTANYL CITRATE 100 MCG: 50 INJECTION, SOLUTION INTRAMUSCULAR; INTRAVENOUS at 09:48

## 2022-06-12 RX ADMIN — LIDOCAINE HYDROCHLORIDE 100 MG: 20 INJECTION, SOLUTION INTRAVENOUS at 08:56

## 2022-06-12 RX ADMIN — FENTANYL CITRATE 50 MCG: 50 INJECTION, SOLUTION INTRAMUSCULAR; INTRAVENOUS at 10:20

## 2022-06-12 RX ADMIN — SUGAMMADEX 200 MG: 100 INJECTION, SOLUTION INTRAVENOUS at 09:57

## 2022-06-12 RX ADMIN — FENTANYL CITRATE 50 MCG: 50 INJECTION, SOLUTION INTRAMUSCULAR; INTRAVENOUS at 10:40

## 2022-06-12 RX ADMIN — HYDROMORPHONE HYDROCHLORIDE 0.5 MG: 1 INJECTION, SOLUTION INTRAMUSCULAR; INTRAVENOUS; SUBCUTANEOUS at 10:20

## 2022-06-12 RX ADMIN — OXYCODONE AND ACETAMINOPHEN 2 TABLET: 5; 325 TABLET ORAL at 16:34

## 2022-06-12 RX ADMIN — OXYCODONE AND ACETAMINOPHEN 2 TABLET: 5; 325 TABLET ORAL at 00:08

## 2022-06-12 RX ADMIN — VANCOMYCIN HYDROCHLORIDE 1500 MG: 1 INJECTION, POWDER, LYOPHILIZED, FOR SOLUTION INTRAVENOUS at 21:44

## 2022-06-12 RX ADMIN — ROCURONIUM BROMIDE 40 MG: 50 INJECTION, SOLUTION INTRAVENOUS at 08:57

## 2022-06-12 RX ADMIN — MORPHINE SULFATE 2 MG: 2 INJECTION, SOLUTION INTRAMUSCULAR; INTRAVENOUS at 11:57

## 2022-06-12 RX ADMIN — PROPOFOL 170 MG: 10 INJECTION, EMULSION INTRAVENOUS at 08:56

## 2022-06-12 RX ADMIN — ATORVASTATIN CALCIUM 40 MG: 40 TABLET, FILM COATED ORAL at 21:36

## 2022-06-12 RX ADMIN — TRANEXAMIC ACID 1000 MG: 1 INJECTION, SOLUTION INTRAVENOUS at 09:05

## 2022-06-12 RX ADMIN — ONDANSETRON 4 MG: 2 INJECTION INTRAMUSCULAR; INTRAVENOUS at 09:20

## 2022-06-12 RX ADMIN — FENTANYL CITRATE 50 MCG: 50 INJECTION, SOLUTION INTRAMUSCULAR; INTRAVENOUS at 10:25

## 2022-06-12 RX ADMIN — FENTANYL CITRATE 50 MCG: 50 INJECTION, SOLUTION INTRAMUSCULAR; INTRAVENOUS at 10:45

## 2022-06-12 RX ADMIN — SODIUM CHLORIDE: 9 INJECTION, SOLUTION INTRAVENOUS at 17:41

## 2022-06-12 RX ADMIN — DEXAMETHASONE SODIUM PHOSPHATE 10 MG: 10 INJECTION, EMULSION INTRAMUSCULAR; INTRAVENOUS at 09:09

## 2022-06-12 RX ADMIN — OXYCODONE AND ACETAMINOPHEN 2 TABLET: 5; 325 TABLET ORAL at 23:01

## 2022-06-12 RX ADMIN — PROPOFOL 30 MG: 10 INJECTION, EMULSION INTRAVENOUS at 10:03

## 2022-06-12 RX ADMIN — FENTANYL CITRATE 50 MCG: 50 INJECTION, SOLUTION INTRAMUSCULAR; INTRAVENOUS at 09:39

## 2022-06-12 RX ADMIN — CYCLOBENZAPRINE 10 MG: 10 TABLET, FILM COATED ORAL at 10:55

## 2022-06-12 RX ADMIN — PROPOFOL 50 MG: 10 INJECTION, EMULSION INTRAVENOUS at 09:59

## 2022-06-12 RX ADMIN — OXYCODONE AND ACETAMINOPHEN 2 TABLET: 5; 325 TABLET ORAL at 10:35

## 2022-06-12 RX ADMIN — FENTANYL CITRATE 50 MCG: 50 INJECTION, SOLUTION INTRAMUSCULAR; INTRAVENOUS at 08:56

## 2022-06-12 RX ADMIN — HYDROMORPHONE HYDROCHLORIDE 0.5 MG: 1 INJECTION, SOLUTION INTRAMUSCULAR; INTRAVENOUS; SUBCUTANEOUS at 10:25

## 2022-06-12 RX ADMIN — SODIUM CHLORIDE: 9 INJECTION, SOLUTION INTRAVENOUS at 10:10

## 2022-06-12 ASSESSMENT — PAIN DESCRIPTION - ORIENTATION
ORIENTATION: RIGHT

## 2022-06-12 ASSESSMENT — PAIN SCALES - GENERAL
PAINLEVEL_OUTOF10: 10
PAINLEVEL_OUTOF10: 7
PAINLEVEL_OUTOF10: 4
PAINLEVEL_OUTOF10: 3
PAINLEVEL_OUTOF10: 0
PAINLEVEL_OUTOF10: 10
PAINLEVEL_OUTOF10: 8
PAINLEVEL_OUTOF10: 7
PAINLEVEL_OUTOF10: 0
PAINLEVEL_OUTOF10: 5
PAINLEVEL_OUTOF10: 5
PAINLEVEL_OUTOF10: 6

## 2022-06-12 ASSESSMENT — PAIN - FUNCTIONAL ASSESSMENT
PAIN_FUNCTIONAL_ASSESSMENT: ACTIVITIES ARE NOT PREVENTED
PAIN_FUNCTIONAL_ASSESSMENT: 0-10

## 2022-06-12 ASSESSMENT — LIFESTYLE VARIABLES: SMOKING_STATUS: 1

## 2022-06-12 ASSESSMENT — PAIN DESCRIPTION - DESCRIPTORS
DESCRIPTORS: ACHING
DESCRIPTORS: SHARP
DESCRIPTORS: SPASM
DESCRIPTORS: SHARP;STABBING

## 2022-06-12 ASSESSMENT — PAIN DESCRIPTION - FREQUENCY: FREQUENCY: CONTINUOUS

## 2022-06-12 ASSESSMENT — PAIN DESCRIPTION - LOCATION
LOCATION: KNEE

## 2022-06-12 ASSESSMENT — PAIN DESCRIPTION - PAIN TYPE: TYPE: SURGICAL PAIN

## 2022-06-12 NOTE — PLAN OF CARE
Problem: Discharge Planning  Goal: Discharge to home or other facility with appropriate resources  6/11/2022 2331 by Vane Quarles RN  Outcome: Progressing  Flowsheets (Taken 6/11/2022 1833 by Luann Echols RN)  Discharge to home or other facility with appropriate resources:   Identify barriers to discharge with patient and caregiver   Identify discharge learning needs (meds, wound care, etc)   Refer to discharge planning if patient needs post-hospital services based on physician order or complex needs related to functional status, cognitive ability or social support system  6/11/2022 1833 by Luann Echols RN  Outcome: Progressing  Flowsheets (Taken 6/11/2022 1833)  Discharge to home or other facility with appropriate resources:   Identify barriers to discharge with patient and caregiver   Identify discharge learning needs (meds, wound care, etc)   Refer to discharge planning if patient needs post-hospital services based on physician order or complex needs related to functional status, cognitive ability or social support system  Note: Discharge planning in process.  to assist patient with discharge needs.       Problem: Pain  Goal: Verbalizes/displays adequate comfort level or baseline comfort level  6/11/2022 2331 by Vane Quarles RN  Outcome: Progressing  Flowsheets (Taken 6/11/2022 2331)  Verbalizes/displays adequate comfort level or baseline comfort level:   Administer analgesics based on type and severity of pain and evaluate response   Assess pain using appropriate pain scale  6/11/2022 1833 by Luann Echols RN  Outcome: Progressing  Flowsheets (Taken 6/11/2022 1833)  Verbalizes/displays adequate comfort level or baseline comfort level:   Encourage patient to monitor pain and request assistance   Administer analgesics based on type and severity of pain and evaluate response   Consider cultural and social influences on pain and pain management   Assess pain using appropriate pain scale   Implement non-pharmacological measures as appropriate and evaluate response   Notify Licensed Independent Practitioner if interventions unsuccessful or patient reports new pain  Note: Patient able to use 0-10 scale to verbalize pain. Patient verbalizing pain to right knee this shift. Patient taking percocet every 6 hours PRN for pain. Patient voices relief of pain with PO PRN pain medication. Patient repositioned in bed PRN for comfort. Problem: Safety - Adult  Goal: Free from fall injury  6/11/2022 2331 by Vahe Schmitz RN  Outcome: Progressing  Flowsheets (Taken 6/11/2022 1833 by Fernandez Lindsey RN)  Free From Fall Injury: Instruct family/caregiver on patient safety  6/11/2022 1833 by Fernandez Lindsey RN  Outcome: Adequate for Discharge  Flowsheets (Taken 6/11/2022 1833)  Free From Fall Injury: Instruct family/caregiver on patient safety  Note: Patient has not had any falls this shift. Bed in lowest position with wheels locked and call light in reach. Hourly rounding in place and bed alarm on. Problem: ABCDS Injury Assessment  Goal: Absence of physical injury  6/11/2022 1833 by Fernandez Lindsey RN  Outcome: Completed  Flowsheets (Taken 6/11/2022 1833)  Absence of Physical Injury: Implement safety measures based on patient assessment  Note: No physical injury to patient this shift.       Problem: Chronic Conditions and Co-morbidities  Goal: Patient's chronic conditions and co-morbidity symptoms are monitored and maintained or improved  6/11/2022 2331 by Vahe Schmitz RN  Outcome: Progressing  Flowsheets (Taken 6/11/2022 1833 by Fernandez Lindsey RN)  Care Plan - Patient's Chronic Conditions and Co-Morbidity Symptoms are Monitored and Maintained or Improved:   Monitor and assess patient's chronic conditions and comorbid symptoms for stability, deterioration, or improvement   Collaborate with multidisciplinary team to address chronic and comorbid conditions and prevent exacerbation or deterioration   Update acute care plan with appropriate goals if chronic or comorbid symptoms are exacerbated and prevent overall improvement and discharge  6/11/2022 1833 by José Miguel Yee RN  Outcome: Progressing  Flowsheets (Taken 6/11/2022 1833)  Care Plan - Patient's Chronic Conditions and Co-Morbidity Symptoms are Monitored and Maintained or Improved:   Monitor and assess patient's chronic conditions and comorbid symptoms for stability, deterioration, or improvement   Collaborate with multidisciplinary team to address chronic and comorbid conditions and prevent exacerbation or deterioration   Update acute care plan with appropriate goals if chronic or comorbid symptoms are exacerbated and prevent overall improvement and discharge  Note: Co-morbidities being monitored      Problem: Skin/Tissue Integrity  Goal: Absence of new skin breakdown  Description: 1. Monitor for areas of redness and/or skin breakdown  2. Assess vascular access sites hourly  3. Every 4-6 hours minimum:  Change oxygen saturation probe site  4. Every 4-6 hours:  If on nasal continuous positive airway pressure, respiratory therapy assess nares and determine need for appliance change or resting period. 6/11/2022 2331 by Ute Scales RN  Outcome: Progressing  Note: No skin breakdown this shift. Patient being assisted with turning. Patients states understanding of repositioning every two hours. 6/11/2022 1833 by José Miguel Yee RN  Outcome: Progressing  Note: No new skin breakdown noted to patient this shift. Patient is able to reposition herself in bed in order to prevent skin breakdown.       Problem: Musculoskeletal - Adult  Goal: Return mobility to safest level of function  Outcome: Progressing  Flowsheets (Taken 6/11/2022 2331)  Return Mobility to Safest Level of Function:   Assist with transfers and ambulation using safe patient handling equipment as needed   Assess patient stability and activity tolerance for standing, transferring and ambulating with or without assistive devices

## 2022-06-12 NOTE — ANESTHESIA PRE PROCEDURE
Department of Anesthesiology  Preprocedure Note       Name:  Karen Rizvi   Age:  60 y.o.  :  1960                                          MRN:  [id number]         Date:  2022      Surgeon: Meghan Pnieda):  Aba Moncada MD    Procedure: Procedure(s):  Right Knee Incision and Drainage of Total Knee With Poly Exchange    Medications prior to admission:   Prior to Admission medications    Medication Sig Start Date End Date Taking? Authorizing Provider   rivaroxaban (XARELTO) 10 MG TABS tablet Take 1 tablet by mouth every 24 hours 22   Darylene Ek Heitmeyer, APRN - CNP   fenofibrate (TRIGLIDE) 160 MG tablet TAKE 1 TABLET DAILY 22   Zofia Pair, DO   tiZANidine (ZANAFLEX) 2 MG tablet Take 1 tablet by mouth 3 times daily as needed (muscle cramps) 22   Zofia Pair, DO   doxepin (SINEQUAN) 10 MG capsule TAKE 2 CAPSULES NIGHTLY 22   Zofia Pair, DO   hydrocortisone 2.5 % cream Apply topically 2 times daily. 22   Zofia Pair, DO   metFORMIN (GLUCOPHAGE-XR) 500 MG extended release tablet Take 1 tablet by mouth daily (with breakfast) 12/15/21   Zofia Pair, DO   atorvastatin (LIPITOR) 40 MG tablet TAKE 1 TABLET NIGHTLY 21   Zofia Pair, DO   losartan (COZAAR) 25 MG tablet TAKE 1 TABLET DAILY 21   Zofia Pair, DO   baclofen (LIORESAL) 10 MG tablet Take 1 tablet by mouth daily as needed (muscle spasms) 21   Zofia Pair, DO   melatonin 3 MG TABS tablet Take 3 mg by mouth daily     Historical Provider, MD   Acetaminophen 500 MG CAPS Take 1,000 mg by mouth as needed for Pain     Historical Provider, MD   KRILL OIL PO Take 350 mg by mouth daily    Historical Provider, MD   Multiple Vitamins-Minerals (THERAPEUTIC MULTIVITAMIN-MINERALS) tablet Take 1 tablet by mouth daily    Historical Provider, MD   aspirin 81 MG tablet Take 81 mg by mouth daily.       Historical Provider, MD       Current medications:    No current tablet 650 mg  650 mg Oral Q4H PRN Isamar Gr PA-C   650 mg at 06/11/22 0434    vancomycin (VANCOCIN) intermittent dosing (placeholder)   Other RX Placeholder Isamar Gr PA-C        atorvastatin (LIPITOR) tablet 40 mg  40 mg Oral Nightly Mondamin Bard, APRN - CNP   40 mg at 06/11/22 2029    doxepin (SINEQUAN) capsule 20 mg  20 mg Oral Nightly Mondamin Bard, APRN - CNP   20 mg at 06/11/22 2029    fenofibrate (TRIGLIDE) tablet 160 mg  160 mg Oral Daily Mondamin Bard, APRN - CNP   160 mg at 06/11/22 1139    losartan (COZAAR) tablet 25 mg  25 mg Oral Daily Dalila Wolff MD        multivitamin 1 tablet  1 tablet Oral Daily Mondamin Bard, APRN - CNP        tiZANidine (ZANAFLEX) tablet 2 mg  2 mg Oral TID PRN Mondamin Bard, APRN - CNP   2 mg at 06/11/22 0147       Allergies: Allergies   Allergen Reactions    Adhesive Tape        Problem List:    Patient Active Problem List   Diagnosis Code    DM type 2 (diabetes mellitus, type 2) ([facility] [address] E11.9    Hyperlipemia E78.5    THORNTON (nonalcoholic steatohepatitis) K75.81    Obesity E66.9    Tobacco abuse Z72.0    Vitamin D deficiency E55.9    Seborrheic keratosis L82.1    Dyshidrotic eczema L30.1    Displaced articular fracture of head of right femur, sequela S72.061S    Neida-prosthetic supracondylar fracture of femur M97. 8XXA, [address] Primary hypertension I10    Infection of total right knee replacement ([facility] [address] T84.53XA       Past Medical History:        Diagnosis Date    Atypical ductal hyperplasia, breast     left breast, 2016, lumpectomy, Tamoxifen, LMH    Hx of breast biopsy Jan 8 2016    Hyperlipidemia     Type II or unspecified type diabetes mellitus without mention of complication, not stated as uncontrolled        Past Surgical History:        Procedure Laterality Date    BLADDER SUSPENSION      BREAST BIOPSY Left     atypical hyperlasia, 2016, [facility]    BREAST LUMPECTOMY Left     atypical hyperplasia, 2016, [facility]    COLONOSCOPY  08/28/2020    HERNIA REPAIR  10/17/2016    KNEE SURGERY      Right knee    REVISION TOTAL KNEE ARTHROPLASTY Right 5/10/2022    RIGHT KNEE TOTAL ARTHROPLASTY REVISION performed by Jose Sanders MD at [address] [facility] EXTRACTION         Social History:    Social History     Tobacco Use    Smoking status: Current Every Day Smoker     Packs/day: 1.50     Years: 49.00     Pack years: 73.50     Types: Cigarettes    Smokeless tobacco: Never Used   Substance Use Topics    Alcohol use: No     Comment: monthly glass of wine                                Ready to quit: Not Answered  Counseling given: Not Answered      Vital Signs (Current): There were no vitals filed for this visit.                                            BP Readings from Last 3 Encounters:   06/12/22 125/60   06/01/22 122/60   05/31/22 122/60       NPO Status:                                                                                 BMI:   Wt Readings from Last 3 Encounters:   06/10/22 180 lb (81.6 kg)   06/01/22 195 lb (88.5 kg)   05/31/22 195 lb (88.5 kg)     There is no height or weight on file to calculate BMI.    CBC:   Lab Results   Component Value Date    WBC 9.5 06/11/2022    RBC 4.28 06/11/2022    RBC 5.62 12/10/2020    HGB 11.2 06/11/2022    HCT 37.7 06/11/2022    MCV 88.1 06/11/2022    RDW 19.1 12/10/2020     06/11/2022       CMP:   Lab Results   Component Value Date     06/11/2022    K 3.3 06/11/2022    K 4.6 05/09/2022     06/11/2022    CO2 20 06/11/2022    BUN 16 06/11/2022    CREATININE 0.4 06/11/2022    AGRATIO 1.8 12/21/2016    LABGLOM >90 06/11/2022    GLUCOSE 94 06/11/2022    GLUCOSE 113 12/10/2020    PROT 7.2 12/08/2021    CALCIUM 8.7 06/11/2022    BILITOT 0.4 12/08/2021    ALKPHOS 104 12/08/2021    AST 19 12/08/2021    ALT 11 12/08/2021       POC Tests:   Recent Labs     06/12/22  0609   POCGLU 79       Coags:   Lab Results   Component Value Date    PROTIME 11.0 10/05/2016    INR 0.90 10/05/2016       HCG (If Applicable): No results found for: PREGTESTUR, PREGSERUM, HCG, HCGQUANT     ABGs: No results found for: PHART, PO2ART, ZYP1DQT, PWT9EGG, BEART, L4IHNTPK     Type & Screen (If Applicable):  Lab Results   Component Value Date    LABRH POS 05/09/2022       Drug/Infectious Status (If Applicable):  Lab Results   Component Value Date    HEPCAB NEGATIVE 05/02/2018       COVID-19 Screening (If Applicable): No results found for: COVID19        Anesthesia Evaluation  Patient summary reviewed no history of anesthetic complications:   Airway: Mallampati: II  TM distance: >3 FB   Neck ROM: full  Mouth opening: > = 3 FB   Dental:          Pulmonary:normal exam    (+) current smoker (1-2 ppd)    (-) COPD and asthma                           Cardiovascular:  Exercise tolerance: good (>4 METS),       (-) hypertension, past MI and CAD                Neuro/Psych:      (-) seizures and CVA           GI/Hepatic/Renal:   (+) GERD: well controlled, liver disease (THORNTON):,      (-) no renal disease       Endo/Other:    (+) DiabetesType II DM, , .    (-) hypothyroidism, hyperthyroidism               Abdominal:             Vascular:     - DVT. Other Findings:             Anesthesia Plan      general     ASA 2     (GETA. PIV. Additional access can be obtained after induction if needed. Standard ASA monitors. IV/PO opioids and other adjuncts as needed for pain control. PACU post op for recovery. Possible anesthetics complications were discussed with the patient, including but not limited to: PONV, damage to the airway and surrounding structures (teeth, lips, gums, tongue, etc.), adverse reactions to medicine, cardiac complications (MI, CHF, arrhythmias, etc.), respiratory complications (post-op ventilation, respiratory failure, etc.), neurologic complications (nerve damage, stroke, seizure), and death.  The patient was given the opportunity to ask questions and all questions were answered to the patient's satisfaction. The patient is in agreement with the anesthetic plan.  )  Induction: intravenous. Anesthetic plan and risks discussed with patient. Plan discussed with CRNA.                     Julia Garcia DO   6/12/2022

## 2022-06-12 NOTE — PLAN OF CARE
Problem: Discharge Planning  Goal: Discharge to home or other facility with appropriate resources  Outcome: Progressing  Flowsheets (Taken 6/12/2022 1834)  Discharge to home or other facility with appropriate resources:   Identify barriers to discharge with patient and caregiver   Identify discharge learning needs (meds, wound care, etc)   Refer to discharge planning if patient needs post-hospital services based on physician order or complex needs related to functional status, cognitive ability or social support system  Note: Discharge planning in process.  to assist patient with discharge needs. Problem: Pain  Goal: Verbalizes/displays adequate comfort level or baseline comfort level  Outcome: Progressing  Flowsheets (Taken 6/12/2022 1834)  Verbalizes/displays adequate comfort level or baseline comfort level:   Encourage patient to monitor pain and request assistance   Administer analgesics based on type and severity of pain and evaluate response   Consider cultural and social influences on pain and pain management   Assess pain using appropriate pain scale   Implement non-pharmacological measures as appropriate and evaluate response   Notify Licensed Independent Practitioner if interventions unsuccessful or patient reports new pain  Note: Patient able to use 0-10 scale to verbalize pain. Patient verbalizing pain to right knee. Patient taking PRN pain medication for relief of pain. Ice applied to right knee PRN for comfort and patient voices relief of pain with interventions.       Problem: Chronic Conditions and Co-morbidities  Goal: Patient's chronic conditions and co-morbidity symptoms are monitored and maintained or improved  Outcome: Progressing  Flowsheets (Taken 6/12/2022 1834)  Care Plan - Patient's Chronic Conditions and Co-Morbidity Symptoms are Monitored and Maintained or Improved: Monitor and assess patient's chronic conditions and comorbid symptoms for stability, deterioration, or improvement  Note: Patient's co-morbidities being monitored during admission. Problem: Skin/Tissue Integrity  Goal: Absence of new skin breakdown  Description: 1. Monitor for areas of redness and/or skin breakdown  2. Assess vascular access sites hourly  3. Every 4-6 hours minimum:  Change oxygen saturation probe site  4. Every 4-6 hours:  If on nasal continuous positive airway pressure, respiratory therapy assess nares and determine need for appliance change or resting period. Outcome: Progressing  Note: No new skin breakdown noted to patient this shift. Patient able to reposition herself in bed in order to prevent skin breakdown. Problem: Musculoskeletal - Adult  Goal: Return mobility to safest level of function  Outcome: Progressing  Flowsheets (Taken 6/12/2022 1834)  Return Mobility to Safest Level of Function:   Assess patient stability and activity tolerance for standing, transferring and ambulating with or without assistive devices   Assist with transfers and ambulation using safe patient handling equipment as needed   Ensure adequate protection for wounds/incisions during mobilization   Obtain physical therapy/occupational therapy consults as needed  Note: Patient has not been out of bed since her surgery today. Patient to work with PT/OT. Problem: Safety - Adult  Goal: Free from fall injury  Outcome: Adequate for Discharge  Flowsheets (Taken 6/12/2022 1834)  Free From Fall Injury: Instruct family/caregiver on patient safety  Note: Patient has not had any falls during this shift. Bed in lowest position with wheels locked and call light in reach. Patient compliant with using call light to request assistance from staff when needed. Fall prevention measures in place and patient compliant. Hourly rounding in place and bed alarm on. Care plan reviewed with patient. Patient verbalizes understanding of plan of care and contributes to goal setting.

## 2022-06-12 NOTE — PROGRESS NOTES
Patient arrived to the pre-op holding area. Nasal swabbing with alcohol swabs is performed and the patient's temperature is 98.1°F. Patient is updated on POC and questions and concerns are addressed.

## 2022-06-12 NOTE — PROGRESS NOTES
[address] [facility] Pharmacokinetic Monitoring Service - Vancomycin    Consulting Provider: CRISTAL Holley   Indication: post surgical site infection  Target Concentration: Goal AUC/CHETNA 400-600 mg*hr/L  Day of Therapy: 3  Additional Antimicrobials: none    Pertinent Laboratory Values: Wt Readings from Last 1 Encounters:   06/10/22 180 lb (81.6 kg)     Temp Readings from Last 1 Encounters:   06/12/22 98.4 °F (36.9 °C) (Oral)     Estimated Creatinine Clearance: 158 mL/min (based on SCr of 0.4 mg/dL). Recent Labs     06/10/22  1428 06/11/22  0543   CREATININE 0.8 0.4   WBC 15.0* 9.5         Pertinent Cultures:  Culture Date Source Results   6/10/22 Synovial fluid right knee Staphylococcus (coagulase positive)   6/10/22 Right knee joint Staphylococcus (coagulase positive)   6/10/22 Blood x 2 NGTD   MRSA Nasal Swab: N/A. Non-respiratory infection.     Recent vancomycin administrations                     vancomycin (VANCOCIN) 1250 mg in dextrose 5 % 250 mL IVPB (mg) 1,250 mg New Bag 06/11/22 1729     1,250 mg New Bag 06/10/22 2339                    Assessment:  Date/Time Current Dose Concentration Timing of Concentration (h) AUC   6/12/22 at 5:22 1250 mg IV every 18 hours 6.7 ~ 12 hours after dose 279   Note: Serum concentrations collected for AUC dosing may appear elevated if collected in close proximity to the dose administered, this is not necessarily an indication of toxicity    Plan:  Current dosing regimen is sub-therapeutic  Increase dose to 1500 mg IV every 12 hours  Repeat vancomycin concentration - not ordered at this time  Pharmacy will continue to monitor patient and adjust therapy as indicated    Thank you for the consult,  Mary Solorzano [facility]  6/12/2022 6:54 AM

## 2022-06-12 NOTE — PROGRESS NOTES
1016 Awake and crying on arrival to PACU , O2 3L NC applied and pt turned onto Lt side per self states that is how she I=comfortable , pillows placed between legs and ice to knee   1020 medicated with Fentanyl and Dilaudid   1025 pt unchanged , medicated with Fentanyl and Dilaudid   1035 pt unchanged , medicated with 2 percocet tabs  1040 pt continues to cry and states pain  A # 10 medicated with Fentanyl 50 mcg IV  1045 pt unchanged , medicated with Fentanyl 50 mcg IV  1055 States she is having muscle spasm , medicated with Flexeril 10 mg PO  1110 states pain easing up to all most tolerable  1115 meets criteria for discharge , transported to [facility], [facility] RBASHIR states  in room waiting ,

## 2022-06-12 NOTE — OP NOTE
Operative Note      Patient: Nura Beck  YOB: 1960  MRN: [id number]    Date of Procedure: 6/12/2022    Pre-Op Diagnosis: Infection of total right knee replacement    Post-Op Diagnosis: Same       Procedure:  Arthrotomy right knee for infection as well as polyethylene change    Surgeon(s):  Aracelis Block MD    Assistant:   Physician Assistant: Sri Sanchez PA-C    Anesthesia: Spinal    Estimated Blood Loss (mL): 841     Complications: None    Specimens:   * No specimens in log *    Implants:  Implant Name Type Inv. Item Serial No.  Lot No. LRB No. Used Action   PIN FIX POLY AUGUST OSS - [id number]  PIN FIX POLY AUGUST OSS  [employer]- [id number] Right 1 Implanted   BUSHING FEM KNEE ARCM POLY LO FRIC INTFACE AXLE AND REINF - [id number]  BUSHING FEM KNEE ARCM POLY LO FRIC INTFACE AXLE AND REINF  [employer]- [id number] Right 1 Implanted   COMPONENT FEM KNEE YOKE REINF ORTH SALV SYS - [id number]  COMPONENT FEM KNEE YOKE REINF ORTH SALV SYS  [employer]- [id number] Right 1 Implanted   BEARING TIB [id number] KNEE UHMWPE ORTH SALV SYS - [id number]  BEARING TIB [id number] KNEE UHMWPE ORTH SALV SYS  [employer]- [id number] Right 1 Implanted         Drains:   [REMOVED] External Urinary Catheter (Removed)   Site Assessment Clean,dry & intact 05/24/22 2213   Placement Replaced 05/24/22 2213   Securement Method Securing device (Describe) 05/22/22 2006   Catheter Care Catheter/Wick replaced 05/24/22 2213   Perineal Care Yes 05/22/22 2006   Suction 40 mmgHg continuous 05/24/22 2213   Urine Color Yellow 05/24/22 2213   Urine Appearance Clear 05/24/22 2213   Output (mL) 500 mL 05/26/22 0504       Findings: Confirmed septic total knee    Detailed Description of Procedure: Indications  This is a 58-year-old female history of a total knee then subsequent infection with disruption of her patellar tendon.   We then did a revision procedure with a hinged implant doing reasonably well for the time being came in yesterday with redness swelling drainage and aspiration performed positive for infection. Felt she would benefit from an incision and drainage arthrotomy with polyethylene exchange. Patient agreed. Patient's had poor tissues. She tends to hold her legs in a flexed position. We have attempted to use knee immobilizers and she is not using them. We have discussed with her about the extensor mechanism and the need to keep the straight unfortunately that has not been successful. Today the key is to work to rid her of infection. I have less concerned about the extensor mechanism at this point. Narrative  Patient taken the operating underwent a general anesthetic. Right lower extremities prepped draped normal sterile fashion. Timeout was taken consent was confirmed. Utilizing the previous incision site skin knife electrocautery down the extensor mechanism. Gross purulence was noted. Significant disruption of the extensor mechanism but the patella does not appear to be going into an alto position. Removed all the nonabsorbable suture. An arthrotomy remove all the bearing surfaces. A synovectomy as well as scrub brush on the tissues as well as on the implant using chlorhexidine irrigation. After thorough irrigation we then replaced the bearings back in with the same polyethylene sizes. Repaired the extensor mechanism with PDS suture monofilament for the skin as well as skin staples. Did inject the capsule with 1 g of vancomycin as well as TXA. Dry dressings were applied. Patient was then awakened taken to the recovery room in good condition. Postoperative plan  Nonweightbearing right lower extremity. Dry dressings as necessary. We get infectious disease to see her as well.     Electronically signed by Aracelis Block MD on 6/12/2022 at 9:36 AM

## 2022-06-12 NOTE — ANESTHESIA POSTPROCEDURE EVALUATION
Department of Anesthesiology  Postprocedure Note    Patient: Chanel Barlow  MRN: [id number]  YOB: 1960  Date of evaluation: 6/12/2022  Time:  11:12 AM     Procedure Summary     Date: 06/12/22 Room / Location: [address] [facility] [place]    Anesthesia Start: 0847 Anesthesia Stop: 5951    Procedure: Right Knee Incision and Drainage of Total Knee With Poly Exchange (Right Knee) Diagnosis:       Infection of knee ([address]      (Infection of total right knee replacement)    Surgeons: Pablo Vincent MD Responsible Provider: Sherrine Koyanagi, DO    Anesthesia Type: general ASA Status: 2          Anesthesia Type: No value filed. Gary Phase I: Gary Score: 9    Gary Phase II:      Last vitals: Reviewed and per EMR flowsheets.        Anesthesia Post Evaluation    Patient location during evaluation: PACU  Patient participation: complete - patient participated  Level of consciousness: awake and alert  Airway patency: patent  Nausea & Vomiting: no vomiting and no nausea  Complications: no  Cardiovascular status: hemodynamically stable  Respiratory status: acceptable  Hydration status: stable

## 2022-06-13 LAB
ANION GAP SERPL CALCULATED.3IONS-SCNC: 11 MEQ/L (ref 8–16)
BASOPHILS # BLD: 0 %
BASOPHILS ABSOLUTE: 0 THOU/MM3 (ref 0–0.1)
BUN BLDV-MCNC: 9 MG/DL (ref 7–22)
CALCIUM SERPL-MCNC: 7.8 MG/DL (ref 8.5–10.5)
CHLORIDE BLD-SCNC: 106 MEQ/L (ref 98–111)
CO2: 19 MEQ/L (ref 23–33)
CREAT SERPL-MCNC: 0.3 MG/DL (ref 0.4–1.2)
EOSINOPHIL # BLD: 0 %
EOSINOPHILS ABSOLUTE: 0 THOU/MM3 (ref 0–0.4)
ERYTHROCYTE [DISTWIDTH] IN BLOOD BY AUTOMATED COUNT: 16.5 % (ref 11.5–14.5)
ERYTHROCYTE [DISTWIDTH] IN BLOOD BY AUTOMATED COUNT: 52.5 FL (ref 35–45)
GFR SERPL CREATININE-BSD FRML MDRD: > 90 ML/MIN/1.73M2
GLUCOSE BLD-MCNC: 80 MG/DL (ref 70–108)
GLUCOSE BLD-MCNC: 81 MG/DL (ref 70–108)
GLUCOSE BLD-MCNC: 91 MG/DL (ref 70–108)
GLUCOSE BLD-MCNC: 95 MG/DL (ref 70–108)
GLUCOSE BLD-MCNC: 95 MG/DL (ref 70–108)
HCT VFR BLD CALC: 32.2 % (ref 37–47)
HEMOGLOBIN: 9.7 GM/DL (ref 12–16)
IMMATURE GRANS (ABS): 0.03 THOU/MM3 (ref 0–0.07)
IMMATURE GRANULOCYTES: 0.5 %
LYMPHOCYTES # BLD: 18.1 %
LYMPHOCYTES ABSOLUTE: 1.1 THOU/MM3 (ref 1–4.8)
MCH RBC QN AUTO: 26.1 PG (ref 26–33)
MCHC RBC AUTO-ENTMCNC: 30.1 GM/DL (ref 32.2–35.5)
MCV RBC AUTO: 86.8 FL (ref 81–99)
MONOCYTES # BLD: 9.1 %
MONOCYTES ABSOLUTE: 0.6 THOU/MM3 (ref 0.4–1.3)
NUCLEATED RED BLOOD CELLS: 0 /100 WBC
PLATELET # BLD: 255 THOU/MM3 (ref 130–400)
PMV BLD AUTO: 11.4 FL (ref 9.4–12.4)
POTASSIUM SERPL-SCNC: 3.7 MEQ/L (ref 3.5–5.2)
RBC # BLD: 3.71 MILL/MM3 (ref 4.2–5.4)
SEG NEUTROPHILS: 72.3 %
SEGMENTED NEUTROPHILS ABSOLUTE COUNT: 4.4 THOU/MM3 (ref 1.8–7.7)
SODIUM BLD-SCNC: 136 MEQ/L (ref 135–145)
WBC # BLD: 6.1 THOU/MM3 (ref 4.8–10.8)

## 2022-06-13 PROCEDURE — 6370000000 HC RX 637 (ALT 250 FOR IP): Performed by: ORTHOPAEDIC SURGERY

## 2022-06-13 PROCEDURE — 82948 REAGENT STRIP/BLOOD GLUCOSE: CPT

## 2022-06-13 PROCEDURE — 36415 COLL VENOUS BLD VENIPUNCTURE: CPT

## 2022-06-13 PROCEDURE — 97162 PT EVAL MOD COMPLEX 30 MIN: CPT

## 2022-06-13 PROCEDURE — 2580000003 HC RX 258: Performed by: ORTHOPAEDIC SURGERY

## 2022-06-13 PROCEDURE — 1200000000 HC SEMI PRIVATE

## 2022-06-13 PROCEDURE — 6360000002 HC RX W HCPCS: Performed by: INTERNAL MEDICINE

## 2022-06-13 PROCEDURE — 6360000002 HC RX W HCPCS: Performed by: ORTHOPAEDIC SURGERY

## 2022-06-13 PROCEDURE — 97530 THERAPEUTIC ACTIVITIES: CPT

## 2022-06-13 PROCEDURE — 1200000003 HC TELEMETRY R&B

## 2022-06-13 PROCEDURE — 85025 COMPLETE CBC W/AUTO DIFF WBC: CPT

## 2022-06-13 PROCEDURE — 80048 BASIC METABOLIC PNL TOTAL CA: CPT

## 2022-06-13 RX ADMIN — DOXEPIN HYDROCHLORIDE 20 MG: 10 CAPSULE ORAL at 20:13

## 2022-06-13 RX ADMIN — VANCOMYCIN HYDROCHLORIDE 1500 MG: 1 INJECTION, POWDER, LYOPHILIZED, FOR SOLUTION INTRAVENOUS at 08:21

## 2022-06-13 RX ADMIN — CEFAZOLIN 2000 MG: 10 INJECTION, POWDER, FOR SOLUTION INTRAVENOUS at 20:19

## 2022-06-13 RX ADMIN — LOSARTAN POTASSIUM 25 MG: 25 TABLET, FILM COATED ORAL at 08:18

## 2022-06-13 RX ADMIN — OXYCODONE AND ACETAMINOPHEN 2 TABLET: 5; 325 TABLET ORAL at 11:57

## 2022-06-13 RX ADMIN — Medication 1 CAPSULE: at 08:17

## 2022-06-13 RX ADMIN — ATORVASTATIN CALCIUM 40 MG: 40 TABLET, FILM COATED ORAL at 20:12

## 2022-06-13 RX ADMIN — FENOFIBRATE 160 MG: 160 TABLET ORAL at 08:17

## 2022-06-13 RX ADMIN — CEFAZOLIN 2000 MG: 10 INJECTION, POWDER, FOR SOLUTION INTRAVENOUS at 13:57

## 2022-06-13 RX ADMIN — OXYCODONE AND ACETAMINOPHEN 2 TABLET: 5; 325 TABLET ORAL at 05:51

## 2022-06-13 RX ADMIN — CYCLOBENZAPRINE 10 MG: 10 TABLET, FILM COATED ORAL at 20:13

## 2022-06-13 RX ADMIN — SODIUM CHLORIDE: 9 INJECTION, SOLUTION INTRAVENOUS at 13:56

## 2022-06-13 RX ADMIN — OXYCODONE AND ACETAMINOPHEN 2 TABLET: 5; 325 TABLET ORAL at 20:13

## 2022-06-13 RX ADMIN — ASPIRIN 325 MG: 325 TABLET ORAL at 08:17

## 2022-06-13 ASSESSMENT — PAIN SCALES - GENERAL
PAINLEVEL_OUTOF10: 8
PAINLEVEL_OUTOF10: 9
PAINLEVEL_OUTOF10: 8

## 2022-06-13 NOTE — DISCHARGE INSTR - COC
Continuity of Care Form    Patient Name: Gonzalo Ralph   :  1960  MRN:  [id number]    Admit date:  6/10/2022  Discharge date:  6/15    Code Status Order: Prior   Advance Directives:      Admitting Physician:  Joshua Reddy MD  PCP: Sherwin Ascencio DO    Discharging Nurse: [facility] Unit/Room#: 7K-14/014-A  Discharging Unit Phone Number: [phone]    Emergency Contact:   Extended Emergency Contact Information  Primary Emergency Contact: Lilliam Rodriguez  Address: [address] [place] [address] [address]           [place] [address] [place] [address]  Phone: [phone]  Relation: Spouse    Past Surgical History:  Past Surgical History:   Procedure Laterality Date    BLADDER SUSPENSION      BREAST BIOPSY Left     atypical hyperlasia, 2016, [facility]    BREAST LUMPECTOMY Left     atypical hyperplasia, 2016, [facility]    COLONOSCOPY  2020    HERNIA REPAIR  10/17/2016    KNEE SURGERY      Right knee    REVISION TOTAL KNEE ARTHROPLASTY Right 5/10/2022    RIGHT KNEE TOTAL ARTHROPLASTY REVISION performed by Joshua Reddy MD at [address] [facility] Right 2022    Right Knee Incision and Drainage of Total Knee With Poly Exchange performed by Joshua Reddy MD at [address] [facility] EXTRACTION         Immunization History:   Immunization History   Administered Date(s) Administered    COVID-19, Pfizer Purple top, DILUTE for use, 12+ yrs, 30mcg/0.3mL dose 2021, 2021, 2022    Influenza, Quadv, IM, PF (6 mo and older Fluzone, Flulaval, Fluarix, and 3 yrs and older Afluria) 12/15/2020    Pneumococcal Polysaccharide ([id number]) 2015    Tdap (Boostrix, Adacel) 2015       Active Problems:  Patient Active Problem List   Diagnosis Code    Diabetes mellitus type 2, noninsulin dependent ([facility] E11.9    Hyperlipemia E78.5    THORNTON (nonalcoholic steatohepatitis) K75.81    Obesity E66.9    Tobacco abuse Z72.0    Vitamin D deficiency E55.9 Seborrheic keratosis L82.1    Dyshidrotic eczema L30.1    Displaced articular fracture of head of right femur, sequela S72.061S    Neida-prosthetic supracondylar fracture of femur M97. 8XXA, [id number]    Primary hypertension I10    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) T84.53XA    History of total knee replacement, right Z96.651    Hypokalemia E87.6       Isolation/Infection:   Isolation            No Isolation          Patient Infection Status       Infection Onset Added Last Indicated Last Indicated By Review Planned Expiration Resolved Resolved By    None active    Resolved    C-diff Rule Out 09/07/21 09/07/21 09/07/21 Clostridium Difficile Toxin/Antigen (Ordered)   09/07/21 Rule-Out Test Resulted            Nurse Assessment:  Last Vital Signs: BP (!) 119/57   Pulse 78   Temp 98.2 °F (36.8 °C) (Oral)   Resp 18   Ht 5' 7\" (1.702 m)   Wt 180 lb (81.6 kg)   SpO2 91%   BMI 28.19 kg/m²     Last documented pain score (0-10 scale): Pain Level: 8  Last Weight:   Wt Readings from Last 1 Encounters:   06/10/22 180 lb (81.6 kg)     Mental Status:  oriented and alert    IV Access:  - None    Nursing Mobility/ADLs:  Walking   Assisted  Transfer  Assisted  Bathing  Assisted  Dressing  Assisted  Toileting  Assisted  Feeding  Assisted  Med Admin  Assisted  Med Delivery   whole    Wound Care Documentation and Therapy:  Incision 06/12/22 Knee Right (Active)   Dressing Status Clean;Dry; Intact 06/13/22 0815   Dressing/Treatment Dry dressing 06/13/22 0815   Closure Other (Comment) 06/13/22 0815   Margins Approximated 06/12/22 0943   Incision Assessment Other (Comment) 06/13/22 0815   Drainage Amount None 06/13/22 0815   Odor None 06/13/22 0815   Neida-incision Assessment Other (Comment) 06/12/22 2131   Number of days: 1        Elimination:  Continence:    Bowel: Yes  Bladder: Yes  Urinary Catheter: None   Colostomy/Ileostomy/Ileal Conduit: No       Date of Last BM: 6/22      Intake/Output Summary (Last 24 hours) at transfer of Yong Mcgowan  is necessary for the continuing treatment of the diagnosis listed and that she requires [place] for greater 30 days.      Update Admission H&P: No change in H&P    PHYSICIAN SIGNATURE:  Electronically signed by Shubham Handley PA-C on 6/22/22 at 11:48 AM EDT

## 2022-06-13 NOTE — PLAN OF CARE
Problem: Discharge Planning  Goal: Discharge to home or other facility with appropriate resources  6/13/2022 0215 by Vicente Deleon RN  Outcome: Progressing  Flowsheets (Taken 6/12/2022 1834 by Carmita Lanza RN)  Discharge to home or other facility with appropriate resources:   Identify barriers to discharge with patient and caregiver   Identify discharge learning needs (meds, wound care, etc)   Refer to discharge planning if patient needs post-hospital services based on physician order or complex needs related to functional status, cognitive ability or social support system  6/12/2022 1834 by Carmita Lanza RN  Outcome: Progressing  Flowsheets (Taken 6/12/2022 1834)  Discharge to home or other facility with appropriate resources:   Identify barriers to discharge with patient and caregiver   Identify discharge learning needs (meds, wound care, etc)   Refer to discharge planning if patient needs post-hospital services based on physician order or complex needs related to functional status, cognitive ability or social support system  Note: Discharge planning in process.  to assist patient with discharge needs.       Problem: Pain  Goal: Verbalizes/displays adequate comfort level or baseline comfort level  6/13/2022 0215 by Vicente Deleon RN  Outcome: Progressing  Flowsheets (Taken 6/13/2022 0215)  Verbalizes/displays adequate comfort level or baseline comfort level:   Assess pain using appropriate pain scale   Administer analgesics based on type and severity of pain and evaluate response  6/12/2022 1834 by Carmita Lanza RN  Outcome: Progressing  Flowsheets (Taken 6/12/2022 1834)  Verbalizes/displays adequate comfort level or baseline comfort level:   Encourage patient to monitor pain and request assistance   Administer analgesics based on type and severity of pain and evaluate response   Consider cultural and social influences on pain and pain management   Assess pain using appropriate pain scale   Implement non-pharmacological measures as appropriate and evaluate response   Notify Licensed Independent Practitioner if interventions unsuccessful or patient reports new pain  Note: Patient able to use 0-10 scale to verbalize pain. Patient verbalizing pain to right knee. Patient taking PRN pain medication for relief of pain. Ice applied to right knee PRN for comfort and patient voices relief of pain with interventions. Problem: Safety - Adult  Goal: Free from fall injury  6/13/2022 0215 by Sonali Mackey RN  Outcome: Progressing  Flowsheets (Taken 6/12/2022 1834 by Michelle Smith RN)  Free From Fall Injury: Instruct family/caregiver on patient safety  6/12/2022 1834 by Michelle Smith RN  Outcome: Adequate for Discharge  Flowsheets (Taken 6/12/2022 1834)  Free From Fall Injury: Instruct family/caregiver on patient safety  Note: Patient has not had any falls during this shift. Bed in lowest position with wheels locked and call light in reach. Patient compliant with using call light to request assistance from staff when needed. Fall prevention measures in place and patient compliant. Hourly rounding in place and bed alarm on.         Problem: Chronic Conditions and Co-morbidities  Goal: Patient's chronic conditions and co-morbidity symptoms are monitored and maintained or improved  6/13/2022 0215 by Sonali Mackey RN  Outcome: Progressing  Flowsheets (Taken 6/12/2022 1834 by Michelle Smith RN)  Care Plan - Patient's Chronic Conditions and Co-Morbidity Symptoms are Monitored and Maintained or Improved: Monitor and assess patient's chronic conditions and comorbid symptoms for stability, deterioration, or improvement  6/12/2022 1834 by Michelle Smith RN  Outcome: Progressing  Flowsheets (Taken 6/12/2022 1834)  Care Plan - Patient's Chronic Conditions and Co-Morbidity Symptoms are Monitored and Maintained or Improved: Monitor and assess patient's chronic conditions and comorbid symptoms for stability, deterioration, or improvement  Note: Patient's co-morbidities being monitored during admission. Problem: Skin/Tissue Integrity  Goal: Absence of new skin breakdown  Description: 1. Monitor for areas of redness and/or skin breakdown  2. Assess vascular access sites hourly  3. Every 4-6 hours minimum:  Change oxygen saturation probe site  4. Every 4-6 hours:  If on nasal continuous positive airway pressure, respiratory therapy assess nares and determine need for appliance change or resting period. 6/13/2022 0215 by Klaudia Garcia RN  Outcome: Progressing  Note: No skin breakdown this shift. Patient being assisted with turning. Patients states understanding of repositioning every two hours. 6/12/2022 1834 by Fernanda Lutz RN  Outcome: Progressing  Note: No new skin breakdown noted to patient this shift. Patient able to reposition herself in bed in order to prevent skin breakdown.       Problem: Musculoskeletal - Adult  Goal: Return mobility to safest level of function  6/13/2022 0215 by Klaudia Garcia RN  Outcome: Progressing  Flowsheets (Taken 6/12/2022 1834 by Fernanda Lutz RN)  Return Mobility to Safest Level of Function:   Assess patient stability and activity tolerance for standing, transferring and ambulating with or without assistive devices   Assist with transfers and ambulation using safe patient handling equipment as needed   Ensure adequate protection for wounds/incisions during mobilization   Obtain physical therapy/occupational therapy consults as needed  6/12/2022 1834 by Fernanda Lutz RN  Outcome: Progressing  Flowsheets (Taken 6/12/2022 1834)  Return Mobility to Safest Level of Function:   Assess patient stability and activity tolerance for standing, transferring and ambulating with or without assistive devices   Assist with transfers and ambulation using safe patient handling equipment as needed   Ensure adequate protection for wounds/incisions during mobilization Obtain physical therapy/occupational therapy consults as needed  Note: Patient has not been out of bed since her surgery today. Patient to work with PT/OT. Problem: Skin/Tissue Integrity - Adult  Goal: Incisions, wounds, or drain sites healing without S/S of infection  Outcome: Progressing  Flowsheets (Taken 6/13/2022 0216)  Incisions, Wounds, or Drain Sites Healing Without Sign and Symptoms of Infection: TWICE DAILY: Assess and document dressing/incision, wound bed, drain sites and surrounding tissue     Problem: Infection - Adult  Goal: Absence of infection at discharge  Outcome: Progressing  Flowsheets (Taken 6/13/2022 0216)  Absence of infection at discharge:   Assess and monitor for signs and symptoms of infection   Administer medications as ordered  Goal: Absence of infection during hospitalization  Outcome: Progressing  Flowsheets (Taken 6/13/2022 0216)  Absence of infection during hospitalization:   Assess and monitor for signs and symptoms of infection   Monitor lab/diagnostic results   Administer medications as ordered   Care plan reviewed with patient. Patient verbalizes understanding of the plan of care and contributes to goal setting.

## 2022-06-13 NOTE — FLOWSHEET NOTE
Advance Care Planning     Advance Care Planning Inpatient Note  Spiritual Care Department    Today's Date: 6/13/2022  Unit: [address]    Received request from [employer]. Upon review of chart and communication with care team, patient's decision making abilities are not in question. . Patient was/were present in the room during visit. Goals of ACP Conversation:  Discuss advance care planning documents    Health Care Decision Makers:       Summary:  No Decision Maker named by patient at this time  Zion Flanagan has printed information but was not ready to work on documents today. She told us that she would like to work on these at another time. Advance Care Planning Documents (Patient Wishes):  None     Assessment:  Zion Flanagan told us that she would like to take printed information with her to read. She was not ready to work on documents at this time. Zion Flanagan told us that she was the healthcare decision maker for her first  when he had cancer. She knows that she can submit documents in the future and that she can request help from [id number] [facility]  at any time.     Interventions:  Provided education on documents for clarity and greater understanding  Encouraged ongoing ACP conversation with future decision makers and loved ones    Care Preferences Communicated:   No    Outcomes/Plan:  ACP Discussion: Postponed    Electronically signed by Liza Muro [facility] on 6/13/2022 at 4:26 PM

## 2022-06-13 NOTE — PLAN OF CARE
Problem: Discharge Planning  Goal: Discharge to home or other facility with appropriate resources  6/13/2022 1425 by Lew Low RN  Outcome: Progressing  Flowsheets (Taken 6/13/2022 1425)  Discharge to home or other facility with appropriate resources:   Identify barriers to discharge with patient and caregiver   Identify discharge learning needs (meds, wound care, etc)     Problem: Pain  Goal: Verbalizes/displays adequate comfort level or baseline comfort level  6/13/2022 1425 by Lew Low RN  Outcome: Progressing  Flowsheets (Taken 6/13/2022 1425)  Verbalizes/displays adequate comfort level or baseline comfort level:   Assess pain using appropriate pain scale   Administer analgesics based on type and severity of pain and evaluate response   Encourage patient to monitor pain and request assistance     Problem: Safety - Adult  Goal: Free from fall injury  6/13/2022 1425 by Lew Low RN  Outcome: Progressing  Flowsheets (Taken 6/13/2022 1425)  Free From Fall Injury: Instruct family/caregiver on patient safety     Problem: Chronic Conditions and Co-morbidities  Goal: Patient's chronic conditions and co-morbidity symptoms are monitored and maintained or improved  6/13/2022 1425 by Lew Low RN  Outcome: Progressing  Flowsheets (Taken 6/13/2022 1425)  Care Plan - Patient's Chronic Conditions and Co-Morbidity Symptoms are Monitored and Maintained or Improved: Monitor and assess patient's chronic conditions and comorbid symptoms for stability, deterioration, or improvement     Problem: Skin/Tissue Integrity  Goal: Absence of new skin breakdown  Description: 1. Monitor for areas of redness and/or skin breakdown  2. Assess vascular access sites hourly  3. Every 4-6 hours minimum:  Change oxygen saturation probe site  4.   Every 4-6 hours:  If on nasal continuous positive airway pressure, respiratory therapy assess nares and determine need for appliance change or resting period. 6/13/2022 1425 by Rosa Chapa RN  Outcome: Progressing     Problem: Musculoskeletal - Adult  Goal: Return mobility to safest level of function  6/13/2022 1425 by Rosa Chapa RN  Outcome: Progressing  Flowsheets (Taken 6/13/2022 1425)  Return Mobility to Safest Level of Function:   Assess patient stability and activity tolerance for standing, transferring and ambulating with or without assistive devices   Assist with transfers and ambulation using safe patient handling equipment as needed   Obtain physical therapy/occupational therapy consults as needed     Problem: Skin/Tissue Integrity - Adult  Goal: Incisions, wounds, or drain sites healing without S/S of infection  6/13/2022 1425 by Rosa Chapa RN  Outcome: Progressing  Flowsheets (Taken 6/13/2022 1425)  Incisions, Wounds, or Drain Sites Healing Without Sign and Symptoms of Infection: TWICE DAILY: Assess and document skin integrity     Problem: Infection - Adult  Goal: Absence of infection at discharge  6/13/2022 1425 by Rosa Chapa RN  Outcome: Progressing  Flowsheets (Taken 6/13/2022 1425)  Absence of infection at discharge:   Assess and monitor for signs and symptoms of infection   Monitor lab/diagnostic results     Problem: Infection - Adult  Goal: Absence of infection during hospitalization  6/13/2022 1425 by Rosa Chapa RN  Outcome: Progressing  Flowsheets (Taken 6/13/2022 1425)  Absence of infection during hospitalization:   Assess and monitor for signs and symptoms of infection   Monitor lab/diagnostic results     Care plan reviewed with patient. Patient verbalize understanding of the plan of care and contribute to goal setting.

## 2022-06-13 NOTE — CARE COORDINATION
6/13/22, 7:26 AM EDT  DISCHARGE PLANNING EVALUATION:    Padma Ferrell       Admitted: 6/10/2022/ [facility] day: 3   [facility] Reason for admit: Infection of total right knee replacement, initial encounter ([facility] [address] [T84.53XA]  History of total knee replacement, right [Z96.651]  Infection of left knee (HCC) [M00.9]   PMH:  has a past medical history of Atypical ductal hyperplasia, breast, Hx of breast biopsy, Hyperlipidemia, and Type II or unspecified type diabetes mellitus without mention of complication, not stated as uncontrolled. Procedure:   6/12 Arthrotomy right knee for infection as well as polyethylene change  Barriers to Discharge:  POD #1, ID consulted, PT eval, NWB to RLE, wound care, DM management, hgb 9.7, total knee precautions. Planning PICC with 6 weeks IV antibiotics. PCP: Stefany Pope DO  Readmission Risk Score: 20.3 ( )%  Patient's Healthcare Decision Maker: Named in [address] [place] [address]  spouse    Patient Goals/Plan/Treatment Preferences: Met with Ynes Torrez; readmit information completed. She plans return to [facility] when ready for discharge and SW follows. Will be precert to return. Transportation/Food Security/Housekeeping Addressed:  No issues identified.

## 2022-06-13 NOTE — CONSULTS
CONSULTATION NOTE :ID       Patient - Aries Batista,  Age - 60 y.o.    - 1960      Room Number - 7K-14/014-A   N -  [id number]   [facility]  Date of Admission -  6/10/2022  2:10 PM  Patient's PCP: Suzie Campos DO     Requesting Physician: Willa Granger MD    REASON FOR CONSULTATION   Right knee infection  CHIEF COMPLAINT   Right knee pain and drainage    HISTORY OF PRESENT ILLNESS       This is a very pleasant 60 y.o. female who was admitted to the hospital with a chief complaints of drainage from her right knee. She had a right total knee replacement around 4 weeks ago which was complicated by periprosthetic fracture requiring another operation. She presented with drainage and had incision drainage and polyexchange. Her culture is growing staph auris she has been on IV vancomycin. She has history of hyperlipidemia obesity and diabetes she has history of smoking.     PAST MEDICAL  HISTORY       Past Medical History:   Diagnosis Date    Atypical ductal hyperplasia, breast     left breast, , lumpectomy, Tamoxifen, [facility]    Hx of breast biopsy 2016    Hyperlipidemia     Type II or unspecified type diabetes mellitus without mention of complication, not stated as uncontrolled        PAST SURGICAL HISTORY     Past Surgical History:   Procedure Laterality Date    BLADDER SUSPENSION      BREAST BIOPSY Left     atypical hyperlasia, 2016, [facility]    BREAST LUMPECTOMY Left     atypical hyperplasia, 2016, [facility]    COLONOSCOPY  2020    HERNIA REPAIR  10/17/2016    KNEE SURGERY      Right knee    REVISION TOTAL KNEE ARTHROPLASTY Right 5/10/2022    RIGHT KNEE TOTAL ARTHROPLASTY REVISION performed by Willa Granger MD at [address]:       Scheduled Meds:   ceFAZolin  2,000 mg IntraVENous Q8H    aspirin  325 mg Oral Daily    insulin lispro  0-6 Units SubCUTAneous TID WC    insulin lispro  0-3 Units SubCUTAneous Nightly    lactobacillus  1 capsule Oral Daily with breakfast    atorvastatin  40 mg Oral Nightly    doxepin  20 mg Oral Nightly    fenofibrate  160 mg Oral Daily    losartan  25 mg Oral Daily    multivitamin  1 tablet Oral Daily     Continuous Infusions:   dextrose      sodium chloride Stopped (06/13/22 1021)     PRN Meds:morphine, cyclobenzaprine, magnesium sulfate, glucose, dextrose bolus **OR** dextrose bolus, glucagon (rDNA), dextrose, potassium chloride **OR** potassium alternative oral replacement **OR** potassium chloride, oxyCODONE-acetaminophen **OR** oxyCODONE-acetaminophen, acetaminophen  Allergies:   ALLERGIES:    Adhesive tape        SOCIAL HISTORY:     TOBACCO:   reports that she has been smoking cigarettes. She has a 73.50 pack-year smoking history. She has never used smokeless tobacco.     ETOH:   reports no history of alcohol use. Patient currently lives with family       FAMILY HISTORY:         Problem Relation Age of Onset    High Cholesterol Mother     High Blood Pressure Mother     Pacemaker Mother     Other Mother         Sundowners syndrome    Crohn's Disease Mother     Cancer Mother         Uterine and cervical and vagina    Thyroid Disease Mother     Dementia Mother     Colon Polyps Mother     Other Father         Pneumonia    Dementia Father     Diabetes Sister     Breast Cancer Sister     Heart Disease Brother     Colon Cancer Neg Hx        REVIEW OF SYSTEMS:     Constitutional: no fever, no night sweats, no fatigue, no weight loss. Head: no head ache , no head injury, no migranes. Eye: no eye discharge, blurring of vision, no double vision,no eye pain.   Ears: no hearing difficulty, no tinnitus  Mouth/throat: no ulceration, dental caries , dysphagia, no hoarseness and voice change  Respiratory: no cough no chest pain,no shortness of breath,no wheezing  CVS: no palpitation, no chest pain,   GI: no abdominal pain, no nausea , no vomiting, no constipation,no diarrhea. ANGELA: no dysuria, frequency and urgency, no hematuria, no kidney stones  Musculoskeletal: As noted in HPI. Endocrine: no polyuria, polydipsia, no cold or heat intolerance  Hematology: no anemia, no easy brusing or bleeding, no hx of clotting disorder  Dermatology: no skin rash, no skin lesions, no pruritis,  Neurological:no headaches,no dizziness, no seizure, no numbness. Psychiatry: no depression, no anxiety,no panic attacks, no suicide ideation    PHYSICAL EXAM:     BP (!) 119/57   Pulse 78   Temp 98.2 °F (36.8 °C) (Oral)   Resp 18   Ht 5' 7\" (1.702 m)   Wt 180 lb (81.6 kg)   SpO2 91%   BMI 28.19 kg/m²   General apperance:  Awake, alert, not in distress. HEENT: pink conjunctiva, unicteric sclera, moist oral mucosa. Chest: Bilateral air entry. Cardiovascular:  RRR ,S1S2, no murmur or gallop. Abdomen:  Soft, non tender to palpation. Extremities: Dressings right knee [post surgery]  Skin:  Warm and dry. CNS: Awake and oriented. LABS:     CBC:   Recent Labs     06/10/22  1428 06/11/22  0543 06/13/22  0447   WBC 15.0* 9.5 6.1   HGB 13.2 11.2* 9.7*    210 255     BMP:    Recent Labs     06/11/22  0543 06/12/22  1659 06/13/22  0447    136 136   K 3.3* 4.0 3.7    105 106   CO2 20* 21* 19*   BUN 16 11 9   CREATININE 0.4 0.3* 0.3*   GLUCOSE 94 148* 91     Calcium:  Recent Labs     06/13/22  0447   CALCIUM 7.8*     Ionized Calcium:No results for input(s): IONCA in the last 72 hours. Magnesium:  Recent Labs     06/12/22  1659   MG 1.3*     Phosphorus:No results for input(s): PHOS in the last 72 hours. BNP:No results for input(s): BNP in the last 72 hours.   Glucose:  Recent Labs     06/12/22  2042 06/13/22  0638 06/13/22  1044   POCGLU 138* 95 81       Problem list of patient      Patient Active Problem List   Diagnosis Code    Diabetes mellitus type 2, noninsulin dependent ([facility] E11.9    Hyperlipemia E78.5    THORNTON (nonalcoholic steatohepatitis) K75.81    Obesity E66.9    Tobacco abuse Z72.0    Vitamin D deficiency E55.9    Seborrheic keratosis L82.1    Dyshidrotic eczema L30.1    Displaced articular fracture of head of right femur, sequela S72.061S    Neida-prosthetic supracondylar fracture of femur M97. 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 Will place PICC line to treat for 6 weeks of antibiotics. Infection Control:   Standard precautions. Discharge Planning (Coordination of out patient care: To be determined. Possible ECF to continue IV antibiotic. Thank you Leobardo Essex, MD for allowing me to participate in this patient's care.     Kulwant Shafer MD, MD,FACP 6/13/2022 11:10 AM

## 2022-06-13 NOTE — PROGRESS NOTES
[facility]  INPATIENT PHYSICAL THERAPY  EVALUATION  [facility] ORTHOPEDICS 7K - 7K-14/014-A    Time In: 1107  Time Out: 4144  Timed Code Treatment Minutes: 15 Minutes  Minutes: 25          Date: 2022  Patient Name: Jose Jaime,  Gender:  female        MRN: [id number]  : 1960  (60 y.o.)      Referring Practitioner: Husam Hopkins MD  Diagnosis: infection of R total knee replacement  Additional Pertinent Hx: Per EMR \"The patient is a 60 y.o. female who presents with pain and increased drainage from her right knee incision. She is about 4 weeks out from her revision total knee arthroplasty after periprosthetic femur fracture. Patient states that the wound opened up a bit when they took out the staples. It has continued to drain from the wound and increased drainage and pain in the past 3-4 days. She is unsure of any fevers, denies chills. She is currently in a nursing home for rehab and is working in therapy. Wearing knee immobilizer as instructed. We were consulted for rule out of septic knee. Culture swabs from the wound were done by ED staff. \" Pt is s/p Arthrotomy right knee for infection as well as polyethylene change completed by Dr. Royer Adrian on . Restrictions/Precautions:  Restrictions/Precautions: General Precautions,Fall Risk,Weight Bearing  Right Lower Extremity Weight Bearing: Non Weight Bearing  Position Activity Restriction  Other position/activity restrictions: limited L knee extension-contracted in flexion    Subjective:  Chart Reviewed: Yes  Patient assessed for rehabilitation services?: Yes  Family / Caregiver Present: No  Subjective: Ok to see pt per nursing. Pt in bed when PT arrived, agreeable to PT session, wiling to try and sit on EOB and then \"go from there. \" Pt became tearful during session, emotional support provided. Nursing aware pt needing OT orders.     General:  Overall Orientation Status: Within Normal Limits  Orientation Level: Oriented X4  Vision: Within Functional Limits  Hearing: Within functional limits       Pain: 8-9/10: R knee, guarded during mobility    Vitals: Vitals not assessed per clinical judgement, see nursing flowsheet    Social/Functional History:    Lives With: Spouse  Type of Home: House  Home Layout: One level  Home Access: Stairs to enter with rails  Entrance Stairs - Number of Steps: 3 FABIO  Entrance Stairs - Rails: Left  Home Equipment: Grab bars,Cane,Walker, rolling     Bathroom Shower/Tub: Tub/Shower unit  Bathroom Equipment: Shower chair       ADL Assistance: Independent  Homemaking Assistance: Needs assistance  Ambulation Assistance: Independent  Transfer Assistance: Independent    Active : Yes     Additional Comments:  completes cooking and cleaning tasks. Pt amb with [facility] prior. Pt has recently been at [facility] for therapy for the last 2 weeks, plans to return home. OBJECTIVE:  Range of Motion:  Bilateral Lower Extremity: Impaired - reduced knee extension B    Strength:  Bilateral Lower Extremity: Impaired - grossly deconditoned, increased pain, not able to assess MMT    Balance:  Static Sitting Balance:  Supervision  Dynamic Sitting Balance: Stand By Assistance, X 1  Sitting EOB >8 min, completed reaching tasks and exercises  Bed Mobility:  Rolling to Right: Stand By Assistance, X 1, with head of bed flat, with rail   Supine to Sit: Stand By Assistance, [employer] Assistance, X 1, with head of bed flat, with rail, with increased time for completion  Due to increased pain reported  Transfers:  Not Tested  Pt declined. Pt reports she will complete a transfer next therapy session    Ambulation:  Not Tested  Not appropriate    Exercise:  Patient was guided in 1 set(s) 10-15 reps of exercise to both lower extremities. Long arc quads and Standing heel/toe raises. Exercises were completed for increased independence with functional mobility. Cues for technique of completion with fair demo.      Functional Outcome Measures: Completed  AM-PAC Inpatient Mobility Raw Score : 10  AM-PAC Inpatient T-Scale Score : 32.29    ASSESSMENT:  Activity Tolerance:  Patient tolerance of  treatment: fair. Treatment Initiated: Treatment and education initiated within context of evaluation. Evaluation time included review of current medical information, gathering information related to past medical, social and functional history, completion of standardized testing, formal and informal observation of tasks, assessment of data and development of plan of care and goals. Treatment time included skilled education and facilitation of tasks to increase safety and independence with functional mobility for improved independence and quality of life.'    Assessment: Body Structures, Functions, Activity Limitations Requiring Skilled Therapeutic Intervention: Decreased functional mobility ,Decreased strength,Decreased safe awareness,Decreased balance,Increased pain,Decreased posture,Decreased endurance,Decreased ROM  Assessment: Pt with recent knee infection, requireing surgical intervention. Pt is NWB to the R LE, cont to require skilled PT services to progress with overall mobility and increase IND with functional tasks. Therapy Prognosis: Fair    Requires PT Follow-Up: Yes    Discharge Recommendations:  Discharge Recommendations: Continue to assess pending progress,Subacute/Skilled Nursing Loel Sep would benefit from continued therapy after discharge    Patient Education:      .     Patient Education  Education Given To: Patient  Education Provided: Role of Therapy,Plan of Care,Precautions  Education Method: Verbal  Education Outcome: Verbalized understanding,Continued education needed       Equipment Recommendations:  Equipment Needed: No    Plan:  Specific Instructions for Next Treatment: slide board transfer  Current Treatment Recommendations: Strengthening,Balance training,Functional mobility training,Equipment evaluation, education, & procurement,Neuromuscular re-education,Endurance training,Patient/Caregiver education & training,Therapeutic activities,Safety education & training,Home exercise program  Plan:  (6x O)  Specific Instructions for Next Treatment: slide board transfer    Goals:  Patient goals : return home eventually  Short Term Goals  Time Frame for Short term goals: by discharge  Short term goal 1: Pt will demo IND with bed mobility tasks with bed flat to progress with overall mobility. Short term goal 2: Pt will tolerate 10 min sitting on EOB with IND to prepare for mobility. Short term goal 3: Pt will tolerate 10-20 reps of ther ex to increase overall mobility. Short term goal 4: PT to assess transfers. Long Term Goals  Time Frame for Long term goals : NA due to short ELOS    Following session, patient left in safe position with all fall risk precautions in place. Sitting on EOB for lunch following session, nursing aware.

## 2022-06-13 NOTE — PROGRESS NOTES
Orthopaedic Progress Note      SUBJECTIVE   Ms. Cheyenne Masters is post op day # 1     Patient notes s/p right knee infection  Poly exchange         OBJECTIVE      Physical    VITALS:  BP (!) 119/57   Pulse 78   Temp 98.2 °F (36.8 °C) (Oral)   Resp 18   Ht 5' 7\" (1.702 m)   Wt 180 lb (81.6 kg)   SpO2 91%   BMI 28.19 kg/m²   I/O last 3 completed shifts: In: 6426.9 [P.O.:1100;  I.V.:5326.9]  Out: 100 [Blood:100]      Dressing dry and intact  NVI sensation intact      Data  CBC:   Lab Results   Component Value Date    WBC 6.1 06/13/2022    HGB 9.7 06/13/2022     06/13/2022     BMP:    Lab Results   Component Value Date     06/13/2022    K 3.7 06/13/2022    K 4.6 05/09/2022     06/13/2022    CO2 19 06/13/2022    BUN 9 06/13/2022    CREATININE 0.3 06/13/2022    CALCIUM 7.8 06/13/2022    GLUCOSE 91 06/13/2022    GLUCOSE 113 12/10/2020     Uric Acid:  No components found for: URIC  PT/INR:    Lab Results   Component Value Date    PROTIME 11.0 10/05/2016    INR 0.90 10/05/2016     Troponin:  No results found for: TROPONINI  Urine Culture:  No components found for: CURINE      Current Inpatient Medications    Current Facility-Administered Medications: vancomycin (VANCOCIN) 1,500 mg in dextrose 5 % 500 mL IVPB, 1,500 mg, IntraVENous, Q12H  morphine (PF) injection 2 mg, 2 mg, IntraVENous, Q2H PRN  cyclobenzaprine (FLEXERIL) tablet 10 mg, 10 mg, Oral, TID PRN  aspirin tablet 325 mg, 325 mg, Oral, Daily  magnesium sulfate 2000 mg in 50 mL IVPB premix, 2,000 mg, IntraVENous, PRN  insulin lispro (HUMALOG) injection vial 0-6 Units, 0-6 Units, SubCUTAneous, TID WC  insulin lispro (HUMALOG) injection vial 0-3 Units, 0-3 Units, SubCUTAneous, Nightly  glucose chewable tablet 16 g, 4 tablet, Oral, PRN  dextrose bolus 10% 125 mL, 125 mL, IntraVENous, PRN **OR** dextrose bolus 10% 250 mL, 250 mL, IntraVENous, PRN  glucagon (rDNA) injection 1 mg, 1 mg, IntraMUSCular, PRN  dextrose 5 % solution, 100 mL/hr, IntraVENous, PRN  potassium chloride (KLOR-CON M) extended release tablet 40 mEq, 40 mEq, Oral, PRN **OR** potassium bicarb-citric acid (EFFER-K) effervescent tablet 40 mEq, 40 mEq, Oral, PRN **OR** potassium chloride 10 mEq/100 mL IVPB (Peripheral Line), 10 mEq, IntraVENous, PRN  lactobacillus (CULTURELLE) capsule 1 capsule, 1 capsule, Oral, Daily with breakfast  0.9 % sodium chloride infusion, , IntraVENous, Continuous  oxyCODONE-acetaminophen (PERCOCET) 5-325 MG per tablet 1 tablet, 1 tablet, Oral, Q6H PRN **OR** oxyCODONE-acetaminophen (PERCOCET) 5-325 MG per tablet 2 tablet, 2 tablet, Oral, Q6H PRN  acetaminophen (TYLENOL) tablet 650 mg, 650 mg, Oral, Q4H PRN  vancomycin (VANCOCIN) intermittent dosing (placeholder), , Other, RX Placeholder  atorvastatin (LIPITOR) tablet 40 mg, 40 mg, Oral, Nightly  doxepin (SINEQUAN) capsule 20 mg, 20 mg, Oral, Nightly  fenofibrate (TRIGLIDE) tablet 160 mg, 160 mg, Oral, Daily  losartan (COZAAR) tablet 25 mg, 25 mg, Oral, Daily  multivitamin 1 tablet, 1 tablet, Oral, Daily        PLAN    PT and OT  WBAT  abx pending cultures

## 2022-06-13 NOTE — CARE COORDINATION
6/13/22, 2:13 PM EDT  Discharge Planning Evaluation  Social work consult received, patient from [facility]. Patient/Family preference is to return to [facility]. Spoke with Kaycee,who confirms plan. She is tearful and shares that she is discouraged that she needs to return to ecf, but is aware that she continues to need that level of care   The patient's current payor source at the facility is [employer].   Medicare skilled days available: n/a  Insurance precert:  yes  Spoke with admissions at the facility.   Patient bed hold: no  Anticipated transport plan:  ambulance  Do they require COVID 19 test to return to ECF: no  Is there a required time frame which which COVID test needs done: n/a  Patient's Healthcare Decision Maker: Legal Next of Kin

## 2022-06-13 NOTE — CARE COORDINATION
06/13/22 1147   Readmission Assessment   Number of Days since last admission? 8-30 days   Previous Disposition SNF   Who is being Interviewed Patient   What was the patient's/caregiver's perception as to why they think they needed to return back to the hospital? Other (Comment)  (infection in right knee while at [facility])   Did you visit your Primary Care Physician after you left the hospital, before you returned this time? Yes   Did you see a specialist, such as Cardiac, Pulmonary, Orthopedic Physician, etc. after you left the hospital? Yes   Who advised the patient to return to the hospital? Physician   Does the patient report anything that got in the way of taking their medications? No   In our efforts to provide the best possible care to you and others like you, can you think of anything that we could have done to help you after you left the hospital the first time, so that you might not have needed to return so soon?  Other (Comment)  (no nothing I can think of, I just got infection)

## 2022-06-14 ENCOUNTER — APPOINTMENT (OUTPATIENT)
Dept: GENERAL RADIOLOGY | Age: 62
DRG: 463 | End: 2022-06-14
Payer: OTHER GOVERNMENT

## 2022-06-14 LAB
GLUCOSE BLD-MCNC: 119 MG/DL (ref 70–108)
GLUCOSE BLD-MCNC: 73 MG/DL (ref 70–108)
GLUCOSE BLD-MCNC: 85 MG/DL (ref 70–108)

## 2022-06-14 PROCEDURE — 97530 THERAPEUTIC ACTIVITIES: CPT

## 2022-06-14 PROCEDURE — C1751 CATH, INF, PER/CENT/MIDLINE: HCPCS

## 2022-06-14 PROCEDURE — 1200000003 HC TELEMETRY R&B

## 2022-06-14 PROCEDURE — 36569 INSJ PICC 5 YR+ W/O IMAGING: CPT

## 2022-06-14 PROCEDURE — 97166 OT EVAL MOD COMPLEX 45 MIN: CPT

## 2022-06-14 PROCEDURE — 6370000000 HC RX 637 (ALT 250 FOR IP): Performed by: ORTHOPAEDIC SURGERY

## 2022-06-14 PROCEDURE — 82948 REAGENT STRIP/BLOOD GLUCOSE: CPT

## 2022-06-14 PROCEDURE — 2580000003 HC RX 258: Performed by: INTERNAL MEDICINE

## 2022-06-14 PROCEDURE — 71045 X-RAY EXAM CHEST 1 VIEW: CPT

## 2022-06-14 PROCEDURE — 76937 US GUIDE VASCULAR ACCESS: CPT

## 2022-06-14 PROCEDURE — 6360000002 HC RX W HCPCS: Performed by: INTERNAL MEDICINE

## 2022-06-14 PROCEDURE — 6360000002 HC RX W HCPCS

## 2022-06-14 RX ORDER — MORPHINE SULFATE 2 MG/ML
INJECTION, SOLUTION INTRAMUSCULAR; INTRAVENOUS
Status: COMPLETED
Start: 2022-06-14 | End: 2022-06-14

## 2022-06-14 RX ADMIN — DOXEPIN HYDROCHLORIDE 20 MG: 10 CAPSULE ORAL at 20:44

## 2022-06-14 RX ADMIN — MORPHINE SULFATE 2 MG: 2 INJECTION, SOLUTION INTRAMUSCULAR; INTRAVENOUS at 10:44

## 2022-06-14 RX ADMIN — LOSARTAN POTASSIUM 25 MG: 25 TABLET, FILM COATED ORAL at 08:54

## 2022-06-14 RX ADMIN — ASPIRIN 325 MG: 325 TABLET ORAL at 08:53

## 2022-06-14 RX ADMIN — CYCLOBENZAPRINE 10 MG: 10 TABLET, FILM COATED ORAL at 12:24

## 2022-06-14 RX ADMIN — Medication 1 TABLET: at 12:24

## 2022-06-14 RX ADMIN — Medication 1 CAPSULE: at 08:53

## 2022-06-14 RX ADMIN — OXYCODONE AND ACETAMINOPHEN 2 TABLET: 5; 325 TABLET ORAL at 08:53

## 2022-06-14 RX ADMIN — CYCLOBENZAPRINE 10 MG: 10 TABLET, FILM COATED ORAL at 20:43

## 2022-06-14 RX ADMIN — CEFAZOLIN 2000 MG: 10 INJECTION, POWDER, FOR SOLUTION INTRAVENOUS at 13:18

## 2022-06-14 RX ADMIN — CEFAZOLIN 2000 MG: 10 INJECTION, POWDER, FOR SOLUTION INTRAVENOUS at 04:16

## 2022-06-14 RX ADMIN — ATORVASTATIN CALCIUM 40 MG: 40 TABLET, FILM COATED ORAL at 20:43

## 2022-06-14 RX ADMIN — FENOFIBRATE 160 MG: 160 TABLET ORAL at 08:54

## 2022-06-14 RX ADMIN — OXYCODONE AND ACETAMINOPHEN 2 TABLET: 5; 325 TABLET ORAL at 02:23

## 2022-06-14 RX ADMIN — CYCLOBENZAPRINE 10 MG: 10 TABLET, FILM COATED ORAL at 04:18

## 2022-06-14 RX ADMIN — OXYCODONE AND ACETAMINOPHEN 2 TABLET: 5; 325 TABLET ORAL at 20:44

## 2022-06-14 RX ADMIN — OXYCODONE AND ACETAMINOPHEN 2 TABLET: 5; 325 TABLET ORAL at 14:33

## 2022-06-14 RX ADMIN — CEFAZOLIN 2000 MG: 10 INJECTION, POWDER, FOR SOLUTION INTRAVENOUS at 20:43

## 2022-06-14 ASSESSMENT — PAIN SCALES - GENERAL
PAINLEVEL_OUTOF10: 8
PAINLEVEL_OUTOF10: 9
PAINLEVEL_OUTOF10: 4
PAINLEVEL_OUTOF10: 4
PAINLEVEL_OUTOF10: 9
PAINLEVEL_OUTOF10: 8
PAINLEVEL_OUTOF10: 4
PAINLEVEL_OUTOF10: 9
PAINLEVEL_OUTOF10: 4

## 2022-06-14 NOTE — PROGRESS NOTES
PICC Procedure Note    Stephen Carrasco   Admitted- 6/10/2022  2:10 PM  Admission diagnosis- Infection of total right knee replacement, initial encounter ([facility] Mariela Ferrera  History of total knee replacement, right [Z96.651]  Infection of left knee ([facility] [M00.9]      Attending Physician- Deven Rivas MD  Ordering Physician-Dr Rl Moss  Indication for Insertion: Antibiotic Therapy    Catheter Insertion Date- 6/14/2022   Lot Number- [id number]  Gauge-4  Lumen-single    Insertion Site- ANISH Basilic  Vein Diameter- 2.36 cm  Catheter Length- 45 cm  Internal Length- 45 cm  Exposed Catheter Length- 0cm   Upper Arm Circumference- 32cm  Tip Confirmation System Bundle met- No: xray  If X-ray required, Tip Location- SVC  Radiologist- Dr Leatha Epley    PICC insertion successful- Yes  Ultrasound- yes    Okay To Use PICC- Yes    Electronically signed by Meseret Terrell, RN, RN on 6/14/2022 at 11:32 AM

## 2022-06-14 NOTE — CARE COORDINATION
6/14/22, 12:15 PM EDT    DISCHARGE PLANNING EVALUATION      Requested [facility] start precert for return.

## 2022-06-14 NOTE — PROGRESS NOTES
Jose Miguelortizcourtney Obando 59  INPATIENT OCCUPATIONAL THERAPY  [facility] ORTHOPEDICS 7K  EVALUATION    Time:    Time In: 770  Time Out: 1009  Timed Code Treatment Minutes: 15 Minutes  Minutes: 25          Date: 2022  Patient Name: Dea Bradshaw,   Gender: female      MRN: [id number]  : 1960  (60 y.o.)  Referring Practitioner: Amber Hernandez MD  Diagnosis: infection of total right knee replacement  Additional Pertinent Hx: Pt is a 60 y.o. female s/p Arthrotomy right knee for infection as well as polyethylene change    Restrictions/Precautions:  Restrictions/Precautions: General Precautions,Fall Risk,Weight Bearing  Right Lower Extremity Weight Bearing: Non Weight Bearing  Position Activity Restriction  Other position/activity restrictions: limited L knee extension-contracted in flexion    Subjective  Chart Reviewed: Yes,Orders,Progress Notes,Operative Notes  Patient assessed for rehabilitation services?: Yes    Subjective: RN okayed session. Pt was up in w/c finishing with PT upon arrival, agreeable to OT assist back to bed. Pain: 5/10    Vitals: Vitals not assessed per clinical judgement, see nursing flowsheet    Social/Functional History:  Lives With: Spouse  Type of Home: House  Home Layout: One level  Home Access: Stairs to enter with rails  Entrance Stairs - Number of Steps: 3 FABIO  Entrance Stairs - Rails: Left  Home Equipment: Grab bars,Cane,Walker, rolling   Bathroom Shower/Tub: Tub/Shower unit  Bathroom Toilet: Handicap height  Bathroom Equipment: Shower chair       ADL Assistance: Independent  Homemaking Assistance: Needs assistance  Ambulation Assistance: Independent  Transfer Assistance: Independent    Active : Yes     Additional Comments:  completes cooking and cleaning tasks. Pt amb with [facility] prior. Pt has recently been at [facility] for therapy for the last 2 weeks, plans to return home.     VISION:WFL    HEARING:  WFL    COGNITION: Decreased Insight and Decreased Safety Awareness    RANGE OF MOTION:  Bilateral Upper Extremity:  WFL    STRENGTH:  Bilateral Upper Extremity:  grossly deconditioned    SENSATION:   WFL    ADL:   Grooming: with set-up. for oral care while seated on EOB and for washing hair with shower cap while seated. BALANCE:  Sitting Balance:  Stand By Assistance. on EOB and up in w/c    BED MOBILITY:  Sit to Supine: Minimal Assistance to guide RLE onto bed  Scooting: Minimal Assistance to scoot laterally on EOB, Mod A for scooting on slide board up an incline    TRANSFERS:  Sliding Board: Moderate Assistance. from w/c to EOB, increased difficulty d/t going up slope. Pt required mod VC for maintaining NWB to RLE. Pt completed in 4 scoots with short rest break between each scoot. Activity Tolerance:  Patient tolerance of  treatment: good. Assessment:  Assessment: Pt presented with the listed deficits s/p admission with infetion of total right knee replacement. Pt with decreased endurance, activity tolerance and currently requires Mod A for slide board t/fs in addition to increased A for ADLs. Pt would benefit from continued OT to restore PLOF maximize pt's indep with ADLs and IADLs in prep for a safe return home at max level of indep. Performance deficits / Impairments: Decreased functional mobility ,Decreased safe awareness,Decreased balance,Decreased ADL status,Decreased endurance,Decreased high-level IADLs,Decreased strength  Prognosis: Fair  REQUIRES OT FOLLOW-UP: Yes  Decision Making: Medium Complexity    Treatment Initiated: Treatment and education initiated within context of evaluation. Evaluation time included review of current medical information, gathering information related to past medical, social and functional history, completion of standardized testing, formal and informal observation of tasks, assessment of data and development of plan of care and goals.   Treatment time included skilled education and facilitation of tasks to increase safety and independence with ADL's for improved functional independence and quality of life. Discharge Recommendations:  Continue to assess pending progress,Patient would benefit from continued therapy after discharge    Patient Education:     Patient Education  Education Given To: Patient  Education Provided: Role of Therapy,Plan of [address]  Floor Training  Education Method: Demonstration,Verbal  Barriers to Learning: None  Education Outcome: Verbalized understanding,Demonstrated understanding    Equipment Recommendations:  Equipment Needed: Yes  Other: continue to assess needs - drop arm commode    Plan:  Times per Week: 6x  Times per Day: Daily  Current Treatment Recommendations: Strengthening,Balance training,Functional mobility training,Endurance training,Safety education & training,Patient/Caregiver education & training,Self-Care / ADL. See long-term goal time frame for expected duration of plan of care. If no long-term goals established, a short length of stay is anticipated. Goals:  Patient goals : return to PLOF  Short Term Goals  Time Frame for Short term goals: by discharge  Short Term Goal 1: Pt will complete slide board t/fs to/from [facility] with no > than Min A and 0-2 VC for maintaining NWB to RLE for increased indep with toileting  Short Term Goal 2: Pt will complete LB ADLs with Min A and AE prn for increased indep with dressing  Short Term Goal 3: Pt will complete BADL routine while seated unsupported with no > than Min A for increased indep with self care         Following session, patient left in safe position with all fall risk precautions in place.

## 2022-06-14 NOTE — PROGRESS NOTES
R knee dressing changed. Old dressing was saturated with dried old blood and moderate serosanguinous drainage. Incision is clean, dry and intact. Staples well approximated. Swelling around incision but no warmth or redness. Cleansed with CHG soap. Split gauze and ABDs placed over incision. Foam pads placed on sides of knee caps for padding (to replace surgical rags that were there). Wrapped with Kerlix then ACE wrap. Patient tolerated dressing change fairly well. Pedal pulses present and toes remain warm with capillary refill less than 3 seconds.

## 2022-06-14 NOTE — DISCHARGE SUMMARY
Physician Discharge Summary     Patient ID:  Talat Shah  [id number]  85 y.o.  1960    Admit date: 5/9/2022    Discharge date and time: 5/26/2022  4:13 PM     Admitting Physician: Silvina Smith MD     Discharge Physician: Silvina Smith MD    Admission Diagnoses: Displaced articular fracture of head of right femur, sequela [S72.061S]  Closed displaced articular fracture of head of right femur, initial encounter ([facility] [S72.061A]  Periprosthetic supracondylar fracture of femur, initial encounter [[address] [facility], [address]    Discharge Diagnoses: Displaced articular fracture of head of right femur, sequela [S72.061S]  Closed displaced articular fracture of head of right femur, initial encounter ([facility] [S72.061A]  Periprosthetic supracondylar fracture of femur, initial encounter [[facility] [facility], [address] Course: Patient was admitted on 5/10/2022 secondary to right knee periprosthetic femur fracture. Patient noted to have a past medical history of type 2 diabetes, hypertension. Patient underwent revision right total knee arthroplasty tibia and femoral components. Patellar tendon avulsion. Patient is noted to have contractures of bilateral knees. She did work with therapy postoperatively. Patient is to be weightbearing as tolerated in the knee immobilizer. The patient has a significant contracture. Patient refuses to wear. Consults:  Internal medicine secondary to type 2 diabetes and hypertension.     Condition: Improved, revision right total knee      TreatmenRevision right total knee       Disposition: ECF at discharge     Patient Instructions:      Medication List      START taking these medications    Xarelto 10 MG Tabs tablet  Generic drug: rivaroxaban  Take 1 tablet by mouth every 24 hours        CONTINUE taking these medications    Acetaminophen 500 MG Caps     aspirin 81 MG tablet     atorvastatin 40 MG tablet  Commonly known as: LIPITOR  TAKE 1 TABLET NIGHTLY     baclofen 10 MG tablet  Commonly known as: LIORESAL  Take 1 tablet by mouth daily as needed (muscle spasms)     doxepin 10 MG capsule  Commonly known as: SINEQUAN  TAKE 2 CAPSULES NIGHTLY     fenofibrate 160 MG tablet  Commonly known as: TRIGLIDE  TAKE 1 TABLET DAILY     hydrocortisone 2.5 % cream  Apply topically 2 times daily. KRILL OIL PO     losartan 25 MG tablet  Commonly known as: COZAAR  TAKE 1 TABLET DAILY     melatonin 3 MG Tabs tablet     metFORMIN 500 MG extended release tablet  Commonly known as: GLUCOPHAGE-XR  Take 1 tablet by mouth daily (with breakfast)     therapeutic multivitamin-minerals tablet     tiZANidine 2 MG tablet  Commonly known as: ZANAFLEX  Take 1 tablet by mouth 3 times daily as needed (muscle cramps)        STOP taking these medications    cyclobenzaprine 10 MG tablet  Commonly known as: FLEXERIL     MAGNESIUM PO     NONFORMULARY     oxyCODONE-acetaminophen 5-325 MG per tablet  Commonly known as: PERCOCET     VITAMIN B6 PO        ASK your doctor about these medications    HYDROcodone-acetaminophen 5-325 MG per tablet  Commonly known as: Norco  Take 1-2 tablets by mouth every 4-6 hours as needed for Pain for up to 7 days. Ask about: Should I take this medication? Where to Get Your Medications      You can get these medications from any pharmacy    Bring a paper prescription for each of these medications  · HYDROcodone-acetaminophen 5-325 MG per tablet  · Xarelto 10 MG Tabs tablet     Pharmacy Instructions: Take all medications as directed         Activity: Weightbearing as tolerated in the immobilizer. Diet: regular   Wound Care:  Follow-up 3 weeks postoperatively change dressing daily and as needed      Follow-up 2-3 weeks  Signed:  Helga Ryan PA-C  6/14/2022  8:25 AM

## 2022-06-14 NOTE — PROGRESS NOTES
[address] [address]  INPATIENT PHYSICAL THERAPY  DAILY NOTE  [facility] ORTHOPEDICS 7K - 7K-14/014-A    Time In: 4438  Time Out: 5389  Timed Code Treatment Minutes: 32 Minutes  Minutes: 26          Date: 2022  Patient Name: Sherry Dumont,  Gender:  female        MRN: [id number]  : 1960  (60 y.o.)     Referring Practitioner: Apolonia Lemus MD  Diagnosis: infection of R total knee replacement  Additional Pertinent Hx: Per EMR \"The patient is a 60 y.o. female who presents with pain and increased drainage from her right knee incision. She is about 4 weeks out from her revision total knee arthroplasty after periprosthetic femur fracture. Patient states that the wound opened up a bit when they took out the staples. It has continued to drain from the wound and increased drainage and pain in the past 3-4 days. She is unsure of any fevers, denies chills. She is currently in a nursing home for rehab and is working in therapy. Wearing knee immobilizer as instructed. We were consulted for rule out of septic knee. Culture swabs from the wound were done by ED staff. \" Pt is s/p Arthrotomy right knee for infection as well as polyethylene change completed by Dr. Tawanna Fox on . Prior Level of Function:  Lives With: Spouse  Type of Home: House  Home Layout: One level  Home Access: Stairs to enter with rails  Entrance Stairs - Number of Steps: 3 FABIO  Entrance Stairs - Rails: Left  Home Equipment: Grab bars,Cane,Walker, rolling   Bathroom Shower/Tub: Tub/Shower unit  Bathroom Toilet: Handicap height  Bathroom Equipment: Shower chair    ADL Assistance: Independent  Homemaking Assistance: Needs assistance  Ambulation Assistance: Independent  Transfer Assistance: Independent  Active : Yes  Additional Comments:  completes cooking and cleaning tasks. Pt amb with [facility] prior.  Pt has recently been at [facility] for therapy for the last 2 weeks, plans to return home.    Restrictions/Precautions:  Restrictions/Precautions: General Precautions,Fall Risk,Weight Bearing  Right Lower Extremity Weight Bearing: Non Weight Bearing  Position Activity Restriction  Other position/activity restrictions: limited L knee extension-contracted in flexion     SUBJECTIVE: RN approved session, requesting pt be back in bed following this as pt was planned to receive a PICC later this morning. This PT did call Saundra Fong PA-C to clarify pt is NWB R LE, he confirmed this. Pt agreeable for PT treatment. OT in at end of session to work with the pt following the PT session concluding. PAIN: 10/10: nerve pain when it fires up, R Leg     Vitals: Vitals not assessed per clinical judgement, see nursing flowsheet    OBJECTIVE:  Bed Mobility:  Rolling to Left: Stand By Assistance, with rail, with increased time for completion   Supine to Sit: Moderate Assistance, with head of bed raised, with rail, with increased time for completion   *Mod assist to elevate trunk  *Increased time with bed mobility as pt not wanting assistance initially     Transfers:  Slide Board:Minimal Assistance, Moderate Assistance, with increased time for completion, cues for hand placement   *Pt dependent for w/c and slide board positioning. Cues for improved limb positioning, to sequence, and to maintain R LE NWB. Min to mod assist to support at L knee for improved stability and for increased lateral force production to complete lateral translation to the w/c.   *Pt completed slowly in multiple small scoots. Ambulation:  Not Tested    Balance:  Static Sitting Balance:  Stand By Assistance, Contact Guard Assistance  Dynamic Sitting Balance: Stand By Assistance, Contact Guard Assistance    Exercise:  Patient was guided in 1 set(s) 12 reps of exercise to both lower extremities. Ankle pumps, Glut sets and Seated marches. Exercises were completed for increased independence with functional mobility.   *Cues and demo provided for appropriate completion of exercises. Cues to maintain within a tolerable range provided. All exercises completed maintaining R LE NWB status     Functional Outcome Measures: Completed  AM-PAC Inpatient Mobility Raw Score : 11  AM-PAC Inpatient T-Scale Score : 33.86    ASSESSMENT:  Assessment: Patient progressing toward established goals. Activity Tolerance:  Patient tolerance of  treatment: good. Pt tolerated mobility well, noted to have some nervousness regarding slide board transfer but tolerated well. Pt noted to have significant lack of extension at B knee, noted to have flexion contractures. Equipment Recommendations:Equipment Needed: No  Discharge Recommendations: Subacte/Skilled Nursing Facility  Plan: Specific Instructions for Next Treatment: slide board transfer  Current Treatment Recommendations: Strengthening,Balance training,Functional mobility training,Equipment evaluation, education, & procurement,Neuromuscular re-education,Endurance training,Patient/Caregiver education & training,Therapeutic activities,Safety education & training,Home exercise program  Plan:  (6x O)  Specific Instructions for Next Treatment: slide board transfer    Patient Education  Patient Education: Plan of Care, Precautions/Restrictions, Bed Mobility, Equipment Education, Transfers, Verbal Exercise Instruction, Activity Pacing      Previous Goals:  Patient goals : return home eventually  Short Term Goals  Time Frame for Short term goals: by discharge  Short term goal 1: Pt will demo IND with bed mobility tasks with bed flat to progress with overall mobility. Short term goal 2: Pt will tolerate 10 min sitting on EOB with IND to prepare for mobility. Short term goal 3: Pt will tolerate 10-20 reps of ther ex to increase overall mobility. Short term goal 4: PT to assess transfers.   Long Term Goals    Updated Goals:  Patient goals : return home eventually  Short Term Goals  Time Frame for Short term goals: by discharge  Short term goal 1: Pt will demo IND with bed mobility tasks with bed flat to progress with overall mobility. Short term goal 2: Pt will tolerate 10 min sitting on EOB with IND to prepare for mobility. Short term goal 3: Pt will tolerate 10-20 reps of ther ex to increase overall mobility. Short term goal 4: Pt to complete slide board transfer from bed <-> w/c with CGA while maintaining R LE NWB for improved mobility in her environment  Long Term Goals  Time Frame for Long term goals : NA due to short ELOS    Following session, patient left in safe position with all fall risk precautions in place.

## 2022-06-14 NOTE — PLAN OF CARE
Problem: Discharge Planning  Goal: Discharge to home or other facility with appropriate resources  6/14/2022 0104 by Pao Alvarez RN  Outcome: Progressing  Flowsheets (Taken 6/13/2022 1425 by Lanis Opitz, RN)  Discharge to home or other facility with appropriate resources:   Identify barriers to discharge with patient and caregiver   Identify discharge learning needs (meds, wound care, etc)  6/13/2022 1425 by Lanis Opitz, RN  Outcome: Progressing  Flowsheets (Taken 6/13/2022 1425)  Discharge to home or other facility with appropriate resources:   Identify barriers to discharge with patient and caregiver   Identify discharge learning needs (meds, wound care, etc)     Problem: Pain  Goal: Verbalizes/displays adequate comfort level or baseline comfort level  6/14/2022 0104 by Pao Alvarez RN  Outcome: Progressing  Flowsheets (Taken 6/13/2022 1425 by Lanis Opitz, RN)  Verbalizes/displays adequate comfort level or baseline comfort level:   Assess pain using appropriate pain scale   Administer analgesics based on type and severity of pain and evaluate response   Encourage patient to monitor pain and request assistance  6/13/2022 1425 by Lanis Opitz, RN  Outcome: Progressing  Flowsheets (Taken 6/13/2022 1425)  Verbalizes/displays adequate comfort level or baseline comfort level:   Assess pain using appropriate pain scale   Administer analgesics based on type and severity of pain and evaluate response   Encourage patient to monitor pain and request assistance     Problem: Safety - Adult  Goal: Free from fall injury  6/14/2022 0104 by Pao Alvarez RN  Outcome: Progressing  6/13/2022 1425 by Lanis Opitz, RN  Outcome: Progressing  Flowsheets (Taken 6/13/2022 1425)  Free From Fall Injury: Instruct family/caregiver on patient safety     Problem: Chronic Conditions and Co-morbidities  Goal: Patient's chronic conditions and co-morbidity symptoms are monitored and maintained or improved  6/14/2022 0104 by Kwasi Power RN  Outcome: Progressing  Flowsheets (Taken 6/13/2022 1425 by Kristopher Hunter RN)  Care Plan - Patient's Chronic Conditions and Co-Morbidity Symptoms are Monitored and Maintained or Improved: Monitor and assess patient's chronic conditions and comorbid symptoms for stability, deterioration, or improvement  6/13/2022 1425 by Kristopher Hunter RN  Outcome: Progressing  Flowsheets (Taken 6/13/2022 1425)  Care Plan - Patient's Chronic Conditions and Co-Morbidity Symptoms are Monitored and Maintained or Improved: Monitor and assess patient's chronic conditions and comorbid symptoms for stability, deterioration, or improvement     Problem: Skin/Tissue Integrity  Goal: Absence of new skin breakdown  Description: 1. Monitor for areas of redness and/or skin breakdown  2. Assess vascular access sites hourly  3. Every 4-6 hours minimum:  Change oxygen saturation probe site  4. Every 4-6 hours:  If on nasal continuous positive airway pressure, respiratory therapy assess nares and determine need for appliance change or resting period. 6/14/2022 0104 by Kwasi Power RN  Outcome: Progressing  Note: No skin breakdown this shift. Patient being assisted with turning. Patients states understanding of repositioning every two hours.    6/13/2022 1425 by Kristopher Hunter RN  Outcome: Progressing     Problem: Musculoskeletal - Adult  Goal: Return mobility to safest level of function  6/14/2022 0104 by Kwasi Power RN  Outcome: Progressing  Flowsheets (Taken 6/13/2022 1425 by Kristopher Hunter RN)  Return Mobility to Safest Level of Function:   Assess patient stability and activity tolerance for standing, transferring and ambulating with or without assistive devices   Assist with transfers and ambulation using safe patient handling equipment as needed   Obtain physical therapy/occupational therapy consults as needed  6/13/2022 1425 by Kristopher Hunter RN  Outcome: Progressing  Flowsheets (Taken 6/13/2022 1425)  Return Mobility to Safest Level of Function:   Assess patient stability and activity tolerance for standing, transferring and ambulating with or without assistive devices   Assist with transfers and ambulation using safe patient handling equipment as needed   Obtain physical therapy/occupational therapy consults as needed     Problem: Skin/Tissue Integrity - Adult  Goal: Incisions, wounds, or drain sites healing without S/S of infection  6/14/2022 0104 by Jyoti Jaimes RN  Outcome: Progressing  Flowsheets (Taken 6/14/2022 0104)  Incisions, Wounds, or Drain Sites Healing Without Sign and Symptoms of Infection: TWICE DAILY: Assess and document dressing/incision, wound bed, drain sites and surrounding tissue  6/13/2022 1425 by Remi Cordero RN  Outcome: Progressing  Flowsheets (Taken 6/13/2022 1425)  Incisions, Wounds, or Drain Sites Healing Without Sign and Symptoms of Infection: TWICE DAILY: Assess and document skin integrity     Problem: Infection - Adult  Goal: Absence of infection at discharge  6/14/2022 0104 by Jyoti Jaimes RN  Outcome: Progressing  Flowsheets (Taken 6/13/2022 1425 by Remi Cordero RN)  Absence of infection at discharge:   Assess and monitor for signs and symptoms of infection   Monitor lab/diagnostic results  6/13/2022 1425 by Remi Cordero RN  Outcome: Progressing  Flowsheets (Taken 6/13/2022 1425)  Absence of infection at discharge:   Assess and monitor for signs and symptoms of infection   Monitor lab/diagnostic results  Goal: Absence of infection during hospitalization  6/14/2022 0104 by Jyoti Jaimes RN  Outcome: Progressing  Flowsheets (Taken 6/13/2022 1425 by Remi Cordero RN)  Absence of infection during hospitalization:   Assess and monitor for signs and symptoms of infection   Monitor lab/diagnostic results  6/13/2022 1425 by Remi Cordero RN  Outcome: Progressing  Flowsheets (Taken 6/13/2022 1425)  Absence of infection during hospitalization:   Assess and monitor for signs and symptoms of infection   Monitor lab/diagnostic results   Care plan reviewed with patient. Patient verbalizes understanding of the plan of care and contributes to goal setting.

## 2022-06-14 NOTE — PROGRESS NOTES
Orthopaedic Progress Note      SUBJECTIVE   Ms. Sushil Martinez is post op day # 2     Patient notes right knee infection staph, on PICC line per ID, [facility]  Noted flexion contracture of bilateral knees       OBJECTIVE      Physical    VITALS:  /65   Pulse 83   Temp 98.1 °F (36.7 °C) (Oral)   Resp 18   Ht 5' 7\" (1.702 m)   Wt 180 lb (81.6 kg)   SpO2 92%   BMI 28.19 kg/m²   I/O last 3 completed shifts:   In: 1760 [P.O.:1760]  Out: -       Dressing dry  Able to flex and ext toes and ankle  Knees flexed to 90 degrees       Data  CBC:   Lab Results   Component Value Date    WBC 6.1 06/13/2022    HGB 9.7 06/13/2022     06/13/2022     BMP:    Lab Results   Component Value Date     06/13/2022    K 3.7 06/13/2022    K 4.6 05/09/2022     06/13/2022    CO2 19 06/13/2022    BUN 9 06/13/2022    CREATININE 0.3 06/13/2022    CALCIUM 7.8 06/13/2022    GLUCOSE 91 06/13/2022    GLUCOSE 113 12/10/2020     Uric Acid:  No components found for: URIC  PT/INR:    Lab Results   Component Value Date    PROTIME 11.0 10/05/2016    INR 0.90 10/05/2016     Troponin:  No results found for: TROPONINI  Urine Culture:  No components found for: HADLEYINE      Current Inpatient Medications    Current Facility-Administered Medications: ceFAZolin (ANCEF) 2000 mg in dextrose 5 % 50 mL IVPB, 2,000 mg, IntraVENous, Q8H  morphine (PF) injection 2 mg, 2 mg, IntraVENous, Q2H PRN  cyclobenzaprine (FLEXERIL) tablet 10 mg, 10 mg, Oral, TID PRN  aspirin tablet 325 mg, 325 mg, Oral, Daily  magnesium sulfate 2000 mg in 50 mL IVPB premix, 2,000 mg, IntraVENous, PRN  insulin lispro (HUMALOG) injection vial 0-6 Units, 0-6 Units, SubCUTAneous, TID WC  insulin lispro (HUMALOG) injection vial 0-3 Units, 0-3 Units, SubCUTAneous, Nightly  glucose chewable tablet 16 g, 4 tablet, Oral, PRN  dextrose bolus 10% 125 mL, 125 mL, IntraVENous, PRN **OR** dextrose bolus 10% 250 mL, 250 mL, IntraVENous, PRN  glucagon (rDNA) injection 1 mg, 1 mg, IntraMUSCular, PRN  dextrose 5 % solution, 100 mL/hr, IntraVENous, PRN  potassium chloride (KLOR-CON M) extended release tablet 40 mEq, 40 mEq, Oral, PRN **OR** potassium bicarb-citric acid (EFFER-K) effervescent tablet 40 mEq, 40 mEq, Oral, PRN **OR** potassium chloride 10 mEq/100 mL IVPB (Peripheral Line), 10 mEq, IntraVENous, PRN  lactobacillus (CULTURELLE) capsule 1 capsule, 1 capsule, Oral, Daily with breakfast  0.9 % sodium chloride infusion, , IntraVENous, Continuous  oxyCODONE-acetaminophen (PERCOCET) 5-325 MG per tablet 1 tablet, 1 tablet, Oral, Q6H PRN **OR** oxyCODONE-acetaminophen (PERCOCET) 5-325 MG per tablet 2 tablet, 2 tablet, Oral, Q6H PRN  acetaminophen (TYLENOL) tablet 650 mg, 650 mg, Oral, Q4H PRN  atorvastatin (LIPITOR) tablet 40 mg, 40 mg, Oral, Nightly  doxepin (SINEQUAN) capsule 20 mg, 20 mg, Oral, Nightly  fenofibrate (TRIGLIDE) tablet 160 mg, 160 mg, Oral, Daily  losartan (COZAAR) tablet 25 mg, 25 mg, Oral, Daily  multivitamin 1 tablet, 1 tablet, Oral, Daily        PLAN    precert started for return to [facility]  PT and OT  Change dressing today

## 2022-06-14 NOTE — PROGRESS NOTES
Progress note: Infectious diseases    Patient - Wilmer Hall,  Age - 60 y.o.    - 1960      Room Number - 7K-14/014-A   MRN -  [id number]   Acct # - [de-identified]  Date of Admission -  6/10/2022  2:10 PM    SUBJECTIVE:   She has no new complaints  OBJECTIVE   VITALS    height is 5' 7\" (1.702 m) and weight is 180 lb (81.6 kg). Her oral temperature is 98.1 °F (36.7 °C). Her blood pressure is 125/65 and her pulse is 83. Her respiration is 18 and oxygen saturation is 92%.        Wt Readings from Last 3 Encounters:   06/10/22 180 lb (81.6 kg)   22 195 lb (88.5 kg)   22 195 lb (88.5 kg)       I/O (24 Hours)    Intake/Output Summary (Last 24 hours) at 2022 1169  Last data filed at 2022 0849  Gross per 24 hour   Intake 820 ml   Output --   Net 820 ml       General Appearance  Awake, alert, oriented,  not  In acute distress  HEENT - normocephalic, atraumatic, pink conjunctiva,  anicteric sclera  Neck - Supple, no mass  Lungs -  Bilateral good air entry, no rhonchi, no wheeze  Cardiovascular - Heart sounds are normal.     Abdomen - soft, not distended, nontender,   Neurologic -oriented  Skin - No bruising or bleeding  Extremities - No edema, no cyanosis, clubbing     MEDICATIONS:      ceFAZolin  2,000 mg IntraVENous Q8H    aspirin  325 mg Oral Daily    insulin lispro  0-6 Units SubCUTAneous TID     insulin lispro  0-3 Units SubCUTAneous Nightly    lactobacillus  1 capsule Oral Daily with breakfast    atorvastatin  40 mg Oral Nightly    doxepin  20 mg Oral Nightly    fenofibrate  160 mg Oral Daily    losartan  25 mg Oral Daily    multivitamin  1 tablet Oral Daily      dextrose      sodium chloride Stopped (22 1427)     morphine, cyclobenzaprine, magnesium sulfate, glucose, dextrose bolus **OR** dextrose bolus, glucagon (rDNA), dextrose, potassium chloride **OR** potassium alternative oral replacement **OR** potassium chloride, oxyCODONE-acetaminophen **OR** oxyCODONE-acetaminophen, acetaminophen      LABS:     CBC:   Recent Labs     06/13/22 0447   WBC 6.1   HGB 9.7*        BMP:    Recent Labs     06/12/22  1659 06/13/22  0447    136   K 4.0 3.7    106   CO2 21* 19*   BUN 11 9   CREATININE 0.3* 0.3*   GLUCOSE 148* 91     Calcium:  Recent Labs     06/13/22 0447   CALCIUM 7.8*     Ionized Calcium:No results for input(s): IONCA in the last 72 hours. Magnesium:  Recent Labs     06/12/22 1659   MG 1.3*     Phosphorus:No results for input(s): PHOS in the last 72 hours. BNP:No results for input(s): BNP in the last 72 hours. Glucose:  Recent Labs     06/13/22  1602 06/13/22 2009 06/14/22  0639   POCGLU 80 95 73        CULTURES:   UA: No results for input(s): SPECGRAV, PHUR, COLORU, CLARITYU, MUCUS, PROTEINU, BLOODU, RBCUA, WBCUA, BACTERIA, NITRU, GLUCOSEU, BILIRUBINUR, UROBILINOGEN, KETUA, LABCAST, LABCASTTY, AMORPHOS in the last 72 hours. Invalid input(s): CRYSTALS  Micro:   Lab Results   Component Value Date    BC No growth-preliminary  06/10/2022    BC No growth-preliminary  06/10/2022          Problem list of patient:     Patient Active Problem List   Diagnosis Code    Diabetes mellitus type 2, noninsulin dependent ([facility] [address] E11.9    Hyperlipemia E78.5    THORNTON (nonalcoholic steatohepatitis) K75.81    Obesity E66.9    Tobacco abuse Z72.0    Vitamin D deficiency E55.9    Seborrheic keratosis L82.1    Dyshidrotic eczema L30.1    Displaced articular fracture of head of right femur, sequela S72.061S    Neida-prosthetic supracondylar fracture of femur M97. 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 Infection with MSSA on ancef  Will arrange PICC line.  She will need 6 wks of iv antibiotic          Rock Parry MD, MD, FACP 6/14/2022 9:07 AM

## 2022-06-14 NOTE — PLAN OF CARE
Problem: Discharge Planning  Goal: Discharge to home or other facility with appropriate resources  6/14/2022 0956 by Alex Walsh RN  Outcome: Progressing  Discharge to home or other facility with appropriate resources:   Identify barriers to discharge with patient and caregiver   Identify discharge learning needs (meds, wound care, etc)  Note: Patient plans to return to [facility]. Pre-cert needed. SW on. Will be discharged with PICC and 6 weeks of antibiotics. Discharge planning ongoing. Problem: Pain  Goal: Verbalizes/displays adequate comfort level or baseline comfort level  6/14/2022 0956 by Alex Walsh RN  Outcome: Progressing  Verbalizes/displays adequate comfort level or baseline comfort level:   Assess pain using appropriate pain scale   Administer analgesics based on type and severity of pain and evaluate response   Encourage patient to monitor pain and request assistance  Note: Patient pain goal is 6/10. Patient reporting pain up to 9/10. PRN Percocet and morphine. Patient has been taking oral pain medication. Patient utilizes rest and repositioning for comfort. Pain assessment ongoing. Problem: Safety - Adult  Goal: Free from fall injury  6/14/2022 0956 by Alex Walsh RN  Outcome: Progressing  Flowsheets (Taken 6/14/2022 0951)  Free From Fall Injury: Instruct family/caregiver on patient safety  Note: Patient bed in lowest position, wheels locked, 2/4 side rails up and alarm on. Call light and belongings within reach. Pathway clear. Nonskid footwear on. Patient rounded on hourly. Fall risk assessment complete. Patient remains free from falls this shift. Fall risk due to pain medication and non weight bearing of L leg. Patient uses a slide board to transfer.         Problem: Chronic Conditions and Co-morbidities  Goal: Patient's chronic conditions and co-morbidity symptoms are monitored and maintained or improved  6/14/2022 0956 by Alex Walsh RN  Outcome: Progressing  Care Plan - Patient's Chronic Conditions and Co-Morbidity Symptoms are Monitored and Maintained or Improved: Monitor and assess patient's chronic conditions and comorbid symptoms for stability, deterioration, or improvement  Note: HX of hypertension. Taking home BP medication. VSS. Monitoring sugars and patient required no insulin coverage so far this shift. Problem: Skin/Tissue Integrity  Goal: Absence of new skin breakdown  Description: 1. Monitor for areas of redness and/or skin breakdown  2. Assess vascular access sites hourly  3. Every 4-6 hours minimum:  Change oxygen saturation probe site  4. Every 4-6 hours:  If on nasal continuous positive airway pressure, respiratory therapy assess nares and determine need for appliance change or resting period. 6/14/2022 0956 by Kayla Drake RN  Outcome: Progressing  Note: Patient repositions every 2 hours. Heels elevated off of bed. Skin kept clean and dry. Skin assessed with every head to toe. Librado scale complete. Surgery wrap present on L knee. Problem: Musculoskeletal - Adult  Goal: Return mobility to safest level of function  6/14/2022 0956 by Kayla Drake RN  Outcome: Progressing  Flowsheets (Taken 6/13/2022 1425 by Gomez Short RN)  Return Mobility to Safest Level of Function:   Assess patient stability and activity tolerance for standing, transferring and ambulating with or without assistive devices   Assist with transfers and ambulation using safe patient handling equipment as needed   Obtain physical therapy/occupational therapy consults as needed  Note: PT/OT working with patient. Non weight bearing on L leg. Using slide board to transfer. Tolerating well.       Problem: Skin/Tissue Integrity - Adult  Goal: Incisions, wounds, or drain sites healing without S/S of infection  6/14/2022 0956 by Kayla Drake RN  Outcome: Progressing  Flowsheets (Taken 6/14/2022 0952)  Incisions, Wounds, or Drain Sites Healing Without Sign and Symptoms of Infection:   TWICE DAILY: Assess and document skin integrity   Implement wound care per orders  Note: Patient had moderate amount of serosanguinous drainage from L knee. Reinforced dressing and reapplied ace wrap. Dressing remains intact. Infection present in knee, reason for admission. On IV vanc and ancef. Patient remains afebrile. VSS. Problem: Infection - Adult  Goal: Absence of infection at discharge  6/14/2022 0956 by Arlene Maria RN  Absence of infection at discharge:   Assess and monitor for signs and symptoms of infection   Monitor lab/diagnostic results  Note: Patient will be discharge on IV antibiotics. Staph in L knee. Care plan reviewed with patient and . Patient and  verbalize understanding of the plan of care and contribute to goal setting.

## 2022-06-14 NOTE — PROGRESS NOTES
Melisa served Dr. Rl Moss regarding discharge dosage of ancef. Social work is working on precert to return to [facility] and needed to know how many times daily patient would need ancef infusion at facility. Dr. Rl Moss stated ancef would be 3x daily. If facility is unable to accommodate that, it could be switched to Rocephin 2 grams daily. Updated social work.

## 2022-06-15 LAB
ANAEROBIC CULTURE: ABNORMAL
BODY FLUID CULTURE, STERILE: ABNORMAL
GLUCOSE BLD-MCNC: 108 MG/DL (ref 70–108)
GLUCOSE BLD-MCNC: 73 MG/DL (ref 70–108)
GLUCOSE BLD-MCNC: 81 MG/DL (ref 70–108)
GLUCOSE BLD-MCNC: 94 MG/DL (ref 70–108)
GRAM STAIN RESULT: ABNORMAL
ORGANISM: ABNORMAL

## 2022-06-15 PROCEDURE — 97110 THERAPEUTIC EXERCISES: CPT

## 2022-06-15 PROCEDURE — 2580000003 HC RX 258: Performed by: ORTHOPAEDIC SURGERY

## 2022-06-15 PROCEDURE — 97535 SELF CARE MNGMENT TRAINING: CPT

## 2022-06-15 PROCEDURE — 6370000000 HC RX 637 (ALT 250 FOR IP): Performed by: ORTHOPAEDIC SURGERY

## 2022-06-15 PROCEDURE — 1200000003 HC TELEMETRY R&B

## 2022-06-15 PROCEDURE — 6360000002 HC RX W HCPCS: Performed by: INTERNAL MEDICINE

## 2022-06-15 PROCEDURE — 2580000003 HC RX 258: Performed by: INTERNAL MEDICINE

## 2022-06-15 PROCEDURE — 97530 THERAPEUTIC ACTIVITIES: CPT

## 2022-06-15 PROCEDURE — 82948 REAGENT STRIP/BLOOD GLUCOSE: CPT

## 2022-06-15 RX ORDER — RIVAROXABAN 10 MG/1
10 TABLET, FILM COATED ORAL EVERY 24 HOURS
Qty: 18 TABLET | Refills: 0 | Status: SHIPPED | OUTPATIENT
Start: 2022-06-15

## 2022-06-15 RX ORDER — OXYCODONE HYDROCHLORIDE AND ACETAMINOPHEN 5; 325 MG/1; MG/1
1 TABLET ORAL EVERY 4 HOURS PRN
Qty: 80 TABLET | Refills: 0 | Status: SHIPPED | OUTPATIENT
Start: 2022-06-15 | End: 2022-06-29

## 2022-06-15 RX ADMIN — OXYCODONE AND ACETAMINOPHEN 2 TABLET: 5; 325 TABLET ORAL at 15:04

## 2022-06-15 RX ADMIN — SODIUM CHLORIDE: 9 INJECTION, SOLUTION INTRAVENOUS at 20:07

## 2022-06-15 RX ADMIN — OXYCODONE AND ACETAMINOPHEN 2 TABLET: 5; 325 TABLET ORAL at 03:42

## 2022-06-15 RX ADMIN — LOSARTAN POTASSIUM 25 MG: 25 TABLET, FILM COATED ORAL at 08:35

## 2022-06-15 RX ADMIN — CEFAZOLIN 2000 MG: 10 INJECTION, POWDER, FOR SOLUTION INTRAVENOUS at 20:04

## 2022-06-15 RX ADMIN — CEFAZOLIN 2000 MG: 10 INJECTION, POWDER, FOR SOLUTION INTRAVENOUS at 03:41

## 2022-06-15 RX ADMIN — FENOFIBRATE 160 MG: 160 TABLET ORAL at 08:35

## 2022-06-15 RX ADMIN — Medication 1 CAPSULE: at 08:35

## 2022-06-15 RX ADMIN — CEFAZOLIN 2000 MG: 10 INJECTION, POWDER, FOR SOLUTION INTRAVENOUS at 12:55

## 2022-06-15 RX ADMIN — CYCLOBENZAPRINE 10 MG: 10 TABLET, FILM COATED ORAL at 15:04

## 2022-06-15 RX ADMIN — CYCLOBENZAPRINE 10 MG: 10 TABLET, FILM COATED ORAL at 08:35

## 2022-06-15 RX ADMIN — ASPIRIN 325 MG: 325 TABLET ORAL at 08:35

## 2022-06-15 RX ADMIN — DOXEPIN HYDROCHLORIDE 20 MG: 10 CAPSULE ORAL at 20:01

## 2022-06-15 RX ADMIN — ATORVASTATIN CALCIUM 40 MG: 40 TABLET, FILM COATED ORAL at 20:01

## 2022-06-15 ASSESSMENT — PAIN - FUNCTIONAL ASSESSMENT
PAIN_FUNCTIONAL_ASSESSMENT: ACTIVITIES ARE NOT PREVENTED
PAIN_FUNCTIONAL_ASSESSMENT: ACTIVITIES ARE NOT PREVENTED
PAIN_FUNCTIONAL_ASSESSMENT: PREVENTS OR INTERFERES SOME ACTIVE ACTIVITIES AND ADLS
PAIN_FUNCTIONAL_ASSESSMENT: ACTIVITIES ARE NOT PREVENTED

## 2022-06-15 ASSESSMENT — PAIN SCALES - GENERAL
PAINLEVEL_OUTOF10: 0
PAINLEVEL_OUTOF10: 6
PAINLEVEL_OUTOF10: 7
PAINLEVEL_OUTOF10: 8
PAINLEVEL_OUTOF10: 8
PAINLEVEL_OUTOF10: 0
PAINLEVEL_OUTOF10: 0
PAINLEVEL_OUTOF10: 6
PAINLEVEL_OUTOF10: 2
PAINLEVEL_OUTOF10: 0
PAINLEVEL_OUTOF10: 0
PAINLEVEL_OUTOF10: 8
PAINLEVEL_OUTOF10: 0
PAINLEVEL_OUTOF10: 8

## 2022-06-15 ASSESSMENT — PAIN DESCRIPTION - LOCATION
LOCATION: KNEE
LOCATION: LEG
LOCATION: LEG
LOCATION: KNEE

## 2022-06-15 ASSESSMENT — PAIN DESCRIPTION - DESCRIPTORS
DESCRIPTORS: ACHING;BURNING;CRAMPING
DESCRIPTORS: ACHING
DESCRIPTORS: ACHING;BURNING;CRAMPING
DESCRIPTORS: ACHING;BURNING
DESCRIPTORS: ACHING;BURNING;CRAMPING
DESCRIPTORS: ACHING

## 2022-06-15 ASSESSMENT — PAIN DESCRIPTION - ORIENTATION
ORIENTATION: RIGHT
ORIENTATION: LEFT
ORIENTATION: LEFT
ORIENTATION: RIGHT;LEFT
ORIENTATION: LEFT
ORIENTATION: LEFT

## 2022-06-15 ASSESSMENT — PAIN DESCRIPTION - PAIN TYPE
TYPE: ACUTE PAIN
TYPE: ACUTE PAIN
TYPE: SURGICAL PAIN
TYPE: ACUTE PAIN

## 2022-06-15 ASSESSMENT — PAIN DESCRIPTION - FREQUENCY
FREQUENCY: INTERMITTENT
FREQUENCY: INTERMITTENT
FREQUENCY: CONTINUOUS

## 2022-06-15 ASSESSMENT — PAIN DESCRIPTION - ONSET
ONSET: GRADUAL
ONSET: ON-GOING

## 2022-06-15 NOTE — PROGRESS NOTES
[address] [address]  INPATIENT PHYSICAL THERAPY  DAILY NOTE  [facility] ORTHOPEDICS 7K - 7K-14/014-A    Time In: 0103  Time Out: 2915  Timed Code Treatment Minutes: 32 Minutes  Minutes: 26          Date: 6/15/2022  Patient Name: Chanel Barlow,  Gender:  female        MRN: [id number]  : 1960  (60 y.o.)     Referring Practitioner: Pablo Vincent MD  Diagnosis: infection of R total knee replacement  Additional Pertinent Hx: Per EMR \"The patient is a 60 y.o. female who presents with pain and increased drainage from her right knee incision. She is about 4 weeks out from her revision total knee arthroplasty after periprosthetic femur fracture. Patient states that the wound opened up a bit when they took out the staples. It has continued to drain from the wound and increased drainage and pain in the past 3-4 days. She is unsure of any fevers, denies chills. She is currently in a nursing home for rehab and is working in therapy. Wearing knee immobilizer as instructed. We were consulted for rule out of septic knee. Culture swabs from the wound were done by ED staff. \" Pt is s/p Arthrotomy right knee for infection as well as polyethylene change completed by Dr. Rochelle Knpap on . Prior Level of Function:  Lives With: Spouse  Type of Home: House  Home Layout: One level  Home Access: Stairs to enter with rails  Entrance Stairs - Number of Steps: 3 FABIO  Entrance Stairs - Rails: Left  Home Equipment: Grab bars,Cane,Walker, rolling   Bathroom Shower/Tub: Tub/Shower unit  Bathroom Toilet: Handicap height  Bathroom Equipment: Shower chair    ADL Assistance: Independent  Homemaking Assistance: Needs assistance  Ambulation Assistance: Independent  Transfer Assistance: Independent  Active : Yes  Additional Comments:  completes cooking and cleaning tasks. Pt amb with [facility] prior.  Pt has recently been at [facility] for therapy for the last 2 weeks, plans to return home.    Restrictions/Precautions:  Restrictions/Precautions: General Precautions,Fall Risk,Weight Bearing  Right Lower Extremity Weight Bearing: Non Weight Bearing  Position Activity Restriction  Other position/activity restrictions: limited L knee extension-contracted in flexion     SUBJECTIVE: RN approved session. Patient in bed side lying upon arrival and agreeable to therapy. PAIN: Pt stated that pain has been better and she was able to get some sleep last night. During transfer pt reported pain in left quad. Vitals: Vitals not assessed per clinical judgement, see nursing flowsheet    OBJECTIVE:  Bed Mobility:  Supine to Sit: Moderate Assistance, X 1, with head of bed raised, with rail, with verbal cues , with increased time for completion  *Mod A needed for trunk and multiple verbal cues for LE positioning  Scooting: [employer], with increased time for completion    Transfers:  Slide Board:Minimal Assistance   *Assistance needed for postioning of slide board as well as WC  *Pt able to maintain WB status without cues  *While transferring pt reported quad pain in left  *Pt performed multiple slow scoots with transfer    Balance:  Static Sitting Balance:  Supervision, ~4 min prior to transfer  Dynamic Sitting Balance: Stand By Assistance, ~ 4 min while performing exercises    Exercise:  Patient was guided in 1 set(s) 12 reps of exercise to both lower extremities. Seated marches, Seated heel/toe raises, Long arc quads to tolerance and Seated isometric hip adduction. Exercises were completed for increased independence with functional mobility. Functional Outcome Measures: Completed  AM-PAC Inpatient Mobility Raw Score : 11  AM-PAC Inpatient T-Scale Score : 33.86    ASSESSMENT:  Assessment: Patient progressing toward established goals. Activity Tolerance:  Patient tolerance of  treatment: good.       Equipment Recommendations:Equipment Needed: No  Discharge Recommendations: Subacte/Skilled Nursing Facility  Plan: Specific Instructions for Next Treatment: slide board transfer  Current Treatment Recommendations: Strengthening,Balance training,Functional mobility training,Equipment evaluation, education, & procurement,Neuromuscular re-education,Endurance training,Patient/Caregiver education & training,Therapeutic activities,Safety education & training,Home exercise program  Plan:  (6x O)  Specific Instructions for Next Treatment: slide board transfer    Patient Education  Patient Education: Plan of Care, Bed Mobility, Transfers, Up in Chair for All Meals, Verbal Exercise Instruction    Goals:  Patient goals : return home eventually  Short Term Goals  Time Frame for Short term goals: by discharge  Short term goal 1: Pt will demo IND with bed mobility tasks with bed flat to progress with overall mobility. Short term goal 2: Pt will tolerate 10 min sitting on EOB with IND to prepare for mobility. Short term goal 3: Pt will tolerate 10-20 reps of ther ex to increase overall mobility. Short term goal 4: Pt to complete slide board transfer from bed <-> w/c with CGA while maintaining R LE NWB for improved mobility in her environment  Long Term Goals  Time Frame for Long term goals : NA due to short ELOS    Following session, patient left in safe position with all fall risk precautions in place.

## 2022-06-15 NOTE — PLAN OF CARE
Problem: Discharge Planning  Goal: Discharge to home or other facility with appropriate resources  Outcome: Progressing  Flowsheets  Taken 6/15/2022 0830 by Jaime Ramírez RN  Discharge to home or other facility with appropriate resources:   Identify barriers to discharge with patient and caregiver   Arrange for needed discharge resources and transportation as appropriate   Identify discharge learning needs (meds, wound care, etc)   Arrange for interpreters to assist at discharge as needed   Refer to discharge planning if patient needs post-hospital services based on physician order or complex needs related to functional status, cognitive ability or social support system  Taken 6/15/2022 0800 by Ced Fischer  Discharge to home or other facility with appropriate resources:   Identify barriers to discharge with patient and caregiver   Arrange for needed discharge resources and transportation as appropriate  Note: Barriers identified and discussed with pt. No  needed at this time. Problem: Pain  Goal: Verbalizes/displays adequate comfort level or baseline comfort level  Outcome: Progressing  Flowsheets  Taken 6/15/2022 1200  Verbalizes/displays adequate comfort level or baseline comfort level:   Encourage patient to monitor pain and request assistance   Assess pain using appropriate pain scale  Taken 6/15/2022 0800  Verbalizes/displays adequate comfort level or baseline comfort level:   Assess pain using appropriate pain scale   Encourage patient to monitor pain and request assistance   Administer analgesics based on type and severity of pain and evaluate response   Implement non-pharmacological measures as appropriate and evaluate response   Pain assessed using pain scale, pt denies pain at this time. Non pharmalogical pain control method utilized pt turned and repositioned.    Problem: Safety - Adult  Goal: Free from fall injury  Outcome: Progressing   Educated pt on use of call light, pt complies. Pt calls for help and does not attempt to get up on own. Problem: Chronic Conditions and Co-morbidities  Goal: Patient's chronic conditions and co-morbidity symptoms are monitored and maintained or improved  Outcome: Progressing  Flowsheets  Taken 6/15/2022 0830 by Henri Dougherty RN  Care Plan - Patient's Chronic Conditions and Co-Morbidity Symptoms are Monitored and Maintained or Improved:   Monitor and assess patient's chronic conditions and comorbid symptoms for stability, deterioration, or improvement   Collaborate with multidisciplinary team to address chronic and comorbid conditions and prevent exacerbation or deterioration   Update acute care plan with appropriate goals if chronic or comorbid symptoms are exacerbated and prevent overall improvement and discharge  Taken 6/15/2022 0800 by Sherice Crocker 31 - Patient's Chronic Conditions and Co-Morbidity Symptoms are Monitored and Maintained or Improved: Monitor and assess patient's chronic conditions and comorbid symptoms for stability, deterioration, or improvement     Problem: Skin/Tissue Integrity  Goal: Absence of new skin breakdown  Description: 1. Monitor for areas of redness and/or skin breakdown  2. Assess vascular access sites hourly  3. Every 4-6 hours minimum:  Change oxygen saturation probe site  4. Every 4-6 hours:  If on nasal continuous positive airway pressure, respiratory therapy assess nares and determine need for appliance change or resting period.   Outcome: Progressing     Problem: Musculoskeletal - Adult  Goal: Return mobility to safest level of function  Outcome: Progressing  Flowsheets  Taken 6/15/2022 0830 by Henri Dougherty RN  Return Mobility to Safest Level of Function:   Assess patient stability and activity tolerance for standing, transferring and ambulating with or without assistive devices   Assist with transfers and ambulation using safe patient handling equipment as needed   Ensure adequate protection for wounds/incisions during mobilization   Obtain physical therapy/occupational therapy consults as needed   Instruct patient/family in ordered activity level  Taken 6/15/2022 0800 by Jenise Zhao  Return Mobility to Safest Level of Function:   Assess patient stability and activity tolerance for standing, transferring and ambulating with or without assistive devices   Assist with transfers and ambulation using safe patient handling equipment as needed  Goal: Maintain proper alignment of affected body part  Outcome: Progressing  Flowsheets  Taken 6/15/2022 0830 by Marnie Deras RN  Maintain proper alignment of affected body part:   Support and protect limb and body alignment per provider's orders   Instruct and reinforce with patient and family use of appropriate assistive device and precautions (e.g. spinal or hip dislocation precautions)  Taken 6/15/2022 0800 by Jenise Zhao  Maintain proper alignment of affected body part: Support and protect limb and body alignment per provider's orders     Problem: Skin/Tissue Integrity - Adult  Goal: Incisions, wounds, or drain sites healing without S/S of infection  Outcome: Progressing  Flowsheets  Taken 6/15/2022 0830 by Marnie Deras RN  Incisions, Wounds, or Drain Sites Healing Without Sign and Symptoms of Infection:   ADMISSION and DAILY: Assess and document risk factors for pressure ulcer development   TWICE DAILY: Assess and document skin integrity   TWICE DAILY: Assess and document dressing/incision, wound bed, drain sites and surrounding tissue   Implement wound care per orders   Initiate isolation precautions as appropriate   Initiate pressure ulcer prevention bundle as indicated  Taken 6/15/2022 0800 by Jenise Zhao  Incisions, Wounds, or Drain Sites Healing Without Sign and Symptoms of Infection:   TWICE DAILY: Assess and document skin integrity   Implement wound care per orders     Problem: Infection - Adult  Goal: Absence of infection at discharge  Outcome: Progressing  Flowsheets  Taken 6/15/2022 0830 by Williams Caban RN  Absence of infection at discharge:   Assess and monitor for signs and symptoms of infection   Monitor lab/diagnostic results   Monitor all insertion sites i.e., indwelling lines, tubes and drains   Administer medications as ordered   Instruct and encourage patient and family to use good hand hygiene technique   Identify and instruct in appropriate isolation precautions for identified infection/condition  Taken 6/15/2022 0800 by Mimi Ryan  Absence of infection at discharge:   Assess and monitor for signs and symptoms of infection   Monitor lab/diagnostic results  Goal: Absence of infection during hospitalization  Outcome: Progressing  Flowsheets  Taken 6/15/2022 0830 by Williams Caban RN  Absence of infection during hospitalization:   Assess and monitor for signs and symptoms of infection   Monitor lab/diagnostic results   Monitor all insertion sites i.e., indwelling lines, tubes and drains   Administer medications as ordered   Instruct and encourage patient and family to use good hand hygiene technique   Identify and instruct in appropriate isolation precautions for identified infection/condition  Taken 6/15/2022 0800 by Mimi Ryan  Absence of infection during hospitalization:   Assess and monitor for signs and symptoms of infection   Monitor lab/diagnostic results

## 2022-06-15 NOTE — PROGRESS NOTES
Progress note: Infectious diseases    Patient - Joanie Montenegro,  Age - 60 y.o.    - 1960      Room Number - 7K-14/014-A   MRN -  [id number]   Acct # - [de-identified]  Date of Admission -  6/10/2022  2:10 PM    SUBJECTIVE:   No new complaints. Seen sitting on the chair. OBJECTIVE   VITALS    height is 5' 7\" (1.702 m) and weight is 180 lb (81.6 kg). Her oral temperature is 98.1 °F (36.7 °C). Her blood pressure is 153/67 (abnormal) and her pulse is 74. Her respiration is 18 and oxygen saturation is 93%.        Wt Readings from Last 3 Encounters:   06/10/22 180 lb (81.6 kg)   22 195 lb (88.5 kg)   22 195 lb (88.5 kg)       I/O (24 Hours)    Intake/Output Summary (Last 24 hours) at 6/15/2022 0744  Last data filed at 6/15/2022 3841  Gross per 24 hour   Intake 1463 ml   Output 200 ml   Net 1263 ml       General Appearance  Awake, alert, oriented,  not  In acute distress  HEENT - normocephalic, atraumatic, pink conjunctiva,  anicteric sclera  Neck - Supple, no mass  Lungs -  Bilateral good air entry, no rhonchi, no wheeze  Cardiovascular - Heart sounds are normal.     Abdomen - soft, not distended, nontender,   Neurologic -oriented  Skin - No bruising or bleeding  Extremities - dressed right knee, +edema on the feet    MEDICATIONS:      ceFAZolin  2,000 mg IntraVENous Q8H    aspirin  325 mg Oral Daily    insulin lispro  0-6 Units SubCUTAneous TID     insulin lispro  0-3 Units SubCUTAneous Nightly    lactobacillus  1 capsule Oral Daily with breakfast    atorvastatin  40 mg Oral Nightly    doxepin  20 mg Oral Nightly    fenofibrate  160 mg Oral Daily    losartan  25 mg Oral Daily    multivitamin  1 tablet Oral Daily      dextrose      sodium chloride Stopped (22 1427)     morphine, cyclobenzaprine, magnesium sulfate, glucose, dextrose bolus **OR** dextrose bolus, glucagon (rDNA), dextrose, potassium chloride **OR** potassium alternative oral replacement **OR** potassium chloride, oxyCODONE-acetaminophen **OR** oxyCODONE-acetaminophen, acetaminophen      LABS:     CBC:   Recent Labs     06/13/22  0447   WBC 6.1   HGB 9.7*        BMP:    Recent Labs     06/12/22  1659 06/13/22  0447    136   K 4.0 3.7    106   CO2 21* 19*   BUN 11 9   CREATININE 0.3* 0.3*   GLUCOSE 148* 91     Calcium:  Recent Labs     06/13/22  0447   CALCIUM 7.8*     Ionized Calcium:No results for input(s): IONCA in the last 72 hours. Magnesium:  Recent Labs     06/12/22  1659   MG 1.3*     Phosphorus:No results for input(s): PHOS in the last 72 hours. BNP:No results for input(s): BNP in the last 72 hours. Glucose:  Recent Labs     06/14/22  1553 06/14/22  2021 06/15/22  0616   POCGLU 119* 85 73        CULTURES:   UA: No results for input(s): SPECGRAV, PHUR, COLORU, CLARITYU, MUCUS, PROTEINU, BLOODU, RBCUA, WBCUA, BACTERIA, NITRU, GLUCOSEU, BILIRUBINUR, UROBILINOGEN, KETUA, LABCAST, LABCASTTY, AMORPHOS in the last 72 hours. Invalid input(s): CRYSTALS  Micro:   Lab Results   Component Value Date    BC No growth-preliminary  06/10/2022    BC No growth-preliminary  06/10/2022          Problem list of patient:     Patient Active Problem List   Diagnosis Code    Diabetes mellitus type 2, noninsulin dependent ([facility] [address] E11.9    Hyperlipemia E78.5    THORNTON (nonalcoholic steatohepatitis) K75.81    Obesity E66.9    Tobacco abuse Z72.0    Vitamin D deficiency E55.9    Seborrheic keratosis L82.1    Dyshidrotic eczema L30.1    Displaced articular fracture of head of right femur, sequela S72.061S    Neida-prosthetic supracondylar fracture of femur M97. 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   Infection with MSSA on ancef  Will need 6 wks of iv antibiotic  Weekly cbc BMP CRP while on iv antibiotic      Daniel Freire MD, MD, FACP 6/15/2022 7:44 AM

## 2022-06-15 NOTE — PROGRESS NOTES
Physician Progress Note      Tal Hunter  CSN #:                  [id number]  :                       1960  ADMIT DATE:       6/10/2022 2:10 PM  [address] [place] DATE:  RESPONDING  PROVIDER #:        Kar Kelly MD          QUERY TEXT:    Dr. Montez Jimenez,    Pt admitted with infection of right total knee replacement. Pt noted to have   T-max 100.2, WBC 15.0, CRP 27.00, lactic acidosis 2.8, sed rate 49. If   possible, please respond below and document in the progress notes and   discharge summary if you are evaluating and /or treating any of the following: The medical record reflects the following:  Risk Factors: infection of right total knee replacement, DM 2, age 60  Clinical Indicators: T-max 100.2, WBC 15.0, CRP 27.00, lactic acidosis 2.8,   sed rate 49  Treatment: IV ATBs, IVF, Tylenol, surgery, Hospitalist & ID consults    Thank you! Sandie Ellis RN, BSN, RHIT, CCDS  Clinical   Options provided:  -- Sepsis, present on admission  -- Sepsis was ruled out  -- Other - I will add my own diagnosis  -- Disagree - Not applicable / Not valid  -- Disagree - Clinically unable to determine / Unknown  -- Refer to Clinical Documentation Reviewer    PROVIDER RESPONSE TEXT:    This patient has sepsis which was present on admission.     Query created by: Jenise Villalobos on 6/15/2022 10:50 AM      Electronically signed by:  Kar Kelly MD 6/15/2022 11:04 AM

## 2022-06-15 NOTE — CARE COORDINATION
6/15/22, 1:24 PM EDT    DISCHARGE ON GOING Karely Gamez 58 day: 5  Location: -14/014-A Reason for admit: Infection of total right knee replacement, initial encounter [facility]) Ashok Garcia  History of total knee replacement, right [Z96.651]  Infection of left knee ([address] [M00.9]   Procedure: 6/12 Arthrotomy right knee for infection as well as polyethylene change  Barriers to Discharge: IV antibiotics, PT/OT, precert. ID following cultures. Patient Goals/Plan/Treatment Preferences: plans return to [facility]; will be precert.     Weekly cbc BMP CRP while on iv antibiotic at [facility]

## 2022-06-15 NOTE — PLAN OF CARE
Problem: Discharge Planning  Goal: Discharge to home or other facility with appropriate resources  Outcome: Progressing  Flowsheets (Taken 6/13/2022 1425 by Kodi Irizarry RN)  Discharge to home or other facility with appropriate resources:   Identify barriers to discharge with patient and caregiver   Identify discharge learning needs (meds, wound care, etc)     Problem: Safety - Adult  Goal: Free from fall injury  Outcome: Progressing  Flowsheets (Taken 6/14/2022 0951 by Aguila San RN)  Free From Fall Injury: Instruct family/caregiver on patient safety     Problem: Chronic Conditions and Co-morbidities  Goal: Patient's chronic conditions and co-morbidity symptoms are monitored and maintained or improved  Outcome: Progressing  Flowsheets (Taken 6/13/2022 1425 by Kodi Irizarry RN)  Care Plan - Patient's Chronic Conditions and Co-Morbidity Symptoms are Monitored and Maintained or Improved: Monitor and assess patient's chronic conditions and comorbid symptoms for stability, deterioration, or improvement     Problem: Skin/Tissue Integrity  Goal: Absence of new skin breakdown  Description: 1. Monitor for areas of redness and/or skin breakdown  2. Assess vascular access sites hourly  3. Every 4-6 hours minimum:  Change oxygen saturation probe site  4. Every 4-6 hours:  If on nasal continuous positive airway pressure, respiratory therapy assess nares and determine need for appliance change or resting period. Outcome: Progressing  Note: No skin breakdown this shift. Patient being assisted with turning. Patients states understanding of repositioning every two hours.       Problem: Musculoskeletal - Adult  Goal: Return mobility to safest level of function  Outcome: Progressing  Flowsheets (Taken 6/13/2022 1425 by Kodi Irizarry RN)  Return Mobility to Safest Level of Function:   Assess patient stability and activity tolerance for standing, transferring and ambulating with or without assistive devices   Assist with transfers and ambulation using safe patient handling equipment as needed   Obtain physical therapy/occupational therapy consults as needed  Goal: Maintain proper alignment of affected body part  Outcome: Progressing  Flowsheets (Taken 6/15/2022 0014)  Maintain proper alignment of affected body part: Support and protect limb and body alignment per provider's orders     Problem: Skin/Tissue Integrity - Adult  Goal: Incisions, wounds, or drain sites healing without S/S of infection  Outcome: Progressing  Flowsheets (Taken 6/15/2022 0014)  Incisions, Wounds, or Drain Sites Healing Without Sign and Symptoms of Infection: TWICE DAILY: Assess and document dressing/incision, wound bed, drain sites and surrounding tissue     Problem: Infection - Adult  Goal: Absence of infection at discharge  Outcome: Progressing  Flowsheets (Taken 6/13/2022 1425 by Bernardo Carranza RN)  Absence of infection at discharge:   Assess and monitor for signs and symptoms of infection   Monitor lab/diagnostic results  Goal: Absence of infection during hospitalization  Outcome: Progressing  Flowsheets (Taken 6/13/2022 1425 by Bernardo Carranza RN)  Absence of infection during hospitalization:   Assess and monitor for signs and symptoms of infection   Monitor lab/diagnostic results   Care plan reviewed with patient. Patient verbalizes understanding of the plan of care and contributes to goal setting.

## 2022-06-15 NOTE — PROGRESS NOTES
[facility]  Occupational Therapy  Daily Note  Time:   Time In:   Time Out:   Timed Code Treatment Minutes: 32 Minutes  Minutes: 27          Date: 6/15/2022  Patient Name: Stephen Carrasco,   Gender: female      [facility]  MRN: [id number]  : 1960  (60 y.o.)  Referring Practitioner: Deven Rivas MD  Diagnosis: infection of total right knee replacement  Additional Pertinent Hx: Pt is a 60 y.o. female s/p Arthrotomy right knee for infection as well as polyethylene change    Restrictions/Precautions:  Restrictions/Precautions: General Precautions,Fall Risk,Weight Bearing  Right Lower Extremity Weight Bearing: Non Weight Bearing  Position Activity Restriction  Other position/activity restrictions: limited L knee extension-contracted in flexion     SUBJECTIVE: RN okayed OT treatment. Pt. In room and agreeable to participate. Coordinated with PTA in preparation to assist Pt. Back to bed from w/c with the slide board. Pt. Incontinent of stool in brief upon entering room. PAIN: 10/10: RLE with bed mobility    Vitals: Vitals not assessed per clinical judgement, see nursing flowsheet    COGNITION: Decreased Insight and Decreased Safety Awareness    ADL:   Grooming: Stand By Assistance and with set-up.  to comb hair  Toileting: Dependent. incontinent of stool, franklin care complete while Pt. in bed. BALANCE:  Sitting Balance:  Stand By Assistance. BED MOBILITY:  Rolling to Left: Minimal Assistance, with rail, with verbal cues     Rolling to Right: Minimal Assistance, with rail, with verbal cues     Sit to Supine: Maximum Assistance, with verbal cues     Scooting: Dependent hercules    TRANSFERS:  Sliding Board: Moderate Assistance, cues for hand placement, with verbal cues.  from w/c to EOB with muliple scoots and max/mod A towards end required while maintaining NWB on RLE    ADDITIONAL ACTIVITIES:  Pt completed B UE exs with light resistance band x 12 reps, x 1 set, with good tolerance of exs, with  RBs throughout, and visual and verbal cues for tech with resistance band to improve strength and overall activity tolerance to support basic ADls. ASSESSMENT:     Activity Tolerance:  Patient tolerance of  treatment: fair. Discharge Recommendations: Subacute/skilled nursing facility  Equipment Recommendations: Equipment Needed: Yes  Other: continue to assess needs - drop arm commode  Plan: Times per Week: 6x  Times per Day: Daily  Current Treatment Recommendations: Strengthening,Balance training,Functional mobility training,Endurance training,Safety education & training,Patient/Caregiver education & training,Self-Care / ADL    Patient Education  Patient Education: ADL's, Home Exercise Program, Precautions and Equipment Education and safety with functional transfers. Goals  Short Term Goals  Time Frame for Short term goals: by discharge  Short Term Goal 1: Pt will complete slide board t/fs to/from [facility] with no > than Min A and 0-2 VC for maintaining NWB to RLE for increased indep with toileting  Short Term Goal 2: Pt will complete LB ADLs with Min A and AE prn for increased indep with dressing  Short Term Goal 3: Pt will complete BADL routine while seated unsupported with no > than Min A for increased indep with self care    Following session, patient left in safe position with all fall risk precautions in place.

## 2022-06-15 NOTE — DISCHARGE SUMMARY
Physician Discharge Summary     Patient ID:  Nura Beck  [id number]  60 y.o.  1960    Admit date: 6/10/2022    Discharge date and time: No discharge date for patient encounter. Admitting Physician: Aracelis Block MD     Discharge Physician: Aracelis Block MD    Admission Diagnoses: Infection of total right knee replacement, initial encounter [facility]) Yamel Contreras  History of total knee replacement, right [Z96.651]  Infection of left knee [facility]) [M00.9]    Discharge Diagnoses: Infection of total right [facility] replacement[facility] Yamel [facility]  History of total knee replacement, right [Z96.651]  Infection of left knee [facility]) [M00.9]    Hospital Course: patient noted to have infection of right total knee. Underwent arthrotomy and polyethylene change DOS 6/12/22. Patient had original total knee 6 weeks ago, then had a fall sustaining periprosthetic femur fracture. Patient then underwent revision total knee on 5/10/22. During this stay she has been started on IV abx per infectious disease and will continue IV for 6 weeks. Infection noted to be staph     Consults:  Infectious disease, internal medicine     Condition: stable     Treatments: arthrotomy, poly change 6/12/22, IV abx     Disposition: ECF    Patient Instructions:      Medication List      ASK your doctor about these medications    Acetaminophen 500 MG Caps     aspirin 81 MG tablet     atorvastatin 40 MG tablet  Commonly known as: LIPITOR  TAKE 1 TABLET NIGHTLY     baclofen 10 MG tablet  Commonly known as: LIORESAL  Take 1 tablet by mouth daily as needed (muscle spasms)     doxepin 10 MG capsule  Commonly known as: SINEQUAN  TAKE 2 CAPSULES NIGHTLY     fenofibrate 160 MG tablet  Commonly known as: TRIGLIDE  TAKE 1 TABLET DAILY     hydrocortisone 2.5 % cream  Apply topically 2 times daily.      KRILL OIL PO     losartan 25 MG tablet  Commonly known as: COZAAR  TAKE 1 TABLET DAILY     melatonin 3 MG Tabs tablet     metFORMIN 500 MG extended release tablet  Commonly known as: GLUCOPHAGE-XR  Take 1 tablet by mouth daily (with breakfast)     therapeutic multivitamin-minerals tablet     tiZANidine 2 MG tablet  Commonly known as: ZANAFLEX  Take 1 tablet by mouth 3 times daily as needed (muscle cramps)     Xarelto 10 MG Tabs tablet  Generic drug: rivaroxaban  Take 1 tablet by mouth every 24 hours          Activity: NWB RLE  Diet: regular  Wound Care: change dressing daily as needed.    Follow-up 3 weeks  Signed:  Ra Powell PA-C  6/15/2022  8:13 AM

## 2022-06-15 NOTE — CARE COORDINATION
6/15/22, 9:23 AM EDT    DISCHARGE PLANNING EVALUATION      Updated [facility] on antibiotic plan

## 2022-06-16 LAB
BLOOD CULTURE, ROUTINE: NORMAL
BLOOD CULTURE, ROUTINE: NORMAL
GLUCOSE BLD-MCNC: 105 MG/DL (ref 70–108)
GLUCOSE BLD-MCNC: 107 MG/DL (ref 70–108)
GLUCOSE BLD-MCNC: 80 MG/DL (ref 70–108)
GLUCOSE BLD-MCNC: 80 MG/DL (ref 70–108)

## 2022-06-16 PROCEDURE — 6370000000 HC RX 637 (ALT 250 FOR IP): Performed by: ORTHOPAEDIC SURGERY

## 2022-06-16 PROCEDURE — 2580000003 HC RX 258: Performed by: ORTHOPAEDIC SURGERY

## 2022-06-16 PROCEDURE — 97530 THERAPEUTIC ACTIVITIES: CPT

## 2022-06-16 PROCEDURE — 1200000003 HC TELEMETRY R&B

## 2022-06-16 PROCEDURE — 6360000002 HC RX W HCPCS: Performed by: INTERNAL MEDICINE

## 2022-06-16 PROCEDURE — 97535 SELF CARE MNGMENT TRAINING: CPT

## 2022-06-16 PROCEDURE — 97110 THERAPEUTIC EXERCISES: CPT

## 2022-06-16 PROCEDURE — 82948 REAGENT STRIP/BLOOD GLUCOSE: CPT

## 2022-06-16 PROCEDURE — 2580000003 HC RX 258: Performed by: INTERNAL MEDICINE

## 2022-06-16 RX ADMIN — OXYCODONE AND ACETAMINOPHEN 2 TABLET: 5; 325 TABLET ORAL at 23:05

## 2022-06-16 RX ADMIN — CEFAZOLIN 2000 MG: 10 INJECTION, POWDER, FOR SOLUTION INTRAVENOUS at 11:16

## 2022-06-16 RX ADMIN — ASPIRIN 325 MG: 325 TABLET ORAL at 08:16

## 2022-06-16 RX ADMIN — DOXEPIN HYDROCHLORIDE 20 MG: 10 CAPSULE ORAL at 20:08

## 2022-06-16 RX ADMIN — ATORVASTATIN CALCIUM 40 MG: 40 TABLET, FILM COATED ORAL at 20:08

## 2022-06-16 RX ADMIN — OXYCODONE AND ACETAMINOPHEN 2 TABLET: 5; 325 TABLET ORAL at 02:06

## 2022-06-16 RX ADMIN — SODIUM CHLORIDE: 9 INJECTION, SOLUTION INTRAVENOUS at 08:09

## 2022-06-16 RX ADMIN — FENOFIBRATE 160 MG: 160 TABLET ORAL at 08:16

## 2022-06-16 RX ADMIN — CYCLOBENZAPRINE 10 MG: 10 TABLET, FILM COATED ORAL at 00:30

## 2022-06-16 RX ADMIN — Medication 1 CAPSULE: at 08:16

## 2022-06-16 RX ADMIN — CYCLOBENZAPRINE 10 MG: 10 TABLET, FILM COATED ORAL at 23:05

## 2022-06-16 RX ADMIN — SODIUM CHLORIDE: 9 INJECTION, SOLUTION INTRAVENOUS at 20:07

## 2022-06-16 RX ADMIN — CEFAZOLIN 2000 MG: 10 INJECTION, POWDER, FOR SOLUTION INTRAVENOUS at 20:08

## 2022-06-16 RX ADMIN — LOSARTAN POTASSIUM 25 MG: 25 TABLET, FILM COATED ORAL at 08:16

## 2022-06-16 RX ADMIN — CEFAZOLIN 2000 MG: 10 INJECTION, POWDER, FOR SOLUTION INTRAVENOUS at 04:31

## 2022-06-16 RX ADMIN — Medication 1 TABLET: at 08:16

## 2022-06-16 RX ADMIN — ACETAMINOPHEN 650 MG: 325 TABLET ORAL at 00:29

## 2022-06-16 RX ADMIN — OXYCODONE AND ACETAMINOPHEN 1 TABLET: 5; 325 TABLET ORAL at 13:47

## 2022-06-16 ASSESSMENT — PAIN SCALES - GENERAL
PAINLEVEL_OUTOF10: 9
PAINLEVEL_OUTOF10: 6
PAINLEVEL_OUTOF10: 2
PAINLEVEL_OUTOF10: 2
PAINLEVEL_OUTOF10: 0
PAINLEVEL_OUTOF10: 4
PAINLEVEL_OUTOF10: 8
PAINLEVEL_OUTOF10: 2
PAINLEVEL_OUTOF10: 3
PAINLEVEL_OUTOF10: 2
PAINLEVEL_OUTOF10: 6
PAINLEVEL_OUTOF10: 2

## 2022-06-16 ASSESSMENT — PAIN DESCRIPTION - LOCATION
LOCATION: KNEE
LOCATION: LEG
LOCATION: KNEE

## 2022-06-16 ASSESSMENT — PAIN - FUNCTIONAL ASSESSMENT
PAIN_FUNCTIONAL_ASSESSMENT: PREVENTS OR INTERFERES SOME ACTIVE ACTIVITIES AND ADLS
PAIN_FUNCTIONAL_ASSESSMENT: ACTIVITIES ARE NOT PREVENTED
PAIN_FUNCTIONAL_ASSESSMENT: PREVENTS OR INTERFERES SOME ACTIVE ACTIVITIES AND ADLS

## 2022-06-16 ASSESSMENT — PAIN DESCRIPTION - ORIENTATION
ORIENTATION: RIGHT
ORIENTATION: RIGHT
ORIENTATION: RIGHT;LEFT
ORIENTATION: RIGHT;LEFT
ORIENTATION: RIGHT

## 2022-06-16 ASSESSMENT — PAIN DESCRIPTION - DESCRIPTORS
DESCRIPTORS: ACHING;DISCOMFORT;SORE
DESCRIPTORS: ACHING;DISCOMFORT
DESCRIPTORS: ACHING
DESCRIPTORS: ACHING

## 2022-06-16 ASSESSMENT — PAIN DESCRIPTION - PAIN TYPE
TYPE: SURGICAL PAIN
TYPE: SURGICAL PAIN

## 2022-06-16 ASSESSMENT — PAIN DESCRIPTION - ONSET
ONSET: ON-GOING
ONSET: ON-GOING

## 2022-06-16 ASSESSMENT — PAIN DESCRIPTION - FREQUENCY
FREQUENCY: CONTINUOUS
FREQUENCY: CONTINUOUS

## 2022-06-16 NOTE — PLAN OF CARE
Problem: Discharge Planning  Goal: Discharge to home or other facility with appropriate resources  Outcome: Progressing  Flowsheets (Taken 6/16/2022 1812)  Discharge to home or other facility with appropriate resources:   Identify barriers to discharge with patient and caregiver   Identify discharge learning needs (meds, wound care, etc)     Problem: Pain  Goal: Verbalizes/displays adequate comfort level or baseline comfort level  Outcome: Progressing  Flowsheets  Taken 6/16/2022 1812  Verbalizes/displays adequate comfort level or baseline comfort level:   Encourage patient to monitor pain and request assistance   Administer analgesics based on type and severity of pain and evaluate response   Assess pain using appropriate pain scale   Implement non-pharmacological measures as appropriate and evaluate response  Taken 6/16/2022 0802  Verbalizes/displays adequate comfort level or baseline comfort level: Encourage patient to monitor pain and request assistance     Problem: Safety - Adult  Goal: Free from fall injury  Outcome: Progressing  Flowsheets (Taken 6/16/2022 1812)  Free From Fall Injury: Instruct family/caregiver on patient safety     Problem: Musculoskeletal - Adult  Goal: Return mobility to safest level of function  Outcome: Progressing  Flowsheets (Taken 6/16/2022 1812)  Return Mobility to Safest Level of Function:   Assess patient stability and activity tolerance for standing, transferring and ambulating with or without assistive devices   Assist with transfers and ambulation using safe patient handling equipment as needed   Ensure adequate protection for wounds/incisions during mobilization   Obtain physical therapy/occupational therapy consults as needed     Problem: Infection - Adult  Goal: Absence of infection at discharge  Outcome: Progressing  Flowsheets (Taken 6/16/2022 1812)  Absence of infection at discharge:   Assess and monitor for signs and symptoms of infection   Monitor lab/diagnostic results   Monitor all insertion sites i.e., indwelling lines, tubes and drains     Problem: Skin/Tissue Integrity - Adult  Goal: Incisions, wounds, or drain sites healing without S/S of infection  Recent Flowsheet Documentation  Taken 6/16/2022 0802 by Tonia Araujo RN  Incisions, Wounds, or Drain Sites Healing Without Sign and Symptoms of Infection: TWICE DAILY: Assess and document skin integrity     Problem: Skin/Tissue Integrity - Adult  Goal: Incisions, wounds, or drain sites healing without S/S of infection  Recent Flowsheet Documentation  Taken 6/16/2022 0802 by Tonia Araujo RN  Incisions, Wounds, or Drain Sites Healing Without Sign and Symptoms of Infection: TWICE DAILY: Assess and document skin integrity

## 2022-06-16 NOTE — PROGRESS NOTES
[facility]  Occupational Therapy  Daily Note  Time:   Time In: 7121  Time Out: 1102  Timed Code Treatment Minutes: 33 Minutes  Minutes: 33          Date: 2022  Patient Name: Nilson Palma,   Gender: female      Room: [facility]14/014-A  MRN: [id number]  : 1960  (60 y.o.)  Referring Practitioner: Antonette Tate MD  Diagnosis: infection of total right knee replacement  Additional Pertinent Hx: Pt is a 60 y.o. female s/p Arthrotomy right knee for infection as well as polyethylene change    Restrictions/Precautions:  Restrictions/Precautions: General Precautions,Fall Risk,Weight Bearing  Right Lower Extremity Weight Bearing: Non Weight Bearing  Position Activity Restriction  Other position/activity restrictions: limited L knee extension-contracted in flexion     SUBJECTIVE: Pt sitting in w/c upon arrival, with pt agreeable to OT session, and pt noted to be heavily soiled post BM    PAIN: 0/10:     Vitals: Nurse checked vitals prior to session    COGNITION: Decreased Recall and Impaired Memory    ADL:   Bathing: Moderate Assistance. with pt able to complete UB, however required max A for LB due to incontience of bowel, with pt requiring to be washed up supine in bed due to NWB status  Upper Extremity Dressing: Minimal Assistance. with gown management  Toileting: Dependent. due to incontinence of bowel, and donning brief supine in bed. BED MOBILITY:  Rolling to Left: Minimal Assistance . Rolling to Right: Minimal Assistance . Sit to Supine: Minimal Assistance      TRANSFERS:  Sliding Board: Moderate Assistance. with small breaks from the w/c to the EOB    ASSESSMENT:     Activity Tolerance:  Patient tolerance of  treatment: fair.        Discharge Recommendations: Continue to assess pending progress  Equipment Recommendations: Equipment Needed: Yes  Other: continue to assess needs - drop arm commode  Plan: Times per Week: 6x  Times per Day: Daily  Current Treatment Recommendations: Strengthening,Balance training,Functional mobility training,Endurance training,Safety education & training,Patient/Caregiver education & training,Self-Care / ADL    Patient Education  Patient Education: Precautions    Goals  Short Term Goals  Time Frame for Short term goals: by discharge  Short Term Goal 1: Pt will complete slide board t/fs to/from [facility] with no > than Min A and 0-2 VC for maintaining NWB to RLE for increased indep with toileting  Short Term Goal 2: Pt will complete LB ADLs with Min A and AE prn for increased indep with dressing  Short Term Goal 3: Pt will complete BADL routine while seated unsupported with no > than Min A for increased indep with self care    Following session, patient left in safe position with all fall risk precautions in place.

## 2022-06-16 NOTE — PROGRESS NOTES
Progress note: Infectious diseases    Patient - Iftikhar Robles,  Age - 60 y.o.    - 1960      Room Number - 7K-14/014-A   MRN -  [id number]   Acct # - [de-identified]  Date of Admission -  6/10/2022  2:10 PM    SUBJECTIVE:   No new complaints. Has loose stool. OBJECTIVE   VITALS    height is 5' 7\" (1.702 m) and weight is 180 lb (81.6 kg). Her oral temperature is 98.3 °F (36.8 °C). Her blood pressure is 145/64 (abnormal) and her pulse is 79. Her respiration is 16 and oxygen saturation is 96%.        Wt Readings from Last 3 Encounters:   06/10/22 180 lb (81.6 kg)   22 195 lb (88.5 kg)   22 195 lb (88.5 kg)       I/O (24 Hours)    Intake/Output Summary (Last 24 hours) at 2022 1041  Last data filed at 2022 3148  Gross per 24 hour   Intake 1220 ml   Output 700 ml   Net 520 ml       General Appearance  Awake, alert, oriented,  not  In acute distress  HEENT - normocephalic, atraumatic, pink conjunctiva,  anicteric sclera  Neck - Supple, no mass  Lungs -  Bilateral  air entry, no rhonchi, no wheeze  Cardiovascular - Heart sounds are normal.     Abdomen - soft, not distended, nontender,   Neurologic -oriented  Skin - No bruising or bleeding  Extremities - dressed right knee,      MEDICATIONS:      ceFAZolin  2,000 mg IntraVENous Q8H    aspirin  325 mg Oral Daily    insulin lispro  0-6 Units SubCUTAneous TID WC    insulin lispro  0-3 Units SubCUTAneous Nightly    lactobacillus  1 capsule Oral Daily with breakfast    atorvastatin  40 mg Oral Nightly    doxepin  20 mg Oral Nightly    fenofibrate  160 mg Oral Daily    losartan  25 mg Oral Daily    multivitamin  1 tablet Oral Daily      dextrose      sodium chloride 100 mL/hr at 22 0809     morphine, cyclobenzaprine, magnesium sulfate, glucose, dextrose bolus **OR** dextrose bolus, glucagon (rDNA), dextrose, potassium chloride **OR** potassium alternative oral replacement **OR** potassium chloride, oxyCODONE-acetaminophen **OR** oxyCODONE-acetaminophen, acetaminophen      LABS:     CBC:   No results for input(s): WBC, HGB, PLT in the last 72 hours. BMP:    No results for input(s): NA, K, CL, CO2, BUN, CREATININE, GLUCOSE in the last 72 hours. Calcium:  No results for input(s): CALCIUM in the last 72 hours. Ionized Calcium:No results for input(s): IONCA in the last 72 hours. Magnesium:  No results for input(s): MG in the last 72 hours. Phosphorus:No results for input(s): PHOS in the last 72 hours. BNP:No results for input(s): BNP in the last 72 hours. Glucose:  Recent Labs     06/15/22  1522 06/15/22  1958 06/16/22  0628   POCGLU 94 108 80        CULTURES:   UA: No results for input(s): SPECGRAV, PHUR, COLORU, CLARITYU, MUCUS, PROTEINU, BLOODU, RBCUA, WBCUA, BACTERIA, NITRU, GLUCOSEU, BILIRUBINUR, UROBILINOGEN, KETUA, LABCAST, LABCASTTY, AMORPHOS in the last 72 hours. Invalid input(s): CRYSTALS  Micro:   Lab Results   Component Value Date    BC No growth-preliminary No growth  06/10/2022    BC No growth-preliminary No growth  06/10/2022          Problem list of patient:     Patient Active Problem List   Diagnosis Code    Diabetes mellitus type 2, noninsulin dependent ([facility] [address] E11.9    Hyperlipemia E78.5    THORNTON (nonalcoholic steatohepatitis) K75.81    Obesity E66.9    Tobacco abuse Z72.0    Vitamin D deficiency E55.9    Seborrheic keratosis L82.1    Dyshidrotic eczema L30.1    Displaced articular fracture of head of right femur, sequela S72.061S    Neida-prosthetic supracondylar fracture of femur M97. 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   Infection with MSSA on ancef  Will need 6 wks of iv antibiotic  Weekly cbc BMP CRP while on iv antibiotic  Awaiting placement   Follow up via virtual at the wound clinic      Dov Pathak MD, MD, [address] 6/16/2022 10:41 AM

## 2022-06-16 NOTE — PROGRESS NOTES
Orthopaedic Progress Note      SUBJECTIVE   Ms. Morgan Kaur is post op day # 4     Patient notes   Patient notes right knee infection staph, on PICC line per ID, NWB RLE   Noted flexion contracture of bilateral knees         OBJECTIVE      Physical    VITALS:  BP (!) 147/63   Pulse 74   Temp 97.8 °F (36.6 °C) (Oral)   Resp 16   Ht 5' 7\" (1.702 m)   Wt 180 lb (81.6 kg)   SpO2 94%   BMI 28.19 kg/m²   I/O last 3 completed shifts: In: 5890 [P.O.:1790]  Out: 200 [Urine:200]      Dressing dry and intact.  Changed yesterday   Able to flex and ext toes and ankle  Knees flexed to 90 degrees bilaterally       Data  CBC:   Lab Results   Component Value Date    WBC 6.1 06/13/2022    HGB 9.7 06/13/2022     06/13/2022     BMP:    Lab Results   Component Value Date     06/13/2022    K 3.7 06/13/2022    K 4.6 05/09/2022     06/13/2022    CO2 19 06/13/2022    BUN 9 06/13/2022    CREATININE 0.3 06/13/2022    CALCIUM 7.8 06/13/2022    GLUCOSE 91 06/13/2022    GLUCOSE 113 12/10/2020     Uric Acid:  No components found for: URIC  PT/INR:    Lab Results   Component Value Date    PROTIME 11.0 10/05/2016    INR 0.90 10/05/2016     Troponin:  No results found for: TROPONINI  Urine Culture:  No components found for: CURINE      Current Inpatient Medications    Current Facility-Administered Medications: ceFAZolin (ANCEF) 2000 mg in dextrose 5 % 50 mL IVPB, 2,000 mg, IntraVENous, Q8H  morphine (PF) injection 2 mg, 2 mg, IntraVENous, Q2H PRN  cyclobenzaprine (FLEXERIL) tablet 10 mg, 10 mg, Oral, TID PRN  aspirin tablet 325 mg, 325 mg, Oral, Daily  magnesium sulfate 2000 mg in 50 mL IVPB premix, 2,000 mg, IntraVENous, PRN  insulin lispro (HUMALOG) injection vial 0-6 Units, 0-6 Units, SubCUTAneous, TID WC  insulin lispro (HUMALOG) injection vial 0-3 Units, 0-3 Units, SubCUTAneous, Nightly  glucose chewable tablet 16 g, 4 tablet, Oral, PRN  dextrose bolus 10% 125 mL, 125 mL, IntraVENous, PRN **OR** dextrose bolus 10% 250 mL, 250 mL, IntraVENous, PRN  glucagon (rDNA) injection 1 mg, 1 mg, IntraMUSCular, PRN  dextrose 5 % solution, 100 mL/hr, IntraVENous, PRN  potassium chloride (KLOR-CON M) extended release tablet 40 mEq, 40 mEq, Oral, PRN **OR** potassium bicarb-citric acid (EFFER-K) effervescent tablet 40 mEq, 40 mEq, Oral, PRN **OR** potassium chloride 10 mEq/100 mL IVPB (Peripheral Line), 10 mEq, IntraVENous, PRN  lactobacillus (CULTURELLE) capsule 1 capsule, 1 capsule, Oral, Daily with breakfast  0.9 % sodium chloride infusion, , IntraVENous, Continuous  oxyCODONE-acetaminophen (PERCOCET) 5-325 MG per tablet 1 tablet, 1 tablet, Oral, Q6H PRN **OR** oxyCODONE-acetaminophen (PERCOCET) 5-325 MG per tablet 2 tablet, 2 tablet, Oral, Q6H PRN  acetaminophen (TYLENOL) tablet 650 mg, 650 mg, Oral, Q4H PRN  atorvastatin (LIPITOR) tablet 40 mg, 40 mg, Oral, Nightly  doxepin (SINEQUAN) capsule 20 mg, 20 mg, Oral, Nightly  fenofibrate (TRIGLIDE) tablet 160 mg, 160 mg, Oral, Daily  losartan (COZAAR) tablet 25 mg, 25 mg, Oral, Daily  multivitamin 1 tablet, 1 tablet, Oral, Daily        PLAN    Awaiting precert approval for leonard dee IV abx per ID    Harlan huber infection    PT and OT NWB RLE

## 2022-06-16 NOTE — PLAN OF CARE
Problem: Pain  Goal: Verbalizes/displays adequate comfort level or baseline comfort level  6/16/2022 0210 by Dave Pratt LPN  Outcome: Progressing  Flowsheets (Taken 6/15/2022 1600 by Hemal Nelson)  Verbalizes/displays adequate comfort level or baseline comfort level:   Encourage patient to monitor pain and request assistance   Assess pain using appropriate pain scale   Problem: Safety - Adult  Goal: Free from fall injury  6/16/2022 0210 by Dave Pratt LPN  Outcome: Progressing   No falls sustained at this time. Patient alert to call light and uses appropriately to alert staff to needs. Bed/chair alarms in use. Care plan reviewed with patient. Patient verbalize understanding of the plan of care and contribute to goal setting.

## 2022-06-16 NOTE — PROGRESS NOTES
[address] [address]  INPATIENT PHYSICAL THERAPY  DAILY NOTE  [facility] ORTHOPEDICS 7K - 7K-14/014-A    Time In:   Time Out: 8832  Timed Code Treatment Minutes: 32 Minutes  Minutes: 31          Date: 2022  Patient Name: Siri Alvarado,  Gender:  female        MRN: [id number]  : 1960  (60 y.o.)     Referring Practitioner: Gaby Giang MD  Diagnosis: infection of R total knee replacement  Additional Pertinent Hx: Per EMR \"The patient is a 60 y.o. female who presents with pain and increased drainage from her right knee incision. She is about 4 weeks out from her revision total knee arthroplasty after periprosthetic femur fracture. Patient states that the wound opened up a bit when they took out the staples. It has continued to drain from the wound and increased drainage and pain in the past 3-4 days. She is unsure of any fevers, denies chills. She is currently in a nursing home for rehab and is working in therapy. Wearing knee immobilizer as instructed. We were consulted for rule out of septic knee. Culture swabs from the wound were done by ED staff. \" Pt is s/p Arthrotomy right knee for infection as well as polyethylene change completed by Dr. Teo Page on . Prior Level of Function:  Lives With: Spouse  Type of Home: House  Home Layout: One level  Home Access: Stairs to enter with rails  Entrance Stairs - Number of Steps: 3 FABIO  Entrance Stairs - Rails: Left  Home Equipment: Grab bars,Cane,Walker, rolling   Bathroom Shower/Tub: Tub/Shower unit  Bathroom Toilet: Handicap height  Bathroom Equipment: Shower chair    ADL Assistance: Independent  Homemaking Assistance: Needs assistance  Ambulation Assistance: Independent  Transfer Assistance: Independent  Active : Yes  Additional Comments:  completes cooking and cleaning tasks. Pt amb with [facility] prior.  Pt has recently been at [facility] for therapy for the last 2 weeks, plans to return home.    Restrictions/Precautions:  Restrictions/Precautions: General Precautions,Fall Risk,Weight Bearing  Right Lower Extremity Weight Bearing: Non Weight Bearing  Position Activity Restriction  Other position/activity restrictions: limited L knee extension-contracted in flexion     SUBJECTIVE: RN approved session. Patient laying in bed upon arrival, patient reported feeling depressed and tearful however, agreeable to therapy. Patient incontinent upon arrival and required pericare. Patient motivated to transfer to w/c this session. PAIN: denies at rest, increased R LE pain with mobility, not rated    Vitals: Vitals not assessed per clinical judgement, see nursing flowsheet    OBJECTIVE:  Bed Mobility:  Rolling to Left: Minimal Assistance, with head of bed flat, with rail, with verbal cues , with increased time for completion   Rolling to Right: Minimal Assistance, with head of bed flat, with rail, with verbal cues , with increased time for completion   Supine to Sit: Minimal Assistance, with head of bed raised, with rail, with verbal cues , with increased time for completion  Scooting: [employer], to edge of bed   *Noted severe B knee flexion contractures. Transfers:  Slide Board:Contact Guard Assistance, Minimal Assistance, X 2, with increased time for completion, cues for hand placement, with verbal cues, bed>w/c, towards left side, required max assist to place/remove board    Balance:  Static Sitting Balance:  Stand By Assistance, ~3min in preparation for slideboard transfer, BUE support    Exercise:  Patient was guided in 1 set(s) 12 reps of exercise to both lower extremities. Ankle pumps, Glut sets, Short arc quads, Hip abduction/adduction and Straight leg raises. *also quad contract/relax B knee into extension stretch. Exercises were completed for increased independence with functional mobility. *Noted severe B knee flexion contractures.     Functional Outcome Measures: Completed  AM-PAC Inpatient Mobility Raw Score : 11  AM-PAC Inpatient T-Scale Score : 33.86    ASSESSMENT:  Assessment: Patient progressing toward established goals. Activity Tolerance:  Patient tolerance of  treatment: fair. Equipment Recommendations:Equipment Needed: No  Discharge Recommendations: Inpatient Rehabilitation, Patient would benefit from continued PT at discharge and Inpatient Therapy Stay  Plan: Specific Instructions for Next Treatment: slide board transfer  Current Treatment Recommendations: Strengthening,Balance training,Functional mobility training,Equipment evaluation, education, & procurement,Neuromuscular re-education,Endurance training,Patient/Caregiver education & training,Therapeutic activities,Safety education & training,Home exercise program  Plan:  (6x O)  Specific Instructions for Next Treatment: slide board transfer    Patient Education  Patient Education: Plan of Care, Precautions/Restrictions, Bed Mobility, Equipment Education, Transfers, Reviewed Prior Education, Up in Chair for Madina Aguero, Verbal Exercise Instruction    Goals:  Patient goals : return home eventually  Short Term Goals  Time Frame for Short term goals: by discharge  Short term goal 1: Pt will demo IND with bed mobility tasks with bed flat to progress with overall mobility. Short term goal 2: Pt will tolerate 10 min sitting on EOB with IND to prepare for mobility. Short term goal 3: Pt will tolerate 10-20 reps of ther ex to increase overall mobility. Short term goal 4: Pt to complete slide board transfer from bed <-> w/c with CGA while maintaining R LE NWB for improved mobility in her environment  Long Term Goals  Time Frame for Long term goals : NA due to short ELOS    Following session, patient left in safe position with all fall risk precautions in place.

## 2022-06-17 LAB
GLUCOSE BLD-MCNC: 111 MG/DL (ref 70–108)
GLUCOSE BLD-MCNC: 129 MG/DL (ref 70–108)
GLUCOSE BLD-MCNC: 79 MG/DL (ref 70–108)
GLUCOSE BLD-MCNC: 84 MG/DL (ref 70–108)

## 2022-06-17 PROCEDURE — 82948 REAGENT STRIP/BLOOD GLUCOSE: CPT

## 2022-06-17 PROCEDURE — 2580000003 HC RX 258: Performed by: ORTHOPAEDIC SURGERY

## 2022-06-17 PROCEDURE — 6370000000 HC RX 637 (ALT 250 FOR IP): Performed by: ORTHOPAEDIC SURGERY

## 2022-06-17 PROCEDURE — 2580000003 HC RX 258: Performed by: INTERNAL MEDICINE

## 2022-06-17 PROCEDURE — 97530 THERAPEUTIC ACTIVITIES: CPT

## 2022-06-17 PROCEDURE — 6360000002 HC RX W HCPCS: Performed by: INTERNAL MEDICINE

## 2022-06-17 PROCEDURE — 1200000003 HC TELEMETRY R&B

## 2022-06-17 PROCEDURE — 97110 THERAPEUTIC EXERCISES: CPT

## 2022-06-17 PROCEDURE — 97535 SELF CARE MNGMENT TRAINING: CPT

## 2022-06-17 RX ADMIN — OXYCODONE AND ACETAMINOPHEN 1 TABLET: 5; 325 TABLET ORAL at 06:00

## 2022-06-17 RX ADMIN — SODIUM CHLORIDE: 9 INJECTION, SOLUTION INTRAVENOUS at 20:05

## 2022-06-17 RX ADMIN — CYCLOBENZAPRINE 10 MG: 10 TABLET, FILM COATED ORAL at 23:19

## 2022-06-17 RX ADMIN — DOXEPIN HYDROCHLORIDE 20 MG: 10 CAPSULE ORAL at 20:41

## 2022-06-17 RX ADMIN — FENOFIBRATE 160 MG: 160 TABLET ORAL at 08:51

## 2022-06-17 RX ADMIN — LOSARTAN POTASSIUM 25 MG: 25 TABLET, FILM COATED ORAL at 08:51

## 2022-06-17 RX ADMIN — CEFAZOLIN 2000 MG: 10 INJECTION, POWDER, FOR SOLUTION INTRAVENOUS at 20:40

## 2022-06-17 RX ADMIN — Medication 1 TABLET: at 10:57

## 2022-06-17 RX ADMIN — ASPIRIN 325 MG: 325 TABLET ORAL at 08:51

## 2022-06-17 RX ADMIN — OXYCODONE AND ACETAMINOPHEN 2 TABLET: 5; 325 TABLET ORAL at 23:18

## 2022-06-17 RX ADMIN — CEFAZOLIN 2000 MG: 10 INJECTION, POWDER, FOR SOLUTION INTRAVENOUS at 03:51

## 2022-06-17 RX ADMIN — SODIUM CHLORIDE: 9 INJECTION, SOLUTION INTRAVENOUS at 08:48

## 2022-06-17 RX ADMIN — ATORVASTATIN CALCIUM 40 MG: 40 TABLET, FILM COATED ORAL at 20:41

## 2022-06-17 RX ADMIN — CEFAZOLIN 2000 MG: 10 INJECTION, POWDER, FOR SOLUTION INTRAVENOUS at 11:43

## 2022-06-17 RX ADMIN — Medication 1 CAPSULE: at 08:51

## 2022-06-17 ASSESSMENT — PAIN DESCRIPTION - ORIENTATION
ORIENTATION: RIGHT
ORIENTATION: RIGHT

## 2022-06-17 ASSESSMENT — PAIN SCALES - GENERAL
PAINLEVEL_OUTOF10: 5
PAINLEVEL_OUTOF10: 9

## 2022-06-17 ASSESSMENT — PAIN DESCRIPTION - LOCATION
LOCATION: LEG
LOCATION: KNEE

## 2022-06-17 NOTE — CARE COORDINATION
6/17/22, 3:45 PM EDT    DISCHARGE ON GOING Karely Gamez 58 day: 7  Location: [facility]14/014-A Reason for admit: Infection of total right knee replacement, initial encounter [facility]) Payal Lea  History of total knee replacement, right [Z96.651]  Infection of left knee ([address] [M00.9]   Procedure:   6/12 Arthrotomy right knee for infection as well as polyethylene change. 6/14 PICC line placed  Barriers to Discharge: IV antibiotics per PICC, PT/OT, await precert. Patient Goals/Plan/Treatment Preferences: await precert for return [facility] [employer]).

## 2022-06-17 NOTE — PROGRESS NOTES
[facility]  Occupational Therapy  Daily Note  Time:   Time In: 825  Time Out: 0780  Timed Code Treatment Minutes: 28 Minutes  Minutes: 28          Date: 2022  Patient Name: Prudence Gutierrez,   Gender: female      Room: [facility]14/014-A  MRN: [id number]  : 1960  (60 y.o.)  Referring Practitioner: Jose Sanders MD  Diagnosis: infection of total right knee replacement  Additional Pertinent Hx: Pt is a 60 y.o. female s/p Arthrotomy right knee for infection as well as polyethylene change    Restrictions/Precautions:  Restrictions/Precautions: General Precautions,Fall Risk,Weight Bearing  Right Lower Extremity Weight Bearing: Non Weight Bearing  Position Activity Restriction  Other position/activity restrictions: limited L knee extension-contracted in flexion     SUBJECTIVE: Pt laying in bed upon arrival, pt agreeable to OT session, RN gave verbal approval for session     PAIN: 0/10:     Vitals: Nurse checked vitals prior to session    COGNITION: Slow Processing, Decreased Insight and Impaired Memory    ADL:   Grooming: Stand By Assistance. with pt sitting at the EOB to comb hair, and apply deodorant  Bathing: Stand By Assistance. for pt completing UB sitting at the EOB with sponge bath  Upper Extremity Dressing: Stand By Assistance. for donning/doffing gown. BALANCE:  Sitting Balance:  Stand By Assistance. with pt sitting for 13 minutes during ADL routine, with BUE release from the EOB    BED MOBILITY:  Supine to Sit: Moderate Assistance with the [facility] elevated, and pt using railing to exit, with support at the trunk and with BLE due to flexion contractures    TRANSFERS:  Sliding Board: Moderate Assistance. with small rest breaks from the EOB to the w/c    ASSESSMENT:     Activity Tolerance:  Patient tolerance of  treatment: fair.        Discharge Recommendations: ECF with OT  Equipment Recommendations: Equipment Needed: Yes  Other: continue to assess needs - drop arm commode  Plan: Times per Week: 6x  Times per Day: Daily  Current Treatment Recommendations: Strengthening,Balance training,Functional mobility training,Endurance training,Safety education & training,Patient/Caregiver education & training,Self-Care / ADL    Patient Education  Patient Education: Precautions    Goals  Short Term Goals  Time Frame for Short term goals: by discharge  Short Term Goal 1: Pt will complete slide board t/fs to/from [facility] with no > than Min A and 0-2 VC for maintaining NWB to RLE for increased indep with toileting  Short Term Goal 2: Pt will complete LB ADLs with Min A and AE prn for increased indep with dressing  Short Term Goal 3: Pt will complete BADL routine while seated unsupported with no > than Min A for increased indep with self care    Following session, patient left in safe position with all fall risk precautions in place.

## 2022-06-17 NOTE — PROGRESS NOTES
Orthopaedic Progress Note      SUBJECTIVE   Ms. Dilia Silver is post op day # 5     Patient notes right knee infection status post arthrotomy and poly exchange. PICC LINE  Staph aures infection        OBJECTIVE      Physical    VITALS:  /78   Pulse 80   Temp 98.4 °F (36.9 °C) (Oral)   Resp 16   Ht 5' 7\" (1.702 m)   Wt 180 lb (81.6 kg)   SpO2 94%   BMI 28.19 kg/m²   I/O last 3 completed shifts: In: 6436.5 [P.O.:1490;  I.V.:4468.4; IV Piggyback:478.2]  Out: 2300 [Urine:2300]      Dressing dry  Knee flexed to 90 degrees       Data  CBC:   Lab Results   Component Value Date    WBC 6.1 06/13/2022    HGB 9.7 06/13/2022     06/13/2022     BMP:    Lab Results   Component Value Date     06/13/2022    K 3.7 06/13/2022    K 4.6 05/09/2022     06/13/2022    CO2 19 06/13/2022    BUN 9 06/13/2022    CREATININE 0.3 06/13/2022    CALCIUM 7.8 06/13/2022    GLUCOSE 91 06/13/2022    GLUCOSE 113 12/10/2020     Uric Acid:  No components found for: URIC  PT/INR:    Lab Results   Component Value Date    PROTIME 11.0 10/05/2016    INR 0.90 10/05/2016     Troponin:  No results found for: TROPONINI  Urine Culture:  No components found for: CURINE      Current Inpatient Medications    Current Facility-Administered Medications: ceFAZolin (ANCEF) 2000 mg in dextrose 5 % 50 mL IVPB, 2,000 mg, IntraVENous, Q8H  morphine (PF) injection 2 mg, 2 mg, IntraVENous, Q2H PRN  cyclobenzaprine (FLEXERIL) tablet 10 mg, 10 mg, Oral, TID PRN  aspirin tablet 325 mg, 325 mg, Oral, Daily  magnesium sulfate 2000 mg in 50 mL IVPB premix, 2,000 mg, IntraVENous, PRN  insulin lispro (HUMALOG) injection vial 0-6 Units, 0-6 Units, SubCUTAneous, TID WC  insulin lispro (HUMALOG) injection vial 0-3 Units, 0-3 Units, SubCUTAneous, Nightly  glucose chewable tablet 16 g, 4 tablet, Oral, PRN  dextrose bolus 10% 125 mL, 125 mL, IntraVENous, PRN **OR** dextrose bolus 10% 250 mL, 250 mL, IntraVENous, PRN  glucagon (rDNA) injection 1 mg, 1 mg, IntraMUSCular, PRN  dextrose 5 % solution, 100 mL/hr, IntraVENous, PRN  potassium chloride (KLOR-CON M) extended release tablet 40 mEq, 40 mEq, Oral, PRN **OR** potassium bicarb-citric acid (EFFER-K) effervescent tablet 40 mEq, 40 mEq, Oral, PRN **OR** potassium chloride 10 mEq/100 mL IVPB (Peripheral Line), 10 mEq, IntraVENous, PRN  lactobacillus (CULTURELLE) capsule 1 capsule, 1 capsule, Oral, Daily with breakfast  0.9 % sodium chloride infusion, , IntraVENous, Continuous  oxyCODONE-acetaminophen (PERCOCET) 5-325 MG per tablet 1 tablet, 1 tablet, Oral, Q6H PRN **OR** oxyCODONE-acetaminophen (PERCOCET) 5-325 MG per tablet 2 tablet, 2 tablet, Oral, Q6H PRN  acetaminophen (TYLENOL) tablet 650 mg, 650 mg, Oral, Q4H PRN  atorvastatin (LIPITOR) tablet 40 mg, 40 mg, Oral, Nightly  doxepin (SINEQUAN) capsule 20 mg, 20 mg, Oral, Nightly  fenofibrate (TRIGLIDE) tablet 160 mg, 160 mg, Oral, Daily  losartan (COZAAR) tablet 25 mg, 25 mg, Oral, Daily  multivitamin 1 tablet, 1 tablet, Oral, Daily        PLAN    Awaiting precert for leonard dee   Continue IV abx  Dressing change daily  NWB RLE

## 2022-06-17 NOTE — PROGRESS NOTES
[address] [address]  INPATIENT PHYSICAL THERAPY  DAILY NOTE  [facility] ORTHOPEDICS 7K - 7K-14/014-A    Time In: 0906  Time Out: 1153  Timed Code Treatment Minutes: 24 Minutes  Minutes: 24          Date: 2022  Patient Name: Yazmin Toledo,  Gender:  female        MRN: [id number]  : 1960  (60 y.o.)     Referring Practitioner: Emigdio Flores MD  Diagnosis: infection of R total knee replacement  Additional Pertinent Hx: Per EMR \"The patient is a 60 y.o. female who presents with pain and increased drainage from her right knee incision. She is about 4 weeks out from her revision total knee arthroplasty after periprosthetic femur fracture. Patient states that the wound opened up a bit when they took out the staples. It has continued to drain from the wound and increased drainage and pain in the past 3-4 days. She is unsure of any fevers, denies chills. She is currently in a nursing home for rehab and is working in therapy. Wearing knee immobilizer as instructed. We were consulted for rule out of septic knee. Culture swabs from the wound were done by ED staff. \" Pt is s/p Arthrotomy right knee for infection as well as polyethylene change completed by Dr. Karla Urena on . Prior Level of Function:  Lives With: Spouse  Type of Home: House  Home Layout: One level  Home Access: Stairs to enter with rails  Entrance Stairs - Number of Steps: 3 FABIO  Entrance Stairs - Rails: Left  Home Equipment: Grab bars,Cane,Walker, rolling   Bathroom Shower/Tub: Tub/Shower unit  Bathroom Toilet: Handicap height  Bathroom Equipment: Shower chair    ADL Assistance: Independent  Homemaking Assistance: Needs assistance  Ambulation Assistance: Independent  Transfer Assistance: Independent  Active : Yes  Additional Comments:  completes cooking and cleaning tasks. Pt amb with [facility] prior.  Pt has recently been at [facility] for therapy for the last 2 weeks, plans to return home.    Restrictions/Precautions:  Restrictions/Precautions: General Precautions,Fall Risk,Weight Bearing  Right Lower Extremity Weight Bearing: Non Weight Bearing  Position Activity Restriction  Other position/activity restrictions: limited L knee extension-contracted in flexion     SUBJECTIVE: RN approved session. Patient in w/c upon arrival and agreeable to therapy. Patient incontinent upon arrival and required extra time for pricare. PAIN: not rated, RLE    Vitals: Vitals not assessed per clinical judgement, see nursing flowsheet    OBJECTIVE:  Bed Mobility:  Rolling to Left: Contact Guard Assistance, Minimal Assistance, X 1, with head of bed flat, with rail, with verbal cues , with increased time for completion   Rolling to Right: Contact Guard Assistance, Minimal Assistance, X 1, with head of bed flat, with rail, with verbal cues , with increased time for completion   Sit to Supine: Minimal Assistance, Moderate Assistance, X 1, with head of bed flat, with rail, with verbal cues , with increased time for completion, assist at BLE onto bed    *multiple rolls to each side during pericare    Transfers:  [address], Minimal Assistance, X 1, and assist of another to steady w/c, towards left side, w/c>bed    Exercise:  Patient was guided in 1 set(s) 12 reps of exercise to both lower extremities. Ankle pumps, Glut sets, Hip abduction/adduction and Straight leg raises. *also quad contract/relax B knee into extension stretch, and manual heel cord stretch [id number] on left LE, active heel cord stretch on LLE. Exercises were completed for increased independence with functional mobility. Functional Outcome Measures: Completed  AM-PAC Inpatient Mobility Raw Score : 11  AM-PAC Inpatient T-Scale Score : 33.86    ASSESSMENT:  Assessment: Patient progressing toward established goals. Activity Tolerance:  Patient tolerance of  treatment: good.       Equipment Recommendations:Equipment Needed: No  Discharge Recommendations: Inpatient Rehabilitation, Patient would benefit from continued PT at discharge and Inpatient Therapy Stay  Plan: Specific Instructions for Next Treatment: slide board transfer  Current Treatment Recommendations: Strengthening,Balance training,Functional mobility training,Equipment evaluation, education, & procurement,Neuromuscular re-education,Endurance training,Patient/Caregiver education & training,Therapeutic activities,Safety education & training,Home exercise program  Plan:  (6x O)  Specific Instructions for Next Treatment: slide board transfer    Patient Education  Patient Education: Plan of Care, Home Exercise Program, Precautions/Restrictions, Bed Mobility, Transfers, Up in Chair for All Meals, Verbal Exercise Instruction    Goals:  Patient goals : return home eventually  Short Term Goals  Time Frame for Short term goals: by discharge  Short term goal 1: Pt will demo IND with bed mobility tasks with bed flat to progress with overall mobility. Short term goal 2: Pt will tolerate 10 min sitting on EOB with IND to prepare for mobility. Short term goal 3: Pt will tolerate 10-20 reps of ther ex to increase overall mobility. Short term goal 4: Pt to complete slide board transfer from bed <-> w/c with CGA while maintaining R LE NWB for improved mobility in her environment  Long Term Goals  Time Frame for Long term goals : NA due to short ELOS    Following session, patient left in safe position with all fall risk precautions in place.

## 2022-06-17 NOTE — PLAN OF CARE
Problem: Discharge Planning  Goal: Discharge to home or other facility with appropriate resources  6/16/2022 2245 by Army Rosa RN  Outcome: Progressing  Flowsheets (Taken 6/16/2022 2245)  Discharge to home or other facility with appropriate resources:   Identify barriers to discharge with patient and caregiver   Arrange for needed discharge resources and transportation as appropriate   Identify discharge learning needs (meds, wound care, etc)  6/16/2022 1812 by Halima Callejas RN  Outcome: Progressing  Flowsheets (Taken 6/16/2022 1812)  Discharge to home or other facility with appropriate resources:   Identify barriers to discharge with patient and caregiver   Identify discharge learning needs (meds, wound care, etc)     Problem: Pain  Goal: Verbalizes/displays adequate comfort level or baseline comfort level  6/16/2022 2245 by Army Rosa RN  Outcome: Progressing  Flowsheets (Taken 6/16/2022 2245)  Verbalizes/displays adequate comfort level or baseline comfort level:   Encourage patient to monitor pain and request assistance   Assess pain using appropriate pain scale   Administer analgesics based on type and severity of pain and evaluate response  6/16/2022 1812 by Halima Callejas RN  Outcome: Progressing  Flowsheets  Taken 6/16/2022 1812  Verbalizes/displays adequate comfort level or baseline comfort level:   Encourage patient to monitor pain and request assistance   Administer analgesics based on type and severity of pain and evaluate response   Assess pain using appropriate pain scale   Implement non-pharmacological measures as appropriate and evaluate response  Taken 6/16/2022 0802  Verbalizes/displays adequate comfort level or baseline comfort level: Encourage patient to monitor pain and request assistance     Problem: Safety - Adult  Goal: Free from fall injury  6/16/2022 1812 by Halima Callejas RN  Outcome: Progressing  Flowsheets (Taken 6/16/2022 1812)  Free From Fall Injury: Instruct family/caregiver on patient safety     Problem: Skin/Tissue Integrity  Goal: Absence of new skin breakdown  Description: 1. Monitor for areas of redness and/or skin breakdown  2. Assess vascular access sites hourly  3. Every 4-6 hours minimum:  Change oxygen saturation probe site  4. Every 4-6 hours:  If on nasal continuous positive airway pressure, respiratory therapy assess nares and determine need for appliance change or resting period. 6/16/2022 1812 by Tomeka Griffith RN  Outcome: Progressing     Problem: Musculoskeletal - Adult  Goal: Return mobility to safest level of function  6/16/2022 1812 by Tomeka Griffith RN  Outcome: Progressing  Flowsheets (Taken 6/16/2022 1812)  Return Mobility to Safest Level of Function:   Assess patient stability and activity tolerance for standing, transferring and ambulating with or without assistive devices   Assist with transfers and ambulation using safe patient handling equipment as needed   Ensure adequate protection for wounds/incisions during mobilization   Obtain physical therapy/occupational therapy consults as needed     Problem: Infection - Adult  Goal: Absence of infection at discharge  6/16/2022 1812 by Tomeka Griffith RN  Outcome: Progressing  Flowsheets (Taken 6/16/2022 1812)  Absence of infection at discharge:   Assess and monitor for signs and symptoms of infection   Monitor lab/diagnostic results   Monitor all insertion sites i.e., indwelling lines, tubes and drains       Care plan reviewed with patient and   Patient  verbalize understanding of the plan of care and contribute to goal setting.

## 2022-06-18 LAB
GLUCOSE BLD-MCNC: 105 MG/DL (ref 70–108)
GLUCOSE BLD-MCNC: 118 MG/DL (ref 70–108)
GLUCOSE BLD-MCNC: 145 MG/DL (ref 70–108)
GLUCOSE BLD-MCNC: 79 MG/DL (ref 70–108)

## 2022-06-18 PROCEDURE — 82948 REAGENT STRIP/BLOOD GLUCOSE: CPT

## 2022-06-18 PROCEDURE — 97530 THERAPEUTIC ACTIVITIES: CPT

## 2022-06-18 PROCEDURE — 2580000003 HC RX 258: Performed by: INTERNAL MEDICINE

## 2022-06-18 PROCEDURE — 97535 SELF CARE MNGMENT TRAINING: CPT

## 2022-06-18 PROCEDURE — 6360000002 HC RX W HCPCS: Performed by: INTERNAL MEDICINE

## 2022-06-18 PROCEDURE — 6370000000 HC RX 637 (ALT 250 FOR IP): Performed by: ORTHOPAEDIC SURGERY

## 2022-06-18 PROCEDURE — 97110 THERAPEUTIC EXERCISES: CPT

## 2022-06-18 PROCEDURE — 2580000003 HC RX 258: Performed by: ORTHOPAEDIC SURGERY

## 2022-06-18 PROCEDURE — 1200000003 HC TELEMETRY R&B

## 2022-06-18 RX ADMIN — SODIUM CHLORIDE: 9 INJECTION, SOLUTION INTRAVENOUS at 09:58

## 2022-06-18 RX ADMIN — INSULIN LISPRO 1 UNITS: 100 INJECTION, SOLUTION INTRAVENOUS; SUBCUTANEOUS at 16:49

## 2022-06-18 RX ADMIN — CEFAZOLIN 2000 MG: 10 INJECTION, POWDER, FOR SOLUTION INTRAVENOUS at 03:21

## 2022-06-18 RX ADMIN — Medication 1 CAPSULE: at 09:53

## 2022-06-18 RX ADMIN — SODIUM CHLORIDE: 9 INJECTION, SOLUTION INTRAVENOUS at 21:04

## 2022-06-18 RX ADMIN — OXYCODONE AND ACETAMINOPHEN 2 TABLET: 5; 325 TABLET ORAL at 12:41

## 2022-06-18 RX ADMIN — Medication 1 TABLET: at 14:25

## 2022-06-18 RX ADMIN — ASPIRIN 325 MG: 325 TABLET ORAL at 09:53

## 2022-06-18 RX ADMIN — ATORVASTATIN CALCIUM 40 MG: 40 TABLET, FILM COATED ORAL at 21:05

## 2022-06-18 RX ADMIN — LOSARTAN POTASSIUM 25 MG: 25 TABLET, FILM COATED ORAL at 09:53

## 2022-06-18 RX ADMIN — CEFAZOLIN 2000 MG: 10 INJECTION, POWDER, FOR SOLUTION INTRAVENOUS at 21:04

## 2022-06-18 RX ADMIN — DOXEPIN HYDROCHLORIDE 20 MG: 10 CAPSULE ORAL at 21:05

## 2022-06-18 RX ADMIN — CEFAZOLIN 2000 MG: 10 INJECTION, POWDER, FOR SOLUTION INTRAVENOUS at 12:40

## 2022-06-18 RX ADMIN — FENOFIBRATE 160 MG: 160 TABLET ORAL at 09:53

## 2022-06-18 ASSESSMENT — PAIN DESCRIPTION - LOCATION
LOCATION: KNEE
LOCATION: BACK

## 2022-06-18 ASSESSMENT — PAIN DESCRIPTION - ORIENTATION: ORIENTATION: RIGHT

## 2022-06-18 ASSESSMENT — PAIN SCALES - GENERAL
PAINLEVEL_OUTOF10: 8
PAINLEVEL_OUTOF10: 2

## 2022-06-18 NOTE — PLAN OF CARE
Problem: Discharge Planning  Goal: Discharge to home or other facility with appropriate resources  Outcome: Progressing  Flowsheets (Taken 6/17/2022 2301)  Discharge to home or other facility with appropriate resources:   Identify barriers to discharge with patient and caregiver   Arrange for needed discharge resources and transportation as appropriate   Identify discharge learning needs (meds, wound care, etc)     Problem: Pain  Goal: Verbalizes/displays adequate comfort level or baseline comfort level  Outcome: Progressing  Flowsheets (Taken 6/17/2022 2301)  Verbalizes/displays adequate comfort level or baseline comfort level:   Encourage patient to monitor pain and request assistance   Assess pain using appropriate pain scale   Administer analgesics based on type and severity of pain and evaluate response    Care plan reviewed with patient and  Patient  verbalize understanding of the plan of care and contribute to goal setting.

## 2022-06-18 NOTE — PROGRESS NOTES
[facility]  Occupational Therapy  Daily Note  Time:   Time In: 3632  Time Out: 7069  Timed Code Treatment Minutes: 24 Minutes  Minutes: 24          Date: 2022  Patient Name: Iftikhar Robles,   Gender: female      Room: -14/014-A  MRN: [id number]  : 1960  (60 y.o.)  Referring Practitioner: Cheryl eH MD  Diagnosis: infection of total right knee replacement  Additional Pertinent Hx: Pt is a 60 y.o. female s/p Arthrotomy right knee for infection as well as polyethylene change    Restrictions/Precautions:  Restrictions/Precautions: General Precautions,Fall Risk,Weight Bearing  Right Lower Extremity Weight Bearing: Non Weight Bearing  Position Activity Restriction  Other position/activity restrictions: limited L knee extension-contracted in flexion     SUBJECTIVE: Patient sidelying in bed upon arrival with B knees in flex position; agreeable to therapy. Attempt 1 of therapy session, patient requests to be put on bed pan, declining slideboard to [facility], PCT placed patient on bedpan with therapist returning back when patient finished. Patient becomes emotional at end of session stating \"I just want this over with\". Therapist provides emotional support and encouragement with patient calm at end of session. PAIN: no numerical scale provided however verbalized discomfort with movement    Vitals: Vitals not assessed per clinical judgement, see nursing flowsheet    COGNITION: Decreased Problem Solving and Decreased Safety Awareness    ADL:   Lower Extremity Dressing: Minimal Assistance, X 1, with set-up, with verbal cues  and with increased time for completion. patient able to mohamud L socks with sock, requiring assistance with manipulating sock aid with LLE d/t NWB/pain.     BALANCE:  Sitting Balance:  Stand By Assistance, X 1. EOB    BED MOBILITY:  Supine to Sit: Minimal Assistance, X 1, with head of bed raised, without rail, with verbal cues , with increased time for completion      TRANSFERS:  Sliding Board: Moderate Assistance, X 1, with increased time for completion, cues for hand placement. FUNCTIONAL MOBILITY:  Assistive Device: Wheelchair  Assist Level:  Minimal Assistance and X 1. Distance: positioning within room    ADDITIONAL ACTIVITIES:  Patient completed BUE strengthening exercises with skilled education on HEP: completed x10 reps x1 set with a moderate resistance band in all joints and all planes in order to improve UE strength and activity tolerance required for BADL routine and toilet / shower transfers. Patient tolerated good, requiring minimal rest breaks. Patient also required minimal verbal/visual cues for technique. ASSESSMENT:     Activity Tolerance:  Patient tolerance of  treatment: good. Discharge Recommendations: Subacute/skilled nursing facility  Equipment Recommendations: Equipment Needed: Yes  Other: continue to assess needs - drop arm commode  Plan: Times per Week: 6x  Times per Day: Daily  Current Treatment Recommendations: Strengthening,Balance training,Functional mobility training,Endurance training,Safety education & training,Patient/Caregiver education & training,Self-Care / ADL    Patient Education  Patient Education: Role of OT, Plan of Care, ADL's, Home Exercise Program, Precautions, Equipment Education, Home Safety and Importance of Increasing Activity    Goals  Short Term Goals  Time Frame for Short term goals: by discharge  Short Term Goal 1: Pt will complete slide board t/fs to/from [facility] with no > than Min A and 0-2 VC for maintaining NWB to RLE for increased indep with toileting  Short Term Goal 2: Pt will complete LB ADLs with Min A and AE prn for increased indep with dressing  Short Term Goal 3: Pt will complete BADL routine while seated unsupported with no > than Min A for increased indep with self care    Following session, patient left in safe position with all fall risk precautions in place.

## 2022-06-18 NOTE — PROGRESS NOTES
[address] [address]  INPATIENT PHYSICAL THERAPY  DAILY NOTE  [facility] ORTHOPEDICS 7K - 7K-14/014-A    Time In: 1055  Time Out: 1121  Timed Code Treatment Minutes: 26 Minutes  Minutes: 26          Date: 2022  Patient Name: Wilmer Hall,  Gender:  female        MRN: [id number]  : 1960  (60 y.o.)     Referring Practitioner: Leobardo Essex, MD  Diagnosis: infection of R total knee replacement  Additional Pertinent Hx: Per EMR \"The patient is a 60 y.o. female who presents with pain and increased drainage from her right knee incision. She is about 4 weeks out from her revision total knee arthroplasty after periprosthetic femur fracture. Patient states that the wound opened up a bit when they took out the staples. It has continued to drain from the wound and increased drainage and pain in the past 3-4 days. She is unsure of any fevers, denies chills. She is currently in a nursing home for rehab and is working in therapy. Wearing knee immobilizer as instructed. We were consulted for rule out of septic knee. Culture swabs from the wound were done by ED staff. \" Pt is s/p Arthrotomy right knee for infection as well as polyethylene change completed by Dr. Rickey Bergeron on . Prior Level of Function:  Lives With: Spouse  Type of Home: House  Home Layout: One level  Home Access: Stairs to enter with rails  Entrance Stairs - Number of Steps: 3 FABIO  Entrance Stairs - Rails: Left  Home Equipment: Grab bars,Cane,Walker, rolling   Bathroom Shower/Tub: Tub/Shower unit  Bathroom Toilet: Handicap height  Bathroom Equipment: Shower chair    ADL Assistance: Independent  Homemaking Assistance: Needs assistance  Ambulation Assistance: Independent  Transfer Assistance: Independent  Active : Yes  Additional Comments:  completes cooking and cleaning tasks. Pt amb with [facility] prior.  Pt has recently been at [facility] for therapy for the last 2 weeks, plans to return home.    Restrictions/Precautions:  Restrictions/Precautions: General Precautions,Fall Risk,Weight Bearing  Right Lower Extremity Weight Bearing: Non Weight Bearing  Position Activity Restriction  Other position/activity restrictions: limited L knee extension-contracted in flexion     SUBJECTIVE: RN approved therapy session. Patient seated in w/c prior to lunch this date and wanting to stay in chair at this time. PAIN: 0/10: Patient denies pain at rest. Patient reports pain with bilateral LE movement, pain not quantified. Vitals: Vitals not assessed per clinical judgement, see nursing flowsheet    OBJECTIVE:  Bed Mobility:  Not Tested    Transfers:  Not Tested    Ambulation:  Not Tested    Balance:  Static Sitting Balance:  Stand By Assistance  Dynamic Sitting Balance: Contact Guard Assistance Patient seated without UT support while sitting unsupported in w/c. Exercise:  Patient was guided in 1 set(s) 10 reps of exercise to both lower extremities. Seated marches, Seated hamstring curls, Seated heel/toe raises, Long arc quads, Seated isometric hip adduction and Seated abduction/adduction. Exercises were completed for increased independence with functional mobility. Patient requires AAROM. PROM for bilateral LE extension to promote increased extension in preparation for functional tasks and transfers. Functional Outcome Measures: Not completed       ASSESSMENT:  Assessment: Patient progressing toward established goals. Activity Tolerance:  Patient tolerance of  treatment: good.       Equipment Recommendations:Equipment Needed: No  Discharge Recommendations: Continue to assess pending progress  Plan: Specific Instructions for Next Treatment: slide board transfer  Current Treatment Recommendations: Strengthening,Balance training,Functional mobility training,Equipment evaluation, education, & procurement,Neuromuscular re-education,Endurance training,Patient/Caregiver education & training,Therapeutic activities,Safety education & training,Home exercise program  Plan:  (6x O)  Specific Instructions for Next Treatment: slide board transfer    Patient Education  Patient Education: Plan of Care, Verbal Exercise Instruction,  - Patient Verbalized Understanding    Goals:  Patient goals : return home eventually  Short Term Goals  Time Frame for Short term goals: by discharge  Short term goal 1: Pt will demo IND with bed mobility tasks with bed flat to progress with overall mobility. Short term goal 2: Pt will tolerate 10 min sitting on EOB with IND to prepare for mobility. Short term goal 3: Pt will tolerate 10-20 reps of ther ex to increase overall mobility. Short term goal 4: Pt to complete slide board transfer from bed <-> w/c with CGA while maintaining R LE NWB for improved mobility in her environment  Long Term Goals  Time Frame for Long term goals : NA due to short ELOS    Following session, patient left in safe position with all fall risk precautions in place.

## 2022-06-18 NOTE — PROGRESS NOTES
Orthopaedic Progress Note      SUBJECTIVE   Ms. Russo is post op day # 6     Patient notes   Right knee infection  Status post [id number] and poly exchange  PICC LINE  Staph aures infection       OBJECTIVE      Physical    VITALS:  BP (!) 149/67   Pulse 88   Temp 99 °F (37.2 °C) (Oral)   Resp 14   Ht 5' 7\" (1.702 m)   Wt 180 lb (81.6 kg)   SpO2 98%   BMI 28.19 kg/m²   I/O last 3 completed shifts:   In: 1930 [P.O.:1930]  Out: 1550 [Urine:1550]      Dressing dry and intact  Sitting at side of bed  NVI sensation intact       Data  CBC:   Lab Results   Component Value Date    WBC 6.1 06/13/2022    HGB 9.7 06/13/2022     06/13/2022     BMP:    Lab Results   Component Value Date     06/13/2022    K 3.7 06/13/2022    K 4.6 05/09/2022     06/13/2022    CO2 19 06/13/2022    BUN 9 06/13/2022    CREATININE 0.3 06/13/2022    CALCIUM 7.8 06/13/2022    GLUCOSE 91 06/13/2022    GLUCOSE 113 12/10/2020     Uric Acid:  No components found for: URIC  PT/INR:    Lab Results   Component Value Date    PROTIME 11.0 10/05/2016    INR 0.90 10/05/2016     Troponin:  No results found for: TROPONINI  Urine Culture:  No components found for: CURINE      Current Inpatient Medications    Current Facility-Administered Medications: ceFAZolin (ANCEF) 2000 mg in dextrose 5 % 50 mL IVPB, 2,000 mg, IntraVENous, Q8H  morphine (PF) injection 2 mg, 2 mg, IntraVENous, Q2H PRN  cyclobenzaprine (FLEXERIL) tablet 10 mg, 10 mg, Oral, TID PRN  aspirin tablet 325 mg, 325 mg, Oral, Daily  magnesium sulfate 2000 mg in 50 mL IVPB premix, 2,000 mg, IntraVENous, PRN  insulin lispro (HUMALOG) injection vial 0-6 Units, 0-6 Units, SubCUTAneous, TID WC  insulin lispro (HUMALOG) injection vial 0-3 Units, 0-3 Units, SubCUTAneous, Nightly  glucose chewable tablet 16 g, 4 tablet, Oral, PRN  dextrose bolus 10% 125 mL, 125 mL, IntraVENous, PRN **OR** dextrose bolus 10% 250 mL, 250 mL, IntraVENous, PRN  glucagon (rDNA) injection 1 mg, 1 mg, IntraMUSCular, PRN  dextrose 5 % solution, 100 mL/hr, IntraVENous, PRN  potassium chloride (KLOR-CON M) extended release tablet 40 mEq, 40 mEq, Oral, PRN **OR** potassium bicarb-citric acid (EFFER-K) effervescent tablet 40 mEq, 40 mEq, Oral, PRN **OR** potassium chloride 10 mEq/100 mL IVPB (Peripheral Line), 10 mEq, IntraVENous, PRN  lactobacillus (CULTURELLE) capsule 1 capsule, 1 capsule, Oral, Daily with breakfast  0.9 % sodium chloride infusion, , IntraVENous, Continuous  oxyCODONE-acetaminophen (PERCOCET) 5-325 MG per tablet 1 tablet, 1 tablet, Oral, Q6H PRN **OR** oxyCODONE-acetaminophen (PERCOCET) 5-325 MG per tablet 2 tablet, 2 tablet, Oral, Q6H PRN  acetaminophen (TYLENOL) tablet 650 mg, 650 mg, Oral, Q4H PRN  atorvastatin (LIPITOR) tablet 40 mg, 40 mg, Oral, Nightly  doxepin (SINEQUAN) capsule 20 mg, 20 mg, Oral, Nightly  fenofibrate (TRIGLIDE) tablet 160 mg, 160 mg, Oral, Daily  losartan (COZAAR) tablet 25 mg, 25 mg, Oral, Daily  multivitamin 1 tablet, 1 tablet, Oral, Daily        PLAN    Continue working with PT and OT  Awaiting  approval for discharge [facility] Dwaine Lennon

## 2022-06-19 LAB
GLUCOSE BLD-MCNC: 101 MG/DL (ref 70–108)
GLUCOSE BLD-MCNC: 133 MG/DL (ref 70–108)
GLUCOSE BLD-MCNC: 72 MG/DL (ref 70–108)
GLUCOSE BLD-MCNC: 96 MG/DL (ref 70–108)

## 2022-06-19 PROCEDURE — 6370000000 HC RX 637 (ALT 250 FOR IP): Performed by: ORTHOPAEDIC SURGERY

## 2022-06-19 PROCEDURE — 2580000003 HC RX 258: Performed by: INTERNAL MEDICINE

## 2022-06-19 PROCEDURE — 82948 REAGENT STRIP/BLOOD GLUCOSE: CPT

## 2022-06-19 PROCEDURE — 1200000003 HC TELEMETRY R&B

## 2022-06-19 PROCEDURE — 99223 1ST HOSP IP/OBS HIGH 75: CPT | Performed by: INTERNAL MEDICINE

## 2022-06-19 PROCEDURE — 2580000003 HC RX 258: Performed by: ORTHOPAEDIC SURGERY

## 2022-06-19 PROCEDURE — 6360000002 HC RX W HCPCS: Performed by: INTERNAL MEDICINE

## 2022-06-19 RX ADMIN — SODIUM CHLORIDE: 9 INJECTION, SOLUTION INTRAVENOUS at 23:45

## 2022-06-19 RX ADMIN — CYCLOBENZAPRINE 10 MG: 10 TABLET, FILM COATED ORAL at 23:52

## 2022-06-19 RX ADMIN — Medication 1 CAPSULE: at 09:14

## 2022-06-19 RX ADMIN — CEFAZOLIN 2000 MG: 10 INJECTION, POWDER, FOR SOLUTION INTRAVENOUS at 14:51

## 2022-06-19 RX ADMIN — ASPIRIN 325 MG: 325 TABLET ORAL at 09:14

## 2022-06-19 RX ADMIN — OXYCODONE AND ACETAMINOPHEN 1 TABLET: 5; 325 TABLET ORAL at 23:52

## 2022-06-19 RX ADMIN — OXYCODONE AND ACETAMINOPHEN 2 TABLET: 5; 325 TABLET ORAL at 03:05

## 2022-06-19 RX ADMIN — Medication 1 TABLET: at 11:30

## 2022-06-19 RX ADMIN — LOSARTAN POTASSIUM 25 MG: 25 TABLET, FILM COATED ORAL at 09:14

## 2022-06-19 RX ADMIN — DOXEPIN HYDROCHLORIDE 20 MG: 10 CAPSULE ORAL at 21:35

## 2022-06-19 RX ADMIN — CYCLOBENZAPRINE 10 MG: 10 TABLET, FILM COATED ORAL at 12:52

## 2022-06-19 RX ADMIN — CEFAZOLIN 2000 MG: 10 INJECTION, POWDER, FOR SOLUTION INTRAVENOUS at 03:10

## 2022-06-19 RX ADMIN — CEFAZOLIN 2000 MG: 10 INJECTION, POWDER, FOR SOLUTION INTRAVENOUS at 21:38

## 2022-06-19 RX ADMIN — CYCLOBENZAPRINE 10 MG: 10 TABLET, FILM COATED ORAL at 03:06

## 2022-06-19 RX ADMIN — FENOFIBRATE 160 MG: 160 TABLET ORAL at 09:14

## 2022-06-19 RX ADMIN — OXYCODONE AND ACETAMINOPHEN 2 TABLET: 5; 325 TABLET ORAL at 14:57

## 2022-06-19 RX ADMIN — ATORVASTATIN CALCIUM 40 MG: 40 TABLET, FILM COATED ORAL at 21:35

## 2022-06-19 RX ADMIN — SODIUM CHLORIDE: 9 INJECTION, SOLUTION INTRAVENOUS at 09:17

## 2022-06-19 ASSESSMENT — PAIN DESCRIPTION - DESCRIPTORS
DESCRIPTORS: CRAMPING
DESCRIPTORS: SHARP;SHOOTING
DESCRIPTORS: CRAMPING;SHOOTING

## 2022-06-19 ASSESSMENT — PAIN DESCRIPTION - LOCATION
LOCATION: KNEE
LOCATION: KNEE;LEG
LOCATION: LEG
LOCATION: LEG

## 2022-06-19 ASSESSMENT — PAIN DESCRIPTION - ONSET
ONSET: ON-GOING
ONSET: SUDDEN

## 2022-06-19 ASSESSMENT — PAIN SCALES - GENERAL
PAINLEVEL_OUTOF10: 10
PAINLEVEL_OUTOF10: 7
PAINLEVEL_OUTOF10: 8
PAINLEVEL_OUTOF10: 6

## 2022-06-19 ASSESSMENT — PAIN DESCRIPTION - PAIN TYPE
TYPE: SURGICAL PAIN
TYPE: SURGICAL PAIN

## 2022-06-19 ASSESSMENT — PAIN DESCRIPTION - FREQUENCY
FREQUENCY: INTERMITTENT
FREQUENCY: INTERMITTENT

## 2022-06-19 ASSESSMENT — PAIN DESCRIPTION - ORIENTATION
ORIENTATION: RIGHT;LEFT
ORIENTATION: RIGHT
ORIENTATION: RIGHT
ORIENTATION: RIGHT;LEFT

## 2022-06-19 ASSESSMENT — PAIN DESCRIPTION - DIRECTION: RADIATING_TOWARDS: THIGH TO KNEE

## 2022-06-19 NOTE — PLAN OF CARE
Problem: Discharge Planning  Goal: Discharge to home or other facility with appropriate resources  Outcome: Progressing  Flowsheets (Taken 6/19/2022 1725)  Discharge to home or other facility with appropriate resources: Identify barriers to discharge with patient and caregiver  Note: Waiting on precert. Problem: Pain  Goal: Verbalizes/displays adequate comfort level or baseline comfort level  Outcome: Progressing  Flowsheets (Taken 6/19/2022 1725)  Verbalizes/displays adequate comfort level or baseline comfort level:   Encourage patient to monitor pain and request assistance   Administer analgesics based on type and severity of pain and evaluate response   Implement non-pharmacological measures as appropriate and evaluate response     Problem: Safety - Adult  Goal: Free from fall injury  Outcome: Progressing  Note: Call light within reach, bed in lowest position, non skid footwear on, room door open, bed alarm on.  pt using call light appropriately. Problem: Skin/Tissue Integrity  Goal: Absence of new skin breakdown  Description: 1. Monitor for areas of redness and/or skin breakdown  2. Assess vascular access sites hourly  3. Every 4-6 hours minimum:  Change oxygen saturation probe site  4. Every 4-6 hours:  If on nasal continuous positive airway pressure, respiratory therapy assess nares and determine need for appliance change or resting period. Outcome: Progressing  Note: Patient turns self frequently in bed. No new skin breakdown noted. Care plan reviewed with patient. Patient verbalizes understanding of the plan of care and contributes to goal setting.

## 2022-06-19 NOTE — CONSULTS
pounds BSA: 2.01 m^2 BMI: 30.44 kg/m^2    BP: 143/61 mmHg    Conclusions    Summary  Normal left ventricle size and systolic function. Ejection fraction was  estimated at 65 %. There were no regional left ventricular wall motion  abnormalities and wall thickness was within normal limits. The left atrium is Mild to moderate dilated. Signature    ----------------------------------------------------------------  Electronically signed by Raman Vann MD (Interpreting  physician) on 05/10/2022 at 05:35 PM  ----------------------------------------------------------------    Findings    Mitral Valve  The mitral valve structure was normal with normal leaflet separation. DOPPLER: The transmitral velocity was within the normal range with no  evidence for mitral stenosis. Trace mitral regurgitation is present. Aortic Valve  The aortic valve was trileaflet with normal thickness and cuspal  separation. DOPPLER: Transaortic velocity was within the normal range with  no evidence of aortic stenosis. There was no evidence of aortic  regurgitation. Tricuspid Valve  The tricuspid valve structure was normal with normal leaflet separation. DOPPLER: There was no evidence of tricuspid stenosis. Mild tricuspid  regurgitation visualized. Pulmonic Valve  The pulmonic valve leaflets exhibited normal thickness, no calcification,  and normal cuspal separation. DOPPLER: The transpulmonic velocity was  within the normal range with no evidence for regurgitation. Left Atrium  The left atrium is Mild to moderate dilated. Left Ventricle  Normal left ventricle size and systolic function. Ejection fraction was  estimated at 65 %. There were no regional left ventricular wall motion  abnormalities and wall thickness was within normal limits. Right Atrium  Right atrial size was normal.    Right Ventricle  The right ventricular size was normal with normal systolic function and  wall thickness.     Pericardial Effusion  The pericardium was normal in appearance with no evidence of a pericardial  effusion. Pleural Effusion  No evidence of pleural effusion. Aorta / Great Vessels  -Aortic root dimension within normal limits.  -The Pulmonary artery is within normal limits. -IVC size is within normal limits with normal respiratory phasic changes.     M-Mode/2D Measurements & Calculations    LV Diastolic   LV Systolic Dimension: 3  AV Cusp Separation: 1.9 cmLA  Dimension: 4   cm                        Dimension: 4.4 cmAO Root  cm             LV Volume Diastolic: 70   Dimension: 3 cmLA Area: 17.4 cm^2  LV FS:25 %     ml  LV PW          LV Volume Systolic: 27 ml  Diastolic: 1   LV EDV/LV EDV Index: 70  cm             ml/35 m^2LV ESV/LV ESV    RV Diastolic Dimension: 3.4 cm  Septum         Index: 27 TU/71 m^2  Diastolic: 1   EF Calculated: 61.4 %     LA/Aorta: 1.47  cm  LA volume/Index: 52.4 ml /26m^2    Doppler Measurements & Calculations    MV Peak E-Wave: 88.7 cm/s  AV Peak Velocity: 173  LVOT Peak Velocity: 138  MV Peak A-Wave: 100 cm/s   cm/s                   cm/s  MV E/A Ratio: 0.89         AV Peak Gradient:      LVOT Peak Gradient: 8  MV Peak Gradient: 3.15     11.97 mmHg             mmHg  mmHg  TV Peak E-Wave: 70.3  MV Deceleration Time: 224                         cm/s  msec                                              TV Peak A-Wave: 82.3  MV P1/2t: 66 msec                                 cm/s  MVA by PHT:3.33 cm^2  TV Peak Gradient: 1.98  MV E' Septal Velocity: 7.5 AV DVI (Vmax):0.8      mmHg  cm/s                                              TR Velocity:244 cm/s  MV A' Septal Velocity:                            TR Gradient:23.81 mmHg  10.9 cm/s                                         PV Peak Velocity: 105  MV E' Lateral Velocity:                           cm/s  5.2 cm/s                                          PV Peak Gradient: 4.41  MV A' Lateral Velocity:                           mmHg  15.7 cm/s  E/E' septal: 11.83  E/E' lateral: 17.06  MR Velocity: 442 cm/s    [id number]       All labs, EKG's, diagnostic testing and images as well as cardiac cath, stress testing were reviewed during this encounter    Past Medical History:   Diagnosis Date    Atypical ductal hyperplasia, breast     left breast, 2016, lumpectomy, Tamoxifen, LMH    Hx of breast biopsy Jan 8 2016    Hyperlipidemia     Type II or unspecified type diabetes mellitus without mention of complication, not stated as uncontrolled      Past Surgical History:   Procedure Laterality Date    BLADDER SUSPENSION      BREAST BIOPSY Left     atypical [facility]lasia, 2016, [facility]    BREAST LUMPECTOMY Left     atypical hyperplasia, 2016, [facility]    COLONOSCOPY  08/28/2020    HERNIA REPAIR  10/17/2016    KNEE SURGERY      Right knee    REVISION TOTAL KNEE ARTHROPLASTY Right 5/10/2022    RIGHT KNEE TOTAL ARTHROPLASTY REVISION performed by Leobardo Essex, MD at [place] [address] Right 6/12/2022    Right Knee Incision and Drainage of Total Knee With Poly Exchange performed by Leobardo Essex, MD at [facility] Dr Danie Porter [facility] EXTRACTION       Current Facility-Administered Medications   Medication Dose Route Frequency Provider Last Rate Last Admin    ceFAZolin (ANCEF) 2000 mg in dextrose 5 % 50 mL IVPB  2,000 mg IntraVENous Q8H Kulwant Shafer  mL/hr at 06/19/22 1451 2,000 mg at 06/19/22 1451    morphine (PF) injection 2 mg  2 mg IntraVENous Q2H PRN Leobardo Essex, MD   2 mg at 06/14/22 1044    cyclobenzaprine (FLEXERIL) tablet 10 mg  10 mg Oral TID PRN Leobardo Essex, MD   10 mg at 06/19/22 1252    aspirin tablet 325 mg  325 mg Oral Daily Leobardo Essex, MD   325 mg at 06/19/22 0914    magnesium sulfate 2000 mg in 50 mL IVPB premix  2,000 mg IntraVENous PRN MD Eduardo Escobar insulin lispro (HUMALOG) injection vial 0-6 Units  0-6 Units SubCUTAneous TID WC Apolonia Lemus MD   1 Units at 06/18/22 1649    insulin lispro (HUMALOG) injection vial 0-3 Units  0-3 Units SubCUTAneous Nightly Apolonia eLmus MD        glucose chewable tablet 16 g  4 tablet Oral PRN Apolonia Lemus MD        dextrose bolus 10% 125 mL  125 mL IntraVENous PRN Apolonia Lemus MD        Or    dextrose bolus 10% 250 mL  250 mL IntraVENous PRN Apolonia Lemus MD        glucagon (rDNA) injection 1 mg  1 mg IntraMUSCular PRN Apolonia Lemus MD        dextrose 5 % solution  100 mL/hr IntraVENous PRN Apolonia Lemus MD        potassium chloride (KLOR-CON M) extended release tablet 40 mEq  40 mEq Oral PRN Apolonia Lemus MD   40 mEq at 06/11/22 0636    Or    potassium bicarb-citric acid (EFFER-K) effervescent tablet 40 mEq  40 mEq Oral PRN Apolonia Lemus MD        Or    potassium chloride 10 mEq/100 mL IVPB (Peripheral Line)  10 mEq IntraVENous PRN Apolonia Lemus MD        lactobacillus (CULTURELLE) capsule 1 capsule  1 capsule Oral Daily with breakfast Apolonia Lemus MD   1 capsule at 06/19/22 0914    0.9 % sodium chloride infusion   IntraVENous Continuous Apolonia Lemus  mL/hr at 06/19/22 0917 New Bag at 06/19/22 0917    oxyCODONE-acetaminophen (PERCOCET) 5-325 MG per tablet 1 tablet  1 tablet Oral Q6H PRN Apolonia Lemus MD   1 tablet at 06/17/22 0600    Or    oxyCODONE-acetaminophen (PERCOCET) 5-325 MG per tablet 2 tablet  2 tablet Oral Q6H PRN Apolonia Lemus MD   2 tablet at 06/19/22 1457    acetaminophen (TYLENOL) tablet 650 mg  650 mg Oral Q4H PRN Apolonia Lemus MD   650 mg at 06/16/22 0029    atorvastatin (LIPITOR) tablet 40 mg  40 mg Oral Nightly Apolonia Lemus MD   40 mg at 06/18/22 2105    doxepin (SINEQUAN) capsule 20 mg  20 mg Oral Nightly Apolonia Lemus MD   20 mg at 06/18/22 2105    fenofibrate (TRIGLIDE) tablet 160 mg  160 mg Oral Daily Apolonia Lemus MD   160 mg at 06/19/22 4301    losartan (COZAAR) tablet 25 mg  25 mg Oral Daily Dorian Manzano MD   25 mg at 06/19/22 0914    multivitamin 1 tablet  1 tablet Oral Daily Dorian Manzano MD   1 tablet at 06/19/22 1130     Prior to Admission medications    Medication Sig Start Date End Date Taking? Authorizing Provider   rivaroxaban (XARELTO) 10 MG TABS tablet Take 1 tablet by mouth every 24 hours 6/15/22  Yes Rita Quinn PA-C   oxyCODONE-acetaminophen (PERCOCET) 5-325 MG per tablet Take 1 tablet by mouth every 4 hours as needed for Pain for up to 14 days. 6/15/22 6/29/22 Yes Rita Quinn PA-C   fenofibrate (TRIGLIDE) 160 MG tablet TAKE 1 TABLET DAILY 4/18/22   Jane Smithor, DO   tiZANidine (ZANAFLEX) 2 MG tablet Take 1 tablet by mouth 3 times daily as needed (muscle cramps) 4/18/22   Jane Chatterjee, DO   doxepin [facility] 10 MG capsule TAKE 2 CAPSULES NIGHTLY 4/4/22   Jane Chatterjee, DO   hydrocortisone 2.5 % cream Apply topically 2 times daily. 4/4/22   Jane , DO   metFORMIN (GLUCOPHAGE-XR) 500 MG extended release tablet Take 1 tablet by mouth daily (with breakfast) 12/15/21   Jane , DO   atorvastatin (LIPITOR) 40 MG tablet TAKE 1 TABLET NIGHTLY 11/1/21   Jane , DO   losartan (COZAAR) 25 MG tablet TAKE 1 TABLET DAILY 8/23/21   Jane , DO   baclofen (LIORESAL) 10 MG tablet Take 1 tablet by mouth daily as needed (muscle spasms) 7/13/21   Jane Smithor, DO   melatonin 3 MG TABS tablet Take 3 mg by mouth daily     Historical Provider, MD   Acetaminophen 500 MG CAPS Take 1,000 mg by mouth as needed for Pain     Historical Provider, MD   KRILL OIL PO Take 350 mg by mouth daily    Historical Provider, MD   Multiple Vitamins-Minerals (THERAPEUTIC MULTIVITAMIN-MINERALS) tablet Take 1 tablet by mouth daily    Historical Provider, MD   aspirin 81 MG tablet Take 81 mg by mouth daily.       Historical Provider, MD   Scheduled Meds:   ceFAZolin  2,000 mg IntraVENous Q8H    aspirin  325 mg Oral Daily    insulin lispro  0-6 Units SubCUTAneous TID WC    insulin lispro  0-3 Units SubCUTAneous Nightly    lactobacillus  1 capsule Oral Daily with breakfast    atorvastatin  40 mg Oral Nightly    doxepin  20 mg Oral Nightly    fenofibrate  160 mg Oral Daily    losartan  25 mg Oral Daily    multivitamin  1 tablet Oral Daily     Continuous Infusions:   dextrose      sodium chloride 100 mL/hr at 06/19/22 0917     PRN Meds:.morphine, cyclobenzaprine, magnesium sulfate, glucose, dextrose bolus **OR** dextrose bolus, glucagon (rDNA), dextrose, potassium chloride **OR** potassium alternative oral replacement **OR** potassium chloride, oxyCODONE-acetaminophen **OR** oxyCODONE-acetaminophen, acetaminophen    Allergies   Allergen Reactions    Adhesive Tape      Family History   Problem Relation Age of Onset    High Cholesterol Mother     High Blood Pressure Mother    [facility] Pacemaker Mother     Other Mother         Sundowners syndrome    Crohn's Disease Mother     Cancer Mother         Uterine and cervical and vagina    Thyroid Disease Mother     Dementia Mother     Colon Polyps Mother     Other Father         Pneumonia    Dementia Father     Diabetes Sister     Breast Cancer Sister     Heart Disease Brother     Colon Cancer Neg Hx      Social History     Socioeconomic History    Marital status:      Spouse name: Fabian Alejandre Number of children: 1    Years of education: 12    Highest education level: Associate degree: academic program   Occupational History    Not on file   Tobacco Use    Smoking status: Current Every Day Smoker     Packs/day: 1.50     Years: 49.00     Pack years: 73.50     Types: Cigarettes    Smokeless tobacco: Never Used   Vaping Use    Vaping Use: Never used   Substance and Sexual Activity    Alcohol use: No     Comment: monthly glass of wine    Drug use: No    Sexual activity: Not on file   Other Topics Concern    Not on file Social History Narrative    Not on file     Social Determinants of Health     Financial Resource Strain:     Difficulty of Paying Living Expenses: Not on file   Food Insecurity:     Worried About Running Out of Food in the Last Year: Not on file    [place] of Food in the Last Year: Not on file   Transportation Needs:     Lack of Transportation (Medical): Not on file    Lack of Transportation (Non-Medical): Not on file   Physical Activity:     Days of Exercise per Week: Not on file    Minutes of Exercise per Session: Not on file   Stress:     Feeling of Stress : Not on file   Social Connections:     Frequency of Communication with Friends and Family: Not on file    Frequency of Social Gatherings with Friends and Family: Not on file    Attends Latter day Services: Not on file    Active Member of [address] [place] [address] or Organizations: Not on file    Attends Club or Organization Meetings: Not on file    Marital Status: Not on file   Intimate Partner Violence:     Fear of Current or Ex-Partner: Not on file    Emotionally Abused: Not on file    Physically Abused: Not on file    Sexually Abused: Not on file   Housing Stability:     Unable to Pay for Housing in the Last Year: Not on file    Number of [place] in the Last Year: Not on file    Unstable Housing in the Last Year: Not on file     ROS:   Constitutional: Denies any recent wt change. Eyes:  Denies any blurring or double vision, no glaucoma  Ears/Nose/Mouth/Throat:  Denies any chronic sinus/rhinitis, bleeding gums  Cardiovascular:  As described above.     Respiratory:  Denies any frequent cough, wheezing or coughing up blood  Genitourinary:  Denies difficulty with urination and kidney stones  Gastrointestinal:  Denies any chronic problems with abdominal pain, nausea, vomiting or diarrhea  Musculoskeletal:  Denies any joint pain, back pain, or difficulty walking  Integumentary:  Denies any rash  Neurological:  No numbness or tingling  Endocrine:  Denies any polydipsia. Hematologic/Lymphatic:  Denies any hemorrhage or lymphatic drainage problems. Labs:  CBC: No results for input(s): WBC, HGB, HCT, MCV, PLT in the last 72 hours. BMP: No results for input(s): NA, K, CL, CO2, PHOS, BUN, CREATININE, CA, MG in the last 72 hours. Accucheck Glucoses:   Recent Labs     06/18/22  1049 06/18/22  1557 06/18/22  2126 06/19/22  0753 06/19/22  1043   POCGLU 118* 145* 105 72 96     Cardiac Enzymes: No results for input(s): CKTOTAL, CKMB, CKMBINDEX, TROPONINI in the last 72 hours. PT/INR: No results for input(s): PROTIME, INR in the last 72 hours. APTT: No results for input(s): APTT in the last 72 hours.   Liver Profile:  Lab Results   Component Value Date    AST 19 12/08/2021    ALT 11 12/08/2021    BILITOT 0.4 12/08/2021    ALKPHOS 104 12/08/2021     Lab Results   Component Value Date    CHOL 147 12/08/2021    HDL 38 12/08/2021    HDL 44 02/04/2012    HDL 44 02/04/2012    TRIG 126 12/08/2021     TSH:   Lab Results   Component Value Date    TSH 1.760 12/08/2021     UA:   Lab Results   Component Value Date    COLORU YELLOW 04/08/2022    PHUR 5.5 04/08/2022    WBCUA 2-4 04/08/2022    RBCUA 0-2 04/08/2022    YEAST NONE SEEN 04/08/2022    BACTERIA FEW 04/08/2022    LEUKOCYTESUR NEGATIVE 04/08/2022    UROBILINOGEN 0.2 04/08/2022    BILIRUBINUR NEGATIVE 04/08/2022    BLOODU NEGATIVE 04/08/2022    GLUCOSEU NEGATIVE 04/08/2022         Physical Exam:  Vitals:    06/19/22 1445   BP: 138/84   Pulse: 92   Resp: 17   Temp: 98.1 °F (36.7 °C)   SpO2: 92%        Intake/Output Summary (Last 24 hours) at 6/19/2022 1530  Last data filed at 6/19/2022 1427  Gross per 24 hour   Intake 1260 ml   Output 1925 ml   Net -665 ml      General:  No acute distress  Neck: Supple, no JVD, no carotid bruits  Heart: Regular rhythm normal S1 and S2, no rubs, murmurs or gallops  Lungs: clear to ascultation no rales, wheezes, or rhonchi  Abdomen: positive bowel sounds, soft, non-tender, non-distended, no bruits, no masses  Extremities:no clubbing, cyanosis or edema, left LE wrapped  Neurologic: alert and oriented x 3, cranial nerves 2-12 grossly intact, motor and sensory intact, moving all extremities  Skin: No rashes  Psych: AO x 3, no depression/sia, no pressured speech, normal affect  Lymph: No obvious LAD      Assessment:  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 Telemetry  2. K>4, Mg>2  3. If recurrent or significant, consider Metoprolol  4. No other indication for further work-up at this time  5. QOD electrolytes  6. Monitor anemia  7. Further recommendations based on results and clinical course    Thank you for allowing us to participate in the care of this patient. Please do not hesitate to call us with questions.     Electronically signed by Stella Harper MD on 6/19/2022 at 3:30 PM    Interventional Cardiology - [facility]

## 2022-06-19 NOTE — PLAN OF CARE
Problem: Discharge Planning  Goal: Discharge to home or other facility with appropriate resources  Outcome: Progressing  Flowsheets (Taken 6/18/2022 2253)  Discharge to home or other facility with appropriate resources:   Identify barriers to discharge with patient and caregiver   Arrange for needed discharge resources and transportation as appropriate   Identify discharge learning needs (meds, wound care, etc)     Problem: Pain  Goal: Verbalizes/displays adequate comfort level or baseline comfort level  Outcome: Progressing  Flowsheets (Taken 6/18/2022 2253)  Verbalizes/displays adequate comfort level or baseline comfort level:   Encourage patient to monitor pain and request assistance   Assess pain using appropriate pain scale   Administer analgesics based on type and severity of pain and evaluate response    Care plan reviewed with patient and   Patient  verbalize understanding of the plan of care and contribute to goal setting.

## 2022-06-19 NOTE — PROGRESS NOTES
Orthopaedic Progress Note      SUBJECTIVE   Ms. Bogdan Mckeon is post op day # 7     Patient notes noted to have a run of 4 beat vtach. Cardiology was consulted. She denies any SOB or CP. Denies lightheadedness   Right knee infection   Status post [id number] and poly exchange   PICC LINE   Staph aures infection       OBJECTIVE      Physical    VITALS:  BP (!) 140/64   Pulse 90   Temp 98.2 °F (36.8 °C) (Oral)   Resp 18   Ht 5' 7\" (1.702 m)   Wt 180 lb (81.6 kg)   SpO2 90%   BMI 28.19 kg/m²   I/O last 3 completed shifts:   In: 2220 [P.O.:2220]  Out: 1700 [Urine:1700]      Right knee limited ROM secondary to bilateral knee contractures   Dressing is dry and intact       Data  CBC:   Lab Results   Component Value Date    WBC 6.1 06/13/2022    HGB 9.7 06/13/2022     06/13/2022     BMP:    Lab Results   Component Value Date     06/13/2022    K 3.7 06/13/2022    K 4.6 05/09/2022     06/13/2022    CO2 19 06/13/2022    BUN 9 06/13/2022    CREATININE 0.3 06/13/2022    CALCIUM 7.8 06/13/2022    GLUCOSE 91 06/13/2022    GLUCOSE 113 12/10/2020     Uric Acid:  No components found for: URIC  PT/INR:    Lab Results   Component Value Date    PROTIME 11.0 10/05/2016    INR 0.90 10/05/2016     Troponin:  No results found for: TROPONINI  Urine Culture:  No components found for: CURINE      Current Inpatient Medications    Current Facility-Administered Medications: ceFAZolin (ANCEF) 2000 mg in dextrose 5 % 50 mL IVPB, 2,000 mg, IntraVENous, Q8H  morphine (PF) injection 2 mg, 2 mg, IntraVENous, Q2H PRN  cyclobenzaprine (FLEXERIL) tablet 10 mg, 10 mg, Oral, TID PRN  aspirin tablet 325 mg, 325 mg, Oral, Daily  magnesium sulfate 2000 mg in 50 mL IVPB premix, 2,000 mg, IntraVENous, PRN  insulin lispro (HUMALOG) injection vial 0-6 Units, 0-6 Units, SubCUTAneous, TID WC  insulin lispro (HUMALOG) injection vial 0-3 Units, 0-3 Units, SubCUTAneous, Nightly  glucose chewable tablet 16 g, 4 tablet, Oral, PRN  dextrose bolus 10% 125 mL, 125 mL, IntraVENous, PRN **OR** dextrose bolus 10% 250 mL, 250 mL, IntraVENous, PRN  glucagon (rDNA) injection 1 mg, 1 mg, IntraMUSCular, PRN  dextrose 5 % solution, 100 mL/hr, IntraVENous, PRN  potassium chloride (KLOR-CON M) extended release tablet 40 mEq, 40 mEq, Oral, PRN **OR** potassium bicarb-citric acid (EFFER-K) effervescent tablet 40 mEq, 40 mEq, Oral, PRN **OR** potassium chloride 10 mEq/100 mL IVPB (Peripheral Line), 10 mEq, IntraVENous, PRN  lactobacillus (CULTURELLE) capsule 1 capsule, 1 capsule, Oral, Daily with breakfast  0.9 % sodium chloride infusion, , IntraVENous, Continuous  oxyCODONE-acetaminophen (PERCOCET) 5-325 MG per tablet 1 tablet, 1 tablet, Oral, Q6H PRN **OR** oxyCODONE-acetaminophen (PERCOCET) 5-325 MG per tablet 2 tablet, 2 tablet, Oral, Q6H PRN  acetaminophen (TYLENOL) tablet 650 mg, 650 mg, Oral, Q4H PRN  atorvastatin (LIPITOR) tablet 40 mg, 40 mg, Oral, Nightly  doxepin (SINEQUAN) capsule 20 mg, 20 mg, Oral, Nightly  fenofibrate (TRIGLIDE) tablet 160 mg, 160 mg, Oral, Daily  losartan (COZAAR) tablet 25 mg, 25 mg, Oral, Daily  multivitamin 1 tablet, 1 tablet, Oral, Daily        PLAN    PT OT  Awaiting [facility] approval for discharge to [facility]

## 2022-06-20 LAB
GLUCOSE BLD-MCNC: 104 MG/DL (ref 70–108)
GLUCOSE BLD-MCNC: 110 MG/DL (ref 70–108)
GLUCOSE BLD-MCNC: 133 MG/DL (ref 70–108)
GLUCOSE BLD-MCNC: 72 MG/DL (ref 70–108)
GLUCOSE BLD-MCNC: 80 MG/DL (ref 70–108)

## 2022-06-20 PROCEDURE — 2580000003 HC RX 258: Performed by: ORTHOPAEDIC SURGERY

## 2022-06-20 PROCEDURE — 6360000002 HC RX W HCPCS: Performed by: INTERNAL MEDICINE

## 2022-06-20 PROCEDURE — 2580000003 HC RX 258: Performed by: INTERNAL MEDICINE

## 2022-06-20 PROCEDURE — 97530 THERAPEUTIC ACTIVITIES: CPT

## 2022-06-20 PROCEDURE — 1200000003 HC TELEMETRY R&B

## 2022-06-20 PROCEDURE — 6370000000 HC RX 637 (ALT 250 FOR IP): Performed by: ORTHOPAEDIC SURGERY

## 2022-06-20 PROCEDURE — 97110 THERAPEUTIC EXERCISES: CPT

## 2022-06-20 PROCEDURE — 82948 REAGENT STRIP/BLOOD GLUCOSE: CPT

## 2022-06-20 RX ADMIN — CEFAZOLIN 2000 MG: 10 INJECTION, POWDER, FOR SOLUTION INTRAVENOUS at 04:48

## 2022-06-20 RX ADMIN — LOSARTAN POTASSIUM 25 MG: 25 TABLET, FILM COATED ORAL at 09:25

## 2022-06-20 RX ADMIN — CYCLOBENZAPRINE 10 MG: 10 TABLET, FILM COATED ORAL at 05:43

## 2022-06-20 RX ADMIN — CEFAZOLIN 2000 MG: 10 INJECTION, POWDER, FOR SOLUTION INTRAVENOUS at 13:42

## 2022-06-20 RX ADMIN — CYCLOBENZAPRINE 10 MG: 10 TABLET, FILM COATED ORAL at 21:47

## 2022-06-20 RX ADMIN — SODIUM CHLORIDE: 9 INJECTION, SOLUTION INTRAVENOUS at 13:42

## 2022-06-20 RX ADMIN — FENOFIBRATE 160 MG: 160 TABLET ORAL at 09:25

## 2022-06-20 RX ADMIN — CEFAZOLIN 2000 MG: 10 INJECTION, POWDER, FOR SOLUTION INTRAVENOUS at 21:49

## 2022-06-20 RX ADMIN — OXYCODONE AND ACETAMINOPHEN 2 TABLET: 5; 325 TABLET ORAL at 21:47

## 2022-06-20 RX ADMIN — Medication 1 TABLET: at 13:46

## 2022-06-20 RX ADMIN — DOXEPIN HYDROCHLORIDE 20 MG: 10 CAPSULE ORAL at 21:48

## 2022-06-20 RX ADMIN — ATORVASTATIN CALCIUM 40 MG: 40 TABLET, FILM COATED ORAL at 21:47

## 2022-06-20 RX ADMIN — ASPIRIN 325 MG: 325 TABLET ORAL at 09:25

## 2022-06-20 RX ADMIN — Medication 1 CAPSULE: at 09:25

## 2022-06-20 ASSESSMENT — PAIN DESCRIPTION - DESCRIPTORS
DESCRIPTORS: ACHING;DISCOMFORT
DESCRIPTORS: ACHING;DISCOMFORT
DESCRIPTORS: ACHING;DISCOMFORT;SORE

## 2022-06-20 ASSESSMENT — PAIN DESCRIPTION - ORIENTATION
ORIENTATION: RIGHT
ORIENTATION: RIGHT;LEFT
ORIENTATION: RIGHT;LEFT

## 2022-06-20 ASSESSMENT — PAIN DESCRIPTION - LOCATION
LOCATION: KNEE;LEG
LOCATION: KNEE;LEG
LOCATION: KNEE

## 2022-06-20 ASSESSMENT — PAIN SCALES - GENERAL
PAINLEVEL_OUTOF10: 7
PAINLEVEL_OUTOF10: 2
PAINLEVEL_OUTOF10: 2
PAINLEVEL_OUTOF10: 5
PAINLEVEL_OUTOF10: 8
PAINLEVEL_OUTOF10: 2

## 2022-06-20 ASSESSMENT — PAIN - FUNCTIONAL ASSESSMENT: PAIN_FUNCTIONAL_ASSESSMENT: PREVENTS OR INTERFERES SOME ACTIVE ACTIVITIES AND ADLS

## 2022-06-20 ASSESSMENT — PAIN DESCRIPTION - ONSET: ONSET: ON-GOING

## 2022-06-20 ASSESSMENT — PAIN DESCRIPTION - DIRECTION: RADIATING_TOWARDS: THIGH TO KNEE

## 2022-06-20 ASSESSMENT — PAIN DESCRIPTION - FREQUENCY: FREQUENCY: INTERMITTENT

## 2022-06-20 ASSESSMENT — PAIN DESCRIPTION - PAIN TYPE: TYPE: SURGICAL PAIN

## 2022-06-20 NOTE — PROGRESS NOTES
Orthopaedic Progress Note      SUBJECTIVE   Ms. Chance Nichole is post op day # 8     Patient notes right knee infection  PICC LINE  Status post arthrotomy and poly exchange       OBJECTIVE      Physical    VITALS:  BP (!) 158/58   Pulse 88   Temp 98.8 °F (37.1 °C) (Oral)   Resp 24   Ht 5' 7\" (1.702 m)   Wt 180 lb (81.6 kg)   SpO2 98%   BMI 28.19 kg/m²   I/O last 3 completed shifts: In: 36 [P.O.:1260;  I.V.:1750]  Out: 1925 [Urine:1925]      NVI senstion intact  No pain in calf  Minimal ROM of knees bilaterally  Right knee dry dressing       Data  CBC:   Lab Results   Component Value Date    WBC 6.1 06/13/2022    HGB 9.7 06/13/2022     06/13/2022     BMP:    Lab Results   Component Value Date     06/13/2022    K 3.7 06/13/2022    K 4.6 05/09/2022     06/13/2022    CO2 19 06/13/2022    BUN 9 06/13/2022    CREATININE 0.3 06/13/2022    CALCIUM 7.8 06/13/2022    GLUCOSE 91 06/13/2022    GLUCOSE 113 12/10/2020     Uric Acid:  No components found for: URIC  PT/INR:    Lab Results   Component Value Date    PROTIME 11.0 10/05/2016    INR 0.90 10/05/2016     Troponin:  No results found for: TROPONINI  Urine Culture:  No components found for: CURINE      Current Inpatient Medications    Current Facility-Administered Medications: ceFAZolin (ANCEF) 2000 mg in dextrose 5 % 50 mL IVPB, 2,000 mg, IntraVENous, Q8H  morphine (PF) injection 2 mg, 2 mg, IntraVENous, Q2H PRN  cyclobenzaprine (FLEXERIL) tablet 10 mg, 10 mg, Oral, TID PRN  aspirin tablet 325 mg, 325 mg, Oral, Daily  magnesium sulfate 2000 mg in 50 mL IVPB premix, 2,000 mg, IntraVENous, PRN  insulin lispro (HUMALOG) injection vial 0-6 Units, 0-6 Units, SubCUTAneous, TID WC  insulin lispro (HUMALOG) injection vial 0-3 Units, 0-3 Units, SubCUTAneous, Nightly  glucose chewable tablet 16 g, 4 tablet, Oral, PRN  dextrose bolus 10% 125 mL, 125 mL, IntraVENous, PRN **OR** dextrose bolus 10% 250 mL, 250 mL, IntraVENous, PRN  glucagon (rDNA) injection 1 mg, 1 mg, IntraMUSCular, PRN  dextrose 5 % solution, 100 mL/hr, IntraVENous, PRN  potassium chloride (KLOR-CON M) extended release tablet 40 mEq, 40 mEq, Oral, PRN **OR** potassium bicarb-citric acid (EFFER-K) effervescent tablet 40 mEq, 40 mEq, Oral, PRN **OR** potassium chloride 10 mEq/100 mL IVPB (Peripheral Line), 10 mEq, IntraVENous, PRN  lactobacillus (CULTURELLE) capsule 1 capsule, 1 capsule, Oral, Daily with breakfast  0.9 % sodium chloride infusion, , IntraVENous, Continuous  oxyCODONE-acetaminophen (PERCOCET) 5-325 MG per tablet 1 tablet, 1 tablet, Oral, Q6H PRN **OR** oxyCODONE-acetaminophen (PERCOCET) 5-325 MG per tablet 2 tablet, 2 tablet, Oral, Q6H PRN  acetaminophen (TYLENOL) tablet 650 mg, 650 mg, Oral, Q4H PRN  atorvastatin (LIPITOR) tablet 40 mg, 40 mg, Oral, Nightly  doxepin (SINEQUAN) capsule 20 mg, 20 mg, Oral, Nightly  fenofibrate (TRIGLIDE) tablet 160 mg, 160 mg, Oral, Daily  losartan (COZAAR) tablet 25 mg, 25 mg, Oral, Daily  multivitamin 1 tablet, 1 tablet, Oral, Daily        PLAN    Continue PT and OT  NWB KIERSTENE

## 2022-06-20 NOTE — CARE COORDINATION
6/20/22, 2:03 PM EDT    DISCHARGE PLANNING EVALUATION      precert in progress for return to [facility].

## 2022-06-20 NOTE — PROGRESS NOTES
[address] [facility]  Occupational Therapy  Daily Note  Time:   Time In: 8178  Time Out: 1047  Timed Code Treatment Minutes: 10 Minutes  Minutes: 10          Date: 2022  Patient Name: Jose Jaime,   Gender: female      Room: [facility]/014-A  MRN: [id number]  : 1960  (60 y.o.)  Referring Practitioner: Husam Hopkins MD  Diagnosis: infection of total right knee replacement  Additional Pertinent Hx: Pt is a 60 y.o. female s/p Arthrotomy right knee for infection as well as polyethylene change    Restrictions/Precautions:  Restrictions/Precautions: General Precautions,Fall Risk,Weight Bearing  Right Lower Extremity Weight Bearing: Non Weight Bearing  Position Activity Restriction  Other position/activity restrictions: limited L knee extension-contracted in flexion     SUBJECTIVE: Patient supine in bed upon arrival; agreeable to up to w/c this date. Patient cooperative throughout. Nursing ok'd session. PAIN: 3/10:     Vitals: Vitals not assessed per clinical judgement, see nursing flowsheet    COGNITION: Decreased Problem Solving and Decreased Safety Awareness    ADL:   No ADL's completed this session. Bebo Ann BALANCE:  Sitting Balance:  Supervision. BED MOBILITY:  Supine to Sit: Moderate Assistance, X 1, with head of bed flat, with rail, with verbal cues , with increased time for completion trunk to upright position    TRANSFERS:  Sliding Board: Moderate Assistance, X 1, with increased time for completion, cues for hand placement, with verbal cues. EOB to w/c, patient unable to maintain NWB of RLE with increased verbal cues and tactile input    FUNCTIONAL MOBILITY:  Assistive Device: Wheelchair  Assist Level:  Minimal Assistance.    Distance: within room for positioning     ADDITIONAL ACTIVITIES:  Patient completed BUE strengthening exercises with skilled education on HEP: completed x10 reps x1 set with a moderate resistance band in all joints and all planes in order to improve UE strength and activity tolerance required for BADL routine and toilet / shower transfers. Patient tolerated good, requiring minimal rest breaks. Patient also required minimal cues for technique. ASSESSMENT:     Activity Tolerance:  Patient tolerance of  treatment: fair. Discharge Recommendations: Subacute/skilled nursing facility  Equipment Recommendations: Equipment Needed: Yes  Other: continue to assess needs - drop arm commode  Plan: Times per Week: 6x  Times per Day: Daily  Current Treatment Recommendations: Strengthening,Balance training,Functional mobility training,Endurance training,Safety education & training,Patient/Caregiver education & training,Self-Care / ADL    Patient Education  Patient Education: Role of OT, Plan of Care, IADL's, Home Exercise Program, Precautions, Equipment Education, Importance of Increasing Activity and Assistive Device Safety    Goals  Short Term Goals  Time Frame for Short term goals: by discharge  Short Term Goal 1: Pt will complete slide board t/fs to/from [facility] with no > than Min A and 0-2 VC for maintaining NWB to RLE for increased indep with toileting  Short Term Goal 2: Pt will complete LB ADLs with Min A and AE prn for increased indep with dressing  Short Term Goal 3: Pt will complete BADL routine while seated unsupported with no > than Min A for increased indep with self care    Following session, patient left in safe position with all fall risk precautions in place.

## 2022-06-20 NOTE — PROGRESS NOTES
[address] [address]  INPATIENT PHYSICAL THERAPY  DAILY NOTE  [facility] ORTHOPEDICS 7K - 7K-14/014-A    Time In: 9920  Time Out: 1536  Timed Code Treatment Minutes: 40 Minutes  Minutes: 44          Date: 2022  Patient Name: Bhavani Stacy,  Gender:  female        MRN: [id number]  : 1960  (60 y.o.)     Referring Practitioner: Jose Martin Mark MD  Diagnosis: infection of R total knee replacement  Additional Pertinent Hx: Per EMR \"The patient is a 60 y.o. female who presents with pain and increased drainage from her right knee incision. She is about 4 weeks out from her revision total knee arthroplasty after periprosthetic femur fracture. Patient states that the wound opened up a bit when they took out the staples. It has continued to drain from the wound and increased drainage and pain in the past 3-4 days. She is unsure of any fevers, denies chills. She is currently in a nursing home for rehab and is working in therapy. Wearing knee immobilizer as instructed. We were consulted for rule out of septic knee. Culture swabs from the wound were done by ED staff. \" Pt is s/p Arthrotomy right knee for infection as well as polyethylene change completed by Dr. Mateo Russell on . Prior Level of Function:  Lives With: Spouse  Type of Home: House  Home Layout: One level  Home Access: Stairs to enter with rails  Entrance Stairs - Number of Steps: 3 FABIO  Entrance Stairs - Rails: Left  Home Equipment: Grab bars,Cane,Walker, rolling   Bathroom Shower/Tub: Tub/Shower unit  Bathroom Toilet: Handicap height  Bathroom Equipment: Shower chair    ADL Assistance: Independent  Homemaking Assistance: Needs assistance  Ambulation Assistance: Independent  Transfer Assistance: Independent  Active : Yes  Additional Comments:  completes cooking and cleaning tasks. Pt amb with [facility] prior.  Pt has recently been at [facility] for therapy for the last 2 weeks, plans to return home.    Restrictions/Precautions:  Restrictions/Precautions: General Precautions,Fall Risk,Weight Bearing  Right Lower Extremity Weight Bearing: Non Weight Bearing  Position Activity Restriction  Other position/activity restrictions: limited L knee extension-contracted in flexion     SUBJECTIVE: Patient in [facility] upon arrival, agreed and cooperative for therapy. Reports feeling like she is ready to get back to bed. Patient incontinent of urine inside depends, reports sitting in this for a little while d/t being outside and then not being able to transfer to [facility] yet at this time. Required extra time for franklin-care and depends change once back in bed. PAIN: Yes, pain in bilateral knees, not rated. Vitals: Vitals not assessed per clinical judgement, see nursing flowsheet    OBJECTIVE:  Bed Mobility:   Rolling to left: Minimal-moderate assistance x1 with HOB with use of HR  Rolling to right: Minimal Assistance x1 HOB flat with use of HR  Sit to Supine: Minimal Assistance, X 1, with head of bed flat, with verbal cues , with increased time for completion     Transfers:  Slide Board: Moderate Assistance, X 1, with increased time for completion, cues for hand placement, with verbal cues. Completed from WC-> bed, completed in multiple increments. Exercise:  Patient was guided in 1 set(s) 10 reps of exercise to both lower extremities. Completed contract-relax stretch x5 reps, hold 10 seconds to improve knee extension bilaterally. Ankle pumps, Glut sets and Quad sets. Exercises were completed for increased independence with functional mobility. Functional Outcome Measures: Completed       ASSESSMENT:  Assessment: Patient progressing toward established goals. The patient tolerated session fairly, requires increased assistance to complete slide board safely at this time and is unsafe to stand at this time. Also, demonstrates increased extension contractures, which causes increased difficulty with functional mobility. Activity Tolerance:  Patient tolerance of  treatment: good. Equipment Recommendations:Equipment Needed: No  Discharge Recommendations: [address], Patient would benefit from continued PT at discharge and Inpatient Therapy Stay  Plan: Specific Instructions for Next Treatment: slide board transfer  Current Treatment Recommendations: Strengthening,Balance training,Functional mobility training,Equipment evaluation, education, & procurement,Neuromuscular re-education,Endurance training,Patient/Caregiver education & training,Therapeutic activities,Safety education & training,Home exercise program  Plan:  (6x O)  Specific Instructions for Next Treatment: slide board transfer    Patient Education  Patient Education: Plan of Care, Precautions/Restrictions, Bed Mobility, Transfers, Reviewed Prior Education, Health Promotion and Wellness Education, - Patient Requires Continued Education    Goals:  Patient goals : return home eventually  Short Term Goals  Time Frame for Short term goals: by discharge  Short term goal 1: Pt will demo IND with bed mobility tasks with bed flat to progress with overall mobility. Short term goal 2: Pt will tolerate 10 min sitting on EOB with IND to prepare for mobility. Short term goal 3: Pt will tolerate 10-20 reps of ther ex to increase overall mobility. Short term goal 4: Pt to complete slide board transfer from bed <-> w/c with CGA while maintaining R LE NWB for improved mobility in her environment  Long Term Goals  Time Frame for Long term goals : NA due to short ELOS    Following session, patient left in safe position with all fall risk precautions in place.

## 2022-06-20 NOTE — PROGRESS NOTES
Comprehensive Nutrition Assessment    Type and Reason for Visit:  Initial,RD Nutrition Re-Screen/LOS    Nutrition Recommendations/Plan:   1. Recommend continue current diet. 2. Encouraged po, protein intake at best efforts - declines ONS. Spouse bringing in food from home. 3. Recommend continue MVI. Malnutrition Assessment:  Malnutrition Status: At risk for malnutrition (Comment) (06/20/22 1138)    Context:  Acute Illness     Findings of the 6 clinical characteristics of malnutrition:  Energy Intake:  Mild decrease in energy intake (Comment)  Weight Loss:  Unable to assess (stated weight)     Body Fat Loss:  No significant body fat loss     Muscle Mass Loss:  No significant muscle mass loss    Fluid Accumulation:  Unable to assess     Strength:  Not Performed    Nutrition Assessment:      Pt. nutritionally compromised AEB wounds. At risk for further nutrition compromise r/t increased nutrient needs for wound healing, knee infection, dislikes of hospital, ECF food and underlying medical condition (hx DM, HLD, breast cancer). Nutrition Related Findings:      Wound Type: Surgical Incision (5/10/22: knee surgery; 6/12/22: revision)     Pt. Report/Treatments/Miscellaneous: pt. Seen - reports appetite is good but doesn't like food at [facility]; denies any trouble w/ chewing/swallowing or nausea/abdominal pain; states doing well w/ protein sources; spouse bringing in food from outside; await ECF placement  GI Status: 2 BMs noted past 24 hours  Pertinent Labs: 6/13: Glucose 91, BUN 9, Cr 0.3; 12/8/21: HgbA1c 5.3%  Pertinent Meds: ATB, Culturelle, Insulin, MVI    Current Nutrition Intake & Therapies:    Average Meal Intake: 1-25%,26-50%,%     ADULT DIET;  Regular; 5 carb choices (75 gm/meal)    Anthropometric Measures:  Height: 5' 7\" (170.2 cm)  Ideal Body Weight (IBW): 135 lbs (61 kg)    Admission Body Weight: 180 lb (81.6 kg) (6/10 stated weight, +2 edema)  Current Body Weight: 180 lb (81.6 kg) (6/10 stated weight, +2 edema),       Current BMI (kg/m2): 28.2  Usual Body Weight:  (per EMR: 11/7/19: 245# 10 oz, 4/18/22: 190# 10 oz)                       BMI Categories: Overweight (BMI 25.0-29. 9)    Estimated Daily Nutrient Needs:  Energy Requirements Based On: Kcal/kg  Weight Used for Energy Requirements: Other (Comment) (82 kgm)  Energy (kcal/day): 4110-2845 kcals (20-25)  Weight Used for Protein Requirements: Ideal (61 kgm)  Protein (g/day): 73-92 gm (1.2-1.5)       Nutrition Diagnosis:   · Increased nutrient needs related to increase demand for energy/nutrients as evidenced by wounds      Nutrition Interventions:   Food and/or Nutrient Delivery: Continue Current Diet (Declines ONS)  Nutrition Education/Counseling: Education initiated (6/20 Encouraged po, protein intake at best efforts. Encouraged MVI.)  Coordination of Nutrition Care: Continue to monitor while inpatient       Goals:     Goals: PO intake 75% or greater,by next RD assessment       Nutrition Monitoring and Evaluation:      Food/Nutrient Intake Outcomes: Diet Advancement/Tolerance,Food and Nutrient Intake  Physical Signs/Symptoms Outcomes: Biochemical Data,Chewing or Swallowing,GI Status,Fluid Status or Edema,Nutrition Focused Physical Findings,Skin,Weight    Discharge Planning:     Too soon to determine     Oksana Ro RD, LD  Contact: [phone]

## 2022-06-21 LAB
GLUCOSE BLD-MCNC: 113 MG/DL (ref 70–108)
GLUCOSE BLD-MCNC: 191 MG/DL (ref 70–108)
GLUCOSE BLD-MCNC: 76 MG/DL (ref 70–108)
GLUCOSE BLD-MCNC: 81 MG/DL (ref 70–108)
GLUCOSE BLD-MCNC: 83 MG/DL (ref 70–108)

## 2022-06-21 PROCEDURE — 6370000000 HC RX 637 (ALT 250 FOR IP): Performed by: ORTHOPAEDIC SURGERY

## 2022-06-21 PROCEDURE — 97535 SELF CARE MNGMENT TRAINING: CPT

## 2022-06-21 PROCEDURE — 1200000003 HC TELEMETRY R&B

## 2022-06-21 PROCEDURE — 97110 THERAPEUTIC EXERCISES: CPT

## 2022-06-21 PROCEDURE — 6360000002 HC RX W HCPCS: Performed by: INTERNAL MEDICINE

## 2022-06-21 PROCEDURE — 2580000003 HC RX 258: Performed by: ORTHOPAEDIC SURGERY

## 2022-06-21 PROCEDURE — 82948 REAGENT STRIP/BLOOD GLUCOSE: CPT

## 2022-06-21 PROCEDURE — 2580000003 HC RX 258: Performed by: INTERNAL MEDICINE

## 2022-06-21 PROCEDURE — 97530 THERAPEUTIC ACTIVITIES: CPT

## 2022-06-21 RX ADMIN — OXYCODONE AND ACETAMINOPHEN 2 TABLET: 5; 325 TABLET ORAL at 22:14

## 2022-06-21 RX ADMIN — CEFAZOLIN 2000 MG: 10 INJECTION, POWDER, FOR SOLUTION INTRAVENOUS at 04:23

## 2022-06-21 RX ADMIN — CYCLOBENZAPRINE 10 MG: 10 TABLET, FILM COATED ORAL at 22:14

## 2022-06-21 RX ADMIN — FENOFIBRATE 160 MG: 160 TABLET ORAL at 08:35

## 2022-06-21 RX ADMIN — SODIUM CHLORIDE: 9 INJECTION, SOLUTION INTRAVENOUS at 13:50

## 2022-06-21 RX ADMIN — CEFAZOLIN 2000 MG: 10 INJECTION, POWDER, FOR SOLUTION INTRAVENOUS at 22:15

## 2022-06-21 RX ADMIN — Medication 1 CAPSULE: at 08:38

## 2022-06-21 RX ADMIN — Medication 1 TABLET: at 08:34

## 2022-06-21 RX ADMIN — DOXEPIN HYDROCHLORIDE 20 MG: 10 CAPSULE ORAL at 22:14

## 2022-06-21 RX ADMIN — INSULIN LISPRO 1 UNITS: 100 INJECTION, SOLUTION INTRAVENOUS; SUBCUTANEOUS at 12:12

## 2022-06-21 RX ADMIN — SODIUM CHLORIDE: 9 INJECTION, SOLUTION INTRAVENOUS at 04:23

## 2022-06-21 RX ADMIN — OXYCODONE AND ACETAMINOPHEN 1 TABLET: 5; 325 TABLET ORAL at 14:56

## 2022-06-21 RX ADMIN — ATORVASTATIN CALCIUM 40 MG: 40 TABLET, FILM COATED ORAL at 22:14

## 2022-06-21 RX ADMIN — LOSARTAN POTASSIUM 25 MG: 25 TABLET, FILM COATED ORAL at 08:36

## 2022-06-21 RX ADMIN — ASPIRIN 325 MG: 325 TABLET ORAL at 08:34

## 2022-06-21 RX ADMIN — CEFAZOLIN 2000 MG: 10 INJECTION, POWDER, FOR SOLUTION INTRAVENOUS at 12:14

## 2022-06-21 ASSESSMENT — PAIN DESCRIPTION - LOCATION
LOCATION: LEG
LOCATION: KNEE

## 2022-06-21 ASSESSMENT — PAIN DESCRIPTION - DESCRIPTORS
DESCRIPTORS: ACHING
DESCRIPTORS: ACHING;DISCOMFORT
DESCRIPTORS: ACHING;DISCOMFORT
DESCRIPTORS: ACHING

## 2022-06-21 ASSESSMENT — PAIN SCALES - GENERAL
PAINLEVEL_OUTOF10: 7
PAINLEVEL_OUTOF10: 4
PAINLEVEL_OUTOF10: 2
PAINLEVEL_OUTOF10: 3
PAINLEVEL_OUTOF10: 2
PAINLEVEL_OUTOF10: 6
PAINLEVEL_OUTOF10: 2

## 2022-06-21 ASSESSMENT — PAIN DESCRIPTION - ORIENTATION
ORIENTATION: RIGHT
ORIENTATION: RIGHT;LEFT
ORIENTATION: RIGHT
ORIENTATION: RIGHT

## 2022-06-21 ASSESSMENT — PAIN DESCRIPTION - PAIN TYPE
TYPE: SURGICAL PAIN
TYPE: SURGICAL PAIN
TYPE: SURGICAL PAIN;CHRONIC PAIN

## 2022-06-21 ASSESSMENT — PAIN DESCRIPTION - ONSET
ONSET: ON-GOING

## 2022-06-21 ASSESSMENT — PAIN DESCRIPTION - FREQUENCY
FREQUENCY: INTERMITTENT

## 2022-06-21 ASSESSMENT — PAIN - FUNCTIONAL ASSESSMENT
PAIN_FUNCTIONAL_ASSESSMENT: ACTIVITIES ARE NOT PREVENTED

## 2022-06-21 NOTE — PROGRESS NOTES
[facility]  INPATIENT PHYSICAL THERAPY  DAILY NOTE  [facility] ORTHOPEDICS 7K - 7K-14/014-A    Time In: 9658  Time Out: 3833  Timed Code Treatment Minutes: 45 Minutes  Minutes: 38          Date: 2022  Patient Name: Joanie Montenegro,  Gender:  female        MRN: [id number]  : 1960  (60 y.o.)     Referring Practitioner: Dorian Manzano MD  Diagnosis: infection of R total knee replacement  Additional Pertinent Hx: Per EMR \"The patient is a 60 y.o. female who presents with pain and increased drainage from her right knee incision. She is about 4 weeks out from her revision total knee arthroplasty after periprosthetic femur fracture. Patient states that the wound opened up a bit when they took out the staples. It has continued to drain from the wound and increased drainage and pain in the past 3-4 days. She is unsure of any fevers, denies chills. She is currently in a nursing home for rehab and is working in therapy. Wearing knee immobilizer as instructed. We were consulted for rule out of septic knee. Culture swabs from the wound were done by ED staff. \" Pt is s/p Arthrotomy right knee for infection as well as polyethylene change completed by Dr. Elie Martinez on . Prior Level of Function:  Lives With: Spouse  Type of Home: House  Home Layout: One level  Home Access: Stairs to enter with rails  Entrance Stairs - Number of Steps: 3 FABIO  Entrance Stairs - Rails: Left  Home Equipment: Grab bars,Cane,Walker, rolling   Bathroom Shower/Tub: Tub/Shower unit  Bathroom Toilet: Handicap height  Bathroom Equipment: Shower chair    ADL Assistance: Independent  Homemaking Assistance: Needs assistance  Ambulation Assistance: Independent  Transfer Assistance: Independent  Active : Yes  Additional Comments:  completes cooking and cleaning tasks. Pt amb with [facility] prior.  Pt has recently been at [facility] for therapy for the last 2 weeks, plans to return home.    Restrictions/Precautions:  Restrictions/Precautions: General Precautions,Fall Risk,Weight Bearing  Right Lower Extremity Weight Bearing: Non Weight Bearing  Position Activity Restriction  Other position/activity restrictions: limited L knee extension-contracted in flexion     SUBJECTIVE: RN approved session. Patient in bed upon arrival, agreed to therapy. Reports that she will need changed before getting up to [facility]. Required extra time to complete franklin-care and donning new depends and gown. PAIN: Yes, pain in RLE/knee with movement. Vitals: Vitals not assessed per clinical judgement, see nursing flowsheet    OBJECTIVE:  Bed Mobility:  Rolling to Left: Minimal Assistance, X 1, with head of bed flat, with rail   Rolling to Right: Minimal Assistance, X 1, with head of bed flat, with rail, with increased time for completion   Supine to Sit: Moderate Assistance, X 1, with head of bed flat, with rail, with verbal cues , with increased time for completion    Transfers:  Slide Board:Contact Guard Assistance, X 1, with increased time for completion, with verbal cues, from bed-> WC. Donning shorts before transfer, completed in multiple increments and required verbal cues for keeping R foot lifted off floor. Therapist blocking WC and slide board from moving. Balance:  Static Sitting Balance:  Stand By Assistance  Dynamic Sitting Balance: Stand By Assistance while sitting on EOB to prepare for transfer    Exercise:  Completed 3-5 reps of contract-relax stretches to improve knee extension. Did show improvement in ROM, RLE> LLE. Rawleigh Billing Exercises were completed for increased independence with functional mobility. Functional Outcome Measures: completed  AM-PAC Inpatient Mobility Raw Score : 11  AM-PAC Inpatient T-Scale Score : 33.86    ASSESSMENT:  Assessment: Patient progressing toward established goals. Activity Tolerance:  Patient tolerance of  treatment: good.       Equipment Recommendations:Equipment Needed: No  Discharge Recommendations: Subacte/Skilled Nursing Facility, Patient would benefit from continued PT at discharge and Inpatient Therapy Stay  Plan: Specific Instructions for Next Treatment: slide board transfer  Current Treatment Recommendations: Strengthening,Balance training,Functional mobility training,Equipment evaluation, education, & procurement,Neuromuscular re-education,Endurance training,Patient/Caregiver education & training,Therapeutic activities,Safety education & training,Home exercise program  Plan:  (6x O)  Specific Instructions for Next Treatment: slide board transfer    Patient Education  Patient Education: Plan of Care, Precautions/Restrictions, Bed Mobility, Transfers, Reviewed Prior Education, Health Promotion and Wellness Education, - Patient Requires Continued Education    Goals:  Patient goals : return home eventually  Short Term Goals  Time Frame for Short term goals: by discharge  Short term goal 1: Pt will demo IND with bed mobility tasks with bed flat to progress with overall mobility. Short term goal 2: Pt will tolerate 10 min sitting on EOB with IND to prepare for mobility. Short term goal 3: Pt will tolerate 10-20 reps of ther ex to increase overall mobility. Short term goal 4: Pt to complete slide board transfer from bed <-> w/c with CGA while maintaining R LE NWB for improved mobility in her environment  Long Term Goals  Time Frame for Long term goals : NA due to short ELOS    Following session, patient left in safe position with all fall risk precautions in place.

## 2022-06-21 NOTE — PROGRESS NOTES
[facility]  Occupational Therapy  Daily Note  Time:   Time In: 1021  Time Out: 1036  Timed Code Treatment Minutes: 15 Minutes  Minutes: 15          Date: 2022  Patient Name: Gonzalo Ralph,   Gender: female      Room: [facility]14/014-A  MRN: [id number]  : 1960  (60 y.o.)  Referring Practitioner: Joshua Reddy MD  Diagnosis: infection of total right knee replacement  Additional Pertinent Hx: Pt is a 60 y.o. female s/p Arthrotomy right knee for infection as well as polyethylene change    Restrictions/Precautions:  Restrictions/Precautions: General Precautions,Fall Risk,Weight Bearing  Right Lower Extremity Weight Bearing: Non Weight Bearing  Position Activity Restriction  Other position/activity restrictions: limited L knee extension-contracted in flexion     SUBJECTIVE: Patient seated in w/c upon arrival; agreeable to therapy this date. Patient presents with flat affect. PAIN: no numerical scale provided however moans with movement of RLE    Vitals: Vitals not assessed per clinical judgement, see nursing flowsheet    COGNITION: Decreased Problem Solving and Decreased Safety Awareness    ADL:   Lower Extremity Dressing: Minimal Assistance, X 1, with set-up, with verbal cues  and with increased time for completion. doff B socks with reacher, mohamud B socks with sock aid with assistance for RLE management. BALANCE:  Sitting Balance:  Modified Independent. w/c     Patient completed lift off exercises in order to increase independence with slideboard transfers x 10 reps x 2 trials seated in w/c with verbal/visual cues in order to complete correctly although patient unable to fully lift bottom from w/c pad demonstrating decreased BUE strength.     BED MOBILITY:  Not Tested    TRANSFERS:  declined    FUNCTIONAL MOBILITY:  declined     ADDITIONAL ACTIVITIES:  Patient completed BUE strengthening exercises with skilled education on HEP: completed x10 reps x1 set with a moderate resistance band in all joints and all planes in order to improve UE strength and activity tolerance required for BADL routine and toilet / shower transfers. Patient tolerated fair, requiring minimal rest breaks. Patient also required minimal cues for technique. ASSESSMENT:     Activity Tolerance:  Patient tolerance of  treatment: fair. Discharge Recommendations: Subacute/skilled nursing facility  Equipment Recommendations: Equipment Needed: Yes  Other: continue to assess needs - drop arm commode  Plan: Times per Week: 6x  Times per Day: Daily  Current Treatment Recommendations: Strengthening,Balance training,Functional mobility training,Endurance training,Safety education & training,Patient/Caregiver education & training,Self-Care / ADL    Patient Education  Patient Education: ADL's, Equipment Education and Importance of Increasing Activity    Goals  Short Term Goals  Time Frame for Short term goals: by discharge  Short Term Goal 1: Pt will complete slide board t/fs to/from [facility] with no > than Min A and 0-2 VC for maintaining NWB to RLE for increased indep with toileting  Short Term Goal 2: Pt will complete LB ADLs with Min A and AE prn for increased indep with dressing  Short Term Goal 3: Pt will complete BADL routine while seated unsupported with no > than Min A for increased indep with self care    Following session, patient left in safe position with all fall risk precautions in place.

## 2022-06-21 NOTE — PROGRESS NOTES
Orthopaedic Progress Note      SUBJECTIVE   Ms. Lane is post op day # 9     Patient notes right total knee infection underwent polyexchange  PICC line in place  Noted to have staph aureus infection. Awaiting [facility] approval for rehab      OBJECTIVE      Physical    VITALS:  BP (!) 142/62   Pulse 84   Temp 98 °F (36.7 °C) (Oral)   Resp 18   Ht 5' 7\" (1.702 m)   Wt 180 lb (81.6 kg)   SpO2 94%   BMI 28.19 kg/m²   I/O last 3 completed shifts: In: 9194 [P.O.:1120; I.V.:1750]  Out: 1000 [Urine:1000]      Neurovascular intact, sensation intact  Denies any pain in the calf at this time. Dressing removed. Minimal redness is noted around the incision. She is noted to have some mild redness along the proximal portion of the incision.   Continued contraction bilateral knees 90 degrees      Data  CBC:   Lab Results   Component Value Date    WBC 6.1 06/13/2022    HGB 9.7 06/13/2022     06/13/2022     BMP:    Lab Results   Component Value Date     06/13/2022    K 3.7 06/13/2022    K 4.6 05/09/2022     06/13/2022    CO2 19 06/13/2022    BUN 9 06/13/2022    CREATININE 0.3 06/13/2022    CALCIUM 7.8 06/13/2022    GLUCOSE 91 06/13/2022    GLUCOSE 113 12/10/2020     Uric Acid:  No components found for: URIC  PT/INR:    Lab Results   Component Value Date    PROTIME 11.0 10/05/2016    INR 0.90 10/05/2016     Troponin:  No results found for: TROPONINI  Urine Culture:  No components found for: CURINE      Current Inpatient Medications    Current Facility-Administered Medications: ceFAZolin (ANCEF) 2000 mg in dextrose 5 % 50 mL IVPB, 2,000 mg, IntraVENous, Q8H  morphine (PF) injection 2 mg, 2 mg, IntraVENous, Q2H PRN  cyclobenzaprine (FLEXERIL) tablet 10 mg, 10 mg, Oral, TID PRN  aspirin tablet 325 mg, 325 mg, Oral, Daily  magnesium sulfate 2000 mg in 50 mL IVPB premix, 2,000 mg, IntraVENous, PRN  insulin lispro (HUMALOG) injection vial 0-6 Units, 0-6 Units, SubCUTAneous, TID WC  insulin lispro (HUMALOG) injection vial 0-3 Units, 0-3 Units, SubCUTAneous, Nightly  glucose chewable tablet 16 g, 4 tablet, Oral, PRN  dextrose bolus 10% 125 mL, 125 mL, IntraVENous, PRN **OR** dextrose bolus 10% 250 mL, 250 mL, IntraVENous, PRN  glucagon (rDNA) injection 1 mg, 1 mg, IntraMUSCular, PRN  dextrose 5 % solution, 100 mL/hr, IntraVENous, PRN  potassium chloride (KLOR-CON M) extended release tablet 40 mEq, 40 mEq, Oral, PRN **OR** potassium bicarb-citric acid (EFFER-K) effervescent tablet 40 mEq, 40 mEq, Oral, PRN **OR** potassium chloride 10 mEq/100 mL IVPB (Peripheral Line), 10 mEq, IntraVENous, PRN  lactobacillus (CULTURELLE) capsule 1 capsule, 1 capsule, Oral, Daily with breakfast  0.9 % sodium chloride infusion, , IntraVENous, Continuous  oxyCODONE-acetaminophen (PERCOCET) 5-325 MG per tablet 1 tablet, 1 tablet, Oral, Q6H PRN **OR** oxyCODONE-acetaminophen (PERCOCET) 5-325 MG per tablet 2 tablet, 2 tablet, Oral, Q6H PRN  acetaminophen (TYLENOL) tablet 650 mg, 650 mg, Oral, Q4H PRN  atorvastatin (LIPITOR) tablet 40 mg, 40 mg, Oral, Nightly  doxepin (SINEQUAN) capsule 20 mg, 20 mg, Oral, Nightly  fenofibrate (TRIGLIDE) tablet 160 mg, 160 mg, Oral, Daily  losartan (COZAAR) tablet 25 mg, 25 mg, Oral, Daily  multivitamin 1 tablet, 1 tablet, Oral, Daily        PLAN    Awaiting [facility] approval for rehab facility. Dry dressing changes as needed.

## 2022-06-22 VITALS
RESPIRATION RATE: 16 BRPM | DIASTOLIC BLOOD PRESSURE: 72 MMHG | HEIGHT: 67 IN | SYSTOLIC BLOOD PRESSURE: 155 MMHG | HEART RATE: 92 BPM | BODY MASS INDEX: 28.25 KG/M2 | OXYGEN SATURATION: 96 % | TEMPERATURE: 98.2 F | WEIGHT: 180 LBS

## 2022-06-22 LAB
GLUCOSE BLD-MCNC: 272 MG/DL (ref 70–108)
GLUCOSE BLD-MCNC: 77 MG/DL (ref 70–108)

## 2022-06-22 PROCEDURE — 97535 SELF CARE MNGMENT TRAINING: CPT

## 2022-06-22 PROCEDURE — 6360000002 HC RX W HCPCS: Performed by: INTERNAL MEDICINE

## 2022-06-22 PROCEDURE — 6370000000 HC RX 637 (ALT 250 FOR IP): Performed by: ORTHOPAEDIC SURGERY

## 2022-06-22 PROCEDURE — 97110 THERAPEUTIC EXERCISES: CPT

## 2022-06-22 PROCEDURE — 82948 REAGENT STRIP/BLOOD GLUCOSE: CPT

## 2022-06-22 PROCEDURE — 97530 THERAPEUTIC ACTIVITIES: CPT

## 2022-06-22 RX ADMIN — Medication 1 CAPSULE: at 12:31

## 2022-06-22 RX ADMIN — CEFAZOLIN 2000 MG: 10 INJECTION, POWDER, FOR SOLUTION INTRAVENOUS at 12:47

## 2022-06-22 RX ADMIN — CEFAZOLIN 2000 MG: 10 INJECTION, POWDER, FOR SOLUTION INTRAVENOUS at 04:00

## 2022-06-22 RX ADMIN — LOSARTAN POTASSIUM 25 MG: 25 TABLET, FILM COATED ORAL at 12:31

## 2022-06-22 RX ADMIN — CYCLOBENZAPRINE 10 MG: 10 TABLET, FILM COATED ORAL at 12:29

## 2022-06-22 RX ADMIN — OXYCODONE AND ACETAMINOPHEN 2 TABLET: 5; 325 TABLET ORAL at 12:30

## 2022-06-22 RX ADMIN — FENOFIBRATE 160 MG: 160 TABLET ORAL at 12:31

## 2022-06-22 RX ADMIN — INSULIN LISPRO 3 UNITS: 100 INJECTION, SOLUTION INTRAVENOUS; SUBCUTANEOUS at 12:45

## 2022-06-22 RX ADMIN — ASPIRIN 325 MG: 325 TABLET ORAL at 12:30

## 2022-06-22 ASSESSMENT — PAIN DESCRIPTION - DIRECTION: RADIATING_TOWARDS: THIGH TO KNEE

## 2022-06-22 ASSESSMENT — PAIN DESCRIPTION - ONSET: ONSET: ON-GOING

## 2022-06-22 ASSESSMENT — PAIN SCALES - GENERAL
PAINLEVEL_OUTOF10: 8
PAINLEVEL_OUTOF10: 3

## 2022-06-22 ASSESSMENT — PAIN DESCRIPTION - ORIENTATION: ORIENTATION: RIGHT

## 2022-06-22 ASSESSMENT — PAIN DESCRIPTION - PAIN TYPE: TYPE: SURGICAL PAIN

## 2022-06-22 ASSESSMENT — PAIN DESCRIPTION - FREQUENCY: FREQUENCY: INTERMITTENT

## 2022-06-22 ASSESSMENT — PAIN DESCRIPTION - LOCATION: LOCATION: KNEE

## 2022-06-22 ASSESSMENT — PAIN DESCRIPTION - DESCRIPTORS: DESCRIPTORS: ACHING

## 2022-06-22 ASSESSMENT — PAIN - FUNCTIONAL ASSESSMENT: PAIN_FUNCTIONAL_ASSESSMENT: ACTIVITIES ARE NOT PREVENTED

## 2022-06-22 NOTE — DISCHARGE SUMMARY
Physician Discharge Summary     Patient ID:  Yazmin Toledo  [id number]  60 y.o.  1960    Admit date: 6/10/2022    Discharge date and time: No discharge date for patient encounter. Admitting Physician: Emigdio Flores MD     Discharge Physician: Emigdio Flores MD    Admission Diagnoses: Infection of total right knee replacement, initial encounter [facility]) Gisella Chengw  History of total knee replacement, right [Z96.651]  Infection of left knee [facility]) [M00.9]    Discharge Diagnoses: Infection of total right knee replacement, initial encounter [facility] Gisella Harrison  History of total knee replacement, right [Z96.651]  Infection of left knee [facility]) [M00.9]  Hospital Course: patient noted to have infection of right total knee. Underwent arthrotomy and polyethylene change DOS 6/12/22. Patient had original total knee 6 weeks ago, then had a fall sustaining periprosthetic femur fracture. Patient then underwent revision total knee on 5/10/22. During this stay she has been started on IV abx per infectious disease and will continue IV for 6 weeks. Infection noted to be staph     Consults:  Infectious disease, internal medicine     Condition: stable    Treatments: arthrotomy, poly exchange 6/12/22 IV abx    Disposition: ECF    Patient Instructions:      Medication List      START taking these medications    ceFAZolin  infusion  Commonly known as: ANCEF  Infuse 2,000 mg intravenously every 8 hours for 14 days Compound per protocol     oxyCODONE-acetaminophen 5-325 MG per tablet  Commonly known as: PERCOCET  Take 1 tablet by mouth every 4 hours as needed for Pain for up to 14 days.         CONTINUE taking these medications    Acetaminophen 500 MG Caps     aspirin 81 MG tablet     atorvastatin 40 MG tablet  Commonly known as: LIPITOR  TAKE 1 TABLET NIGHTLY     baclofen 10 MG tablet  Commonly known as: LIORESAL  Take 1 tablet by mouth daily as needed (muscle spasms)     doxepin 10 MG capsule  Commonly known as: SINEQUAN  TAKE 2 CAPSULES NIGHTLY     fenofibrate 160 MG tablet  Commonly known as: TRIGLIDE  TAKE 1 TABLET DAILY     hydrocortisone 2.5 % cream  Apply topically 2 times daily.      KRILL OIL PO     losartan 25 MG tablet  Commonly known as: COZAAR  TAKE 1 TABLET DAILY     melatonin 3 MG Tabs tablet     metFORMIN 500 MG extended release tablet  Commonly known as: GLUCOPHAGE-XR  Take 1 tablet by mouth daily (with breakfast)     therapeutic multivitamin-minerals tablet     tiZANidine 2 MG tablet  Commonly known as: ZANAFLEX  Take 1 tablet by mouth 3 times daily as needed (muscle cramps)     Xarelto 10 MG Tabs tablet  Generic drug: rivaroxaban  Take 1 tablet by mouth every 24 hours           Where to Get Your Medications      You can get these medications from any pharmacy    Bring a paper prescription for each of these medications  · ceFAZolin  infusion  · oxyCODONE-acetaminophen 5-325 MG per tablet  · Xarelto 10 MG Tabs tablet       Activity: NWB RLE  Diet: regular  Wound Care: change dressing daily keep wound clean and dry     Follow-up next week call for appt June 29  Signed:  Diane Gramajo PA-C  6/22/2022  12:44 PM

## 2022-06-22 NOTE — PROGRESS NOTES
Orthopaedic Progress Note      SUBJECTIVE   Ms. Ashtyn Haynes is post op day # 10 s/p right total knee infection underwent poly exchange 6/12/22  Initial sx was 5/10/22  + staph aureus infection, PICC in place  Pt seen in bed, notes pain and muscles spasms to R leg  drsg dry and intact  Waiting for ECF approval       Allergies:     Allergies as of 06/10/2022 - Fully Reviewed 06/10/2022   Allergen Reaction Noted    Adhesive tape  03/15/2017     Current Inpatient Medications:  Current Facility-Administered Medications: ceFAZolin (ANCEF) 2000 mg in dextrose 5 % 50 mL IVPB, 2,000 mg, IntraVENous, Q8H  morphine (PF) injection 2 mg, 2 mg, IntraVENous, Q2H PRN  cyclobenzaprine (FLEXERIL) tablet 10 mg, 10 mg, Oral, TID PRN  aspirin tablet 325 mg, 325 mg, Oral, Daily  magnesium sulfate 2000 mg in 50 mL IVPB premix, 2,000 mg, IntraVENous, PRN  insulin lispro (HUMALOG) injection vial 0-6 Units, 0-6 Units, SubCUTAneous, TID WC  insulin lispro (HUMALOG) injection vial 0-3 Units, 0-3 Units, SubCUTAneous, Nightly  glucose chewable tablet 16 g, 4 tablet, Oral, PRN  dextrose bolus 10% 125 mL, 125 mL, IntraVENous, PRN **OR** dextrose bolus 10% 250 mL, 250 mL, IntraVENous, PRN  glucagon (rDNA) injection 1 mg, 1 mg, IntraMUSCular, PRN  dextrose 5 % solution, 100 mL/hr, IntraVENous, PRN  potassium chloride (KLOR-CON M) extended release tablet 40 mEq, 40 mEq, Oral, PRN **OR** potassium bicarb-citric acid (EFFER-K) effervescent tablet 40 mEq, 40 mEq, Oral, PRN **OR** potassium chloride 10 mEq/100 mL IVPB (Peripheral Line), 10 mEq, IntraVENous, PRN  lactobacillus (CULTURELLE) capsule 1 capsule, 1 capsule, Oral, Daily with breakfast  0.9 % sodium chloride infusion, , IntraVENous, Continuous  oxyCODONE-acetaminophen (PERCOCET) 5-325 MG per tablet 1 tablet, 1 tablet, Oral, Q6H PRN **OR** oxyCODONE-acetaminophen (PERCOCET) 5-325 MG per tablet 2 tablet, 2 tablet, Oral, Q6H PRN  acetaminophen (TYLENOL) tablet 650 mg, 650 mg, Oral, Q4H PRN  atorvastatin (LIPITOR) tablet 40 mg, 40 mg, Oral, Nightly  doxepin (SINEQUAN) capsule 20 mg, 20 mg, Oral, Nightly  fenofibrate (TRIGLIDE) tablet 160 mg, 160 mg, Oral, Daily  losartan (COZAAR) tablet 25 mg, 25 mg, Oral, Daily  multivitamin 1 tablet, 1 tablet, Oral, Daily    REVIEW OF SYSTEMS:  Constitutional: Denies any fever, chills. Derm: Denies any rash or skin color change. Musculoskeletal: Denies numbness and tingling RLE, Pain to R leg. Neuro: Denies any dizziness, paresthesia or weakness. OBJECTIVE    Patient Vitals for the past 24 hrs:   BP Temp Temp src Pulse Resp SpO2   06/22/22 0045 (!) 142/92 98 °F (36.7 °C) -- 82 15 96 %   06/21/22 2200 138/63 98 °F (36.7 °C) Oral 85 16 97 %   06/21/22 1600 138/64 97.9 °F (36.6 °C) Oral 82 16 95 %   06/21/22 1215 (!) 142/62 98 °F (36.7 °C) Oral 84 18 94 %     INTAKE/OUTPUT:    Intake/Output Summary (Last 24 hours) at 6/22/2022 0928  Last data filed at 6/22/2022 0615  Gross per 24 hour   Intake 450 ml   Output 2200 ml   Net -1750 ml     I/O last 3 completed shifts: In: 850 [P.O.:850]  Out: 2400 [Urine:2400]    PHYSICAL EXAM:  General appearance:  Alert and oriented x 3. No apparent distress, appears stated age and cooperative. Musculoskeletal: RLE:  Denies calf pain to palpation. decreased range of motion without deformity. Pt can flex and extend right toes. Right knee drsg dry and intact. Skin: Skin color normal.  No rashes or lesions. Neurologic:  Neurovascularly intact without any focal sensory/motor deficits. Sensation intact.        Data  CBC:   Lab Results   Component Value Date    WBC 6.1 06/13/2022    HGB 9.7 06/13/2022     06/13/2022     BMP:    Lab Results   Component Value Date     06/13/2022    K 3.7 06/13/2022    K 4.6 05/09/2022     06/13/2022    CO2 19 06/13/2022    BUN 9 06/13/2022    CREATININE 0.3 06/13/2022    CALCIUM 7.8 06/13/2022    GLUCOSE 91 06/13/2022    GLUCOSE 113 12/10/2020     Uric Acid:  No components found for: URIC  PT/INR:    Lab Results   Component Value Date    PROTIME 11.0 10/05/2016    INR 0.90 10/05/2016     Troponin:  No results found for: TROPONINI  Urine Culture:  No components found for: JULIA    ASSESSMENT:  C/Jim Nicole 1106 Problems    Diagnosis Date Noted    History of total knee replacement, right [Z96.651]      Priority: Medium    Hypokalemia [E87.6]      Priority: Medium    Infection of total right knee replacement ([facility] Payal Lea 06/10/2022     Priority: Medium    Diabetes mellitus type 2, noninsulin dependent ([facility] [E11.9] 08/03/2011       PLAN  1. Dry drsg changes as needed   2. NWB RLE  3. PT/OT to eval and treat   4. Continue current medical management   5.  May discharge to [facility] when approved       Electronically signed by DEREK Flores CNP on 6/22/2022 at 9:26 AM

## 2022-06-22 NOTE — PLAN OF CARE
Problem: Discharge Planning  Goal: Discharge to home or other facility with appropriate resources  Outcome: Progressing  Flowsheets (Taken 6/22/2022 0130)  Discharge to home or other facility with appropriate resources:   Identify barriers to discharge with patient and caregiver   Identify discharge learning needs (meds, wound care, etc)   Refer to discharge planning if patient needs post-hospital services based on physician order or complex needs related to functional status, cognitive ability or social support system     Problem: Pain  Goal: Verbalizes/displays adequate comfort level or baseline comfort level  Outcome: Progressing  Flowsheets (Taken 6/22/2022 0130)  Verbalizes/displays adequate comfort level or baseline comfort level:   Encourage patient to monitor pain and request assistance   Administer analgesics based on type and severity of pain and evaluate response   Consider cultural and social influences on pain and pain management     Problem: Safety - Adult  Goal: Free from fall injury  Outcome: Progressing  Flowsheets  Taken 6/22/2022 0130  Free From Fall Injury:   Instruct family/caregiver on patient safety   Based on caregiver fall risk screen, instruct family/caregiver to ask for assistance with transferring infant if caregiver noted to have fall risk factors  Taken 6/22/2022 0050  Free From Fall Injury: Instruct family/caregiver on patient safety     Problem: Chronic Conditions and Co-morbidities  Goal: Patient's chronic conditions and co-morbidity symptoms are monitored and maintained or improved  Outcome: Progressing  Flowsheets (Taken 6/22/2022 0130)  Care Plan - Patient's Chronic Conditions and Co-Morbidity Symptoms are Monitored and Maintained or Improved:   Monitor and assess patient's chronic conditions and comorbid symptoms for stability, deterioration, or improvement   Collaborate with multidisciplinary team to address chronic and comorbid conditions and prevent exacerbation or deterioration   Update acute care plan with appropriate goals if chronic or comorbid symptoms are exacerbated and prevent overall improvement and discharge     Problem: Skin/Tissue Integrity  Goal: Absence of new skin breakdown  Description: 1. Monitor for areas of redness and/or skin breakdown  2. Assess vascular access sites hourly  3. Every 4-6 hours minimum:  Change oxygen saturation probe site  4. Every 4-6 hours:  If on nasal continuous positive airway pressure, respiratory therapy assess nares and determine need for appliance change or resting period.   Outcome: Progressing     Problem: Musculoskeletal - Adult  Goal: Return mobility to safest level of function  Outcome: Progressing  Flowsheets (Taken 6/21/2022 0334)  Return Mobility to Safest Level of Function:   Assess patient stability and activity tolerance for standing, transferring and ambulating with or without assistive devices   Assist with transfers and ambulation using safe patient handling equipment as needed   Ensure adequate protection for wounds/incisions during mobilization  Goal: Maintain proper alignment of affected body part  Outcome: Progressing  Flowsheets (Taken 6/21/2022 0334)  Maintain proper alignment of affected body part:   Support and protect limb and body alignment per provider's orders   Instruct and reinforce with patient and family use of appropriate assistive device and precautions (e.g. spinal or hip dislocation precautions)     Problem: Skin/Tissue Integrity - Adult  Goal: Incisions, wounds, or drain sites healing without S/S of infection  Outcome: Progressing  Flowsheets (Taken 6/22/2022 0050)  Incisions, Wounds, or Drain Sites Healing Without Sign and Symptoms of Infection: TWICE DAILY: Assess and document skin integrity     Problem: Infection - Adult  Goal: Absence of infection at discharge  Outcome: Progressing  Flowsheets (Taken 6/21/2022 0334)  Absence of infection at discharge:   Assess and monitor for signs and symptoms of infection   Monitor lab/diagnostic results   Monitor all insertion sites i.e., indwelling lines, tubes and drains  Goal: Absence of infection during hospitalization  Outcome: Progressing     Problem: Nutrition Deficit:  Goal: Optimize nutritional status  Outcome: Progressing  Flowsheets (Taken 6/21/2022 9242)  Nutrient intake appropriate for improving, restoring, or maintaining nutritional needs:   Assess nutritional status and recommend course of action   Recommend appropriate diets, oral nutritional supplements, and vitamin/mineral supplements   Order, calculate, and assess calorie counts as needed     Care plan reviewed with patient. Patient verbalizes understanding of the plan of care and contributes  to goal setting.

## 2022-06-22 NOTE — PROGRESS NOTES
Recommendations: Equipment Needed: Yes  Other: continue to assess needs - drop arm commode  Plan: Times per Week: 6x  Times per Day: Daily  Current Treatment Recommendations: Strengthening,Balance training,Functional mobility training,Endurance training,Safety education & training,Patient/Caregiver education & training,Self-Care / ADL    Patient Education  Patient Education: ADL's     Goals  Short Term Goals  Time Frame for Short term goals: by discharge  Short Term Goal 1: Pt will complete slide board t/fs to/from [facility] with no > than Min A and 0-2 VC for maintaining NWB to RLE for increased indep with toileting  Short Term Goal 2: Pt will complete LB ADLs with Min A and AE prn for increased indep with dressing  Short Term Goal 3: Pt will complete BADL routine while seated unsupported with no > than Min A for increased indep with self care    Following session, patient left in safe position with all fall risk precautions in place.

## 2022-06-22 NOTE — CARE COORDINATION
6/22/22, 11:47 AM EDT    DISCHARGE PLANNING EVALUATION      Precert complete for [facility]. ECF can accept today. Discussed with Abelino Lawson, who is in agreement. Transport scheduled for 4:00. Adal Rosales faxed. 6/22/22, 2:37 PM EDT    Patient goals/plan/ treatment preferences discussed by  and . Patient goals/plan/ treatment preferences reviewed with patient/ family. Patient/ family verbalize understanding of discharge plan and are in agreement with goal/plan/treatment preferences. Understanding was demonstrated using the teach back method. AVS provided by RN at time of discharge, which includes all necessary medical information pertaining to the patients current course of illness, treatment, post-discharge goals of care, and treatment preferences. Services At/After Discharge: [place] ([facility])       IMM Letter  IMM Letter given to Patient/Family/Significant other/Guardian/POA/by[de-identified] Gayle BARRY Case Manager.  Form delivered. (Pt has Cedrick Clements).   IMM Letter date given[de-identified] 06/12/22  IMM Letter time given[de-identified] 7075

## 2022-06-22 NOTE — CARE COORDINATION
6/22/22, 9:01 AM EDT    DISCHARGE PLANNING EVALUATION      Call made to [facility] admissions, waiting precert approval for return.

## 2022-06-22 NOTE — PROGRESS NOTES
exs, with  RBs throughout, and visual and verbal cues for tech with resistance band to improve strength and activity tolerance to support basic ADLs. ASSESSMENT:     Activity Tolerance:  Patient tolerance of  treatment: fair. Discharge Recommendations: Subacute/skilled nursing facility  Equipment Recommendations: Equipment Needed: Yes  Other: continue to assess needs - drop arm commode  Plan: Times per Week: 6x  Times per Day: Daily  Current Treatment Recommendations: Strengthening,Balance training,Functional mobility training,Endurance training,Safety education & training,Patient/Caregiver education & training,Self-Care / ADL    Patient Education  Patient Education: ADL's, Home Exercise Program, Precautions, Equipment Education and Reviewed Prior Education    Goals  Short Term Goals  Time Frame for Short term goals: by discharge  Short Term Goal 1: Pt will complete slide board t/fs to/from [facility] with no > than Min A and 0-2 VC for maintaining NWB to RLE for increased indep with toileting  Short Term Goal 2: Pt will complete LB ADLs with Min A and AE prn for increased indep with dressing  Short Term Goal 3: Pt will complete BADL routine while seated unsupported with no > than Min A for increased indep with self care    Following session, patient left in safe position with all fall risk precautions in place.

## 2022-07-06 ENCOUNTER — TELEPHONE (OUTPATIENT)
Dept: WOUND CARE | Age: 62
End: 2022-07-06

## 2022-07-07 ENCOUNTER — TELEPHONE (OUTPATIENT)
Dept: WOUND CARE | Age: 62
End: 2022-07-07

## 2022-07-07 NOTE — TELEPHONE ENCOUNTER
Spoke to [employer] from [facility] regarding orders received yesterday from Dr Romero Prescott regarding patients IV antibiotics. Continue current antibiotic as prescribed. New stop date of 08/03/2022. Patient to have CBC BMP and CRP drawn weekly and faxed to wound clinic. Virtual visit with Dr Romero Prescott scheduled for 07/13/2022. Syeda voices understanding of all new orders . No concerns at this time. Deonna Licea from [facility] updated on plan also.

## 2022-07-13 ENCOUNTER — TELEPHONE (OUTPATIENT)
Dept: WOUND CARE | Age: 62
End: 2022-07-13

## 2022-07-13 NOTE — TELEPHONE ENCOUNTER
2nd attempt to contact [facility] for patient virtual visit. Spoke with  who then transferred phone to patient nurse and no one answers.

## 2022-07-13 NOTE — TELEPHONE ENCOUNTER
[facility] regarding patients virtual visit with Dr. Romero Prescott. Asked to speak to patients nurse to request an email or phone number to send the Doxy. me link to. This nurse was transferred 3 times and lastly sent to the [facility] voicemail. Message left for a return call and explained that patients appointment is at 11am and after 15 minutes would need to be rescheduled.

## 2022-07-18 ENCOUNTER — TELEPHONE (OUTPATIENT)
Dept: WOUND CARE | Age: 62
End: 2022-07-18

## 2022-07-18 NOTE — TELEPHONE ENCOUNTER
Arlen from [facility] called to reschedule patients missed virtual visit with Dr Alayna Vuong. She would like the link to be sent to patients phone the day of her visit. Advised her a nurse will still need to be present for visit also. She voices understanding of new appointment date and time.

## 2022-07-27 ENCOUNTER — HOSPITAL ENCOUNTER (OUTPATIENT)
Dept: WOUND CARE | Age: 62
Discharge: HOME | End: 2022-07-27
Payer: OTHER GOVERNMENT

## 2022-07-27 PROCEDURE — 99212 OFFICE O/P EST SF 10 MIN: CPT

## 2022-07-27 NOTE — PROGRESS NOTES
Virtual Visit Wound Care  Clinic Level of Care   NAME:  Oc Age OF BIRTH:  1960 GENDER: female  MEDICAL RECORD NUMBER:  [id number]   DATE:  7/27/2022     Patient Type Points   No documentation completed by nursing staff. []   0   Nursing staff documented in the navigator for an ESTABLISHED patient including Episode, Patient ID, Chief Complaint, Travel Screen, Allergies, Latex Allergy, Home Medication, History, Psychosocial Screen, C-SSRS Screen, Fall Risk, Nutritional Screen, Advanced Directive, Education and Plan of Care, and Discharge Instructions. The Functional Screening tab is only required if the patient's status changes. []   1   Nursing staff documented in the navigator for a NEW patient including Patient ID, Chief Complaint, Travel Screen, Allergies, Latex Allergy, Home Medication, History, Psychosocial Screen, C-SSRS Screen, Fall Risk, Nutritional Screen, Advanced Directive, Functional Screen, Education and Plan of Care, and Discharge Instructions. [x]   2   Nursing staff documented in the navigator for a CONSULT patient including Episode, Patient ID, Chief Complaint, Travel Screen, Allergies, Latex Allergy, Home Medication, History, Psychosocial Screen, C-SSRS Screen, Fall Risk, Nutritional Screen, Advanced Directive, Functional Screen, Education and Plan of Care, and Discharge Instructions. []   2     Wound Description Points   Unable to obtain image of Wound. For example, patient/caregiver is instructed not to remove dressing, is unable to correctly position smart phone, no smart phone is available, patient is unable to maintain connectivity or the patient's wound is healed. [x]   0   1-3 wound images annotated. Images of the wound(s) is obtained and annotated along with completed description in [facility]. []   1   4-5 wound images annotated. Images of the wound(s) is obtained and annotated along with completed description in [facility]. []   2   Greater than 6 wound images annotated.  Images of the wound(s) is obtained and annotated along with completed description in [facility]. []   3     Education Points   No Education completed by nursing staff.    []   0   Patient/caregiver is educated on 1-4 topics. Nursing staff identifies learner, confirms understanding of information (verbal, demonstration, written) and documents details. May include Discharge Instructions/AVS, available documents in My Chart, or Web-based learning. [x]   1   Patient/caregiver is educated on 5-9 topics. Nursing staff identifies learner, confirms understanding of information (verbal, demonstration, written) and documents details. May include Discharge Instructions/AVS, available documents in My Chart, or Web-based learning. []   2   Patient/caregiver is educated on 10 or more topics. Nursing staff identifies learner, confirms understanding of information (verbal, demonstration, written) and documents details. May include Discharge Instructions/AVS, available documents in My Chart, or Web-based learning. []   3     Follow-up Virtual Visit Points   No contact with outside resources made. []   0   Nursing staff contacts 1-2 outside resource. For example, telephone call made to home health, primary care provider, pharmacy, or DME. May include filling out forms and writing letters, arranging transportation, communication with insurance , vendors, etc.  Discharge, instructions and/or After Visit Summary given to patient/caregiver and instructions completed. [x]   1   Nursing staff contacts 3-4 outside resource. For example, telephone calls made to home health, primary care provider, pharmacy, or DME. May include filling out forms and writing letters, arranging transportation, communication with insurance , vendors, etc.  Discharge, instructions and/or After Visit Summary given to patient/caregiver and instructions completed. []   2   Nursing contacts 5 or more outside resource.  For example, telephone calls made to home health, primary care providers, pharmacy, or DME. May include filling out forms and writing letters, arranging transportation, communication with insurance , vendors, etc.  Discharge, instructions and/or After Visit Summary given to patient/caregiver and instructions completed.    []   3     Is this the Patient's First Visit to the [address] [place] [address] Road  Yes    Is this Patient Established @ [facility]  Yes             Virtual Visit Clinical Level of Care Wound Care      Points  1-3  Level 1 []     Points  4-5  Level 2 [x]     Points  6-7  Level 3 []     Points  8-9  Level 4 []     Points  10-11  Level 5 []       Electronically signed by Antonino Tian RN on 7/27/2022 at 8:38 AM

## 2022-07-27 NOTE — PROGRESS NOTES
2022    TELEHEALTH EVALUATION -- Audio/Visual (During [facility] public health emergency)    HPI:    Ana Palma (:  1960) has requested an audio/video evaluation for the following concern(s):  She is at the nursing home getting IV antibiotics to treat the knee infection she had infection of prosthesis and she had a pull exchanged she has a small wound draining she denies any fever or chills. Her blood work was reviewed. Recently she was diagnosed with C. difficile and has been on oral vancomycin    Prior to Visit Medications    Medication Sig Taking? Authorizing Provider   rivaroxaban (XARELTO) 10 MG TABS tablet Take 1 tablet by mouth every 24 hours  Lamont Church PA-C   fenofibrate (TRIGLIDE) 160 MG tablet TAKE 1 TABLET DAILY  Gillian Chimsara, DO   tiZANidine (ZANAFLEX) 2 MG tablet Take 1 tablet by mouth 3 times daily as needed (muscle cramps)  Gillian Chimes, DO   doxepin (SINEQUAN) 10 MG capsule TAKE 2 CAPSULES NIGHTLY  Gillian Chimes, DO   hydrocortisone 2.5 % cream Apply topically 2 times daily. Gillian Chimes, DO   metFORMIN (GLUCOPHAGE-XR) 500 MG extended release tablet Take 1 tablet by mouth daily (with breakfast)  Gillian Singh, DO   atorvastatin (LIPITOR) 40 MG tablet TAKE 1 TABLET NIGHTLY  Gillian Chimes, DO   losartan (COZAAR) 25 MG tablet TAKE 1 TABLET DAILY  Gillian Chimes, DO   baclofen (LIORESAL) 10 MG tablet Take 1 tablet by mouth daily as needed (muscle spasms)  Gillian Chimes, DO   melatonin 3 MG TABS tablet Take 3 mg by mouth daily   Historical Provider, MD   Acetaminophen 500 MG CAPS Take 1,000 mg by mouth as needed for Pain   Historical Provider, MD   KRILL OIL PO Take 350 mg by mouth daily  Historical Provider, MD   Multiple Vitamins-Minerals (THERAPEUTIC MULTIVITAMIN-MINERALS) tablet Take 1 tablet by mouth daily  Historical Provider, MD   aspirin 81 MG tablet Take 81 mg by mouth daily.     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 Unfortunately she has ongoing C. difficile that might complicate long-term oral antibiotic. Verenice Bustillo, was evaluated through a synchronous (real-time) audio-video encounter. The patient (or guardian if applicable) is aware that this is a billable service, which includes applicable co-pays. This Virtual Visit was conducted with patient's (and/or legal guardian's) consent. The visit was conducted pursuant to the emergency declaration under the [address] [place] [address], [address] [place] [address] authority and the [employer] General Act. Patient identification was verified, and a caregiver was present when appropriate. --Heidi Overton MD on 7/27/2022 at 8:22 AM    An electronic signature was used to authenticate this note.

## 2022-07-27 NOTE — PROGRESS NOTES
Sera Bautista, was evaluated through a synchronous (real-time) audio-video encounter. The patient (or guardian if applicable) is aware that this is a billable service, which includes applicable co-pays. This Virtual Visit was conducted with patient's (and/or legal guardian's) consent. The visit was conducted pursuant to the emergency declaration under the [address] [place] [address] and the [employer] General Act. Patient identification was verified, and a caregiver was present when appropriate. The patient was located at Other: [facility] . Provider was located at [facility] ([place] [address]): [address] SCOTT MELENDEZ AM [facility],  [address]. Total time spent for this encounter:  15 mins    --Tereza Hayes RN on 7/27/2022 at 8:37 AM    An electronic signature was used to authenticate this note.

## 2022-07-27 NOTE — DISCHARGE INSTRUCTIONS
Visit Discharge/Physician Orders: continue IV antibiotics and maintain PICC line until 8/3/22  - Draw weekly labs CBC,BMP,CRP and fax to [phone]  - plan to discuss oral antibiotics at next visit    Home Care: [facility]    Wound Location: right knee      Keep all dressings clean & dry. Follow up visit:   1 Week 8/3/22 at 8am virtual visit        Keep next scheduled appointment. Please give 24 hour notice if unable to keep appointment. [phone]    If you experience any of the following, please call the Wound Care Service during business hours: Monday through Friday 8:00 am - 4:30 pm  ([phone]). *Increase in pain   *Temperature over 101   *Increase in drainage from your wound or a foul odor   *Uncontrolled swelling   *Need for compression bandage changes due to slippage, breakthrough drainage    If you need medical attention outside of business hours, please contact your Primary Care Doctor or go to the nearest emergency room.

## 2022-07-27 NOTE — PLAN OF CARE
Pt. Seen today for IV antibiotics see AVS for orders. Follow up in 1 week. Care plan reviewed with patient and nurse. Patient and nurse verbalize understanding of the plan of care and contribute to goal setting.

## 2022-08-03 ENCOUNTER — HOSPITAL ENCOUNTER (OUTPATIENT)
Dept: WOUND CARE | Age: 62
Discharge: HOME | End: 2022-08-03
Payer: OTHER GOVERNMENT

## 2022-08-03 VITALS
OXYGEN SATURATION: 97 % | SYSTOLIC BLOOD PRESSURE: 135 MMHG | RESPIRATION RATE: 18 BRPM | DIASTOLIC BLOOD PRESSURE: 65 MMHG | HEART RATE: 75 BPM | TEMPERATURE: 97.9 F

## 2022-08-03 PROCEDURE — 99212 OFFICE O/P EST SF 10 MIN: CPT

## 2022-08-03 RX ORDER — CEPHALEXIN 500 MG/1
500 CAPSULE ORAL EVERY 8 HOURS SCHEDULED
Status: DISCONTINUED | OUTPATIENT
Start: 2022-08-03 | End: 2022-08-04 | Stop reason: HOSPADM

## 2022-08-03 ASSESSMENT — PAIN SCALES - GENERAL: PAINLEVEL_OUTOF10: 2

## 2022-08-03 NOTE — DISCHARGE INSTRUCTIONS
Visit Discharge/Physician Orders:   - Finish last dose of IV antibiotics and remove PICC line 8/3/22  - Fax last labs from 7/29/22 to [phone]  - Also please fax vitals from this AM as well   - Will start on Oral antibiotics Keflex 500 mg 3 times a day until next appointment (4 weeks)       Home Care: [facility]     Wound Location: Right knee        Keep all dressings clean & dry. Follow up visit:   1 Month on Wednesday August 31st at [facility] - please call us if you get discharged from facility before your appointment           Keep next scheduled appointment. Please give 24 hour notice if unable to keep appointment. [phone]     If you experience any of the following, please call the Wound Care Service during business hours: Monday through Friday 8:00 am - 4:30 pm  ([phone]). *Increase in pain              *Temperature over 101              *Increase in drainage from your wound or a foul odor              *Uncontrolled swelling              *Need for compression bandage changes due to slippage, breakthrough drainage     If you need medical attention outside of business hours, please contact your Primary Care Doctor or go to the nearest emergency room.

## 2022-08-03 NOTE — PROGRESS NOTES
Elly Marixa, was evaluated through a synchronous (real-time) audio-video encounter. The patient (or guardian if applicable) is aware that this is a billable service, which includes applicable co-pays. This Virtual Visit was conducted with patient's (and/or legal guardian's) consent. The visit was conducted pursuant to the emergency declaration under the [address] [place] [address] and the [employer] General Act. Patient identification was verified, and a caregiver was present when appropriate. The patient was located at Other: [address] [place] [address] . Provider was located at [facility] ([place] [address]): [place] [address] [address] [place] [address] Dr SCOTT MELENDEZ AM [facility],  [address]. Total time spent for this encounter:  27    --Harjit Garcia RN on 8/3/2022 at 10:04 AM    An electronic signature was used to authenticate this note.

## 2022-08-03 NOTE — PROGRESS NOTES
Virtual Visit Wound Care  Clinic Level of Care   NAME:  Sharlene Rico OF BIRTH:  1960 GENDER: female  MEDICAL RECORD NUMBER:  [id number]   DATE:  8/3/2022     Patient Type Points   No documentation completed by nursing staff. []   0   Nursing staff documented in the navigator for an ESTABLISHED patient including Episode, Patient ID, Chief Complaint, Travel Screen, Allergies, Latex Allergy, Home Medication, History, Psychosocial Screen, C-SSRS Screen, Fall Risk, Nutritional Screen, Advanced Directive, Education and Plan of Care, and Discharge Instructions. The Functional Screening tab is only required if the patient's status changes. [x]   1   Nursing staff documented in the navigator for a NEW patient including Patient ID, Chief Complaint, Travel Screen, Allergies, Latex Allergy, Home Medication, History, Psychosocial Screen, C-SSRS Screen, Fall Risk, Nutritional Screen, Advanced Directive, Functional Screen, Education and Plan of Care, and Discharge Instructions. []   2   Nursing staff documented in the navigator for a CONSULT patient including Episode, Patient ID, Chief Complaint, Travel Screen, Allergies, Latex Allergy, Home Medication, History, Psychosocial Screen, C-SSRS Screen, Fall Risk, Nutritional Screen, Advanced Directive, Functional Screen, Education and Plan of Care, and Discharge Instructions. []   2     Wound Description Points   Unable to obtain image of Wound. For example, patient/caregiver is instructed not to remove dressing, is unable to correctly position smart phone, no smart phone is available, patient is unable to maintain connectivity or the patient's wound is healed. [x]   0   1-3 wound images annotated. Images of the wound(s) is obtained and annotated along with completed description in [facility]. []   1   4-5 wound images annotated. Images of the wound(s) is obtained and annotated along with completed description in [facility]. []   2   Greater than 6 wound images annotated.  Images of made to home health, primary care providers, pharmacy, or DME. May include filling out forms and writing letters, arranging transportation, communication with insurance , vendors, etc.  Discharge, instructions and/or After Visit Summary given to patient/caregiver and instructions completed.    []   3     Is this the Patient's First Visit to the [address] [place] [address] Road  No    Is this Patient Established @ [facility]  Yes             Virtual Visit Clinical Level of Care Wound Care      Points  1-3  Level 1 []     Points  4-5  Level 2 [x]     Points  6-7  Level 3 []     Points  8-9  Level 4 []     Points  10-11  Level 5 []       Electronically signed by Jasmin Ely RN on 8/3/2022 at @NO

## 2022-08-03 NOTE — PLAN OF CARE
Problem: Wound:  Goal: Will show signs of wound healing; wound closure and no evidence of infection  Description: Will show signs of wound healing; wound closure and no evidence of infection  Outcome: Progressing   Patient seen in clinic today for IV antibiotics. See AVS. Follow up in 4 weeks. Care plan reviewed with patient. Patient verbalize understanding of the plan of care and contribute to goal setting.

## 2022-08-03 NOTE — PROGRESS NOTES
8/3/2022    TELEHEALTH EVALUATION -- Audio/Visual (During WGZSI-90 public health emergency)    HPI:     Romeo Shaikh (:  1960) has requested an audio/video evaluation for the following concern(s):  She is at the nursing home getting IV antibiotics to treat the knee infection she had infection of prosthesis and she had a pull exchanged she has a small wound draining she denies any fever or chills. She has completed 6 wks of iv antibiotics. She still has drainage from the wound serosanguinous    Review of Systems    Prior to Visit Medications    Medication Sig Taking? Authorizing Provider   rivaroxaban (XARELTO) 10 MG TABS tablet Take 1 tablet by mouth every 24 hours  Cynthia Cortes PA-C   fenofibrate (TRIGLIDE) 160 MG tablet TAKE 1 TABLET DAILY  Radha Rondon DO   tiZANidine (ZANAFLEX) 2 MG tablet Take 1 tablet by mouth 3 times daily as needed (muscle cramps)  Radha Rondon DO   doxepin (SINEQUAN) 10 MG capsule TAKE 2 CAPSULES NIGHTLY  Radha Rondon DO   hydrocortisone 2.5 % cream Apply topically 2 times daily. Radha Rondon DO   metFORMIN (GLUCOPHAGE-XR) 500 MG extended release tablet Take 1 tablet by mouth daily (with breakfast)  Radha Rondon DO   atorvastatin (LIPITOR) 40 MG tablet TAKE 1 TABLET NIGHTLY  Radha Rondon DO   losartan (COZAAR) 25 MG tablet TAKE 1 TABLET DAILY  Radha Rondon DO   baclofen (LIORESAL) 10 MG tablet Take 1 tablet by mouth daily as needed (muscle spasms)  Radha Rondon DO   melatonin 3 MG TABS tablet Take 3 mg by mouth daily   Historical Provider, MD   Acetaminophen 500 MG CAPS Take 1,000 mg by mouth as needed for Pain   Historical Provider, MD   KRILL OIL PO Take 350 mg by mouth daily  Historical Provider, MD   Multiple Vitamins-Minerals (THERAPEUTIC MULTIVITAMIN-MINERALS) tablet Take 1 tablet by mouth daily  Historical Provider, MD   aspirin 81 MG tablet Take 81 mg by mouth daily.     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 The patient (or guardian if applicable) is aware that this is a billable service, which includes applicable co-pays. This Virtual Visit was conducted with patient's (and/or legal guardian's) consent. The visit was conducted pursuant to the emergency declaration under the [address] [place] [address], [address] [place] [address] authority and the [employer] General Act. Patient identification was verified, and a caregiver was present when appropriate. --Estrada Reeves MD on 8/3/2022 at 8:26 AM    An electronic signature was used to authenticate this note.

## 2022-08-08 ENCOUNTER — TELEPHONE (OUTPATIENT)
Dept: CARDIOLOGY CLINIC | Age: 62
End: 2022-08-08

## 2022-08-08 NOTE — TELEPHONE ENCOUNTER
Trevor Knapp, MERY  [facility] Clinical Support Pool  Pt is needing a NP appt with Dr. Reyes Crerony due to Arterial stenosis of right leg     [facility] Ph : [phone]     Unable to get ahold of staff, I called and talked to Jesus and she is faxing over testing results. Appt scheduled on 9/8 at 0845. They are faxing over testing results to make sure patient doesn't need to be seen sooner.

## 2022-08-25 PROBLEM — A04.72 ENTEROCOLITIS DUE TO CLOSTRIDIUM DIFFICILE, NOT SPECIFIED AS RECURRENT: Status: ACTIVE | Noted: 2022-08-25

## 2022-08-25 PROBLEM — Z96.659 AFTERCARE FOLLOWING KNEE JOINT REPLACEMENT SURGERY: Status: ACTIVE | Noted: 2022-08-25

## 2022-08-25 PROBLEM — Z47.1 AFTERCARE FOLLOWING KNEE JOINT REPLACEMENT SURGERY: Status: ACTIVE | Noted: 2022-08-25

## 2022-08-25 PROBLEM — F33.9 RECURRENT MAJOR DEPRESSIVE DISORDER (HCC): Status: ACTIVE | Noted: 2022-08-25

## 2022-08-31 ENCOUNTER — HOSPITAL ENCOUNTER (OUTPATIENT)
Dept: WOUND CARE | Age: 62
Discharge: HOME | End: 2022-08-31
Payer: OTHER GOVERNMENT

## 2022-08-31 VITALS
RESPIRATION RATE: 16 BRPM | SYSTOLIC BLOOD PRESSURE: 184 MMHG | TEMPERATURE: 96.6 F | OXYGEN SATURATION: 96 % | DIASTOLIC BLOOD PRESSURE: 78 MMHG | HEART RATE: 69 BPM

## 2022-08-31 PROCEDURE — 99212 OFFICE O/P EST SF 10 MIN: CPT

## 2022-08-31 RX ORDER — CEPHALEXIN 250 MG/1
500 CAPSULE ORAL 3 TIMES DAILY
COMMUNITY

## 2022-08-31 ASSESSMENT — PAIN SCALES - GENERAL: PAINLEVEL_OUTOF10: 8

## 2022-08-31 ASSESSMENT — PAIN DESCRIPTION - LOCATION: LOCATION: LEG;KNEE

## 2022-08-31 ASSESSMENT — PAIN DESCRIPTION - ORIENTATION: ORIENTATION: RIGHT;LEFT

## 2022-08-31 NOTE — PLAN OF CARE
Problem: Wound:  Goal: Will show signs of wound healing; wound closure and no evidence of infection  Description: Will show signs of wound healing; wound closure and no evidence of infection  Outcome: Adequate for Discharge     Virtual visit completed with patient. Discontinue oral Keflex. Patient to complete course of oral Vancomycin. Continue to eat yogurt with live cultures daily and oral probiotic daily. Called and spoke to patients nurse at [facility] and instructed of new orders and faxed new orders to facility. Follow up visit: As needed, call when having next knee surgery. Care plan reviewed with patient and nurse. Patient and nurse verbalize understanding of the plan of care and contribute to goal setting.

## 2022-08-31 NOTE — PROGRESS NOTES
Kali Trinh, was evaluated through a synchronous (real-time) audio-video encounter. The patient (or guardian if applicable) is aware that this is a billable service, which includes applicable co-pays. This Virtual Visit was conducted with patient's (and/or legal guardian's) consent. The visit was conducted pursuant to the emergency declaration under the [address] [place] [address] and the [employer] and [employer] General Act. Patient identification was verified, and a caregiver was present when appropriate. The patient was located at Other: [facility] . Provider was located at [facility] ([place] [address]): [place] [address] [address] [place] [address] Dr SCOTT MELENDEZ AM [facility],  [address]. Total time spent for this encounter:  30 minutes    --Naersh Chou RN on 8/31/2022 at 8:38 AM    An electronic signature was used to authenticate this note.

## 2022-08-31 NOTE — PROGRESS NOTES
Virtual Visit Wound Care  Clinic Level of Care   NAME:  Yamile Pyleties OF BIRTH:  1960 GENDER: female  MEDICAL RECORD NUMBER:  [id number]   DATE:  8/31/2022     Patient Type Points   No documentation completed by nursing staff. []   0   Nursing staff documented in the navigator for an ESTABLISHED patient including Episode, Patient ID, Chief Complaint, Travel Screen, Allergies, Latex Allergy, Home Medication, History, Psychosocial Screen, C-SSRS Screen, Fall Risk, Nutritional Screen, Advanced Directive, Education and Plan of Care, and Discharge Instructions. The Functional Screening tab is only required if the patient's status changes. [x]   1   Nursing staff documented in the navigator for a NEW patient including Patient ID, Chief Complaint, Travel Screen, Allergies, Latex Allergy, Home Medication, History, Psychosocial Screen, C-SSRS Screen, Fall Risk, Nutritional Screen, Advanced Directive, Functional Screen, Education and Plan of Care, and Discharge Instructions. []   2   Nursing staff documented in the navigator for a CONSULT patient including Episode, Patient ID, Chief Complaint, Travel Screen, Allergies, Latex Allergy, Home Medication, History, Psychosocial Screen, C-SSRS Screen, Fall Risk, Nutritional Screen, Advanced Directive, Functional Screen, Education and Plan of Care, and Discharge Instructions. []   2     Wound Description Points   Unable to obtain image of Wound. For example, patient/caregiver is instructed not to remove dressing, is unable to correctly position smart phone, no smart phone is available, patient is unable to maintain connectivity or the patient's wound is healed. [x]   0   1-3 wound images annotated. Images of the wound(s) is obtained and annotated along with completed description in [facility]. []   1   4-5 wound images annotated. Images of the wound(s) is obtained and annotated along with completed description in [facility]. []   2   Greater than 6 wound images annotated.  Images of the wound(s) is obtained and annotated along with completed description in [facility]. []   3     Education Points   No Education completed by nursing staff.    []   0   Patient/caregiver is educated on 1-4 topics. Nursing staff identifies learner, confirms understanding of information (verbal, demonstration, written) and documents details. May include Discharge Instructions/AVS, available documents in My Chart, or Web-based learning.  []   1   Patient/caregiver is educated on 5-9 topics. Nursing staff identifies learner, confirms understanding of information (verbal, demonstration, written) and documents details. May include Discharge Instructions/AVS, available documents in My Chart, or Web-based learning. [x]   2   Patient/caregiver is educated on 10 or more topics. Nursing staff identifies learner, confirms understanding of information (verbal, demonstration, written) and documents details. May include Discharge Instructions/AVS, available documents in My Chart, or Web-based learning. []   3     Follow-up Virtual Visit Points   No contact with outside resources made. []   0   Nursing staff contacts 1-2 outside resource. For example, telephone call made to home health, primary care provider, pharmacy, or DME. May include filling out forms and writing letters, arranging transportation, communication with insurance , vendors, etc.  Discharge, instructions and/or After Visit Summary given to patient/caregiver and instructions completed. [x]   1   Nursing staff contacts 3-4 outside resource. For example, telephone calls made to home health, primary care provider, pharmacy, or DME. May include filling out forms and writing letters, arranging transportation, communication with insurance , vendors, etc.  Discharge, instructions and/or After Visit Summary given to patient/caregiver and instructions completed. []   2   Nursing contacts 5 or more outside resource.  For example, telephone calls made to home health, primary care providers, pharmacy, or DME. May include filling out forms and writing letters, arranging transportation, communication with insurance , vendors, etc.  Discharge, instructions and/or After Visit Summary given to patient/caregiver and instructions completed.    []   3     Is this the Patient's First Visit to the [address] [place] [address] Road  No    Is this Patient Established @ [facility]  Yes             Virtual Visit Clinical Level of Care Wound Care      Points  1-3  Level 1 []     Points  4-5  Level 2 [x]     Points  6-7  Level 3 []     Points  8-9  Level 4 []     Points  10-11  Level 5 []       Electronically signed by Anshul Cruz RN on 8/31/2022 at @ 8:39 AM

## 2022-08-31 NOTE — PROGRESS NOTES
2022    TELEHEALTH EVALUATION -- Audio/Visual (During GEGEK-23 public health emergency)    HPI:     Review of [facility] (:  1960) has requested an audio/video evaluation for the following concern(s):  She is at the nursing home  she is on oral keflex and started oral vancomycin for C.difficle. she has persistent diarrhea. She was seen at [facility], plan for surgery         Prior to Visit Medications    Medication Sig Taking? Authorizing Provider   LACTOBACILLUS PO Take 1 capsule by mouth daily Yes Historical Provider, MD   vancomycin (VANCOCIN) 50 mg/mL oral solution Take by mouth Yes Historical Provider, MD   cephALEXin (KEFLEX) 250 MG capsule Take 500 mg by mouth 3 times daily Yes Historical Provider, MD   rivaroxaban (XARELTO) 10 MG TABS tablet Take 1 tablet by mouth every 24 hours  Cynthia Cortes PA-C   fenofibrate (TRIGLIDE) 160 MG tablet TAKE 1 TABLET DAILY  Radha Rondon DO   tiZANidine (ZANAFLEX) 2 MG tablet Take 1 tablet by mouth 3 times daily as needed (muscle cramps)  Radha Rondon DO   doxepin (SINEQUAN) 10 MG capsule TAKE 2 CAPSULES NIGHTLY  Radha Rondon DO   hydrocortisone 2.5 % cream Apply topically 2 times daily.   Radha Rondon DO   metFORMIN (GLUCOPHAGE-XR) 500 MG extended release tablet Take 1 tablet by mouth daily (with breakfast)  Radha Rondon DO   atorvastatin (LIPITOR) 40 MG tablet TAKE 1 TABLET NIGHTLY  Radha Rondon DO   losartan (COZAAR) 25 MG tablet TAKE 1 TABLET DAILY  Radha Rondon DO   baclofen (LIORESAL) 10 MG tablet Take 1 tablet by mouth daily as needed (muscle spasms)  Radha Rondon DO   melatonin 3 MG TABS tablet Take 3 mg by mouth daily   Historical Provider, MD   Acetaminophen 500 MG CAPS Take 1,000 mg by mouth as needed for Pain   Historical Provider, MD   KRILL OIL PO Take 350 mg by mouth daily  Historical Provider, MD   Multiple Vitamins-Minerals (THERAPEUTIC MULTIVITAMIN-MINERALS) tablet Take 1 tablet by mouth daily  Historical Provider, MD   aspirin 81 MG tablet Take 81 mg by mouth daily. 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 The patient (or guardian if applicable) is aware that this is a billable service, which includes applicable co-pays. This Virtual Visit was conducted with patient's (and/or legal guardian's) consent. The visit was conducted pursuant to the emergency declaration under the [address] [place] [address] authority and the [employer] General Act. Patient identification was verified, and a caregiver was present when appropriate. --Cheryl Antonio MD on 8/31/2022 at 8:33 AM    An electronic signature was used to authenticate this note.

## 2022-09-14 ENCOUNTER — TELEPHONE (OUTPATIENT)
Dept: WOUND CARE | Age: 62
End: 2022-09-14

## 2022-09-14 NOTE — TELEPHONE ENCOUNTER
JESSICA Castro from [facility] called with concerns from wound. States she has two open wounds that are now draining puruluent drainage. Dr Chapis Ramirez notified. He would like wound culture taken and results faxed to our office to review. Once we know sensitivity he will decide if patient needs oral antibiotics at that time. Patient is currently on oral Vancomycin for cdiff infection. Patient has scheduled follow up with [facility] on September 21st to discuss surgery options.

## 2022-09-21 ENCOUNTER — TELEPHONE (OUTPATIENT)
Dept: WOUND CARE | Age: 62
End: 2022-09-21

## 2022-09-21 NOTE — TELEPHONE ENCOUNTER
----- Message from Francisco Reis sent at 9/21/2022  2:21 PM EDT -----  [address] [place] @ [address] [facility] 9761 A 2/3 WEEK FOLLOW UP VIRTUAL VISIT WITH DR Dannielle Salguero

## 2022-09-22 ENCOUNTER — TELEPHONE (OUTPATIENT)
Dept: WOUND CARE | Age: 62
End: 2022-09-22

## 2022-09-22 NOTE — TELEPHONE ENCOUNTER
Spoke with Dr Nay Gautam regarding most recent wound culture result that was taken by [facility]. Patient is having I&D with ortho on September 26th. He will follow those cultures from surgery and make a treatment plan then. Patient is scheduled for October 5th with Dr Nay Gautam in wound clinic. No new orders at this time.

## 2022-09-26 LAB
METER GLUCOSE: 84 MG/DL (ref 70–110)
MRSA SCREEN: NOT DETECTED
VANCOMYCIN RESISTANT ENTEROCOCCUS: NEGATIVE

## 2022-09-28 LAB — MRSA SCREEN, FEC: NORMAL

## 2022-10-01 LAB — Lab: NORMAL

## 2022-10-05 ENCOUNTER — HOSPITAL ENCOUNTER (OUTPATIENT)
Dept: WOUND CARE | Age: 62
Discharge: HOME | End: 2022-10-05
Payer: OTHER GOVERNMENT

## 2022-10-05 PROCEDURE — 99212 OFFICE O/P EST SF 10 MIN: CPT

## 2022-10-05 NOTE — DISCHARGE INSTRUCTIONS
Visit Discharge/Physician Orders:   - Continue antibiotics as prescribed  - Continue to eat yogurt with live cultures daily and oral probiotic daily     Home Care: [facility]     Wound Location: Right knee - dressings per [facility]     Follow up visit: Virtual Visit in 3 weeks on 10/26/22 at 9:00AM     Keep next scheduled appointment. Please give 24 hour notice if unable to keep appointment. [phone]     If you experience any of the following, please call the Wound Care Service during business hours: Monday through Friday 8:00 am - 4:30 pm  ([phone]). *Increase in pain              *Temperature over 101              *Increase in drainage from your wound or a foul odor              *Uncontrolled swelling              *Need for compression bandage changes due to slippage, breakthrough drainage     If you need medical attention outside of business hours, please contact your Primary Care Doctor or go to the nearest emergency room.

## 2022-10-05 NOTE — PROGRESS NOTES
Krys Campos, was evaluated through a synchronous (real-time) audio-video encounter. The patient (or guardian if applicable) is aware that this is a billable service, which includes applicable co-pays. This Virtual Visit was conducted with patient's (and/or legal guardian's) consent. The visit was conducted pursuant to the emergency declaration under the [address] authority and the [employer] and [employer] General Act. Patient identification was verified, and a caregiver was present when appropriate. The patient was located at Other: [facility] . Provider was located at [place] [address]): [address] [facility]  [address]. Total time spent for this encounter: [address] [address]    --Lucina Mullins RN on 10/5/2022 at 4:11 PM    An electronic signature was used to authenticate this note.

## 2022-10-05 NOTE — PROGRESS NOTES
Virtual Visit Wound Care  Clinic Level of Care   NAME:  Mary Grace Delacruz OF BIRTH:  1960 GENDER: female  MEDICAL RECORD NUMBER:  [id number]   DATE:  10/5/2022     Patient Type Points   No documentation completed by nursing staff. []   0   Nursing staff documented in the navigator for an ESTABLISHED patient including Episode, Patient ID, Chief Complaint, Travel Screen, Allergies, Latex Allergy, Home Medication, History, Psychosocial Screen, C-SSRS Screen, Fall Risk, Nutritional Screen, Advanced Directive, Education and Plan of Care, and Discharge Instructions. The Functional Screening tab is only required if the patient's status changes. [x]   1   Nursing staff documented in the navigator for a NEW patient including Patient ID, Chief Complaint, Travel Screen, Allergies, Latex Allergy, Home Medication, History, Psychosocial Screen, C-SSRS Screen, Fall Risk, Nutritional Screen, Advanced Directive, Functional Screen, Education and Plan of Care, and Discharge Instructions. []   2   Nursing staff documented in the navigator for a CONSULT patient including Episode, Patient ID, Chief Complaint, Travel Screen, Allergies, Latex Allergy, Home Medication, History, Psychosocial Screen, C-SSRS Screen, Fall Risk, Nutritional Screen, Advanced Directive, Functional Screen, Education and Plan of Care, and Discharge Instructions. []   2     Wound Description Points   Unable to obtain image of Wound. For example, patient/caregiver is instructed not to remove dressing, is unable to correctly position smart phone, no smart phone is available, patient is unable to maintain connectivity or the patient's wound is healed. [x]   0   1-3 wound images annotated. Images of the wound(s) is obtained and annotated along with completed description in [facility]. []   1   4-5 wound images annotated. Images of the wound(s) is obtained and annotated along with completed description in [facility]. []   2   Greater than 6 wound images annotated.  Images of the wound(s) is obtained and annotated along with completed description in [address] [place] [address]. []   3     Education Points   No Education completed by nursing staff.    []   0   Patient/caregiver is educated on 1-4 topics. Nursing staff identifies learner, confirms understanding of information (verbal, demonstration, written) and documents details. May include Discharge Instructions/AVS, available documents in My Chart, or Web-based learning.  []   1   Patient/caregiver is educated on 5-9 topics. Nursing staff identifies learner, confirms understanding of information (verbal, demonstration, written) and documents details. May include Discharge Instructions/AVS, available documents in My Chart, or Web-based learning. [x]   2   Patient/caregiver is educated on 10 or more topics. Nursing staff identifies learner, confirms understanding of information (verbal, demonstration, written) and documents details. May include Discharge Instructions/AVS, available documents in My Chart, or Web-based learning. []   3     Follow-up Virtual Visit Points   No contact with outside resources made. []   0   Nursing staff contacts 1-2 outside resource. For example, telephone call made to home health, primary care provider, pharmacy, or DME. May include filling out forms and writing letters, arranging transportation, communication with insurance , vendors, etc.  Discharge, instructions and/or After Visit Summary given to patient/caregiver and instructions completed. [x]   1   Nursing staff contacts 3-4 outside resource. For example, telephone calls made to home health, primary care provider, pharmacy, or DME. May include filling out forms and writing letters, arranging transportation, communication with insurance , vendors, etc.  Discharge, instructions and/or After Visit Summary given to patient/caregiver and instructions completed. []   2   Nursing contacts 5 or more outside resource.  For example, telephone calls made to home health, primary care providers, pharmacy, or DME. May include filling out forms and writing letters, arranging transportation, communication with insurance , vendors, etc.  Discharge, instructions and/or After Visit Summary given to patient/caregiver and instructions completed.    []   3     Is this the Patient's First Visit to the [address] [place] [address] Road  No    Is this Patient Established @ [facility]  Yes             Virtual Visit Clinical Level of Care Wound Care      Points  1-3  Level 1 []     Points  4-5  Level 2 [x]     Points  6-7  Level 3 []     Points  8-9  Level 4 []     Points  10-11  Level 5 []       Electronically signed by Mg Freire RN on 10/5/2022 at @NO

## 2022-10-05 NOTE — PROGRESS NOTES
10/5/2022    TELEHEALTH EVALUATION -- Audio/Visual (During SBHNU-35 public health emergency)    HPI:    Garnetta Apgar (:  1960) has requested an audio/video evaluation for the following concern(s):  She is at the [facility]. Had hx of infected knee. She had a recent surgery for I and D of the knee at [facility]. The lab report is not available  She is on oral keflex doxy and oral vancomycin  She still has drainage from the wound       Review of Systems    Prior to Visit Medications    Medication Sig Taking? Authorizing Provider   LACTOBACILLUS PO Take 1 capsule by mouth daily  Historical Provider, MD   vancomycin (VANCOCIN) 50 mg/mL oral solution Take by mouth  Historical Provider, MD   cephALEXin (KEFLEX) 250 MG capsule Take 500 mg by mouth 3 times daily  Historical Provider, MD   rivaroxaban (XARELTO) 10 MG TABS tablet Take 1 tablet by mouth every 24 hours  Eli Powell PA-C   fenofibrate (TRIGLIDE) 160 MG tablet TAKE 1 TABLET DAILY  Leal Current, DO   tiZANidine (ZANAFLEX) 2 MG tablet Take 1 tablet by mouth 3 times daily as needed (muscle cramps)  Leal Current, DO   doxepin (SINEQUAN) 10 MG capsule TAKE 2 CAPSULES NIGHTLY  Leal Current, DO   hydrocortisone 2.5 % cream Apply topically 2 times daily.   Leal Current, DO   metFORMIN (GLUCOPHAGE-XR) 500 MG extended release tablet Take 1 tablet by mouth daily (with breakfast)  Leal Current, DO   atorvastatin (LIPITOR) 40 MG tablet TAKE 1 TABLET NIGHTLY  Leal Current, DO   losartan (COZAAR) 25 MG tablet TAKE 1 TABLET DAILY  Leal Current, DO   baclofen (LIORESAL) 10 MG tablet Take 1 tablet by mouth daily as needed (muscle spasms)  Leal Current, DO   melatonin 3 MG TABS tablet Take 3 mg by mouth daily   Historical Provider, MD   Acetaminophen 500 MG CAPS Take 1,000 mg by mouth as needed for Pain   Historical Provider, MD   KRILL OIL PO Take 350 mg by mouth daily  Historical Provider, MD   Multiple Vitamins-Minerals (THERAPEUTIC MULTIVITAMIN-MINERALS) tablet Take 1 tablet by mouth daily  Historical Provider, MD   aspirin 81 MG tablet Take 81 mg by mouth daily. Historical Provider, MD       Social History     Tobacco Use    Smoking status: Every Day     Packs/day: 0.50     Years: 49.00     Pack years: 24.50     Types: Cigarettes    Smokeless tobacco: Never   Vaping Use    Vaping Use: Never used   Substance Use Topics    Alcohol use: No     Comment: monthly glass of wine    Drug use: No         ASSESSMENT/PLAN:  Infection of the knee prosthesis. Will try to get culture report from [facility]  Continue current treatment      Follow up will be scheduled    Kim Richardsont, was evaluated through a synchronous (real-time) audio-video encounter. The patient (or guardian if applicable) is aware that this is a billable service, which includes applicable co-pays. This Virtual Visit was conducted with patient's (and/or legal guardian's) consent. The visit was conducted pursuant to the emergency declaration under the [facility], [id number] waiver authority and the [employer] General Act. Patient identification was verified, and a caregiver was present when appropriate. --Mirella Parsons MD on 10/5/2022 at 11:39 AM    An electronic signature was used to authenticate this note.

## 2022-10-20 ENCOUNTER — TELEPHONE (OUTPATIENT)
Dept: WOUND CARE | Age: 62
End: 2022-10-20

## 2022-10-20 NOTE — TELEPHONE ENCOUNTER
Consulted with provider, returned call to Jayme Bailey,  informed her to continue with the Vancomycin until her next appointment on 10/26/2022 then he will review if further antibiotic therapy is to be continued. Jayme Bailey voiced understanding.

## 2022-10-20 NOTE — TELEPHONE ENCOUNTER
----- Message from Jesus Norton sent at 10/20/2022  1:39 PM EDT -----  [address] [facility] NP ROUNDING @ [address] [address] INDEFINITELY. DOES DR Seda Shell WANT THAT STOPPED FOR HER? SHE JUST FINISHED HER COURSE OF DOXYCYLINE YESTERDAY.  WANTS TO KNOW WHAT HIS PLAN IS FOR HER [facility]Y INPATIENT FOLLOW UP PROGRESS NOTE     ADMISSION DATE:  11/15/2020  DATE OF SERVICE:  11/17/20    I had an opportunity to review the patient today in follow up. Neurology services were initially requested on 11/17/20 for vertigo.    CHIEF COMPLAINT: Dizziness and nausea    SUBJECTIVE:  The patient reports that she is feeling much better today. She had some dizziness during the night, but currently denies any dizziness or nausea. She reports that she ate breakfast without difficulty today. She denies any issues with ambulating in room and is feeling close to her baseline.     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 In the ED, vital signs were stable. Labs were remarkable for leukocytosis (12.9) and UA suspicious for UTI, but otherwise unremarkable for any significant identifiable infectious, metabolic or hematological abnormality. Covid test was negative. CXR showed right greater than left bibasilar opacities, favored to represent atelectasias vs pneumonia. CTOH showed no acute abnormalities.     The patient states that she started to feel ill after breakfast 11/15 and reports that she started vomiting and it became progressively worse throughout the day. She also reported significant dizziness and difficulties with balance. She described the dizziness as a \"room spinning sensation\". She denied any sense of lightheadedness. She did report that her dizziness worsened with activity, but unsure if it was related to having her eyes open. She reported worsened dizziness with any change in her head position. She also noted a transient episode of ringing in her right ear, but this quickly resolved. She denied any numbness/tingling or focal weakness. She denied any speech or  swallowing disturbance. She does report that approximately one week ago she had a slight cold with nasal congestion and runny nose, but this had resolved. She denies any issues with an ear pain, ear congestion or decreased hearing. She denied ever having these symptoms in the past.      PAST MEDICAL HISTORY:  Past Medical History:   Diagnosis Date   • Infectious mononucleosis     in high school   • Unspecified hypothyroidism        ALLERGIES:    ALLERGIES:  No Known Allergies    MEDICATIONS (CURRENT INPATIENT):   Current Facility-Administered Medications   Medication Dose Route Frequency Provider Last Rate Last Dose   • sodium chloride 0.9 % flush bag 25 mL  25 mL Intravenous PRN Kendra Wallace MD       • sodium chloride (PF) 0.9 % injection 2 mL  2 mL Intracatheter 2 times per day Kendra Wallace MD   2 mL at 11/17/20 0950   • sodium chloride (NORMAL SALINE) 0.9 % bolus 500 mL  500 mL Intravenous PRN Kendra Wallace MD       • meclizine (ANTIVERT) tablet 25 mg  25 mg Oral TID PRN Kendra Wallace MD   25 mg at 11/16/20 1913       MEDICATIONS (PRIOR TO ADMISSION):  Medications Prior to Admission   Medication Sig Dispense Refill   • Synthroid 75 MCG tablet Take 75 mcg by mouth daily. Take together with 200 MCG tablet to equal 275 MCG dose.     • naproxen sodium (ALEVE) 220 MG tablet Take 220-440 mg by mouth 2 times daily as needed. Indications: Pain     • cholecalciferol (VITAMIN D) 25 mcg (1,000 units) tablet Take by mouth daily.     • Ascorbic Acid (vitamin C) 1000 MG tablet Take 1,000 mg by mouth daily.     • SYNTHROID 200 MCG PO TABS 1 TAB PO QD (Patient taking differently: Take 200 mcg by mouth daily. Take together with 75 MCG tablet to equal 275 MCG dose.) 100 0       SOCIAL HISTORY:  Social History     Tobacco Use   Smoking Status Never Smoker   Smokeless Tobacco Never Used     Social History     Substance and Sexual Activity   Alcohol Use Yes   • Alcohol/week: 2.0 standard drinks   • Types: 2 Standard  drinks or equivalent per week     Social History     Tobacco Use   • Smoking status: Never Smoker   • Smokeless tobacco: Never Used   Substance Use Topics   • Alcohol use: Yes     Alcohol/week: 2.0 standard drinks     Types: 2 Standard drinks or equivalent per week   • Drug use: Never       FAMILY HISTORY:   No family history on file.    REVIEW OF SYSTEMS:  General:  Patient denies fever, chills, fatigue, or malaise.  HEENT:  Patient denies any current or frequent headaches.  Reports chronic blurred vision related to \"cortical blindness\". Denies any double vision. Denies any ringing in her ears or change in her hearing.   Cardiovascular:  Patient denies chest pain or palpitations.  Respiratory:  Patient denies any current wheezing or frequent cough.   Gastrointestinal:  Patient denies abdominal pain or change in bowel patterns. Denies nausea.   Musculoskeletal:  Patient denies neck pain, back pain, joint pain or leg swelling.  NEUROLOGIC:  Patient denies any confusion. Denies dizziness. Denies numbness/tingling. Denies focal weakness.     PHYSICAL EXAM:    VITAL SIGNS:  Vital Last Value 24 Hour Range   Temperature 98.7 °F (37.1 °C) (11/17/20 0421) Temp  Min: 98 °F (36.7 °C)  Max: 99.3 °F (37.4 °C)   Pulse 83 (11/17/20 0800) Pulse  Min: 70  Max: 98   Respiratory 20 (11/17/20 0421) Resp  Min: 16  Max: 20   Non-Invasive  Blood Pressure (!) 131/95 (11/17/20 0800) BP  Min: 131/95  Max: 175/86   Pulse Oximetry 93 % (11/17/20 0421) SpO2  Min: 93 %  Max: 99 %     Vital Today Admitted   Weight (!) 190.2 kg(standing weight notifies clay) (11/17/20 0421) Weight: (!) 192.8 kg (11/16/20 0516)   Height N/A Height: 5' 6\" (167.6 cm) (11/16/20 0654)   BMI N/A BMI (Calculated): 63.34 (11/16/20 0654)       General:  Morbidly obese, female in no acute distress.   Skin:  Warm and dry.  No rashes noted.  No ecchymosis noted.   Head:  Normocephalic and atraumatic.   Eyes:  Brows and lashes present bilaterally.  No scleral or conjunctival  injection noted.   Nose:  Nares patent bilaterally.  No erythema or drainage noted.   Mouth:  Lips and mucosa without lesions. Pharynx without  erythema.   Neck:  Supple, symmetric in appearance, no masses.   Musculoskeletal:  Muscle bulk and tone normal.  Strength 5/5 in bilateral upper 5/5 in lower extremities.  No edema noted in upper or lower extremities bilaterally.    Neurologic:  Mental Status:  The patient is oriented to person, place, and time.  Recent and remote memory functions are normal.  The person is attentive with normal concentration.  Language is fluent.  Speech is of normal jim and character.  The speech is non-dysarthric.  Fund of knowledge is normal.  Cranial Nerves:   I - Not tested.    II - The pupils are 3 mm, equally round and reactive to light.  Visual fields are intact.     III, IV, VI - The extraocular movements are full in all directions of gaze. She has very subtle, left beating nystagmus, slower in comparison to yesterday. There is no ptosis.   V - Light touch intact and symmetric in the V1, V2, V3 divisions of bilateral trigeminal nerves.  Intact masseter and temporalis strength.   VII - Facial movements are full and symmetric with normal lip seal and eye closure.    VIII - Hearing intact to finger rub bilaterally.    IX, X - Palate elevated symmetrically.  Swallow and phonation are normal.  There is no palatal myoclonus.    XI - Sternocleidomastoid strength is 5/5 bilaterally with normal shoulder shrug.  XII - Midline tongue protrusion without atrophy or fasciculations.    Motor:  Tone is normal in all four extremities without fasciculations, atrophy, or myoclonus.  There are no involuntary movements.  No pronator.  Sensory:  The sensory examination is normal for light touch bilaterally and symmetrically in all extremities.   Coordination/Movement:  Finger to nose is intact bilaterally without dysmetria or tremor.    Gait:  The gait was not observed.     LABS:     All pertinent  laboratory results were reviewed.    TSH   Date Value Ref Range Status   11/16/2020 1.196 0.350 - 5.000 mcUnits/mL Final     IMAGING:  CT of HEAD:  11/15/20  IMPRESSION:      No acute intracranial abnormality.    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 Her labs were significant or leukocytosis (12.9) and abnormal UA. CTOH showed no acute intracranial abnormalities. Physical examination was significant for continuous left beating nystagmus. Neurology services were initially requested for vertigo.    Assessment:  # Dizziness  # Nausea/vomitiing  # Gait instability  # Nystagmus, rapid, left beating , improved    Ddx: Recommend evaluation for central cause such as a cerebellar lesion. Peripheral vestibular nystagmus is also considered. Vestibular neuritis is a possibility given patient with recent viral illness and her current symptomatology.     RECOMMENDATIONS:    - CTA head and neck   - An MRI brain was considered, but due to the patient's body habitus and inability to lie flat, would be difficult to get this done safely.   - Echo ordered  - Agree with vestibular therapy as tolerated  - General Neurology will follow.     Thank you for the opportunity to participate in the care of this patient.     This patient's plan was discussed with patient and EDGARD Louie  General Neurology  [facility] Neurology  Pager [phone]    Answering service: [phone]    Please page above NP number weekdays [phone]. After 1630 during weekdays, and over the weekends and holidays, please page the General Neurologist on call.     General Neurology MD Pagers  Radha Millard -[phone]  Christine Jeronimo -[phone]  Angel Khanna MD  [phone]  Sae Hancock -[phone]  Ayana Baez -[phone]    General Neurology Service Physician  I have performed a complete history and physical on this patient.  I have seen the patient and had a face to face encounter.  History and examination as above and assessment and plan by NP, Resident or Student has been reviewed, discussed and edited by me.    Assess:  1. Acute vestibular symptoms possible vestibular neuronitis rule out a small cerebellar stroke or TIA simulating    2.  BMI 68    Plan:  Need CTA final read, but looks like no athero and poor bolus timing.  Vessels seen look OK.  Echo if OK by primary team since we cant get MRI        • This document was created in part using voice recognition software; please excuse any unintended typographical errors resulting from this process.    Ayana Baez MD  General Neurology and Stroke Neurology  [facility]  Pager   [phone]  Office   [phone]

## 2022-10-25 DIAGNOSIS — E78.5 HYPERLIPIDEMIA, UNSPECIFIED HYPERLIPIDEMIA TYPE: ICD-10-CM

## 2022-10-25 RX ORDER — ATORVASTATIN CALCIUM 40 MG/1
TABLET, FILM COATED ORAL
Qty: 90 TABLET | Refills: 3 | Status: SHIPPED | OUTPATIENT
Start: 2022-10-25

## 2022-10-26 ENCOUNTER — HOSPITAL ENCOUNTER (OUTPATIENT)
Dept: WOUND CARE | Age: 62
Discharge: HOME | End: 2022-10-26
Payer: OTHER GOVERNMENT

## 2022-10-26 VITALS — DIASTOLIC BLOOD PRESSURE: 84 MMHG | HEART RATE: 92 BPM | SYSTOLIC BLOOD PRESSURE: 129 MMHG

## 2022-10-26 PROCEDURE — 99212 OFFICE O/P EST SF 10 MIN: CPT

## 2022-10-26 ASSESSMENT — PAIN DESCRIPTION - LOCATION: LOCATION: KNEE

## 2022-10-26 ASSESSMENT — PAIN DESCRIPTION - ORIENTATION: ORIENTATION: RIGHT

## 2022-10-26 ASSESSMENT — PAIN SCALES - GENERAL: PAINLEVEL_OUTOF10: 6

## 2022-10-26 NOTE — PROGRESS NOTES
10/26/2022    TELEHEALTH EVALUATION -- Audio/Visual (During XODXH-69 public health emergency)    HPI:    Van Kyle (:  1960) has requested an audio/video evaluation for the following concern(s):  She is at the [facility]. Had hx of infected knee. she is on oral vancomycin  The diarrhea has resolved  Her lab work was reviewed, crp is normal  The sutures were removed       Prior to Visit Medications    Medication Sig Taking? Authorizing Provider   atorvastatin (LIPITOR) 40 MG tablet TAKE 1 TABLET NIGHTLY  Hoy Lunger, DO   LACTOBACILLUS PO Take 1 capsule by mouth daily  Historical Provider, MD   vancomycin (VANCOCIN) 50 mg/mL oral solution Take by mouth  Historical Provider, MD   cephALEXin (KEFLEX) 250 MG capsule Take 500 mg by mouth 3 times daily  Historical Provider, MD   rivaroxaban (XARELTO) 10 MG TABS tablet Take 1 tablet by mouth every 24 hours  Weiner Session, KIRBY   fenofibrate (TRIGLIDE) 160 MG tablet TAKE 1 TABLET DAILY  Hoy Lunger, DO   tiZANidine (ZANAFLEX) 2 MG tablet Take 1 tablet by mouth 3 times daily as needed (muscle cramps)  Hoy Lunger, DO   doxepin (SINEQUAN) 10 MG capsule TAKE 2 CAPSULES NIGHTLY  Hoy Lunger, DO   hydrocortisone 2.5 % cream Apply topically 2 times daily.   Hoy Lunger, DO   metFORMIN (GLUCOPHAGE-XR) 500 MG extended release tablet Take 1 tablet by mouth daily (with breakfast)  Hoy Lunger, DO   losartan (COZAAR) 25 MG tablet TAKE 1 TABLET DAILY  Hoy Lunger, DO   baclofen (LIORESAL) 10 MG tablet Take 1 tablet by mouth daily as needed (muscle spasms)  Hoy Lunger, DO   melatonin 3 MG TABS tablet Take 3 mg by mouth daily   Historical Provider, MD   Acetaminophen 500 MG CAPS Take 1,000 mg by mouth as needed for Pain   Historical Provider, MD   KRILL OIL PO Take 350 mg by mouth daily  Historical Provider, MD   Multiple Vitamins-Minerals (THERAPEUTIC MULTIVITAMIN-MINERALS) tablet Take 1 tablet by mouth daily Historical Provider, MD   aspirin 81 MG tablet Take 81 mg by mouth daily. Historical Provider, MD       Social History     Tobacco Use    Smoking status: Every Day     Packs/day: 0.50     Years: 49.00     Pack years: 24.50     Types: Cigarettes    Smokeless tobacco: Never   Vaping Use    Vaping Use: Never used   Substance Use Topics    Alcohol use: No     Comment: monthly glass of wine    Drug use: No       ASSESSMENT/PLAN:   Infection of the knee prosthesis. Wound healing  Normal markers of inflammation  Follow up will be scheduled as needed       Chiqui Ennis was evaluated through a synchronous (real-time) audio-video encounter. The patient (or guardian if applicable) is aware that this is a billable service, which includes applicable co-pays. This Virtual Visit was conducted with patient's (and/or legal guardian's) consent. The visit was conducted pursuant to the emergency declaration under the [address] [place] [address], [address] [place] [address] authority and the [employer] General Act. Patient identification was verified, and a caregiver was present when appropriate. --Diogo Bone MD on 10/26/2022 at 9:33 AM    An electronic signature was used to authenticate this note.

## 2022-10-26 NOTE — PLAN OF CARE
Problem: Wound:  Goal: Will show signs of wound healing; wound closure and no evidence of infection  Description: Will show signs of wound healing; wound closure and no evidence of infection  Outcome: Completed   Virtual visit completed with patient and [facility]. Dr. Yudy Alfredo ordered to wean oral Vancomycin: 125 mg every 8 hours for 1 week, 125 mg every 12 hours for 1 week, 125 mg daily for 1 week then discontinue. (Verbal order given to nurse at [facility]). Discontinue weekly lab work. Discharge instructions/orders faxed to [facility]. Right knee - dressings per [facility]. Follow up visit: Discharge from [address] [place] [address], follow up as needed. Care plan reviewed with patient and nurse. Patient and nurse verbalize understanding of the plan of care and contribute to goal setting.

## 2022-10-26 NOTE — DISCHARGE INSTRUCTIONS
Visit Discharge/Physician Orders:   - Wean oral Vancomycin: 125 mg every 8 hours for 1 week, 125 mg every 12 hours for 1 week, 125 mg daily for 1 week then discontinue. (Verbal order given to nurse at [facility])   - Discontinue weekly lab work. Home Care: [facility]     Wound Location: Right knee - dressings per [facility]     Follow up visit: Discharge from [address] [place] [address], follow up as needed. Keep next scheduled appointment. Please give 24 hour notice if unable to keep appointment. [phone]     If you experience any of the following, please call the Wound Care Service during business hours: Monday through Friday 8:00 am - 4:30 pm  ([phone]). *Increase in pain              *Temperature over 101              *Increase in drainage from your wound or a foul odor              *Uncontrolled swelling              *Need for compression bandage changes due to slippage, breakthrough drainage     If you need medical attention outside of business hours, please contact your Primary Care Doctor or go to the nearest emergency room.

## 2022-10-26 NOTE — PROGRESS NOTES
Stefania Flood, was evaluated through a synchronous (real-time) audio-video encounter. The patient (or guardian if applicable) is aware that this is a billable service, which includes applicable co-pays. This Virtual Visit was conducted with patient's (and/or legal guardian's) consent. The visit was conducted pursuant to the emergency declaration under the [address] [place] [address] and the Rashad Wordy and [employer] General Act. Patient identification was verified, and a caregiver was present when appropriate. The patient was located at Other: [facility] . Provider was located at [facility] ([place] [address]): [address] SCOTT MELENDEZ AM [facility],  [address]. Total time spent for this encounter:  30 minutes    --Fernanda Cruz RN on 10/26/2022 at 9:39 AM    An electronic signature was used to authenticate this note.

## 2022-10-26 NOTE — PROGRESS NOTES
Virtual Visit Wound Care  Clinic Level of Care   NAME:  Jigar Reyes OF BIRTH:  1960 GENDER: female  MEDICAL RECORD NUMBER:  [id number]   DATE:  10/26/2022     Patient Type Points   No documentation completed by nursing staff. []   0   Nursing staff documented in the navigator for an ESTABLISHED patient including Episode, Patient ID, Chief Complaint, Travel Screen, Allergies, Latex Allergy, Home Medication, History, Psychosocial Screen, C-SSRS Screen, Fall Risk, Nutritional Screen, Advanced Directive, Education and Plan of Care, and Discharge Instructions. The Functional Screening tab is only required if the patient's status changes. [x]   1   Nursing staff documented in the navigator for a NEW patient including Patient ID, Chief Complaint, Travel Screen, Allergies, Latex Allergy, Home Medication, History, Psychosocial Screen, C-SSRS Screen, Fall Risk, Nutritional Screen, Advanced Directive, Functional Screen, Education and Plan of Care, and Discharge Instructions. []   2   Nursing staff documented in the navigator for a CONSULT patient including Episode, Patient ID, Chief Complaint, Travel Screen, Allergies, Latex Allergy, Home Medication, History, Psychosocial Screen, C-SSRS Screen, Fall Risk, Nutritional Screen, Advanced Directive, Functional Screen, Education and Plan of Care, and Discharge Instructions. []   2     Wound Description Points   Unable to obtain image of Wound. For example, patient/caregiver is instructed not to remove dressing, is unable to correctly position smart phone, no smart phone is available, patient is unable to maintain connectivity or the patient's wound is healed. []   0   1-3 wound images annotated. Images of the wound(s) is obtained and annotated along with completed description in [address] [place] [address]. [x]   1   4-5 wound images annotated. Images of the wound(s) is obtained and annotated along with completed description in [address] [place] [address]. []   2   Greater than 6 wound images annotated.  Images of the wound(s) is obtained and annotated along with completed description in [address] [place] [address]. []   3     Education Points   No Education completed by nursing staff.    []   0   Patient/caregiver is educated on 1-4 topics. Nursing staff identifies learner, confirms understanding of information (verbal, demonstration, written) and documents details. May include Discharge Instructions/AVS, available documents in My Chart, or Web-based learning.  []   1   Patient/caregiver is educated on 5-9 topics. Nursing staff identifies learner, confirms understanding of information (verbal, demonstration, written) and documents details. May include Discharge Instructions/AVS, available documents in My Chart, or Web-based learning. [x]   2   Patient/caregiver is educated on 10 or more topics. Nursing staff identifies learner, confirms understanding of information (verbal, demonstration, written) and documents details. May include Discharge Instructions/AVS, available documents in My Chart, or Web-based learning. []   3     Follow-up Virtual Visit Points   No contact with outside resources made. []   0   Nursing staff contacts 1-2 outside resource. For example, telephone call made to home health, primary care provider, pharmacy, or DME. May include filling out forms and writing letters, arranging transportation, communication with insurance , vendors, etc.  Discharge, instructions and/or After Visit Summary given to patient/caregiver and instructions completed. [x]   1   Nursing staff contacts 3-4 outside resource. For example, telephone calls made to home health, primary care provider, pharmacy, or DME. May include filling out forms and writing letters, arranging transportation, communication with insurance , vendors, etc.  Discharge, instructions and/or After Visit Summary given to patient/caregiver and instructions completed. []   2   Nursing contacts 5 or more outside resource.  For example, telephone calls made to home health, primary care providers, pharmacy, or DME. May include filling out forms and writing letters, arranging transportation, communication with insurance , vendors, etc.  Discharge, instructions and/or After Visit Summary given to patient/caregiver and instructions completed.    []   3     Is this the Patient's First Visit to the [address] [place] [address] Road  No    Is this Patient Established @ [facility]  Yes             Virtual Visit Clinical Level of Care Wound Care      Points  1-3  Level 1 []     Points  4-5  Level 2 [x]     Points  6-7  Level 3 []     Points  8-9  Level 4 []     Points  10-11  Level 5 []       Electronically signed by Yordan Vu RN on 10/26/2022 at @ 9:46 AM

## 2022-12-01 ENCOUNTER — HOSPITAL ENCOUNTER (EMERGENCY)
Age: 62
Discharge: OTHER INSTITUTION | End: 2022-12-01
Attending: EMERGENCY MEDICINE
Payer: OTHER GOVERNMENT

## 2022-12-01 ENCOUNTER — APPOINTMENT (OUTPATIENT)
Dept: CT IMAGING | Age: 62
End: 2022-12-01
Payer: OTHER GOVERNMENT

## 2022-12-01 VITALS
DIASTOLIC BLOOD PRESSURE: 68 MMHG | BODY MASS INDEX: 25.06 KG/M2 | OXYGEN SATURATION: 97 % | HEART RATE: 88 BPM | SYSTOLIC BLOOD PRESSURE: 121 MMHG | TEMPERATURE: 98.5 F | WEIGHT: 160 LBS | RESPIRATION RATE: 24 BRPM

## 2022-12-01 DIAGNOSIS — R19.5 OCCULT BLOOD IN STOOLS: Primary | ICD-10-CM

## 2022-12-01 LAB
ABO: NORMAL
ALBUMIN SERPL-MCNC: 3.3 G/DL (ref 3.5–5.1)
ALP BLD-CCNC: 86 U/L (ref 38–126)
ALT SERPL-CCNC: 8 U/L (ref 11–66)
ANION GAP SERPL CALCULATED.3IONS-SCNC: 12 MEQ/L (ref 8–16)
ANTIBODY SCREEN: NORMAL
AST SERPL-CCNC: 20 U/L (ref 5–40)
BASOPHILS # BLD: 0.3 %
BASOPHILS ABSOLUTE: 0 THOU/MM3 (ref 0–0.1)
BILIRUB SERPL-MCNC: 0.3 MG/DL (ref 0.3–1.2)
BUN BLDV-MCNC: 9 MG/DL (ref 7–22)
CALCIUM SERPL-MCNC: 9.5 MG/DL (ref 8.5–10.5)
CHLORIDE BLD-SCNC: 101 MEQ/L (ref 98–111)
CO2: 26 MEQ/L (ref 23–33)
CREAT SERPL-MCNC: 0.4 MG/DL (ref 0.4–1.2)
EOSINOPHIL # BLD: 1.7 %
EOSINOPHILS ABSOLUTE: 0.1 THOU/MM3 (ref 0–0.4)
ERYTHROCYTE [DISTWIDTH] IN BLOOD BY AUTOMATED COUNT: 15.7 % (ref 11.5–14.5)
ERYTHROCYTE [DISTWIDTH] IN BLOOD BY AUTOMATED COUNT: 51.3 FL (ref 35–45)
GFR SERPL CREATININE-BSD FRML MDRD: > 60 ML/MIN/1.73M2
GLUCOSE BLD-MCNC: 73 MG/DL (ref 70–108)
HCT VFR BLD CALC: 38.6 % (ref 37–47)
HEMOCCULT STL QL: POSITIVE
HEMOGLOBIN: 11.9 GM/DL (ref 12–16)
IMMATURE GRANS (ABS): 0.03 THOU/MM3 (ref 0–0.07)
IMMATURE GRANULOCYTES: 0.4 %
INR BLD: 1.44 (ref 0.85–1.13)
LYMPHOCYTES # BLD: 18.8 %
LYMPHOCYTES ABSOLUTE: 1.5 THOU/MM3 (ref 1–4.8)
MCH RBC QN AUTO: 27.7 PG (ref 26–33)
MCHC RBC AUTO-ENTMCNC: 30.8 GM/DL (ref 32.2–35.5)
MCV RBC AUTO: 90 FL (ref 81–99)
MONOCYTES # BLD: 6 %
MONOCYTES ABSOLUTE: 0.5 THOU/MM3 (ref 0.4–1.3)
NUCLEATED RED BLOOD CELLS: 0 /100 WBC
OSMOLALITY CALCULATION: 274.8 MOSMOL/KG (ref 275–300)
PLATELET # BLD: 529 THOU/MM3 (ref 130–400)
PMV BLD AUTO: 10.3 FL (ref 9.4–12.4)
POTASSIUM SERPL-SCNC: 4.2 MEQ/L (ref 3.5–5.2)
RBC # BLD: 4.29 MILL/MM3 (ref 4.2–5.4)
RH FACTOR: NORMAL
SEG NEUTROPHILS: 72.8 %
SEGMENTED NEUTROPHILS ABSOLUTE COUNT: 5.7 THOU/MM3 (ref 1.8–7.7)
SODIUM BLD-SCNC: 139 MEQ/L (ref 135–145)
TOTAL PROTEIN: 6.7 G/DL (ref 6.1–8)
WBC # BLD: 7.8 THOU/MM3 (ref 4.8–10.8)

## 2022-12-01 PROCEDURE — 86850 RBC ANTIBODY SCREEN: CPT

## 2022-12-01 PROCEDURE — 6360000004 HC RX CONTRAST MEDICATION: Performed by: EMERGENCY MEDICINE

## 2022-12-01 PROCEDURE — 82272 OCCULT BLD FECES 1-3 TESTS: CPT

## 2022-12-01 PROCEDURE — 80053 COMPREHEN METABOLIC PANEL: CPT

## 2022-12-01 PROCEDURE — 85025 COMPLETE CBC W/AUTO DIFF WBC: CPT

## 2022-12-01 PROCEDURE — 6360000002 HC RX W HCPCS: Performed by: EMERGENCY MEDICINE

## 2022-12-01 PROCEDURE — 74177 CT ABD & PELVIS W/CONTRAST: CPT

## 2022-12-01 PROCEDURE — 96374 THER/PROPH/DIAG INJ IV PUSH: CPT

## 2022-12-01 PROCEDURE — 36415 COLL VENOUS BLD VENIPUNCTURE: CPT

## 2022-12-01 PROCEDURE — 86900 BLOOD TYPING SEROLOGIC ABO: CPT

## 2022-12-01 PROCEDURE — 99285 EMERGENCY DEPT VISIT HI MDM: CPT

## 2022-12-01 PROCEDURE — 86901 BLOOD TYPING SEROLOGIC RH(D): CPT

## 2022-12-01 PROCEDURE — 85610 PROTHROMBIN TIME: CPT

## 2022-12-01 RX ORDER — OXYCODONE HYDROCHLORIDE AND ACETAMINOPHEN 5; 325 MG/1; MG/1
1 TABLET ORAL EVERY 6 HOURS PRN
COMMUNITY

## 2022-12-01 RX ORDER — MORPHINE SULFATE 2 MG/ML
2 INJECTION, SOLUTION INTRAMUSCULAR; INTRAVENOUS ONCE
Status: COMPLETED | OUTPATIENT
Start: 2022-12-01 | End: 2022-12-01

## 2022-12-01 RX ADMIN — IOPAMIDOL 80 ML: 755 INJECTION, SOLUTION INTRAVENOUS at 19:11

## 2022-12-01 RX ADMIN — MORPHINE SULFATE 2 MG: 2 INJECTION, SOLUTION INTRAMUSCULAR; INTRAVENOUS at 18:24

## 2022-12-01 ASSESSMENT — ENCOUNTER SYMPTOMS
VOMITING: 0
COUGH: 0
EYE REDNESS: 0
NAUSEA: 0
TROUBLE SWALLOWING: 0
SHORTNESS OF BREATH: 0
BLOOD IN STOOL: 1
ABDOMINAL PAIN: 0
BACK PAIN: 0

## 2022-12-01 ASSESSMENT — PAIN SCALES - GENERAL: PAINLEVEL_OUTOF10: 7

## 2022-12-01 NOTE — ED TRIAGE NOTES
Pt to ED due to blood in the stool since yesterday. Pt denies any abdominal pain. Pt denies chest pain or shortness of breath.

## 2022-12-01 NOTE — ED PROVIDER NOTES
[facility] [address] [address] EMERGENCY DEPT    EMERGENCY MEDICINE     Pt Name: Misa Fuentes  MRN: [id number]  [place] 1960  Date of evaluation: 12/1/2022  Provider: Gage Urena MD,     CHIEF COMPLAINT       Chief Complaint   Patient presents with    Melena       HISTORY OF PRESENT ILLNESS    Misa Fuentes is a pleasant 60 y.o. female who presents to the emergency department from nursing home for evaluation of GI bleeding. Patient states that she noticed blood in her stool yesterday. She was told by her nurses aide that it looks like \"red velvet \". Patient denies any abdominal pain, nausea, or vomiting. She has had some episodes of loose stools and is supposed to be tested for C. difficile. She has a history of C. difficile and has taken vancomycin in the past for this. Patient is currently in the nursing home secondary to complicated knee replacement surgery. She denies previous history of blood in her stool. She is on Xarelto. Triage notes and Nursing notes were reviewed by myself. Any discrepancies are addressed above.     PAST MEDICAL HISTORY     Past Medical History:   Diagnosis Date    Atypical ductal hyperplasia, breast     left breast, 2016, lumpectomy, Tamoxifen, Barbara    Hx of breast biopsy Jan 8 2016    Hyperlipidemia     Type II or unspecified type diabetes mellitus without mention of complication, not stated as uncontrolled        SURGICAL HISTORY       Past Surgical History:   Procedure Laterality Date    BLADDER SUSPENSION      BREAST BIOPSY Left     atypical hyperlasia, 2016, Barbara    BREAST LUMPECTOMY Left     atypical hyperplasia, 2016, Barbara    COLONOSCOPY  08/28/2020    HERNIA REPAIR  10/17/2016    KNEE SURGERY      Right knee    REVISION TOTAL KNEE ARTHROPLASTY Right 5/10/2022    RIGHT KNEE TOTAL ARTHROPLASTY REVISION performed by Lilliam Pat MD at [facility] [address] Right 6/12/2022    Right Knee Incision and Drainage of Total Knee With Poly Exchange performed by Taylor Sheldon MD at [address]       Discharge Medication List as of 12/1/2022  7:43 PM        CONTINUE these medications which have NOT CHANGED    Details   oxyCODONE-acetaminophen (PERCOCET) 5-325 MG per tablet Take 1 tablet by mouth every 6 hours as needed for Pain. Historical Med      atorvastatin (LIPITOR) 40 MG tablet TAKE 1 TABLET NIGHTLY, Disp-90 tablet, R-3Normal      LACTOBACILLUS PO Take 1 capsule by mouth dailyHistorical Med      vancomycin (VANCOCIN) 50 mg/mL oral solution Take by mouthHistorical Med      cephALEXin (KEFLEX) 250 MG capsule Take 500 mg by mouth 3 times dailyHistorical Med      rivaroxaban (XARELTO) 10 MG TABS tablet Take 1 tablet by mouth every 24 hours, Disp-18 tablet, R-0Print      fenofibrate (TRIGLIDE) 160 MG tablet TAKE 1 TABLET DAILY, Disp-90 tablet, R-3Normal      tiZANidine (ZANAFLEX) 2 MG tablet Take 1 tablet by mouth 3 times daily as needed (muscle cramps), Disp-30 tablet, R-2Normal      doxepin (SINEQUAN) 10 MG capsule TAKE 2 CAPSULES NIGHTLY, Disp-180 capsule, R-3Normal      hydrocortisone 2.5 % cream Apply topically 2 times daily. , Disp-60 g, R-0, Normal      metFORMIN (GLUCOPHAGE-XR) 500 MG extended release tablet Take 1 tablet by mouth daily (with breakfast), Disp-90 tablet, R-3Normal      losartan (COZAAR) 25 MG tablet TAKE 1 TABLET DAILY, Disp-90 tablet, R-3Normal      baclofen (LIORESAL) 10 MG tablet Take 1 tablet by mouth daily as needed (muscle spasms), Disp-30 tablet, R-0Normal      melatonin 3 MG TABS tablet Take 3 mg by mouth daily Historical Med      Acetaminophen 500 MG CAPS Take 1,000 mg by mouth as needed for Pain Historical Med      KRILL OIL PO Take 350 mg by mouth dailyHistorical Med      Multiple Vitamins-Minerals (THERAPEUTIC MULTIVITAMIN-MINERALS) tablet Take 1 tablet by mouth daily      aspirin 81 MG tablet Take 81 mg by mouth daily.                ALLERGIES     Adhesive tape    FAMILY HISTORY       Family History   Problem Relation Age of Onset    High Cholesterol Mother     High Blood Pressure Mother     Pacemaker Mother     Other Mother         Sundowners syndrome    Crohn's Disease Mother     Cancer Mother         Uterine and cervical and vagina    Thyroid Disease Mother     Dementia Mother     Colon Polyps Mother     Other Father         Pneumonia    Dementia Father     Diabetes Sister     Breast Cancer Sister     Heart Disease Brother     Colon Cancer Neg Hx         SOCIAL HISTORY       Social History     Socioeconomic History    Marital status:      Spouse name: Мария Cortes     Number of children: 1    Years of education: 12    Highest education level: Associate degree: academic program   Tobacco Use    Smoking status: Every Day     Packs/day: 0.50     Years: 49.00     Pack years: 24.50     Types: Cigarettes    Smokeless tobacco: Never   Vaping Use    Vaping Use: Never used   Substance and Sexual Activity    Alcohol use: No     Comment: monthly glass of wine    Drug use: No       REVIEW OF SYSTEMS     Review of Systems   Constitutional:  Negative for fatigue and fever. HENT:  Negative for congestion and trouble swallowing. Eyes:  Negative for redness. Respiratory:  Negative for cough and shortness of breath. Cardiovascular:  Negative for chest pain. Gastrointestinal:  Positive for blood in stool. Negative for abdominal pain, nausea and vomiting. Genitourinary:  Negative for dysuria. Musculoskeletal:  Negative for back pain. Skin:  Negative for rash. Allergic/Immunologic: Negative for immunocompromised state. Neurological:  Negative for light-headedness. Hematological:  Does not bruise/bleed easily. Except as noted above the remainder of the review of systems was reviewed and is.    PHYSICAL EXAM    (up to 7 for level 4, 8 or more for level 5)     ED Triage Vitals [12/01/22 1509]   BP Temp Temp src Heart Rate Resp SpO2 Height Weight   107/87 98.5 °F (36.9 °C) -- 96 16 98 % -- 160 lb (72.6 kg)       Physical Exam  Vitals and nursing note reviewed. Exam conducted with a chaperone present. Constitutional:       General: She is not in acute distress. Appearance: She is normal weight. She is not ill-appearing. HENT:      Head: Normocephalic and atraumatic. Nose: Nose normal. No congestion. Mouth/Throat:      Mouth: Mucous membranes are moist.      Pharynx: Oropharynx is clear. No oropharyngeal exudate or posterior oropharyngeal erythema. Eyes:      Extraocular Movements: Extraocular movements intact. Pupils: Pupils are equal, round, and reactive to light. Cardiovascular:      Rate and Rhythm: Normal rate and regular rhythm. Pulses: Normal pulses. Heart sounds: No murmur heard. No friction rub. Pulmonary:      Effort: Pulmonary effort is normal. No respiratory distress. Breath sounds: Normal breath sounds. No wheezing. Abdominal:      General: Abdomen is flat. There is no distension. Palpations: Abdomen is soft. Tenderness: There is no abdominal tenderness. There is no guarding or rebound. Genitourinary:     Rectum: Guaiac result positive. No mass, tenderness, anal fissure, external hemorrhoid or internal hemorrhoid. Normal anal tone. Musculoskeletal:         General: Normal range of motion. Skin:     General: Skin is warm and dry. Capillary Refill: Capillary refill takes less than 2 seconds. Neurological:      General: No focal deficit present. Mental Status: She is alert and oriented to person, place, and time.        DIAGNOSTIC RESULTS     EKG:(none if blank)  All E[facility]'s are [facility] who either signs or Co-signs this chart in the absence of a cardiologist.        RADIOLOGY: (none if blank)   Interpretation per the Radiologistbelow, if available at the time of this note:    CT ABDOMEN PELVIS W IV CONTRAST Additional Contrast? None   Final Result Impression:   Multifocal cholelithiasis. This document has been electronically signed by: Shruthi Peterson MD on    12/01/2022 07:38 PM      All CTs at this facility use dose modulation techniques and iterative    reconstructions, and/or weight-based dosing   when appropriate to reduce radiation to a low as reasonably achievable. LABS:  Labs Reviewed   CBC WITH AUTO DIFFERENTIAL - Abnormal; Notable for the following components:       Result Value    Hemoglobin 11.9 (*)     MCHC 30.8 (*)     RDW-CV 15.7 (*)     RDW-SD 51.3 (*)     Platelets 720 (*)     All other components within normal limits   COMPREHENSIVE METABOLIC PANEL - Abnormal; Notable for the following components:    Albumin 3.3 (*)     ALT 8 (*)     All other components within normal limits   PROTIME-INR - Abnormal; Notable for the following components:    INR 1.44 (*)     All other components within normal limits   OSMOLALITY - Abnormal; Notable for the following components:    Osmolality Calc 274.8 (*)     All other components within normal limits   GASTROINTESTINAL PANEL, MOLECULAR   BLOOD OCCULT STOOL SCREEN #1   GLOMERULAR FILTRATION RATE, ESTIMATED   ANION GAP   URINALYSIS   TYPE AND SCREEN       All other labs were within normal range or not returned as of this dictation. Please note, any cultures that may have been sent were not resulted at the time of this patient visit. EMERGENCY DEPARTMENT COURSE andMedical Decision Making:     MDM  Number of Diagnoses or Management Options  Occult blood in stools  Diagnosis management comments: 79-year-old female presents emergency room for blood in her stool. Differential includes diverticulosis, hemorrhoids, anal fissure, AV malformation, medication reaction, gastritis, duodenitis, peptic ulcer disease. No evidence of hemorrhoids on my evaluation. There was no active bleeding on my rectal exam.  We will send off a stool guaiac. Will evaluate with CBC, CMP, PT/INR, type and screen. Patient is hemodynamically stable at this time.    /  ED Course as of 12/01/22 2138   Thu Dec 01, 2022   1616 Fecal occult is positive for blood. Hemoglobin appears to be at her baseline. No active bleeding at the bedside. [DD]   1939 CT is negative. No bleeding here in the emergency department. Will discharge home. [DD]      ED Course User Index  [DD] Amie Dunlap MD         The patient was evaluated during the global COVID-19 pandemic, and that diagnosis was considered upon their initial presentation. Their evaluation, treatment and testing was consistent with current guidelines for patients who present with complaints or symptoms that may be related to COVID-19. Strict returnprecautions and follow up instructions were discussed with the patient with which the patient agrees        ED Medications administered this visit:    Medications   morphine (PF) injection 2 mg (2 mg IntraVENous Given 12/1/22 1824)   iopamidol (ISOVUE-370) 76 % injection 80 mL (80 mLs IntraVENous Given 12/1/22 1911)         Procedures: (None if blank)       CLINICAL       1.  Occult blood in stools          DISPOSITION/PLAN   DISPOSITION Decision To Discharge 12/01/2022 08:50:02 PM      PATIENT REFERRED TO:  DO Pamela Carroll, [address] [facility] [address]  [phone]    Schedule an appointment as soon as possible for a visit in 1 week  If symptoms worsen    Caitlin Bardales MD  [address] [facility]. [address]  [phone]    Schedule an appointment as soon as possible for a visit   If symptoms worsen    DISCHARGE MEDICATIONS:  Discharge Medication List as of 12/1/2022  7:43 PM                 (Please note that portions of this note were completed with a voice recognition program.  Efforts were made to edit the dictations but occasionallywords are mis-transcribed.)      Electronically signed by Baldomero Xavier MD on 12/1/22 at 5:14 PM EST    Attending Physician, Emergency [facility] Fauzia Daniel MD  12/01/22 2253

## 2022-12-02 ENCOUNTER — TELEPHONE (OUTPATIENT)
Dept: FAMILY MEDICINE CLINIC | Age: 62
End: 2022-12-02

## 2022-12-02 NOTE — DISCHARGE INSTRUCTIONS
You were seen for blood in your stool. No evidence of active or gross bleeding was found on evaluation. Your CT scan did not reveal any ischemia, diverticulosis, diverticulitis, or large masses. Please follow-up with your primary care physician as needed. Please continue taking home medications as prescribed. I have also written down the name of a GI physician as you may benefit from a colonoscopy.

## 2022-12-02 NOTE — ED NOTES
ED nurse-to-nurse bedside report    Chief Complaint   Patient presents with    Melena      LOC: alert and orientated to name, place, date  Vital signs   Vitals:    12/01/22 1509 12/01/22 1625 12/01/22 1810   BP: 107/87 128/79 126/62   Pulse: 96 91 88   Resp: 16 23 23   Temp: 98.5 °F (36.9 °C)     SpO2: 98% 98% 98%   Weight: 160 lb (72.6 kg)        Pain:    Pain Interventions: see MAR  Pain Goal: 2/10  Oxygen: No    Current needs required RA   Telemetry: Yes  LDAs:   Peripheral IV 12/01/22 Right Forearm (Active)   Site Assessment Clean, dry & intact 12/01/22 1840   Line Status Blood return noted; Flushed 12/01/22 1840   Phlebitis Assessment No symptoms 12/01/22 1840   Infiltration Assessment 0 12/01/22 1840   Dressing Status Clean, dry & intact 12/01/22 1840     Continuous Infusions:   Mobility: Fully dependent  Kumar Fall Risk Score: No flowsheet data found.   Fall Interventions: call light in reach, bed in low position with wheels locked, side rails up x2  Report given to: Kelley Bustos RN  12/01/22 2921

## 2022-12-08 ENCOUNTER — PATIENT MESSAGE (OUTPATIENT)
Dept: FAMILY MEDICINE CLINIC | Age: 62
End: 2022-12-08

## 2022-12-08 DIAGNOSIS — M25.561 ACUTE PAIN OF RIGHT KNEE: Primary | ICD-10-CM

## 2022-12-08 NOTE — TELEPHONE ENCOUNTER
From: Scott Varela  To: Dr. Wang Loomis  Sent: 12/8/2022 9:13 AM EST  Subject: Pain medication     I'm being released from the nursing home Monday. Is there any way I can get a script for pain meds for home. My knee is still infected and they are going to have to go in and remove the hardware and try to put in a antibiotic spacer. I went yesterday to [facility] and they pushed and prodded my knee and calf and now the pain is unbearable. So I would appreciate it if I could get a script for when I go home.  Please

## 2022-12-09 RX ORDER — TRAMADOL HYDROCHLORIDE 50 MG/1
50 TABLET ORAL EVERY 6 HOURS PRN
Qty: 28 TABLET | Refills: 0 | Status: SHIPPED | OUTPATIENT
Start: 2022-12-09 | End: 2022-12-16

## 2022-12-12 ENCOUNTER — TELEPHONE (OUTPATIENT)
Dept: WOUND CARE | Age: 62
End: 2022-12-12

## 2022-12-12 NOTE — TELEPHONE ENCOUNTER
Spoke with Garland Saldivar who states patient knee incision opened back up sometime last week. Cultures were done. Patient was started on an antibiotic. Dr Adan Bolton would like patient to follow up with Dr. Stephanie Montes as patient will be discharged from [facility] 12/14. Appointment made.  Garland Saldivar voices understanding.

## 2022-12-12 NOTE — TELEPHONE ENCOUNTER
----- Message from Silvestre Saba sent at 12/12/2022  8:35 AM EST -----  [address] [phone] EXT [id number] MEET @ [employer] [facility] NEEDS TO GET YAIMA SET UP WITH A F/U APPT WITH DR Brenda Fonseca.  SHE IS BEING DISCHARGED WED 12/14

## 2022-12-16 RX ORDER — OXYCODONE HYDROCHLORIDE AND ACETAMINOPHEN 5; 325 MG/1; MG/1
1 TABLET ORAL EVERY 4 HOURS PRN
COMMUNITY
End: 2022-12-16

## 2022-12-16 RX ORDER — DOXYCYCLINE HYCLATE 100 MG
1 TABLET ORAL 2 TIMES DAILY
Status: ON HOLD | COMMUNITY
Start: 2022-12-09 | End: 2023-01-03 | Stop reason: HOSPADM

## 2022-12-16 NOTE — PROGRESS NOTES
called & left message Keira with Dr Wang Mcgrath in regards to [facility] when the pt should stop taking

## 2022-12-16 NOTE — PROGRESS NOTES
Follow all instructions given by your physician  NPO after midnight   Sips of water am of surgery with allowed medications  Bring insurance info and 's license  Wear comfortable clean, loose fitting clothing  No jewelry or contact lenses to be worn day of surgery  No glue on dentures morning of surgery;you will be asked to remove them for surgery. Case for glasses. Shower night before and morning of surgery with a liquid antibacterial soap, dry with fresh clean towel; no lotions, creams or powder. Clean sheets and pillow case on bed night before surgery  Bring medications in original bottles      Please refer to the SSI-Surgical Site Infection Flyer you hopefully received in the mail-together we can prevent infections; signs and symptoms reviewed. When discharged be sure you understand how to care for your wound and that you have the phone # to call if you have any concerns or questions about your wound.  needed at discharge Report to SDS on 2nd floor  If you would become ill prior to surgery, please call the surgeon  May have a visitor with you, we request that you limit to 2 visitors in pre-op area  Masks are recommended but not required, new masks at entrance desk  Call [phone] for any questions    Covid questionnaire Complete; Patient negative for symptoms or exposure. See documentation.

## 2022-12-20 ENCOUNTER — TELEPHONE (OUTPATIENT)
Dept: WOUND CARE | Age: 62
End: 2022-12-20

## 2022-12-20 RX ORDER — SODIUM CHLORIDE 0.9 % (FLUSH) 0.9 %
5-40 SYRINGE (ML) INJECTION EVERY 12 HOURS SCHEDULED
Status: CANCELLED | OUTPATIENT
Start: 2022-12-20

## 2022-12-20 RX ORDER — SODIUM CHLORIDE 9 MG/ML
INJECTION, SOLUTION INTRAVENOUS PRN
Status: CANCELLED | OUTPATIENT
Start: 2022-12-20

## 2022-12-20 RX ORDER — SODIUM CHLORIDE 0.9 % (FLUSH) 0.9 %
5-40 SYRINGE (ML) INJECTION PRN
Status: CANCELLED | OUTPATIENT
Start: 2022-12-20

## 2022-12-20 NOTE — TELEPHONE ENCOUNTER
Spoke with patient and informed her if after surgery the physician wants Lis Robles to see while in hospital they can consult him. Patient verbalizes understanding.

## 2022-12-20 NOTE — TELEPHONE ENCOUNTER
----- Message from [employer] sent at 12/20/2022 10:51 AM EST -----   [phone] YAIMA CALLED BACK TO [facility] HER APPT ON 12/21 WITH DR Sony Oreilly. STATES THAT IT IS A TRANSPORTATION ISSUE. STILL WANTS TO KNOW IF DR CARPENTER CAN SEE HER IN [facility] FOLLOWING HER SURGERY.  SHE WILL CALL AFTER DISCHARGE TO GET R/S

## 2022-12-21 ENCOUNTER — OFFICE VISIT (OUTPATIENT)
Dept: FAMILY MEDICINE CLINIC | Age: 62
End: 2022-12-21
Payer: OTHER GOVERNMENT

## 2022-12-21 VITALS — HEART RATE: 72 BPM | RESPIRATION RATE: 16 BRPM | SYSTOLIC BLOOD PRESSURE: 118 MMHG | DIASTOLIC BLOOD PRESSURE: 64 MMHG

## 2022-12-21 DIAGNOSIS — Z01.818 PRE-OP EVALUATION: Primary | ICD-10-CM

## 2022-12-21 DIAGNOSIS — E78.00 PURE HYPERCHOLESTEROLEMIA: ICD-10-CM

## 2022-12-21 DIAGNOSIS — Z96.651 STATUS POST RIGHT KNEE REPLACEMENT: ICD-10-CM

## 2022-12-21 DIAGNOSIS — L08.9 RIGHT KNEE SKIN INFECTION: ICD-10-CM

## 2022-12-21 DIAGNOSIS — E11.9 CONTROLLED TYPE 2 DIABETES MELLITUS WITHOUT COMPLICATION, WITHOUT LONG-TERM CURRENT USE OF INSULIN (HCC): ICD-10-CM

## 2022-12-21 PROCEDURE — 99214 OFFICE O/P EST MOD 30 MIN: CPT | Performed by: FAMILY MEDICINE

## 2022-12-21 PROCEDURE — 3078F DIAST BP <80 MM HG: CPT | Performed by: FAMILY MEDICINE

## 2022-12-21 PROCEDURE — 3074F SYST BP LT 130 MM HG: CPT | Performed by: FAMILY MEDICINE

## 2022-12-21 RX ORDER — TIZANIDINE 2 MG/1
2 TABLET ORAL 3 TIMES DAILY PRN
Qty: 30 TABLET | Refills: 2 | Status: ON HOLD | OUTPATIENT
Start: 2022-12-21

## 2022-12-21 SDOH — ECONOMIC STABILITY: FOOD INSECURITY: WITHIN THE PAST 12 MONTHS, YOU WORRIED THAT YOUR FOOD WOULD RUN OUT BEFORE YOU GOT MONEY TO BUY MORE.: NEVER TRUE

## 2022-12-21 SDOH — ECONOMIC STABILITY: FOOD INSECURITY: WITHIN THE PAST 12 MONTHS, THE FOOD YOU BOUGHT JUST DIDN'T LAST AND YOU DIDN'T HAVE MONEY TO GET MORE.: NEVER TRUE

## 2022-12-21 ASSESSMENT — PATIENT HEALTH QUESTIONNAIRE - PHQ9
9. THOUGHTS THAT YOU WOULD BE BETTER OFF DEAD, OR OF HURTING YOURSELF: 0
SUM OF ALL RESPONSES TO PHQ QUESTIONS 1-9: 0
SUM OF ALL RESPONSES TO PHQ QUESTIONS 1-9: 0
1. LITTLE INTEREST OR PLEASURE IN DOING THINGS: 0
SUM OF ALL RESPONSES TO PHQ9 QUESTIONS 1 & 2: 0
10. IF YOU CHECKED OFF ANY PROBLEMS, HOW DIFFICULT HAVE THESE PROBLEMS MADE IT FOR YOU TO DO YOUR WORK, TAKE CARE OF THINGS AT HOME, OR GET ALONG WITH OTHER PEOPLE: 0
3. TROUBLE FALLING OR STAYING ASLEEP: 0
8. MOVING OR SPEAKING SO SLOWLY THAT OTHER PEOPLE COULD HAVE NOTICED. OR THE OPPOSITE, BEING SO FIGETY OR RESTLESS THAT YOU HAVE BEEN MOVING AROUND A LOT MORE THAN USUAL: 0
2. FEELING DOWN, DEPRESSED OR HOPELESS: 0
7. TROUBLE CONCENTRATING ON THINGS, SUCH AS READING THE NEWSPAPER OR WATCHING TELEVISION: 0
6. FEELING BAD ABOUT YOURSELF - OR THAT YOU ARE A FAILURE OR HAVE LET YOURSELF OR YOUR FAMILY DOWN: 0
5. POOR APPETITE OR OVEREATING: 0
SUM OF ALL RESPONSES TO PHQ QUESTIONS 1-9: 0
SUM OF ALL RESPONSES TO PHQ QUESTIONS 1-9: 0
4. FEELING TIRED OR HAVING LITTLE ENERGY: 0

## 2022-12-21 ASSESSMENT — ENCOUNTER SYMPTOMS
RESPIRATORY NEGATIVE: 1
GASTROINTESTINAL NEGATIVE: 1

## 2022-12-21 ASSESSMENT — SOCIAL DETERMINANTS OF HEALTH (SDOH): HOW HARD IS IT FOR YOU TO PAY FOR THE VERY BASICS LIKE FOOD, HOUSING, MEDICAL CARE, AND HEATING?: NOT HARD AT ALL

## 2022-12-21 NOTE — PROGRESS NOTES
Damián Varela (:  1960) is a 60 y.o. female,Established patient, here for evaluation of the following chief complaint(s):  Pre-op Exam (22 right knee revision with Dr. Ra Cole at [facility]) and Results        Subjective   SUBJECTIVE/OBJECTIVE:  HPI:    Chief Complaint   Patient presents with    Pre-op Exam     22 right knee revision with Dr. Ra Cole at [facility]    Results     Pre-op evaluation. Scheduled for right knee revision with spacer with Dr. Ra Cole on . Pt denies CP, chest tightness, SOB. Unable to perform 4 METs due to knee pain. BPs controlled. BP Readings from Last 3 Encounters:   22 118/64   22 109/69   22 (!) 129/52     Sugars checked on regular basis in NH, stopped metformin. Lab Results   Component Value Date    LABA1C 5.3 2021    LABA1C 5.1 2021    LABA1C 5.1 2021     Lab Results   Component Value Date    GLUF 141 (H) 2016    LABMICR 10.05 2021    LDLCALC 84 2021    CREATININE 0.4 2022     Denies hx of general anesthesia complications.     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)    History of total knee replacement, right    Hypokalemia    Enterocolitis due to Clostridium difficile, not specified as recurrent    Recurrent major depressive disorder [facility])    Aftercare following knee joint replacement surgery     Past Surgical History:   Procedure Laterality Date    BLADDER SUSPENSION      BREAST BIOPSY Left     atypical hyperlasia, 2016, [facility]    BREAST LUMPECTOMY Left     atypical hyperplasia, 2016, [facility]    COLONOSCOPY  2020 HERNIA REPAIR  10/17/2016    JOINT REPLACEMENT Left 2021    knee    KNEE SURGERY      Right knee    REVISION TOTAL KNEE ARTHROPLASTY Right 05/10/2022    RIGHT KNEE TOTAL ARTHROPLASTY REVISION performed by Jose Antonio MD at [address] Right 06/12/2022    Right Knee Incision and Drainage of Total Knee With Poly Exchange performed by Jose Antonio MD at [address] EXTRACTION       Social History     Tobacco Use    Smoking status: Every Day     Packs/day: 1.00     Years: 49.00     Pack years: 49.00     Types: Cigarettes    Smokeless tobacco: Never   Vaping Use    Vaping Use: Never used   Substance Use Topics    Alcohol use: No     Comment: monthly glass of wine    Drug use: No         Review of Systems   Constitutional: Negative. HENT: Negative. Respiratory: Negative. Cardiovascular: Negative. Gastrointestinal: Negative. Musculoskeletal:  Positive for arthralgias (right knee pain). All other systems reviewed and are negative. Objective   Physical Exam  Vitals and nursing note reviewed. Constitutional:       General: She is not in acute distress. Appearance: Normal appearance. She is well-developed. HENT:      Head: Normocephalic and atraumatic. Right Ear: Tympanic membrane normal.      Left Ear: Tympanic membrane normal.   Eyes:      Conjunctiva/sclera: Conjunctivae normal.   Cardiovascular:      Rate and Rhythm: Normal rate and regular rhythm. Heart sounds: Normal heart sounds. No murmur heard. Pulmonary:      Effort: Pulmonary effort is normal.      Breath sounds: Normal breath sounds. No wheezing, rhonchi or rales. Abdominal:      General: There is no distension. Musculoskeletal:      Cervical back: Neck supple. Skin:     General: Skin is warm and dry. Findings: No rash (on exposed surfaces). Neurological:      General: No focal deficit present. Mental Status: She is alert. Psychiatric:         Attention and Perception: Attention normal.         Mood and Affect: Mood normal.         Speech: Speech normal.         Behavior: Behavior normal. Behavior is cooperative. Thought Content: Thought content normal.         Judgment: Judgment normal.             ASSESSMENT/PLAN:  1. Pre-op evaluation  2. Right knee skin infection  3. Status post right knee replacement  4. Controlled type 2 diabetes mellitus without complication, without long-term current use of insulin ([address]  5. Pure hypercholesterolemia    -  Pt acceptable risk for right knee revision, copy of this note will be sent to Dr. Kelsy Sweeney to hold metformin at this time  -  Will plan to recheck A1C 3 mos    Return in about 3 months (around 3/21/2023) for DM2. An electronic signature was used to authenticate this note.     --Edinson Reyes, DO

## 2022-12-21 NOTE — PATIENT INSTRUCTIONS
You may receive a survey about your visit with us today. The feedback from our patients helps us identify what is working well and where the service to all patients can be enhanced. Thank you! Tobacco Cessation Programs     Telephonic behavior modification  1-800-QUIT-NOW (187-8596)  Counseling service for those who are ready to quit using tobacco.    Available for uninsured [place] residents, Medicaid recipients, pregnant women, or patients whose health plans or employers are members of the [address] [place] [address] behavior modification  http://[place]. Quitlogix. org  Online support program to help patients through each step of the quitting process. Available 24 hours a day 7 days a week. Provides up to date researched based tool, step-by-step guides, and motivational messages. Online behavior modification  www.lungusa.org/stop-smoking/how-to-quit  HelpLine: 1-800-LUNG-USA (289-1236)  Email questions to:  Abdiel@[employer]. SnapAppointments   Website offers resources to help tobacco users figure out their reasons for quitting and then take the big step of quitting for good. Hypnosis  Location: [address], [facility], [place]  Contact: Savannah Cohen, PhD at [phone]  Hypnosis for tobacco cessation  Cost $225 for the initial session and $175 for each session afterwards. Most patients require 6-8 sessions. There is the option to submit through the patients insurance. Hypnosis and behavior modification  Location: [facility],  [address] [facility], [place]  Contact: Jose Carlos Raman, PhD at [phone]  Counseling and hypnosis for nicotine addition  Cost: For uninsured patients:  Please call above phone number  Cost for insured patients depends on patients insurance plan.     Behavior modification  Location: [facility], [address] [facility], [facility]: [phone]  Services include four one-on-one appointments between the patient and a respiratory therapist.  The four appointments span over three weeks. The respiratory therapist schedules one of the appointments to occur 48 hours after the patients quit date. Cost $100 total for the four sessions.   Tobacco cessation products are not included in the cost and are not provided by [facility].

## 2022-12-22 ENCOUNTER — ANESTHESIA (OUTPATIENT)
Dept: OPERATING ROOM | Age: 62
End: 2022-12-22
Payer: OTHER GOVERNMENT

## 2022-12-22 ENCOUNTER — HOSPITAL ENCOUNTER (INPATIENT)
Age: 62
LOS: 18 days | Discharge: NURSING, REHAB OR LONG-TERM CARE | DRG: 486 | End: 2023-01-09
Attending: ORTHOPAEDIC SURGERY | Admitting: ORTHOPAEDIC SURGERY
Payer: OTHER GOVERNMENT

## 2022-12-22 ENCOUNTER — ANESTHESIA EVENT (OUTPATIENT)
Dept: OPERATING ROOM | Age: 62
End: 2022-12-22
Payer: OTHER GOVERNMENT

## 2022-12-22 DIAGNOSIS — T84.53XD INFECTION OF TOTAL RIGHT KNEE REPLACEMENT, SUBSEQUENT ENCOUNTER: ICD-10-CM

## 2022-12-22 PROBLEM — Z98.890 POST-OPERATIVE STATE: Status: ACTIVE | Noted: 2022-12-22

## 2022-12-22 LAB
ABO: NORMAL
ANTIBODY SCREEN: NORMAL
RH FACTOR: NORMAL

## 2022-12-22 PROCEDURE — 87077 CULTURE AEROBIC IDENTIFY: CPT

## 2022-12-22 PROCEDURE — 2580000003 HC RX 258: Performed by: ORTHOPAEDIC SURGERY

## 2022-12-22 PROCEDURE — 3600000014 HC SURGERY LEVEL 4 ADDTL 15MIN: Performed by: ORTHOPAEDIC SURGERY

## 2022-12-22 PROCEDURE — 7100000011 HC PHASE II RECOVERY - ADDTL 15 MIN: Performed by: ORTHOPAEDIC SURGERY

## 2022-12-22 PROCEDURE — 7100000001 HC PACU RECOVERY - ADDTL 15 MIN: Performed by: ORTHOPAEDIC SURGERY

## 2022-12-22 PROCEDURE — 3600000004 HC SURGERY LEVEL 4 BASE: Performed by: ORTHOPAEDIC SURGERY

## 2022-12-22 PROCEDURE — 87147 CULTURE TYPE IMMUNOLOGIC: CPT

## 2022-12-22 PROCEDURE — 6360000002 HC RX W HCPCS: Performed by: PHYSICIAN ASSISTANT

## 2022-12-22 PROCEDURE — 6360000002 HC RX W HCPCS: Performed by: ORTHOPAEDIC SURGERY

## 2022-12-22 PROCEDURE — 0SHC08Z INSERTION OF SPACER INTO RIGHT KNEE JOINT, OPEN APPROACH: ICD-10-PCS | Performed by: ORTHOPAEDIC SURGERY

## 2022-12-22 PROCEDURE — 6360000002 HC RX W HCPCS: Performed by: ANESTHESIOLOGY

## 2022-12-22 PROCEDURE — 2500000003 HC RX 250 WO HCPCS: Performed by: ORTHOPAEDIC SURGERY

## 2022-12-22 PROCEDURE — 2780000010 HC IMPLANT OTHER: Performed by: ORTHOPAEDIC SURGERY

## 2022-12-22 PROCEDURE — 86901 BLOOD TYPING SEROLOGIC RH(D): CPT

## 2022-12-22 PROCEDURE — 87186 SC STD MICRODIL/AGAR DIL: CPT

## 2022-12-22 PROCEDURE — 87070 CULTURE OTHR SPECIMN AEROBIC: CPT

## 2022-12-22 PROCEDURE — 2500000003 HC RX 250 WO HCPCS: Performed by: NURSE ANESTHETIST, CERTIFIED REGISTERED

## 2022-12-22 PROCEDURE — 1200000000 HC SEMI PRIVATE

## 2022-12-22 PROCEDURE — 3700000000 HC ANESTHESIA ATTENDED CARE: Performed by: ORTHOPAEDIC SURGERY

## 2022-12-22 PROCEDURE — C1713 ANCHOR/SCREW BN/BN,TIS/BN: HCPCS | Performed by: ORTHOPAEDIC SURGERY

## 2022-12-22 PROCEDURE — 6370000000 HC RX 637 (ALT 250 FOR IP): Performed by: PHYSICIAN ASSISTANT

## 2022-12-22 PROCEDURE — 7100000010 HC PHASE II RECOVERY - FIRST 15 MIN: Performed by: ORTHOPAEDIC SURGERY

## 2022-12-22 PROCEDURE — 6360000002 HC RX W HCPCS

## 2022-12-22 PROCEDURE — 7100000000 HC PACU RECOVERY - FIRST 15 MIN: Performed by: ORTHOPAEDIC SURGERY

## 2022-12-22 PROCEDURE — 2580000003 HC RX 258: Performed by: PHYSICIAN ASSISTANT

## 2022-12-22 PROCEDURE — 3700000001 HC ADD 15 MINUTES (ANESTHESIA): Performed by: ORTHOPAEDIC SURGERY

## 2022-12-22 PROCEDURE — 36415 COLL VENOUS BLD VENIPUNCTURE: CPT

## 2022-12-22 PROCEDURE — 87205 SMEAR GRAM STAIN: CPT

## 2022-12-22 PROCEDURE — 86900 BLOOD TYPING SEROLOGIC ABO: CPT

## 2022-12-22 PROCEDURE — 86850 RBC ANTIBODY SCREEN: CPT

## 2022-12-22 PROCEDURE — 87075 CULTR BACTERIA EXCEPT BLOOD: CPT

## 2022-12-22 PROCEDURE — 6360000002 HC RX W HCPCS: Performed by: NURSE ANESTHETIST, CERTIFIED REGISTERED

## 2022-12-22 PROCEDURE — 2709999900 HC NON-CHARGEABLE SUPPLY: Performed by: ORTHOPAEDIC SURGERY

## 2022-12-22 DEVICE — RALLY MV AB BONE CEMENT 40 GRAMS
Type: IMPLANTABLE DEVICE | Status: FUNCTIONAL
Brand: RALLY

## 2022-12-22 RX ORDER — PHENYLEPHRINE HYDROCHLORIDE 10 MG/ML
INJECTION INTRAVENOUS PRN
Status: DISCONTINUED | OUTPATIENT
Start: 2022-12-22 | End: 2022-12-22 | Stop reason: SDUPTHER

## 2022-12-22 RX ORDER — ONDANSETRON 2 MG/ML
INJECTION INTRAMUSCULAR; INTRAVENOUS PRN
Status: DISCONTINUED | OUTPATIENT
Start: 2022-12-22 | End: 2022-12-22 | Stop reason: SDUPTHER

## 2022-12-22 RX ORDER — LOSARTAN POTASSIUM 25 MG/1
25 TABLET ORAL DAILY
Status: DISCONTINUED | OUTPATIENT
Start: 2022-12-22 | End: 2023-01-09 | Stop reason: HOSPADM

## 2022-12-22 RX ORDER — LANOLIN ALCOHOL/MO/W.PET/CERES
3 CREAM (GRAM) TOPICAL NIGHTLY
Status: DISCONTINUED | OUTPATIENT
Start: 2022-12-22 | End: 2023-01-09 | Stop reason: HOSPADM

## 2022-12-22 RX ORDER — TRANEXAMIC ACID 100 MG/ML
INJECTION, SOLUTION INTRAVENOUS PRN
Status: DISCONTINUED | OUTPATIENT
Start: 2022-12-22 | End: 2022-12-22 | Stop reason: ALTCHOICE

## 2022-12-22 RX ORDER — FENOFIBRATE 160 MG/1
160 TABLET ORAL DAILY
Status: DISCONTINUED | OUTPATIENT
Start: 2022-12-22 | End: 2023-01-09 | Stop reason: HOSPADM

## 2022-12-22 RX ORDER — VANCOMYCIN HYDROCHLORIDE 1 G/20ML
INJECTION, POWDER, LYOPHILIZED, FOR SOLUTION INTRAVENOUS PRN
Status: DISCONTINUED | OUTPATIENT
Start: 2022-12-22 | End: 2022-12-22 | Stop reason: ALTCHOICE

## 2022-12-22 RX ORDER — SODIUM CHLORIDE 0.9 % (FLUSH) 0.9 %
5-40 SYRINGE (ML) INJECTION EVERY 12 HOURS SCHEDULED
Status: DISCONTINUED | OUTPATIENT
Start: 2022-12-22 | End: 2023-01-09 | Stop reason: HOSPADM

## 2022-12-22 RX ORDER — MORPHINE SULFATE 2 MG/ML
2 INJECTION, SOLUTION INTRAMUSCULAR; INTRAVENOUS
Status: DISCONTINUED | OUTPATIENT
Start: 2022-12-22 | End: 2023-01-09 | Stop reason: HOSPADM

## 2022-12-22 RX ORDER — SODIUM CHLORIDE 0.9 % (FLUSH) 0.9 %
5-40 SYRINGE (ML) INJECTION PRN
Status: DISCONTINUED | OUTPATIENT
Start: 2022-12-22 | End: 2022-12-22 | Stop reason: HOSPADM

## 2022-12-22 RX ORDER — SODIUM CHLORIDE 9 MG/ML
INJECTION, SOLUTION INTRAVENOUS CONTINUOUS
Status: DISCONTINUED | OUTPATIENT
Start: 2022-12-22 | End: 2022-12-22 | Stop reason: HOSPADM

## 2022-12-22 RX ORDER — MEPERIDINE HYDROCHLORIDE 25 MG/ML
12.5 INJECTION INTRAMUSCULAR; INTRAVENOUS; SUBCUTANEOUS EVERY 4 HOURS PRN
Status: DISCONTINUED | OUTPATIENT
Start: 2022-12-22 | End: 2022-12-22 | Stop reason: HOSPADM

## 2022-12-22 RX ORDER — OXYCODONE HYDROCHLORIDE AND ACETAMINOPHEN 5; 325 MG/1; MG/1
1 TABLET ORAL EVERY 4 HOURS PRN
Status: DISCONTINUED | OUTPATIENT
Start: 2022-12-22 | End: 2023-01-09 | Stop reason: HOSPADM

## 2022-12-22 RX ORDER — FENTANYL CITRATE 50 UG/ML
INJECTION, SOLUTION INTRAMUSCULAR; INTRAVENOUS PRN
Status: DISCONTINUED | OUTPATIENT
Start: 2022-12-22 | End: 2022-12-22 | Stop reason: SDUPTHER

## 2022-12-22 RX ORDER — SODIUM CHLORIDE 9 MG/ML
INJECTION, SOLUTION INTRAVENOUS CONTINUOUS
Status: DISCONTINUED | OUTPATIENT
Start: 2022-12-22 | End: 2023-01-02

## 2022-12-22 RX ORDER — CEFAZOLIN SODIUM 1 G/3ML
INJECTION, POWDER, FOR SOLUTION INTRAMUSCULAR; INTRAVENOUS PRN
Status: DISCONTINUED | OUTPATIENT
Start: 2022-12-22 | End: 2022-12-22 | Stop reason: SDUPTHER

## 2022-12-22 RX ORDER — FENTANYL CITRATE 50 UG/ML
50 INJECTION, SOLUTION INTRAMUSCULAR; INTRAVENOUS EVERY 5 MIN PRN
Status: COMPLETED | OUTPATIENT
Start: 2022-12-22 | End: 2022-12-22

## 2022-12-22 RX ORDER — MULTIVITAMIN WITH IRON
1 TABLET ORAL DAILY
Status: DISCONTINUED | OUTPATIENT
Start: 2022-12-22 | End: 2023-01-09 | Stop reason: HOSPADM

## 2022-12-22 RX ORDER — LIDOCAINE HYDROCHLORIDE 20 MG/ML
INJECTION, SOLUTION INFILTRATION; PERINEURAL PRN
Status: DISCONTINUED | OUTPATIENT
Start: 2022-12-22 | End: 2022-12-22 | Stop reason: SDUPTHER

## 2022-12-22 RX ORDER — PROPOFOL 10 MG/ML
INJECTION, EMULSION INTRAVENOUS PRN
Status: DISCONTINUED | OUTPATIENT
Start: 2022-12-22 | End: 2022-12-22 | Stop reason: SDUPTHER

## 2022-12-22 RX ORDER — SODIUM CHLORIDE 9 MG/ML
INJECTION, SOLUTION INTRAVENOUS PRN
Status: DISCONTINUED | OUTPATIENT
Start: 2022-12-22 | End: 2022-12-22 | Stop reason: HOSPADM

## 2022-12-22 RX ORDER — BACLOFEN 10 MG/1
10 TABLET ORAL EVERY 6 HOURS PRN
Status: DISCONTINUED | OUTPATIENT
Start: 2022-12-22 | End: 2023-01-09 | Stop reason: HOSPADM

## 2022-12-22 RX ORDER — ACETAMINOPHEN 325 MG/1
650 TABLET ORAL EVERY 4 HOURS PRN
Status: DISCONTINUED | OUTPATIENT
Start: 2022-12-22 | End: 2023-01-09 | Stop reason: HOSPADM

## 2022-12-22 RX ORDER — MORPHINE SULFATE 4 MG/ML
4 INJECTION, SOLUTION INTRAMUSCULAR; INTRAVENOUS
Status: DISCONTINUED | OUTPATIENT
Start: 2022-12-22 | End: 2023-01-09 | Stop reason: HOSPADM

## 2022-12-22 RX ORDER — METOCLOPRAMIDE HYDROCHLORIDE 5 MG/ML
10 INJECTION INTRAMUSCULAR; INTRAVENOUS
Status: DISCONTINUED | OUTPATIENT
Start: 2022-12-22 | End: 2022-12-22 | Stop reason: HOSPADM

## 2022-12-22 RX ORDER — SODIUM CHLORIDE 0.9 % (FLUSH) 0.9 %
5-40 SYRINGE (ML) INJECTION PRN
Status: DISCONTINUED | OUTPATIENT
Start: 2022-12-22 | End: 2023-01-09 | Stop reason: HOSPADM

## 2022-12-22 RX ORDER — SENNA AND DOCUSATE SODIUM 50; 8.6 MG/1; MG/1
1 TABLET, FILM COATED ORAL 2 TIMES DAILY
Status: DISCONTINUED | OUTPATIENT
Start: 2022-12-22 | End: 2023-01-09 | Stop reason: HOSPADM

## 2022-12-22 RX ORDER — ONDANSETRON 4 MG/1
4 TABLET, ORALLY DISINTEGRATING ORAL EVERY 8 HOURS PRN
Status: DISCONTINUED | OUTPATIENT
Start: 2022-12-22 | End: 2023-01-09 | Stop reason: HOSPADM

## 2022-12-22 RX ORDER — ONDANSETRON 2 MG/ML
4 INJECTION INTRAMUSCULAR; INTRAVENOUS EVERY 6 HOURS PRN
Status: DISCONTINUED | OUTPATIENT
Start: 2022-12-22 | End: 2023-01-09 | Stop reason: HOSPADM

## 2022-12-22 RX ORDER — ATORVASTATIN CALCIUM 40 MG/1
40 TABLET, FILM COATED ORAL NIGHTLY
Status: DISCONTINUED | OUTPATIENT
Start: 2022-12-22 | End: 2023-01-09 | Stop reason: HOSPADM

## 2022-12-22 RX ORDER — SODIUM CHLORIDE 0.9 % (FLUSH) 0.9 %
5-40 SYRINGE (ML) INJECTION EVERY 12 HOURS SCHEDULED
Status: DISCONTINUED | OUTPATIENT
Start: 2022-12-22 | End: 2022-12-22 | Stop reason: HOSPADM

## 2022-12-22 RX ORDER — DIPHENHYDRAMINE HYDROCHLORIDE 50 MG/ML
12.5 INJECTION INTRAMUSCULAR; INTRAVENOUS
Status: DISCONTINUED | OUTPATIENT
Start: 2022-12-22 | End: 2022-12-22 | Stop reason: HOSPADM

## 2022-12-22 RX ORDER — SODIUM PHOSPHATE, DIBASIC AND SODIUM PHOSPHATE, MONOBASIC 7; 19 G/133ML; G/133ML
1 ENEMA RECTAL DAILY PRN
Status: DISCONTINUED | OUTPATIENT
Start: 2022-12-22 | End: 2023-01-09 | Stop reason: HOSPADM

## 2022-12-22 RX ORDER — FENTANYL CITRATE 50 UG/ML
25 INJECTION, SOLUTION INTRAMUSCULAR; INTRAVENOUS EVERY 5 MIN PRN
Status: DISCONTINUED | OUTPATIENT
Start: 2022-12-22 | End: 2022-12-22 | Stop reason: HOSPADM

## 2022-12-22 RX ORDER — KETOROLAC TROMETHAMINE 30 MG/ML
15 INJECTION, SOLUTION INTRAMUSCULAR; INTRAVENOUS EVERY 6 HOURS
Status: COMPLETED | OUTPATIENT
Start: 2022-12-22 | End: 2022-12-25

## 2022-12-22 RX ORDER — ROCURONIUM BROMIDE 10 MG/ML
INJECTION, SOLUTION INTRAVENOUS PRN
Status: DISCONTINUED | OUTPATIENT
Start: 2022-12-22 | End: 2022-12-22 | Stop reason: SDUPTHER

## 2022-12-22 RX ORDER — OXYCODONE HYDROCHLORIDE AND ACETAMINOPHEN 5; 325 MG/1; MG/1
2 TABLET ORAL EVERY 4 HOURS PRN
Status: DISCONTINUED | OUTPATIENT
Start: 2022-12-22 | End: 2023-01-09 | Stop reason: HOSPADM

## 2022-12-22 RX ORDER — DOXEPIN HYDROCHLORIDE 10 MG/1
40 CAPSULE ORAL NIGHTLY
Status: DISCONTINUED | OUTPATIENT
Start: 2022-12-22 | End: 2023-01-09 | Stop reason: HOSPADM

## 2022-12-22 RX ORDER — SODIUM CHLORIDE 9 MG/ML
INJECTION, SOLUTION INTRAVENOUS PRN
Status: DISCONTINUED | OUTPATIENT
Start: 2022-12-22 | End: 2023-01-09 | Stop reason: HOSPADM

## 2022-12-22 RX ADMIN — PROPOFOL 150 MG: 10 INJECTION, EMULSION INTRAVENOUS at 07:45

## 2022-12-22 RX ADMIN — OXYCODONE AND ACETAMINOPHEN 2 TABLET: 5; 325 TABLET ORAL at 11:51

## 2022-12-22 RX ADMIN — ROCURONIUM BROMIDE 50 MG: 10 INJECTION, SOLUTION INTRAVENOUS at 07:45

## 2022-12-22 RX ADMIN — KETOROLAC TROMETHAMINE 15 MG: 30 INJECTION, SOLUTION INTRAMUSCULAR; INTRAVENOUS at 13:00

## 2022-12-22 RX ADMIN — SENNOSIDES AND DOCUSATE SODIUM 1 TABLET: 50; 8.6 TABLET ORAL at 22:20

## 2022-12-22 RX ADMIN — OXYCODONE AND ACETAMINOPHEN 2 TABLET: 5; 325 TABLET ORAL at 15:49

## 2022-12-22 RX ADMIN — Medication 3 MG: at 22:20

## 2022-12-22 RX ADMIN — Medication 1 TABLET: at 12:59

## 2022-12-22 RX ADMIN — ROCURONIUM BROMIDE 10 MG: 10 INJECTION, SOLUTION INTRAVENOUS at 08:00

## 2022-12-22 RX ADMIN — SODIUM CHLORIDE: 9 INJECTION, SOLUTION INTRAVENOUS at 22:08

## 2022-12-22 RX ADMIN — FENTANYL CITRATE 50 MCG: 50 INJECTION, SOLUTION INTRAMUSCULAR; INTRAVENOUS at 09:23

## 2022-12-22 RX ADMIN — DOXEPIN HYDROCHLORIDE 40 MG: 10 CAPSULE ORAL at 22:15

## 2022-12-22 RX ADMIN — SODIUM CHLORIDE: 9 INJECTION, SOLUTION INTRAVENOUS at 11:52

## 2022-12-22 RX ADMIN — SODIUM CHLORIDE: 9 INJECTION, SOLUTION INTRAVENOUS at 06:56

## 2022-12-22 RX ADMIN — FENTANYL CITRATE 100 MCG: 50 INJECTION, SOLUTION INTRAMUSCULAR; INTRAVENOUS at 07:40

## 2022-12-22 RX ADMIN — FENTANYL CITRATE 50 MCG: 50 INJECTION, SOLUTION INTRAMUSCULAR; INTRAVENOUS at 09:09

## 2022-12-22 RX ADMIN — PHENYLEPHRINE HYDROCHLORIDE 100 MCG: 10 INJECTION INTRAVENOUS at 07:51

## 2022-12-22 RX ADMIN — HYDROMORPHONE HYDROCHLORIDE 0.5 MG: 1 INJECTION, SOLUTION INTRAMUSCULAR; INTRAVENOUS; SUBCUTANEOUS at 09:30

## 2022-12-22 RX ADMIN — LOSARTAN POTASSIUM 25 MG: 25 TABLET, FILM COATED ORAL at 13:00

## 2022-12-22 RX ADMIN — SODIUM CHLORIDE: 9 INJECTION, SOLUTION INTRAVENOUS at 22:13

## 2022-12-22 RX ADMIN — FENTANYL CITRATE 50 MCG: 50 INJECTION, SOLUTION INTRAMUSCULAR; INTRAVENOUS at 09:15

## 2022-12-22 RX ADMIN — LIDOCAINE HYDROCHLORIDE 100 MG: 20 INJECTION, SOLUTION INFILTRATION; PERINEURAL at 07:45

## 2022-12-22 RX ADMIN — CEFAZOLIN 2000 MG: 10 INJECTION, POWDER, FOR SOLUTION INTRAVENOUS at 15:50

## 2022-12-22 RX ADMIN — HYDROMORPHONE HYDROCHLORIDE 0.5 MG: 1 INJECTION, SOLUTION INTRAMUSCULAR; INTRAVENOUS; SUBCUTANEOUS at 09:35

## 2022-12-22 RX ADMIN — PHENYLEPHRINE HYDROCHLORIDE 200 MCG: 10 INJECTION INTRAVENOUS at 07:54

## 2022-12-22 RX ADMIN — ONDANSETRON 4 MG: 2 INJECTION INTRAMUSCULAR; INTRAVENOUS at 08:52

## 2022-12-22 RX ADMIN — CEFAZOLIN 2 G: 1 INJECTION, POWDER, FOR SOLUTION INTRAMUSCULAR; INTRAVENOUS at 08:31

## 2022-12-22 RX ADMIN — SUGAMMADEX 200 MG: 100 INJECTION, SOLUTION INTRAVENOUS at 08:52

## 2022-12-22 RX ADMIN — FENTANYL CITRATE 50 MCG: 50 INJECTION, SOLUTION INTRAMUSCULAR; INTRAVENOUS at 09:03

## 2022-12-22 RX ADMIN — FENOFIBRATE 160 MG: 160 TABLET ORAL at 13:00

## 2022-12-22 RX ADMIN — ATORVASTATIN CALCIUM 40 MG: 40 TABLET, FILM COATED ORAL at 22:15

## 2022-12-22 RX ADMIN — KETOROLAC TROMETHAMINE 15 MG: 30 INJECTION, SOLUTION INTRAMUSCULAR; INTRAVENOUS at 17:55

## 2022-12-22 ASSESSMENT — PAIN SCALES - GENERAL
PAINLEVEL_OUTOF10: 0
PAINLEVEL_OUTOF10: 8
PAINLEVEL_OUTOF10: 8
PAINLEVEL_OUTOF10: 2
PAINLEVEL_OUTOF10: 8
PAINLEVEL_OUTOF10: 8
PAINLEVEL_OUTOF10: 9
PAINLEVEL_OUTOF10: 10
PAINLEVEL_OUTOF10: 9
PAINLEVEL_OUTOF10: 8
PAINLEVEL_OUTOF10: 9
PAINLEVEL_OUTOF10: 9
PAINLEVEL_OUTOF10: 8
PAINLEVEL_OUTOF10: 5

## 2022-12-22 ASSESSMENT — PAIN - FUNCTIONAL ASSESSMENT
PAIN_FUNCTIONAL_ASSESSMENT: PREVENTS OR INTERFERES SOME ACTIVE ACTIVITIES AND ADLS
PAIN_FUNCTIONAL_ASSESSMENT: 0-10
PAIN_FUNCTIONAL_ASSESSMENT: 0-10

## 2022-12-22 ASSESSMENT — PAIN DESCRIPTION - ONSET: ONSET: ON-GOING

## 2022-12-22 ASSESSMENT — PAIN DESCRIPTION - LOCATION
LOCATION: KNEE

## 2022-12-22 ASSESSMENT — PAIN DESCRIPTION - DESCRIPTORS
DESCRIPTORS: DISCOMFORT
DESCRIPTORS: ACHING
DESCRIPTORS: SHARP;SORE
DESCRIPTORS: DISCOMFORT;SORE

## 2022-12-22 ASSESSMENT — PAIN DESCRIPTION - ORIENTATION: ORIENTATION: RIGHT

## 2022-12-22 ASSESSMENT — PAIN DESCRIPTION - FREQUENCY: FREQUENCY: CONTINUOUS

## 2022-12-22 ASSESSMENT — LIFESTYLE VARIABLES: SMOKING_STATUS: 1

## 2022-12-22 ASSESSMENT — PAIN DESCRIPTION - PAIN TYPE: TYPE: SURGICAL PAIN

## 2022-12-22 NOTE — PROGRESS NOTES
Patient arrived to 7K10 from PACU. Vitals and assessment as charted. Patient given call light and oriented to room.

## 2022-12-22 NOTE — ANESTHESIA PRE PROCEDURE
Department of Anesthesiology  Preprocedure Note       Name:  Autumn Jimenez   Age:  60 y.o.  :  1960                                          MRN:  [id number]         Date:  2022      Surgeon: Maksim Patel):  Shaila Hernandez MD    Procedure: Procedure(s):  STAGE 1 RIGHT KNEE    Medications prior to admission:   Prior to Admission medications    Medication Sig Start Date End Date Taking? Authorizing Provider   tiZANidine (ZANAFLEX) 2 MG tablet Take 1 tablet by mouth 3 times daily as needed (muscle cramps) 22   Aileen Sol, DO   doxycycline hyclate (VIBRA-TABS) 100 MG tablet Take 1 tablet by mouth in the morning and at bedtime 22   Historical Provider, MD   oxyCODONE-acetaminophen (PERCOCET) 5-325 MG per tablet Take 1 tablet by mouth every 6 hours as needed for Pain. Historical Provider, MD   atorvastatin (LIPITOR) 40 MG tablet TAKE 1 TABLET NIGHTLY 10/25/22   Aileen Sol, DO   rivaroxaban (XARELTO) 10 MG TABS tablet Take 1 tablet by mouth every 24 hours 6/15/22   Fly Short PA-C   fenofibrate (TRIGLIDE) 160 MG tablet TAKE 1 TABLET DAILY 22   Aileen Sol, DO   doxepin (SINEQUAN) 10 MG capsule TAKE 2 CAPSULES NIGHTLY  Patient taking differently: Take 40 mg by mouth nightly TAKE4 CAPSULES NIGHTLY 22   Aileen Sol, DO   hydrocortisone 2.5 % cream Apply topically 2 times daily.  22   Aileen Sol, DO   losartan (COZAAR) 25 MG tablet TAKE 1 TABLET DAILY 21   Aileen Sol, DO   baclofen (LIORESAL) 10 MG tablet Take 1 tablet by mouth daily as needed (muscle spasms)  Patient taking differently: Take 10 mg by mouth every 6 hours as needed (muscle spasms) 21   Aileen Sol, DO   melatonin 3 MG TABS tablet Take 3 mg by mouth daily     Historical Provider, MD   Acetaminophen 500 MG CAPS Take 1,000 mg by mouth as needed for Pain     Historical Provider, MD   KRILL OIL PO Take 350 mg by mouth daily    Historical Provider, MD Multiple Vitamins-Minerals (THERAPEUTIC MULTIVITAMIN-MINERALS) tablet Take 1 tablet by mouth daily    Historical Provider, MD   aspirin 81 MG tablet Take 81 mg by mouth daily. Historical Provider, MD       Current medications:    Current Facility-Administered Medications   Medication Dose Route Frequency Provider Last Rate Last Admin    sodium chloride flush 0.9 % injection 5-40 mL  5-40 mL IntraVENous PRN Shabnam Mahoney MD        0.9 % sodium chloride infusion   IntraVENous Continuous Shabnam Mahoney  mL/hr at 12/22/22 0656 New Bag at 12/22/22 0656       Allergies: Allergies   Allergen Reactions    Adhesive Tape        Problem List:    Patient Active Problem List   Diagnosis Code    Diabetes mellitus type 2, noninsulin dependent ([facility] E11.9    Hyperlipemia E78.5    THORNTON (nonalcoholic steatohepatitis) K75.81    Obesity E66.9    Tobacco abuse Z72.0    Vitamin D deficiency E55.9    Seborrheic keratosis L82.1    Dyshidrotic eczema L30.1    Displaced articular fracture of head of right femur, sequela S72.061S    Neida-prosthetic supracondylar fracture of femur M97. 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) T84.53XA    History of total knee replacement, right Z96.651    Hypokalemia E87.6    Enterocolitis due to Clostridium difficile, not specified as recurrent A04.72    Recurrent major depressive disorder ([facility] F33.9    Aftercare following knee joint replacement surgery Z47.1, Z96.659       Past Medical History:        Diagnosis Date    Arthritis     Atypical ductal hyperplasia, breast     left breast, 2016, lumpectomy, Tamoxifen, H    [facility]     Hx of breast biopsy 01/08/2016    Hyperlipidemia     Hypertension     Type II or unspecified type diabetes mellitus without mention of complication, not stated as uncontrolled        Past Surgical History:        Procedure Laterality Date    BLADDER SUSPENSION      BREAST BIOPSY Left     atypical hyperlasia, 2016, [facility]    BREAST LUMPECTOMY Left     atypical hyperplasia, 2016, [facility]    COLONOSCOPY  08/28/2020    HERNIA REPAIR  10/17/2016    JOINT REPLACEMENT Left 2021    knee    KNEE SURGERY      Right knee    REVISION TOTAL KNEE ARTHROPLASTY Right 05/10/2022    RIGHT KNEE TOTAL ARTHROPLASTY REVISION performed by Sergio Low MD at [facility] Right 06/12/2022    Right Knee Incision and Drainage of Total Knee With Poly Exchange performed by Sergio Low MD at [address] [place] [address]         Social History:    Social History     Tobacco Use    Smoking status: Every Day     Packs/day: 1.00     Years: 49.00     Pack years: 49.00     Types: Cigarettes    Smokeless tobacco: Never   Substance Use Topics    Alcohol use: No     Comment: monthly glass of wine                                Ready to quit: Not Answered  Counseling given: Not Answered      Vital Signs (Current):   Vitals:    12/16/22 0924 12/22/22 0614 12/22/22 0621   BP:  (!) 107/59    Pulse:  99    Resp:  20    Temp:  97.4 °F (36.3 °C)    TempSrc:  Temporal    SpO2:  97%    Weight: 160 lb (72.6 kg)  160 lb (72.6 kg)   Height: 5' 8\" (1.727 m)  5' 7\" (1.702 m)                                              BP Readings from Last 3 Encounters:   12/22/22 (!) 107/59   12/21/22 118/64   12/08/22 109/69       NPO Status: Time of last liquid consumption: 2330                        Time of last solid consumption: 2330                        Date of last liquid consumption: 12/21/22                        Date of last solid food consumption: 12/21/22    BMI:   Wt Readings from Last 3 Encounters:   12/22/22 160 lb (72.6 kg)   12/08/22 158 lb (71.7 kg)   12/02/22 158 lb (71.7 kg)     Body mass index is 25.06 kg/m².     CBC:   Lab Results   Component Value Date/Time    WBC 7.8 12/01/2022 03:59 PM    RBC 4.29 12/01/2022 03:59 PM    RBC 5.62 12/10/2020 10:01 AM    HGB 11.9 12/01/2022 03:59 PM    HCT 38.6 12/01/2022 03:59 PM    MCV 90.0 12/01/2022 03:59 PM    RDW 19.1 12/10/2020 10:01 AM     12/01/2022 03:59 PM       CMP:   Lab Results   Component Value Date/Time     12/01/2022 03:59 PM    K 4.2 12/01/2022 03:59 PM    K 4.6 05/09/2022 01:05 PM     12/01/2022 03:59 PM    CO2 26 12/01/2022 03:59 PM    BUN 9 12/01/2022 03:59 PM    CREATININE 0.4 12/01/2022 03:59 PM    AGRATIO 1.8 12/21/2016 09:51 AM    LABGLOM >60 12/01/2022 03:45 PM    GLUCOSE 73 12/01/2022 03:59 PM    GLUCOSE 113 12/10/2020 10:01 AM    PROT 6.7 12/01/2022 03:59 PM    CALCIUM 9.5 12/01/2022 03:59 PM    BILITOT 0.3 12/01/2022 03:59 PM    ALKPHOS 86 12/01/2022 03:59 PM    AST 20 12/01/2022 03:59 PM    ALT 8 12/01/2022 03:59 PM       POC Tests: No results for input(s): POCGLU, POCNA, POCK, POCCL, POCBUN, POCHEMO, POCHCT in the last 72 hours.     Coags:   Lab Results   Component Value Date/Time    PROTIME 11.0 10/05/2016 03:39 PM    INR 1.44 12/01/2022 03:59 PM       HCG (If Applicable): No results found for: PREGTESTUR, PREGSERUM, HCG, HCGQUANT     ABGs: No results found for: PHART, PO2ART, NXA4SXY, WLA6TSJ, BEART, T4DZSIMC     Type & Screen (If Applicable):  Lab Results   Component Value Date    LABRH POS 12/01/2022       Drug/Infectious Status (If Applicable):  Lab Results   Component Value Date/Time    HEPCAB NEGATIVE 05/02/2018 08:28 AM       COVID-19 Screening (If Applicable): No results found for: COVID19        Anesthesia Evaluation  Patient summary reviewed no history of anesthetic complications:   Airway: Mallampati: II  TM distance: >3 FB   Neck ROM: full  Mouth opening: > = 3 FB   Dental: normal exam         Pulmonary:normal exam    (+) current smoker                           Cardiovascular:    (+) hypertension:, hyperlipidemia      ECG reviewed      Echocardiogram reviewed                  Neuro/Psych:               GI/Hepatic/Renal:   (+) liver disease:,           Endo/Other:    (+) Diabetes, . Abdominal:             Vascular: Other Findings:           Anesthesia Plan      general     ASA 3       Induction: intravenous. MIPS: Postoperative opioids intended and Prophylactic antiemetics administered. Anesthetic plan and risks discussed with patient and spouse.       Plan discussed with CRNA and surgical team.                    Rosa Pretty MD   12/22/2022

## 2022-12-22 NOTE — PROGRESS NOTES
8362  pt. Alert and oriented on arrival to phase II. Pt. States pain is 8 out of 10.pt. states she has lots of chronic pain. Right knee dressing intact and dry. Ice behind knee. Family at bedside. Call light in reach. Waiting on bed availability. 1010   pt. Eating and drinking without difficulty. 1030  continue to wait on bed availability. 1100  pt. Resting quietly. 1110  report called to [facility]  pt. Transferred to [facility].

## 2022-12-22 NOTE — OP NOTE
Operative Note      Patient: Darlene Avalos  YOB: 1960  MRN: [id number]    Date of Procedure: 12/22/2022    Pre-Op Diagnosis: Right septic total knee    Post-Op Diagnosis: Same       Procedure(s):  STAGE 1 RIGHT KNEE with insertion of antibiotic spacer    Surgeon(s):  Marissa Pierce MD    Assistant:   Physician Assistant: Honorio Mcdaniel PA-C; CRISTAL Garrett    Anesthesia: General    Estimated Blood Loss (mL): 939     Complications: None    Specimens:   ID Type Source Tests Collected by Time Destination   1 : 1) bone and tissue, right knee for cultures a&a Tissue Joint, Knee CULTURE, ANAEROBIC AND AEROBIC Marissa Pierce MD 12/22/2022 9556        Implants:  Implant Name Type Inv. Item Serial No.  Lot No. LRB No. Used Action   CEMENT BONE 40GM [facility] [id number]  CEMENT BONE 40GM M VISC AB Eric Camacho AND NEPHEW Emre Schwabril [id number] Right 2 Implanted   jet x bar      Right 1 Implanted   jet x bar       Right 1 Implanted         Drains: * No LDAs found *    Findings: Confirmed    Detailed Description of Procedure: Indications  This is 14-DIIP-ELB female complicated history of a total knee with fracture. She developed significant contractures bilaterally. She had a hinged knee placed cells came infected. We discussed options. Discussed above the amputation versus spacer. Patient wants to attempt a spacer. Elected to proceed. Narrative  Patient taken operative room underwent a general anesthetic. Right lower extremity was prepped draped normal sterile fashion. Timeout was taken consent was confirmed. Antibiotics withheld secondary to culturing. Open the previous incision site skin knife electrocautery down the extensor mechanism. Performed a medial parapatellar arthrotomy. Exposed excised the patella secondary to its small size and previous surgery and infection. She is unlikely to get an knee at any point because of her contractures.   We then began disassembling the hinge. Tapped off the distal femur and all the cement removed came with it. We able to tap out the tibia that all the cement came with it as well. We shorten up the femur to allow us to get to near extension. We were unable to get full extension secondary to significant posterior contractures. Thoroughly debrided it using a synovectomy. We then placed 2 carbon fiber rods with a clamp. Cement was applied in the cavities. The extensor mechanism was repaired with monofilament suture followed by 3-0 nylon dry dressings. We then checked for a pulse secondary to her having these contractures. Good Doppler from the dorsalis pedis vessel. Patient was then awakened taken to recovery room in good condition. Postoperative plan  Be admitted for IV antibiotics per infectious disease. She be weightbearing as tolerated. See her in the office in 3 weeks clinical exam no x-rays.     Electronically signed by Mila Buitrago MD on 12/22/2022 at 9:57 AM

## 2022-12-22 NOTE — PROGRESS NOTES
0498 Patient to PACU     4982 Medicated for pain at this time. 1961 Medicated for pain at this time. 135 [address] Medicated for pain at this time. Resp easy and unlabored, VSS.     [id number] Patient moaning in pain. States it is still 9/10 at this time. 7531 Medicated for pain at this time. 0930 Medicated for pain at this time. 0694 Patient resting in bed with eyes closed, appears comfortable. Resp easy and unlabored. VSS    [id number] Patient meets criteria for discharge from PACU at this time.       8971 Report given to Tana BARRY

## 2022-12-22 NOTE — ANESTHESIA POSTPROCEDURE EVALUATION
Department of Anesthesiology  Postprocedure Note    Patient: Chiqui Ennis  MRN: [id number]  YOB: 1960  Date of evaluation: 12/22/2022      Procedure Summary     Date: 12/22/22 Room / Location: [address] [place]    Anesthesia Start: 0739 Anesthesia Stop: 0900    Procedure: STAGE 1 RIGHT KNEE (Right: Knee) Diagnosis: Infection of total right knee replacement, subsequent encounter    Surgeons: Sarah Salvador MD Responsible Provider: Sachi Blanco MD    Anesthesia Type: general ASA Status: 3          Anesthesia Type: No value filed. Gary Phase I: Gary Score: 10    Gary Phase II: Gary Score: 10      Anesthesia Post Evaluation    Patient location during evaluation: PACU  Patient participation: complete - patient participated  Level of consciousness: awake and alert  Airway patency: patent  Nausea & Vomiting: no nausea and no vomiting  Complications: no  Cardiovascular status: hemodynamically stable  Respiratory status: acceptable  Hydration status: euvolemic      [facility]  POST-ANESTHESIA NOTE       Name:  Chiqui Ennis                                         Age:  60 y.o.   MRN:  [id number]      Last Vitals:  BP (!) 122/59   Pulse (!) 112   Temp 97.8 °F (36.6 °C) (Oral)   Resp 16   Ht 5' 7\" (1.702 m)   Wt 160 lb (72.6 kg)   SpO2 93%   BMI 25.06 kg/m²   Patient Vitals for the past 4 hrs:   BP Temp Temp src Pulse Resp SpO2   12/22/22 1247 (!) 122/59 -- -- (!) 112 16 93 %   12/22/22 1222 (!) 131/58 -- -- 90 16 95 %   12/22/22 1200 (!) 118/57 -- -- 75 16 91 %   12/22/22 1143 130/60 97.8 °F (36.6 °C) Oral 74 18 96 %   12/22/22 1109 (!) 113/56 -- -- 72 18 93 %   12/22/22 1030 126/68 -- -- 93 20 92 %   12/22/22 1004 (!) 148/69 97.9 °F (36.6 °C) Temporal 94 20 93 %   12/22/22 0958 138/62 -- -- 89 26 93 %   12/22/22 0950 (!) 146/63 -- -- -- 26 (!) 89 %   12/22/22 0945 (!) 155/68 -- -- 94 10 97 %   12/22/22 0940 (!) 142/66 -- -- 91 11 97 %   12/22/22 0935 (!) 150/67 -- -- 91 25 91 %   12/22/22 0930 118/63 -- -- 98 12 95 %   12/22/22 0925 (!) 143/66 -- -- 93 14 94 %   12/22/22 0920 138/76 -- -- 93 10 94 %   12/22/22 0915 (!) 141/63 -- -- 92 14 95 %   12/22/22 0910 (!) 159/69 -- -- 93 18 94 %       Level of Consciousness:  Awake    Respiratory:  Stable    Oxygen Saturation:  Stable    Cardiovascular:  Stable    Hydration:  Adequate    PONV:  Stable    Post-op Pain:  Adequate analgesia    Post-op Assessment:  No apparent anesthetic complications    Additional Follow-Up / Treatment / Comment:  None    Winston Fajardo MD  December 22, 2022   1:09 PM

## 2022-12-22 NOTE — DISCHARGE INSTRUCTIONS
Brigitte Valdivia MD       ACTIVITY / WEIGHTBEARING  INSTRUCTIONS:  Weightbearing as tolerated surgical extremity. PT/OT   Ice and elevation as needed  Use appropriate assistive device and/or assistance when necessary    DIET:  Increase Fluid/Water intake, eat foods high in fiber, fruits and vegetables to help to prevent constipation. WOUND/DRESSING INSTRUCTIONS:  Always ensure you wash your hands before and after caring for your wound/dressing. Keep your dressing clean and dry. Change dressing as needed. Can wash around incision. Do not place antibiotic ointment, lotion, creams on surgical incision. Smoking impairs adequate wound healing. If smoking, consider quitting. May apply ice to surgical incision to help to prevent swelling. MEDICATION INSTRUCTIONS:  Take pain medication as prescribed. See orders regarding resuming your home medications and any new medications. Continue blood thinner if ordered by your physician. FOLLOW-UP CARE:  If a follow-up appointment was not made for you at discharge, call [phone] [facility] - INPATIENT office) or [phone] [facility] office) to schedule an appointment for 3 weeks. Call Dr Danisha Durham office with any concerns. Signs of infection such as fever, chills, redness, pus, or bad smelling discharge from incision site. Excessive bleeding, swelling, increasing pain, or discharge around incision site. The stitches or staples come apart at the incision site. Cough shortness of breath, or chest pain. Severe nausea or vomiting. Pain that you cannot control with the medications you have been given or pain becomes worse. Numbness, tingling, or loss of feeling in your leg, knee, or foot. Pain, burning, urgency, or frequency of urination. If a medical emergency, please call [phone] or go to your local emergency room.

## 2022-12-22 NOTE — PLAN OF CARE
Problem: Discharge Planning  Goal: Discharge to home or other facility with appropriate resources  Outcome: Progressing  Flowsheets (Taken 12/22/2022 1649)  Discharge to home or other facility with appropriate resources:   Identify barriers to discharge with patient and caregiver   Identify discharge learning needs (meds, wound care, etc)   Refer to discharge planning if patient needs post-hospital services based on physician order or complex needs related to functional status, cognitive ability or social support system   Arrange for needed discharge resources and transportation as appropriate   Arrange for interpreters to assist at discharge as needed     Problem: Chronic Conditions and Co-morbidities  Goal: Patient's chronic conditions and co-morbidity symptoms are monitored and maintained or improved  Outcome: Progressing  Flowsheets (Taken 12/22/2022 1649)  Care Plan - Patient's Chronic Conditions and Co-Morbidity Symptoms are Monitored and Maintained or Improved:   Monitor and assess patient's chronic conditions and comorbid symptoms for stability, deterioration, or improvement   Collaborate with multidisciplinary team to address chronic and comorbid conditions and prevent exacerbation or deterioration   Update acute care plan with appropriate goals if chronic or comorbid symptoms are exacerbated and prevent overall improvement and discharge     Problem: Pain  Goal: Verbalizes/displays adequate comfort level or baseline comfort level  Outcome: Progressing  Flowsheets (Taken 12/22/2022 1649)  Verbalizes/displays adequate comfort level or baseline comfort level:   Encourage patient to monitor pain and request assistance   Administer analgesics based on type and severity of pain and evaluate response   Consider cultural and social influences on pain and pain management   Assess pain using appropriate pain scale   Implement non-pharmacological measures as appropriate and evaluate response     Problem: Safety - Adult  Goal: Free from fall injury  Outcome: Progressing  Flowsheets (Taken 12/22/2022 6203)  Free From Fall Injury: Instruct family/caregiver on patient safety     Problem: Skin/Tissue Integrity  Goal: Absence of new skin breakdown  Description: 1. Monitor for areas of redness and/or skin breakdown  2. Assess vascular access sites hourly  3. Every 4-6 hours minimum:  Change oxygen saturation probe site  4. Every 4-6 hours:  If on nasal continuous positive airway pressure, respiratory therapy assess nares and determine need for appliance change or resting period. Outcome: Progressing     Care plan reviewed with patient. Patient verbalizes understanding of plan of care and contributes to goal setting.

## 2022-12-23 LAB
ANION GAP SERPL CALCULATED.3IONS-SCNC: 10 MEQ/L (ref 8–16)
BASOPHILS # BLD: 0.3 %
BASOPHILS ABSOLUTE: 0 THOU/MM3 (ref 0–0.1)
BUN BLDV-MCNC: 7 MG/DL (ref 7–22)
CALCIUM SERPL-MCNC: 8.1 MG/DL (ref 8.5–10.5)
CHLORIDE BLD-SCNC: 108 MEQ/L (ref 98–111)
CO2: 22 MEQ/L (ref 23–33)
CREAT SERPL-MCNC: 0.4 MG/DL (ref 0.4–1.2)
EOSINOPHIL # BLD: 4.8 %
EOSINOPHILS ABSOLUTE: 0.3 THOU/MM3 (ref 0–0.4)
ERYTHROCYTE [DISTWIDTH] IN BLOOD BY AUTOMATED COUNT: 16.7 % (ref 11.5–14.5)
ERYTHROCYTE [DISTWIDTH] IN BLOOD BY AUTOMATED COUNT: 55.3 FL (ref 35–45)
GFR SERPL CREATININE-BSD FRML MDRD: > 60 ML/MIN/1.73M2
GLUCOSE BLD-MCNC: 66 MG/DL (ref 70–108)
HCT VFR BLD CALC: 31.9 % (ref 37–47)
HEMOGLOBIN: 9.8 GM/DL (ref 12–16)
IMMATURE GRANS (ABS): 0.02 THOU/MM3 (ref 0–0.07)
IMMATURE GRANULOCYTES: 0.3 %
LYMPHOCYTES # BLD: 19.8 %
LYMPHOCYTES ABSOLUTE: 1.2 THOU/MM3 (ref 1–4.8)
MCH RBC QN AUTO: 28.2 PG (ref 26–33)
MCHC RBC AUTO-ENTMCNC: 30.7 GM/DL (ref 32.2–35.5)
MCV RBC AUTO: 91.9 FL (ref 81–99)
MONOCYTES # BLD: 7.2 %
MONOCYTES ABSOLUTE: 0.4 THOU/MM3 (ref 0.4–1.3)
NUCLEATED RED BLOOD CELLS: 0 /100 WBC
PLATELET # BLD: 239 THOU/MM3 (ref 130–400)
PMV BLD AUTO: 11.2 FL (ref 9.4–12.4)
POTASSIUM SERPL-SCNC: 4.1 MEQ/L (ref 3.5–5.2)
RBC # BLD: 3.47 MILL/MM3 (ref 4.2–5.4)
SEG NEUTROPHILS: 67.6 %
SEGMENTED NEUTROPHILS ABSOLUTE COUNT: 4 THOU/MM3 (ref 1.8–7.7)
SODIUM BLD-SCNC: 140 MEQ/L (ref 135–145)
WBC # BLD: 5.9 THOU/MM3 (ref 4.8–10.8)

## 2022-12-23 PROCEDURE — 97530 THERAPEUTIC ACTIVITIES: CPT

## 2022-12-23 PROCEDURE — 97162 PT EVAL MOD COMPLEX 30 MIN: CPT

## 2022-12-23 PROCEDURE — 6360000002 HC RX W HCPCS: Performed by: PHYSICIAN ASSISTANT

## 2022-12-23 PROCEDURE — 2580000003 HC RX 258: Performed by: PHYSICIAN ASSISTANT

## 2022-12-23 PROCEDURE — 85025 COMPLETE CBC W/AUTO DIFF WBC: CPT

## 2022-12-23 PROCEDURE — 80048 BASIC METABOLIC PNL TOTAL CA: CPT

## 2022-12-23 PROCEDURE — 36415 COLL VENOUS BLD VENIPUNCTURE: CPT

## 2022-12-23 PROCEDURE — 6370000000 HC RX 637 (ALT 250 FOR IP): Performed by: PHYSICIAN ASSISTANT

## 2022-12-23 PROCEDURE — 1200000000 HC SEMI PRIVATE

## 2022-12-23 PROCEDURE — 97166 OT EVAL MOD COMPLEX 45 MIN: CPT

## 2022-12-23 RX ADMIN — BACLOFEN 10 MG: 10 TABLET ORAL at 14:29

## 2022-12-23 RX ADMIN — KETOROLAC TROMETHAMINE 15 MG: 30 INJECTION, SOLUTION INTRAMUSCULAR; INTRAVENOUS at 05:37

## 2022-12-23 RX ADMIN — KETOROLAC TROMETHAMINE 15 MG: 30 INJECTION, SOLUTION INTRAMUSCULAR; INTRAVENOUS at 23:58

## 2022-12-23 RX ADMIN — SODIUM CHLORIDE: 9 INJECTION, SOLUTION INTRAVENOUS at 16:10

## 2022-12-23 RX ADMIN — DOXEPIN HYDROCHLORIDE 40 MG: 10 CAPSULE ORAL at 21:24

## 2022-12-23 RX ADMIN — CEFAZOLIN 2000 MG: 10 INJECTION, POWDER, FOR SOLUTION INTRAVENOUS at 00:34

## 2022-12-23 RX ADMIN — SODIUM CHLORIDE, PRESERVATIVE FREE 10 ML: 5 INJECTION INTRAVENOUS at 21:23

## 2022-12-23 RX ADMIN — ATORVASTATIN CALCIUM 40 MG: 40 TABLET, FILM COATED ORAL at 21:24

## 2022-12-23 RX ADMIN — BACLOFEN 10 MG: 10 TABLET ORAL at 22:04

## 2022-12-23 RX ADMIN — BACLOFEN 10 MG: 10 TABLET ORAL at 08:35

## 2022-12-23 RX ADMIN — OXYCODONE AND ACETAMINOPHEN 1 TABLET: 5; 325 TABLET ORAL at 01:58

## 2022-12-23 RX ADMIN — KETOROLAC TROMETHAMINE 15 MG: 30 INJECTION, SOLUTION INTRAMUSCULAR; INTRAVENOUS at 11:56

## 2022-12-23 RX ADMIN — SENNOSIDES AND DOCUSATE SODIUM 1 TABLET: 50; 8.6 TABLET ORAL at 21:21

## 2022-12-23 RX ADMIN — FENOFIBRATE 160 MG: 160 TABLET ORAL at 08:35

## 2022-12-23 RX ADMIN — Medication 3 MG: at 21:21

## 2022-12-23 RX ADMIN — KETOROLAC TROMETHAMINE 15 MG: 30 INJECTION, SOLUTION INTRAMUSCULAR; INTRAVENOUS at 18:36

## 2022-12-23 RX ADMIN — OXYCODONE AND ACETAMINOPHEN 2 TABLET: 5; 325 TABLET ORAL at 12:44

## 2022-12-23 RX ADMIN — LOSARTAN POTASSIUM 25 MG: 25 TABLET, FILM COATED ORAL at 08:35

## 2022-12-23 RX ADMIN — OXYCODONE AND ACETAMINOPHEN 2 TABLET: 5; 325 TABLET ORAL at 08:38

## 2022-12-23 RX ADMIN — MORPHINE SULFATE 4 MG: 4 INJECTION, SOLUTION INTRAMUSCULAR; INTRAVENOUS at 09:19

## 2022-12-23 RX ADMIN — KETOROLAC TROMETHAMINE 15 MG: 30 INJECTION, SOLUTION INTRAMUSCULAR; INTRAVENOUS at 00:36

## 2022-12-23 RX ADMIN — OXYCODONE AND ACETAMINOPHEN 2 TABLET: 5; 325 TABLET ORAL at 22:01

## 2022-12-23 RX ADMIN — Medication 1 TABLET: at 08:35

## 2022-12-23 RX ADMIN — OXYCODONE AND ACETAMINOPHEN 2 TABLET: 5; 325 TABLET ORAL at 17:41

## 2022-12-23 ASSESSMENT — PAIN SCALES - GENERAL
PAINLEVEL_OUTOF10: 8
PAINLEVEL_OUTOF10: 4
PAINLEVEL_OUTOF10: 0
PAINLEVEL_OUTOF10: 3
PAINLEVEL_OUTOF10: 6
PAINLEVEL_OUTOF10: 6
PAINLEVEL_OUTOF10: 8
PAINLEVEL_OUTOF10: 0
PAINLEVEL_OUTOF10: 6
PAINLEVEL_OUTOF10: 5
PAINLEVEL_OUTOF10: 5
PAINLEVEL_OUTOF10: 8
PAINLEVEL_OUTOF10: 9
PAINLEVEL_OUTOF10: 8
PAINLEVEL_OUTOF10: 7

## 2022-12-23 ASSESSMENT — PAIN DESCRIPTION - LOCATION
LOCATION: KNEE
LOCATION: LEG
LOCATION: LEG

## 2022-12-23 ASSESSMENT — PAIN DESCRIPTION - ORIENTATION
ORIENTATION: RIGHT

## 2022-12-23 ASSESSMENT — PAIN DESCRIPTION - DESCRIPTORS
DESCRIPTORS: ACHING;SHARP
DESCRIPTORS: ACHING

## 2022-12-23 NOTE — PROGRESS NOTES
Orthopaedic Progress Note      SUBJECTIVE   Ms. Naomy Montenegro is post op day # 1 R stage 1 TKA    phone rounds  Pain well controlled, tolerating oral diet  Denies any numbness/paresthesia in operative extremity  To mobilize with therapy today      OBJECTIVE      Physical    VITALS:  BP (!) 109/46   Pulse 95   Temp 98.7 °F (37.1 °C)   Resp 18   Ht 5' 7\" (1.702 m)   Wt 160 lb (72.6 kg)   SpO2 96%   BMI 25.06 kg/m²   I/O last 3 completed shifts:   In: 980 [P.O.:180; I.V.:800]  Out: -     6/10 pain  Gen: alert and oriented  Head: normorcephalic, atraumatic  Resp: unlabored, room air  RLE:incision c/d/I, no drainage, no bandage saturation  Sensation to light touch intact  Gastroc TA EHL intact  Distal pulses palpable, warm well perfused  Calf supple nontender to palpation     Data  CBC:   Lab Results   Component Value Date/Time    WBC 5.9 12/23/2022 06:12 AM    HGB 9.8 12/23/2022 06:12 AM     12/23/2022 06:12 AM     BMP:    Lab Results   Component Value Date/Time     12/01/2022 03:59 PM    K 4.2 12/01/2022 03:59 PM    K 4.6 05/09/2022 01:05 PM     12/01/2022 03:59 PM    CO2 26 12/01/2022 03:59 PM    BUN 9 12/01/2022 03:59 PM    CREATININE 0.4 12/01/2022 03:59 PM    CALCIUM 9.5 12/01/2022 03:59 PM    GLUCOSE 73 12/01/2022 03:59 PM    GLUCOSE 113 12/10/2020 10:01 AM     Uric Acid:  No components found for: URIC  PT/INR:    Lab Results   Component Value Date/Time    PROTIME 11.0 10/05/2016 03:39 PM    INR 1.44 12/01/2022 03:59 PM     Troponin:  No results found for: TROPONINI  Urine Culture:  No components found for: CURINE      Current Inpatient Medications    Current Facility-Administered Medications: atorvastatin (LIPITOR) tablet 40 mg, 40 mg, Oral, Nightly  baclofen (LIORESAL) tablet 10 mg, 10 mg, Oral, Q6H PRN  doxepin (SINEQUAN) capsule 40 mg, 40 mg, Oral, Nightly  fenofibrate (TRIGLIDE) tablet 160 mg, 160 mg, Oral, Daily  losartan (COZAAR) tablet 25 mg, 25 mg, Oral, Daily  melatonin tablet 3 mg, 3 mg, Oral, Nightly  multivitamin 1 tablet, 1 tablet, Oral, Daily  [START ON 12/24/2022] rivaroxaban (XARELTO) tablet 10 mg, 10 mg, Oral, Q24H  sodium chloride flush 0.9 % injection 5-40 mL, 5-40 mL, IntraVENous, 2 times per day  sodium chloride flush 0.9 % injection 5-40 mL, 5-40 mL, IntraVENous, PRN  0.9 % sodium chloride infusion, , IntraVENous, PRN  ondansetron (ZOFRAN-ODT) disintegrating tablet 4 mg, 4 mg, Oral, Q8H PRN **OR** ondansetron (ZOFRAN) injection 4 mg, 4 mg, IntraVENous, Q6H PRN  0.9 % sodium chloride infusion, , IntraVENous, Continuous  acetaminophen (TYLENOL) tablet 650 mg, 650 mg, Oral, Q4H PRN  ketorolac (TORADOL) injection 15 mg, 15 mg, IntraVENous, Q6H  morphine (PF) injection 2 mg, 2 mg, IntraVENous, Q2H PRN **OR** morphine injection 4 mg, 4 mg, IntraVENous, Q2H PRN  sennosides-docusate sodium (SENOKOT-S) 8.6-50 MG tablet 1 tablet, 1 tablet, Oral, BID  magnesium hydroxide (MILK OF MAGNESIA) 400 MG/5ML suspension 30 mL, 30 mL, Oral, Daily PRN  sodium phosphate (FLEET) rectal enema 1 enema, 1 enema, Rectal, Daily PRN  oxyCODONE-acetaminophen (PERCOCET) 5-325 MG per tablet 1 tablet, 1 tablet, Oral, Q4H PRN  oxyCODONE-acetaminophen (PERCOCET) 5-325 MG per tablet 2 tablet, 2 tablet, Oral, Q4H PRN        PLAN    Ms. Fonseca is post op day # 1 R TKA stage 1    Doing well  Continue to ADAT  Repeat hcg/hct tomorrow, no indication for transfusion at this time  WBAT RLE  PT/OT today  ID consult  Dvt ppx- Xarelto  Dispo- SNF likely considering iv abx need

## 2022-12-23 NOTE — PROGRESS NOTES
[facility]  INPATIENT PHYSICAL THERAPY  EVALUATION  [facility] [[email or web address]] ORTHOPEDICS 7K - 7K-10/010-A    Time In: 1173  Time Out: 0826  Timed Code Treatment Minutes: 25 Minutes  Minutes: 36          Date: 2022  Patient Name: Alanna Barnes,  Gender:  female        MRN: [id number]  : 1960  (60 y.o.)      Referring Practitioner: CRISTAL Kitchen  Diagnosis: Infection of total right knee replacement, initial encounter  Additional Pertinent Hx: STAGE 1 RIGHT KNEE with insertion of antibiotic spacer  by Dr Carrasco; extensive knee history with several surgeries prior to this     Restrictions/Precautions:  Restrictions/Precautions: Fall Risk, Weight Bearing, General Precautions  Right Lower Extremity Weight Bearing: Weight Bearing As Tolerated    Subjective:  Chart Reviewed: Yes  Patient assessed for rehabilitation services?: Yes  Family / Caregiver Present: No  Subjective: RN approved session.  Pt pleasant and agreeable to therapy    General:  Overall Orientation Status: Within Functional Limits  Vision: Within Functional Limits  Hearing: Within functional limits       Pain: 3/10: R knee     Vitals: Vitals not assessed per clinical judgement, see nursing flowsheet    Social/Functional History:    Lives With: Spouse  Type of Home: House  Home Layout: One level  Home Access: Ramped entrance  Home Equipment: Ifrah Beto, rolling, Wheelchair-manual     Bathroom Shower/Tub: Tub/Shower unit  Bathroom Toilet: Bedside commode             Ambulation Assistance: Non-ambulatory  Transfer Assistance: Independent          Additional Comments: Pt has been at SNF for therapy and recently returned home, she reports she was able to scoot from EOB to w/c but mostly remained in bed    OBJECTIVE:  Range of Motion:  Right Lower Extremity: lacking 20 deg ext- 90deg flex   Left Lower Extremity: lacking ~40 deg ext- WFL flexion    Strength:  Right Lower Extremity: Impaired - not formally tested; observed to complete SLR, unsure how much was strength vs contracture   Left Lower Extremity: Impaired - deconditioned    Balance:  Static Sitting Balance:  Contact Guard Assistance    Bed Mobility:  Rolling to Left: Minimal Assistance, X 1, with rail, with increased time for completion   Rolling to Right: Minimal Assistance, X 1, with rail, with increased time for completion   Supine to Sit: Moderate Assistance, X 1, with head of bed raised, with rail, with verbal cues , with increased time for completion  Increased time rolling several times for franklin-care d/t pt incontinent of stool upon arrival     Transfers:  Sit Pivot:Maximum Assistance, X 1, with verbal cues, from EOB to bedside chair towards L     Ambulation:  Not Tested    Exercise:  *educated on quad sets in order to regain knee extension, pt verbalized understanding     Functional Outcome Measures: Completed  AM-PAC Inpatient Mobility without Stair Climbing Raw Score : 7  AM-PAC Inpatient without Stair Climbing T-Scale Score : 28.66    ASSESSMENT:  Activity Tolerance:  Patient tolerance of  treatment: good. Treatment Initiated: Treatment and education initiated within context of evaluation. Evaluation time included review of current medical information, gathering information related to past medical, social and functional history, completion of standardized testing, formal and informal observation of tasks, assessment of data and development of plan of care and goals. Treatment time included skilled education and facilitation of tasks to increase safety and independence with functional mobility for improved independence and quality of life. Assessment: Body Structures, Functions, Activity Limitations Requiring Skilled Therapeutic Intervention: Decreased functional mobility , Decreased endurance, Decreased balance, Decreased strength, Decreased posture  Assessment: Roland Garcia is a 60 y.o. female that presents with R knee revision with antibiotic spaceer.  she is ind prior to admission now requiring assist for basic mobility. Pt demonstrates a decrease in baseline by way of bed mobility, transfers and ambulation secondary to decreased activity tolerance, strength, fatigue, and balance deficits. Pt will benefit from skilled PT services throughout admission and beyond hospital discharge for improvements in functional mobility and in order to decrease fall risk and return pt to PLOF. Therapy Prognosis: Good    Requires PT Follow-Up: Yes    Discharge Recommendations:  Discharge Recommendations: Home with Home health PT    Patient Education:      . Patient Education  Education Given To: Patient  Education Provided: Role of Therapy, Plan of Care  Education Method: Verbal  Barriers to Learning: None  Education Outcome: Verbalized understanding       Equipment Recommendations:  Equipment Needed: No    Plan:  Current Treatment Recommendations: Strengthening, Balance training, Functional mobility training, Transfer training, Endurance training, Safety education & training  General Plan:  (5x O)    Goals:  Patient Goals : none stated  Short Term Goals  Time Frame for Short Term Goals: by discharge  Short Term Goal 1: bed mobility with HOB flat, no rails, mod I for increased functional ind  Short Term Goal 2: sit pivot with LRD mod I for safe transfers  Short Term Goal 3: w/c mobility with mod I for safe household navigation  Long Term Goals  Time Frame for Long Term Goals : NA d/t short ELOS    Following session, patient left in safe position with all fall risk precautions in place.     Priyanka Welsh (Laurie) PT, DPT

## 2022-12-23 NOTE — PROGRESS NOTES
[facility] 59  INPATIENT OCCUPATIONAL THERAPY  [facility] ORTHOPEDICS 7K  EVALUATION    Time:    Time In: 3833  Time Out: 1116  Timed Code Treatment Minutes: 23 Minutes  Minutes: 32          Date: 2022  Patient Name: Misa Fuentes,   Gender: female      MRN: [id number]  : 1960  (60 y.o.)  Referring Practitioner: CRISTAL Dasilva  Diagnosis: Infection of total right knee  Additional Pertinent Hx: STAGE 1 RIGHT KNEE with insertion of antibiotic spacer  by Dr Allyson Garcia; extensive knee history with several surgeries prior to this    Restrictions/Precautions:  Restrictions/Precautions: Fall Risk, Weight Bearing, General Precautions  Right Lower Extremity Weight Bearing: Weight Bearing As Tolerated  Position Activity Restriction  Other position/activity restrictions: B LE contractures    Subjective  Chart Reviewed: Yes, Orders, Progress Notes, Operative Notes  Patient assessed for rehabilitation services?: Yes    Subjective: Pt resting in bed upon arrival, agreeable to OT with min encouragement. Pain: 4/10    Vitals: Vitals not assessed per clinical judgement, see nursing flowsheet    Social/Functional History:  Lives With: Spouse  Type of Home: House  Home Layout: One level  Home Access: Ramped entrance  Home Equipment: Walker, rolling, Wheelchair-manual   Bathroom Shower/Tub: Tub/Shower unit  Bathroom Toilet: Bedside commode  IADL Comments:  helped her as needed, pt reports he is a \"worry wart\" and only wants her to transfer with him present. Pt's  has been doing most IADLs at home.        ADL Assistance: Independent  Homemaking Assistance: Independent  Ambulation Assistance: Non-ambulatory  Transfer Assistance: Independent          Additional Comments: Pt has been at SNF for therapy and recently returned home, she reports she was able to scoot from EOB to w/c but mostly remained in bed    VISION:WFL    HEARING:  WFL    COGNITION: WFL    RANGE OF MOTION:  Bilateral Upper Extremity: WFL    STRENGTH:  Bilateral Upper Extremity:  WFL    SENSATION:   WFL    ADL:   No ADL's completed this session. Lamar Kumar BALANCE:  Sitting Balance:  Stand By Assistance. With increased time to achieve sitting balance    BED MOBILITY:  Rolling to Right: Minimal Assistance    Supine to Sit: Minimal Assistance to guide BLE  Scooting: Minimal Assistance      TRANSFERS:  Squat Pivot: Minimal Assistance. EOB to w/c, completed in multiple scoots. Significantly increased time required for completion of t/f. VC required for technique as pt wanted to complete scoot t/f. Activity Tolerance:  Patient tolerance of  treatment: good. Assessment:  Assessment: Pt presented with the listed deficits s/p admission with STAGE 1 RIGHT KNEE with insertion of antibiotic spacer. Pt with decreased strength, endurance, and activity tolerance and currently requires increased A for ADLs and IADLs. Pt would benefit from continued OT to restore PLOF maximize pt's indep with ADLs and IADLs in prep for a safe return home at max level of indep. Performance deficits / Impairments: Decreased functional mobility , Decreased safe awareness, Decreased balance, Decreased ADL status, Decreased posture, Decreased endurance, Decreased high-level IADLs, Decreased strength  Prognosis: Fair  REQUIRES OT FOLLOW-UP: Yes  Decision Making: Medium Complexity    Treatment Initiated: Treatment and education initiated within context of evaluation. Evaluation time included review of current medical information, gathering information related to past medical, social and functional history, completion of standardized testing, formal and informal observation of tasks, assessment of data and development of plan of care and goals. Treatment time included skilled education and facilitation of tasks to increase safety and independence with ADL's for improved functional independence and quality of life.     Discharge Recommendations:  Continue to assess pending progress, Subacute/Skilled Nursing Facility    Patient Education:     Patient Education  Education Given To: Patient  Education Provided: Role of Therapy, Plan of Care, Precautions, ADL Adaptive Strategies, Transfer Training, Energy Conservation, IADL Safety, Fall Prevention Strategies  Education Method: Demonstration  Barriers to Learning: None  Education Outcome: Verbalized understanding, Demonstrated understanding    Equipment Recommendations:  Equipment Needed: No    Plan:  Times Per Week: 6x  Current Treatment Recommendations: Strengthening, Balance training, Functional mobility training, Endurance training, Patient/Caregiver education & training, Return to work related activity, Self-Care / ADL, Home management training, Safety education & training. See long-term goal time frame for expected duration of plan of care. If no long-term goals established, a short length of stay is anticipated. Goals:  Patient goals : return home  Short Term Goals  Time Frame for Short Term Goals: by discharge  Short Term Goal 1: Pt will complete sit pivot t/fs with no > than CGA and min VC for technique to increase indep with ADLs  Short Term Goal 2: Pt will complete LB ADLs with Min A and adaptive techniques prn to increase indep with dressing  Short Term Goal 3: Pt will complete ADL item retrieval from w/c level with Mod I and min safety cues for adaptations to increase indep with self care routine         Following session, patient left in safe position with all fall risk precautions in place.

## 2022-12-23 NOTE — PLAN OF CARE
Problem: Discharge Planning  Goal: Discharge to home or other facility with appropriate resources  Outcome: Progressing  Flowsheets (Taken 12/23/2022 1854)  Discharge to home or other facility with appropriate resources:   Identify barriers to discharge with patient and caregiver   Identify discharge learning needs (meds, wound care, etc)   Refer to discharge planning if patient needs post-hospital services based on physician order or complex needs related to functional status, cognitive ability or social support system   Arrange for needed discharge resources and transportation as appropriate   Arrange for interpreters to assist at discharge as needed     Problem: Chronic Conditions and Co-morbidities  Goal: Patient's chronic conditions and co-morbidity symptoms are monitored and maintained or improved  Outcome: Progressing  Flowsheets (Taken 12/23/2022 1854)  Care Plan - Patient's Chronic Conditions and Co-Morbidity Symptoms are Monitored and Maintained or Improved: Monitor and assess patient's chronic conditions and comorbid symptoms for stability, deterioration, or improvement     Problem: Pain  Goal: Verbalizes/displays adequate comfort level or baseline comfort level  12/23/2022 1854 by Kristy Day RN  Outcome: Progressing  Flowsheets (Taken 12/23/2022 1854)  Verbalizes/displays adequate comfort level or baseline comfort level:   Encourage patient to monitor pain and request assistance   Administer analgesics based on type and severity of pain and evaluate response   Consider cultural and social influences on pain and pain management   Assess pain using appropriate pain scale   Implement non-pharmacological measures as appropriate and evaluate response   Notify Licensed Independent Practitioner if interventions unsuccessful or patient reports new pain     Problem: Safety - Adult  Goal: Free from fall injury  12/23/2022 1854 by Kristy Day RN  Outcome: Progressing  Flowsheets (Taken 12/23/2022 Ari4)  Free From Fall Injury: Instruct family/caregiver on patient safety     Problem: Skin/Tissue Integrity  Goal: Absence of new skin breakdown  Description: 1. Monitor for areas of redness and/or skin breakdown  2. Assess vascular access sites hourly  3. Every 4-6 hours minimum:  Change oxygen saturation probe site  4. Every 4-6 hours:  If on nasal continuous positive airway pressure, respiratory therapy assess nares and determine need for appliance change or resting period. 12/23/2022 1854 by Irasema Yao RN  Outcome: Progressing     Care plan reviewed with patient. Patient verbalizes understanding of plan of care and contributes to goal setting.

## 2022-12-23 NOTE — PLAN OF CARE
Problem: Pain  Goal: Verbalizes/displays adequate comfort level or baseline comfort level  12/23/2022 0542 by Maria G Garcia RN  Outcome: Progressing  Flowsheets (Taken 12/23/2022 0036)  Verbalizes/displays adequate comfort level or baseline comfort level:   Encourage patient to monitor pain and request assistance   Assess pain using appropriate pain scale     Problem: Safety - Adult  Goal: Free from fall injury  12/23/2022 0542 by Maria G Garcia RN  Outcome: Progressing     Problem: Skin/Tissue Integrity  Goal: Absence of new skin breakdown  Description: 1.  Monitor for areas of redness and/or skin breakdown  2.  Assess vascular access sites hourly  3.  Every 4-6 hours minimum:  Change oxygen saturation probe site  4.  Every 4-6 hours:  If on nasal continuous positive airway pressure, respiratory therapy assess nares and determine need for appliance change or resting period.  12/23/2022 0542 by Maria G Garcia RN  Outcome: Progressing

## 2022-12-23 NOTE — PROGRESS NOTES
Physician Progress Note      PATIENT:               Patel Brar  CSN #:                  [id number]  :                       1960  ADMIT DATE:       2022 5:45 AM  [address] [place] DATE:  [place]  PROVIDER #:        Niurka Rees MD          QUERY TEXT:    Dr Samuel Muñiz,    Pt admitted with Septic Knee-noted: hinged knee placed cells came infected and   underwent insertion of antibiotic spacer. ? If possible, please document in   progress notes and discharge summary the relationship if any between the knee   infection and the prosthesis:    The medical record reflects the following:  Risk Factors:  total knee with fracture. She developed significant   contractures bilaterally. Clinical Indicators: hinged knee placed cells came infected  Treatment: STAGE 1 RIGHT KNEE with insertion of antibiotic spacer  Options provided:  -- Septic arthritis of R total knee prosthesis-POA  -- Knee Infection is not due to the prosthesis, but is due to pyogenic   arthritis unknown source. 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     Query created by: Maryanne Cameron on 2022 5:43 AM      Electronically signed by:  Niurka Rees MD 2022 9:17 AM

## 2022-12-23 NOTE — CARE COORDINATION
Case Management Assessment  Initial Evaluation    Date/Time of Evaluation: 12/23/2022 1:20 PM  Assessment Completed by: Victoria Rogers RN    If patient is discharged prior to next notation, then this note serves as note for discharge by case management. Patient Name: Alexa Nayak                   YOB: 1960  Diagnosis: Infection of total right knee replacement, subsequent encounter [T84.53XD]  Post-operative state [Z98.890]                   Date / Time: 12/22/2022  5:45 AM  Location: [address] [place] [address]     Patient Admission Status: Inpatient   Readmission Risk (Low < 19, Mod (19-27), High > 27): Readmission Risk Score: 16.5    Current PCP: Catrachita Chirinos, DO  PCP verified by CM? Yes    Chart Reviewed: Yes      History Provided by: Patient  Patient Orientation: Alert and Oriented    Patient Cognition: Alert    Hospitalization in the last 30 days (Readmission):  No    If yes, Readmission Assessment in CM Navigator will be completed. Advance Directives:      Code Status: Prior   Patient's Primary Decision Maker is: Legal Next of Kin      Discharge Planning:    Patient lives with: Spouse/Significant Other   Type of Home: House  Primary Care Giver: Self  Patient Support Systems include: Spouse/Significant Other   Current Financial resources: Other (Comment) ([facility])  Current community resources:  (no none)  Current services prior to admission: Home Care  Current DME:    Type of Home Care services:       ADLS  Prior functional level: Assistance with the following:, Mobility, Other (see comment) (wheelchair bound since May 2022)  Current functional level: Assistance with the following:, Mobility    Family can provide assistance at DC: Yes  Would you like Case Management to discuss the discharge plan with any other family members/significant others, and if so, who?  No  Plans to Return to Present Housing: Unknown at present  Other Identified Issues/Barriers to RETURNING to current housing: none  Potential Assistance needed at discharge: Home Care  Potential DME:  none   Patient expects to discharge to: [facility] for transportation at discharge: Family    Financial    Payor: [place] / Plan:  EAST  / Product Type: *No Product type* /     Does insurance require precert for SNF: Yes    Potential assistance Purchasing Medications:    Meds-to-Beds request: No      [facility] [address] [place] [address], [address] [place] [address] [phone] - F [phone]  [address] [facility] [address]  Phone: [phone] Fax: [facility] [address], [place] - [address] [facility] [phone] - f [phone]  [facility] [address]  Phone: [phone] Fax: [phone] [facility] 190 #[id number] - Lisa Agustin - [address] [facility]  F [phone]  [address] [facility] [address]  Phone: [phone] Fax: [phone]      Notes:    Factors facilitating achievement of predicted outcomes: Caregiver support, Cooperative, Good insight into deficits, and Has needed Durable Medical Equipment at home    Barriers to discharge: Pain, Decreased motivation, Decreased endurance, and Lower extremity weakness    Additional Case Management Notes: ID to see and follow, pain control, dressing care, PT/OT evals. Procedure: 12/22  STAGE 1 RIGHT KNEE with insertion of antibiotic spacer with Dr Luna Skill for Transition of Care is related to the following treatment goals of Infection of total right knee replacement, subsequent encounter [T84.53XD]  Post-operative state [Z98.890]    Patient Goals/Plan/Treatment Preferences: Met with Marija Alfaro; she lives home with her  in [address] [place] [address]. She uses community RTA to get home as she is wheel chair bound. She is current with [facility] for  RN, PT, aides. She has PCP, insured, and is unsure of antibiotic plan. ID follows. Transportation/Food Security/Housekeeping Addressed: No issues identified.      Elver Flores Karo Montgomery RN  Case Management Department

## 2022-12-24 LAB
BASOPHILS # BLD: 0.4 %
BASOPHILS ABSOLUTE: 0 THOU/MM3 (ref 0–0.1)
EOSINOPHIL # BLD: 6.4 %
EOSINOPHILS ABSOLUTE: 0.3 THOU/MM3 (ref 0–0.4)
ERYTHROCYTE [DISTWIDTH] IN BLOOD BY AUTOMATED COUNT: 16.5 % (ref 11.5–14.5)
ERYTHROCYTE [DISTWIDTH] IN BLOOD BY AUTOMATED COUNT: 53.2 FL (ref 35–45)
HCT VFR BLD CALC: 30.8 % (ref 37–47)
HEMOGLOBIN: 9.5 GM/DL (ref 12–16)
IMMATURE GRANS (ABS): 0.01 THOU/MM3 (ref 0–0.07)
IMMATURE GRANULOCYTES: 0.2 %
LYMPHOCYTES # BLD: 22.2 %
LYMPHOCYTES ABSOLUTE: 1.2 THOU/MM3 (ref 1–4.8)
MCH RBC QN AUTO: 27.9 PG (ref 26–33)
MCHC RBC AUTO-ENTMCNC: 30.8 GM/DL (ref 32.2–35.5)
MCV RBC AUTO: 90.6 FL (ref 81–99)
MONOCYTES # BLD: 9.3 %
MONOCYTES ABSOLUTE: 0.5 THOU/MM3 (ref 0.4–1.3)
NUCLEATED RED BLOOD CELLS: 0 /100 WBC
PLATELET # BLD: 228 THOU/MM3 (ref 130–400)
PMV BLD AUTO: 11.5 FL (ref 9.4–12.4)
RBC # BLD: 3.4 MILL/MM3 (ref 4.2–5.4)
SEG NEUTROPHILS: 61.5 %
SEGMENTED NEUTROPHILS ABSOLUTE COUNT: 3.2 THOU/MM3 (ref 1.8–7.7)
WBC # BLD: 5.2 THOU/MM3 (ref 4.8–10.8)

## 2022-12-24 PROCEDURE — 36415 COLL VENOUS BLD VENIPUNCTURE: CPT

## 2022-12-24 PROCEDURE — 6360000002 HC RX W HCPCS: Performed by: PHYSICIAN ASSISTANT

## 2022-12-24 PROCEDURE — 2580000003 HC RX 258: Performed by: PHYSICIAN ASSISTANT

## 2022-12-24 PROCEDURE — 6370000000 HC RX 637 (ALT 250 FOR IP): Performed by: PHYSICIAN ASSISTANT

## 2022-12-24 PROCEDURE — 1200000000 HC SEMI PRIVATE

## 2022-12-24 PROCEDURE — 85025 COMPLETE CBC W/AUTO DIFF WBC: CPT

## 2022-12-24 RX ADMIN — OXYCODONE AND ACETAMINOPHEN 2 TABLET: 5; 325 TABLET ORAL at 19:43

## 2022-12-24 RX ADMIN — Medication 1 TABLET: at 12:23

## 2022-12-24 RX ADMIN — ACETAMINOPHEN 650 MG: 325 TABLET ORAL at 06:28

## 2022-12-24 RX ADMIN — SENNOSIDES AND DOCUSATE SODIUM 1 TABLET: 50; 8.6 TABLET ORAL at 12:22

## 2022-12-24 RX ADMIN — DOXEPIN HYDROCHLORIDE 40 MG: 10 CAPSULE ORAL at 20:54

## 2022-12-24 RX ADMIN — OXYCODONE AND ACETAMINOPHEN 2 TABLET: 5; 325 TABLET ORAL at 03:45

## 2022-12-24 RX ADMIN — FENOFIBRATE 160 MG: 160 TABLET ORAL at 12:24

## 2022-12-24 RX ADMIN — RIVAROXABAN 10 MG: 10 TABLET, FILM COATED ORAL at 00:00

## 2022-12-24 RX ADMIN — RIVAROXABAN 10 MG: 10 TABLET, FILM COATED ORAL at 23:55

## 2022-12-24 RX ADMIN — Medication 3 MG: at 20:54

## 2022-12-24 RX ADMIN — BACLOFEN 10 MG: 10 TABLET ORAL at 03:54

## 2022-12-24 RX ADMIN — SODIUM CHLORIDE, PRESERVATIVE FREE 10 ML: 5 INJECTION INTRAVENOUS at 12:23

## 2022-12-24 RX ADMIN — BACLOFEN 10 MG: 10 TABLET ORAL at 12:23

## 2022-12-24 RX ADMIN — KETOROLAC TROMETHAMINE 15 MG: 30 INJECTION, SOLUTION INTRAMUSCULAR; INTRAVENOUS at 23:55

## 2022-12-24 RX ADMIN — ATORVASTATIN CALCIUM 40 MG: 40 TABLET, FILM COATED ORAL at 20:54

## 2022-12-24 RX ADMIN — KETOROLAC TROMETHAMINE 15 MG: 30 INJECTION, SOLUTION INTRAMUSCULAR; INTRAVENOUS at 17:19

## 2022-12-24 RX ADMIN — KETOROLAC TROMETHAMINE 15 MG: 30 INJECTION, SOLUTION INTRAMUSCULAR; INTRAVENOUS at 06:28

## 2022-12-24 RX ADMIN — LOSARTAN POTASSIUM 25 MG: 25 TABLET, FILM COATED ORAL at 12:24

## 2022-12-24 RX ADMIN — KETOROLAC TROMETHAMINE 15 MG: 30 INJECTION, SOLUTION INTRAMUSCULAR; INTRAVENOUS at 12:22

## 2022-12-24 ASSESSMENT — PAIN DESCRIPTION - DESCRIPTORS
DESCRIPTORS: ACHING

## 2022-12-24 ASSESSMENT — PAIN DESCRIPTION - LOCATION
LOCATION: LEG

## 2022-12-24 ASSESSMENT — PAIN SCALES - GENERAL
PAINLEVEL_OUTOF10: 6
PAINLEVEL_OUTOF10: 6
PAINLEVEL_OUTOF10: 7
PAINLEVEL_OUTOF10: 6
PAINLEVEL_OUTOF10: 5
PAINLEVEL_OUTOF10: 8
PAINLEVEL_OUTOF10: 10
PAINLEVEL_OUTOF10: 8
PAINLEVEL_OUTOF10: 10

## 2022-12-24 ASSESSMENT — PAIN DESCRIPTION - ORIENTATION
ORIENTATION: RIGHT

## 2022-12-24 NOTE — PROGRESS NOTES
[facility]  OCCUPATIONAL THERAPY MISSED TREATMENT NOTE  [facility] ORTHOPEDICS 7K  7K-10/010-A      Date: 2022  Patient Name: Stefania Flood        CSN: [id number]   : 1960  (60 y.o.)  Gender: female   Referring Practitioner: CRISTAL Tracy  Diagnosis: Infection of total right knee         REASON FOR MISSED TREATMENT: Patient Refused. Pt resting in bed upon arrival and stating she is nauseous and \"just leave me alone to rest.\" Will check back as able.

## 2022-12-24 NOTE — PROGRESS NOTES
Orthopaedic Progress Note      SUBJECTIVE   Ms. Leal Liming is post op day # 2     Patient s/p Stage I revision right knee with antibiotic spacer. Hgb 9.5 this AM. Endorsing pain in right knee. Patient feels tired and not motivated to work with therapy, nursing staff highly encouraging patient to work with therapy. No new ortho concerns. OBJECTIVE      Physical    VITALS:  /60   Pulse 92   Temp 97.8 °F (36.6 °C) (Oral)   Resp 16   Ht 5' 7\" (1.702 m)   Wt 160 lb (72.6 kg)   SpO2 99%   BMI 25.06 kg/m²   I/O last 3 completed shifts: In: 1080 [P.O.:1080]  Out: 350 [Urine:350]      RLE:  Dressing clean, dry, and intact. No significant drainage. ROM limited secondary to spacer. NVI. Pulses intact.       Data  CBC:   Lab Results   Component Value Date/Time    WBC 5.2 12/24/2022 06:50 AM    HGB 9.5 12/24/2022 06:50 AM     12/24/2022 06:50 AM     BMP:    Lab Results   Component Value Date/Time     12/23/2022 06:12 AM    K 4.1 12/23/2022 06:12 AM    K 4.6 05/09/2022 01:05 PM     12/23/2022 06:12 AM    CO2 22 12/23/2022 06:12 AM    BUN 7 12/23/2022 06:12 AM    CREATININE 0.4 12/23/2022 06:12 AM    CALCIUM 8.1 12/23/2022 06:12 AM    GLUCOSE 66 12/23/2022 06:12 AM    GLUCOSE 113 12/10/2020 10:01 AM     Uric Acid:  No components found for: URIC  PT/INR:    Lab Results   Component Value Date/Time    PROTIME 11.0 10/05/2016 03:39 PM    INR 1.44 12/01/2022 03:59 PM     Troponin:  No results found for: TROPONINI  Urine Culture:  No components found for: CURINE      Current Inpatient Medications    Current Facility-Administered Medications: atorvastatin (LIPITOR) tablet 40 mg, 40 mg, Oral, Nightly  baclofen (LIORESAL) tablet 10 mg, 10 mg, Oral, Q6H PRN  doxepin (SINEQUAN) capsule 40 mg, 40 mg, Oral, Nightly  fenofibrate (TRIGLIDE) tablet 160 mg, 160 mg, Oral, Daily  losartan (COZAAR) tablet 25 mg, 25 mg, Oral, Daily  melatonin tablet 3 mg, 3 mg, Oral, Nightly  multivitamin 1 tablet, 1 tablet, Oral, Daily  rivaroxaban (XARELTO) tablet 10 mg, 10 mg, Oral, Q24H  sodium chloride flush 0.9 % injection 5-40 mL, 5-40 mL, IntraVENous, 2 times per day  sodium chloride flush 0.9 % injection 5-40 mL, 5-40 mL, IntraVENous, PRN  0.9 % sodium chloride infusion, , IntraVENous, PRN  ondansetron (ZOFRAN-ODT) disintegrating tablet 4 mg, 4 mg, Oral, Q8H PRN **OR** ondansetron (ZOFRAN) injection 4 mg, 4 mg, IntraVENous, Q6H PRN  0.9 % sodium chloride infusion, , IntraVENous, Continuous  acetaminophen (TYLENOL) tablet 650 mg, 650 mg, Oral, Q4H PRN  ketorolac (TORADOL) injection 15 mg, 15 mg, IntraVENous, Q6H  morphine (PF) injection 2 mg, 2 mg, IntraVENous, Q2H PRN **OR** morphine injection 4 mg, 4 mg, IntraVENous, Q2H PRN  sennosides-docusate sodium (SENOKOT-S) 8.6-50 MG tablet 1 tablet, 1 tablet, Oral, BID  magnesium hydroxide (MILK OF MAGNESIA) 400 MG/5ML suspension 30 mL, 30 mL, Oral, Daily PRN  sodium phosphate (FLEET) rectal enema 1 enema, 1 enema, Rectal, Daily PRN  oxyCODONE-acetaminophen (PERCOCET) 5-325 MG per tablet 1 tablet, 1 tablet, Oral, Q4H PRN  oxyCODONE-acetaminophen (PERCOCET) 5-325 MG per tablet 2 tablet, 2 tablet, Oral, Q4H PRN        PLAN    1) Cont with current medical management  2)  WBAT RLE, activity as tolerated  3)  Ice and elevation  4)  ID following, appreciate the recommendations  5)  Anticipating ECF upon discharge, case management assisting    Barbara Bahena PA-C

## 2022-12-25 LAB
BASOPHILS # BLD: 0.2 %
BASOPHILS ABSOLUTE: 0 THOU/MM3 (ref 0–0.1)
EOSINOPHIL # BLD: 6.2 %
EOSINOPHILS ABSOLUTE: 0.4 THOU/MM3 (ref 0–0.4)
ERYTHROCYTE [DISTWIDTH] IN BLOOD BY AUTOMATED COUNT: 16.5 % (ref 11.5–14.5)
ERYTHROCYTE [DISTWIDTH] IN BLOOD BY AUTOMATED COUNT: 54.8 FL (ref 35–45)
HCT VFR BLD CALC: 30.2 % (ref 37–47)
HEMOGLOBIN: 9.3 GM/DL (ref 12–16)
IMMATURE GRANS (ABS): 0.02 THOU/MM3 (ref 0–0.07)
IMMATURE GRANULOCYTES: 0.3 %
LYMPHOCYTES # BLD: 23.3 %
LYMPHOCYTES ABSOLUTE: 1.4 THOU/MM3 (ref 1–4.8)
MCH RBC QN AUTO: 28 PG (ref 26–33)
MCHC RBC AUTO-ENTMCNC: 30.8 GM/DL (ref 32.2–35.5)
MCV RBC AUTO: 91 FL (ref 81–99)
MONOCYTES # BLD: 7.7 %
MONOCYTES ABSOLUTE: 0.5 THOU/MM3 (ref 0.4–1.3)
NUCLEATED RED BLOOD CELLS: 0 /100 WBC
PLATELET # BLD: 270 THOU/MM3 (ref 130–400)
PMV BLD AUTO: 11.5 FL (ref 9.4–12.4)
RBC # BLD: 3.32 MILL/MM3 (ref 4.2–5.4)
SEG NEUTROPHILS: 62.3 %
SEGMENTED NEUTROPHILS ABSOLUTE COUNT: 3.8 THOU/MM3 (ref 1.8–7.7)
WBC # BLD: 6.1 THOU/MM3 (ref 4.8–10.8)

## 2022-12-25 PROCEDURE — 6360000002 HC RX W HCPCS: Performed by: PHYSICIAN ASSISTANT

## 2022-12-25 PROCEDURE — 6370000000 HC RX 637 (ALT 250 FOR IP): Performed by: PHYSICIAN ASSISTANT

## 2022-12-25 PROCEDURE — 36415 COLL VENOUS BLD VENIPUNCTURE: CPT

## 2022-12-25 PROCEDURE — 2580000003 HC RX 258: Performed by: PHYSICIAN ASSISTANT

## 2022-12-25 PROCEDURE — 1200000000 HC SEMI PRIVATE

## 2022-12-25 PROCEDURE — 85025 COMPLETE CBC W/AUTO DIFF WBC: CPT

## 2022-12-25 RX ADMIN — SODIUM CHLORIDE, PRESERVATIVE FREE 10 ML: 5 INJECTION INTRAVENOUS at 20:20

## 2022-12-25 RX ADMIN — OXYCODONE AND ACETAMINOPHEN 2 TABLET: 5; 325 TABLET ORAL at 21:55

## 2022-12-25 RX ADMIN — BACLOFEN 10 MG: 10 TABLET ORAL at 04:34

## 2022-12-25 RX ADMIN — FENOFIBRATE 160 MG: 160 TABLET ORAL at 07:56

## 2022-12-25 RX ADMIN — DOXEPIN HYDROCHLORIDE 40 MG: 10 CAPSULE ORAL at 20:20

## 2022-12-25 RX ADMIN — MORPHINE SULFATE 4 MG: 4 INJECTION, SOLUTION INTRAMUSCULAR; INTRAVENOUS at 07:56

## 2022-12-25 RX ADMIN — LOSARTAN POTASSIUM 25 MG: 25 TABLET, FILM COATED ORAL at 07:56

## 2022-12-25 RX ADMIN — SODIUM CHLORIDE: 9 INJECTION, SOLUTION INTRAVENOUS at 06:32

## 2022-12-25 RX ADMIN — Medication 1 TABLET: at 07:56

## 2022-12-25 RX ADMIN — Medication 3 MG: at 20:20

## 2022-12-25 RX ADMIN — OXYCODONE AND ACETAMINOPHEN 2 TABLET: 5; 325 TABLET ORAL at 04:28

## 2022-12-25 RX ADMIN — BACLOFEN 10 MG: 10 TABLET ORAL at 11:09

## 2022-12-25 RX ADMIN — OXYCODONE AND ACETAMINOPHEN 2 TABLET: 5; 325 TABLET ORAL at 15:53

## 2022-12-25 RX ADMIN — BACLOFEN 10 MG: 10 TABLET ORAL at 17:59

## 2022-12-25 RX ADMIN — KETOROLAC TROMETHAMINE 15 MG: 30 INJECTION, SOLUTION INTRAMUSCULAR; INTRAVENOUS at 06:34

## 2022-12-25 RX ADMIN — OXYCODONE AND ACETAMINOPHEN 2 TABLET: 5; 325 TABLET ORAL at 08:59

## 2022-12-25 RX ADMIN — ATORVASTATIN CALCIUM 40 MG: 40 TABLET, FILM COATED ORAL at 20:20

## 2022-12-25 RX ADMIN — RIVAROXABAN 10 MG: 10 TABLET, FILM COATED ORAL at 23:32

## 2022-12-25 ASSESSMENT — PAIN DESCRIPTION - ORIENTATION
ORIENTATION: RIGHT

## 2022-12-25 ASSESSMENT — PAIN SCALES - GENERAL
PAINLEVEL_OUTOF10: 9
PAINLEVEL_OUTOF10: 8
PAINLEVEL_OUTOF10: 8
PAINLEVEL_OUTOF10: 6
PAINLEVEL_OUTOF10: 7
PAINLEVEL_OUTOF10: 0
PAINLEVEL_OUTOF10: 10
PAINLEVEL_OUTOF10: 4
PAINLEVEL_OUTOF10: 8

## 2022-12-25 ASSESSMENT — PAIN DESCRIPTION - LOCATION
LOCATION: LEG

## 2022-12-25 ASSESSMENT — PAIN DESCRIPTION - DESCRIPTORS
DESCRIPTORS: ACHING
DESCRIPTORS: ACHING;THROBBING
DESCRIPTORS: ACHING
DESCRIPTORS: SHARP;ACHING

## 2022-12-25 NOTE — PROGRESS NOTES
Orthopaedic Progress Note      SUBJECTIVE   Ms. Sacha Kuhn is post op day # 3     Patient s/p Stage I revision right knee with antibiotic spacer. Hgb 9.3 this AM. Endorsing pain in right knee. Has not had dressing change yet, doing at bedside today. No new ortho concerns. Infectious disease helping with antibiotic therapy pending culture results. OBJECTIVE      Physical    VITALS:  /68   Pulse 89   Temp 99 °F (37.2 °C) (Oral)   Resp 16   Ht 5' 7\" (1.702 m)   Wt 160 lb (72.6 kg)   SpO2 96%   BMI 25.06 kg/m²   I/O last 3 completed shifts: In: 720 [P.O.:720]  Out: 1750 [Urine:1750]      RLE:  Dressing clean, dry, and intact. Old bloody drainage noted on gauze and ABD howver no breakthrough into the remainder of the dressing. No active drainage from incision. ROM limited secondary to spacer. NVI. Dorsalis pedal and posterior tibialis pulses strong and regular.       Data  CBC:   Lab Results   Component Value Date/Time    WBC 6.1 12/25/2022 05:17 AM    HGB 9.3 12/25/2022 05:17 AM     12/25/2022 05:17 AM     BMP:    Lab Results   Component Value Date/Time     12/23/2022 06:12 AM    K 4.1 12/23/2022 06:12 AM    K 4.6 05/09/2022 01:05 PM     12/23/2022 06:12 AM    CO2 22 12/23/2022 06:12 AM    BUN 7 12/23/2022 06:12 AM    CREATININE 0.4 12/23/2022 06:12 AM    CALCIUM 8.1 12/23/2022 06:12 AM    GLUCOSE 66 12/23/2022 06:12 AM    GLUCOSE 113 12/10/2020 10:01 AM     Uric Acid:  No components found for: URIC  PT/INR:    Lab Results   Component Value Date/Time    PROTIME 11.0 10/05/2016 03:39 PM    INR 1.44 12/01/2022 03:59 PM     Troponin:  No results found for: TROPONINI  Urine Culture:  No components found for: CURINE      Current Inpatient Medications    Current Facility-Administered Medications: atorvastatin (LIPITOR) tablet 40 mg, 40 mg, Oral, Nightly  baclofen (LIORESAL) tablet 10 mg, 10 mg, Oral, Q6H PRN  doxepin (SINEQUAN) capsule 40 mg, 40 mg, Oral, Nightly  fenofibrate (TRIGLIDE) tablet 160 mg, 160 mg, Oral, Daily  losartan (COZAAR) tablet 25 mg, 25 mg, Oral, Daily  melatonin tablet 3 mg, 3 mg, Oral, Nightly  multivitamin 1 tablet, 1 tablet, Oral, Daily  rivaroxaban (XARELTO) tablet 10 mg, 10 mg, Oral, Q24H  sodium chloride flush 0.9 % injection 5-40 mL, 5-40 mL, IntraVENous, 2 times per day  sodium chloride flush 0.9 % injection 5-40 mL, 5-40 mL, IntraVENous, PRN  0.9 % sodium chloride infusion, , IntraVENous, PRN  ondansetron (ZOFRAN-ODT) disintegrating tablet 4 mg, 4 mg, Oral, Q8H PRN **OR** ondansetron (ZOFRAN) injection 4 mg, 4 mg, IntraVENous, Q6H PRN  0.9 % sodium chloride infusion, , IntraVENous, Continuous  acetaminophen (TYLENOL) tablet 650 mg, 650 mg, Oral, Q4H PRN  morphine (PF) injection 2 mg, 2 mg, IntraVENous, Q2H PRN **OR** morphine injection 4 mg, 4 mg, IntraVENous, Q2H PRN  sennosides-docusate sodium (SENOKOT-S) 8.6-50 MG tablet 1 tablet, 1 tablet, Oral, BID  magnesium hydroxide (MILK OF MAGNESIA) 400 MG/5ML suspension 30 mL, 30 mL, Oral, Daily PRN  sodium phosphate (FLEET) rectal enema 1 enema, 1 enema, Rectal, Daily PRN  oxyCODONE-acetaminophen (PERCOCET) 5-325 MG per tablet 1 tablet, 1 tablet, Oral, Q4H PRN  oxyCODONE-acetaminophen (PERCOCET) 5-325 MG per tablet 2 tablet, 2 tablet, Oral, Q4H PRN        PLAN       1) Cont with current medical management  2)  WBAT RLE, activity as tolerated  3)  Ice and elevation  4)  ID following, appreciate the recommendations  5)  Anticipating ECF upon discharge, case management assisting  6)  dry dressing changes PRN     Trenna Lesch, PA-C

## 2022-12-25 NOTE — PLAN OF CARE
Problem: Discharge Planning  Goal: Discharge to home or other facility with appropriate resources  Outcome: Progressing  Flowsheets (Taken 12/25/2022 1053)  Discharge to home or other facility with appropriate resources:   Identify barriers to discharge with patient and caregiver   Identify discharge learning needs (meds, wound care, etc)   Refer to discharge planning if patient needs post-hospital services based on physician order or complex needs related to functional status, cognitive ability or social support system   Arrange for needed discharge resources and transportation as appropriate   Arrange for interpreters to assist at discharge as needed     Problem: Chronic Conditions and Co-morbidities  Goal: Patient's chronic conditions and co-morbidity symptoms are monitored and maintained or improved  Outcome: Progressing  Flowsheets (Taken 12/25/2022 1053)  Care Plan - Patient's Chronic Conditions and Co-Morbidity Symptoms are Monitored and Maintained or Improved:   Monitor and assess patient's chronic conditions and comorbid symptoms for stability, deterioration, or improvement   Collaborate with multidisciplinary team to address chronic and comorbid conditions and prevent exacerbation or deterioration   Update acute care plan with appropriate goals if chronic or comorbid symptoms are exacerbated and prevent overall improvement and discharge     Problem: Pain  Goal: Verbalizes/displays adequate comfort level or baseline comfort level  Outcome: Progressing  Flowsheets (Taken 12/25/2022 1053)  Verbalizes/displays adequate comfort level or baseline comfort level:   Encourage patient to monitor pain and request assistance   Administer analgesics based on type and severity of pain and evaluate response   Consider cultural and social influences on pain and pain management   Assess pain using appropriate pain scale   Implement non-pharmacological measures as appropriate and evaluate response     Problem: Safety - Adult  Goal: Free from fall injury  Outcome: Progressing  Flowsheets (Taken 12/25/2022 1053)  Free From Fall Injury: Instruct family/caregiver on patient safety     Problem: Skin/Tissue Integrity  Goal: Absence of new skin breakdown  Description: 1. Monitor for areas of redness and/or skin breakdown  2. Assess vascular access sites hourly  3. Every 4-6 hours minimum:  Change oxygen saturation probe site  4. Every 4-6 hours:  If on nasal continuous positive airway pressure, respiratory therapy assess nares and determine need for appliance change or resting period. Outcome: Adequate for Discharge     Care plan reviewed with patient. Patient verbalizes understanding of plan of care and contributes to goal setting.

## 2022-12-26 LAB
HCT VFR BLD CALC: 26.7 % (ref 37–47)
HEMOGLOBIN: 8.5 GM/DL (ref 12–16)

## 2022-12-26 PROCEDURE — 6370000000 HC RX 637 (ALT 250 FOR IP): Performed by: PHYSICIAN ASSISTANT

## 2022-12-26 PROCEDURE — 97110 THERAPEUTIC EXERCISES: CPT

## 2022-12-26 PROCEDURE — 6360000002 HC RX W HCPCS: Performed by: PHYSICIAN ASSISTANT

## 2022-12-26 PROCEDURE — 85014 HEMATOCRIT: CPT

## 2022-12-26 PROCEDURE — 6360000002 HC RX W HCPCS: Performed by: INTERNAL MEDICINE

## 2022-12-26 PROCEDURE — 2580000003 HC RX 258: Performed by: INTERNAL MEDICINE

## 2022-12-26 PROCEDURE — 85018 HEMOGLOBIN: CPT

## 2022-12-26 PROCEDURE — 1200000000 HC SEMI PRIVATE

## 2022-12-26 PROCEDURE — 2580000003 HC RX 258: Performed by: PHYSICIAN ASSISTANT

## 2022-12-26 PROCEDURE — 36415 COLL VENOUS BLD VENIPUNCTURE: CPT

## 2022-12-26 RX ADMIN — Medication 3 MG: at 20:24

## 2022-12-26 RX ADMIN — MORPHINE SULFATE 4 MG: 4 INJECTION, SOLUTION INTRAMUSCULAR; INTRAVENOUS at 20:26

## 2022-12-26 RX ADMIN — ONDANSETRON 4 MG: 2 INJECTION INTRAMUSCULAR; INTRAVENOUS at 15:55

## 2022-12-26 RX ADMIN — OXYCODONE AND ACETAMINOPHEN 2 TABLET: 5; 325 TABLET ORAL at 12:11

## 2022-12-26 RX ADMIN — CEFAZOLIN 2000 MG: 10 INJECTION, POWDER, FOR SOLUTION INTRAVENOUS at 15:32

## 2022-12-26 RX ADMIN — BACLOFEN 10 MG: 10 TABLET ORAL at 22:13

## 2022-12-26 RX ADMIN — BACLOFEN 10 MG: 10 TABLET ORAL at 09:32

## 2022-12-26 RX ADMIN — OXYCODONE AND ACETAMINOPHEN 2 TABLET: 5; 325 TABLET ORAL at 08:08

## 2022-12-26 RX ADMIN — OXYCODONE AND ACETAMINOPHEN 2 TABLET: 5; 325 TABLET ORAL at 02:51

## 2022-12-26 RX ADMIN — ATORVASTATIN CALCIUM 40 MG: 40 TABLET, FILM COATED ORAL at 20:24

## 2022-12-26 RX ADMIN — LOSARTAN POTASSIUM 25 MG: 25 TABLET, FILM COATED ORAL at 08:08

## 2022-12-26 RX ADMIN — OXYCODONE AND ACETAMINOPHEN 2 TABLET: 5; 325 TABLET ORAL at 22:13

## 2022-12-26 RX ADMIN — OXYCODONE AND ACETAMINOPHEN 2 TABLET: 5; 325 TABLET ORAL at 17:57

## 2022-12-26 RX ADMIN — BACLOFEN 10 MG: 10 TABLET ORAL at 15:28

## 2022-12-26 RX ADMIN — FENOFIBRATE 160 MG: 160 TABLET ORAL at 08:08

## 2022-12-26 RX ADMIN — SODIUM CHLORIDE: 9 INJECTION, SOLUTION INTRAVENOUS at 02:57

## 2022-12-26 RX ADMIN — Medication 1 TABLET: at 08:08

## 2022-12-26 RX ADMIN — DOXEPIN HYDROCHLORIDE 40 MG: 10 CAPSULE ORAL at 20:24

## 2022-12-26 RX ADMIN — RIVAROXABAN 10 MG: 10 TABLET, FILM COATED ORAL at 23:40

## 2022-12-26 RX ADMIN — CEFAZOLIN 2000 MG: 10 INJECTION, POWDER, FOR SOLUTION INTRAVENOUS at 23:41

## 2022-12-26 ASSESSMENT — PAIN DESCRIPTION - LOCATION
LOCATION: LEG
LOCATION: LEG

## 2022-12-26 ASSESSMENT — PAIN - FUNCTIONAL ASSESSMENT
PAIN_FUNCTIONAL_ASSESSMENT: PREVENTS OR INTERFERES SOME ACTIVE ACTIVITIES AND ADLS
PAIN_FUNCTIONAL_ASSESSMENT: PREVENTS OR INTERFERES SOME ACTIVE ACTIVITIES AND ADLS

## 2022-12-26 ASSESSMENT — PAIN SCALES - GENERAL
PAINLEVEL_OUTOF10: 8
PAINLEVEL_OUTOF10: 9
PAINLEVEL_OUTOF10: 6
PAINLEVEL_OUTOF10: 9
PAINLEVEL_OUTOF10: 8
PAINLEVEL_OUTOF10: 9

## 2022-12-26 ASSESSMENT — PAIN DESCRIPTION - ORIENTATION
ORIENTATION: RIGHT
ORIENTATION: RIGHT

## 2022-12-26 ASSESSMENT — PAIN DESCRIPTION - DESCRIPTORS
DESCRIPTORS: SHARP;ACHING
DESCRIPTORS: ACHING;THROBBING
DESCRIPTORS: SHARP;ACHING

## 2022-12-26 ASSESSMENT — PAIN DESCRIPTION - FREQUENCY: FREQUENCY: CONTINUOUS

## 2022-12-26 ASSESSMENT — PAIN DESCRIPTION - ONSET: ONSET: ON-GOING

## 2022-12-26 ASSESSMENT — PAIN DESCRIPTION - PAIN TYPE: TYPE: SURGICAL PAIN

## 2022-12-26 ASSESSMENT — PAIN DESCRIPTION - DIRECTION: RADIATING_TOWARDS: DOWN RIGHT LEG

## 2022-12-26 NOTE — CONSULTS
CONSULTATION NOTE :ID       Patient - Pepper Dunbar,  Age - 60 y.o.    - 1960      Room Number - 7K-10/010-A   N -  [id number]   Acct # - [de-identified]  Date of Admission -  2022  5:45 AM  Patient's PCP: Mars Jackson DO     Requesting Physician: Lulú Dumont MD    REASON FOR CONSULTATION   Right knee infected prosthesis  CHIEF COMPLAINT   Draining wound from the right knee    HISTORY OF PRESENT ILLNESS       This is a very pleasant 60 y.o. female who was admitted to the hospital with a chief complaints of draining wound from her right knee. She had previous history of right total knee replacement complicated by infection. She had replacement unfortunately she broke after a fall and had Keita exchange. Was treated with prolonged IV and oral antibiotic. Patient reported drainage from the wound and was diagnosed with have staph infection. She was admitted to the hospital had her first stage revision with a spacer. She is on IV Ancef. I was asked to see this patient and assist with treatment. She has history of osteoarthritis hypertension and diabetes was treated in the past for C. difficile related to prolonged antibiotic treatment.     PAST MEDICAL  HISTORY       Past Medical History:   Diagnosis Date    Arthritis     Atypical ductal hyperplasia, breast     left breast, 2016, lumpectomy, Tamoxifen, [facility]    Cancer ([facility] [address]     Hx of breast biopsy 2016    Hyperlipidemia     Hypertension     Type II or unspecified type diabetes mellitus without mention of complication, not stated as uncontrolled        PAST SURGICAL HISTORY     Past Surgical History:   Procedure Laterality Date    BLADDER SUSPENSION      BREAST BIOPSY Left     atypical hyperlasia, 2016, [facility]    BREAST LUMPECTOMY Left     atypical hyperplasia, 2016, [facility]    COLONOSCOPY  2020    HERNIA REPAIR  10/17/2016    JOINT REPLACEMENT Left     knee    KNEE SURGERY      Right knee    REVISION TOTAL KNEE ARTHROPLASTY Right 05/10/2022    RIGHT KNEE TOTAL ARTHROPLASTY REVISION performed by Steph Mendoza MD at [address] [facility] Right 06/12/2022    Right Knee Incision and Drainage of Total Knee With Poly Exchange performed by Steph Mendoza MD at [address] [facility] Right 12/22/2022    STAGE 1 RIGHT KNEE performed by Steph Mendoza MD at [address] [facility] [address] [address]:       Scheduled Meds:   atorvastatin  40 mg Oral Nightly    doxepin  40 mg Oral Nightly    fenofibrate  160 mg Oral Daily    losartan  25 mg Oral Daily    melatonin  3 mg Oral Nightly    multivitamin  1 tablet Oral Daily    rivaroxaban  10 mg Oral Q24H    sodium chloride flush  5-40 mL IntraVENous 2 times per day    sennosides-docusate sodium  1 tablet Oral BID     Continuous Infusions:   sodium chloride      sodium chloride 50 mL/hr at 12/26/22 0257     PRN Meds:baclofen, sodium chloride flush, sodium chloride, ondansetron **OR** ondansetron, acetaminophen, morphine **OR** morphine, magnesium hydroxide, sodium phosphate, oxyCODONE-acetaminophen, oxyCODONE-acetaminophen  Allergies:   ALLERGIES:    Adhesive tape        SOCIAL HISTORY:     TOBACCO:   reports that she has been smoking cigarettes. She has a 49.00 pack-year smoking history. She has never used smokeless tobacco.     ETOH:   reports no history of alcohol use.       FAMILY HISTORY:         Problem Relation Age of Onset    High Cholesterol Mother     High Blood Pressure Mother     Pacemaker Mother     Other Mother         Sundowners syndrome    Crohn's Disease Mother     Cancer Mother         Uterine and cervical and vagina    Thyroid Disease Mother     Dementia Mother     Colon Polyps Mother     Other Father         Pneumonia    Dementia Father     Diabetes Sister     Breast Cancer Sister     Heart Disease Brother     Colon Cancer Neg Hx        REVIEW OF SYSTEMS:     Constitutional: no fever, no night sweats, no fatigue, no weight loss. Head: no head ache , no head injury, no migranes. Eye: no eye discharge, blurring of vision, no double vision,no eye pain. Ears: no hearing difficulty, no tinnitus  Mouth/throat: no ulceration, dental caries , dysphagia, no hoarseness and voice change  Respiratory: no cough no chest pain,no shortness of breath,no wheezing  CVS: no palpitation, no chest pain,   GI: no abdominal pain, no nausea , no vomiting, no constipation,no diarrhea. ANGELA: no dysuria, frequency and urgency, no hematuria, no kidney stones  Musculoskeletal: As noted in HPI. Endocrine: no polyuria, polydipsia, no cold or heat intolerance  Hematology: no anemia, no easy brusing or bleeding, no hx of clotting disorder  Dermatology: no skin rash, no skin lesions, no pruritis,  Neurological:no headaches,no dizziness, no seizure, no numbness. Psychiatry: no depression, no anxiety,no panic attacks, no suicide ideation    PHYSICAL EXAM:     BP (!) 112/38   Pulse 55   Temp 98.4 °F (36.9 °C) (Oral)   Resp 18   Ht 5' 7\" (1.702 m)   Wt 160 lb (72.6 kg)   SpO2 100%   BMI 25.06 kg/m²   General apperance:  Awake, alert, not in distress. HEENT: Pale conjunctiva, unicteric sclera, moist oral mucosa. Chest: Bilateral air entry. Cardiovascular:  RRR ,S1S2, no murmur or gallop. Abdomen:  Soft, non tender to palpation. Extremities: Dressings right knee [post surgery]. Skin:  Warm and dry.   CNS: Awake and oriented        LABS:     CBC:   Recent Labs     12/24/22  0650 12/25/22  0517 12/26/22  0808   WBC 5.2 6.1  --    HGB 9.5* 9.3* 8.5*    270  --     Micro:   Lab Results   Component Value Date/Time    BC No growth-preliminary No growth 06/10/2022 03:00 PM    BC No growth-preliminary No growth 06/10/2022 03:00 PM       Problem list of patient      Patient Active Problem List   Diagnosis Code    Diabetes mellitus type 2, noninsulin dependent ([facility] [address] E11.9    Hyperlipemia E78.5    THORNTON (nonalcoholic steatohepatitis) K75.81    Obesity E66.9    Tobacco abuse Z72.0    Vitamin D deficiency E55.9    Seborrheic keratosis L82.1    Dyshidrotic eczema L30.1    Displaced articular fracture of head of right femur, sequela S72.061S    Neida-prosthetic supracondylar fracture of femur M97. 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) T84.53XA    History of total knee replacement, right Z96.651    Hypokalemia E87.6    Enterocolitis due to Clostridium difficile, not specified as recurrent A04.72    Recurrent major depressive disorder ([facility] [address] F33.9    Aftercare following knee joint replacement surgery Z47.1, Z96.659    Post-operative state Z98.890           Impression and Recommendation:   Right knee infected prosthesis status post first stage revision  We will continue IV Ancef  Arrange PICC line for long-term IV antibiotic treatment   to assist with discharge planning  Will check weekly C-reactive protein, CBC and BMP  Infection Control:   Standard precautions  Discharge Planning (Coordination of out patient care:    to assist with discharge planning. Thank you Barrera Eng MD for allowing me to participate in this patient's care.     Bethany Medeiros MD, MD,FACP 12/26/2022 2:42 PM

## 2022-12-26 NOTE — PROGRESS NOTES
Orthopaedic Progress Note      SUBJECTIVE   Ms. Terra Rivera is post op day # 4    Patient s/p Stage I revision right knee with antibiotic spacer. Global RLE pain. Incision without drainage, bandage intact. No new ortho concerns. Infectious disease helping with antibiotic therapy pending culture results. OBJECTIVE      Physical    VITALS:  BP (!) 94/46   Pulse 93   Temp 97.5 °F (36.4 °C) (Axillary)   Resp 18   Ht 5' 7\" (1.702 m)   Wt 160 lb (72.6 kg)   SpO2 97%   BMI 25.06 kg/m²   I/O last 3 completed shifts: In: 80 [P.O.:980; I.V.:4530]  Out: 2400 [Urine:2400]    8/10 pain  Gen: alert and oriented  Head: normorcephalic, atraumatic  Resp: unlabored, room air  Pelvis: stable  RLE:  Dressing clean, dry, and intact. No active drainage from incision. ROM limited secondary to spacer. NVI. Dorsalis pedal and posterior tibialis pulses strong and regular.       Data  CBC:   Lab Results   Component Value Date/Time    WBC 6.1 12/25/2022 05:17 AM    HGB 9.3 12/25/2022 05:17 AM     12/25/2022 05:17 AM     BMP:    Lab Results   Component Value Date/Time     12/23/2022 06:12 AM    K 4.1 12/23/2022 06:12 AM    K 4.6 05/09/2022 01:05 PM     12/23/2022 06:12 AM    CO2 22 12/23/2022 06:12 AM    BUN 7 12/23/2022 06:12 AM    CREATININE 0.4 12/23/2022 06:12 AM    CALCIUM 8.1 12/23/2022 06:12 AM    GLUCOSE 66 12/23/2022 06:12 AM    GLUCOSE 113 12/10/2020 10:01 AM     Uric Acid:  No components found for: URIC  PT/INR:    Lab Results   Component Value Date/Time    PROTIME 11.0 10/05/2016 03:39 PM    INR 1.44 12/01/2022 03:59 PM     Troponin:  No results found for: TROPONINI  Urine Culture:  No components found for: CURINE      Current Inpatient Medications    Current Facility-Administered Medications: atorvastatin (LIPITOR) tablet 40 mg, 40 mg, Oral, Nightly  baclofen (LIORESAL) tablet 10 mg, 10 mg, Oral, Q6H PRN  doxepin (SINEQUAN) capsule 40 mg, 40 mg, Oral, Nightly  fenofibrate (TRIGLIDE) tablet 160 mg, 160 mg, Oral, Daily  losartan (COZAAR) tablet 25 mg, 25 mg, Oral, Daily  melatonin tablet 3 mg, 3 mg, Oral, Nightly  multivitamin 1 tablet, 1 tablet, Oral, Daily  rivaroxaban (XARELTO) tablet 10 mg, 10 mg, Oral, Q24H  sodium chloride flush 0.9 % injection 5-40 mL, 5-40 mL, IntraVENous, 2 times per day  sodium chloride flush 0.9 % injection 5-40 mL, 5-40 mL, IntraVENous, PRN  0.9 % sodium chloride infusion, , IntraVENous, PRN  ondansetron (ZOFRAN-ODT) disintegrating tablet 4 mg, 4 mg, Oral, Q8H PRN **OR** ondansetron (ZOFRAN) injection 4 mg, 4 mg, IntraVENous, Q6H PRN  0.9 % sodium chloride infusion, , IntraVENous, Continuous  acetaminophen (TYLENOL) tablet 650 mg, 650 mg, Oral, Q4H PRN  morphine (PF) injection 2 mg, 2 mg, IntraVENous, Q2H PRN **OR** morphine injection 4 mg, 4 mg, IntraVENous, Q2H PRN  sennosides-docusate sodium (SENOKOT-S) 8.6-50 MG tablet 1 tablet, 1 tablet, Oral, BID  magnesium hydroxide (MILK OF MAGNESIA) 400 MG/5ML suspension 30 mL, 30 mL, Oral, Daily PRN  sodium phosphate (FLEET) rectal enema 1 enema, 1 enema, Rectal, Daily PRN  oxyCODONE-acetaminophen (PERCOCET) 5-325 MG per tablet 1 tablet, 1 tablet, Oral, Q4H PRN  oxyCODONE-acetaminophen (PERCOCET) 5-325 MG per tablet 2 tablet, 2 tablet, Oral, Q4H PRN        PLAN       1) Cont with current medical management  2)  WBAT RLE, activity as tolerated  3)  Ice and elevation  4)  ID to see, appreciate the recommendations, will need picc   5)  Anticipating ECF upon discharge, case management assisting  6)  dry dressing changes PRN  7)  PT OT as able  8)  trend hgb hct

## 2022-12-26 NOTE — PLAN OF CARE
Problem: Discharge Planning  Goal: Discharge to home or other facility with appropriate resources  Outcome: Progressing  Flowsheets (Taken 12/26/2022 1248)  Discharge to home or other facility with appropriate resources:   Identify barriers to discharge with patient and caregiver   Identify discharge learning needs (meds, wound care, etc)   Refer to discharge planning if patient needs post-hospital services based on physician order or complex needs related to functional status, cognitive ability or social support system   Arrange for needed discharge resources and transportation as appropriate     Problem: Chronic Conditions and Co-morbidities  Goal: Patient's chronic conditions and co-morbidity symptoms are monitored and maintained or improved  Outcome: Progressing  Flowsheets (Taken 12/26/2022 1248)  Care Plan - Patient's Chronic Conditions and Co-Morbidity Symptoms are Monitored and Maintained or Improved:   Monitor and assess patient's chronic conditions and comorbid symptoms for stability, deterioration, or improvement   Collaborate with multidisciplinary team to address chronic and comorbid conditions and prevent exacerbation or deterioration   Update acute care plan with appropriate goals if chronic or comorbid symptoms are exacerbated and prevent overall improvement and discharge     Problem: Pain  Goal: Verbalizes/displays adequate comfort level or baseline comfort level  Outcome: Progressing  Flowsheets (Taken 12/26/2022 1248)  Verbalizes/displays adequate comfort level or baseline comfort level:   Encourage patient to monitor pain and request assistance   Administer analgesics based on type and severity of pain and evaluate response   Consider cultural and social influences on pain and pain management   Assess pain using appropriate pain scale   Implement non-pharmacological measures as appropriate and evaluate response   Notify Licensed Independent Practitioner if interventions unsuccessful or patient reports new pain     Problem: Safety - Adult  Goal: Free from fall injury  Outcome: Adequate for Discharge  Flowsheets (Taken 12/26/2022 1248)  Free From Fall Injury: Instruct family/caregiver on patient safety     Problem: Skin/Tissue Integrity  Goal: Absence of new skin breakdown  Description: 1. Monitor for areas of redness and/or skin breakdown  2. Assess vascular access sites hourly  3. Every 4-6 hours minimum:  Change oxygen saturation probe site  4. Every 4-6 hours:  If on nasal continuous positive airway pressure, respiratory therapy assess nares and determine need for appliance change or resting period. Outcome: Adequate for Discharge     Care plan reviewed with patient. Patient verbalizes understanding of plan of care and contributes to goal setting.

## 2022-12-26 NOTE — PROGRESS NOTES
[address] [address]  INPATIENT PHYSICAL THERAPY  DAILY NOTE  [facility] ORTHOPEDICS 7K - 7K-10/010-A    Time In: 0915  Time Out: 8125  Timed Code Treatment Minutes: 10 Minutes  Minutes: 10          Date: 2022  Patient Name: Herlinda Edouard,  Gender:  female        MRN: [id number]  : 1960  (60 y.o.)     Referring Practitioner: CRISTAL Wang  Diagnosis: Infection of total right knee replacement, initial encounter  Additional Pertinent Hx: STAGE 1 RIGHT KNEE with insertion of antibiotic spacer  by Dr Arpan Patel; extensive knee history with several surgeries prior to this     Prior Level of Function:  Lives With: Spouse  Type of Home: House  Home Layout: One level  Home Access: Ramped entrance  Home Equipment: Walker, rolling, Wheelchair-manual   Bathroom Shower/Tub: Tub/Shower unit  Bathroom Toilet: Bedside commode    ADL Assistance: Independent  Homemaking Assistance: Independent  Ambulation Assistance: Non-ambulatory  Transfer Assistance: Independent  Active : Yes  IADL Comments:  helped her as needed, pt reports he is a \"worry wart\" and only wants her to transfer with him present. Pt's  has been doing most IADLs at home. Additional Comments: Pt has been at SNF for therapy and recently returned home, she reports she was able to scoot from EOB to w/c but mostly remained in bed    Restrictions/Precautions:  Restrictions/Precautions: Fall Risk, Weight Bearing, General Precautions  Right Lower Extremity Weight Bearing: Weight Bearing As Tolerated  Position Activity Restriction  Other position/activity restrictions: B LE contractures     SUBJECTIVE: RN approved session. Patient in bed at arrival and agreeable to exercises in bed only. Patient frequently moved and yelled out in bed during session, RN aware.      PAIN: 10: R knee     Vitals: Vitals not assessed per clinical judgement, see nursing flowsheet    OBJECTIVE:  Bed Mobility:  Not Tested    Transfers:  Not Tested    Ambulation:  Not Tested    Exercise:  Patient was guided in 1 set(s) 10 reps of exercise to both lower extremities. Ankle pumps, Glut sets, Quad sets, Heelslides, and Hip abduction/adduction. Unable to complete heelslides and hip abduction/adduction on R LE due to pain. Patient requested to end session after a few exercises. Exercises were completed for increased independence with functional mobility. Functional Outcome Measures: Completed  AM-PAC Inpatient Mobility without Stair Climbing Raw Score : 7  AM-PAC Inpatient without Stair Climbing T-Scale Score : 28.66    ASSESSMENT:  Assessment: Patient progressing toward established goals. Activity Tolerance:  Patient tolerance of  treatment: good. Equipment Recommendations:Equipment Needed: No  Discharge Recommendations: Continue to assess pending progress and Patient would benefit from continued PT at discharge  Plan: Current Treatment Recommendations: Strengthening, Balance training, Functional mobility training, Transfer training, Endurance training, Safety education & training  General Plan:  (5x O)    Patient Education  Patient Education: Plan of Care    Goals:  Patient Goals : none stated  Short Term Goals  Time Frame for Short Term Goals: by discharge  Short Term Goal 1: bed mobility with HOB flat, no rails, mod I for increased functional ind  Short Term Goal 2: sit pivot with LRD mod I for safe transfers  Short Term Goal 3: w/c mobility with mod I for safe household navigation  Long Term Goals  Time Frame for Long Term Goals : NA d/t short ELOS    Following session, patient left in safe position with all fall risk precautions in place.

## 2022-12-27 ENCOUNTER — APPOINTMENT (OUTPATIENT)
Dept: GENERAL RADIOLOGY | Age: 62
DRG: 486 | End: 2022-12-27
Attending: ORTHOPAEDIC SURGERY
Payer: OTHER GOVERNMENT

## 2022-12-27 LAB
AEROBIC CULTURE: ABNORMAL
AEROBIC CULTURE: ABNORMAL
ANAEROBIC CULTURE: ABNORMAL
GRAM STAIN RESULT: ABNORMAL
HCT VFR BLD CALC: 23.1 % (ref 37–47)
HEMOGLOBIN: 7.2 GM/DL (ref 12–16)
ORGANISM: ABNORMAL

## 2022-12-27 PROCEDURE — C1751 CATH, INF, PER/CENT/MIDLINE: HCPCS

## 2022-12-27 PROCEDURE — 2580000003 HC RX 258: Performed by: PHYSICIAN ASSISTANT

## 2022-12-27 PROCEDURE — 97530 THERAPEUTIC ACTIVITIES: CPT

## 2022-12-27 PROCEDURE — 2580000003 HC RX 258: Performed by: INTERNAL MEDICINE

## 2022-12-27 PROCEDURE — 97535 SELF CARE MNGMENT TRAINING: CPT

## 2022-12-27 PROCEDURE — 36415 COLL VENOUS BLD VENIPUNCTURE: CPT

## 2022-12-27 PROCEDURE — 6360000002 HC RX W HCPCS: Performed by: INTERNAL MEDICINE

## 2022-12-27 PROCEDURE — 36569 INSJ PICC 5 YR+ W/O IMAGING: CPT

## 2022-12-27 PROCEDURE — 71045 X-RAY EXAM CHEST 1 VIEW: CPT

## 2022-12-27 PROCEDURE — 6370000000 HC RX 637 (ALT 250 FOR IP): Performed by: PHYSICIAN ASSISTANT

## 2022-12-27 PROCEDURE — 76937 US GUIDE VASCULAR ACCESS: CPT

## 2022-12-27 PROCEDURE — 85014 HEMATOCRIT: CPT

## 2022-12-27 PROCEDURE — 2500000003 HC RX 250 WO HCPCS: Performed by: PHYSICIAN ASSISTANT

## 2022-12-27 PROCEDURE — 1200000000 HC SEMI PRIVATE

## 2022-12-27 PROCEDURE — 85018 HEMOGLOBIN: CPT

## 2022-12-27 RX ORDER — SODIUM CHLORIDE 0.9 % (FLUSH) 0.9 %
5-40 SYRINGE (ML) INJECTION EVERY 12 HOURS SCHEDULED
Status: DISCONTINUED | OUTPATIENT
Start: 2022-12-27 | End: 2022-12-27 | Stop reason: SDUPTHER

## 2022-12-27 RX ORDER — SODIUM CHLORIDE 0.9 % (FLUSH) 0.9 %
5-40 SYRINGE (ML) INJECTION PRN
Status: DISCONTINUED | OUTPATIENT
Start: 2022-12-27 | End: 2022-12-27 | Stop reason: SDUPTHER

## 2022-12-27 RX ORDER — SODIUM CHLORIDE 9 MG/ML
25 INJECTION, SOLUTION INTRAVENOUS PRN
Status: DISCONTINUED | OUTPATIENT
Start: 2022-12-27 | End: 2022-12-27 | Stop reason: SDUPTHER

## 2022-12-27 RX ORDER — OXYCODONE HYDROCHLORIDE AND ACETAMINOPHEN 5; 325 MG/1; MG/1
1 TABLET ORAL EVERY 6 HOURS PRN
Qty: 28 TABLET | Refills: 0 | Status: SHIPPED | OUTPATIENT
Start: 2022-12-27 | End: 2023-01-06 | Stop reason: SDUPTHER

## 2022-12-27 RX ORDER — LIDOCAINE HYDROCHLORIDE 10 MG/ML
5 INJECTION, SOLUTION EPIDURAL; INFILTRATION; INTRACAUDAL; PERINEURAL ONCE
Status: DISCONTINUED | OUTPATIENT
Start: 2022-12-27 | End: 2023-01-09 | Stop reason: HOSPADM

## 2022-12-27 RX ORDER — RIVAROXABAN 10 MG/1
10 TABLET, FILM COATED ORAL EVERY 24 HOURS
Qty: 18 TABLET | Refills: 0 | Status: SHIPPED | OUTPATIENT
Start: 2022-12-27

## 2022-12-27 RX ADMIN — DOXEPIN HYDROCHLORIDE 40 MG: 10 CAPSULE ORAL at 21:25

## 2022-12-27 RX ADMIN — OXYCODONE AND ACETAMINOPHEN 2 TABLET: 5; 325 TABLET ORAL at 08:31

## 2022-12-27 RX ADMIN — OXYCODONE AND ACETAMINOPHEN 2 TABLET: 5; 325 TABLET ORAL at 17:30

## 2022-12-27 RX ADMIN — BACLOFEN 10 MG: 10 TABLET ORAL at 22:59

## 2022-12-27 RX ADMIN — ATORVASTATIN CALCIUM 40 MG: 40 TABLET, FILM COATED ORAL at 21:25

## 2022-12-27 RX ADMIN — MICONAZOLE NITRATE: 2 POWDER TOPICAL at 21:26

## 2022-12-27 RX ADMIN — SODIUM CHLORIDE, PRESERVATIVE FREE 10 ML: 5 INJECTION INTRAVENOUS at 21:27

## 2022-12-27 RX ADMIN — FENOFIBRATE 160 MG: 160 TABLET ORAL at 08:26

## 2022-12-27 RX ADMIN — LOSARTAN POTASSIUM 25 MG: 25 TABLET, FILM COATED ORAL at 08:26

## 2022-12-27 RX ADMIN — Medication 3 MG: at 21:25

## 2022-12-27 RX ADMIN — OXYCODONE AND ACETAMINOPHEN 2 TABLET: 5; 325 TABLET ORAL at 03:46

## 2022-12-27 RX ADMIN — OXYCODONE AND ACETAMINOPHEN 2 TABLET: 5; 325 TABLET ORAL at 12:52

## 2022-12-27 RX ADMIN — SODIUM CHLORIDE: 9 INJECTION, SOLUTION INTRAVENOUS at 13:27

## 2022-12-27 RX ADMIN — Medication 1 TABLET: at 08:26

## 2022-12-27 RX ADMIN — CEFAZOLIN 2000 MG: 10 INJECTION, POWDER, FOR SOLUTION INTRAVENOUS at 06:56

## 2022-12-27 RX ADMIN — BACLOFEN 10 MG: 10 TABLET ORAL at 12:56

## 2022-12-27 RX ADMIN — CEFAZOLIN 2000 MG: 10 INJECTION, POWDER, FOR SOLUTION INTRAVENOUS at 16:08

## 2022-12-27 RX ADMIN — MICONAZOLE NITRATE: 2 POWDER TOPICAL at 08:34

## 2022-12-27 ASSESSMENT — PAIN DESCRIPTION - ONSET: ONSET: ON-GOING

## 2022-12-27 ASSESSMENT — PAIN SCALES - GENERAL
PAINLEVEL_OUTOF10: 8
PAINLEVEL_OUTOF10: 4
PAINLEVEL_OUTOF10: 8
PAINLEVEL_OUTOF10: 8
PAINLEVEL_OUTOF10: 4
PAINLEVEL_OUTOF10: 8
PAINLEVEL_OUTOF10: 8
PAINLEVEL_OUTOF10: 4

## 2022-12-27 ASSESSMENT — PAIN DESCRIPTION - PAIN TYPE
TYPE: ACUTE PAIN
TYPE: SURGICAL PAIN
TYPE: ACUTE PAIN

## 2022-12-27 ASSESSMENT — PAIN - FUNCTIONAL ASSESSMENT: PAIN_FUNCTIONAL_ASSESSMENT: PREVENTS OR INTERFERES SOME ACTIVE ACTIVITIES AND ADLS

## 2022-12-27 ASSESSMENT — PAIN DESCRIPTION - ORIENTATION
ORIENTATION: RIGHT
ORIENTATION: RIGHT;LEFT
ORIENTATION: RIGHT

## 2022-12-27 ASSESSMENT — PAIN DESCRIPTION - FREQUENCY
FREQUENCY: INTERMITTENT
FREQUENCY: INTERMITTENT
FREQUENCY: CONTINUOUS

## 2022-12-27 ASSESSMENT — PAIN DESCRIPTION - LOCATION
LOCATION: LEG
LOCATION: BUTTOCKS
LOCATION: LEG

## 2022-12-27 ASSESSMENT — PAIN DESCRIPTION - DESCRIPTORS
DESCRIPTORS: SPASM
DESCRIPTORS: SPASM

## 2022-12-27 NOTE — PROGRESS NOTES
Progress note: Infectious diseases    Patient - Yamile Carver,  Age - 60 y.o.    - 1960      Room Number - 7K-10/010-A   MRN -  [id number]   Acct # - [de-identified]  Date of Admission -  2022  5:45 AM    SUBJECTIVE:   No new complaints. OBJECTIVE   VITALS    height is 5' 7\" (1.702 m) and weight is 160 lb (72.6 kg). Her oral temperature is 98.1 °F (36.7 °C). Her blood pressure is 118/68 and her pulse is 109 (abnormal). Her respiration is 20 and oxygen saturation is 100%.        Wt Readings from Last 3 Encounters:   22 160 lb (72.6 kg)   22 158 lb (71.7 kg)   22 158 lb (71.7 kg)       I/O (24 Hours)    Intake/Output Summary (Last 24 hours) at 2022 0947  Last data filed at 2022 3001  Gross per 24 hour   Intake 6064.74 ml   Output 900 ml   Net 5164.74 ml       General Appearance  Awake, alert, oriented,  not  In acute distress  HEENT - normocephalic, atraumatic, slightly pale conjunctiva,  anicteric sclera  Neck - Supple, no mass  Lungs -  Bilateral  air entry, no rhonchi, no wheeze  Cardiovascular - Heart sounds are normal.    Abdomen - soft, not distended, nontender,   Neurologic -oriented  Skin - No bruising or bleeding  Extremities -dressed with right knee [postsurgical]  MEDICATIONS:      miconazole   Topical BID    ceFAZolin  2,000 mg IntraVENous Q8H    atorvastatin  40 mg Oral Nightly    doxepin  40 mg Oral Nightly    fenofibrate  160 mg Oral Daily    losartan  25 mg Oral Daily    melatonin  3 mg Oral Nightly    multivitamin  1 tablet Oral Daily    rivaroxaban  10 mg Oral Q24H    sodium chloride flush  5-40 mL IntraVENous 2 times per day    sennosides-docusate sodium  1 tablet Oral BID      sodium chloride      sodium chloride 20 mL/hr at 22 0633     baclofen, sodium chloride flush, sodium chloride, ondansetron **OR** ondansetron, acetaminophen, morphine **OR** morphine, magnesium hydroxide, sodium phosphate, oxyCODONE-acetaminophen, oxyCODONE-acetaminophen      LABS:     CBC:   Recent Labs     12/25/22  0517 12/26/22  0808 12/27/22  0513   WBC 6.1  --   --    HGB 9.3* 8.5* 7.2*     --   --      CULTURES:   UA: No results for input(s): SPECGRAV, PHUR, COLORU, CLARITYU, MUCUS, PROTEINU, BLOODU, RBCUA, WBCUA, BACTERIA, NITRU, GLUCOSEU, BILIRUBINUR, UROBILINOGEN, KETUA, LABCAST, LABCASTTY, AMORPHOS in the last 72 hours. Invalid input(s): CRYSTALS  Micro:   Lab Results   Component Value Date/Time    BC No growth-preliminary No growth 06/10/2022 03:00 PM    BC No growth-preliminary No growth 06/10/2022 03:00 PM       Problem list of patient:     Patient Active Problem List   Diagnosis Code    Diabetes mellitus type 2, noninsulin dependent ([facility] [address] E11.9    Hyperlipemia E78.5    THORNTON (nonalcoholic steatohepatitis) K75.81    Obesity E66.9    Tobacco abuse Z72.0    Vitamin D deficiency E55.9    Seborrheic keratosis L82.1    Dyshidrotic eczema L30.1    Displaced articular fracture of head of right femur, sequela S72.061S    Neida-prosthetic supracondylar fracture of femur M97. 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 Arrange PICC line and placement to an extended care facility.       June Claudio MD, MD, FACP 12/27/2022 9:47 AM

## 2022-12-27 NOTE — PROGRESS NOTES
Orthopaedic Progress Note      SUBJECTIVE   Ms. Glory Toro is post op day # 5    Patient s/p Stage I revision right knee with antibiotic spacer. Global RLE pain. Incision without drainage, bandage intact. No new ortho concerns. Infectious disease helping with antibiotic therapy pending culture results. Resting in bed on exam today      OBJECTIVE      Physical    VITALS:  BP (!) 96/51   Pulse 99   Temp 98.1 °F (36.7 °C) (Oral)   Resp 16   Ht 5' 7\" (1.702 m)   Wt 160 lb (72.6 kg)   SpO2 96%   BMI 25.06 kg/m²   I/O last 3 completed shifts: In: 6266 [P.O.:800; I.V.:5466]  Out: 2150 [Urine:2150]    8/10 pain  Gen: alert and oriented, resting today  Head: normorcephalic, atraumatic  Resp: unlabored, room air  Pelvis: stable  RLE:  Dressing clean, dry, and intact. No active drainage from incision. ROM limited secondary to spacer. NVI. Dorsalis pedal and posterior tibialis pulses strong and regular.       Data  CBC:   Lab Results   Component Value Date/Time    WBC 6.1 12/25/2022 05:17 AM    HGB 7.2 12/27/2022 05:13 AM     12/25/2022 05:17 AM     BMP:    Lab Results   Component Value Date/Time     12/23/2022 06:12 AM    K 4.1 12/23/2022 06:12 AM    K 4.6 05/09/2022 01:05 PM     12/23/2022 06:12 AM    CO2 22 12/23/2022 06:12 AM    BUN 7 12/23/2022 06:12 AM    CREATININE 0.4 12/23/2022 06:12 AM    CALCIUM 8.1 12/23/2022 06:12 AM    GLUCOSE 66 12/23/2022 06:12 AM    GLUCOSE 113 12/10/2020 10:01 AM     Uric Acid:  No components found for: URIC  PT/INR:    Lab Results   Component Value Date/Time    PROTIME 11.0 10/05/2016 03:39 PM    INR 1.44 12/01/2022 03:59 PM     Troponin:  No results found for: TROPONINI  Urine Culture:  No components found for: CURINE      Current Inpatient Medications    Current Facility-Administered Medications: miconazole (MICOTIN) 2 % powder, , Topical, BID  ceFAZolin (ANCEF) 2000 mg in dextrose 5 % 50 mL IVPB, 2,000 mg, IntraVENous, Q8H  atorvastatin (LIPITOR) tablet 40 mg, 40 mg, Oral, Nightly  baclofen (LIORESAL) tablet 10 mg, 10 mg, Oral, Q6H PRN  doxepin (SINEQUAN) capsule 40 mg, 40 mg, Oral, Nightly  fenofibrate (TRIGLIDE) tablet 160 mg, 160 mg, Oral, Daily  losartan (COZAAR) tablet 25 mg, 25 mg, Oral, Daily  melatonin tablet 3 mg, 3 mg, Oral, Nightly  multivitamin 1 tablet, 1 tablet, Oral, Daily  rivaroxaban (XARELTO) tablet 10 mg, 10 mg, Oral, Q24H  sodium chloride flush 0.9 % injection 5-40 mL, 5-40 mL, IntraVENous, 2 times per day  sodium chloride flush 0.9 % injection 5-40 mL, 5-40 mL, IntraVENous, PRN  0.9 % sodium chloride infusion, , IntraVENous, PRN  ondansetron (ZOFRAN-ODT) disintegrating tablet 4 mg, 4 mg, Oral, Q8H PRN **OR** ondansetron (ZOFRAN) injection 4 mg, 4 mg, IntraVENous, Q6H PRN  0.9 % sodium chloride infusion, , IntraVENous, Continuous  acetaminophen (TYLENOL) tablet 650 mg, 650 mg, Oral, Q4H PRN  morphine (PF) injection 2 mg, 2 mg, IntraVENous, Q2H PRN **OR** morphine injection 4 mg, 4 mg, IntraVENous, Q2H PRN  sennosides-docusate sodium (SENOKOT-S) 8.6-50 MG tablet 1 tablet, 1 tablet, Oral, BID  magnesium hydroxide (MILK OF MAGNESIA) 400 MG/5ML suspension 30 mL, 30 mL, Oral, Daily PRN  sodium phosphate (FLEET) rectal enema 1 enema, 1 enema, Rectal, Daily PRN  oxyCODONE-acetaminophen (PERCOCET) 5-325 MG per tablet 1 tablet, 1 tablet, Oral, Q4H PRN  oxyCODONE-acetaminophen (PERCOCET) 5-325 MG per tablet 2 tablet, 2 tablet, Oral, Q4H PRN        PLAN       1) Cont with current medical management  2)  WBAT RLE, activity as tolerated  3)  Ice and elevation  4)  ID evaluated, will place picc, IV ancef  5)  Anticipating ECF upon discharge, case management assisting  6)  dry dressing changes PRN  7)  PT OT as able  8)  trend hgb hct 7.2 today, will trend

## 2022-12-27 NOTE — PROGRESS NOTES
[facility] ORTHOPEDICS 7K  Occupational Therapy  Daily Note  Time:   Time In: 1012  Time Out: 1039  Timed Code Treatment Minutes: 27 Minutes  Minutes: 27          Date: 2022  Patient Name: Kaycee Varela,   Gender: female      Room: [facility]10/010-A  MRN: [id number]  : 1960  (62 y.o.)  Referring Practitioner: CRISTAL Dotson  Diagnosis: Infection of total right knee  Additional Pertinent Hx: STAGE 1 RIGHT KNEE with insertion of antibiotic spacer  by Dr Fischer; extensive knee history with several surgeries prior to this    Restrictions/Precautions:  Restrictions/Precautions: Fall Risk, Weight Bearing, General Precautions  Right Lower Extremity Weight Bearing: Weight Bearing As Tolerated  Position Activity Restriction  Other position/activity restrictions: B LE contractures     SUBJECTIVE: Pt laying in bed upon arrival, pt agreeable to OT session, RN Gave verbal approval for session     PAIN: 0/10:     Vitals: Nurse checked vitals prior to session    COGNITION: Impulsive    ADL:   Grooming: Minimal Assistance.  For thoroughness with combing hair  Lower Extremity Dressing: Maximum Assistance.  For donning brief and socks supine in bed  Toileting: Maximum Assistance.  Due to pt incontinent of stool with brief applied supine in bed    BALANCE:  Sitting Balance:  Supervision. In preparation for functional t/f    BED MOBILITY:  Rolling to Left: Minimal Assistance with rotating at the hips  Rolling to Right: Minimal Assistance with rotating at the hips  Supine to Sit: Stand By Assistance with the HOB elevated and extended time needed per patient request to get to the EOB    TRANSFERS:  Squat Pivot: Minimal Assistance. From the EOB to the w/c with poor safety due to pt attempting to slide over to the chair, with education on proper technique and hand placement    ASSESSMENT:     Activity Tolerance:  Patient tolerance of  treatment: good.       Discharge Recommendations: ECF with OT  Equipment  Recommendations: Equipment Needed: No  Plan: Times Per Week: 6x  Current Treatment Recommendations: Strengthening, Balance training, Functional mobility training, Endurance training, Patient/Caregiver education & training, Return to work related activity, Self-Care / ADL, Home management training, Safety education & training    Patient Education  Patient Education: ADL's    Goals  Short Term Goals  Time Frame for Short Term Goals: by discharge  Short Term Goal 1: Pt will complete sit pivot t/fs with no > than CGA and min VC for technique to increase indep with ADLs  Short Term Goal 2: Pt will complete LB ADLs with Min A and adaptive techniques prn to increase indep with dressing  Short Term Goal 3: Pt will complete ADL item retrieval from w/c level with Mod I and min safety cues for adaptations to increase indep with self care routine    Following session, patient left in safe position with all fall risk precautions in place.

## 2022-12-27 NOTE — PLAN OF CARE
Problem: Discharge Planning  Goal: Discharge to home or other facility with appropriate resources  12/27/2022 1115 by Charly Li LPN  Outcome: Progressing  Flowsheets (Taken 12/27/2022 1115)  Discharge to home or other facility with appropriate resources:   Identify barriers to discharge with patient and caregiver   Identify discharge learning needs (meds, wound care, etc)   Arrange for needed discharge resources and transportation as appropriate     Problem: Chronic Conditions and Co-morbidities  Goal: Patient's chronic conditions and co-morbidity symptoms are monitored and maintained or improved  12/27/2022 1115 by Charly Li LPN  Outcome: Progressing  Flowsheets (Taken 12/27/2022 1115)  Care Plan - Patient's Chronic Conditions and Co-Morbidity Symptoms are Monitored and Maintained or Improved:   Monitor and assess patient's chronic conditions and comorbid symptoms for stability, deterioration, or improvement   Collaborate with multidisciplinary team to address chronic and comorbid conditions and prevent exacerbation or deterioration     Problem: Pain  Goal: Verbalizes/displays adequate comfort level or baseline comfort level  12/27/2022 1115 by Charly Li LPN  Outcome: Progressing  Flowsheets (Taken 12/27/2022 1115)  Verbalizes/displays adequate comfort level or baseline comfort level:   Encourage patient to monitor pain and request assistance   Administer analgesics based on type and severity of pain and evaluate response   Assess pain using appropriate pain scale     Problem: Safety - Adult  Goal: Free from fall injury  12/27/2022 1115 by Charly Li LPN  Outcome: Progressing  Flowsheets (Taken 12/27/2022 1115)  Free From Fall Injury: Instruct family/caregiver on patient safety     Problem: ABCDS Injury Assessment  Goal: Absence of physical injury  12/27/2022 1115 by Charly Li LPN  Outcome: Progressing  Flowsheets (Taken 12/27/2022 1115)  Absence of Physical Injury: Implement safety measures based on patient assessment     Problem: Skin/Tissue Integrity - Adult  Goal: Incisions, wounds, or drain sites healing without S/S of infection  12/27/2022 1115 by Scott Arrington LPN  Outcome: Progressing  Flowsheets (Taken 12/27/2022 1115)  Incisions, Wounds, or Drain Sites Healing Without Sign and Symptoms of Infection:   TWICE DAILY: Assess and document skin integrity   TWICE DAILY: Assess and document dressing/incision, wound bed, drain sites and surrounding tissue     Problem: Musculoskeletal - Adult  Goal: Return ADL status to a safe level of function  12/27/2022 1115 by Scott Arrington LPN  Outcome: Progressing  Flowsheets (Taken 12/27/2022 1115)  Return ADL Status to a Safe Level of Function:   Administer medication as ordered   Obtain physical therapy/occupational therapy consults as needed   Assess activities of daily living deficits and provide assistive devices as needed     Problem: Genitourinary - Adult  Goal: Absence of urinary retention  12/27/2022 1115 by Scott Arrington LPN  Outcome: Progressing  Flowsheets (Taken 12/27/2022 1115)  Absence of urinary retention:   Assess patients ability to void and empty bladder   Monitor intake/output and perform bladder scan as needed   Care plan reviewed with patient. Patient verbalize understanding of the plan of care and contribute to goal setting.

## 2022-12-27 NOTE — PROGRESS NOTES
[facility]  INPATIENT PHYSICAL THERAPY  DAILY NOTE  [facility] ORTHOPEDICS 7K - 7K-10/010-A      Time In: 3038  Time Out: 1205  Timed Code Treatment Minutes: 45 Minutes  Minutes: 38          Date: 2022  Patient Name: Abdullahi Cordero,  Gender:  female        MRN: [id number]  : 1960  (60 y.o.)     Referring Practitioner: CRISTAL Llanos  Diagnosis: Infection of total right knee replacement, initial encounter  Additional Pertinent Hx: STAGE 1 RIGHT KNEE with insertion of antibiotic spacer  by Dr Samaria Quigley; extensive knee history with several surgeries prior to this     Prior Level of Function:  Lives With: Spouse  Type of Home: House  Home Layout: One level  Home Access: Ramped entrance  Home Equipment: Walker, rolling, Wheelchair-manual   Bathroom Shower/Tub: Tub/Shower unit  Bathroom Toilet: Bedside commode    ADL Assistance: Independent  Homemaking Assistance: Independent  Ambulation Assistance: Non-ambulatory  Transfer Assistance: Independent  Active : Yes  IADL Comments:  helped her as needed, pt reports he is a \"worry wart\" and only wants her to transfer with him present. Pt's  has been doing most IADLs at home.   Additional Comments: Pt has been at SNF for therapy and recently returned home, she reports she was able to scoot from EOB to w/c but mostly remained in bed    Restrictions/Precautions:  Restrictions/Precautions: Fall Risk, Weight Bearing, General Precautions  Right Lower Extremity Weight Bearing: Weight Bearing As Tolerated  Position Activity Restriction  Other position/activity restrictions: B LE contractures       SUBJECTIVE: pt up in w/c on arrival and she was wanting to return to bed pt also reported that she had a BM while sitting in the w/c- took extensive time for cleaning up - pt requested to take extra time to transition from positions     PAIN: at right LE and c/o of sore buttocks applied cream to buttocks and placed pillows under right LE while in bed     Vitals: Vitals not assessed per clinical judgement, see nursing flowsheet    OBJECTIVE:  Bed Mobility:  Rolling to Left: Minimal Assistance   Rolling to Right: Maximum Assistance, and unable to complete full roll, pt needed assist of a second person to support left LE- had to complete multiple rolls for franklin care    Sit to Supine: Moderate Assistance, and once back in bed took extra time to reposition and to get comfortable- pt insisting that MD wants 2 pillows placed under her right LE      Transfers:  Sit Pivot: Moderate Assistance, going to the left, pt took extra time to complete in a few scoots - pt was 1/2 way through the transfer and asked for a break - she needed cues to position w/c and assist at left break    Balance:  Sitting edge of bed following transfer with SBA however she required assist from therapist to support her right LE     Exercise:  None     Functional Outcome Measures: Completed  AM-PAC Inpatient Mobility without Stair Climbing Raw Score : 8  AM-PAC Inpatient without Stair Climbing T-Scale Score : 30.65    ASSESSMENT:  Assessment:  pt was incont of BM on arrival and needed total assist for clean up, pt required much assist with transfers and mobility - pt may benefit from a therapy stay prior to return home   Activity Tolerance:  Patient tolerance of  treatment: fair.         Equipment Recommendations:Equipment Needed: No  Discharge Recommendations: Subacte/Skilled Nursing Facility and vs home with 24 hour assist and home health therapy   Plan: Current Treatment Recommendations: Strengthening, Balance training, Functional mobility training, Transfer training, Endurance training, Safety education & training  General Plan:  (5x O)    Patient Education  Patient Education: Plan of Care, encouraged pt to call for help when she needs to use bathroom     Goals:  Patient Goals : none stated  Short Term Goals  Time Frame for Short Term Goals: by discharge  Short Term Goal 1: bed mobility with HOB flat, no rails, mod I for increased functional ind  Short Term Goal 2: sit pivot with LRD mod I for safe transfers  Short Term Goal 3: w/c mobility with mod I for safe household navigation  Long Term Goals  Time Frame for Long Term Goals : NA d/t short ELOS    Following session, patient left in safe position with all fall risk precautions in place.

## 2022-12-27 NOTE — PROGRESS NOTES
PICC Procedure Note    Kaycee Varela   Admitted- 12/22/2022  5:45 AM  Admission diagnosis- Infection of total right knee replacement, subsequent encounter [T84.53XD]  Post-operative state [Z98.890]      Attending Physician- Rigo Fischer MD  Ordering Physician- Dr Escalona  Indication for Insertion: Antibiotic Therapy    Catheter Insertion Date- 12/27/2022   Lot Number- [id number]  Gauge-4  Lumen-single    Insertion Site- KIANA Basilic  Vein Diameter- 1.55 cm  Catheter Length- 38 cm  Internal Length- 38 cm  Exposed Catheter Length- 0cm   Upper Arm Circumference- 30cm  Tip Confirmation System Bundle met- No: chest xray needed  If X-ray required, Tip Location- SVC  Radiologist- Dr Pope    PICC insertion successful- yes  Ultrasound- yes    Okay To Use PICC- yes    Electronically signed by Juani Parisi, RN, RN on 12/27/2022 at 2:16 PM

## 2022-12-27 NOTE — PROGRESS NOTES
During 0000 vitals and assessment it was observed that the patient had placed a pillow under her right knee. This nurse explained to the patient that following knee surgery, it is best to keep pillows out from under her knee because it can cause it to heal incorrectly. Patient states that her doctors told her she needed to have a pillow in place and that I couldn't remove it. Patient not receptive to education provided.

## 2022-12-27 NOTE — PLAN OF CARE
Problem: Discharge Planning  Goal: Discharge to home or other facility with appropriate resources  12/27/2022 0245 by Delmis Treviño RN  Outcome: Progressing  Flowsheets (Taken 12/27/2022 0245)  Discharge to home or other facility with appropriate resources:   Identify barriers to discharge with patient and caregiver   Arrange for needed discharge resources and transportation as appropriate   Identify discharge learning needs (meds, wound care, etc)   Refer to discharge planning if patient needs post-hospital services based on physician order or complex needs related to functional status, cognitive ability or social support system  Note: SNF placement anticipated at discharge for IV ATB management.       Problem: Chronic Conditions and Co-morbidities  Goal: Patient's chronic conditions and co-morbidity symptoms are monitored and maintained or improved  12/27/2022 0245 by Delmis Treviño RN  Outcome: Progressing  Flowsheets (Taken 12/27/2022 0245)  Care Plan - Patient's Chronic Conditions and Co-Morbidity Symptoms are Monitored and Maintained or Improved:   Monitor and assess patient's chronic conditions and comorbid symptoms for stability, deterioration, or improvement   Update acute care plan with appropriate goals if chronic or comorbid symptoms are exacerbated and prevent overall improvement and discharge   Collaborate with multidisciplinary team to address chronic and comorbid conditions and prevent exacerbation or deterioration     Problem: Pain  Goal: Verbalizes/displays adequate comfort level or baseline comfort level  12/27/2022 0245 by Delmis Treviño RN  Outcome: Progressing  Flowsheets (Taken 12/27/2022 0245)  Verbalizes/displays adequate comfort level or baseline comfort level:   Encourage patient to monitor pain and request assistance   Assess pain using appropriate pain scale   Administer analgesics based on type and severity of pain and evaluate response   Implement non-pharmacological measures as appropriate and evaluate response     Problem: Safety - Adult  Goal: Free from fall injury  12/27/2022 0245 by Toyin Hinton RN  Outcome: Progressing  Flowsheets  Taken 12/27/2022 0245  Free From Fall Injury: Instruct family/caregiver on patient safety  Taken 12/26/2022 2222  Free From Fall Injury: Instruct family/caregiver on patient safety     Problem: Skin/Tissue Integrity  Goal: Absence of new skin breakdown  Description: 1. Monitor for areas of redness and/or skin breakdown  2. Assess vascular access sites hourly  3. Every 4-6 hours minimum:  Change oxygen saturation probe site  4. Every 4-6 hours:  If on nasal continuous positive airway pressure, respiratory therapy assess nares and determine need for appliance change or resting period. 12/27/2022 0245 by Toyin Hinton RN  Outcome: Progressing  Note: See flowsheets for skin integrity documentation.       Problem: ABCDS Injury Assessment  Goal: Absence of physical injury  Outcome: Progressing  Flowsheets (Taken 12/27/2022 0245)  Absence of Physical Injury: Implement safety measures based on patient assessment     Problem: Skin/Tissue Integrity - Adult  Goal: Skin integrity remains intact  Outcome: Progressing  Flowsheets  Taken 12/27/2022 0245  Skin Integrity Remains Intact:   Assess vascular access sites hourly   Monitor for areas of redness and/or skin breakdown  Taken 12/26/2022 2222  Skin Integrity Remains Intact:   Monitor for areas of redness and/or skin breakdown   Assess vascular access sites hourly     Problem: Skin/Tissue Integrity - Adult  Goal: Incisions, wounds, or drain sites healing without S/S of infection  Outcome: Progressing  Flowsheets (Taken 12/27/2022 0245)  Incisions, Wounds, or Drain Sites Healing Without Sign and Symptoms of Infection:   ADMISSION and DAILY: Assess and document risk factors for pressure ulcer development   TWICE DAILY: Assess and document skin integrity   TWICE DAILY: Assess and document dressing/incision, wound bed, drain sites and surrounding tissue   Implement wound care per orders   Initiate pressure ulcer prevention bundle as indicated     Problem: Musculoskeletal - Adult  Goal: Return mobility to safest level of function  Outcome: Progressing  Flowsheets (Taken 12/27/2022 0245)  Return Mobility to Safest Level of Function:   Assess patient stability and activity tolerance for standing, transferring and ambulating with or without assistive devices   Assist with transfers and ambulation using safe patient handling equipment as needed   Ensure adequate protection for wounds/incisions during mobilization   Obtain physical therapy/occupational therapy consults as needed   Instruct patient/family in ordered activity level     Problem: Musculoskeletal - Adult  Goal: Maintain proper alignment of affected body part  Outcome: Progressing  Flowsheets (Taken 12/27/2022 0245)  Maintain proper alignment of affected body part: Support and protect limb and body alignment per provider's orders  Note: Patient instructed to removed pillow support from under knee during post op period. Patient disregards education provided.       Problem: Musculoskeletal - Adult  Goal: Return ADL status to a safe level of function  Outcome: Progressing  Flowsheets (Taken 12/27/2022 0245)  Return ADL Status to a Safe Level of Function:   Administer medication as ordered   Assess activities of daily living deficits and provide assistive devices as needed   Obtain physical therapy/occupational therapy consults as needed   Assist and instruct patient to increase activity and self care as tolerated     Problem: Gastrointestinal - Adult  Goal: Minimal or absence of nausea and vomiting  Outcome: Progressing  Flowsheets (Taken 12/27/2022 0245)  Minimal or absence of nausea and vomiting:   Administer IV fluids as ordered to ensure adequate hydration   Maintain NPO status until nausea and vomiting are resolved   Provide nonpharmacologic comfort measures as appropriate   Administer ordered antiemetic medications as needed  Note: Patient reports episode of nausea/vomiting on the previous shift. PRN Zofran available as needed. Problem: Genitourinary - Adult  Goal: Absence of urinary retention  Outcome: Progressing  Flowsheets (Taken 12/27/2022 0245)  Absence of urinary retention:   Assess patients ability to void and empty bladder   Monitor intake/output and perform bladder scan as needed  Note: External catheter in place at this time. Patient incontinent X2. Care plan reviewed with patient . Patient verbalizes understanding of the plan of care and contribute to goal setting.

## 2022-12-28 LAB
HCT VFR BLD CALC: 23.2 % (ref 37–47)
HEMOGLOBIN: 7 GM/DL (ref 12–16)

## 2022-12-28 PROCEDURE — 2580000003 HC RX 258: Performed by: INTERNAL MEDICINE

## 2022-12-28 PROCEDURE — 97530 THERAPEUTIC ACTIVITIES: CPT

## 2022-12-28 PROCEDURE — 6370000000 HC RX 637 (ALT 250 FOR IP): Performed by: PHYSICIAN ASSISTANT

## 2022-12-28 PROCEDURE — 85018 HEMOGLOBIN: CPT

## 2022-12-28 PROCEDURE — 6360000002 HC RX W HCPCS: Performed by: INTERNAL MEDICINE

## 2022-12-28 PROCEDURE — 97535 SELF CARE MNGMENT TRAINING: CPT

## 2022-12-28 PROCEDURE — 1200000000 HC SEMI PRIVATE

## 2022-12-28 PROCEDURE — 97110 THERAPEUTIC EXERCISES: CPT

## 2022-12-28 PROCEDURE — 6360000002 HC RX W HCPCS: Performed by: PHYSICIAN ASSISTANT

## 2022-12-28 PROCEDURE — 36415 COLL VENOUS BLD VENIPUNCTURE: CPT

## 2022-12-28 PROCEDURE — 2580000003 HC RX 258: Performed by: PHYSICIAN ASSISTANT

## 2022-12-28 PROCEDURE — 85014 HEMATOCRIT: CPT

## 2022-12-28 RX ADMIN — ACETAMINOPHEN 650 MG: 325 TABLET ORAL at 20:19

## 2022-12-28 RX ADMIN — RIVAROXABAN 10 MG: 10 TABLET, FILM COATED ORAL at 00:04

## 2022-12-28 RX ADMIN — CEFAZOLIN 2000 MG: 10 INJECTION, POWDER, FOR SOLUTION INTRAVENOUS at 08:01

## 2022-12-28 RX ADMIN — MICONAZOLE NITRATE: 2 POWDER TOPICAL at 20:21

## 2022-12-28 RX ADMIN — Medication 3 MG: at 20:19

## 2022-12-28 RX ADMIN — BACLOFEN 10 MG: 10 TABLET ORAL at 08:08

## 2022-12-28 RX ADMIN — OXYCODONE AND ACETAMINOPHEN 2 TABLET: 5; 325 TABLET ORAL at 01:50

## 2022-12-28 RX ADMIN — MICONAZOLE NITRATE: 2 POWDER TOPICAL at 08:16

## 2022-12-28 RX ADMIN — FENOFIBRATE 160 MG: 160 TABLET ORAL at 08:08

## 2022-12-28 RX ADMIN — DOXEPIN HYDROCHLORIDE 40 MG: 10 CAPSULE ORAL at 20:18

## 2022-12-28 RX ADMIN — MORPHINE SULFATE 2 MG: 2 INJECTION, SOLUTION INTRAMUSCULAR; INTRAVENOUS at 15:41

## 2022-12-28 RX ADMIN — OXYCODONE AND ACETAMINOPHEN 2 TABLET: 5; 325 TABLET ORAL at 21:39

## 2022-12-28 RX ADMIN — Medication 1 TABLET: at 08:08

## 2022-12-28 RX ADMIN — MORPHINE SULFATE 4 MG: 4 INJECTION, SOLUTION INTRAMUSCULAR; INTRAVENOUS at 08:13

## 2022-12-28 RX ADMIN — ONDANSETRON 4 MG: 2 INJECTION INTRAMUSCULAR; INTRAVENOUS at 15:38

## 2022-12-28 RX ADMIN — CEFAZOLIN 2000 MG: 10 INJECTION, POWDER, FOR SOLUTION INTRAVENOUS at 23:36

## 2022-12-28 RX ADMIN — ATORVASTATIN CALCIUM 40 MG: 40 TABLET, FILM COATED ORAL at 20:19

## 2022-12-28 RX ADMIN — LOSARTAN POTASSIUM 25 MG: 25 TABLET, FILM COATED ORAL at 08:09

## 2022-12-28 RX ADMIN — SENNOSIDES AND DOCUSATE SODIUM 1 TABLET: 50; 8.6 TABLET ORAL at 08:13

## 2022-12-28 RX ADMIN — SODIUM CHLORIDE: 9 INJECTION, SOLUTION INTRAVENOUS at 23:31

## 2022-12-28 RX ADMIN — RIVAROXABAN 10 MG: 10 TABLET, FILM COATED ORAL at 23:55

## 2022-12-28 RX ADMIN — OXYCODONE AND ACETAMINOPHEN 2 TABLET: 5; 325 TABLET ORAL at 13:59

## 2022-12-28 RX ADMIN — ONDANSETRON 4 MG: 4 TABLET, ORALLY DISINTEGRATING ORAL at 01:51

## 2022-12-28 RX ADMIN — CEFAZOLIN 2000 MG: 10 INJECTION, POWDER, FOR SOLUTION INTRAVENOUS at 00:04

## 2022-12-28 RX ADMIN — CEFAZOLIN 2000 MG: 10 INJECTION, POWDER, FOR SOLUTION INTRAVENOUS at 15:13

## 2022-12-28 RX ADMIN — BACLOFEN 10 MG: 10 TABLET ORAL at 20:19

## 2022-12-28 RX ADMIN — SODIUM CHLORIDE: 9 INJECTION, SOLUTION INTRAVENOUS at 08:04

## 2022-12-28 RX ADMIN — OXYCODONE AND ACETAMINOPHEN 2 TABLET: 5; 325 TABLET ORAL at 10:12

## 2022-12-28 ASSESSMENT — PAIN DESCRIPTION - LOCATION
LOCATION: ABDOMEN
LOCATION: KNEE
LOCATION: LEG
LOCATION: LEG
LOCATION: FOOT
LOCATION: KNEE
LOCATION: LEG
LOCATION: LEG

## 2022-12-28 ASSESSMENT — PAIN SCALES - GENERAL
PAINLEVEL_OUTOF10: 10
PAINLEVEL_OUTOF10: 10
PAINLEVEL_OUTOF10: 5
PAINLEVEL_OUTOF10: 8
PAINLEVEL_OUTOF10: 8
PAINLEVEL_OUTOF10: 3
PAINLEVEL_OUTOF10: 10
PAINLEVEL_OUTOF10: 9
PAINLEVEL_OUTOF10: 3
PAINLEVEL_OUTOF10: 5

## 2022-12-28 ASSESSMENT — PAIN DESCRIPTION - FREQUENCY
FREQUENCY: CONTINUOUS
FREQUENCY: CONTINUOUS

## 2022-12-28 ASSESSMENT — PAIN DESCRIPTION - PAIN TYPE
TYPE: ACUTE PAIN

## 2022-12-28 ASSESSMENT — PAIN DESCRIPTION - ONSET
ONSET: ON-GOING
ONSET: PROGRESSIVE

## 2022-12-28 ASSESSMENT — PAIN DESCRIPTION - ORIENTATION
ORIENTATION: LOWER
ORIENTATION: RIGHT
ORIENTATION: RIGHT;LEFT
ORIENTATION: RIGHT
ORIENTATION: RIGHT;LEFT
ORIENTATION: RIGHT

## 2022-12-28 ASSESSMENT — PAIN DESCRIPTION - DESCRIPTORS
DESCRIPTORS: THROBBING
DESCRIPTORS: CRAMPING
DESCRIPTORS: THROBBING
DESCRIPTORS: ACHING

## 2022-12-28 ASSESSMENT — PAIN - FUNCTIONAL ASSESSMENT: PAIN_FUNCTIONAL_ASSESSMENT: PREVENTS OR INTERFERES SOME ACTIVE ACTIVITIES AND ADLS

## 2022-12-28 NOTE — PROGRESS NOTES
Orthopaedic Progress Note      SUBJECTIVE   Ms. Naomy Montenegro is post op day # 6    Patient s/p Stage I revision right knee with antibiotic spacer. Global RLE pain, modest improvement. Incision without drainage, bandage intact. No new ortho concerns. Infectious disease helping with antibiotic therapy pending culture results. Resting in bed on exam today      OBJECTIVE      Physical    VITALS:  BP (!) 104/52   Pulse 99   Temp 98.3 °F (36.8 °C) (Oral)   Resp 18   Ht 5' 7\" (1.702 m)   Wt 160 lb (72.6 kg)   SpO2 94%   BMI 25.06 kg/m²   I/O last 3 completed shifts: In: 6064.7 [P.O.:1100; I.V.:4871.9; IV Piggyback:92.8]  Out: 900 [Urine:900]    8/10 pain  Gen: alert and oriented, resting today  Head: normorcephalic, atraumatic  Resp: unlabored, room air  Pelvis: stable  RLE:  Dressing clean, dry, and intact. No active drainage from incision. ROM limited secondary to spacer. NVI. Dorsalis pedal and posterior tibialis pulses strong and regular.       Data  CBC:   Lab Results   Component Value Date/Time    WBC 6.1 12/25/2022 05:17 AM    HGB 7.0 12/28/2022 03:33 AM     12/25/2022 05:17 AM     BMP:    Lab Results   Component Value Date/Time     12/23/2022 06:12 AM    K 4.1 12/23/2022 06:12 AM    K 4.6 05/09/2022 01:05 PM     12/23/2022 06:12 AM    CO2 22 12/23/2022 06:12 AM    BUN 7 12/23/2022 06:12 AM    CREATININE 0.4 12/23/2022 06:12 AM    CALCIUM 8.1 12/23/2022 06:12 AM    GLUCOSE 66 12/23/2022 06:12 AM    GLUCOSE 113 12/10/2020 10:01 AM     Uric Acid:  No components found for: URIC  PT/INR:    Lab Results   Component Value Date/Time    PROTIME 11.0 10/05/2016 03:39 PM    INR 1.44 12/01/2022 03:59 PM     Troponin:  No results found for: TROPONINI  Urine Culture:  No components found for: CURINE      Current Inpatient Medications    Current Facility-Administered Medications: miconazole (MICOTIN) 2 % powder, , Topical, BID  lidocaine PF 1 % injection 5 mL, 5 mL, IntraDERmal, Once  ceFAZolin (ANCEF) 2000 mg in dextrose 5 % 50 mL IVPB, 2,000 mg, IntraVENous, Q8H  atorvastatin (LIPITOR) tablet 40 mg, 40 mg, Oral, Nightly  baclofen (LIORESAL) tablet 10 mg, 10 mg, Oral, Q6H PRN  doxepin (SINEQUAN) capsule 40 mg, 40 mg, Oral, Nightly  fenofibrate (TRIGLIDE) tablet 160 mg, 160 mg, Oral, Daily  losartan (COZAAR) tablet 25 mg, 25 mg, Oral, Daily  melatonin tablet 3 mg, 3 mg, Oral, Nightly  multivitamin 1 tablet, 1 tablet, Oral, Daily  rivaroxaban (XARELTO) tablet 10 mg, 10 mg, Oral, Q24H  sodium chloride flush 0.9 % injection 5-40 mL, 5-40 mL, IntraVENous, 2 times per day  sodium chloride flush 0.9 % injection 5-40 mL, 5-40 mL, IntraVENous, PRN  0.9 % sodium chloride infusion, , IntraVENous, PRN  ondansetron (ZOFRAN-ODT) disintegrating tablet 4 mg, 4 mg, Oral, Q8H PRN **OR** ondansetron (ZOFRAN) injection 4 mg, 4 mg, IntraVENous, Q6H PRN  0.9 % sodium chloride infusion, , IntraVENous, Continuous  acetaminophen (TYLENOL) tablet 650 mg, 650 mg, Oral, Q4H PRN  morphine (PF) injection 2 mg, 2 mg, IntraVENous, Q2H PRN **OR** morphine injection 4 mg, 4 mg, IntraVENous, Q2H PRN  sennosides-docusate sodium (SENOKOT-S) 8.6-50 MG tablet 1 tablet, 1 tablet, Oral, BID  magnesium hydroxide (MILK OF MAGNESIA) 400 MG/5ML suspension 30 mL, 30 mL, Oral, Daily PRN  sodium phosphate (FLEET) rectal enema 1 enema, 1 enema, Rectal, Daily PRN  oxyCODONE-acetaminophen (PERCOCET) 5-325 MG per tablet 1 tablet, 1 tablet, Oral, Q4H PRN  oxyCODONE-acetaminophen (PERCOCET) 5-325 MG per tablet 2 tablet, 2 tablet, Oral, Q4H PRN        PLAN       1) Cont with current medical management  2)  WBAT RLE, activity as tolerated  3)  Ice and elevation  4)  ID evaluated, picc placed  5)  ECF upon discharge, case management assisting  6)  dry dressing changes PRN  7)  PT OT as able  8)  hgb hct 7.0/ 23.3 will trend  9)  stable for discharge from ortho standpoint, pending SNF placement

## 2022-12-28 NOTE — PROGRESS NOTES
Amado MACKENZIE. notified of the following \"This patient B/P is 102/50 (Manual). Pt c/o pain in the left leg rated at 8/10. Pt has PRN [place] and Morphin. I am concern about  her B/P reading since both meds can affect her B/P. Is there any other alternative meds to give for the pain? Please adivce. Thank you. \"

## 2022-12-28 NOTE — PROGRESS NOTES
[facility]  Occupational Therapy  Daily Note  Time:   Time In: 9274  Time Out: 1134  Timed Code Treatment Minutes: 23 Minutes  Minutes: 23          Date: 2022  Patient Name: Rachele Riddle,   Gender: female      Room: [facility]10/010-A  MRN: [id number]  : 1960  (60 y.o.)  Referring Practitioner: CRISTAL Reed  Diagnosis: Infection of total right knee  Additional Pertinent Hx: STAGE 1 RIGHT KNEE with insertion of antibiotic spacer  by Dr Tani Cuevas; extensive knee history with several surgeries prior to this    Restrictions/Precautions:  Restrictions/Precautions: Fall Risk, Weight Bearing, General Precautions  Right Lower Extremity Weight Bearing: Weight Bearing As Tolerated  Position Activity Restriction  Other position/activity restrictions: B LE contractures     SUBJECTIVE: Pt sitting at the EOB upon arrival, pt agreeable to OT session, RN gave verbal approval for session    PAIN: 4/10: RLE    Vitals: Nurse checked vitals prior to session    COGNITION: WFL    ADL:   Grooming: Stand By Assistance. With pt sitting at the EOB to brush teeth and comb hair . BALANCE:  Sitting Balance:  Stand By Assistance. With BUE release from the EOB with pt sitting for 12 minutes    BED MOBILITY:  Not Tested    TRANSFERS:  Squat Pivot: Minimal Assistance. From the EOB to the recliner with pt educated on safe transfer technique, and not attempting to sit on wheel of wleechair     ASSESSMENT:     Activity Tolerance:  Patient tolerance of  treatment: good.        Discharge Recommendations: Home with Home Health OT  Equipment Recommendations: Equipment Needed: No  Plan: Times Per Week: 6x  Current Treatment Recommendations: Strengthening, Balance training, Functional mobility training, Endurance training, Patient/Caregiver education & training, Return to work related activity, Self-Care / ADL, Home management training, Safety education & training    Patient Education  Patient Education: ADL's    Goals  Short Term Goals  Time Frame for Short Term Goals: by discharge  Short Term Goal 1: Pt will complete sit pivot t/fs with no > than CGA and min VC for technique to increase indep with ADLs  Short Term Goal 2: Pt will complete LB ADLs with Min A and adaptive techniques prn to increase indep with dressing  Short Term Goal 3: Pt will complete ADL item retrieval from w/c level with Mod I and min safety cues for adaptations to increase indep with self care routine    Following session, patient left in safe position with all fall risk precautions in place.

## 2022-12-28 NOTE — CARE COORDINATION
DISCHARGE PLANNING EVALUATION  12/28/22, 2:38 PM EST    Reason for Referral: ecf   Mental Status: alert, oriented   Decision Making:  makes own decisions   Family/Social/Home Environment: Marilee Strauss lives at home with her . She is mostly independent with her own care, family helps as needed with meals, housekeeping, transportation, personal care as needed   Current Services including food security, transportation and housekeeping: current with [facility]  Current Equipment: wheelchair   Payment Source:   Concerns or Barriers to Discharge: will need precert for ecf   Post-acute [facility]) provider list was provided to patient. Patient was informed of their freedom to choose [facility] provider. Discussed and offered to show the patient the relevant [facility] Providers quality and resource use measures on Medicare Compare web site via computer based on patient's goals of care and treatment preferences. Questions regarding selection process were answered. Teach Back Method used with patient  regarding care plan and discharge plan  Patient  verbalizes understanding of the plan of care and contributes to goal setting. Patient goals, treatment preferences and discharge plan: spoke with Marilee Strauss about discharge plan. She prefers to go home, but is aware that she will need ecf for q8 IV antibiotics. She is requesting [facility] ([facility] [place] [facility] accept insurance). [facility] is reviewing for possible admission.       Electronically signed by DYLAN Kern on 12/28/2022 at 2:38 PM

## 2022-12-28 NOTE — PROGRESS NOTES
[address] [address]  INPATIENT PHYSICAL THERAPY  DAILY NOTE  [facility] ORTHOPEDICS 7K - 7K-10/010-A      Time In: 1350  Time Out: 1417  Timed Code Treatment Minutes: 27 Minutes  Minutes: 27          Date: 2022  Patient Name: Kevan Vigil,  Gender:  female        MRN: [id number]  : 1960  (60 y.o.)     Referring Practitioner: CRISTAL Curiel  Diagnosis: Infection of total right knee replacement, initial encounter  Additional Pertinent Hx: STAGE 1 RIGHT KNEE with insertion of antibiotic spacer  by Dr Michelle Cordero; extensive knee history with several surgeries prior to this     Prior Level of Function:  Lives With: Spouse  Type of Home: House  Home Layout: One level  Home Access: Ramped entrance  Home Equipment: Walker, rolling, Wheelchair-manual   Bathroom Shower/Tub: Tub/Shower unit  Bathroom Toilet: Bedside commode    ADL Assistance: Independent  Homemaking Assistance: Independent  Ambulation Assistance: Non-ambulatory  Transfer Assistance: Independent  Active : Yes  IADL Comments:  helped her as needed, pt reports he is a \"worry wart\" and only wants her to transfer with him present. Pt's  has been doing most IADLs at home.   Additional Comments: Pt has been at SNF for therapy and recently returned home, she reports she was able to scoot from EOB to w/c but mostly remained in bed    Restrictions/Precautions:  Restrictions/Precautions: Fall Risk, Weight Bearing, General Precautions  Right Lower Extremity Weight Bearing: Weight Bearing As Tolerated  Position Activity Restriction  Other position/activity restrictions: B LE contractures       SUBJECTIVE: pt in w/c on arrival and ready to return to bed, pt was incont of urine while up in chair- discussed trying a slide board as she has used in the past but she declined - pt took extra time to complete all activity     PAIN: 8-9 at right LE in knee and then c/o of muscle spasms       Vitals: Vitals not assessed per clinical judgement, see nursing flowsheet    OBJECTIVE:  Bed Mobility:  Rolling to Left: Minimal Assistance, with rail   Rolling to Right: Maximum Assistance, and only able to roll part way    Sit to Supine: Minimal Assistance, then once in bed she needed max assist to get hips and shoulders repositioned in bed and dependent to boost up in bed- due to contracture pt unable to place her right LE down on the bed and needed to place 2 pillows under it    Scooting: Minimal Assistance, to scoot hips back into bed     Transfers:  Sit Pivot: Moderate Assistance, from w/c to bed a higher surface going to her left- initially pt requested therapist to support her right LE however she wasn't able to scoot w/o my assist- she completed in several scoots and would have to stop frequently mid transfer due to pain and spasms and would ask me to support her leg during these moments- pt may do better with a slide board- pt needed assist and cues for proper w/c set up prior to transfer      Exercise:  Patient was guided in 1 set(s) 10 reps of exercise to both lower extremities. Ankle pumps and Glut sets. Exercises were completed for increased independence with functional mobility. Functional Outcome Measures: Completed  AM-PAC Inpatient Mobility without Stair Climbing Raw Score : 8  AM-PAC Inpatient without Stair Climbing T-Scale Score : 30.65    ASSESSMENT:  Assessment:  pt cont to demonstrate decreased range and strength more so at right LE, she cont to require much assist for transfers and extra time to complete mobility, pt would benefit from cont skilled therapy   Activity Tolerance:  Patient tolerance of  treatment: fair.         Equipment Recommendations:Equipment Needed: No  Discharge Recommendations: Subacte/Skilled Nursing Facility  Plan: Current Treatment Recommendations: Strengthening, Balance training, Functional mobility training, Transfer training, Endurance training, Safety education & training  General Plan:  (5x O)    Patient Education  Patient Education: Plan of Care    Goals:  Patient Goals : none stated  Short Term Goals  Time Frame for Short Term Goals: by discharge  Short Term Goal 1: bed mobility with HOB flat, no rails, mod I for increased functional ind  Short Term Goal 2: sit pivot with LRD mod I for safe transfers  Short Term Goal 3: w/c mobility with mod I for safe household navigation  Long Term Goals  Time Frame for Long Term Goals : NA d/t short ELOS    Following session, patient left in safe position with all fall risk precautions in place.

## 2022-12-28 NOTE — PROGRESS NOTES
Progress note: Infectious diseases    Patient - Yamile Carver,  Age - 60 y.o.    - 1960      Room Number - 7K-10/010-A   MRN -  [id number]   Acct # - [de-identified]  Date of Admission -  2022  5:45 AM    SUBJECTIVE:   No new complaints. OBJECTIVE   VITALS    height is 5' 7\" (1.702 m) and weight is 160 lb (72.6 kg). Her oral temperature is 99 °F (37.2 °C). Her blood pressure is 101/47 (abnormal) and her pulse is 108 (abnormal). Her respiration is 18 and oxygen saturation is 100%.        Wt Readings from Last 3 Encounters:   22 160 lb (72.6 kg)   22 158 lb (71.7 kg)   22 158 lb (71.7 kg)       I/O (24 Hours)    Intake/Output Summary (Last 24 hours) at 2022 1744  Last data filed at 2022 1357  Gross per 24 hour   Intake 520 ml   Output 850 ml   Net -330 ml         General Appearance  Awake, alert, oriented,  not  In acute distress  HEENT - normocephalic, atraumatic, slightly pale conjunctiva,  anicteric sclera  Neck - Supple, no mass  Lungs -  Bilateral  air entry, no rhonchi, no wheeze  Cardiovascular - Heart sounds are normal.    Abdomen - soft, not distended, nontender,   Neurologic -oriented  Skin - No bruising or bleeding  Extremities -dressed with right knee [postsurgical]  MEDICATIONS:      miconazole   Topical BID    lidocaine 1 % injection  5 mL IntraDERmal Once    ceFAZolin  2,000 mg IntraVENous Q8H    atorvastatin  40 mg Oral Nightly    doxepin  40 mg Oral Nightly    fenofibrate  160 mg Oral Daily    losartan  25 mg Oral Daily    melatonin  3 mg Oral Nightly    multivitamin  1 tablet Oral Daily    rivaroxaban  10 mg Oral Q24H    sodium chloride flush  5-40 mL IntraVENous 2 times per day    sennosides-docusate sodium  1 tablet Oral BID      sodium chloride      sodium chloride 50 mL/hr at 22 0804     baclofen, sodium chloride flush, sodium chloride, ondansetron **OR** ondansetron, acetaminophen, morphine **OR** morphine, magnesium hydroxide, sodium phosphate, oxyCODONE-acetaminophen, oxyCODONE-acetaminophen      LABS:     CBC:   Recent Labs     12/26/22  0808 12/27/22  0513 12/28/22  0333   HGB 8.5* 7.2* 7.0*       CULTURES:   UA: No results for input(s): SPECGRAV, PHUR, COLORU, CLARITYU, MUCUS, PROTEINU, BLOODU, RBCUA, WBCUA, BACTERIA, NITRU, GLUCOSEU, BILIRUBINUR, UROBILINOGEN, KETUA, LABCAST, LABCASTTY, AMORPHOS in the last 72 hours. Invalid input(s): CRYSTALS  Micro:   Lab Results   Component Value Date/Time    BC No growth-preliminary No growth 06/10/2022 03:00 PM    BC No growth-preliminary No growth 06/10/2022 03:00 PM       Problem list of patient:     Patient Active Problem List   Diagnosis Code    Diabetes mellitus type 2, noninsulin dependent ([facility] E11.9    Hyperlipemia E78.5    THORNTON (nonalcoholic steatohepatitis) K75.81    Obesity E66.9    Tobacco abuse Z72.0    Vitamin D deficiency E55.9    Seborrheic keratosis L82.1    Dyshidrotic eczema L30.1    Displaced articular fracture of head of right femur, sequela S72.061S    Neida-prosthetic supracondylar fracture of femur M97. 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    She will need 6 wks of iv antibiotics      Leatha Juárez MD, MD, FACP 12/28/2022 5:44 PM

## 2022-12-28 NOTE — PLAN OF CARE
Problem: Chronic Conditions and Co-morbidities  Goal: Patient's chronic conditions and co-morbidity symptoms are monitored and maintained or improved  Outcome: Progressing     Problem: Pain  Goal: Verbalizes/displays adequate comfort level or baseline comfort level  Outcome: Progressing     Problem: Skin/Tissue Integrity - Adult  Goal: Incisions, wounds, or drain sites healing without S/S of infection  Outcome: Progressing

## 2022-12-29 LAB
HCT VFR BLD CALC: 23.7 % (ref 37–47)
HEMOGLOBIN: 7.4 GM/DL (ref 12–16)

## 2022-12-29 PROCEDURE — 85014 HEMATOCRIT: CPT

## 2022-12-29 PROCEDURE — 97535 SELF CARE MNGMENT TRAINING: CPT

## 2022-12-29 PROCEDURE — 6370000000 HC RX 637 (ALT 250 FOR IP): Performed by: PHYSICIAN ASSISTANT

## 2022-12-29 PROCEDURE — 85018 HEMOGLOBIN: CPT

## 2022-12-29 PROCEDURE — 2580000003 HC RX 258: Performed by: PHYSICIAN ASSISTANT

## 2022-12-29 PROCEDURE — 6360000002 HC RX W HCPCS: Performed by: INTERNAL MEDICINE

## 2022-12-29 PROCEDURE — 36415 COLL VENOUS BLD VENIPUNCTURE: CPT

## 2022-12-29 PROCEDURE — 2580000003 HC RX 258: Performed by: INTERNAL MEDICINE

## 2022-12-29 PROCEDURE — 1200000000 HC SEMI PRIVATE

## 2022-12-29 RX ADMIN — CEFAZOLIN 2000 MG: 10 INJECTION, POWDER, FOR SOLUTION INTRAVENOUS at 23:39

## 2022-12-29 RX ADMIN — MICONAZOLE NITRATE: 2 POWDER TOPICAL at 08:55

## 2022-12-29 RX ADMIN — OXYCODONE AND ACETAMINOPHEN 1 TABLET: 5; 325 TABLET ORAL at 04:04

## 2022-12-29 RX ADMIN — OXYCODONE AND ACETAMINOPHEN 2 TABLET: 5; 325 TABLET ORAL at 08:53

## 2022-12-29 RX ADMIN — CEFAZOLIN 2000 MG: 10 INJECTION, POWDER, FOR SOLUTION INTRAVENOUS at 07:43

## 2022-12-29 RX ADMIN — ATORVASTATIN CALCIUM 40 MG: 40 TABLET, FILM COATED ORAL at 20:26

## 2022-12-29 RX ADMIN — BACLOFEN 10 MG: 10 TABLET ORAL at 08:53

## 2022-12-29 RX ADMIN — RIVAROXABAN 10 MG: 10 TABLET, FILM COATED ORAL at 23:41

## 2022-12-29 RX ADMIN — SODIUM CHLORIDE: 9 INJECTION, SOLUTION INTRAVENOUS at 12:20

## 2022-12-29 RX ADMIN — FENOFIBRATE 160 MG: 160 TABLET ORAL at 08:53

## 2022-12-29 RX ADMIN — Medication 3 MG: at 20:22

## 2022-12-29 RX ADMIN — Medication 1 TABLET: at 08:53

## 2022-12-29 RX ADMIN — DOXEPIN HYDROCHLORIDE 40 MG: 10 CAPSULE ORAL at 20:26

## 2022-12-29 RX ADMIN — OXYCODONE AND ACETAMINOPHEN 2 TABLET: 5; 325 TABLET ORAL at 20:22

## 2022-12-29 RX ADMIN — CEFAZOLIN 2000 MG: 10 INJECTION, POWDER, FOR SOLUTION INTRAVENOUS at 14:51

## 2022-12-29 RX ADMIN — OXYCODONE AND ACETAMINOPHEN 2 TABLET: 5; 325 TABLET ORAL at 14:53

## 2022-12-29 RX ADMIN — LOSARTAN POTASSIUM 25 MG: 25 TABLET, FILM COATED ORAL at 08:54

## 2022-12-29 RX ADMIN — BACLOFEN 10 MG: 10 TABLET ORAL at 15:40

## 2022-12-29 ASSESSMENT — PAIN DESCRIPTION - PAIN TYPE
TYPE: ACUTE PAIN

## 2022-12-29 ASSESSMENT — PAIN SCALES - GENERAL
PAINLEVEL_OUTOF10: 3
PAINLEVEL_OUTOF10: 8
PAINLEVEL_OUTOF10: 6
PAINLEVEL_OUTOF10: 2
PAINLEVEL_OUTOF10: 8
PAINLEVEL_OUTOF10: 5
PAINLEVEL_OUTOF10: 0

## 2022-12-29 ASSESSMENT — PAIN DESCRIPTION - ONSET
ONSET: ON-GOING

## 2022-12-29 ASSESSMENT — PAIN DESCRIPTION - ORIENTATION
ORIENTATION: RIGHT

## 2022-12-29 ASSESSMENT — PAIN DESCRIPTION - LOCATION
LOCATION: BACK
LOCATION: LEG
LOCATION: BACK;KNEE
LOCATION: FOOT
LOCATION: BACK;KNEE
LOCATION: LEG
LOCATION: LEG

## 2022-12-29 ASSESSMENT — PAIN DESCRIPTION - FREQUENCY
FREQUENCY: CONTINUOUS

## 2022-12-29 ASSESSMENT — PAIN - FUNCTIONAL ASSESSMENT
PAIN_FUNCTIONAL_ASSESSMENT: PREVENTS OR INTERFERES SOME ACTIVE ACTIVITIES AND ADLS

## 2022-12-29 ASSESSMENT — PAIN DESCRIPTION - DESCRIPTORS
DESCRIPTORS: ACHING
DESCRIPTORS: ACHING
DESCRIPTORS: ACHING;THROBBING
DESCRIPTORS: ACHING
DESCRIPTORS: ACHING

## 2022-12-29 ASSESSMENT — PAIN DESCRIPTION - DIRECTION
RADIATING_TOWARDS: DOWN RIGHT LEG
RADIATING_TOWARDS: DOWN RIGHT LEG

## 2022-12-29 NOTE — PROGRESS NOTES
Orthopaedic Progress Note      SUBJECTIVE   Ms. Josiah Chavez is post op day # 7    Patient s/p Stage I revision right knee with antibiotic spacer. Global RLE pain, modest improvement, nonspecific and sporadic. Incision without drainage, bandage intact. No new ortho concerns. Infectious disease helping with antibiotic therapy. Resting in bed on exam today      OBJECTIVE      Physical    VITALS:  BP (!) 111/52   Pulse 92   Temp 98.6 °F (37 °C) (Oral)   Resp 16   Ht 5' 7\" (1.702 m)   Wt 160 lb (72.6 kg)   SpO2 95%   BMI 25.06 kg/m²   I/O last 3 completed shifts: In: 820 [P.O.:820]  Out: 1150 [Urine:1150]    6/10 pain  Gen: alert and oriented, resting today  Head: normorcephalic, atraumatic  Resp: unlabored, room air  Pelvis: stable  RLE:  Dressing clean, dry, and intact. No active drainage from incision. ROM limited secondary to spacer. Able to perform SLR. NVI. Dorsalis pedal and posterior tibialis pulses strong and regular.       Data  CBC:   Lab Results   Component Value Date/Time    WBC 6.1 12/25/2022 05:17 AM    HGB 7.0 12/28/2022 03:33 AM     12/25/2022 05:17 AM     BMP:    Lab Results   Component Value Date/Time     12/23/2022 06:12 AM    K 4.1 12/23/2022 06:12 AM    K 4.6 05/09/2022 01:05 PM     12/23/2022 06:12 AM    CO2 22 12/23/2022 06:12 AM    BUN 7 12/23/2022 06:12 AM    CREATININE 0.4 12/23/2022 06:12 AM    CALCIUM 8.1 12/23/2022 06:12 AM    GLUCOSE 66 12/23/2022 06:12 AM    GLUCOSE 113 12/10/2020 10:01 AM     Uric Acid:  No components found for: URIC  PT/INR:    Lab Results   Component Value Date/Time    PROTIME 11.0 10/05/2016 03:39 PM    INR 1.44 12/01/2022 03:59 PM     Troponin:  No results found for: TROPONINI  Urine Culture:  No components found for: CURINE      Current Inpatient Medications    Current Facility-Administered Medications: miconazole (MICOTIN) 2 % powder, , Topical, BID  lidocaine PF 1 % injection 5 mL, 5 mL, IntraDERmal, Once  ceFAZolin (ANCEF) 2000 mg in dextrose 5 % 50 mL IVPB, 2,000 mg, IntraVENous, Q8H  atorvastatin (LIPITOR) tablet 40 mg, 40 mg, Oral, Nightly  baclofen (LIORESAL) tablet 10 mg, 10 mg, Oral, Q6H PRN  doxepin (SINEQUAN) capsule 40 mg, 40 mg, Oral, Nightly  fenofibrate (TRIGLIDE) tablet 160 mg, 160 mg, Oral, Daily  losartan (COZAAR) tablet 25 mg, 25 mg, Oral, Daily  melatonin tablet 3 mg, 3 mg, Oral, Nightly  multivitamin 1 tablet, 1 tablet, Oral, Daily  rivaroxaban (XARELTO) tablet 10 mg, 10 mg, Oral, Q24H  sodium chloride flush 0.9 % injection 5-40 mL, 5-40 mL, IntraVENous, 2 times per day  sodium chloride flush 0.9 % injection 5-40 mL, 5-40 mL, IntraVENous, PRN  0.9 % sodium chloride infusion, , IntraVENous, PRN  ondansetron (ZOFRAN-ODT) disintegrating tablet 4 mg, 4 mg, Oral, Q8H PRN **OR** ondansetron (ZOFRAN) injection 4 mg, 4 mg, IntraVENous, Q6H PRN  0.9 % sodium chloride infusion, , IntraVENous, Continuous  acetaminophen (TYLENOL) tablet 650 mg, 650 mg, Oral, Q4H PRN  morphine (PF) injection 2 mg, 2 mg, IntraVENous, Q2H PRN **OR** morphine injection 4 mg, 4 mg, IntraVENous, Q2H PRN  sennosides-docusate sodium (SENOKOT-S) 8.6-50 MG tablet 1 tablet, 1 tablet, Oral, BID  magnesium hydroxide (MILK OF MAGNESIA) 400 MG/5ML suspension 30 mL, 30 mL, Oral, Daily PRN  sodium phosphate (FLEET) rectal enema 1 enema, 1 enema, Rectal, Daily PRN  oxyCODONE-acetaminophen (PERCOCET) 5-325 MG per tablet 1 tablet, 1 tablet, Oral, Q4H PRN  oxyCODONE-acetaminophen (PERCOCET) 5-325 MG per tablet 2 tablet, 2 tablet, Oral, Q4H PRN        PLAN       1)  Cont with current medical management  2)  WBAT RLE, activity as tolerated  3)  Ice and elevation  4)  ID evaluated, picc placed  5)  ECF upon discharge, case management assisting  6)  dry dressing changes PRN  7)  PT OT as able  8)  pending repeat draw this AM  9)  stable for discharge from ortho standpoint, pending SNF placement

## 2022-12-29 NOTE — PLAN OF CARE
Problem: Chronic Conditions and Co-morbidities  Goal: Patient's chronic conditions and co-morbidity symptoms are monitored and maintained or improved  12/29/2022 0202 by Luis Burkett RN  Outcome: Progressing     Problem: Pain  Goal: Verbalizes/displays adequate comfort level or baseline comfort level  12/29/2022 0202 by Luis Bukrett RN  Outcome: Progressing     Problem: Safety - Adult  Goal: Free from fall injury  12/29/2022 0202 by Luis Burkett RN  Outcome: Progressing   Care plan reviewed with patient. Patient verbalize understanding of the plan of care and contribute to goal setting.

## 2022-12-29 NOTE — CARE COORDINATION
12/29/22, 2:40 PM EST    DISCHARGE ON GOING EVALUATION    [facility] day: 7  Location: [facility]10/010-A Reason for admit: Infection of total right knee replacement, subsequent encounter [T84.53XD]  Post-operative state [Z98.890]   Procedure:   12/29 STAGE 1 RIGHT KNEE with insertion of antibiotic spacer with Dr Bekah Torres  Barriers to Discharge: Ortho and ID following. POD 7. IV ancef. Pain control. PT/OT. Plan 6 weeks of infusions. PCP: Clara Thrasher DO  Readmission Risk Score: 15.3%  Patient Goals/Plan/Treatment Preferences: Await response from [facility]. Precert required.

## 2022-12-29 NOTE — PROGRESS NOTES
Progress note: Infectious diseases    Patient - Cecilia Bishop,  Age - 60 y.o.    - 1960      Room Number - 7K-10/010-A   MRN -  [id number]   Acct # - [de-identified]  Date of Admission -  2022  5:45 AM    SUBJECTIVE:   No new issues  OBJECTIVE   VITALS    height is 5' 7\" (1.702 m) and weight is 160 lb (72.6 kg). Her oral temperature is 98.6 °F (37 °C). Her blood pressure is 111/52 (abnormal) and her pulse is 92. Her respiration is 16 and oxygen saturation is 95%.        Wt Readings from Last 3 Encounters:   22 160 lb (72.6 kg)   22 158 lb (71.7 kg)   22 158 lb (71.7 kg)       I/O (24 Hours)    Intake/Output Summary (Last 24 hours) at 2022 0930  Last data filed at 2022 2064  Gross per 24 hour   Intake 540 ml   Output 700 ml   Net -160 ml         General Appearance  Awake, alert, oriented,  not  In acute distress  HEENT - normocephalic, atraumatic, slightly pale conjunctiva,  anicteric sclera  Neck - Supple, no mass  Lungs -  Bilateral  air entry, no rhonchi, no wheeze  Cardiovascular - Heart sounds are normal.    Abdomen - soft, not distended, nontender,   Neurologic -oriented  Skin - No bruising or bleeding  Extremities -dressed with right knee [postsurgical]  MEDICATIONS:      miconazole   Topical BID    lidocaine 1 % injection  5 mL IntraDERmal Once    ceFAZolin  2,000 mg IntraVENous Q8H    atorvastatin  40 mg Oral Nightly    doxepin  40 mg Oral Nightly    fenofibrate  160 mg Oral Daily    losartan  25 mg Oral Daily    melatonin  3 mg Oral Nightly    multivitamin  1 tablet Oral Daily    rivaroxaban  10 mg Oral Q24H    sodium chloride flush  5-40 mL IntraVENous 2 times per day    sennosides-docusate sodium  1 tablet Oral BID      sodium chloride      sodium chloride 50 mL/hr at 22 2331     baclofen, sodium chloride flush, sodium chloride, ondansetron **OR** ondansetron, acetaminophen, morphine **OR** morphine, magnesium hydroxide, sodium phosphate, oxyCODONE-acetaminophen, oxyCODONE-acetaminophen      LABS:     CBC:   Recent Labs     12/27/22  0513 12/28/22  0333 12/29/22  0833   HGB 7.2* 7.0* 7.4*       CULTURES:   UA: No results for input(s): SPECGRAV, PHUR, COLORU, CLARITYU, MUCUS, PROTEINU, BLOODU, RBCUA, WBCUA, BACTERIA, NITRU, GLUCOSEU, BILIRUBINUR, UROBILINOGEN, KETUA, LABCAST, LABCASTTY, AMORPHOS in the last 72 hours. Invalid input(s): CRYSTALS  Micro:   Lab Results   Component Value Date/Time    BC No growth-preliminary No growth 06/10/2022 03:00 PM    BC No growth-preliminary No growth 06/10/2022 03:00 PM       Problem list of patient:     Patient Active Problem List   Diagnosis Code    Diabetes mellitus type 2, noninsulin dependent [facility] E11.9    Hyperlipemia E78.5    THORNTON (nonalcoholic steatohepatitis) K75.81    Obesity E66.9    Tobacco abuse Z72.0    Vitamin D deficiency E55.9    Seborrheic keratosis L82.1    Dyshidrotic eczema L30.1    Displaced articular fracture of head of right femur, sequela S72.061S    Neida-prosthetic supracondylar fracture of femur M97. 8XXA, [id number]    Primary hypertension I10    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) T84.53XA    History of total knee replacement, right Z96.651    Hypokalemia E87.6    Enterocolitis due to Clostridium difficile, not specified as recurrent A04.72    Recurrent major depressive disorder ([facility] F33.9    Aftercare following knee joint replacement surgery Z47.1, Z96.659    Post-operative state Z98.890         ASSESSMENT/PLAN   Right knee infected prosthesis status post first stage revision  Infection with MSSA  Continue IV Ancef.    She will need 6 wks of iv antibiotics  Awaiting placement      Wayne Adler MD, MD, FACP 12/29/2022 9:30 AM

## 2022-12-29 NOTE — PROGRESS NOTES
[facility] [facility]  Occupational Therapy  Daily Note  Time:   Time In: 915  Time Out: 0085  Timed Code Treatment Minutes: 28 Minutes  Minutes: 35          Date: 2022  Patient Name: Brady Tracey,   Gender: female      Room: [facility]10010-A  MRN: [id number]  : 1960  (60 y.o.)  Referring Practitioner: CRISTAL Blanco  Diagnosis: Infection of total right knee  Additional Pertinent Hx: STAGE 1 RIGHT KNEE with insertion of antibiotic spacer  by Dr Fadumo Gray; extensive knee history with several surgeries prior to this    Restrictions/Precautions:  Restrictions/Precautions: Fall Risk, Weight Bearing, General Precautions  Right Lower Extremity Weight Bearing: Weight Bearing As Tolerated  Position Activity Restriction  Other position/activity restrictions: B LE contractures     SUBJECTIVE: RN okayed OT session     PAIN: RLE with bed mobility, not rated     Vitals: Vitals not assessed per clinical judgement, see nursing flowsheet    COGNITION: WFL    ADL:   Grooming: Stand By Assistance and with set-up. Seated on EOB completed oral care and brushed out hair  Lower Extremity Dressing: Dependent. To don and doff undergarment  Toileting: Dependent. Incontinent of stool Dependent with hygiene. Annelle Castles BALANCE:  Sitting Balance:  Stand By Assistance. BED MOBILITY:  Supine to Sit: Moderate Assistance, with verbal cues , with increased time for completion    Scooting: Moderate Assistance to advance hips to EOB    TRANSFERS:  Squat Pivot: from EOB to w/c transferring to the Left. FUNCTIONAL MOBILITY:  Assistive Device: Wheelchair  Assist Level:  Stand By Assistance. Distance:  in room with assistance for IV pole         ASSESSMENT:     Activity Tolerance:  Patient tolerance of  treatment: fair.        Discharge Recommendations: Subacute/skilled nursing facility  Equipment Recommendations: Equipment Needed: No  Plan: Times Per Week: 6x  Current Treatment Recommendations: Strengthening, Balance training, Functional mobility training, Endurance training, Patient/Caregiver education & training, Return to work related activity, Self-Care / ADL, Home management training, Safety education & training    Patient Education  Patient Education: ADL's    Goals  Short Term Goals  Time Frame for Short Term Goals: by discharge  Short Term Goal 1: Pt will complete sit pivot t/fs with no > than CGA and min VC for technique to increase indep with ADLs  Short Term Goal 2: Pt will complete LB ADLs with Min A and adaptive techniques prn to increase indep with dressing  Short Term Goal 3: Pt will complete ADL item retrieval from w/c level with Mod I and min safety cues for adaptations to increase indep with self care routine    Following session, patient left in safe position with all fall risk precautions in place.

## 2022-12-29 NOTE — PROGRESS NOTES
[address]  PHYSICAL THERAPY MISSED TREATMENT NOTE  [facility] ORTHOPEDICS 7K    Date: 2022  Patient Name: Jesusita Ramírez        MRN: [id number]   : 1960  (60 y.o.)  Gender: female   Referring Practitioner: CRISTAL Maynard  Diagnosis: Infection of total right knee replacement, initial encounter         REASON FOR MISSED TREATMENT:  On first attempt pt was up in the w/c and I notified I would be back to help get her back to bed, second attempt pt out of room and unable to locate her on the unit. I notified nursing she reported that family was going to push her in the w/c but wasn't aware she would leave the unit. Will check back next available date .

## 2022-12-29 NOTE — DISCHARGE INSTR - COC
22Continuity of Care Form    Patient Name: Didi Adrian   :  1960  MRN:  [id number]    Admit date:  2022  Discharge date:  23    Code Status Order: Prior   Advance Directives:   [facility] Documentation       Date/Time Healthcare Directive Type of Healthcare Directive Copy in [facility] [address] Agent's Name Healthcare Agent's Phone Number    22 0630 No, patient does not have an advance directive for healthcare treatment -- -- -- -- --            Admitting Physician:  Temitope Pearce MD  PCP: Alray Gilford, DO    Discharging Nurse: [facility] Unit/Room#: 7K-10/010-A  Discharging Unit Phone Number: [phone]    Emergency Contact:   Extended Emergency Contact Information  Primary Emergency Contact: Miriam Seaman  Address: [address] [place] [address] 1           [place] Phone: [phone]  Relation: Spouse    Past Surgical History:  Past Surgical History:   Procedure Laterality Date    BLADDER SUSPENSION      BREAST BIOPSY Left     atypical hyperlasia, 2016, [facility]    BREAST LUMPECTOMY Left     atypical hyperplasia, 2016, [facility]    COLONOSCOPY  2020    HERNIA REPAIR  10/17/2016    JOINT REPLACEMENT Left     knee    KNEE SURGERY      Right knee    REVISION TOTAL KNEE ARTHROPLASTY Right 05/10/2022    RIGHT KNEE TOTAL ARTHROPLASTY REVISION performed by Temitope Pearce MD at [address] Right 2022    Right Knee Incision and Drainage of Total Knee With Poly Exchange performed by Temitope Pearce MD at [facility] Right 2022    STAGE 1 RIGHT KNEE performed by Temitope Pearce MD at [address] [facility] EXTRACTION         Immunization History:   Immunization History   Administered Date(s) Administered    COVID-19, [address], (age 12y+), IM, 48 mcg/0.5 mL 2022    COVID-19, Mikel Ndiaye Booster BLUE border, (age 18y+), IM, 50mcg/0.25mL 2022    COVID-19, PFIZER PURPLE top, DILUTE for use, (age 15 y+), 30mcg/0.3mL 2021, 2021, 2022    Influenza, FLUARIX, FLULAVAL, FLUZONE (age 10 mo+) AND AFLURIA, (age 1 y+), PF, 0.5mL 12/15/2020    Pneumococcal Polysaccharide ([id number]) 2015    Tdap (Boostrix, Adacel) 2015       Active Problems:  Patient Active Problem List   Diagnosis Code    Diabetes mellitus type 2, noninsulin dependent ([facility] E11.9    Hyperlipemia E78.5    THORNTON (nonalcoholic steatohepatitis) K75.81    Obesity E66.9    Tobacco abuse Z72.0    Vitamin D deficiency E55.9    Seborrheic keratosis L82.1    Dyshidrotic eczema L30.1    Displaced articular fracture of head of right femur, sequela S72.061S    Neida-prosthetic supracondylar fracture of femur M97. 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) T84.53XA    History of total knee replacement, right Z96.651    Hypokalemia E87.6    Enterocolitis due to Clostridium difficile, not specified as recurrent A04.72    Recurrent major depressive disorder (HCC) F33.9    Aftercare following knee joint replacement surgery Z47.1, Z96.659    Post-operative state Z98.890       Isolation/Infection:   Isolation            No Isolation          Patient Infection Status       Infection Onset Added Last Indicated Last Indicated By Review Planned Expiration Resolved Resolved By    None active    Resolved    C-diff Rule Out 22 Gastrointestinal Panel, Molecular (Ordered)   22     C-diff Rule Out 21 Clostridium Difficile Toxin/Antigen (Ordered)   21 Rule-Out Test Resulted            Nurse Assessment:  Last Vital Signs: BP (!) 102/49   Pulse 99   Temp 97.7 °F (36.5 °C) (Oral)   Resp 16   Ht 5' 7\" (1.702 m)   Wt 160 lb (72.6 kg)   SpO2 97%   BMI 25.06 kg/m²     Last documented pain score (0-10 scale): Pain Level: 8  Last Weight:   Wt Readings from Last 1 Encounters:   12/22/22 160 lb (72.6 kg)     Mental Status:  oriented and alert    IV Access:  - PICC - site  R Basilic, insertion date: 12/27/22    Nursing Mobility/ADLs:  Walking   Assisted  Transfer  Assisted  Bathing  Assisted  Dressing  Assisted  Toileting  Assisted  Feeding  Independent  Med Admin  Assisted  Med Delivery   whole    Wound Care Documentation and Therapy:  Wound Coccyx (Active)   Dressing/Treatment Triad hydro/zinc oxide-based hydrophilic paste 73/44/57 5713   Drainage Amount None 12/29/22 0800   Odor None 12/29/22 0800   Number of days:        Incision 06/12/22 Knee Right (Active)   Number of days: 200       Incision 12/22/22 Knee Anterior;Right (Active)   Dressing Status Clean;Dry; Intact 12/29/22 0800   Dressing/Treatment Ace wrap 12/29/22 0800   Closure Other (Comment) 12/28/22 0826   Incision Assessment Other (Comment) 12/27/22 1000   Drainage Amount None 12/28/22 0826   Odor None 12/29/22 0800   Neida-incision Assessment Other (Comment) 12/26/22 2050   Number of days: 7        Elimination:  Continence: Bowel: Yes  Bladder: Yes  Urinary Catheter: None   Colostomy/Ileostomy/Ileal Conduit: No       Date of Last BM: 1/8/23    Intake/Output Summary (Last 24 hours) at 12/29/2022 1010  Last data filed at 12/29/2022 0824  Gross per 24 hour   Intake 540 ml   Output 700 ml   Net -160 ml     I/O last 3 completed shifts: In: 36 [P.O.:820]  Out: 1150 [Urine:1150]    Safety Concerns: At Risk for Falls    Impairments/Disabilities:      None    Nutrition Therapy:  Current Nutrition Therapy:   - Oral Diet:  General    Routes of Feeding: Oral  Liquids: No Restrictions  Daily Fluid Restriction: no  Last Modified Barium Swallow with Video (Video Swallowing Test): not done    Treatments at the Time of Hospital Discharge:   Respiratory Treatments: NA  Oxygen Therapy:  is not on home oxygen therapy.   Ventilator:    - No ventilator support    Rehab Therapies: Physical Therapy, occupational therapy  Weight Bearing Status/Restrictions: No weight bearing restrictions  Other Medical Equipment (for information only, NOT a DME order):  wheelchair and walker  Other Treatments: NA    Patient's personal belongings (please select all that are sent with patient):  None    RN SIGNATURE:  Electronically signed by Gurpreet Washington RN on 1/9/23 at 10:13 AM EST    CASE MANAGEMENT/SOCIAL WORK SECTION    Inpatient Status Date: 12/22/22    Readmission Risk Assessment Score:  Readmission Risk              Risk of Unplanned Readmission:  17           Discharging to Facility/ Agency   Name: [facility]   Address: [address] [place] [address]  [id number]  Phone: [phone]  Fax: 0     Dialysis Facility (if applicable)   Name:  Address:  Dialysis Schedule:  Phone:  Fax:    / signature:   DYLAN Haines    01/09/2023   8:55 a.m. PHYSICIAN SECTION    Prognosis: Good    Condition at Discharge: Stable    Rehab Potential (if transferring to Rehab): Good    Recommended Labs or Other Treatments After Discharge: IV ABX as directed until 2/6/2023, CBC w/ diff, ESR, CRP weekly    Physician Certification: I certify the above information and transfer of Van Kyle  is necessary for the continuing treatment of the diagnosis listed and that she requires [facility] for greater 30 days.      Update Admission H&P: No change in H&P    PHYSICIAN SIGNATURE:  Electronically signed by Selma Cai PA-C on 12/29/22 at 10:10 AM EST

## 2022-12-29 NOTE — PROGRESS NOTES
This RN observe pt moaning and holding her RLE. Pt denies pain and refuses intervention at this time. Education given to pt on pain control. Pt verbalizes understanding of education. Will continue to monitor and reinforce education.

## 2022-12-30 LAB
HCT VFR BLD CALC: 23.2 % (ref 37–47)
HEMOGLOBIN: 7.1 GM/DL (ref 12–16)

## 2022-12-30 PROCEDURE — 85018 HEMOGLOBIN: CPT

## 2022-12-30 PROCEDURE — 6360000002 HC RX W HCPCS: Performed by: INTERNAL MEDICINE

## 2022-12-30 PROCEDURE — 1200000000 HC SEMI PRIVATE

## 2022-12-30 PROCEDURE — 2580000003 HC RX 258: Performed by: INTERNAL MEDICINE

## 2022-12-30 PROCEDURE — 97530 THERAPEUTIC ACTIVITIES: CPT

## 2022-12-30 PROCEDURE — 36415 COLL VENOUS BLD VENIPUNCTURE: CPT

## 2022-12-30 PROCEDURE — 97110 THERAPEUTIC EXERCISES: CPT

## 2022-12-30 PROCEDURE — 6370000000 HC RX 637 (ALT 250 FOR IP): Performed by: PHYSICIAN ASSISTANT

## 2022-12-30 PROCEDURE — 2580000003 HC RX 258: Performed by: PHYSICIAN ASSISTANT

## 2022-12-30 PROCEDURE — 85014 HEMATOCRIT: CPT

## 2022-12-30 PROCEDURE — 97535 SELF CARE MNGMENT TRAINING: CPT

## 2022-12-30 RX ADMIN — Medication 3 MG: at 20:27

## 2022-12-30 RX ADMIN — OXYCODONE AND ACETAMINOPHEN 2 TABLET: 5; 325 TABLET ORAL at 06:02

## 2022-12-30 RX ADMIN — DOXEPIN HYDROCHLORIDE 40 MG: 10 CAPSULE ORAL at 20:27

## 2022-12-30 RX ADMIN — OXYCODONE AND ACETAMINOPHEN 2 TABLET: 5; 325 TABLET ORAL at 00:28

## 2022-12-30 RX ADMIN — MICONAZOLE NITRATE: 2 POWDER TOPICAL at 20:28

## 2022-12-30 RX ADMIN — LOSARTAN POTASSIUM 25 MG: 25 TABLET, FILM COATED ORAL at 07:56

## 2022-12-30 RX ADMIN — ATORVASTATIN CALCIUM 40 MG: 40 TABLET, FILM COATED ORAL at 20:27

## 2022-12-30 RX ADMIN — CEFAZOLIN 2000 MG: 10 INJECTION, POWDER, FOR SOLUTION INTRAVENOUS at 06:57

## 2022-12-30 RX ADMIN — FENOFIBRATE 160 MG: 160 TABLET ORAL at 07:57

## 2022-12-30 RX ADMIN — OXYCODONE AND ACETAMINOPHEN 2 TABLET: 5; 325 TABLET ORAL at 20:51

## 2022-12-30 RX ADMIN — Medication 1 TABLET: at 07:58

## 2022-12-30 RX ADMIN — BACLOFEN 10 MG: 10 TABLET ORAL at 20:48

## 2022-12-30 RX ADMIN — OXYCODONE AND ACETAMINOPHEN 2 TABLET: 5; 325 TABLET ORAL at 10:58

## 2022-12-30 RX ADMIN — BACLOFEN 10 MG: 10 TABLET ORAL at 03:07

## 2022-12-30 RX ADMIN — CEFAZOLIN 2000 MG: 10 INJECTION, POWDER, FOR SOLUTION INTRAVENOUS at 15:04

## 2022-12-30 RX ADMIN — CEFAZOLIN 2000 MG: 10 INJECTION, POWDER, FOR SOLUTION INTRAVENOUS at 23:57

## 2022-12-30 RX ADMIN — SODIUM CHLORIDE, PRESERVATIVE FREE 10 ML: 5 INJECTION INTRAVENOUS at 20:29

## 2022-12-30 RX ADMIN — MICONAZOLE NITRATE: 2 POWDER TOPICAL at 07:58

## 2022-12-30 RX ADMIN — OXYCODONE AND ACETAMINOPHEN 2 TABLET: 5; 325 TABLET ORAL at 15:34

## 2022-12-30 RX ADMIN — SENNOSIDES AND DOCUSATE SODIUM 1 TABLET: 50; 8.6 TABLET ORAL at 07:57

## 2022-12-30 ASSESSMENT — PAIN DESCRIPTION - ORIENTATION
ORIENTATION: RIGHT

## 2022-12-30 ASSESSMENT — PAIN SCALES - GENERAL
PAINLEVEL_OUTOF10: 8
PAINLEVEL_OUTOF10: 8
PAINLEVEL_OUTOF10: 9
PAINLEVEL_OUTOF10: 7
PAINLEVEL_OUTOF10: 10
PAINLEVEL_OUTOF10: 8
PAINLEVEL_OUTOF10: 10
PAINLEVEL_OUTOF10: 9
PAINLEVEL_OUTOF10: 9

## 2022-12-30 ASSESSMENT — PAIN DESCRIPTION - DESCRIPTORS
DESCRIPTORS: SHARP;PRESSURE
DESCRIPTORS: ACHING
DESCRIPTORS: ACHING
DESCRIPTORS: SHARP

## 2022-12-30 ASSESSMENT — PAIN DESCRIPTION - LOCATION
LOCATION: LEG

## 2022-12-30 ASSESSMENT — PAIN DESCRIPTION - DIRECTION: RADIATING_TOWARDS: DOWN RIGHT LEG

## 2022-12-30 ASSESSMENT — PAIN DESCRIPTION - FREQUENCY: FREQUENCY: CONTINUOUS

## 2022-12-30 ASSESSMENT — PAIN DESCRIPTION - PAIN TYPE: TYPE: ACUTE PAIN

## 2022-12-30 ASSESSMENT — PAIN DESCRIPTION - ONSET: ONSET: ON-GOING

## 2022-12-30 ASSESSMENT — PAIN - FUNCTIONAL ASSESSMENT
PAIN_FUNCTIONAL_ASSESSMENT: PREVENTS OR INTERFERES SOME ACTIVE ACTIVITIES AND ADLS

## 2022-12-30 NOTE — PLAN OF CARE
Problem: Discharge Planning  Goal: Discharge to home or other facility with appropriate resources  12/30/2022 1512 by Keila Chaudhary RN  Outcome: Progressing  Flowsheets (Taken 12/30/2022 1512)  Discharge to home or other facility with appropriate resources: Identify barriers to discharge with patient and caregiver     Problem: Chronic Conditions and Co-morbidities  Goal: Patient's chronic conditions and co-morbidity symptoms are monitored and maintained or improved  12/30/2022 1512 by Keila Chaudhary RN  Outcome: Progressing  Flowsheets (Taken 12/30/2022 1512)  Care Plan - Patient's Chronic Conditions and Co-Morbidity Symptoms are Monitored and Maintained or Improved: Monitor and assess patient's chronic conditions and comorbid symptoms for stability, deterioration, or improvement     Problem: Pain  Goal: Verbalizes/displays adequate comfort level or baseline comfort level  12/30/2022 1512 by Keila Chaudhary RN  Outcome: Progressing  Flowsheets (Taken 12/30/2022 1512)  Verbalizes/displays adequate comfort level or baseline comfort level:   Encourage patient to monitor pain and request assistance   Assess pain using appropriate pain scale   Implement non-pharmacological measures as appropriate and evaluate response   Administer analgesics based on type and severity of pain and evaluate response     Problem: Safety - Adult  Goal: Free from fall injury  12/30/2022 1512 by Keila Chaudhary RN  Outcome: Progressing  Flowsheets (Taken 12/30/2022 1512)  Free From Fall Injury: Instruct family/caregiver on patient safety     Problem: Skin/Tissue Integrity - Adult  Goal: Incisions, wounds, or drain sites healing without S/S of infection  12/30/2022 1512 by Keila Chaudhary RN  Outcome: Progressing  Flowsheets (Taken 12/30/2022 1512)  Incisions, Wounds, or Drain Sites Healing Without Sign and Symptoms of Infection:   TWICE DAILY: Assess and document skin integrity   TWICE DAILY: Assess and document dressing/incision, wound bed, drain sites and surrounding tissue

## 2022-12-30 NOTE — PROGRESS NOTES
Comprehensive Nutrition Assessment    Type and Reason for Visit:  Initial, RD Nutrition Re-Screen/LOS    Nutrition Recommendations/Plan:   Will send Ensure Clear BID. Encouraged po, ONS at best efforts. Recommend continue MVI. Consider probiotic. Malnutrition Assessment:  Malnutrition Status: At risk for malnutrition (Comment) (12/30/22 8826)    Context:  Acute Illness     Findings of the 6 clinical characteristics of malnutrition:  Energy Intake:  Mild decrease in energy intake (Comment)  Weight Loss:   (-10% in 1 year)     Body Fat Loss:  No significant body fat loss     Muscle Mass Loss:  No significant muscle mass loss    Fluid Accumulation:  Unable to assess     Strength:  Not Performed    Nutrition Assessment:     Pt. nutritionally compromised AEB decreased appetite and intake during LOS. At risk for further nutrition compromise r/t increased nutrient needs for post operative healing and underlying medical condition (hx breast cancer, DM, HTN, HLD, knee surgery). Nutrition Related Findings:      Wound Type: Surgical Incision (12/22 STAGE 1 RIGHT KNEE with insertion of antibiotic spacer)     Pt. Report/Treatments/Miscellaneous: pt. Seen - reports decreased appetite and intake; reports some nausea, abdominal pain - no worse w/ eating; dislikes most ONS but states likes Boost Breeze (agrees to trial Ensure Clear); admits to weight loss several years ago - states had lesions on tongue that made eating challenging but that has resolved; states eating 3 meals/day pta; plan ECF at discharge  GI Status: +Bms (today per pt)  Pertinent Labs: 12/23: Glucose 66, BUN 7, Cr 0.4  Pertinent Meds: MOM, Senokot, MVI, ATB, Triglide, Lipitor      Current Nutrition Intake & Therapies:    Average Meal Intake: 1-25%, 26-50%, %     ADULT DIET;  Regular  ADULT ORAL NUTRITION SUPPLEMENT; Breakfast, Dinner; Clear Liquid Oral Supplement    Anthropometric Measures:  Height: 5' 7\" (170.2 cm)  Ideal Body Weight (IBW): 135 lbs (61 kg)    Admission Body Weight: 160 lb (72.6 kg) (12/22 actual weight, no edema)  Current Body Weight: 160 lb (72.6 kg) (12/22 actual weight, no edema),      Current BMI (kg/m2): 25.1  Usual Body Weight:  (per pt. 256# a few years ago; per EMR: 6/12/20: 248# 14 oz, 12/15/21: 177# 14 oz, 7/29/22: 173# 13 oz)                       BMI Categories: Overweight (BMI 25.0-29. 9)    Estimated Daily Nutrient Needs:  Energy Requirements Based On: Kcal/kg  Weight Used for Energy Requirements: Other (Comment) (73)  Energy (kcal/day): 1701-6615 kcals (20-25)  Weight Used for Protein Requirements: Ideal (61)  Protein (g/day): 79+ grams (1.3+)       Nutrition Diagnosis:   Inadequate oral intake related to inadequate protein-energy intake as evidenced by intake 0-25%, intake 26-50%    Nutrition Interventions:   Food and/or Nutrient Delivery: Continue Current Diet, Start Oral Nutrition Supplement  Nutrition Education/Counseling: Education initiated (12/30 Encouraged po, ONS intake at best efforts.)  Coordination of Nutrition Care: Continue to monitor while inpatient       Goals:     Goals: PO intake 75% or greater, by next RD assessment       Nutrition Monitoring and Evaluation:      Food/Nutrient Intake Outcomes: Diet Advancement/Tolerance, Food and Nutrient Intake, Supplement Intake  Physical Signs/Symptoms Outcomes: Biochemical Data, Chewing or Swallowing, GI Status, Fluid Status or Edema, Nutrition Focused Physical Findings, Skin, Weight    Discharge Planning:     Too soon to determine     Rakelibyulia Ramirez RD, LD  Contact: [phone]

## 2022-12-30 NOTE — PROGRESS NOTES
Orthopaedic Progress Note      SUBJECTIVE   Ms. Ayden Brar is post op day # 8    Patient s/p Stage I revision right knee with antibiotic spacer. no adverse events overnight  Tolerating oral diet  Mobilizing with therapy  Incision without drainage  Pain appraisal sporadic   Resting on exam    OBJECTIVE      Physical    VITALS:  BP (!) 121/59   Pulse 78   Temp 97.9 °F (36.6 °C) (Oral)   Resp 16   Ht 5' 7\" (1.702 m)   Wt 160 lb (72.6 kg)   SpO2 97%   BMI 25.06 kg/m²   I/O last 3 completed shifts: In: 540 [P.O.:540]  Out: 650 [Urine:650]    8/10 pain  Gen: alert and oriented, resting today  Head: normorcephalic, atraumatic  Resp: unlabored, room air  Pelvis: stable  RLE:  Dressing clean, dry, and intact. No active drainage from incision. ROM limited secondary to spacer. Able to perform SLR. NVI. Dorsalis pedal and posterior tibialis pulses strong and regular.       Data  CBC:   Lab Results   Component Value Date/Time    WBC 6.1 12/25/2022 05:17 AM    HGB 7.1 12/30/2022 06:57 AM     12/25/2022 05:17 AM     BMP:    Lab Results   Component Value Date/Time     12/23/2022 06:12 AM    K 4.1 12/23/2022 06:12 AM    K 4.6 05/09/2022 01:05 PM     12/23/2022 06:12 AM    CO2 22 12/23/2022 06:12 AM    BUN 7 12/23/2022 06:12 AM    CREATININE 0.4 12/23/2022 06:12 AM    CALCIUM 8.1 12/23/2022 06:12 AM    GLUCOSE 66 12/23/2022 06:12 AM    GLUCOSE 113 12/10/2020 10:01 AM     Uric Acid:  No components found for: URIC  PT/INR:    Lab Results   Component Value Date/Time    PROTIME 11.0 10/05/2016 03:39 PM    INR 1.44 12/01/2022 03:59 PM     Troponin:  No results found for: TROPONINI  Urine Culture:  No components found for: CURINE      Current Inpatient Medications    Current Facility-Administered Medications: miconazole (MICOTIN) 2 % powder, , Topical, BID  lidocaine PF 1 % injection 5 mL, 5 mL, IntraDERmal, Once  ceFAZolin (ANCEF) 2000 mg in dextrose 5 % 50 mL IVPB, 2,000 mg, IntraVENous, Q8H  atorvastatin (LIPITOR) tablet 40 mg, 40 mg, Oral, Nightly  baclofen (LIORESAL) tablet 10 mg, 10 mg, Oral, Q6H PRN  doxepin (SINEQUAN) capsule 40 mg, 40 mg, Oral, Nightly  fenofibrate (TRIGLIDE) tablet 160 mg, 160 mg, Oral, Daily  losartan (COZAAR) tablet 25 mg, 25 mg, Oral, Daily  melatonin tablet 3 mg, 3 mg, Oral, Nightly  multivitamin 1 tablet, 1 tablet, Oral, Daily  rivaroxaban (XARELTO) tablet 10 mg, 10 mg, Oral, Q24H  sodium chloride flush 0.9 % injection 5-40 mL, 5-40 mL, IntraVENous, 2 times per day  sodium chloride flush 0.9 % injection 5-40 mL, 5-40 mL, IntraVENous, PRN  0.9 % sodium chloride infusion, , IntraVENous, PRN  ondansetron (ZOFRAN-ODT) disintegrating tablet 4 mg, 4 mg, Oral, Q8H PRN **OR** ondansetron (ZOFRAN) injection 4 mg, 4 mg, IntraVENous, Q6H PRN  0.9 % sodium chloride infusion, , IntraVENous, Continuous  acetaminophen (TYLENOL) tablet 650 mg, 650 mg, Oral, Q4H PRN  morphine (PF) injection 2 mg, 2 mg, IntraVENous, Q2H PRN **OR** morphine injection 4 mg, 4 mg, IntraVENous, Q2H PRN  sennosides-docusate sodium (SENOKOT-S) 8.6-50 MG tablet 1 tablet, 1 tablet, Oral, BID  magnesium hydroxide (MILK OF MAGNESIA) 400 MG/5ML suspension 30 mL, 30 mL, Oral, Daily PRN  sodium phosphate (FLEET) rectal enema 1 enema, 1 enema, Rectal, Daily PRN  oxyCODONE-acetaminophen (PERCOCET) 5-325 MG per tablet 1 tablet, 1 tablet, Oral, Q4H PRN  oxyCODONE-acetaminophen (PERCOCET) 5-325 MG per tablet 2 tablet, 2 tablet, Oral, Q4H PRN        PLAN       1)  Cont with current medical management  2)  WBAT RLE, activity as tolerated  3)  Ice and elevation  4)  ID evaluated, picc placed  5)  ECF upon discharge, case management assisting  6)  dry dressing changes PRN  7)  PT OT as able  8)  hgb hct pending repeat draw this AM  9)  stable for discharge from ortho standpoint, pending SNF placement

## 2022-12-30 NOTE — CARE COORDINATION
12/30/22, 4:00 PM EST    DISCHARGE ON GOING EVALUATION    [facility] day: 8  Location: [facility]10/010-A Reason for admit: Infection of total right knee replacement, subsequent encounter [T84.53XD]  Post-operative state [Z98.890]   Procedure:   12/22 STAGE 1 RIGHT KNEE with insertion of antibiotic spacer with Dr Anish Muhammad  Barriers to Discharge: Ortho and ID following. POD 8. IV Ancef. Pain control. PT/OT. Plans 6 weeks of infusions.   Patient Goals/Plan/Treatment Preferences: precert started for [facility]

## 2022-12-30 NOTE — PROGRESS NOTES
Progress note: Infectious diseases    Patient - Darlene Avalos,  Age - 60 y.o.    - 1960      Room Number - 7K-10/010-A   MRN -  [id number]   Acct # - [de-identified]  Date of Admission -  2022  5:45 AM    SUBJECTIVE:   No new issues. OBJECTIVE   VITALS    height is 5' 7\" (1.702 m) and weight is 160 lb (72.6 kg). Her oral temperature is 97.9 °F (36.6 °C). Her blood pressure is 121/59 (abnormal) and her pulse is 78. Her respiration is 16 and oxygen saturation is 97%.        Wt Readings from Last 3 Encounters:   22 160 lb (72.6 kg)   22 158 lb (71.7 kg)   22 158 lb (71.7 kg)       I/O (24 Hours)    Intake/Output Summary (Last 24 hours) at 2022 8736  Last data filed at 2022 1211  Gross per 24 hour   Intake 240 ml   Output 350 ml   Net -110 ml         General Appearance  Awake, alert, oriented,  not  In acute distress  HEENT - normocephalic, atraumatic, slightly pale conjunctiva,  anicteric sclera  Neck - Supple, no mass  Lungs -  Bilateral  air entry, no rhonchi, no wheeze  Cardiovascular - Heart sounds are normal.    Abdomen - soft, not distended, nontender,   Neurologic -oriented  Skin - No bruising or bleeding  Extremities -dressed with right knee [postsurgical]  MEDICATIONS:      miconazole   Topical BID    lidocaine 1 % injection  5 mL IntraDERmal Once    ceFAZolin  2,000 mg IntraVENous Q8H    atorvastatin  40 mg Oral Nightly    doxepin  40 mg Oral Nightly    fenofibrate  160 mg Oral Daily    losartan  25 mg Oral Daily    melatonin  3 mg Oral Nightly    multivitamin  1 tablet Oral Daily    rivaroxaban  10 mg Oral Q24H    sodium chloride flush  5-40 mL IntraVENous 2 times per day    sennosides-docusate sodium  1 tablet Oral BID      sodium chloride      sodium chloride 50 mL/hr at 22 1220     baclofen, sodium chloride flush, sodium chloride, ondansetron **OR** ondansetron, acetaminophen, morphine **OR** morphine, magnesium hydroxide, sodium phosphate, oxyCODONE-acetaminophen, oxyCODONE-acetaminophen      LABS:     CBC:   Recent Labs     12/28/22  0333 12/29/22  0833 12/30/22  0657   HGB 7.0* 7.4* 7.1*       CULTURES:   UA: No results for input(s): SPECGRAV, PHUR, COLORU, CLARITYU, MUCUS, PROTEINU, BLOODU, RBCUA, WBCUA, BACTERIA, NITRU, GLUCOSEU, BILIRUBINUR, UROBILINOGEN, KETUA, LABCAST, LABCASTTY, AMORPHOS in the last 72 hours. Invalid input(s): CRYSTALS  Micro:   Lab Results   Component Value Date/Time    BC No growth-preliminary No growth 06/10/2022 03:00 PM    BC No growth-preliminary No growth 06/10/2022 03:00 PM       Problem list of patient:     Patient Active Problem List   Diagnosis Code    [facility] E11.9    Hyperlipemia E78.5    THORNTON (nonalcoholic steatohepatitis) K75.81    Obesity E66.9    Tobacco abuse Z72.0    Vitamin D deficiency E55.9    Seborrheic keratosis L82.1    Dyshidrotic eczema L30.1    Displaced articular fracture of head of right femur, sequela S72.061S    Neida-prosthetic supracondylar fracture of femur M97. 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    She will need 6 wks of iv antibiotics  Awaiting precertification for ECF  Will need weekly cbc bmp crp while on iv antibiotics      Victoria Roque MD, MD, FACP 12/30/2022 9:23 AM

## 2022-12-30 NOTE — CARE COORDINATION
12/30/22, 2:45 PM EST    DISCHARGE PLANNING EVALUATION    Spoke with [facility]. Facility will accept and has started precert.

## 2022-12-30 NOTE — PROGRESS NOTES
[facility]  Occupational Therapy  Daily Note  Time:   Time In: 9375  Time Out: 1430  Timed Code Treatment Minutes: 26 Minutes  Minutes: 26          Date: 2022  Patient Name: Brady Tracey,   Gender: female      [facility]  MRN: [id number]  : 1960  (60 y.o.)  Referring Practitioner: CRISTAL Blanco  Diagnosis: Infection of total right knee  Additional Pertinent Hx: STAGE 1 RIGHT KNEE with insertion of antibiotic spacer  by Dr Fadumo Gray; extensive knee history with several surgeries prior to this    Restrictions/Precautions:  Restrictions/Precautions: Fall Risk, Weight Bearing, General Precautions  Right Lower Extremity Weight Bearing: Weight Bearing As Tolerated  Position Activity Restriction  Other position/activity restrictions: B LE contractures     SUBJECTIVE: RN okayed OT treatment. Pt. Bed supine agreeable to participate. PAIN: BLE's not numerically rated    Vitals: Vitals not assessed per clinical judgement, see nursing flowsheet    COGNITION: WFL    ADL:   Grooming: Stand By Assistance and with set-up. Completed at EOB while seated . BALANCE:  Sitting Balance:  Supervision. Seated on EOB    BED MOBILITY:  Supine to Sit: Stand By Assistance, with rail, with verbal cues , with increased time for completion    Sit to Supine: Stand By Assistance, with verbal cues , with increased time for completion    Scooting: Stand By Assistance while seated to scoot to EOB    Pt. Declined to transfer into w/c. ADDITIONAL ACTIVITIES:  Pt completed B UE exs with light resistance band x =12 reps, x 1 set, with good tolerance of exs, with  RBs throughout, and visual and verbal cues for tech with resistance band to improve overall strength and activity tolerance to support Adls. ASSESSMENT:     Activity Tolerance:  Patient tolerance of  treatment: fair.        Discharge Recommendations: Subacute/skilled nursing facility  Equipment Recommendations: Equipment Needed: No  Plan: Times Per Week: 6x  Current Treatment Recommendations: Strengthening, Balance training, Functional mobility training, Endurance training, Patient/Caregiver education & training, Return to work related activity, Self-Care / ADL, Home management training, Safety education & training    Patient Education  Patient Education: ADL's, Home Exercise Program, and Importance of Increasing Activity    Goals  Short Term Goals  Time Frame for Short Term Goals: by discharge  Short Term Goal 1: Pt will complete sit pivot t/fs with no > than CGA and min VC for technique to increase indep with ADLs  Short Term Goal 2: Pt will complete LB ADLs with Min A and adaptive techniques prn to increase indep with dressing  Short Term Goal 3: Pt will complete ADL item retrieval from w/c level with Mod I and min safety cues for adaptations to increase indep with self care routine    Following session, patient left in safe position with all fall risk precautions in place.

## 2022-12-30 NOTE — PROGRESS NOTES
[address] [address]  INPATIENT PHYSICAL THERAPY  DAILY NOTE  [facility] ORTHOPEDICS 7K - 7K-10/010-A      Time In:   Time Out: 1108  Timed Code Treatment Minutes: 23 Minutes  Minutes: 23          Date: 2022  Patient Name: Cecilia Bishop,  Gender:  female        MRN: [id number]  : 1960  (60 y.o.)     Referring Practitioner: CRISTAL Mcgarry  Diagnosis: Infection of total right knee replacement, initial encounter  Additional Pertinent Hx: STAGE 1 RIGHT KNEE with insertion of antibiotic spacer  by Dr Neelam Horner; extensive knee history with several surgeries prior to this     Prior Level of Function:  Lives With: Spouse  Type of Home: House  Home Layout: One level  Home Access: Ramped entrance  Home Equipment: Walker, rolling, Wheelchair-manual   Bathroom Shower/Tub: Tub/Shower unit  Bathroom Toilet: Bedside commode    ADL Assistance: Independent  Homemaking Assistance: Independent  Ambulation Assistance: Non-ambulatory  Transfer Assistance: Independent  Active : Yes  IADL Comments:  helped her as needed, pt reports he is a \"worry wart\" and only wants her to transfer with him present. Pt's  has been doing most IADLs at home.   Additional Comments: Pt has been at SNF for therapy and recently returned home, she reports she was able to scoot from EOB to w/c but mostly remained in bed    Restrictions/Precautions:  Restrictions/Precautions: Fall Risk, Weight Bearing, General Precautions  Right Lower Extremity Weight Bearing: Weight Bearing As Tolerated  Position Activity Restriction  Other position/activity restrictions: B LE contractures       SUBJECTIVE: had attempted pt earlier and she declined due to not feeling well she was agreeable however took extra time to complete     PAIN: 8/10: at right LE more at anterior knee she denied any ice     Vitals: Vitals not assessed per clinical judgement, see nursing flowsheet    OBJECTIVE:  Bed Mobility:  Supine to Sit: [employer], with rail and pt took nearly 10 min to get sitting up, she did cross her left LE under the right to help it off the bed   Scooting: Contact Guard Assistance, with increased time for completion    Transfers:  Slide Board:Minimal Assistance in several scoots - she needed assist to place and remove the board- she initially was hesitant to use board but then did agree   Wheelchair mobility- pt propelled w/c in room with reginaldo UEs with mod I, she however required assist for removing and placing board   Exercise:  Patient was guided in 1 set(s) 10 reps of exercise to both lower extremities. Ankle pumps and Glut sets. Exercises were completed for increased independence with functional mobility. Functional Outcome Measures: Completed  AM-PAC Inpatient Mobility without Stair Climbing Raw Score : 11  AM-PAC Inpatient without Stair Climbing T-Scale Score : 35.66    ASSESSMENT:  Assessment: Patient progressing toward established goals. Activity Tolerance:  Patient tolerance of  treatment: fair. Equipment Recommendations:Equipment Needed: No  Discharge Recommendations: Subacte/Skilled Nursing Facility  Plan: Current Treatment Recommendations: Strengthening, Balance training, Functional mobility training, Transfer training, Endurance training, Safety education & training  General Plan:  (5x O)    Patient Education  Patient Education: Plan of Care, Transfers    Goals:  Patient Goals : none stated  Short Term Goals  Time Frame for Short Term Goals: by discharge  Short Term Goal 1: bed mobility with HOB flat, no rails, mod I for increased functional ind  Short Term Goal 2: sit pivot with LRD mod I for safe transfers  Short Term Goal 3: w/c mobility with mod I for safe household navigation  Long Term Goals  Time Frame for Long Term Goals : NA d/t short ELOS    Following session, patient left in safe position with all fall risk precautions in place.

## 2022-12-30 NOTE — PLAN OF CARE
Problem: Discharge Planning  Goal: Discharge to home or other facility with appropriate resources  Flowsheets (Taken 12/29/2022 2022)  Discharge to home or other facility with appropriate resources:   Identify barriers to discharge with patient and caregiver   Arrange for needed discharge resources and transportation as appropriate   Identify discharge learning needs (meds, wound care, etc)   Refer to discharge planning if patient needs post-hospital services based on physician order or complex needs related to functional status, cognitive ability or social support system     Problem: Chronic Conditions and Co-morbidities  Goal: Patient's chronic conditions and co-morbidity symptoms are monitored and maintained or improved  Flowsheets (Taken 12/29/2022 2022)  Care Plan - Patient's Chronic Conditions and Co-Morbidity Symptoms are Monitored and Maintained or Improved:   Monitor and assess patient's chronic conditions and comorbid symptoms for stability, deterioration, or improvement   Collaborate with multidisciplinary team to address chronic and comorbid conditions and prevent exacerbation or deterioration   Update acute care plan with appropriate goals if chronic or comorbid symptoms are exacerbated and prevent overall improvement and discharge     Problem: Pain  Goal: Verbalizes/displays adequate comfort level or baseline comfort level  Flowsheets (Taken 12/30/2022 0242)  Verbalizes/displays adequate comfort level or baseline comfort level:   Assess pain using appropriate pain scale   Implement non-pharmacological measures as appropriate and evaluate response   Administer analgesics based on type and severity of pain and evaluate response   Encourage patient to monitor pain and request assistance     Problem: Safety - Adult  Goal: Free from fall injury  Flowsheets (Taken 12/30/2022 0242)  Free From Fall Injury: Instruct family/caregiver on patient safety     Problem: ABCDS Injury Assessment  Goal: Absence of  physical injury  Flowsheets (Taken 12/30/2022 0242)  Absence of Physical Injury: Implement safety measures based on patient assessment     Problem: Skin/Tissue Integrity - Adult  Goal: Skin integrity remains intact  Recent Flowsheet Documentation  Taken 12/30/2022 0241 by Mynor Rodríguez RN  Skin Integrity Remains Intact:   Monitor for areas of redness and/or skin breakdown   Assess vascular access sites hourly  Taken 12/29/2022 2022 by Mynor Rodríguez RN  Skin Integrity Remains Intact:   Monitor for areas of redness and/or skin breakdown   Assess vascular access sites hourly  Goal: Incisions, wounds, or drain sites healing without S/S of infection  Flowsheets  Taken 12/30/2022 0241  Incisions, Wounds, or Drain Sites Healing Without Sign and Symptoms of Infection: ADMISSION and DAILY: Assess and document risk factors for pressure ulcer development  Taken 12/29/2022 2022  Incisions, Wounds, or Drain Sites Healing Without Sign and Symptoms of Infection:   ADMISSION and DAILY: Assess and document risk factors for pressure ulcer development   TWICE DAILY: Assess and document skin integrity   TWICE DAILY: Assess and document dressing/incision, wound bed, drain sites and surrounding tissue     Problem: Musculoskeletal - Adult  Goal: Return mobility to safest level of function  Recent Flowsheet Documentation  Taken 12/29/2022 2022 by Mynor Rodríguez RN  Return Mobility to Safest Level of Function:   Assess patient stability and activity tolerance for standing, transferring and ambulating with or without assistive devices   Assist with transfers and ambulation using safe patient handling equipment as needed   Ensure adequate protection for wounds/incisions during mobilization   Instruct patient/family in ordered activity level  Goal: Maintain proper alignment of affected body part  Recent Flowsheet Documentation  Taken 12/29/2022 2022 by Mynor Rodríguez RN  Maintain proper alignment of affected body part:   Support and protect limb and body alignment per provider's orders   Instruct and reinforce with patient and family use of appropriate assistive device and precautions (e.g. spinal or hip dislocation precautions)  Goal: Return ADL status to a safe level of function  Flowsheets (Taken 12/29/2022 2022)  Return ADL Status to a Safe Level of Function:   Administer medication as ordered   Assess activities of daily living deficits and provide assistive devices as needed   Assist and instruct patient to increase activity and self care as tolerated     Problem: Gastrointestinal - Adult  Goal: Minimal or absence of nausea and vomiting  Recent Flowsheet Documentation  Taken 12/29/2022 2022 by Addison Ward RN  Minimal or absence of nausea and vomiting:   Administer IV fluids as ordered to ensure adequate hydration   Provide nonpharmacologic comfort measures as appropriate   Administer ordered antiemetic medications as needed     Problem: Genitourinary - Adult  Goal: Absence of urinary retention  Flowsheets (Taken 12/29/2022 2022)  Absence of urinary retention:   Assess patients ability to void and empty bladder   Monitor intake/output and perform bladder scan as needed     Care plan reviewed with patient. Patient verbalized understanding of the plan of care and contribute to goal setting.

## 2022-12-31 LAB
HCT VFR BLD CALC: 23.9 % (ref 37–47)
HEMOGLOBIN: 7.3 GM/DL (ref 12–16)

## 2022-12-31 PROCEDURE — 6370000000 HC RX 637 (ALT 250 FOR IP): Performed by: PHYSICIAN ASSISTANT

## 2022-12-31 PROCEDURE — 85014 HEMATOCRIT: CPT

## 2022-12-31 PROCEDURE — 85018 HEMOGLOBIN: CPT

## 2022-12-31 PROCEDURE — 97535 SELF CARE MNGMENT TRAINING: CPT

## 2022-12-31 PROCEDURE — 1200000000 HC SEMI PRIVATE

## 2022-12-31 PROCEDURE — 2580000003 HC RX 258: Performed by: INTERNAL MEDICINE

## 2022-12-31 PROCEDURE — 2500000003 HC RX 250 WO HCPCS: Performed by: PHYSICIAN ASSISTANT

## 2022-12-31 PROCEDURE — 36415 COLL VENOUS BLD VENIPUNCTURE: CPT

## 2022-12-31 PROCEDURE — 6360000002 HC RX W HCPCS: Performed by: INTERNAL MEDICINE

## 2022-12-31 PROCEDURE — 2580000003 HC RX 258: Performed by: PHYSICIAN ASSISTANT

## 2022-12-31 RX ADMIN — OXYCODONE AND ACETAMINOPHEN 2 TABLET: 5; 325 TABLET ORAL at 05:38

## 2022-12-31 RX ADMIN — MICONAZOLE NITRATE: 2 POWDER TOPICAL at 08:15

## 2022-12-31 RX ADMIN — CEFAZOLIN 2000 MG: 10 INJECTION, POWDER, FOR SOLUTION INTRAVENOUS at 16:31

## 2022-12-31 RX ADMIN — OXYCODONE AND ACETAMINOPHEN 2 TABLET: 5; 325 TABLET ORAL at 20:40

## 2022-12-31 RX ADMIN — OXYCODONE AND ACETAMINOPHEN 2 TABLET: 5; 325 TABLET ORAL at 11:32

## 2022-12-31 RX ADMIN — CEFAZOLIN 2000 MG: 10 INJECTION, POWDER, FOR SOLUTION INTRAVENOUS at 06:37

## 2022-12-31 RX ADMIN — Medication 1 TABLET: at 08:15

## 2022-12-31 RX ADMIN — Medication 3 MG: at 20:40

## 2022-12-31 RX ADMIN — BACLOFEN 10 MG: 10 TABLET ORAL at 03:14

## 2022-12-31 RX ADMIN — OXYCODONE AND ACETAMINOPHEN 2 TABLET: 5; 325 TABLET ORAL at 00:51

## 2022-12-31 RX ADMIN — ATORVASTATIN CALCIUM 40 MG: 40 TABLET, FILM COATED ORAL at 20:41

## 2022-12-31 RX ADMIN — FENOFIBRATE 160 MG: 160 TABLET ORAL at 08:15

## 2022-12-31 RX ADMIN — RIVAROXABAN 10 MG: 10 TABLET, FILM COATED ORAL at 00:49

## 2022-12-31 RX ADMIN — MICONAZOLE NITRATE: 2 POWDER TOPICAL at 21:00

## 2022-12-31 RX ADMIN — SODIUM CHLORIDE, PRESERVATIVE FREE 10 ML: 5 INJECTION INTRAVENOUS at 08:14

## 2022-12-31 RX ADMIN — LOSARTAN POTASSIUM 25 MG: 25 TABLET, FILM COATED ORAL at 08:15

## 2022-12-31 RX ADMIN — DOXEPIN HYDROCHLORIDE 40 MG: 10 CAPSULE ORAL at 20:41

## 2022-12-31 RX ADMIN — OXYCODONE AND ACETAMINOPHEN 2 TABLET: 5; 325 TABLET ORAL at 16:26

## 2022-12-31 ASSESSMENT — PAIN SCALES - GENERAL
PAINLEVEL_OUTOF10: 8
PAINLEVEL_OUTOF10: 10
PAINLEVEL_OUTOF10: 8
PAINLEVEL_OUTOF10: 10

## 2022-12-31 ASSESSMENT — PAIN DESCRIPTION - ORIENTATION
ORIENTATION: RIGHT

## 2022-12-31 ASSESSMENT — PAIN DESCRIPTION - LOCATION
LOCATION: LEG

## 2022-12-31 ASSESSMENT — PAIN DESCRIPTION - DESCRIPTORS
DESCRIPTORS: ACHING
DESCRIPTORS: ACHING;SORE
DESCRIPTORS: ACHING
DESCRIPTORS: ACHING
DESCRIPTORS: SPASM
DESCRIPTORS: ACHING

## 2022-12-31 NOTE — PLAN OF CARE
Problem: Discharge Planning  Goal: Discharge to home or other facility with appropriate resources  Outcome: Progressing     Problem: Chronic Conditions and Co-morbidities  Goal: Patient's chronic conditions and co-morbidity symptoms are monitored and maintained or improved  Outcome: Progressing     Problem: Pain  Goal: Verbalizes/displays adequate comfort level or baseline comfort level  Outcome: Progressing     Problem: Safety - Adult  Goal: Free from fall injury  Outcome: Progressing     Problem: ABCDS Injury Assessment  Goal: Absence of physical injury  Outcome: Progressing     Problem: Skin/Tissue Integrity - Adult  Goal: Incisions, wounds, or drain sites healing without S/S of infection  Outcome: Progressing     Problem: Musculoskeletal - Adult  Goal: Return ADL status to a safe level of function  Outcome: Progressing     Problem: Genitourinary - Adult  Goal: Absence of urinary retention  Outcome: Progressing     Problem: Nutrition Deficit:  Goal: Optimize nutritional status  Outcome: Progressing   Care plan reviewed with patient. Patient verbalizes understanding of the plan of care and contributed to goal setting.

## 2022-12-31 NOTE — PROGRESS NOTES
Orthopaedic Progress Note      SUBJECTIVE   Ms. Dale Cornea is post op day # 9    Patient s/p Stage I revision right knee with antibiotic spacer. no adverse events overnight  Tolerating oral diet  Mobilizing with therapy  Incision without drainage  Pain appraisal sporadic   Resting on exam    OBJECTIVE      Physical    VITALS:  /61   Pulse 64   Temp 98.1 °F (36.7 °C) (Oral)   Resp 18   Ht 5' 7\" (1.702 m)   Wt 160 lb (72.6 kg)   SpO2 97%   BMI 25.06 kg/m²   I/O last 3 completed shifts: In: 1440 [P.O.:1440]  Out: 900 [Urine:900]    6/10 pain  Gen: alert and oriented, resting today  Head: normorcephalic, atraumatic  Resp: unlabored, room air  Pelvis: stable  RLE:  Dressing clean, dry, and intact. No active drainage from incision. ROM limited secondary to spacer. Able to perform SLR. NVI. Dorsalis pedal and posterior tibialis pulses strong and regular.       Data  CBC:   Lab Results   Component Value Date/Time    WBC 6.1 12/25/2022 05:17 AM    HGB 7.3 12/31/2022 06:26 AM     12/25/2022 05:17 AM     BMP:    Lab Results   Component Value Date/Time     12/23/2022 06:12 AM    K 4.1 12/23/2022 06:12 AM    K 4.6 05/09/2022 01:05 PM     12/23/2022 06:12 AM    CO2 22 12/23/2022 06:12 AM    BUN 7 12/23/2022 06:12 AM    CREATININE 0.4 12/23/2022 06:12 AM    CALCIUM 8.1 12/23/2022 06:12 AM    GLUCOSE 66 12/23/2022 06:12 AM    GLUCOSE 113 12/10/2020 10:01 AM     Uric Acid:  No components found for: URIC  PT/INR:    Lab Results   Component Value Date/Time    PROTIME 11.0 10/05/2016 03:39 PM    INR 1.44 12/01/2022 03:59 PM     Troponin:  No results found for: TROPONINI  Urine Culture:  No components found for: CURINE      Current Inpatient Medications    Current Facility-Administered Medications: miconazole (MICOTIN) 2 % powder, , Topical, BID  lidocaine PF 1 % injection 5 mL, 5 mL, IntraDERmal, Once  ceFAZolin (ANCEF) 2000 mg in dextrose 5 % 50 mL IVPB, 2,000 mg, IntraVENous, Q8H  atorvastatin (LIPITOR) tablet 40 mg, 40 mg, Oral, Nightly  baclofen (LIORESAL) tablet 10 mg, 10 mg, Oral, Q6H PRN  doxepin (SINEQUAN) capsule 40 mg, 40 mg, Oral, Nightly  fenofibrate (TRIGLIDE) tablet 160 mg, 160 mg, Oral, Daily  losartan (COZAAR) tablet 25 mg, 25 mg, Oral, Daily  melatonin tablet 3 mg, 3 mg, Oral, Nightly  multivitamin 1 tablet, 1 tablet, Oral, Daily  rivaroxaban (XARELTO) tablet 10 mg, 10 mg, Oral, Q24H  sodium chloride flush 0.9 % injection 5-40 mL, 5-40 mL, IntraVENous, 2 times per day  sodium chloride flush 0.9 % injection 5-40 mL, 5-40 mL, IntraVENous, PRN  0.9 % sodium chloride infusion, , IntraVENous, PRN  ondansetron (ZOFRAN-ODT) disintegrating tablet 4 mg, 4 mg, Oral, Q8H PRN **OR** ondansetron (ZOFRAN) injection 4 mg, 4 mg, IntraVENous, Q6H PRN  0.9 % sodium chloride infusion, , IntraVENous, Continuous  acetaminophen (TYLENOL) tablet 650 mg, 650 mg, Oral, Q4H PRN  morphine (PF) injection 2 mg, 2 mg, IntraVENous, Q2H PRN **OR** morphine injection 4 mg, 4 mg, IntraVENous, Q2H PRN  sennosides-docusate sodium (SENOKOT-S) 8.6-50 MG tablet 1 tablet, 1 tablet, Oral, BID  magnesium hydroxide (MILK OF MAGNESIA) 400 MG/5ML suspension 30 mL, 30 mL, Oral, Daily PRN  sodium phosphate (FLEET) rectal enema 1 enema, 1 enema, Rectal, Daily PRN  oxyCODONE-acetaminophen (PERCOCET) 5-325 MG per tablet 1 tablet, 1 tablet, Oral, Q4H PRN  oxyCODONE-acetaminophen (PERCOCET) 5-325 MG per tablet 2 tablet, 2 tablet, Oral, Q4H PRN        PLAN       1)  Cont with current medical management  2)  WBAT RLE, activity as tolerated  3)  Ice and elevation  4)  ID evaluated, picc placed  5)  ECF upon discharge, case management assisting  6)  dry dressing changes PRN  7)  PT OT as able  8)  hgb hct stable, continue to trend  9)  stable for discharge from ortho standpoint, pending SNF placement, precert started 90/41/39

## 2022-12-31 NOTE — PROGRESS NOTES
[facility]  Occupational Therapy  Daily Note  Time:   Time In: 1050  Time Out: 1130  Timed Code Treatment Minutes: 40 Minutes  Minutes: 40          Date: 2022  Patient Name: Michael Johnson,   Gender: female      Room: [facility]10/010-A  MRN: [id number]  : 1960  (60 y.o.)  Referring Practitioner: CRISTAL Walter  Diagnosis: Infection of total right knee  Additional Pertinent Hx: STAGE 1 RIGHT KNEE with insertion of antibiotic spacer  by Dr Clara Preciado; extensive knee history with several surgeries prior to this    Restrictions/Precautions:  Restrictions/Precautions: Fall Risk, Weight Bearing, General Precautions  Right Lower Extremity Weight Bearing: Weight Bearing As Tolerated  Position Activity Restriction  Other position/activity restrictions: B LE contractures     SUBJECTIVE: RN okayed OT session. Pt. In bed upon e arrival and agreeable to participate. PAIN: BLE's 9/10: with muscle spasms per Pt. When moves    Vitals: Nurse checked vitals prior to session    COGNITION: WFL    ADL:   Grooming: Stand By Assistance and with set-up. Completed seated at EOB  Bathing: Stand By Assistance, with set-up, and with verbal cues . Completed seated at EOB  Upper Extremity Dressing: Minimal Assistance. To don gown  Lower Extremity Dressing: Dependent. To don and doff undergarment  Toileting: Dependent. incontinent . BALANCE:  Sitting Balance:  Stand By Assistance. Seated on EOB    BED MOBILITY:  Supine to Sit: Stand By Assistance, with rail, with verbal cues , with increased time for completion    Sit to Supine: Stand By Assistance, with verbal cues , with increased time for completion    Scooting: Stand By Assistance, with rail, with increased time for completion      TRANSFERS:  Sliding Board: [employer]. From EOB to w/c assistance required to place and remove slide board.     FUNCTIONAL MOBILITY:  Assistive Device: Wheelchair  Assist Level:  Stand By Assistance. Distance:  with in room         Activity Tolerance:  Patient tolerance of  treatment: fair. Discharge Recommendations: Subacute/skilled nursing facility  Equipment Recommendations: Equipment Needed: No  Plan: Times Per Week: 6x  Current Treatment Recommendations: Strengthening, Balance training, Functional mobility training, Endurance training, Patient/Caregiver education & training, Return to work related activity, Self-Care / ADL, Home management training, Safety education & training    Patient Education  Patient Education: ADL's, Importance of Increasing Activity, and Assistive Device Safety    Goals  Short Term Goals  Time Frame for Short Term Goals: by discharge  Short Term Goal 1: Pt will complete sit pivot t/fs with no > than CGA and min VC for technique to increase indep with ADLs  Short Term Goal 2: Pt will complete LB ADLs with Min A and adaptive techniques prn to increase indep with dressing  Short Term Goal 3: Pt will complete ADL item retrieval from w/c level with Mod I and min safety cues for adaptations to increase indep with self care routine    Following session, patient left in safe position with all fall risk precautions in place.

## 2022-12-31 NOTE — PLAN OF CARE
Problem: Pain  Goal: Verbalizes/displays adequate comfort level or baseline comfort level  12/31/2022 0134 by Rubina Schmitz RN  Outcome: Not Progressing

## 2023-01-01 LAB
BASOPHILS # BLD: 0.5 %
BASOPHILS ABSOLUTE: 0 THOU/MM3 (ref 0–0.1)
EOSINOPHIL # BLD: 5.2 %
EOSINOPHILS ABSOLUTE: 0.3 THOU/MM3 (ref 0–0.4)
ERYTHROCYTE [DISTWIDTH] IN BLOOD BY AUTOMATED COUNT: 18.2 % (ref 11.5–14.5)
ERYTHROCYTE [DISTWIDTH] IN BLOOD BY AUTOMATED COUNT: 56.5 FL (ref 35–45)
HCT VFR BLD CALC: 24.7 % (ref 37–47)
HEMOGLOBIN: 7.7 GM/DL (ref 12–16)
IMMATURE GRANS (ABS): 0.06 THOU/MM3 (ref 0–0.07)
IMMATURE GRANULOCYTES: 0.9 %
LYMPHOCYTES # BLD: 21 %
LYMPHOCYTES ABSOLUTE: 1.3 THOU/MM3 (ref 1–4.8)
MCH RBC QN AUTO: 28.4 PG (ref 26–33)
MCHC RBC AUTO-ENTMCNC: 31.2 GM/DL (ref 32.2–35.5)
MCV RBC AUTO: 91.1 FL (ref 81–99)
MONOCYTES # BLD: 9.8 %
MONOCYTES ABSOLUTE: 0.6 THOU/MM3 (ref 0.4–1.3)
NUCLEATED RED BLOOD CELLS: 0 /100 WBC
PLATELET # BLD: 416 THOU/MM3 (ref 130–400)
PMV BLD AUTO: 10.7 FL (ref 9.4–12.4)
RBC # BLD: 2.71 MILL/MM3 (ref 4.2–5.4)
SEG NEUTROPHILS: 62.6 %
SEGMENTED NEUTROPHILS ABSOLUTE COUNT: 3.9 THOU/MM3 (ref 1.8–7.7)
WBC # BLD: 6.3 THOU/MM3 (ref 4.8–10.8)

## 2023-01-01 PROCEDURE — 6360000002 HC RX W HCPCS: Performed by: INTERNAL MEDICINE

## 2023-01-01 PROCEDURE — 36415 COLL VENOUS BLD VENIPUNCTURE: CPT

## 2023-01-01 PROCEDURE — 6370000000 HC RX 637 (ALT 250 FOR IP): Performed by: PHYSICIAN ASSISTANT

## 2023-01-01 PROCEDURE — 2580000003 HC RX 258: Performed by: PHYSICIAN ASSISTANT

## 2023-01-01 PROCEDURE — 85025 COMPLETE CBC W/AUTO DIFF WBC: CPT

## 2023-01-01 PROCEDURE — 1200000000 HC SEMI PRIVATE

## 2023-01-01 RX ADMIN — Medication 3 MG: at 19:40

## 2023-01-01 RX ADMIN — Medication 1 TABLET: at 08:30

## 2023-01-01 RX ADMIN — OXYCODONE AND ACETAMINOPHEN 2 TABLET: 5; 325 TABLET ORAL at 06:50

## 2023-01-01 RX ADMIN — CEFAZOLIN 2000 MG: 10 INJECTION, POWDER, FOR SOLUTION INTRAVENOUS at 00:46

## 2023-01-01 RX ADMIN — ATORVASTATIN CALCIUM 40 MG: 40 TABLET, FILM COATED ORAL at 19:39

## 2023-01-01 RX ADMIN — RIVAROXABAN 10 MG: 10 TABLET, FILM COATED ORAL at 00:48

## 2023-01-01 RX ADMIN — OXYCODONE AND ACETAMINOPHEN 2 TABLET: 5; 325 TABLET ORAL at 00:41

## 2023-01-01 RX ADMIN — SODIUM CHLORIDE, PRESERVATIVE FREE 10 ML: 5 INJECTION INTRAVENOUS at 00:49

## 2023-01-01 RX ADMIN — SODIUM CHLORIDE, PRESERVATIVE FREE 10 ML: 5 INJECTION INTRAVENOUS at 19:40

## 2023-01-01 RX ADMIN — MICONAZOLE NITRATE: 2 POWDER TOPICAL at 08:27

## 2023-01-01 RX ADMIN — BACLOFEN 10 MG: 10 TABLET ORAL at 14:04

## 2023-01-01 RX ADMIN — CEFAZOLIN 2000 MG: 10 INJECTION, POWDER, FOR SOLUTION INTRAVENOUS at 08:31

## 2023-01-01 RX ADMIN — FENOFIBRATE 160 MG: 160 TABLET ORAL at 08:30

## 2023-01-01 RX ADMIN — OXYCODONE AND ACETAMINOPHEN 2 TABLET: 5; 325 TABLET ORAL at 19:38

## 2023-01-01 RX ADMIN — CEFAZOLIN 2000 MG: 10 INJECTION, POWDER, FOR SOLUTION INTRAVENOUS at 16:36

## 2023-01-01 RX ADMIN — DOXEPIN HYDROCHLORIDE 40 MG: 10 CAPSULE ORAL at 19:39

## 2023-01-01 RX ADMIN — SODIUM CHLORIDE, PRESERVATIVE FREE 10 ML: 5 INJECTION INTRAVENOUS at 08:30

## 2023-01-01 RX ADMIN — BACLOFEN 10 MG: 10 TABLET ORAL at 02:59

## 2023-01-01 RX ADMIN — LOSARTAN POTASSIUM 25 MG: 25 TABLET, FILM COATED ORAL at 08:30

## 2023-01-01 RX ADMIN — OXYCODONE AND ACETAMINOPHEN 2 TABLET: 5; 325 TABLET ORAL at 14:04

## 2023-01-01 ASSESSMENT — PAIN SCALES - GENERAL
PAINLEVEL_OUTOF10: 9
PAINLEVEL_OUTOF10: 8
PAINLEVEL_OUTOF10: 8

## 2023-01-01 ASSESSMENT — PAIN DESCRIPTION - DESCRIPTORS
DESCRIPTORS: SPASM
DESCRIPTORS: CRAMPING
DESCRIPTORS: ACHING

## 2023-01-01 ASSESSMENT — PAIN DESCRIPTION - LOCATION
LOCATION: HIP
LOCATION: KNEE
LOCATION: LEG
LOCATION: LEG

## 2023-01-01 ASSESSMENT — PAIN DESCRIPTION - ORIENTATION
ORIENTATION: RIGHT
ORIENTATION: RIGHT;LEFT
ORIENTATION: RIGHT
ORIENTATION: RIGHT

## 2023-01-01 NOTE — PLAN OF CARE
Problem: Discharge Planning  Goal: Discharge to home or other facility with appropriate resources  1/1/2023 0928 by Katie Cohen RN  Outcome: Progressing  Flowsheets (Taken 12/31/2022 2030 by Lobito Llamas RN)  Discharge to home or other facility with appropriate resources:   Identify barriers to discharge with patient and caregiver   Arrange for needed discharge resources and transportation as appropriate   Identify discharge learning needs (meds, wound care, etc)   Arrange for interpreters to assist at discharge as needed  1/1/2023 0201 by Lobito Llaams RN  Outcome: Progressing  Flowsheets (Taken 12/31/2022 2030)  Discharge to home or other facility with appropriate resources:   Identify barriers to discharge with patient and caregiver   Arrange for needed discharge resources and transportation as appropriate   Identify discharge learning needs (meds, wound care, etc)   Arrange for interpreters to assist at discharge as needed     Problem: Chronic Conditions and Co-morbidities  Goal: Patient's chronic conditions and co-morbidity symptoms are monitored and maintained or improved  1/1/2023 0928 by Katie Cohen RN  Outcome: Progressing  Flowsheets (Taken 12/31/2022 2030 by Lobito Llamas RN)  Care Plan - Patient's Chronic Conditions and Co-Morbidity Symptoms are Monitored and Maintained or Improved:   Monitor and assess patient's chronic conditions and comorbid symptoms for stability, deterioration, or improvement   Collaborate with multidisciplinary team to address chronic and comorbid conditions and prevent exacerbation or deterioration   Update acute care plan with appropriate goals if chronic or comorbid symptoms are exacerbated and prevent overall improvement and discharge  1/1/2023 0201 by Lobito Llamas RN  Outcome: Progressing  Flowsheets (Taken 12/31/2022 2030)  Care Plan - Patient's Chronic Conditions and Co-Morbidity Symptoms are Monitored and Maintained or Improved:   Monitor and assess patient's chronic conditions and comorbid symptoms for stability, deterioration, or improvement   Collaborate with multidisciplinary team to address chronic and comorbid conditions and prevent exacerbation or deterioration   Update acute care plan with appropriate goals if chronic or comorbid symptoms are exacerbated and prevent overall improvement and discharge     Problem: Pain  Goal: Verbalizes/displays adequate comfort level or baseline comfort level  1/1/2023 0928 by Obie Avalos RN  Outcome: Progressing  Flowsheets (Taken 12/30/2022 1512 by Misha Carrillo RN)  Verbalizes/displays adequate comfort level or baseline comfort level:   Encourage patient to monitor pain and request assistance   Assess pain using appropriate pain scale   Implement non-pharmacological measures as appropriate and evaluate response   Administer analgesics based on type and severity of pain and evaluate response  1/1/2023 0201 by Aleida Alexandre RN  Outcome: Progressing  Flowsheets (Taken 12/30/2022 1512 by Misha Carrillo RN)  Verbalizes/displays adequate comfort level or baseline comfort level:   Encourage patient to monitor pain and request assistance   Assess pain using appropriate pain scale   Implement non-pharmacological measures as appropriate and evaluate response   Administer analgesics based on type and severity of pain and evaluate response     Problem: ABCDS Injury Assessment  Goal: Absence of physical injury  1/1/2023 0928 by Obie Avalos RN  Outcome: Progressing  Flowsheets (Taken 1/1/2023 0203 by Aleida Alexandre RN)  Absence of Physical Injury: Implement safety measures based on patient assessment  1/1/2023 0201 by Aleida Alexandre RN  Outcome: Progressing  Flowsheets (Taken 12/30/2022 0242 by Georg Cooks, RN)  Absence of Physical Injury: Implement safety measures based on patient assessment     Problem: Skin/Tissue Integrity - Adult  Goal: Skin integrity remains intact  Recent Flowsheet Documentation  Taken 1/1/2023 0203 by Terell Dee RN  Skin Integrity Remains Intact:   Monitor for areas of redness and/or skin breakdown   Assess vascular access sites hourly  Taken 12/31/2022 2030 by Terell Dee RN  Skin Integrity Remains Intact:   Monitor for areas of redness and/or skin breakdown   Assess vascular access sites hourly     Problem: Skin/Tissue Integrity - Adult  Goal: Incisions, wounds, or drain sites healing without S/S of infection  1/1/2023 0928 by Elma Sims RN  Outcome: Progressing  Flowsheets (Taken 1/1/2023 0203 by Terell Dee RN)  Incisions, Wounds, or Drain Sites Healing Without Sign and Symptoms of Infection:   ADMISSION and DAILY: Assess and document risk factors for pressure ulcer development   TWICE DAILY: Assess and document skin integrity   TWICE DAILY: Assess and document dressing/incision, wound bed, drain sites and surrounding tissue     Problem: Musculoskeletal - Adult  Goal: Return mobility to safest level of function  Recent Flowsheet Documentation  Taken 12/31/2022 2030 by Terell Dee RN  Return Mobility to Safest Level of Function:   Assess patient stability and activity tolerance for standing, transferring and ambulating with or without assistive devices   Assist with transfers and ambulation using safe patient handling equipment as needed   Ensure adequate protection for wounds/incisions during mobilization   Obtain physical therapy/occupational therapy consults as needed   Apply continuous passive motion per provider or physical therapy orders to increase flexion toward goal     Problem: Musculoskeletal - Adult  Goal: Maintain proper alignment of affected body part  Recent Flowsheet Documentation  Taken 12/31/2022 2030 by Terell Dee RN  Maintain proper alignment of affected body part: Support and protect limb and body alignment per provider's orders     Problem: Musculoskeletal - Adult  Goal: Return ADL status to a safe level of function  1/1/2023 0928 by Obie Avalos RN  Outcome: Progressing  Flowsheets (Taken 12/31/2022 2030 by Aleida Alexandre RN)  Return ADL Status to a Safe Level of Function:   Administer medication as ordered   Assess activities of daily living deficits and provide assistive devices as needed     Problem: Gastrointestinal - Adult  Goal: Minimal or absence of nausea and vomiting  Recent Flowsheet Documentation  Taken 12/31/2022 2030 by Aleida Alexandre RN  Minimal or absence of nausea and vomiting:   Administer IV fluids as ordered to ensure adequate hydration   Maintain NPO status until nausea and vomiting are resolved   Provide nonpharmacologic comfort measures as appropriate   Administer ordered antiemetic medications as needed     Problem: Genitourinary - Adult  Goal: Absence of urinary retention  1/1/2023 0928 by Obie Avalos RN  Outcome: Progressing  Flowsheets (Taken 12/31/2022 2030 by Aleida Alexandre RN)  Absence of urinary retention:   Assess patients ability to void and empty bladder   Monitor intake/output and perform bladder scan as needed  1/1/2023 0201 by Aleida Alexandre RN  Outcome: Progressing  Flowsheets (Taken 12/31/2022 2030)  Absence of urinary retention:   Assess patients ability to void and empty bladder   Monitor intake/output and perform bladder scan as needed     Problem: Nutrition Deficit:  Goal: Optimize nutritional status  1/1/2023 0928 by Obie Avalos RN  Outcome: Progressing  1/1/2023 0201 by Aleida Alexandre RN  Outcome: Progressing  Flowsheets (Taken 12/30/2022 1542 by Gurvinder Weaver, RD, LD)  Nutrient intake appropriate for improving, restoring, or maintaining nutritional needs:   Assess nutritional status and recommend course of action   Monitor oral intake, labs, and treatment plans   Recommend appropriate diets, oral nutritional supplements, and vitamin/mineral supplements

## 2023-01-01 NOTE — PLAN OF CARE
Problem: Discharge Planning  Goal: Discharge to home or other facility with appropriate resources  1/1/2023 0201 by Kathryn Arreguin RN  Outcome: Progressing  Flowsheets (Taken 12/31/2022 2030)  Discharge to home or other facility with appropriate resources:   Identify barriers to discharge with patient and caregiver   Arrange for needed discharge resources and transportation as appropriate   Identify discharge learning needs (meds, wound care, etc)   Arrange for interpreters to assist at discharge as needed     Problem: Chronic Conditions and Co-morbidities  Goal: Patient's chronic conditions and co-morbidity symptoms are monitored and maintained or improved  1/1/2023 0201 by Kathryn Arreguin RN  Outcome: Progressing  [address] (Taken 12/31/2022 2030)  Care Plan - Patient's Chronic Conditions and Co-Morbidity Symptoms are Monitored and Maintained or Improved:   Monitor and assess patient's chronic conditions and comorbid symptoms for stability, deterioration, or improvement   Collaborate with multidisciplinary team to address chronic and comorbid conditions and prevent exacerbation or deterioration   Update acute care plan with appropriate goals if chronic or comorbid symptoms are exacerbated and prevent overall improvement and discharge     Problem: Pain  Goal: Verbalizes/displays adequate comfort level or baseline comfort level  1/1/2023 0201 by Kathryn Arreguin RN  Outcome: Progressing  Flowsheets (Taken 12/30/2022 1512 by Marysol Daley RN)  Verbalizes/displays adequate comfort level or baseline comfort level:   Encourage patient to monitor pain and request assistance   Assess pain using appropriate pain scale   Implement non-pharmacological measures as appropriate and evaluate response   Administer analgesics based on type and severity of pain and evaluate response     Problem: Safety - Adult  Goal: Free from fall injury  1/1/2023 0201 by Kathryn Arreguin RN  Outcome: Progressing  Flowsheets (Taken 1/1/2023 0201)  Free From Fall Injury: Instruct family/caregiver on patient safety     Problem: ABCDS Injury Assessment  Goal: Absence of physical injury  1/1/2023 0201 by Adriana Berger RN  Outcome: Progressing  Flowsheets (Taken 12/30/2022 0242 by Monica Fernandez RN)  Absence of Physical Injury: Implement safety measures based on patient assessment     Problem: Skin/Tissue Integrity - Adult  Goal: Skin integrity remains intact  Recent Flowsheet Documentation  Taken 12/31/2022 2030 by Adriana Berger RN  Skin Integrity Remains Intact:   Monitor for areas of redness and/or skin breakdown   Assess vascular access sites hourly     Problem: Skin/Tissue Integrity - Adult  Goal: Incisions, wounds, or drain sites healing without S/S of infection  1/1/2023 0201 by Adriana Berger RN  Outcome: Progressing  Flowsheets (Taken 12/31/2022 2030)  Incisions, Wounds, or Drain Sites Healing Without Sign and Symptoms of Infection:   ADMISSION and DAILY: Assess and document risk factors for pressure ulcer development   TWICE DAILY: Assess and document skin integrity   TWICE DAILY: Assess and document dressing/incision, wound bed, drain sites and surrounding tissue     Problem: Musculoskeletal - Adult  Goal: Return mobility to safest level of function  Recent Flowsheet Documentation  Taken 12/31/2022 2030 by Adriana Berger RN  Return Mobility to Safest Level of Function:   Assess patient stability and activity tolerance for standing, transferring and ambulating with or without assistive devices   Assist with transfers and ambulation using safe patient handling equipment as needed   Ensure adequate protection for wounds/incisions during mobilization   Obtain physical therapy/occupational therapy consults as needed   Apply continuous passive motion per provider or physical therapy orders to increase flexion toward goal     Problem: Musculoskeletal - Adult  Goal: Maintain proper alignment of affected body part  Recent Flowsheet Documentation  Taken 12/31/2022 2030 by Ahsan Root RN  Maintain proper alignment of affected body part: Support and protect limb and body alignment per provider's orders     Problem: Musculoskeletal - Adult  Goal: Return ADL status to a safe level of function  1/1/2023 0201 by Ahsan Root RN  Outcome: Progressing  Flowsheets (Taken 12/31/2022 2030)  Return ADL Status to a Safe Level of Function:   Administer medication as ordered   Assess activities of daily living deficits and provide assistive devices as needed     Problem: Gastrointestinal - Adult  Goal: Minimal or absence of nausea and vomiting  Recent Flowsheet Documentation  Taken 12/31/2022 2030 by Ahsan Root RN  Minimal or absence of nausea and vomiting:   Administer IV fluids as ordered to ensure adequate hydration   Maintain NPO status until nausea and vomiting are resolved   Provide nonpharmacologic comfort measures as appropriate   Administer ordered antiemetic medications as needed     Problem: Genitourinary - Adult  Goal: Absence of urinary retention  1/1/2023 0201 by Ahsan Root RN  Outcome: Progressing  Flowsheets (Taken 12/31/2022 2030)  Absence of urinary retention:   Assess patients ability to void and empty bladder   Monitor intake/output and perform bladder scan as needed     Problem: Nutrition Deficit:  Goal: Optimize nutritional status  1/1/2023 0201 by Ahsan Root RN  Outcome: Progressing  Flowsheets (Taken 12/30/2022 1542 by Jose Maria Yarbrough, JOSELYN, LD)  Nutrient intake appropriate for improving, restoring, or maintaining nutritional needs:   Assess nutritional status and recommend course of action   Monitor oral intake, labs, and treatment plans   Recommend appropriate diets, oral nutritional supplements, and vitamin/mineral supplements   Care plan reviewed with patient. Patient verbalize understanding of the plan of care and contribute to goal setting.

## 2023-01-01 NOTE — PROGRESS NOTES
Orthopaedic Progress Note      SUBJECTIVE   Ms. Delatorre Mode is post op day # 10    Patient s/p Stage I revision right knee with antibiotic spacer. no adverse events overnight  Tolerating oral diet  Mobilizing with therapy  Incision without drainage  Pain appraisal sporadic   Resting on exam    OBJECTIVE      Physical    VITALS:  BP (!) 116/53   Pulse 88   Temp 98.2 °F (36.8 °C) (Oral)   Resp 16   Ht 5' 7\" (1.702 m)   Wt 160 lb (72.6 kg)   SpO2 94%   BMI 25.06 kg/m²   I/O last 3 completed shifts: In: 1520 [P.O.:1520]  Out: 650 [Urine:650]    7/10 pain  Gen: alert and oriented, resting today  Head: normorcephalic, atraumatic  Resp: unlabored, room air  Pelvis: stable  RLE:  Dressing clean, dry, and intact. No active drainage from incision. ROM limited secondary to spacer. Able to perform SLR. NVI. Dorsalis pedal and posterior tibialis pulses strong and regular.       Data  CBC:   Lab Results   Component Value Date/Time    WBC 6.1 12/25/2022 05:17 AM    HGB 7.3 12/31/2022 06:26 AM     12/25/2022 05:17 AM     BMP:    Lab Results   Component Value Date/Time     12/23/2022 06:12 AM    K 4.1 12/23/2022 06:12 AM    K 4.6 05/09/2022 01:05 PM     12/23/2022 06:12 AM    CO2 22 12/23/2022 06:12 AM    BUN 7 12/23/2022 06:12 AM    CREATININE 0.4 12/23/2022 06:12 AM    CALCIUM 8.1 12/23/2022 06:12 AM    GLUCOSE 66 12/23/2022 06:12 AM    GLUCOSE 113 12/10/2020 10:01 AM     Uric Acid:  No components found for: URIC  PT/INR:    Lab Results   Component Value Date/Time    PROTIME 11.0 10/05/2016 03:39 PM    INR 1.44 12/01/2022 03:59 PM     Troponin:  No results found for: TROPONINI  Urine Culture:  No components found for: CURINE      Current Inpatient Medications    Current Facility-Administered Medications: miconazole (MICOTIN) 2 % powder, , Topical, BID  lidocaine PF 1 % injection 5 mL, 5 mL, IntraDERmal, Once  ceFAZolin (ANCEF) 2000 mg in dextrose 5 % 50 mL IVPB, 2,000 mg, IntraVENous, Q8H  atorvastatin (LIPITOR) tablet 40 mg, 40 mg, Oral, Nightly  baclofen (LIORESAL) tablet 10 mg, 10 mg, Oral, Q6H PRN  doxepin (SINEQUAN) capsule 40 mg, 40 mg, Oral, Nightly  fenofibrate (TRIGLIDE) tablet 160 mg, 160 mg, Oral, Daily  losartan (COZAAR) tablet 25 mg, 25 mg, Oral, Daily  melatonin tablet 3 mg, 3 mg, Oral, Nightly  multivitamin 1 tablet, 1 tablet, Oral, Daily  rivaroxaban (XARELTO) tablet 10 mg, 10 mg, Oral, Q24H  sodium chloride flush 0.9 % injection 5-40 mL, 5-40 mL, IntraVENous, 2 times per day  sodium chloride flush 0.9 % injection 5-40 mL, 5-40 mL, IntraVENous, PRN  0.9 % sodium chloride infusion, , IntraVENous, PRN  ondansetron (ZOFRAN-ODT) disintegrating tablet 4 mg, 4 mg, Oral, Q8H PRN **OR** ondansetron (ZOFRAN) injection 4 mg, 4 mg, IntraVENous, Q6H PRN  0.9 % sodium chloride infusion, , IntraVENous, Continuous  acetaminophen (TYLENOL) tablet 650 mg, 650 mg, Oral, Q4H PRN  morphine (PF) injection 2 mg, 2 mg, IntraVENous, Q2H PRN **OR** morphine injection 4 mg, 4 mg, IntraVENous, Q2H PRN  sennosides-docusate sodium (SENOKOT-S) 8.6-50 MG tablet 1 tablet, 1 tablet, Oral, BID  magnesium hydroxide (MILK OF MAGNESIA) 400 MG/5ML suspension 30 mL, 30 mL, Oral, Daily PRN  sodium phosphate (FLEET) rectal enema 1 enema, 1 enema, Rectal, Daily PRN  oxyCODONE-acetaminophen (PERCOCET) 5-325 MG per tablet 1 tablet, 1 tablet, Oral, Q4H PRN  oxyCODONE-acetaminophen (PERCOCET) 5-325 MG per tablet 2 tablet, 2 tablet, Oral, Q4H PRN        PLAN       1)  Cont with current medical management  2)  WBAT RLE, activity as tolerated  3)  Ice and elevation  4)  ID evaluated, picc placed  5)  ECF upon discharge, case management assisting  6)  dry dressing changes PRN  7)  PT OT as able  8)  hbg hct pending, will follow  9)  stable for discharge from ortho standpoint, pending SNF placement, precert started 25/05/73

## 2023-01-02 LAB
BASOPHILS # BLD: 0.3 %
BASOPHILS ABSOLUTE: 0 THOU/MM3 (ref 0–0.1)
EOSINOPHIL # BLD: 3.6 %
EOSINOPHILS ABSOLUTE: 0.2 THOU/MM3 (ref 0–0.4)
ERYTHROCYTE [DISTWIDTH] IN BLOOD BY AUTOMATED COUNT: 18.6 % (ref 11.5–14.5)
ERYTHROCYTE [DISTWIDTH] IN BLOOD BY AUTOMATED COUNT: 60.3 FL (ref 35–45)
HCT VFR BLD CALC: 25.9 % (ref 37–47)
HEMOGLOBIN: 7.9 GM/DL (ref 12–16)
IMMATURE GRANS (ABS): 0.07 THOU/MM3 (ref 0–0.07)
IMMATURE GRANULOCYTES: 1 %
LYMPHOCYTES # BLD: 28.5 %
LYMPHOCYTES ABSOLUTE: 2 THOU/MM3 (ref 1–4.8)
MCH RBC QN AUTO: 28.2 PG (ref 26–33)
MCHC RBC AUTO-ENTMCNC: 30.5 GM/DL (ref 32.2–35.5)
MCV RBC AUTO: 92.5 FL (ref 81–99)
MONOCYTES # BLD: 9.4 %
MONOCYTES ABSOLUTE: 0.6 THOU/MM3 (ref 0.4–1.3)
NUCLEATED RED BLOOD CELLS: 0 /100 WBC
PLATELET # BLD: 430 THOU/MM3 (ref 130–400)
PMV BLD AUTO: 10.6 FL (ref 9.4–12.4)
RBC # BLD: 2.8 MILL/MM3 (ref 4.2–5.4)
SEG NEUTROPHILS: 57.2 %
SEGMENTED NEUTROPHILS ABSOLUTE COUNT: 3.9 THOU/MM3 (ref 1.8–7.7)
WBC # BLD: 6.9 THOU/MM3 (ref 4.8–10.8)

## 2023-01-02 PROCEDURE — 85025 COMPLETE CBC W/AUTO DIFF WBC: CPT

## 2023-01-02 PROCEDURE — 2580000003 HC RX 258: Performed by: INTERNAL MEDICINE

## 2023-01-02 PROCEDURE — 2580000003 HC RX 258: Performed by: PHYSICIAN ASSISTANT

## 2023-01-02 PROCEDURE — 6370000000 HC RX 637 (ALT 250 FOR IP): Performed by: PHYSICIAN ASSISTANT

## 2023-01-02 PROCEDURE — 6360000002 HC RX W HCPCS: Performed by: INTERNAL MEDICINE

## 2023-01-02 PROCEDURE — 97110 THERAPEUTIC EXERCISES: CPT

## 2023-01-02 PROCEDURE — 97535 SELF CARE MNGMENT TRAINING: CPT

## 2023-01-02 PROCEDURE — 1200000000 HC SEMI PRIVATE

## 2023-01-02 RX ADMIN — OXYCODONE AND ACETAMINOPHEN 2 TABLET: 5; 325 TABLET ORAL at 22:16

## 2023-01-02 RX ADMIN — LOSARTAN POTASSIUM 25 MG: 25 TABLET, FILM COATED ORAL at 08:26

## 2023-01-02 RX ADMIN — OXYCODONE AND ACETAMINOPHEN 2 TABLET: 5; 325 TABLET ORAL at 13:13

## 2023-01-02 RX ADMIN — FENOFIBRATE 160 MG: 160 TABLET ORAL at 08:26

## 2023-01-02 RX ADMIN — Medication 1 TABLET: at 08:26

## 2023-01-02 RX ADMIN — OXYCODONE AND ACETAMINOPHEN 2 TABLET: 5; 325 TABLET ORAL at 04:19

## 2023-01-02 RX ADMIN — DOXEPIN HYDROCHLORIDE 40 MG: 10 CAPSULE ORAL at 21:47

## 2023-01-02 RX ADMIN — CEFAZOLIN 2000 MG: 10 INJECTION, POWDER, FOR SOLUTION INTRAVENOUS at 15:15

## 2023-01-02 RX ADMIN — SODIUM CHLORIDE, PRESERVATIVE FREE 10 ML: 5 INJECTION INTRAVENOUS at 08:27

## 2023-01-02 RX ADMIN — Medication 3 MG: at 21:47

## 2023-01-02 RX ADMIN — SODIUM CHLORIDE, PRESERVATIVE FREE 10 ML: 5 INJECTION INTRAVENOUS at 21:47

## 2023-01-02 RX ADMIN — OXYCODONE AND ACETAMINOPHEN 2 TABLET: 5; 325 TABLET ORAL at 00:09

## 2023-01-02 RX ADMIN — ATORVASTATIN CALCIUM 40 MG: 40 TABLET, FILM COATED ORAL at 21:47

## 2023-01-02 RX ADMIN — MICONAZOLE NITRATE: 2 POWDER TOPICAL at 00:34

## 2023-01-02 RX ADMIN — BACLOFEN 10 MG: 10 TABLET ORAL at 04:19

## 2023-01-02 RX ADMIN — CEFAZOLIN 2000 MG: 10 INJECTION, POWDER, FOR SOLUTION INTRAVENOUS at 06:08

## 2023-01-02 RX ADMIN — BACLOFEN 10 MG: 10 TABLET ORAL at 13:13

## 2023-01-02 RX ADMIN — OXYCODONE AND ACETAMINOPHEN 2 TABLET: 5; 325 TABLET ORAL at 08:26

## 2023-01-02 RX ADMIN — CEFAZOLIN 2000 MG: 10 INJECTION, POWDER, FOR SOLUTION INTRAVENOUS at 00:11

## 2023-01-02 RX ADMIN — OXYCODONE AND ACETAMINOPHEN 2 TABLET: 5; 325 TABLET ORAL at 18:23

## 2023-01-02 RX ADMIN — BACLOFEN 10 MG: 10 TABLET ORAL at 21:48

## 2023-01-02 RX ADMIN — RIVAROXABAN 10 MG: 10 TABLET, FILM COATED ORAL at 00:34

## 2023-01-02 ASSESSMENT — PAIN SCALES - GENERAL
PAINLEVEL_OUTOF10: 8
PAINLEVEL_OUTOF10: 5
PAINLEVEL_OUTOF10: 8
PAINLEVEL_OUTOF10: 5
PAINLEVEL_OUTOF10: 8
PAINLEVEL_OUTOF10: 8
PAINLEVEL_OUTOF10: 9

## 2023-01-02 ASSESSMENT — PAIN DESCRIPTION - ORIENTATION
ORIENTATION: RIGHT

## 2023-01-02 ASSESSMENT — PAIN DESCRIPTION - FREQUENCY
FREQUENCY: CONTINUOUS

## 2023-01-02 ASSESSMENT — PAIN DESCRIPTION - DESCRIPTORS
DESCRIPTORS: ACHING

## 2023-01-02 ASSESSMENT — PAIN DESCRIPTION - PAIN TYPE
TYPE: ACUTE PAIN;SURGICAL PAIN

## 2023-01-02 ASSESSMENT — PAIN DESCRIPTION - ONSET
ONSET: ON-GOING

## 2023-01-02 ASSESSMENT — PAIN DESCRIPTION - LOCATION
LOCATION: KNEE

## 2023-01-02 ASSESSMENT — PAIN - FUNCTIONAL ASSESSMENT
PAIN_FUNCTIONAL_ASSESSMENT: PREVENTS OR INTERFERES SOME ACTIVE ACTIVITIES AND ADLS
PAIN_FUNCTIONAL_ASSESSMENT: PREVENTS OR INTERFERES WITH MANY ACTIVE NOT PASSIVE ACTIVITIES

## 2023-01-02 NOTE — PROGRESS NOTES
Patient incontinent of stool at this time. Patient refuses to let this nurse clean her up. Patient states she wants pain medication first and then \"someone else\" can clean her up. Tech alerted. Monitor. 0900: Patient allowed nurse aid to provide franklin-care at this time. Monitor.

## 2023-01-02 NOTE — PROGRESS NOTES
[address] [facility]  Occupational Therapy  Daily Note  Time:   Time In: 1412  Time Out: 1435  Timed Code Treatment Minutes: 23 Minutes  Minutes: 23          Date: 2023  Patient Name: Jesusita Ramírez,   Gender: female      Room: [facility]10/010-A  MRN: [id number]  : 1960  (60 y.o.)  Referring Practitioner: CRISTAL Maynard  Diagnosis: Infection of total right knee  Additional Pertinent Hx: STAGE 1 RIGHT KNEE with insertion of antibiotic spacer  by Dr Carissa Kang; extensive knee history with several surgeries prior to this    Restrictions/Precautions:  Restrictions/Precautions: Fall Risk, Weight Bearing, General Precautions  Right Lower Extremity Weight Bearing: Weight Bearing As Tolerated  Position Activity Restriction  Other position/activity restrictions: B LE contractures     SUBJECTIVE: RN okayed OT session. Pt. In room and agreeable to participate in OT session. Pt. Reports incontinence upon arrival.    PAIN: muscle spasms and pain with movement in RLE-no numerical scale given    Vitals: Vitals not assessed per clinical judgement, see nursing flowsheet    COGNITION: WFL    ADL:   Grooming: with set-up. .  Toileting:Incontinent of stool, and Dependent for franklin care. And to don and doff undergarment. BALANCE:  Sitting Balance:  Stand By Assistance. Seated on EOB      BED MOBILITY:  Rolling to Left: Minimal Assistance    Rolling to Right: Minimal Assistance    Supine to Sit: Stand By Assistance, with verbal cues , with increased time for completion    Sit to Supine: Stand By Assistance    Scooting: Stand By Assistance to scoot to EOB    TRANSFERS:  Pt. Declined to transfer into w/c this date. Pt. Educated on importance of getting out of bed, pt. Reports too fatigued to transfer into w/c.        ADDITIONAL ACTIVITIES:  Pt completed B UE exs with moderate resistance band x 12 reps, x 1 set, with good  tolerance of exs, with  RBs throughout, and visual and verbal cues for tech with resistance band to improve overall strength and activity tolerance to support Adls. ASSESSMENT:     Activity Tolerance:  Patient tolerance of  treatment: fair. Discharge Recommendations: Subacute/skilled nursing facility  Equipment Recommendations: Equipment Needed: No  Plan: Times Per Week: 6x  Current Treatment Recommendations: Strengthening, Balance training, Functional mobility training, Endurance training, Patient/Caregiver education & training, Return to work related activity, Self-Care / ADL, Home management training, Safety education & training    Patient Education  Patient Education: ADL's and Importance of Increasing Activity, Home Exercise Program    Goals  Short Term Goals  Time Frame for Short Term Goals: by discharge  Short Term Goal 1: Pt will complete sit pivot t/fs with no > than CGA and min VC for technique to increase indep with ADLs  Short Term Goal 2: Pt will complete LB ADLs with Min A and adaptive techniques prn to increase indep with dressing  Short Term Goal 3: Pt will complete ADL item retrieval from w/c level with Mod I and min safety cues for adaptations to increase indep with self care routine    Following session, patient left in safe position with all fall risk precautions in place.

## 2023-01-02 NOTE — PROGRESS NOTES
Orthopaedic Progress Note      SUBJECTIVE   Ms. Duval is post op day # 11    Patient s/p Stage I revision right knee with antibiotic spacer. no adverse events overnight  Tolerating oral diet  Mobilizing with therapy  Incision c/d/i  Mood intermittent, improved today  Resting on exam    OBJECTIVE      Physical    VITALS:  BP (!) 118/55   Pulse (!) 48   Temp 97.8 °F (36.6 °C) (Oral)   Resp 16   Ht 5' 7\" (1.702 m)   Wt 160 lb (72.6 kg)   SpO2 95%   BMI 25.06 kg/m²   I/O last 3 completed shifts: In: 18 [P.O.:1470; I.V.:10]  Out: 750 [Urine:750]    7/10 pain  Gen: alert and oriented, resting today  Head: normorcephalic, atraumatic  Resp: unlabored, room air  Pelvis: stable  RLE:  Dressing clean, dry, and intact. No active drainage from incision. ROM limited secondary to spacer. Able to perform SLR. NVI. Dorsalis pedal and posterior tibialis pulses strong and regular.       Data  CBC:   Lab Results   Component Value Date/Time    WBC 6.9 01/02/2023 04:25 AM    HGB 7.9 01/02/2023 04:25 AM     01/02/2023 04:25 AM     BMP:    Lab Results   Component Value Date/Time     12/23/2022 06:12 AM    K 4.1 12/23/2022 06:12 AM    K 4.6 05/09/2022 01:05 PM     12/23/2022 06:12 AM    CO2 22 12/23/2022 06:12 AM    BUN 7 12/23/2022 06:12 AM    CREATININE 0.4 12/23/2022 06:12 AM    CALCIUM 8.1 12/23/2022 06:12 AM    GLUCOSE 66 12/23/2022 06:12 AM    GLUCOSE 113 12/10/2020 10:01 AM     Uric Acid:  No components found for: URIC  PT/INR:    Lab Results   Component Value Date/Time    PROTIME 11.0 10/05/2016 03:39 PM    INR 1.44 12/01/2022 03:59 PM     Troponin:  No results found for: TROPONINI  Urine Culture:  No components found for: CURINE      Current Inpatient Medications    Current Facility-Administered Medications: miconazole (MICOTIN) 2 % powder, , Topical, BID  lidocaine PF 1 % injection 5 mL, 5 mL, IntraDERmal, Once  ceFAZolin (ANCEF) 2000 mg in dextrose 5 % 50 mL IVPB, 2,000 mg, IntraVENous, Q8H  atorvastatin (LIPITOR) tablet 40 mg, 40 mg, Oral, Nightly  baclofen (LIORESAL) tablet 10 mg, 10 mg, Oral, Q6H PRN  doxepin (SINEQUAN) capsule 40 mg, 40 mg, Oral, Nightly  fenofibrate (TRIGLIDE) tablet 160 mg, 160 mg, Oral, Daily  losartan (COZAAR) tablet 25 mg, 25 mg, Oral, Daily  melatonin tablet 3 mg, 3 mg, Oral, Nightly  multivitamin 1 tablet, 1 tablet, Oral, Daily  rivaroxaban (XARELTO) tablet 10 mg, 10 mg, Oral, Q24H  sodium chloride flush 0.9 % injection 5-40 mL, 5-40 mL, IntraVENous, 2 times per day  sodium chloride flush 0.9 % injection 5-40 mL, 5-40 mL, IntraVENous, PRN  0.9 % sodium chloride infusion, , IntraVENous, PRN  ondansetron (ZOFRAN-ODT) disintegrating tablet 4 mg, 4 mg, Oral, Q8H PRN **OR** ondansetron (ZOFRAN) injection 4 mg, 4 mg, IntraVENous, Q6H PRN  0.9 % sodium chloride infusion, , IntraVENous, Continuous  acetaminophen (TYLENOL) tablet 650 mg, 650 mg, Oral, Q4H PRN  morphine (PF) injection 2 mg, 2 mg, IntraVENous, Q2H PRN **OR** morphine injection 4 mg, 4 mg, IntraVENous, Q2H PRN  sennosides-docusate sodium (SENOKOT-S) 8.6-50 MG tablet 1 tablet, 1 tablet, Oral, BID  magnesium hydroxide (MILK OF MAGNESIA) 400 MG/5ML suspension 30 mL, 30 mL, Oral, Daily PRN  sodium phosphate (FLEET) rectal enema 1 enema, 1 enema, Rectal, Daily PRN  oxyCODONE-acetaminophen (PERCOCET) 5-325 MG per tablet 1 tablet, 1 tablet, Oral, Q4H PRN  oxyCODONE-acetaminophen (PERCOCET) 5-325 MG per tablet 2 tablet, 2 tablet, Oral, Q4H PRN        PLAN       1)  Cont with current medical management  2)  WBAT RLE, activity as tolerated  3)  Ice and elevation  4)  ID evaluated, picc placed  5)  ECF upon discharge, case management assisting  6)  dry dressing changes PRN  7)  PT OT as able  8)  hbg hct pending, will follow  9)  stable for discharge from ortho standpoint, pending SNF placement, precert started 14/18/60

## 2023-01-03 LAB
BASOPHILS # BLD: 0.6 %
BASOPHILS ABSOLUTE: 0 THOU/MM3 (ref 0–0.1)
EOSINOPHIL # BLD: 3.6 %
EOSINOPHILS ABSOLUTE: 0.2 THOU/MM3 (ref 0–0.4)
ERYTHROCYTE [DISTWIDTH] IN BLOOD BY AUTOMATED COUNT: 18.9 % (ref 11.5–14.5)
ERYTHROCYTE [DISTWIDTH] IN BLOOD BY AUTOMATED COUNT: 61.6 FL (ref 35–45)
HCT VFR BLD CALC: 25.3 % (ref 37–47)
HEMOGLOBIN: 7.6 GM/DL (ref 12–16)
IMMATURE GRANS (ABS): 0.05 THOU/MM3 (ref 0–0.07)
IMMATURE GRANULOCYTES: 0.7 %
LYMPHOCYTES # BLD: 26.4 %
LYMPHOCYTES ABSOLUTE: 1.8 THOU/MM3 (ref 1–4.8)
MCH RBC QN AUTO: 27.9 PG (ref 26–33)
MCHC RBC AUTO-ENTMCNC: 30 GM/DL (ref 32.2–35.5)
MCV RBC AUTO: 93 FL (ref 81–99)
MONOCYTES # BLD: 8.5 %
MONOCYTES ABSOLUTE: 0.6 THOU/MM3 (ref 0.4–1.3)
NUCLEATED RED BLOOD CELLS: 0 /100 WBC
PLATELET # BLD: 410 THOU/MM3 (ref 130–400)
PMV BLD AUTO: 10.4 FL (ref 9.4–12.4)
RBC # BLD: 2.72 MILL/MM3 (ref 4.2–5.4)
SEG NEUTROPHILS: 60.2 %
SEGMENTED NEUTROPHILS ABSOLUTE COUNT: 4 THOU/MM3 (ref 1.8–7.7)
WBC # BLD: 6.7 THOU/MM3 (ref 4.8–10.8)

## 2023-01-03 PROCEDURE — 97535 SELF CARE MNGMENT TRAINING: CPT

## 2023-01-03 PROCEDURE — 6370000000 HC RX 637 (ALT 250 FOR IP): Performed by: PHYSICIAN ASSISTANT

## 2023-01-03 PROCEDURE — 2580000003 HC RX 258: Performed by: INTERNAL MEDICINE

## 2023-01-03 PROCEDURE — 97110 THERAPEUTIC EXERCISES: CPT

## 2023-01-03 PROCEDURE — 2580000003 HC RX 258: Performed by: PHYSICIAN ASSISTANT

## 2023-01-03 PROCEDURE — 85025 COMPLETE CBC W/AUTO DIFF WBC: CPT

## 2023-01-03 PROCEDURE — 6360000002 HC RX W HCPCS: Performed by: PHYSICIAN ASSISTANT

## 2023-01-03 PROCEDURE — 6360000002 HC RX W HCPCS: Performed by: INTERNAL MEDICINE

## 2023-01-03 PROCEDURE — 1200000000 HC SEMI PRIVATE

## 2023-01-03 RX ADMIN — OXYCODONE AND ACETAMINOPHEN 2 TABLET: 5; 325 TABLET ORAL at 21:59

## 2023-01-03 RX ADMIN — OXYCODONE AND ACETAMINOPHEN 2 TABLET: 5; 325 TABLET ORAL at 03:19

## 2023-01-03 RX ADMIN — ATORVASTATIN CALCIUM 40 MG: 40 TABLET, FILM COATED ORAL at 21:59

## 2023-01-03 RX ADMIN — RIVAROXABAN 10 MG: 10 TABLET, FILM COATED ORAL at 00:43

## 2023-01-03 RX ADMIN — OXYCODONE AND ACETAMINOPHEN 2 TABLET: 5; 325 TABLET ORAL at 15:31

## 2023-01-03 RX ADMIN — Medication 3 MG: at 21:59

## 2023-01-03 RX ADMIN — CEFAZOLIN 2000 MG: 10 INJECTION, POWDER, FOR SOLUTION INTRAVENOUS at 06:21

## 2023-01-03 RX ADMIN — LOSARTAN POTASSIUM 25 MG: 25 TABLET, FILM COATED ORAL at 09:36

## 2023-01-03 RX ADMIN — ONDANSETRON 4 MG: 2 INJECTION INTRAMUSCULAR; INTRAVENOUS at 19:45

## 2023-01-03 RX ADMIN — FENOFIBRATE 160 MG: 160 TABLET ORAL at 09:36

## 2023-01-03 RX ADMIN — Medication 1 TABLET: at 09:36

## 2023-01-03 RX ADMIN — SODIUM CHLORIDE, PRESERVATIVE FREE 10 ML: 5 INJECTION INTRAVENOUS at 09:42

## 2023-01-03 RX ADMIN — OXYCODONE AND ACETAMINOPHEN 2 TABLET: 5; 325 TABLET ORAL at 09:40

## 2023-01-03 RX ADMIN — BACLOFEN 10 MG: 10 TABLET ORAL at 03:20

## 2023-01-03 RX ADMIN — CEFAZOLIN 2000 MG: 10 INJECTION, POWDER, FOR SOLUTION INTRAVENOUS at 15:34

## 2023-01-03 RX ADMIN — BACLOFEN 10 MG: 10 TABLET ORAL at 21:59

## 2023-01-03 RX ADMIN — DOXEPIN HYDROCHLORIDE 40 MG: 10 CAPSULE ORAL at 21:59

## 2023-01-03 RX ADMIN — CEFAZOLIN 2000 MG: 10 INJECTION, POWDER, FOR SOLUTION INTRAVENOUS at 00:43

## 2023-01-03 RX ADMIN — BACLOFEN 10 MG: 10 TABLET ORAL at 09:36

## 2023-01-03 ASSESSMENT — PAIN DESCRIPTION - PAIN TYPE: TYPE: ACUTE PAIN;SURGICAL PAIN

## 2023-01-03 ASSESSMENT — PAIN DESCRIPTION - FREQUENCY: FREQUENCY: CONTINUOUS

## 2023-01-03 ASSESSMENT — PAIN SCALES - GENERAL
PAINLEVEL_OUTOF10: 8

## 2023-01-03 ASSESSMENT — PAIN - FUNCTIONAL ASSESSMENT: PAIN_FUNCTIONAL_ASSESSMENT: PREVENTS OR INTERFERES SOME ACTIVE ACTIVITIES AND ADLS

## 2023-01-03 ASSESSMENT — PAIN DESCRIPTION - DESCRIPTORS: DESCRIPTORS: SHARP

## 2023-01-03 ASSESSMENT — PAIN DESCRIPTION - LOCATION
LOCATION: KNEE
LOCATION: KNEE

## 2023-01-03 ASSESSMENT — PAIN DESCRIPTION - ONSET: ONSET: ON-GOING

## 2023-01-03 ASSESSMENT — PAIN DESCRIPTION - ORIENTATION
ORIENTATION: RIGHT
ORIENTATION: RIGHT

## 2023-01-03 ASSESSMENT — PAIN DESCRIPTION - DIRECTION: RADIATING_TOWARDS: DOWN TO ANKLE

## 2023-01-03 NOTE — CARE COORDINATION
1/3/23, 2:22 PM EST    DISCHARGE PLANNING EVALUATION     Precert in progress for [facility], will accept once precert is approved.

## 2023-01-03 NOTE — PROGRESS NOTES
Orthopaedic Progress Note      SUBJECTIVE   Ms. Virginia Aviles is post op day # 12    Patient s/p Stage I revision right knee with antibiotic spacer. no adverse events overnight  Tolerating oral diet  Mobilizing with therapy  Incision c/d/i  Mood intermittent, improved today  Resting on exam    OBJECTIVE      Physical    VITALS:  /68   Pulse 80   Temp 97.8 °F (36.6 °C) (Oral)   Resp 16   Ht 5' 7\" (1.702 m)   Wt 160 lb (72.6 kg)   SpO2 94%   BMI 25.06 kg/m²   I/O last 3 completed shifts: In: 1980 [P.O.:1970; I.V.:10]  Out: 950 [Urine:950]    5/10 pain  Gen: alert and oriented, resting today  Head: normorcephalic, atraumatic  Resp: unlabored, room air  Pelvis: stable  RLE:  Dressing clean, dry, and intact. No active drainage from incision. ROM limited secondary to spacer. Able to perform SLR. NVI. Dorsalis pedal and posterior tibialis pulses strong and regular.       Data  CBC:   Lab Results   Component Value Date/Time    WBC 6.7 01/03/2023 06:23 AM    HGB 7.6 01/03/2023 06:23 AM     01/03/2023 06:23 AM     BMP:    Lab Results   Component Value Date/Time     12/23/2022 06:12 AM    K 4.1 12/23/2022 06:12 AM    K 4.6 05/09/2022 01:05 PM     12/23/2022 06:12 AM    CO2 22 12/23/2022 06:12 AM    BUN 7 12/23/2022 06:12 AM    CREATININE 0.4 12/23/2022 06:12 AM    CALCIUM 8.1 12/23/2022 06:12 AM    GLUCOSE 66 12/23/2022 06:12 AM    GLUCOSE 113 12/10/2020 10:01 AM     Uric Acid:  No components found for: URIC  PT/INR:    Lab Results   Component Value Date/Time    PROTIME 11.0 10/05/2016 03:39 PM    INR 1.44 12/01/2022 03:59 PM     Troponin:  No results found for: TROPONINI  Urine Culture:  No components found for: CURINE      Current Inpatient Medications    Current Facility-Administered Medications: miconazole (MICOTIN) 2 % powder, , Topical, BID  lidocaine PF 1 % injection 5 mL, 5 mL, IntraDERmal, Once  ceFAZolin (ANCEF) 2000 mg in dextrose 5 % 50 mL IVPB, 2,000 mg, IntraVENous, Q8H  atorvastatin (LIPITOR) tablet 40 mg, 40 mg, Oral, Nightly  baclofen (LIORESAL) tablet 10 mg, 10 mg, Oral, Q6H PRN  doxepin (SINEQUAN) capsule 40 mg, 40 mg, Oral, Nightly  fenofibrate (TRIGLIDE) tablet 160 mg, 160 mg, Oral, Daily  losartan (COZAAR) tablet 25 mg, 25 mg, Oral, Daily  melatonin tablet 3 mg, 3 mg, Oral, Nightly  multivitamin 1 tablet, 1 tablet, Oral, Daily  rivaroxaban (XARELTO) tablet 10 mg, 10 mg, Oral, Q24H  sodium chloride flush 0.9 % injection 5-40 mL, 5-40 mL, IntraVENous, 2 times per day  sodium chloride flush 0.9 % injection 5-40 mL, 5-40 mL, IntraVENous, PRN  0.9 % sodium chloride infusion, , IntraVENous, PRN  ondansetron (ZOFRAN-ODT) disintegrating tablet 4 mg, 4 mg, Oral, Q8H PRN **OR** ondansetron (ZOFRAN) injection 4 mg, 4 mg, IntraVENous, Q6H PRN  acetaminophen (TYLENOL) tablet 650 mg, 650 mg, Oral, Q4H PRN  morphine (PF) injection 2 mg, 2 mg, IntraVENous, Q2H PRN **OR** morphine injection 4 mg, 4 mg, IntraVENous, Q2H PRN  sennosides-docusate sodium (SENOKOT-S) 8.6-50 MG tablet 1 tablet, 1 tablet, Oral, BID  magnesium hydroxide (MILK OF MAGNESIA) 400 MG/5ML suspension 30 mL, 30 mL, Oral, Daily PRN  sodium phosphate (FLEET) rectal enema 1 enema, 1 enema, Rectal, Daily PRN  oxyCODONE-acetaminophen (PERCOCET) 5-325 MG per tablet 1 tablet, 1 tablet, Oral, Q4H PRN  oxyCODONE-acetaminophen (PERCOCET) 5-325 MG per tablet 2 tablet, 2 tablet, Oral, Q4H PRN        PLAN       1)  Cont with current medical management  2)  WBAT RLE, activity as tolerated  3)  Ice and elevation  4)  ID evaluated, picc placed  5)  ECF upon discharge, case management assisting  6)  dry dressing changes PRN  7)  PT OT as able  8)  hbg hct pending, will follow  9)  stable for discharge from ortho standpoint, pending SNF placement, precert started 40/75/27  10) follow up one week with Dr Selene Manzo

## 2023-01-03 NOTE — PROGRESS NOTES
Progress note: Infectious diseases    Patient - Brady Tracey,  Age - 60 y.o.    - 1960      Room Number - 7K-10/010-A   MRN -  [id number]   Acct # - [de-identified]  Date of Admission -  2022  5:45 AM  Late entry. SUBJECTIVE:   No new issues. OBJECTIVE   VITALS    height is 5' 7\" (1.702 m) and weight is 160 lb (72.6 kg). Her oral temperature is 98.3 °F (36.8 °C). Her blood pressure is 111/58 (abnormal) and her pulse is 94. Her respiration is 16 and oxygen saturation is 94%.        Wt Readings from Last 3 Encounters:   22 160 lb (72.6 kg)   22 158 lb (71.7 kg)   22 158 lb (71.7 kg)       I/O (24 Hours)    Intake/Output Summary (Last 24 hours) at 1/3/2023 1036  Last data filed at 1/3/2023 0319  Gross per 24 hour   Intake 1310 ml   Output 450 ml   Net 860 ml         General Appearance  Awake, alert, oriented,  not  In acute distress  HEENT - normocephalic, atraumatic, slightly pale conjunctiva,  anicteric sclera  Neck - Supple, no mass  Lungs -  Bilateral  air entry, no rhonchi, no wheeze  Cardiovascular - Heart sounds are normal.    Abdomen - soft, not distended, nontender,   Neurologic -oriented  Skin - No bruising or bleeding  Extremities -dressed with right knee  MEDICATIONS:      miconazole   Topical BID    lidocaine 1 % injection  5 mL IntraDERmal Once    ceFAZolin  2,000 mg IntraVENous Q8H    atorvastatin  40 mg Oral Nightly    doxepin  40 mg Oral Nightly    fenofibrate  160 mg Oral Daily    losartan  25 mg Oral Daily    melatonin  3 mg Oral Nightly    multivitamin  1 tablet Oral Daily    rivaroxaban  10 mg Oral Q24H    sodium chloride flush  5-40 mL IntraVENous 2 times per day    sennosides-docusate sodium  1 tablet Oral BID      sodium chloride       baclofen, sodium chloride flush, sodium chloride, ondansetron **OR** ondansetron, acetaminophen, morphine **OR** morphine, magnesium hydroxide, sodium phosphate, oxyCODONE-acetaminophen, oxyCODONE-acetaminophen      LABS:     CBC:   Recent Labs     01/01/23  0935 01/02/23  0425 01/03/23  0623   WBC 6.3 6.9 6.7   HGB 7.7* 7.9* 7.6*   * 430* 410*       CULTURES:   UA: No results for input(s): SPECGRAV, PHUR, COLORU, CLARITYU, MUCUS, PROTEINU, BLOODU, RBCUA, WBCUA, BACTERIA, NITRU, GLUCOSEU, BILIRUBINUR, UROBILINOGEN, KETUA, LABCAST, LABCASTTY, AMORPHOS in the last 72 hours. Invalid input(s): CRYSTALS  Micro:   Lab Results   Component Value Date/Time    BC No growth-preliminary No growth 06/10/2022 03:00 PM    BC No growth-preliminary No growth 06/10/2022 03:00 PM       Problem list of patient:     Patient Active Problem List   Diagnosis Code    Diabetes mellitus type 2, noninsulin dependent ([facility] [address] E11.9    Hyperlipemia E78.5    THORNTON (nonalcoholic steatohepatitis) K75.81    Obesity E66.9    Tobacco abuse Z72.0    Vitamin D deficiency E55.9    Seborrheic keratosis L82.1    Dyshidrotic eczema L30.1    Displaced articular fracture of head of right femur, sequela S72.061S    Neida-prosthetic supracondylar fracture of femur M97. 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    She will need 6 wks of iv antibiotics  Awaiting precertification for ECF  Will need weekly cbc bmp crp while on iv antibiotics      Chau Riley MD, MD, [address]  1/2/23

## 2023-01-03 NOTE — PROGRESS NOTES
[address] [address]  INPATIENT PHYSICAL THERAPY  DAILY NOTE  [facility] ORTHOPEDICS 7K - 7K-10/010-A    Time In: 4100  Time Out: 1433  Timed Code Treatment Minutes: 15 Minutes  Minutes: 19          Date: 1/3/2023  Patient Name: Roland Garcia,  Gender:  female        MRN: [id number]  : 1960  (60 y.o.)     Referring Practitioner: CRISTAL Jin  Diagnosis: Infection of total right knee replacement, initial encounter  Additional Pertinent Hx: STAGE 1 RIGHT KNEE with insertion of antibiotic spacer  by Dr Sita Almazan; extensive knee history with several surgeries prior to this     Prior Level of Function:  Lives With: Spouse  Type of Home: House  Home Layout: One level  Home Access: Ramped entrance  Home Equipment: Walker, rolling, Wheelchair-manual   Bathroom Shower/Tub: Tub/Shower unit  Bathroom Toilet: Bedside commode    ADL Assistance: Independent  Homemaking Assistance: Independent  Ambulation Assistance: Non-ambulatory  Transfer Assistance: Independent  Active : Yes  IADL Comments:  helped her as needed, pt reports he is a \"worry wart\" and only wants her to transfer with him present. Pt's  has been doing most IADLs at home. Additional Comments: Pt has been at SNF for therapy and recently returned home, she reports she was able to scoot from EOB to w/c but mostly remained in bed    Restrictions/Precautions:  Restrictions/Precautions: Fall Risk, Weight Bearing, General Precautions  Right Lower Extremity Weight Bearing: Weight Bearing As Tolerated  Position Activity Restriction  Other position/activity restrictions: B LE contractures     SUBJECTIVE: RN approved session. Pt in bed upon arrival and agrees to therapy. Pt reportign she had just gotten back into bed and she is really hurting but did agree to therex in bed. PAIN: 8/10: R knee and R thigh/hamstring did request percocet during session.  Also c/o nausea    Vitals: Oxygen: 94% on room air  Heart Rate: 92    OBJECTIVE:  Exercise:  Patient was guided in 1 set(s) 10 reps of exercise to both lower extremities.  Pt demos significant ROM deficits in reginaldo LEs, L knee unable to extend and stays in approx 90 deg flexion. RLE approx 60 deg, ankle pumps, glute sets, attempted to complete TKE into pillows however minimal effort noted with c/o pain. Hip ab/add, LLE ROM minimal, RLE AA needed LLE heel slides, AA for R SLR .  Exercises were completed for increased independence with functional mobility.    Functional Outcome Measures: Completed  AM-PAC Inpatient Mobility without Stair Climbing Raw Score : 11  AM-PAC Inpatient without Stair Climbing T-Scale Score : 35.66    ASSESSMENT:  Assessment: Patient progressing toward established goals.  Activity Tolerance:  Patient tolerance of  treatment: fair. Pt demos decreased strength, endurance, and independence with mobility. Pt limited by decreased ROM in reginaldo hips and knees. Pt will benefit from cont PT at this time to ensure safety, to decrease the risk for falls and to return to PLOF.      Equipment Recommendations:Equipment Needed: No  Discharge Recommendations: Subacte/Skilled Nursing Facility  Plan: Current Treatment Recommendations: Strengthening, Balance training, Functional mobility training, Transfer training, Endurance training, Safety education & training  General Plan:  (5x O)    Patient Education  Patient Education: Plan of Care, Verbal Exercise Instruction, role of PT, importance of mobility. positioning    Goals:  Patient Goals : none stated  Short Term Goals  Time Frame for Short Term Goals: by discharge  Short Term Goal 1: bed mobility with HOB flat, no rails, mod I for increased functional ind  Short Term Goal 2: sit pivot with LRD mod I for safe transfers  Short Term Goal 3: w/c mobility with mod I for safe household navigation  Long Term Goals  Time Frame for Long Term Goals : NA d/t short ELOS    Following session, patient left in safe position with all fall  risk precautions in place.

## 2023-01-03 NOTE — PROGRESS NOTES
[facility]  Occupational Therapy  Daily Note  Time:   Time In: 1020  Time Out: 1059  Timed Code Treatment Minutes: 44 Minutes  Minutes: 39          Date: 1/3/2023  Patient Name: Michael Fajardo,   Gender: female      Room: [facility]010-A  MRN: [id number]  : 1960  (60 y.o.)  Referring Practitioner: CRISTAL Lomeli  Diagnosis: Infection of total right knee  Additional Pertinent Hx: STAGE 1 RIGHT KNEE with insertion of antibiotic spacer  by Dr Lillette Schirmer; extensive knee history with several surgeries prior to this    Restrictions/Precautions:  Restrictions/Precautions: Fall Risk, Weight Bearing, General Precautions  Right Lower Extremity Weight Bearing: Weight Bearing As Tolerated  Position Activity Restriction  Other position/activity restrictions: B LE contractures     SUBJECTIVE: educated on positioning and preventing contracture Educated to keep knee extended and prevent flexing at the joint to decrease contracture. Pt receptive to feedback and  agreeable to education. Pt supine in bed with knee flexed and supported with pillow. Pt agreeable to Tx. PAIN: moans and groans in pain during movement, does not rate pain     Vitals: Vitals not assessed per clinical judgement, see nursing flowsheet    COGNITION: WFL    ADL:   Grooming: Supervision and with set-up. EOB sitting unsupported with increased time. Bathing: Stand By Assistance and with set-up. Siting EOB engaged in UB and face wash and with increased time  Upper Extremity Dressing: Minimal Assistance. Dani Mirza and off gown and with increased time . BALANCE:  Sitting Balance:  Supervision. 0 LOB sitting over x25min unsupported.      BED MOBILITY:  Rolling to Left: Stand By Assistance, with head of bed raised, with rail, with verbal cues , with increased time for completion    Rolling to Right: Stand By Assistance, with head of bed raised, with rail, with verbal cues , with increased time for completion    Supine to Sit: Stand By Assistance, with head of bed raised, with rail, with verbal cues , with increased time for completion    Sit to Supine: Stand By Assistance, with head of bed raised, with rail, with verbal cues , with increased time for completion    Scooting: Stand By Assistance, with head of bed raised, with verbal cues , with increased time for completion      ADDITIONAL ACTIVITIES:  Engaged in yellow 1x15 BUE ex's with yellow theraband, 1x15 ea in all planes of motion sitting unsupported with 0 LOB to increase UB strength for self care tasks. ASSESSMENT:     Activity Tolerance:  Patient tolerance of  treatment: good. Discharge Recommendations: ECF with OT  Equipment Recommendations: Equipment Needed: No  Plan: Times Per Week: 6x  Current Treatment Recommendations: Strengthening, Balance training, Functional mobility training, Endurance training, Patient/Caregiver education & training, Return to work related activity, Self-Care / ADL, Home management training, Safety education & training    Patient Education  Patient Education: Role of OT and Plan of Care    Goals  Short Term Goals  Time Frame for Short Term Goals: by discharge  Short Term Goal 1: Pt will complete sit pivot t/fs with no > than CGA and min VC for technique to increase indep with ADLs  Short Term Goal 2: Pt will complete LB ADLs with Min A and adaptive techniques prn to increase indep with dressing  Short Term Goal 3: Pt will complete ADL item retrieval from w/c level with Mod I and min safety cues for adaptations to increase indep with self care routine    Following session, patient left in safe position with all fall risk precautions in place.

## 2023-01-03 NOTE — PROGRESS NOTES
[address]  PHYSICAL THERAPY MISSED TREATMENT NOTE  [facility] ORTHOPEDICS 7K    Date: 1/3/2023  Patient Name: Abdullahi Cordero        MRN: [id number]   : 1960  (60 y.o.)  Gender: female   Referring Practitioner: CRISTAL Llanos  Diagnosis: Infection of total right knee replacement, initial encounter         REASON FOR MISSED TREATMENT:  Patient with other ancillary department.       Working with OT at time of attempt, will check back later if able

## 2023-01-03 NOTE — PROGRESS NOTES
Progress note: Infectious diseases    Patient - Eleanor Luna,  Age - 60 y.o.    - 1960      Room Number - 7K-10/010-A   MRN -  [id number]   Acct # - [de-identified]  Date of Admission -  2022  5:45 AM  Late entry. SUBJECTIVE:   No new issues. OBJECTIVE   VITALS    height is 5' 7\" (1.702 m) and weight is 160 lb (72.6 kg). Her oral temperature is 97.8 °F (36.6 °C). Her blood pressure is 134/68 and her pulse is 80. Her respiration is 16 and oxygen saturation is 94%.        Wt Readings from Last 3 Encounters:   22 160 lb (72.6 kg)   22 158 lb (71.7 kg)   22 158 lb (71.7 kg)       I/O (24 Hours)    Intake/Output Summary (Last 24 hours) at 1/3/2023 0777  Last data filed at 1/3/2023 0319  Gross per 24 hour   Intake 1310 ml   Output 450 ml   Net 860 ml         General Appearance  Awake, alert, oriented,  not  In acute distress  HEENT - normocephalic, atraumatic, slightly pale conjunctiva,  anicteric sclera  Neck - Supple, no mass  Lungs -  Bilateral  air entry, no rhonchi, no wheeze  Cardiovascular - Heart sounds are normal.    Abdomen - soft, not distended, nontender,   Neurologic -oriented  Skin - No bruising or bleeding  Extremities -dressed with right knee  MEDICATIONS:      miconazole   Topical BID    lidocaine 1 % injection  5 mL IntraDERmal Once    ceFAZolin  2,000 mg IntraVENous Q8H    atorvastatin  40 mg Oral Nightly    doxepin  40 mg Oral Nightly    fenofibrate  160 mg Oral Daily    losartan  25 mg Oral Daily    melatonin  3 mg Oral Nightly    multivitamin  1 tablet Oral Daily    rivaroxaban  10 mg Oral Q24H    sodium chloride flush  5-40 mL IntraVENous 2 times per day    sennosides-docusate sodium  1 tablet Oral BID      sodium chloride       baclofen, sodium chloride flush, sodium chloride, ondansetron **OR** ondansetron, acetaminophen, morphine **OR** morphine, magnesium hydroxide, sodium phosphate, oxyCODONE-acetaminophen, oxyCODONE-acetaminophen      LABS:     CBC:   Recent Labs     01/01/23  0935 01/02/23  0425 01/03/23  0623   WBC 6.3 6.9 6.7   HGB 7.7* 7.9* 7.6*   * 430* 410*       CULTURES:   UA: No results for input(s): SPECGRAV, PHUR, COLORU, CLARITYU, MUCUS, PROTEINU, BLOODU, RBCUA, WBCUA, BACTERIA, NITRU, GLUCOSEU, BILIRUBINUR, UROBILINOGEN, KETUA, LABCAST, LABCASTTY, AMORPHOS in the last 72 hours. Invalid input(s): CRYSTALS  Micro:   Lab Results   Component Value Date/Time    BC No growth-preliminary No growth 06/10/2022 03:00 PM    BC No growth-preliminary No growth 06/10/2022 03:00 PM       Problem list of patient:     Patient Active Problem List   Diagnosis Code    Diabetes mellitus type 2, noninsulin dependent ([facility] [address] E11.9    Hyperlipemia E78.5    THORTNON (nonalcoholic steatohepatitis) K75.81    Obesity E66.9    Tobacco abuse Z72.0    Vitamin D deficiency E55.9    Seborrheic keratosis L82.1    Dyshidrotic eczema L30.1    Displaced articular fracture of head of right femur, sequela S72.061S    Neida-prosthetic supracondylar fracture of femur M97. 8XXA, [id number]    Primary hypertension I10    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) T84.53XA    History of total knee replacement, right Z96.651    Hypokalemia E87.6    Enterocolitis due to Clostridium difficile, not specified as recurrent A04.72    Recurrent major depressive disorder ([facility] [address] F33.9    Aftercare following knee joint replacement surgery Z47.1, Z96.659    Post-operative state Z98.890         ASSESSMENT/PLAN   Right knee infected prosthesis status post first stage revision  Infection with MSSA  Continue IV Ancef.    She will need 6 wks of iv antibiotics  Awaiting precertification for ECF  Will need weekly cbc bmp crp while on iv antibiotics      Fan Ignacio MD, MD, [facility]  1/2/23

## 2023-01-04 PROCEDURE — 97530 THERAPEUTIC ACTIVITIES: CPT

## 2023-01-04 PROCEDURE — 2580000003 HC RX 258: Performed by: PHYSICIAN ASSISTANT

## 2023-01-04 PROCEDURE — 6370000000 HC RX 637 (ALT 250 FOR IP): Performed by: PHYSICIAN ASSISTANT

## 2023-01-04 PROCEDURE — 97535 SELF CARE MNGMENT TRAINING: CPT

## 2023-01-04 PROCEDURE — 2580000003 HC RX 258: Performed by: INTERNAL MEDICINE

## 2023-01-04 PROCEDURE — 1200000000 HC SEMI PRIVATE

## 2023-01-04 PROCEDURE — 6360000002 HC RX W HCPCS: Performed by: INTERNAL MEDICINE

## 2023-01-04 RX ADMIN — ATORVASTATIN CALCIUM 40 MG: 40 TABLET, FILM COATED ORAL at 21:55

## 2023-01-04 RX ADMIN — Medication 1 TABLET: at 08:47

## 2023-01-04 RX ADMIN — SODIUM CHLORIDE, PRESERVATIVE FREE 10 ML: 5 INJECTION INTRAVENOUS at 00:31

## 2023-01-04 RX ADMIN — CEFAZOLIN 2000 MG: 10 INJECTION, POWDER, FOR SOLUTION INTRAVENOUS at 15:07

## 2023-01-04 RX ADMIN — OXYCODONE AND ACETAMINOPHEN 2 TABLET: 5; 325 TABLET ORAL at 03:49

## 2023-01-04 RX ADMIN — OXYCODONE AND ACETAMINOPHEN 2 TABLET: 5; 325 TABLET ORAL at 22:29

## 2023-01-04 RX ADMIN — SODIUM CHLORIDE, PRESERVATIVE FREE 10 ML: 5 INJECTION INTRAVENOUS at 21:55

## 2023-01-04 RX ADMIN — SODIUM CHLORIDE, PRESERVATIVE FREE 10 ML: 5 INJECTION INTRAVENOUS at 08:47

## 2023-01-04 RX ADMIN — CEFAZOLIN 2000 MG: 10 INJECTION, POWDER, FOR SOLUTION INTRAVENOUS at 06:37

## 2023-01-04 RX ADMIN — LOSARTAN POTASSIUM 25 MG: 25 TABLET, FILM COATED ORAL at 08:47

## 2023-01-04 RX ADMIN — BACLOFEN 10 MG: 10 TABLET ORAL at 17:14

## 2023-01-04 RX ADMIN — OXYCODONE AND ACETAMINOPHEN 2 TABLET: 5; 325 TABLET ORAL at 08:49

## 2023-01-04 RX ADMIN — MICONAZOLE NITRATE: 2 POWDER TOPICAL at 08:48

## 2023-01-04 RX ADMIN — Medication 3 MG: at 21:55

## 2023-01-04 RX ADMIN — CEFAZOLIN 2000 MG: 10 INJECTION, POWDER, FOR SOLUTION INTRAVENOUS at 22:24

## 2023-01-04 RX ADMIN — CEFAZOLIN 2000 MG: 10 INJECTION, POWDER, FOR SOLUTION INTRAVENOUS at 00:29

## 2023-01-04 RX ADMIN — FENOFIBRATE 160 MG: 160 TABLET ORAL at 08:47

## 2023-01-04 RX ADMIN — DOXEPIN HYDROCHLORIDE 40 MG: 10 CAPSULE ORAL at 21:55

## 2023-01-04 RX ADMIN — OXYCODONE AND ACETAMINOPHEN 2 TABLET: 5; 325 TABLET ORAL at 14:56

## 2023-01-04 RX ADMIN — BACLOFEN 10 MG: 10 TABLET ORAL at 03:49

## 2023-01-04 RX ADMIN — RIVAROXABAN 10 MG: 10 TABLET, FILM COATED ORAL at 23:59

## 2023-01-04 RX ADMIN — RIVAROXABAN 10 MG: 10 TABLET, FILM COATED ORAL at 00:29

## 2023-01-04 ASSESSMENT — PAIN SCALES - GENERAL
PAINLEVEL_OUTOF10: 8
PAINLEVEL_OUTOF10: 8
PAINLEVEL_OUTOF10: 4
PAINLEVEL_OUTOF10: 3
PAINLEVEL_OUTOF10: 9
PAINLEVEL_OUTOF10: 4
PAINLEVEL_OUTOF10: 8

## 2023-01-04 ASSESSMENT — PAIN DESCRIPTION - DESCRIPTORS
DESCRIPTORS: ACHING
DESCRIPTORS: SHARP;SPASM
DESCRIPTORS: SPASM

## 2023-01-04 ASSESSMENT — PAIN DESCRIPTION - LOCATION
LOCATION: LEG
LOCATION: KNEE

## 2023-01-04 ASSESSMENT — PAIN DESCRIPTION - PAIN TYPE
TYPE: SURGICAL PAIN

## 2023-01-04 ASSESSMENT — PAIN DESCRIPTION - ORIENTATION
ORIENTATION: RIGHT

## 2023-01-04 ASSESSMENT — PAIN - FUNCTIONAL ASSESSMENT
PAIN_FUNCTIONAL_ASSESSMENT: ACTIVITIES ARE NOT PREVENTED
PAIN_FUNCTIONAL_ASSESSMENT: PREVENTS OR INTERFERES SOME ACTIVE ACTIVITIES AND ADLS
PAIN_FUNCTIONAL_ASSESSMENT: ACTIVITIES ARE NOT PREVENTED

## 2023-01-04 ASSESSMENT — PAIN DESCRIPTION - FREQUENCY
FREQUENCY: CONTINUOUS
FREQUENCY: CONTINUOUS

## 2023-01-04 NOTE — PROGRESS NOTES
[place] [address] PROGRESS NOTE      Patient: Jackson Diop  Room #: 7K-10/010-A            YOB: 1960  Age: 60 y.o. Gender: female            Admit Date & Time: 12/22/2022  5:45 AM    Assessment:  Wero Mar is a 60year old female who is in bed on 7k. She is here due to infection in her hardware on her knee. She spoke of her cat and dog at home and her long bout with this infection. She is hopeful and realistic. We had a pleasant conversation. No family is in the room at this time    Interventions:  Prayer for her healing and strength were offered and accepted. She is optimistic and is aware that she will need rehab and a place to be while she is on the antibiotics being given to her by IV. She stated, maybe she will be going back to the [facility]. Outcomes:  Encouraged and thankful. Plan: May be going back to an [facility] for rehab. Care Plan:  Continue spiritual and emotional care for patient and family. Including prayers.       Electronically signed by Kush Bullock, on 1/4/2023 at 3:24 PM.  [facility]  [phone]

## 2023-01-04 NOTE — PROGRESS NOTES
[facility]  Occupational Therapy  Daily Note  Time:   Time In: 6177  Time Out: 1344  Timed Code Treatment Minutes: 29 Minutes  Minutes: 29          Date: 2023  Patient Name: Bouchra Claudio,   Gender: female      Room: [facility]10010-A  MRN: [id number]  : 1960  (60 y.o.)  Referring Practitioner: CRISTAL Gomes  Diagnosis: Infection of total right knee  Additional Pertinent Hx: STAGE 1 RIGHT KNEE with insertion of antibiotic spacer  by Dr Hugo Galvan; extensive knee history with several surgeries prior to this    Restrictions/Precautions:  Restrictions/Precautions: Fall Risk, Weight Bearing, General Precautions  Right Lower Extremity Weight Bearing: Weight Bearing As Tolerated  Position Activity Restriction  Other position/activity restrictions: B LE contractures     SUBJECTIVE: Pt sitting at the EOB upon arrival, pt's  present during session and educated on safe slide board transfers, pt agreeable to OT session     PAIN: 0/10:     Vitals: Nurse checked vitals prior to session    COGNITION: Slow Processing    ADL:   Grooming: Maximum Assistance. For washing hair due to pt unable to get in the shower at this time  . TRANSFERS:  Sliding Board: Moderate Assistance. From the EOB to the w/c with 3 stops on slide board, vc's for hand placement and for safety    FUNCTIONAL MOBILITY:  Assistive Device: Wheelchair  Assist Level:  Stand By Assistance. Distance:  short distances     ASSESSMENT:     Activity Tolerance:  Patient tolerance of  treatment: good.        Discharge Recommendations: ECF with OT  Equipment Recommendations: Equipment Needed: No  Plan: Times Per Week: 3-5x  Current Treatment Recommendations: Strengthening, Balance training, Functional mobility training, Endurance training, Patient/Caregiver education & training, Return to work related activity, Self-Care / ADL, Home management training, Safety education & training    Patient Education  Patient Education: ADL's    Goals  Short Term Goals  Time Frame for Short Term Goals: by discharge  Short Term Goal 1: Pt will complete sit pivot t/fs with no > than CGA and min VC for technique to increase indep with ADLs  Short Term Goal 2: Pt will complete LB ADLs with Min A and adaptive techniques prn to increase indep with dressing  Short Term Goal 3: Pt will complete ADL item retrieval from w/c level with Mod I and min safety cues for adaptations to increase indep with self care routine    Following session, patient left in safe position with all fall risk precautions in place.

## 2023-01-04 NOTE — PLAN OF CARE
Problem: Discharge Planning  Goal: Discharge to home or other facility with appropriate resources  1/4/2023 1414 by Lakisha Berg RN  Outcome: Progressing  Flowsheets (Taken 1/4/2023 4166)  Discharge to home or other facility with appropriate resources:   Identify barriers to discharge with patient and caregiver   Arrange for needed discharge resources and transportation as appropriate   Identify discharge learning needs (meds, wound care, etc)   Refer to discharge planning if patient needs post-hospital services based on physician order or complex needs related to functional status, cognitive ability or social support system     Problem: Chronic Conditions and Co-morbidities  Goal: Patient's chronic conditions and co-morbidity symptoms are monitored and maintained or improved  1/4/2023 1414 by Lakisha Berg RN  Outcome: Progressing  Flowsheets (Taken 1/4/2023 0843)  Care Plan - Patient's Chronic Conditions and Co-Morbidity Symptoms are Monitored and Maintained or Improved:   Monitor and assess patient's chronic conditions and comorbid symptoms for stability, deterioration, or improvement   Collaborate with multidisciplinary team to address chronic and comorbid conditions and prevent exacerbation or deterioration   Update acute care plan with appropriate goals if chronic or comorbid symptoms are exacerbated and prevent overall improvement and discharge     Problem: Pain  Goal: Verbalizes/displays adequate comfort level or baseline comfort level  1/4/2023 1414 by Lakisha Berg RN  Outcome: Progressing  Flowsheets (Taken 1/4/2023 0843)  Verbalizes/displays adequate comfort level or baseline comfort level:   Encourage patient to monitor pain and request assistance   Assess pain using appropriate pain scale   Administer analgesics based on type and severity of pain and evaluate response   Implement non-pharmacological measures as appropriate and evaluate response   Notify Licensed Independent Practitioner if interventions unsuccessful or patient reports new pain     Problem: Safety - Adult  Goal: Free from fall injury  1/4/2023 1414 by Stas Toro RN  Outcome: Progressing  Flowsheets (Taken 1/1/2023 0203 by Di Lee RN)  Free From Fall Injury: Instruct family/caregiver on patient safety     Problem: ABCDS Injury Assessment  Goal: Absence of physical injury  1/4/2023 1414 by Stas Toro RN  Outcome: Cindy Cart (Taken 1/1/2023 0203 by Di Lee, RN)  Absence of Physical Injury: Implement safety measures based on patient assessment     Problem: Skin/Tissue Integrity - Adult  Goal: Incisions, wounds, or drain sites healing without S/S of infection  1/4/2023 1414 by Stas Toro RN  Outcome: Progressing  Flowsheets (Taken 1/4/2023 0843)  Incisions, Wounds, or Drain Sites Healing Without Sign and Symptoms of Infection: TWICE DAILY: Assess and document skin integrity     Problem: Musculoskeletal - Adult  Goal: Return ADL status to a safe level of function  1/4/2023 1414 by Stas Toro RN  Outcome: Progressing  Flowsheets (Taken 1/4/2023 0843)  Return ADL Status to a Safe Level of Function:   Administer medication as ordered   Assess activities of daily living deficits and provide assistive devices as needed   Obtain physical therapy/occupational therapy consults as needed   Assist and instruct patient to increase activity and self care as tolerated     Problem: Genitourinary - Adult  Goal: Absence of urinary retention  1/4/2023 1414 by Stas Toro RN  Outcome: Progressing  Flowsheets (Taken 1/4/2023 0843)  Absence of urinary retention:   Assess patients ability to void and empty bladder   Monitor intake/output and perform bladder scan as needed     Problem: Nutrition Deficit:  Goal: Optimize nutritional status  1/4/2023 1414 by Stas Toro RN  Outcome: Progressing  Flowsheets (Taken 12/30/2022 1542 by Poli Robertson RD, LD)  Nutrient intake appropriate for improving, restoring, or maintaining nutritional needs:   Assess nutritional status and recommend course of action   Monitor oral intake, labs, and treatment plans   Recommend appropriate diets, oral nutritional supplements, and vitamin/mineral supplements     Problem: Hematologic - Adult  Goal: Maintains hematologic stability  Outcome: Progressing  Flowsheets (Taken 1/4/2023 0843)  Maintains hematologic stability:   Assess for signs and symptoms of bleeding or hemorrhage   Monitor labs for bleeding or clotting disorders     Problem: Skin/Tissue Integrity - Adult  Goal: Skin integrity remains intact  Recent Flowsheet Documentation  Taken 1/4/2023 0843 by Brinda Tineo RN  Skin Integrity Remains Intact:   Monitor for areas of redness and/or skin breakdown   Assess vascular access sites hourly     Problem: Musculoskeletal - Adult  Goal: Return mobility to safest level of function  Recent Flowsheet Documentation  Taken 1/4/2023 0843 by Brinda Tineo RN  Return Mobility to Safest Level of Function:   Assess patient stability and activity tolerance for standing, transferring and ambulating with or without assistive devices   Assist with transfers and ambulation using safe patient handling equipment as needed   Ensure adequate protection for wounds/incisions during mobilization   Obtain physical therapy/occupational therapy consults as needed     Problem: Musculoskeletal - Adult  Goal: Maintain proper alignment of affected body part  Recent Flowsheet Documentation  Taken 1/4/2023 0843 by Brinda Tineo RN  Maintain proper alignment of affected body part:   Support and protect limb and body alignment per provider's orders   Instruct and reinforce with patient and family use of appropriate assistive device and precautions (e.g. spinal or hip dislocation precautions)   Care plan reviewed with patient.   Patient  verbalize understanding of the plan of care and contribute to goal setting.

## 2023-01-04 NOTE — PROGRESS NOTES
Orthopaedic Progress Note      SUBJECTIVE   Ms. Tami Gallagher is post op day # 15    Patient s/p Stage I revision right knee with antibiotic spacer. no adverse events overnight  Tolerating oral diet  Mobilizing with therapy  Incision c/d/i  Resting on exam  No change overall    OBJECTIVE      Physical    VITALS:  BP (!) 93/44   Pulse 88   Temp 98.4 °F (36.9 °C) (Oral)   Resp 18   Ht 5' 7\" (1.702 m)   Wt 160 lb (72.6 kg)   SpO2 98%   BMI 25.06 kg/m²   I/O last 3 completed shifts: In: 1048 [P.O.:1400; I.V.:10]  Out: 700 [Urine:700]    4/10 pain  Gen: alert and oriented, resting today  Head: normorcephalic, atraumatic  Resp: unlabored, room air  Pelvis: stable  RLE:  Dressing clean, dry, and intact. No active drainage from incision. ROM limited secondary to spacer. Able to perform SLR. NVI. Dorsalis pedal and posterior tibialis pulses strong and regular.       Data  CBC:   Lab Results   Component Value Date/Time    WBC 6.7 01/03/2023 06:23 AM    HGB 7.6 01/03/2023 06:23 AM     01/03/2023 06:23 AM     BMP:    Lab Results   Component Value Date/Time     12/23/2022 06:12 AM    K 4.1 12/23/2022 06:12 AM    K 4.6 05/09/2022 01:05 PM     12/23/2022 06:12 AM    CO2 22 12/23/2022 06:12 AM    BUN 7 12/23/2022 06:12 AM    CREATININE 0.4 12/23/2022 06:12 AM    CALCIUM 8.1 12/23/2022 06:12 AM    GLUCOSE 66 12/23/2022 06:12 AM    GLUCOSE 113 12/10/2020 10:01 AM     Uric Acid:  No components found for: URIC  PT/INR:    Lab Results   Component Value Date/Time    PROTIME 11.0 10/05/2016 03:39 PM    INR 1.44 12/01/2022 03:59 PM     Troponin:  No results found for: TROPONINI  Urine Culture:  No components found for: CURINE      Current Inpatient Medications    Current Facility-Administered Medications: miconazole (MICOTIN) 2 % powder, , Topical, BID  lidocaine PF 1 % injection 5 mL, 5 mL, IntraDERmal, Once  ceFAZolin (ANCEF) 2000 mg in dextrose 5 % 50 mL IVPB, 2,000 mg, IntraVENous, Q8H  atorvastatin (LIPITOR) tablet 40 mg, 40 mg, Oral, Nightly  baclofen (LIORESAL) tablet 10 mg, 10 mg, Oral, Q6H PRN  doxepin (SINEQUAN) capsule 40 mg, 40 mg, Oral, Nightly  fenofibrate (TRIGLIDE) tablet 160 mg, 160 mg, Oral, Daily  losartan (COZAAR) tablet 25 mg, 25 mg, Oral, Daily  melatonin tablet 3 mg, 3 mg, Oral, Nightly  multivitamin 1 tablet, 1 tablet, Oral, Daily  rivaroxaban (XARELTO) tablet 10 mg, 10 mg, Oral, Q24H  sodium chloride flush 0.9 % injection 5-40 mL, 5-40 mL, IntraVENous, 2 times per day  sodium chloride flush 0.9 % injection 5-40 mL, 5-40 mL, IntraVENous, PRN  0.9 % sodium chloride infusion, , IntraVENous, PRN  ondansetron (ZOFRAN-ODT) disintegrating tablet 4 mg, 4 mg, Oral, Q8H PRN **OR** ondansetron (ZOFRAN) injection 4 mg, 4 mg, IntraVENous, Q6H PRN  acetaminophen (TYLENOL) tablet 650 mg, 650 mg, Oral, Q4H PRN  morphine (PF) injection 2 mg, 2 mg, IntraVENous, Q2H PRN **OR** morphine injection 4 mg, 4 mg, IntraVENous, Q2H PRN  sennosides-docusate sodium (SENOKOT-S) 8.6-50 MG tablet 1 tablet, 1 tablet, Oral, BID  magnesium hydroxide (MILK OF MAGNESIA) 400 MG/5ML suspension 30 mL, 30 mL, Oral, Daily PRN  sodium phosphate (FLEET) rectal enema 1 enema, 1 enema, Rectal, Daily PRN  oxyCODONE-acetaminophen (PERCOCET) 5-325 MG per tablet 1 tablet, 1 tablet, Oral, Q4H PRN  oxyCODONE-acetaminophen (PERCOCET) 5-325 MG per tablet 2 tablet, 2 tablet, Oral, Q4H PRN        PLAN       1)  Cont with current medical management  2)  WBAT RLE, activity as tolerated  3)  Ice and elevation  4)  ID evaluated, picc placed  5)  ECF upon discharge, case management assisting  6)  dry dressing changes PRN  7)  PT OT as able  8)  hbg hct pending, will follow  9)  stable for discharge from [facility] standpoint, pending SNF placement, precert started 20/81/93  10) follow up one week with Dr Ferrer Favors

## 2023-01-04 NOTE — CARE COORDINATION
1/4/23, 2:25 PM EST    DISCHARGE PLANNING EVALUATION    Waiting precert for [facility]. Facility will accept once precert is approved.

## 2023-01-04 NOTE — PLAN OF CARE
Problem: Discharge Planning  Goal: Discharge to home or other facility with appropriate resources  Outcome: Progressing  Flowsheets (Taken 1/3/2023 1930)  Discharge to home or other facility with appropriate resources: Identify barriers to discharge with patient and caregiver     Problem: Chronic Conditions and Co-morbidities  Goal: Patient's chronic conditions and co-morbidity symptoms are monitored and maintained or improved  Outcome: Progressing  Flowsheets (Taken 1/3/2023 1930)  Care Plan - Patient's Chronic Conditions and Co-Morbidity Symptoms are Monitored and Maintained or Improved: Monitor and assess patient's chronic conditions and comorbid symptoms for stability, deterioration, or improvement     Problem: Pain  Goal: Verbalizes/displays adequate comfort level or baseline comfort level  Outcome: Progressing  Flowsheets (Taken 1/3/2023 1930)  Verbalizes/displays adequate comfort level or baseline comfort level:   Encourage patient to monitor pain and request assistance   Assess pain using appropriate pain scale     Problem: Safety - Adult  Goal: Free from fall injury  Outcome: Progressing  Flowsheets (Taken 1/1/2023 0203 by Mojgan Heck RN)  Free From Fall Injury: Instruct family/caregiver on patient safety     Problem: ABCDS Injury Assessment  Goal: Absence of physical injury  Outcome: Progressing  Flowsheets (Taken 1/1/2023 0203 by Mojgan Heck RN)  Absence of Physical Injury: Implement safety measures based on patient assessment     Problem: Skin/Tissue Integrity - Adult  Goal: Incisions, wounds, or drain sites healing without S/S of infection  Outcome: Progressing  Flowsheets  Taken 1/4/2023 0229  Incisions, Wounds, or Drain Sites Healing Without Sign and Symptoms of Infection: ADMISSION and DAILY: Assess and document risk factors for pressure ulcer development  Taken 1/3/2023 1930  Incisions, Wounds, or Drain Sites Healing Without Sign and Symptoms of Infection: TWICE DAILY: Assess and document skin integrity     Problem: Musculoskeletal - Adult  Goal: Return ADL status to a safe level of function  Outcome: Progressing  Flowsheets (Taken 1/3/2023 1930)  Return ADL Status to a Safe Level of Function: Administer medication as ordered     Problem: Genitourinary - Adult  Goal: Absence of urinary retention  Outcome: Progressing  Flowsheets (Taken 1/3/2023 1930)  Absence of urinary retention: Assess patients ability to void and empty bladder     Problem: Nutrition Deficit:  Goal: Optimize nutritional status  Outcome: Progressing  Flowsheets (Taken 12/30/2022 1542 by Silvina Cheng, RD, LD)  Nutrient intake appropriate for improving, restoring, or maintaining nutritional needs:   Assess nutritional status and recommend course of action   Monitor oral intake, labs, and treatment plans   Recommend appropriate diets, oral nutritional supplements, and vitamin/mineral supplements     Care plan reviewed with patient and . Patient and  verbalize understanding of the plan of care and contribute to goal setting.

## 2023-01-04 NOTE — PROGRESS NOTES
Progress note: Infectious diseases    Patient - Michael Fajardo,  Age - 60 y.o.    - 1960      Room Number - 7K-10/010-A   MRN -  [id number]   Acct # - [de-identified]  Date of Admission -  2022  5:45 AM  Late entry. SUBJECTIVE:   She has dizziness  when she stands up  OBJECTIVE   VITALS    height is 5' 7\" (1.702 m) and weight is 160 lb (72.6 kg). Her oral temperature is 98.1 °F (36.7 °C). Her blood pressure is 119/88 and her pulse is 94. Her respiration is 20 and oxygen saturation is 98%.        Wt Readings from Last 3 Encounters:   22 160 lb (72.6 kg)   22 158 lb (71.7 kg)   22 158 lb (71.7 kg)       I/O (24 Hours)    Intake/Output Summary (Last 24 hours) at 2023 1256  Last data filed at 2023 0345  Gross per 24 hour   Intake 500 ml   Output 450 ml   Net 50 ml         General Appearance  Awake, alert, oriented,  not  In acute distress  HEENT - normocephalic, atraumatic, slightly pale conjunctiva,  anicteric sclera  Neck - Supple, no mass  Lungs -  Bilateral  air entry, no rhonchi, no wheeze  Cardiovascular - Heart sounds are normal.    Abdomen - soft, not distended, nontender,   Neurologic -oriented  Skin - No bruising or bleeding  Extremities -dressed with right knee  MEDICATIONS:      miconazole   Topical BID    lidocaine 1 % injection  5 mL IntraDERmal Once    ceFAZolin  2,000 mg IntraVENous Q8H    atorvastatin  40 mg Oral Nightly    doxepin  40 mg Oral Nightly    fenofibrate  160 mg Oral Daily    losartan  25 mg Oral Daily    melatonin  3 mg Oral Nightly    multivitamin  1 tablet Oral Daily    rivaroxaban  10 mg Oral Q24H    sodium chloride flush  5-40 mL IntraVENous 2 times per day    sennosides-docusate sodium  1 tablet Oral BID      sodium chloride       baclofen, sodium chloride flush, sodium chloride, ondansetron **OR** ondansetron, acetaminophen, morphine **OR** morphine, magnesium hydroxide, sodium phosphate, oxyCODONE-acetaminophen, oxyCODONE-acetaminophen      LABS:     CBC:   Recent Labs     01/02/23  0425 01/03/23  0623   WBC 6.9 6.7   HGB 7.9* 7.6*   * 410*       CULTURES:   UA: No results for input(s): SPECGRAV, PHUR, COLORU, CLARITYU, MUCUS, PROTEINU, BLOODU, RBCUA, WBCUA, BACTERIA, NITRU, GLUCOSEU, BILIRUBINUR, UROBILINOGEN, KETUA, LABCAST, LABCASTTY, AMORPHOS in the last 72 hours. Invalid input(s): CRYSTALS  Micro:   Lab Results   Component Value Date/Time    BC No growth-preliminary No growth 06/10/2022 03:00 PM    BC No growth-preliminary No growth 06/10/2022 03:00 PM       Problem list of patient:     Patient Active Problem List   Diagnosis Code    Diabetes mellitus type 2, noninsulin dependent ([address] E11.9    Hyperlipemia E78.5    THORNTON (nonalcoholic steatohepatitis) K75.81    Obesity E66.9    Tobacco abuse Z72.0    Vitamin D deficiency E55.9    Seborrheic keratosis L82.1    Dyshidrotic eczema L30.1    Displaced articular fracture of head of right femur, sequela S72.061S    Neida-prosthetic supracondylar fracture of femur M97. 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    She will need 6 wks of iv antibiotics  Awaiting precertification for ECF  Will need weekly cbc bmp crp while on iv antibiotics  Anemia: will add iron      Donna Cordero MD, MD, [facility]  1/2/23

## 2023-01-05 LAB
ANION GAP SERPL CALCULATED.3IONS-SCNC: 10 MEQ/L (ref 8–16)
BASE EXCESS (CALCULATED): 3.8 MMOL/L (ref -2.5–2.5)
BASE EXCESS MIXED: 5.5 MMOL/L (ref -2–3)
BASOPHILS # BLD: 0.4 %
BASOPHILS ABSOLUTE: 0 THOU/MM3 (ref 0–0.1)
BUN BLDV-MCNC: 5 MG/DL (ref 7–22)
CALCIUM SERPL-MCNC: 7.9 MG/DL (ref 8.5–10.5)
CHLORIDE BLD-SCNC: 109 MEQ/L (ref 98–111)
CO2: 28 MEQ/L (ref 23–33)
COLLECTED BY:: ABNORMAL
COLLECTED BY:: ABNORMAL
CREAT SERPL-MCNC: 0.4 MG/DL (ref 0.4–1.2)
DEVICE: ABNORMAL
EOSINOPHIL # BLD: 4.1 %
EOSINOPHILS ABSOLUTE: 0.3 THOU/MM3 (ref 0–0.4)
ERYTHROCYTE [DISTWIDTH] IN BLOOD BY AUTOMATED COUNT: 19.2 % (ref 11.5–14.5)
ERYTHROCYTE [DISTWIDTH] IN BLOOD BY AUTOMATED COUNT: 63.8 FL (ref 35–45)
FIO2, MIXED VENOUS: 21
GFR SERPL CREATININE-BSD FRML MDRD: > 60 ML/MIN/1.73M2
GLUCOSE BLD-MCNC: 64 MG/DL (ref 70–108)
HCO3, MIXED: 28 MMOL/L (ref 23–28)
HCO3: 24 MMOL/L (ref 23–28)
HCT VFR BLD CALC: 27.2 % (ref 37–47)
HEMOGLOBIN: 8.3 GM/DL (ref 12–16)
IMMATURE GRANS (ABS): 0.03 THOU/MM3 (ref 0–0.07)
IMMATURE GRANULOCYTES: 0.4 %
LACTIC ACID: 1.5 MMOL/L (ref 0.5–2)
LYMPHOCYTES # BLD: 26.6 %
LYMPHOCYTES ABSOLUTE: 2.1 THOU/MM3 (ref 1–4.8)
MAGNESIUM: 1.3 MG/DL (ref 1.6–2.4)
MCH RBC QN AUTO: 28.4 PG (ref 26–33)
MCHC RBC AUTO-ENTMCNC: 30.5 GM/DL (ref 32.2–35.5)
MCV RBC AUTO: 93.2 FL (ref 81–99)
MONOCYTES # BLD: 8.3 %
MONOCYTES ABSOLUTE: 0.6 THOU/MM3 (ref 0.4–1.3)
NUCLEATED RED BLOOD CELLS: 0 /100 WBC
O2 SAT, MIXED: 80 %
O2 SATURATION: 100 %
PCO2, MIXED VENOUS: 32 MMHG (ref 41–51)
PCO2: 22 MMHG (ref 35–45)
PH BLOOD GAS: 7.65 (ref 7.35–7.45)
PH, MIXED: 7.55 (ref 7.31–7.41)
PLATELET # BLD: 484 THOU/MM3 (ref 130–400)
PMV BLD AUTO: 10.3 FL (ref 9.4–12.4)
PO2 MIXED: 38 MMHG (ref 25–40)
PO2: 162 MMHG (ref 71–104)
POTASSIUM SERPL-SCNC: 2.9 MEQ/L (ref 3.5–5.2)
POTASSIUM SERPL-SCNC: 3.4 MEQ/L (ref 3.5–5.2)
RBC # BLD: 2.92 MILL/MM3 (ref 4.2–5.4)
REASON FOR REJECTION: NORMAL
REJECTED TEST: NORMAL
SEG NEUTROPHILS: 60.2 %
SEGMENTED NEUTROPHILS ABSOLUTE COUNT: 4.7 THOU/MM3 (ref 1.8–7.7)
SITE: ABNORMAL
SODIUM BLD-SCNC: 147 MEQ/L (ref 135–145)
WBC # BLD: 7.8 THOU/MM3 (ref 4.8–10.8)

## 2023-01-05 PROCEDURE — 80048 BASIC METABOLIC PNL TOTAL CA: CPT

## 2023-01-05 PROCEDURE — 84132 ASSAY OF SERUM POTASSIUM: CPT

## 2023-01-05 PROCEDURE — 6360000002 HC RX W HCPCS

## 2023-01-05 PROCEDURE — 36415 COLL VENOUS BLD VENIPUNCTURE: CPT

## 2023-01-05 PROCEDURE — 2580000003 HC RX 258: Performed by: INTERNAL MEDICINE

## 2023-01-05 PROCEDURE — 6370000000 HC RX 637 (ALT 250 FOR IP): Performed by: PHYSICIAN ASSISTANT

## 2023-01-05 PROCEDURE — 83735 ASSAY OF MAGNESIUM: CPT

## 2023-01-05 PROCEDURE — 2500000003 HC RX 250 WO HCPCS: Performed by: PHYSICIAN ASSISTANT

## 2023-01-05 PROCEDURE — 83605 ASSAY OF LACTIC ACID: CPT

## 2023-01-05 PROCEDURE — 2580000003 HC RX 258: Performed by: PHYSICIAN ASSISTANT

## 2023-01-05 PROCEDURE — 36600 WITHDRAWAL OF ARTERIAL BLOOD: CPT

## 2023-01-05 PROCEDURE — 1200000000 HC SEMI PRIVATE

## 2023-01-05 PROCEDURE — 82803 BLOOD GASES ANY COMBINATION: CPT

## 2023-01-05 PROCEDURE — 99253 IP/OBS CNSLTJ NEW/EST LOW 45: CPT

## 2023-01-05 PROCEDURE — 6360000002 HC RX W HCPCS: Performed by: INTERNAL MEDICINE

## 2023-01-05 PROCEDURE — 6370000000 HC RX 637 (ALT 250 FOR IP): Performed by: INTERNAL MEDICINE

## 2023-01-05 PROCEDURE — 85025 COMPLETE CBC W/AUTO DIFF WBC: CPT

## 2023-01-05 RX ORDER — POTASSIUM CHLORIDE 20 MEQ/1
40 TABLET, EXTENDED RELEASE ORAL PRN
Status: DISCONTINUED | OUTPATIENT
Start: 2023-01-05 | End: 2023-01-09 | Stop reason: HOSPADM

## 2023-01-05 RX ORDER — POTASSIUM CHLORIDE 7.45 MG/ML
10 INJECTION INTRAVENOUS PRN
Status: DISCONTINUED | OUTPATIENT
Start: 2023-01-05 | End: 2023-01-09 | Stop reason: HOSPADM

## 2023-01-05 RX ORDER — FERROUS SULFATE 325(65) MG
325 TABLET ORAL 2 TIMES DAILY WITH MEALS
Status: DISCONTINUED | OUTPATIENT
Start: 2023-01-05 | End: 2023-01-09 | Stop reason: HOSPADM

## 2023-01-05 RX ORDER — MAGNESIUM SULFATE IN WATER 40 MG/ML
2000 INJECTION, SOLUTION INTRAVENOUS PRN
Status: DISCONTINUED | OUTPATIENT
Start: 2023-01-05 | End: 2023-01-09 | Stop reason: HOSPADM

## 2023-01-05 RX ADMIN — BACLOFEN 10 MG: 10 TABLET ORAL at 00:00

## 2023-01-05 RX ADMIN — POTASSIUM CHLORIDE 10 MEQ: 7.46 INJECTION, SOLUTION INTRAVENOUS at 15:46

## 2023-01-05 RX ADMIN — SODIUM CHLORIDE, PRESERVATIVE FREE 10 ML: 5 INJECTION INTRAVENOUS at 20:27

## 2023-01-05 RX ADMIN — OXYCODONE AND ACETAMINOPHEN 2 TABLET: 5; 325 TABLET ORAL at 03:39

## 2023-01-05 RX ADMIN — BACLOFEN 10 MG: 10 TABLET ORAL at 06:26

## 2023-01-05 RX ADMIN — Medication 1 TABLET: at 10:35

## 2023-01-05 RX ADMIN — OXYCODONE AND ACETAMINOPHEN 2 TABLET: 5; 325 TABLET ORAL at 10:35

## 2023-01-05 RX ADMIN — MAGNESIUM SULFATE HEPTAHYDRATE 2000 MG: 40 INJECTION, SOLUTION INTRAVENOUS at 07:04

## 2023-01-05 RX ADMIN — CEFAZOLIN 2000 MG: 10 INJECTION, POWDER, FOR SOLUTION INTRAVENOUS at 06:25

## 2023-01-05 RX ADMIN — POTASSIUM CHLORIDE 10 MEQ: 7.46 INJECTION, SOLUTION INTRAVENOUS at 13:18

## 2023-01-05 RX ADMIN — POTASSIUM CHLORIDE 10 MEQ: 7.46 INJECTION, SOLUTION INTRAVENOUS at 11:54

## 2023-01-05 RX ADMIN — ATORVASTATIN CALCIUM 40 MG: 40 TABLET, FILM COATED ORAL at 20:24

## 2023-01-05 RX ADMIN — POTASSIUM CHLORIDE 10 MEQ: 7.46 INJECTION, SOLUTION INTRAVENOUS at 10:43

## 2023-01-05 RX ADMIN — SODIUM CHLORIDE: 9 INJECTION, SOLUTION INTRAVENOUS at 10:39

## 2023-01-05 RX ADMIN — BACLOFEN 10 MG: 10 TABLET ORAL at 20:24

## 2023-01-05 RX ADMIN — POTASSIUM CHLORIDE 10 MEQ: 7.46 INJECTION, SOLUTION INTRAVENOUS at 14:28

## 2023-01-05 RX ADMIN — FENOFIBRATE 160 MG: 160 TABLET ORAL at 10:35

## 2023-01-05 RX ADMIN — CEFAZOLIN 2000 MG: 10 INJECTION, POWDER, FOR SOLUTION INTRAVENOUS at 22:59

## 2023-01-05 RX ADMIN — FERROUS SULFATE TAB 325 MG (65 MG ELEMENTAL FE) 325 MG: 325 (65 FE) TAB at 10:35

## 2023-01-05 RX ADMIN — POTASSIUM CHLORIDE 10 MEQ: 7.46 INJECTION, SOLUTION INTRAVENOUS at 16:47

## 2023-01-05 RX ADMIN — SENNOSIDES AND DOCUSATE SODIUM 1 TABLET: 50; 8.6 TABLET ORAL at 10:35

## 2023-01-05 RX ADMIN — Medication 3 MG: at 20:24

## 2023-01-05 RX ADMIN — LOSARTAN POTASSIUM 25 MG: 25 TABLET, FILM COATED ORAL at 10:35

## 2023-01-05 RX ADMIN — MICONAZOLE NITRATE: 2 POWDER TOPICAL at 20:25

## 2023-01-05 RX ADMIN — FERROUS SULFATE TAB 325 MG (65 MG ELEMENTAL FE) 325 MG: 325 (65 FE) TAB at 16:49

## 2023-01-05 RX ADMIN — CEFAZOLIN 2000 MG: 10 INJECTION, POWDER, FOR SOLUTION INTRAVENOUS at 17:56

## 2023-01-05 RX ADMIN — DOXEPIN HYDROCHLORIDE 40 MG: 10 CAPSULE ORAL at 20:25

## 2023-01-05 RX ADMIN — OXYCODONE AND ACETAMINOPHEN 2 TABLET: 5; 325 TABLET ORAL at 20:24

## 2023-01-05 RX ADMIN — RIVAROXABAN 10 MG: 10 TABLET, FILM COATED ORAL at 22:59

## 2023-01-05 RX ADMIN — OXYCODONE AND ACETAMINOPHEN 2 TABLET: 5; 325 TABLET ORAL at 15:45

## 2023-01-05 RX ADMIN — MICONAZOLE NITRATE: 2 POWDER TOPICAL at 10:28

## 2023-01-05 ASSESSMENT — PAIN DESCRIPTION - FREQUENCY: FREQUENCY: CONTINUOUS

## 2023-01-05 ASSESSMENT — PAIN DESCRIPTION - LOCATION
LOCATION: LEG
LOCATION: LEG

## 2023-01-05 ASSESSMENT — PAIN DESCRIPTION - DESCRIPTORS: DESCRIPTORS: ACHING;BURNING;SHARP;STABBING

## 2023-01-05 ASSESSMENT — PAIN DESCRIPTION - ORIENTATION
ORIENTATION: RIGHT
ORIENTATION: RIGHT

## 2023-01-05 ASSESSMENT — PAIN SCALES - GENERAL
PAINLEVEL_OUTOF10: 8
PAINLEVEL_OUTOF10: 7
PAINLEVEL_OUTOF10: 8
PAINLEVEL_OUTOF10: 5
PAINLEVEL_OUTOF10: 8
PAINLEVEL_OUTOF10: 8
PAINLEVEL_OUTOF10: 9

## 2023-01-05 ASSESSMENT — PAIN DESCRIPTION - PAIN TYPE: TYPE: ACUTE PAIN;SURGICAL PAIN

## 2023-01-05 ASSESSMENT — PAIN DESCRIPTION - ONSET: ONSET: ON-GOING

## 2023-01-05 ASSESSMENT — PAIN - FUNCTIONAL ASSESSMENT: PAIN_FUNCTIONAL_ASSESSMENT: PREVENTS OR INTERFERES SOME ACTIVE ACTIVITIES AND ADLS

## 2023-01-05 NOTE — PROGRESS NOTES
Orthopaedic Progress Note      SUBJECTIVE   Ms. Kailey Reddy is post op day # 14    Patient s/p Stage I revision right knee with antibiotic spacer. no adverse events overnight  Tolerating oral diet  Mobilizing with therapy  Incision c/d/i  Resting on exam  No change overall  Erroneous draw of abg, stable    OBJECTIVE      Physical    VITALS:  BP (!) 128/59   Pulse 84   Temp 97.5 °F (36.4 °C) (Oral)   Resp 18   Ht 5' 7\" (1.702 m)   Wt 160 lb (72.6 kg)   SpO2 93%   BMI 25.06 kg/m²   I/O last 3 completed shifts: In: 1820 [P.O.:1820]  Out: 700 [Urine:700]    4/10 pain  Gen: alert and oriented, resting today  Head: normorcephalic, atraumatic  Resp: unlabored, room air  Pelvis: stable  RLE:  Dressing clean, dry, and intact. No active drainage from incision. ROM limited secondary to spacer. Able to perform SLR. NVI. Dorsalis pedal and posterior tibialis pulses strong and regular.       Data  CBC:   Lab Results   Component Value Date/Time    WBC 7.8 01/05/2023 04:45 AM    HGB 8.3 01/05/2023 04:45 AM     01/05/2023 04:45 AM     BMP:    Lab Results   Component Value Date/Time     01/05/2023 04:45 AM    K 2.9 01/05/2023 04:45 AM    K 4.6 05/09/2022 01:05 PM     01/05/2023 04:45 AM    CO2 28 01/05/2023 04:45 AM    BUN 5 01/05/2023 04:45 AM    CREATININE 0.4 01/05/2023 04:45 AM    CALCIUM 7.9 01/05/2023 04:45 AM    GLUCOSE 64 01/05/2023 04:45 AM    GLUCOSE 113 12/10/2020 10:01 AM     Uric Acid:  No components found for: URIC  PT/INR:    Lab Results   Component Value Date/Time    PROTIME 11.0 10/05/2016 03:39 PM    INR 1.44 12/01/2022 03:59 PM     Troponin:  No results found for: TROPONINI  Urine Culture:  No components found for: CURINE      Current Inpatient Medications    Current Facility-Administered Medications: potassium chloride (KLOR-CON M) extended release tablet 40 mEq, 40 mEq, Oral, PRN **OR** potassium bicarb-citric acid (EFFER-K) effervescent tablet 40 mEq, 40 mEq, Oral, PRN **OR** potassium chloride 10 mEq/100 mL IVPB (Peripheral Line), 10 mEq, IntraVENous, PRN  magnesium sulfate 2000 mg in 50 mL IVPB premix, 2,000 mg, IntraVENous, PRN  miconazole (MICOTIN) 2 % powder, , Topical, BID  lidocaine PF 1 % injection 5 mL, 5 mL, IntraDERmal, Once  ceFAZolin (ANCEF) 2000 mg in dextrose 5 % 50 mL IVPB, 2,000 mg, IntraVENous, Q8H  atorvastatin (LIPITOR) tablet 40 mg, 40 mg, Oral, Nightly  baclofen (LIORESAL) tablet 10 mg, 10 mg, Oral, Q6H PRN  doxepin (SINEQUAN) capsule 40 mg, 40 mg, Oral, Nightly  fenofibrate (TRIGLIDE) tablet 160 mg, 160 mg, Oral, Daily  losartan (COZAAR) tablet 25 mg, 25 mg, Oral, Daily  melatonin tablet 3 mg, 3 mg, Oral, Nightly  multivitamin 1 tablet, 1 tablet, Oral, Daily  rivaroxaban (XARELTO) tablet 10 mg, 10 mg, Oral, Q24H  sodium chloride flush 0.9 % injection 5-40 mL, 5-40 mL, IntraVENous, 2 times per day  sodium chloride flush 0.9 % injection 5-40 mL, 5-40 mL, IntraVENous, PRN  0.9 % sodium chloride infusion, , IntraVENous, PRN  ondansetron (ZOFRAN-ODT) disintegrating tablet 4 mg, 4 mg, Oral, Q8H PRN **OR** ondansetron (ZOFRAN) injection 4 mg, 4 mg, IntraVENous, Q6H PRN  acetaminophen (TYLENOL) tablet 650 mg, 650 mg, Oral, Q4H PRN  morphine (PF) injection 2 mg, 2 mg, IntraVENous, Q2H PRN **OR** morphine injection 4 mg, 4 mg, IntraVENous, Q2H PRN  sennosides-docusate sodium (SENOKOT-S) 8.6-50 MG tablet 1 tablet, 1 tablet, Oral, BID  magnesium hydroxide (MILK OF MAGNESIA) 400 MG/5ML suspension 30 mL, 30 mL, Oral, Daily PRN  sodium phosphate (FLEET) rectal enema 1 enema, 1 enema, Rectal, Daily PRN  oxyCODONE-acetaminophen (PERCOCET) 5-325 MG per tablet 1 tablet, 1 tablet, Oral, Q4H PRN  oxyCODONE-acetaminophen (PERCOCET) 5-325 MG per tablet 2 tablet, 2 tablet, Oral, Q4H PRN        PLAN       1)  Cont with current medical management  2)  WBAT RLE, activity as tolerated  3)  Ice and elevation  4)  ID evaluated, picc placed  5)  ECF upon discharge, case management assisting  6)  dry dressing changes PRN  7)  PT OT as able  8)  hbg hct pending, will follow  9)  stable for discharge from ortho standpoint, pending SNF placement, precert started 96/04/90  10) follow up one week with Dr Hugo Galvan

## 2023-01-05 NOTE — FLOWSHEET NOTE
[address] 59  PHYSICAL THERAPY MISSED TREATMENT NOTE  [facility] ORTHOPEDICS 7K    Date: 2023  Patient Name: Krys Campos        MRN: [id number]   : 1960  (60 y.o.)  Gender: female   Referring Practitioner: CRISTAL Ocasio  Diagnosis: Infection of total right knee replacement, initial encounter         REASON FOR MISSED TREATMENT:  Missed Treat. Patient declined therapy at this time, reports \"had a bad night\" and \"feeling nauseous\". Will resume therapy on next appropriate date per POC.

## 2023-01-05 NOTE — CARE COORDINATION
1/5/23, 1:51 PM EST    DISCHARGE ON [address] day: 14  Location: [facility]10/010 Reason for admit: Infection of total right knee replacement, subsequent encounter [T84.53XD]  Post-operative state [Z98.890]   Procedure: 12/22 STAGE 1 RIGHT KNEE with insertion of antibiotic spacer with Dr Alla Peterson  Barriers to Discharge: await percert for ECF, Ancef IV q 8 hours, K+ 2.9 with IV replacement. Mg 1.3 with IV replacement. Cont. PT/OT. PCP: Catrachita Chirinos DO  Readmission Risk Score: 16.8%  Patient Goals/Plan/Treatment Preferences: Dionisio martinez for [facility]; SW follows. Patient needs 6 weeks IV antibiotics.

## 2023-01-05 NOTE — PLAN OF CARE
Problem: Discharge Planning  Goal: Discharge to home or other facility with appropriate resources  1/5/2023 0132 by Malik Bacon RN  Outcome: Progressing  Flowsheets (Taken 1/5/2023 0132)  Discharge to home or other facility with appropriate resources:   Identify barriers to discharge with patient and caregiver   Arrange for needed discharge resources and transportation as appropriate   Identify discharge learning needs (meds, wound care, etc)  Note: Plans to be discharged to [facility]     Problem: Chronic Conditions and Co-morbidities  Goal: Patient's chronic conditions and co-morbidity symptoms are monitored and maintained or improved  1/5/2023 0132 by Malik Bacon RN  Outcome: Progressing  Flowsheets (Taken 1/5/2023 0132)  Care Plan - Patient's Chronic Conditions and Co-Morbidity Symptoms are Monitored and Maintained or Improved:   Monitor and assess patient's chronic conditions and comorbid symptoms for stability, deterioration, or improvement   Collaborate with multidisciplinary team to address chronic and comorbid conditions and prevent exacerbation or deterioration     Problem: Pain  Goal: Verbalizes/displays adequate comfort level or baseline comfort level  1/5/2023 0132 by Malik Bacon RN  Outcome: Progressing  Flowsheets (Taken 1/5/2023 0132)  Verbalizes/displays adequate comfort level or baseline comfort level:   Encourage patient to monitor pain and request assistance   Assess pain using appropriate pain scale   Administer analgesics based on type and severity of pain and evaluate response   Implement non-pharmacological measures as appropriate and evaluate response  Note: Patient taking pain medication on MAR as needed to control pain. Use of non-pharmacologic pain management including ice/repositioning. Patient pain goal is 2.      Problem: Safety - Adult  Goal: Free from fall injury  1/5/2023 0132 by Malik Bacon RN  Outcome: Progressing  Flowsheets (Taken 1/5/2023 0132)  Free From Fall Injury: Instruct family/caregiver on patient safety  Note: Patient up with staff assistance. Able to use call light. Patient has remained free of falls during this shift. Appropriate fall prevention measures in place. Problem: ABCDS Injury Assessment  Goal: Absence of physical injury  1/5/2023 0132 by Yoana Briseno RN  Outcome: Progressing  Flowsheets (Taken 1/5/2023 0132)  Absence of Physical Injury: Implement safety measures based on patient assessment  Note: Patient up with staff assistance. Able to use call light. Patient has remained free of falls during this shift. Appropriate fall prevention measures in place.        Problem: Skin/Tissue Integrity - Adult  Goal: Skin integrity remains intact  Recent Flowsheet Documentation  Taken 1/4/2023 1417 by Luiz Tsai RN  Skin Integrity Remains Intact:   Monitor for areas of redness and/or skin breakdown   Assess vascular access sites hourly     Problem: Skin/Tissue Integrity - Adult  Goal: Incisions, wounds, or drain sites healing without S/S of infection  1/5/2023 0132 by Yoana Briseno RN  Outcome: Progressing  Flowsheets  Taken 1/5/2023 0132 by Yoana Briseno RN  Incisions, Wounds, or Drain Sites Healing Without Sign and Symptoms of Infection: TWICE DAILY: Assess and document skin integrity  Taken 1/4/2023 1417 by Luiz Tsai RN  Incisions, Wounds, or Drain Sites Healing Without Sign and Symptoms of Infection: TWICE DAILY: Assess and document skin integrity     Problem: Musculoskeletal - Adult  Goal: Return ADL status to a safe level of function  1/5/2023 0132 by Yoana Briseno RN  Outcome: Progressing  Flowsheets (Taken 1/5/2023 0132)  Return ADL Status to a Safe Level of Function:   Assess activities of daily living deficits and provide assistive devices as needed   Assist and instruct patient to increase activity and self care as tolerated     Problem: Hematologic - Adult  Goal: Maintains hematologic stability  1/5/2023 0132 by Trenton Rapp ASHA Marshall  Outcome: Progressing  Flowsheets (Taken 1/5/2023 0132)  Maintains hematologic stability: Assess for signs and symptoms of bleeding or hemorrhage     Problem: Nutrition Deficit:  Goal: Optimize nutritional status  1/5/2023 0132 by Mariia Marshall RN  Outcome: Progressing  Flowsheets (Taken 1/5/2023 0132)  Nutrient intake appropriate for improving, restoring, or maintaining nutritional needs:   Assess nutritional status and recommend course of action   Recommend appropriate diets, oral nutritional supplements, and vitamin/mineral supplements   Monitor oral intake, labs, and treatment plans     Care plan reviewed with patient.  Patient verbalized understanding of the plan of care and contribute to goal setting.

## 2023-01-05 NOTE — CONSULTS
Hospitalist Consult Note        Patient:  Yamile Carver  YOB: 1960  Date of Service: 1/5/2023  MRN: [id number]   Acct:  [de-identified]   Primary Care Physician: Jameson Madison DO    Chief Complaint:  right knee pain  Reason for consult  critical vbg    Date of Service: Pt seen/examined in consultation on 1/5/2023     History Of Present Illness:      [age] y.o. female who we are asked to see/evaluate by Ana Engel MD for evaluation of abnormal ABG. Patient was admitted on 12/22 for scheduled revision of right total knee with antibiotic spacer due to infection. Patient is admitted under orthopedic service and been managed over the last 2 weeks along with infectious disease. Patient is postop day #14 with plans to discharge to [facility]. On the evening of 1/5, it appears blood gas was drawn in error after being ordered on a different patient. Blood gas was reported by respiratory therapy to be arterial and showed a critical pH of 7.65, PCO2 of 22, PaO2 of 162. Due to abnormal labs orthopedic PA on-call was notified who then reached out to hospitalist service for consult. ABG was not supposed to have been drawn on this patient. Patient has been stable during her stay. Patient was assessed at bedside. She denies chest pain, cough, shortness of breath, abdominal pain, lightheadedness or dizziness. She reports that she has been eating and drinking well. Vital signs have been stable. Patient is mildly tachypneic, pain on her side, and does appear in moderate distress related to pain in her right knee. Right knee pain is patient's only complaint at this time and reports that nursing is supposed to be bringing her pain medicine. Assessment and Plan:-  Right septic knee joint s/p revision with antibiotic spacer 12/22: Managed per primary team and ID.   Mild respiratory alkalosis, likely secondary to tachypnea from pain: Initial ABG drawn in error and was apparently on bedside table for unknown period of time according to respiratory therapy. Repeat ABG was ordered however unable to obtain and VBG was obtained. Shows pH of 7.55, PCO2 of 32. Flowsheet shows patient pain has been rated predominantly 8-9 on 0-to-10 scale over the last couple days, does improve to 3-4. On exam, patient denies shortness of breath, chest pain. She does appear mildly tachypneic and reports pain in her right knee. Has been receiving Percocet. Has not received morphine in several days. Recommend use of PRN pain medication for severe pain. Will check CBC, BMP, Magnesium, Lactic Acid for any other abnormalities. Continue to monitor for other signs of respiratory compromise. Acute normocytic anemia, secondary to blood loss from surgery: Hemoglobin postop was 9.8, trended down to the lowest at 7.0. Last hemoglobin from 1/3 is 7.6. No active signs of bleeding at this time. Vital signs stable. Repeat CBC, transfuse for hemoglobin less than 7.       Past Medical History:        Diagnosis Date    Arthritis     Atypical ductal hyperplasia, breast     left breast, 2016, lumpectomy, Tamoxifen, [facility]    Cancer ([facility] [address]     Hx of breast biopsy 01/08/2016    Hyperlipidemia     Hypertension     Type II or unspecified type diabetes mellitus without mention of complication, not stated as uncontrolled        Past Surgical History:        Procedure Laterality Date    BLADDER SUSPENSION      BREAST BIOPSY Left     atypical hyperlasia, 2016, [facility]    BREAST LUMPECTOMY Left     atypical hyperplasia, 2016, [facility]    COLONOSCOPY  08/28/2020    HERNIA REPAIR  10/17/2016    JOINT REPLACEMENT Left 2021    knee    KNEE SURGERY      Right knee    REVISION TOTAL KNEE ARTHROPLASTY Right 05/10/2022    RIGHT KNEE TOTAL ARTHROPLASTY REVISION performed by Jose Antonio MD at [address] [place] [address] Right 06/12/2022    Right Knee Incision and Drainage of Total Knee With Poly Exchange performed by Jose Antonio MD at [facility]    REVISION TOTAL KNEE ARTHROPLASTY Right 12/22/2022    STAGE 1 RIGHT KNEE performed by Nicanor Stevens MD at [address] Medications:   No current facility-administered medications on file prior to encounter. Current Outpatient Medications on File Prior to Encounter   Medication Sig Dispense Refill    atorvastatin (LIPITOR) 40 MG tablet TAKE 1 TABLET NIGHTLY 90 tablet 3    fenofibrate (TRIGLIDE) 160 MG tablet TAKE 1 TABLET DAILY 90 tablet 3    doxepin (SINEQUAN) 10 MG capsule TAKE 2 CAPSULES NIGHTLY (Patient taking differently: Take 40 mg by mouth nightly TAKE4 CAPSULES NIGHTLY) 180 capsule 3    losartan (COZAAR) 25 MG tablet TAKE 1 TABLET DAILY 90 tablet 3    baclofen (LIORESAL) 10 MG tablet Take 1 tablet by mouth daily as needed (muscle spasms) (Patient taking differently: Take 10 mg by mouth every 6 hours as needed (muscle spasms)) 30 tablet 0    melatonin 3 MG TABS tablet Take 3 mg by mouth daily       Acetaminophen 500 MG CAPS Take 1,000 mg by mouth as needed for Pain       KRILL OIL PO Take 350 mg by mouth daily      Multiple Vitamins-Minerals (THERAPEUTIC MULTIVITAMIN-MINERALS) tablet Take 1 tablet by mouth daily      aspirin 81 MG tablet Take 81 mg by mouth daily. Allergies:    Adhesive tape    Social History:    reports that she has been smoking cigarettes. She has a 49.00 pack-year smoking history. She has never used smokeless tobacco. She reports that she does not drink alcohol and does not use drugs.     Family History:       Problem Relation Age of Onset    High Cholesterol Mother     High Blood Pressure Mother     Pacemaker Mother     Other Mother         Sundowners syndrome    Crohn's Disease Mother     Cancer Mother         Uterine and cervical and vagina    Thyroid Disease Mother     Dementia Mother     Colon Polyps Mother     Other Father         Pneumonia    Dementia Father     Diabetes Sister     Breast Cancer Sister Heart Disease Brother     Colon Cancer Neg Hx        Diet:  ADULT DIET; Regular  ADULT ORAL NUTRITION SUPPLEMENT; Breakfast, Dinner; Clear Liquid Oral Supplement    Review of systems:   Pertinent positives as noted in the HPI. All other systems reviewed and negative. PHYSICAL EXAM:  BP (!) 128/59   Pulse 84   Temp 97.5 °F (36.4 °C) (Oral)   Resp 18   Ht 5' 7\" (1.702 m)   Wt 160 lb (72.6 kg)   SpO2 93%   BMI 25.06 kg/m²   General appearance: Laying on side, in moderate distress due to pain , appears stated age and cooperative. HEENT: Normal cephalic, atraumatic without obvious deformity. Pupils equal, round, and reactive to light. Neck: Supple, with full range of motion. No jugular venous distention. Trachea midline. Respiratory:  Normal respiratory effort. Clear to auscultation, bilaterally without Rales/Wheezes/Rhonchi. Cardiovascular: Regular rate and rhythm with normal S1/S2 without murmurs, rubs or gallops. Abdomen: Soft, non-tender, non-distended with normal bowel sounds. Musculoskeletal:  No clubbing, cyanosis or edema bilaterally. Right lower extremity in Ace wrap/surgical dressing. Skin: Skin color, texture, turgor normal.  No rashes or lesions. Neurologic:  Neurovascularly intact without any focal sensory/motor deficits. Psychiatric: Alert and oriented, thought content appropriate, normal insight  Capillary Refill: Brisk,< 3 seconds   Peripheral Pulses: +2 palpable, equal bilaterally     Labs:   Recent Labs     01/02/23  0425 01/03/23  0623   WBC 6.9 6.7   HGB 7.9* 7.6*   HCT 25.9* 25.3*   * 410*     No results for input(s): NA, K, CL, CO2, BUN, CREATININE, CALCIUM, PHOS in the last 72 hours. Invalid input(s): MAGNES  No results for input(s): AST, ALT, BILIDIR, BILITOT, ALKPHOS in the last 72 hours. No results for input(s): INR in the last 72 hours. No results for input(s): Randene Holes in the last 72 hours.     Urinalysis:    Lab Results   Component Value Date/Time 12 [address] NEGATIVE 04/08/2022 01:56 PM    WBCUA 2-4 04/08/2022 01:56 PM    BACTERIA FEW 04/08/2022 01:56 PM    RBCUA 0-2 04/08/2022 01:56 PM    BLOODU NEGATIVE 04/08/2022 01:56 PM    GLUCOSEU NEGATIVE 04/08/2022 01:56 PM       Radiology:   XR CHEST PORTABLE   Final Result   PICC line tip is in the superior vena cava. **This report has been created using voice recognition software. It may contain minor errors which are inherent in voice recognition technology. **      Final report electronically signed by Dr. Brandy Coello on 12/27/2022 1:30 PM        No results found.             [address] YOU FOR THE CONSULT    Electronically signed by DEREK Corrales CNP on 1/5/2023 at 4:12 AM

## 2023-01-05 NOTE — PROGRESS NOTES
Progress note: Infectious diseases    Patient - Darlene Avalos,  Age - 60 y.o.    - 1960      Room Number - 7K-10/010-A   MRN -  [id number]   Acct # - [de-identified]  Date of Admission -  2022  5:45 AM  Late entry. SUBJECTIVE:   No new complaints. OBJECTIVE   VITALS    height is 5' 7\" (1.702 m) and weight is 160 lb (72.6 kg). Her oral temperature is 98.4 °F (36.9 °C). Her blood pressure is 122/59 (abnormal) and her pulse is 90. Her respiration is 18 and oxygen saturation is 93%. Wt Readings from Last 3 Encounters:   22 160 lb (72.6 kg)   22 158 lb (71.7 kg)   22 158 lb (71.7 kg)       I/O (24 Hours)    Intake/Output Summary (Last 24 hours) at 2023 0906  Last data filed at 2023 0428  Gross per 24 hour   Intake 1120 ml   Output 600 ml   Net 520 ml         General Appearance  Awake, alert, oriented,  not  In acute distress  HEENT - normocephalic, atraumatic, slightly pale conjunctiva,  anicteric sclera  Neck - Supple, no mass  Lungs -  Bilateral  air entry, no rhonchi, no wheeze  Cardiovascular - Heart sounds are normal.    Abdomen - soft, not distended, nontender.   Neurologic -oriented  Skin - No bruising or bleeding  Extremities -dressed with right knee  MEDICATIONS:      miconazole   Topical BID    lidocaine 1 % injection  5 mL IntraDERmal Once    ceFAZolin  2,000 mg IntraVENous Q8H    atorvastatin  40 mg Oral Nightly    doxepin  40 mg Oral Nightly    fenofibrate  160 mg Oral Daily    losartan  25 mg Oral Daily    melatonin  3 mg Oral Nightly    multivitamin  1 tablet Oral Daily    rivaroxaban  10 mg Oral Q24H    sodium chloride flush  5-40 mL IntraVENous 2 times per day    sennosides-docusate sodium  1 tablet Oral BID      sodium chloride       potassium chloride **OR** potassium alternative oral replacement **OR** potassium chloride, magnesium sulfate, baclofen, sodium chloride flush, sodium chloride, ondansetron **OR** ondansetron, acetaminophen, morphine **OR** morphine, magnesium hydroxide, sodium phosphate, oxyCODONE-acetaminophen, oxyCODONE-acetaminophen      LABS:     CBC:   Recent Labs     01/03/23  0623 01/05/23  0445   WBC 6.7 7.8   HGB 7.6* 8.3*   * 484*       CULTURES:   UA: No results for input(s): SPECGRAV, PHUR, COLORU, CLARITYU, MUCUS, PROTEINU, BLOODU, RBCUA, WBCUA, BACTERIA, NITRU, GLUCOSEU, BILIRUBINUR, UROBILINOGEN, KETUA, LABCAST, LABCASTTY, AMORPHOS in the last 72 hours. Invalid input(s): CRYSTALS  Micro:   Lab Results   Component Value Date/Time    BC No growth-preliminary No growth 06/10/2022 03:00 PM    BC No growth-preliminary No growth 06/10/2022 03:00 PM       Problem list of patient:     Patient Active Problem List   Diagnosis Code    Diabetes mellitus type 2, noninsulin dependent ([facility] [address] E11.9    Hyperlipemia E78.5    THORNTON (nonalcoholic steatohepatitis) K75.81    Obesity E66.9    Tobacco abuse Z72.0    Vitamin D deficiency E55.9    Seborrheic keratosis L82.1    Dyshidrotic eczema L30.1    Displaced articular fracture of head of right femur, sequela S72.061S    Neida-prosthetic supracondylar fracture of femur M97. 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    She will need 6 wks of iv antibiotics  Awaiting precertification for ECF  Will need weekly cbc bmp crp while on iv antibiotics  Anemia:     June Claudio MD, MD, FACP  1/2/23

## 2023-01-06 LAB
ANION GAP SERPL CALCULATED.3IONS-SCNC: 10 MEQ/L (ref 8–16)
BUN BLDV-MCNC: 5 MG/DL (ref 7–22)
CALCIUM SERPL-MCNC: 7.7 MG/DL (ref 8.5–10.5)
CHLORIDE BLD-SCNC: 109 MEQ/L (ref 98–111)
CO2: 26 MEQ/L (ref 23–33)
CREAT SERPL-MCNC: 0.4 MG/DL (ref 0.4–1.2)
GFR SERPL CREATININE-BSD FRML MDRD: > 60 ML/MIN/1.73M2
GLUCOSE BLD-MCNC: 65 MG/DL (ref 70–108)
MAGNESIUM: 1.5 MG/DL (ref 1.6–2.4)
POTASSIUM SERPL-SCNC: 3.4 MEQ/L (ref 3.5–5.2)
SODIUM BLD-SCNC: 145 MEQ/L (ref 135–145)

## 2023-01-06 PROCEDURE — 97530 THERAPEUTIC ACTIVITIES: CPT

## 2023-01-06 PROCEDURE — 97542 WHEELCHAIR MNGMENT TRAINING: CPT

## 2023-01-06 PROCEDURE — 83735 ASSAY OF MAGNESIUM: CPT

## 2023-01-06 PROCEDURE — 6370000000 HC RX 637 (ALT 250 FOR IP): Performed by: PHYSICIAN ASSISTANT

## 2023-01-06 PROCEDURE — 6360000002 HC RX W HCPCS: Performed by: PHYSICIAN ASSISTANT

## 2023-01-06 PROCEDURE — 6370000000 HC RX 637 (ALT 250 FOR IP): Performed by: INTERNAL MEDICINE

## 2023-01-06 PROCEDURE — 2580000003 HC RX 258: Performed by: INTERNAL MEDICINE

## 2023-01-06 PROCEDURE — 36415 COLL VENOUS BLD VENIPUNCTURE: CPT

## 2023-01-06 PROCEDURE — 2580000003 HC RX 258: Performed by: PHYSICIAN ASSISTANT

## 2023-01-06 PROCEDURE — 6370000000 HC RX 637 (ALT 250 FOR IP)

## 2023-01-06 PROCEDURE — 80048 BASIC METABOLIC PNL TOTAL CA: CPT

## 2023-01-06 PROCEDURE — 6360000002 HC RX W HCPCS: Performed by: INTERNAL MEDICINE

## 2023-01-06 PROCEDURE — 1200000000 HC SEMI PRIVATE

## 2023-01-06 PROCEDURE — 97110 THERAPEUTIC EXERCISES: CPT

## 2023-01-06 PROCEDURE — 99231 SBSQ HOSP IP/OBS SF/LOW 25: CPT | Performed by: NURSE PRACTITIONER

## 2023-01-06 RX ORDER — OXYCODONE HYDROCHLORIDE AND ACETAMINOPHEN 5; 325 MG/1; MG/1
1 TABLET ORAL EVERY 6 HOURS PRN
Qty: 28 TABLET | Refills: 0 | Status: SHIPPED | OUTPATIENT
Start: 2023-01-06 | End: 2023-01-09 | Stop reason: SDUPTHER

## 2023-01-06 RX ADMIN — OXYCODONE AND ACETAMINOPHEN 2 TABLET: 5; 325 TABLET ORAL at 16:49

## 2023-01-06 RX ADMIN — BACLOFEN 10 MG: 10 TABLET ORAL at 02:21

## 2023-01-06 RX ADMIN — SODIUM CHLORIDE: 9 INJECTION, SOLUTION INTRAVENOUS at 16:52

## 2023-01-06 RX ADMIN — FENOFIBRATE 160 MG: 160 TABLET ORAL at 09:10

## 2023-01-06 RX ADMIN — CEFAZOLIN 2000 MG: 10 INJECTION, POWDER, FOR SOLUTION INTRAVENOUS at 23:55

## 2023-01-06 RX ADMIN — SODIUM CHLORIDE, PRESERVATIVE FREE 10 ML: 5 INJECTION INTRAVENOUS at 13:27

## 2023-01-06 RX ADMIN — SODIUM CHLORIDE, PRESERVATIVE FREE 10 ML: 5 INJECTION INTRAVENOUS at 20:41

## 2023-01-06 RX ADMIN — Medication 1 TABLET: at 09:11

## 2023-01-06 RX ADMIN — FERROUS SULFATE TAB 325 MG (65 MG ELEMENTAL FE) 325 MG: 325 (65 FE) TAB at 09:10

## 2023-01-06 RX ADMIN — Medication 3 MG: at 20:40

## 2023-01-06 RX ADMIN — FERROUS SULFATE TAB 325 MG (65 MG ELEMENTAL FE) 325 MG: 325 (65 FE) TAB at 16:49

## 2023-01-06 RX ADMIN — BACLOFEN 10 MG: 10 TABLET ORAL at 16:49

## 2023-01-06 RX ADMIN — SODIUM CHLORIDE, PRESERVATIVE FREE 10 ML: 5 INJECTION INTRAVENOUS at 16:53

## 2023-01-06 RX ADMIN — LOSARTAN POTASSIUM 25 MG: 25 TABLET, FILM COATED ORAL at 09:10

## 2023-01-06 RX ADMIN — POTASSIUM CHLORIDE 40 MEQ: 1500 TABLET, EXTENDED RELEASE ORAL at 09:16

## 2023-01-06 RX ADMIN — OXYCODONE AND ACETAMINOPHEN 2 TABLET: 5; 325 TABLET ORAL at 20:40

## 2023-01-06 RX ADMIN — ATORVASTATIN CALCIUM 40 MG: 40 TABLET, FILM COATED ORAL at 20:41

## 2023-01-06 RX ADMIN — OXYCODONE AND ACETAMINOPHEN 2 TABLET: 5; 325 TABLET ORAL at 02:20

## 2023-01-06 RX ADMIN — CEFAZOLIN 2000 MG: 10 INJECTION, POWDER, FOR SOLUTION INTRAVENOUS at 05:59

## 2023-01-06 RX ADMIN — CEFAZOLIN 2000 MG: 10 INJECTION, POWDER, FOR SOLUTION INTRAVENOUS at 16:52

## 2023-01-06 RX ADMIN — DOXEPIN HYDROCHLORIDE 40 MG: 10 CAPSULE ORAL at 20:40

## 2023-01-06 RX ADMIN — RIVAROXABAN 10 MG: 10 TABLET, FILM COATED ORAL at 23:56

## 2023-01-06 RX ADMIN — MICONAZOLE NITRATE: 2 POWDER TOPICAL at 20:43

## 2023-01-06 RX ADMIN — OXYCODONE AND ACETAMINOPHEN 2 TABLET: 5; 325 TABLET ORAL at 09:11

## 2023-01-06 RX ADMIN — BACLOFEN 10 MG: 10 TABLET ORAL at 09:12

## 2023-01-06 RX ADMIN — ONDANSETRON 4 MG: 2 INJECTION INTRAMUSCULAR; INTRAVENOUS at 13:27

## 2023-01-06 RX ADMIN — MICONAZOLE NITRATE: 2 POWDER TOPICAL at 09:10

## 2023-01-06 ASSESSMENT — PAIN SCALES - GENERAL
PAINLEVEL_OUTOF10: 8
PAINLEVEL_OUTOF10: 8
PAINLEVEL_OUTOF10: 6
PAINLEVEL_OUTOF10: 6

## 2023-01-06 ASSESSMENT — PAIN DESCRIPTION - LOCATION: LOCATION: LEG

## 2023-01-06 ASSESSMENT — PAIN DESCRIPTION - ORIENTATION: ORIENTATION: RIGHT

## 2023-01-06 NOTE — CARE COORDINATION
1/6/23, 9:46 AM EST    DISCHARGE PLANNING EVALUATION    Spoke with [facility] admissions, will need PT note when patient agrees to participate in therapy to update precert     9/4/27, 5:96 PM EST    DISCHARGE PLANNING EVALUATION   PT/OT able to work with patient today, ECF will update precert

## 2023-01-06 NOTE — PROGRESS NOTES
Hospitalist Progress Note    Patient:  Adrian Ashley      Unit/Bed:7K-10/010-A    YOB: 1960    MRN: [id number]       Acct: [de-identified]     PCP: Jalen Tobias DO    Date of Admission: 12/22/2022    Assessment/Plan:    Acute hypokalemia, improved greatly with replacement. K was 2.8, now 3.4. Replace and repeat lab in AM.  Mild resp alkalosis secondary to hyperventilation from pain, resolved. Right septci knee joint s/p revision with antibiotic spacer 12/22. ID and ortho following. ABL anemia. HH trend is stable. Transfuse for hbg <7 or hemodynamically stable. Dispo: patient is medically stable. Hospitalist will sign off, call back PRN. Chief Complaint:  critical vbg  Hospital Course:     M[age] Calderon y.o. female who we are asked to see/evaluate by Felicia Toney MD for evaluation of abnormal ABG. Patient was admitted on 12/22 for scheduled revision of right total knee with antibiotic spacer due to infection. Patient is admitted under orthopedic service and been managed over the last 2 weeks along with infectious disease. Patient is postop day #14 with plans to discharge to [facility]. On the evening of 1/5, it appears blood gas was drawn in error after being ordered on a different patient. Blood gas was reported by respiratory therapy to be arterial and showed a critical pH of 7.65, PCO2 of 22, PaO2 of 162. Due to abnormal labs orthopedic PA on-call was notified who then reached out to hospitalist service for consult. ABG was not supposed to have been drawn on this patient. Patient has been stable during her stay. Patient was assessed at bedside. She denies chest pain, cough, shortness of breath, abdominal pain, lightheadedness or dizziness. She reports that she has been eating and drinking well. Vital signs have been stable. Patient is mildly tachypneic, pain on her side, and does appear in moderate distress related to pain in her right knee.   Right knee pain is patient's only complaint at this time and reports that nursing is supposed to be bringing her pain medicine. Subjective: Upset she is still here. Some pain at surgical site. No n/v/d. Tolerating diet. Medications:  Reviewed    Infusion Medications    sodium chloride 100 mL/hr at 01/05/23 1039     Scheduled Medications    ferrous sulfate  325 mg Oral BID WC    miconazole   Topical BID    lidocaine 1 % injection  5 mL IntraDERmal Once    ceFAZolin  2,000 mg IntraVENous Q8H    atorvastatin  40 mg Oral Nightly    doxepin  40 mg Oral Nightly    fenofibrate  160 mg Oral Daily    losartan  25 mg Oral Daily    melatonin  3 mg Oral Nightly    multivitamin  1 tablet Oral Daily    rivaroxaban  10 mg Oral Q24H    sodium chloride flush  5-40 mL IntraVENous 2 times per day    sennosides-docusate sodium  1 tablet Oral BID     PRN Meds: potassium chloride **OR** potassium alternative oral replacement **OR** potassium chloride, magnesium sulfate, baclofen, sodium chloride flush, sodium chloride, ondansetron **OR** ondansetron, acetaminophen, morphine **OR** morphine, magnesium hydroxide, sodium phosphate, oxyCODONE-acetaminophen, oxyCODONE-acetaminophen      Intake/Output Summary (Last 24 hours) at 1/6/2023 1334  Last data filed at 1/5/2023 2147  Gross per 24 hour   Intake 560 ml   Output --   Net 560 ml       Diet:  ADULT DIET; Regular  ADULT ORAL NUTRITION SUPPLEMENT; Breakfast, Dinner; Clear Liquid Oral Supplement    Exam:  BP (!) 120/57   Pulse 92   Temp 97.7 °F (36.5 °C) (Oral)   Resp 18   Ht 5' 7\" (1.702 m)   Wt 160 lb (72.6 kg)   SpO2 95%   BMI 25.06 kg/m²     General appearance: No apparent distress, appears stated age and cooperative. HEENT: Pupils equal, round, and reactive to light. Conjunctivae/corneas clear. Neck: Supple, with full range of motion. No jugular venous distention. Trachea midline. Respiratory:  Normal respiratory effort.  Clear to auscultation, bilaterally without Rales/Wheezes/Rhonchi. Cardiovascular: Regular rate and rhythm with normal S1/S2 without murmurs, rubs or gallops. Abdomen: Soft, obese, non-tender, non-distended with normal bowel sounds. Musculoskeletal: passive and active ROM x 4 extremities. Skin: Skin color, texture, turgor normal.    Neurologic:  Neurovascularly intact without any focal sensory/motor deficits. Cranial nerves: II-XII intact, grossly non-focal.  Psychiatric: Alert and oriented, thought content appropriate, normal insight  Capillary Refill: Brisk,< 3 seconds   Peripheral Pulses: +2 palpable, equal bilaterally       Labs:   Recent Labs     01/05/23 0445   WBC 7.8   HGB 8.3*   HCT 27.2*   *     Recent Labs     01/05/23 0445 01/05/23 2019 01/06/23  0645   *  --  145   K 2.9* 3.4* 3.4*     --  109   CO2 28  --  26   BUN 5*  --  5*   CREATININE 0.4  --  0.4   CALCIUM 7.9*  --  7.7*     No results for input(s): AST, ALT, BILIDIR, BILITOT, ALKPHOS in the last 72 hours. No results for input(s): INR in the last 72 hours. No results for input(s): Brii Nikolai in the last 72 hours. Urinalysis:      Lab Results   Component Value Date/Time    NITRU NEGATIVE 04/08/2022 01:56 PM    WBCUA 2-4 04/08/2022 01:56 PM    BACTERIA FEW 04/08/2022 01:56 PM    RBCUA 0-2 04/08/2022 01:56 PM    BLOODU NEGATIVE 04/08/2022 01:56 PM    GLUCOSEU NEGATIVE 04/08/2022 01:56 PM       Radiology:  XR CHEST PORTABLE   Final Result   PICC line tip is in the superior vena cava. **This report has been created using voice recognition software. It may contain minor errors which are inherent in voice recognition technology. **      Final report electronically signed by Dr. Tawnya Garcia on 12/27/2022 1:30 PM          Diet: ADULT DIET;  Regular  ADULT ORAL NUTRITION SUPPLEMENT; Breakfast, Dinner; Clear Liquid Oral Supplement       Disposition:    [] Home       [] TCU       [] Rehab       [] Psych       [x] SNF       [] [facility] [] Other-    Code Status: Full Code    PT/OT Eval Status: following        Electronically signed by DEREK Murrell CNP on 1/6/2023 at 1:34 PM

## 2023-01-06 NOTE — PLAN OF CARE
Problem: Discharge Planning  Goal: Discharge to home or other facility with appropriate resources  Outcome: Progressing  Flowsheets (Taken 1/6/2023 1700)  Discharge to home or other facility with appropriate resources: Identify barriers to discharge with patient and caregiver  Note: Discharge planning in progress. Precert is pending for [facility]. Case management assisting with discharge needs. Problem: Chronic Conditions and Co-morbidities  Goal: Patient's chronic conditions and co-morbidity symptoms are monitored and maintained or improved  Outcome: Progressing     Problem: Pain  Goal: Verbalizes/displays adequate comfort level or baseline comfort level  Outcome: Progressing  Flowsheets (Taken 1/6/2023 1700)  Verbalizes/displays adequate comfort level or baseline comfort level:   Encourage patient to monitor pain and request assistance   Assess pain using appropriate pain scale  Note: Patient reports pain in the right knee with a rating of 7/9 on 0-10 scale. PRN pain medications given as ordered along with ice packs and elevation. Problem: Safety - Adult  Goal: Free from fall injury  Outcome: Progressing  Flowsheets (Taken 1/6/2023 1700)  Free From Fall Injury: Instruct family/caregiver on patient safety  Note: Patient has remained free of falls during this shift. Appropriate fall prevention measures in place. Patient is compliant with using call light for assistance when needed. Goal: Absence of central venous catheter-associated infection  Description: Absence of central venous catheter-associated infection  Outcome: Progressing  Note: No s/sx of infection noted at PICC dressing site. Dressing remains dry and intact. Problem: ABCDS Injury Assessment  Goal: Absence of physical injury  Outcome: Progressing  Flowsheets (Taken 1/6/2023 1700)  Absence of Physical Injury: Implement safety measures based on patient assessment  Note: Patient has remained free of physical injury during this shift. Safe environment provided, call light within reach, and hourly rounding completed. Problem: Skin/Tissue Integrity - Adult  Goal: Incisions, wounds, or drain sites healing without S/S of infection  Outcome: Progressing  Flowsheets (Taken 1/6/2023 1700)  Incisions, Wounds, or Drain Sites Healing Without Sign and Symptoms of Infection: TWICE DAILY: Assess and document skin integrity  Note: No s/sx of infection noted during this shift. Patient remains afebrile and VS stable. Patient is currently on Ancef. Problem: Musculoskeletal - Adult  Goal: Return ADL status to a safe level of function  Outcome: Progressing  Flowsheets (Taken 1/6/2023 1700)  Return ADL Status to a Safe Level of Function: Assess activities of daily living deficits and provide assistive devices as needed  Note: Patient is slide board transfer to wheel chair. Problem: Genitourinary - Adult  Goal: Absence of urinary retention  Outcome: Progressing  Flowsheets (Taken 1/6/2023 1700)  Absence of urinary retention: Assess patients ability to void and empty bladder  Note: Patient has voided adequate amounts of clear, julissa urine during this shift. Patient prefers to use external catheter. Problem: Nutrition Deficit:  Goal: Optimize nutritional status  Outcome: Progressing  Flowsheets (Taken 1/6/2023 1700)  Nutrient intake appropriate for improving, restoring, or maintaining nutritional needs:   Assess nutritional status and recommend course of action   Recommend appropriate diets, oral nutritional supplements, and vitamin/mineral supplements  Note: Patient is on a regular diet. Denies any nausea or vomiting during this shift. Tolerates diet well. Problem: Hematologic - Adult  Goal: Maintains hematologic stability  Outcome: Progressing  Flowsheets (Taken 1/6/2023 1700)  Maintains hematologic stability: Assess for signs and symptoms of bleeding or hemorrhage  Note: Patient remains hemodynamically stable during this shift.  VS within normal limits. Care plan reviewed with patient. Patient verbalize understanding of the plan of care and contribute to goal setting.

## 2023-01-06 NOTE — PROGRESS NOTES
[facility]  INPATIENT PHYSICAL THERAPY  DAILY NOTE  [facility] ORTHOPEDICS 7K - 7K-10/010-A    Time In: 1116  Time Out: 1139  Timed Code Treatment Minutes: 23 Minutes  Minutes: 23          Date: 2023  Patient Name: Alexa Nayak,  Gender:  female        MRN: [id number]  : 1960  (58 y.o.)     Referring Practitioner: CRISTAL Dixon  Diagnosis: Infection of total right knee replacement, initial encounter  Additional Pertinent Hx: STAGE 1 RIGHT KNEE with insertion of antibiotic spacer  by Dr Alla Peterson; extensive knee history with several surgeries prior to this     Prior Level of Function:  Lives With: Spouse  Type of Home: House  Home Layout: One level  Home Access: Ramped entrance  Home Equipment: Walker, rolling, Wheelchair-manual   Bathroom Shower/Tub: Tub/Shower unit  Bathroom Toilet: Bedside commode    ADL Assistance: Independent  Homemaking Assistance: Independent  Ambulation Assistance: Non-ambulatory  Transfer Assistance: Independent  Active : Yes  IADL Comments:  helped her as needed, pt reports he is a \"worry wart\" and only wants her to transfer with him present. Pt's  has been doing most IADLs at home. Additional Comments: Pt has been at SNF for therapy and recently returned home, she reports she was able to scoot from EOB to w/c but mostly remained in bed    Restrictions/Precautions:  Restrictions/Precautions: Fall Risk, Weight Bearing, General Precautions  Right Lower Extremity Weight Bearing: Weight Bearing As Tolerated  Position Activity Restriction  Other position/activity restrictions: B LE contractures     SUBJECTIVE: RN approved session. Pt pleasant and agreeable to therapy.        PAIN: 0/10: denies pain at rest; does c/o pain when cramping     Vitals: Vitals not assessed per clinical judgement, see nursing flowsheet    OBJECTIVE:  Bed Mobility:  Supine to Sit: Moderate Assistance, X 1, with head of bed raised, with rail    Transfers:  Slide Board:Contact Guard Assistance, X 1, with verbal cues, from EOB to w/c towards R     Ambulation:  Not Tested    Wheelchair Mobility:  Stand By Assistance  Extremities Used: Bilateral Upper Extremities  Type of Chair:Manual  Surface: Level Tile  Distance: 340'  Quality: Slow Velocity    Balance:  Static Sitting Balance:  Supervision    Exercise:  Patient was guided in 1 set(s) 10 reps of exercise to right lower extremities. Quad sets and Short arc quads. Exercises were completed for increased independence with functional mobility. Functional Outcome Measures: Completed  AM-PAC Inpatient Mobility without Stair Climbing Raw Score : 11  AM-PAC Inpatient without Stair Climbing T-Scale Score : 35.66    ASSESSMENT:  Assessment: Patient progressing toward established goals. Activity Tolerance:  Patient tolerance of  treatment: good. Equipment Recommendations:Equipment Needed: No  Discharge Recommendations: Subacte/Skilled Nursing Facility  Plan: Current Treatment Recommendations: Strengthening, Balance training, Functional mobility training, Transfer training, Endurance training, Safety education & training  General Plan:  (3-5xO)    Patient Education  Patient Education: Plan of Care, Bed Mobility, Transfers, Verbal Exercise Instruction    Goals:  Patient Goals : none stated  Short Term Goals  Time Frame for Short Term Goals: by discharge  Short Term Goal 1: bed mobility with HOB flat, no rails, mod I for increased functional ind  Short Term Goal 2: sit pivot with LRD mod I for safe transfers  Short Term Goal 3: w/c mobility with mod I for safe household navigation  Long Term Goals  Time Frame for Long Term Goals : NA d/t short ELOS    Following session, patient left in safe position with all fall risk precautions in place.     Gerianne Oppenheim ([place]x) PT, DPT

## 2023-01-06 NOTE — PROGRESS NOTES
Progress note: Infectious diseases    Patient - Darlene Avalos,  Age - 58 y.o.    - 1960      Room Number - 7K-10/010-A   MRN -  [id number]   Acct # - [de-identified]  Date of Admission -  2022  5:45 AM  Late entry. SUBJECTIVE:   No new complaints. OBJECTIVE   VITALS    height is 5' 7\" (1.702 m) and weight is 160 lb (72.6 kg). Her oral temperature is 97.7 °F (36.5 °C). Her blood pressure is 120/57 (abnormal) and her pulse is 92. Her respiration is 18 and oxygen saturation is 95%. Wt Readings from Last 3 Encounters:   22 160 lb (72.6 kg)   22 158 lb (71.7 kg)   22 158 lb (71.7 kg)       I/O (24 Hours)    Intake/Output Summary (Last 24 hours) at 2023 1335  Last data filed at 2023 2147  Gross per 24 hour   Intake 560 ml   Output --   Net 560 ml         General Appearance  Awake, alert, oriented,  not  In acute distress  HEENT - normocephalic, atraumatic, slightly pale conjunctiva,  anicteric sclera  Neck - Supple, no mass  Lungs -  Bilateral  air entry,    Cardiovascular - Heart sounds are normal.    Abdomen - soft, not distended, nontender.   Neurologic -oriented  Skin - No bruising or bleeding  Extremities -dressed with right knee  MEDICATIONS:      ferrous sulfate  325 mg Oral BID WC    miconazole   Topical BID    lidocaine 1 % injection  5 mL IntraDERmal Once    ceFAZolin  2,000 mg IntraVENous Q8H    atorvastatin  40 mg Oral Nightly    doxepin  40 mg Oral Nightly    fenofibrate  160 mg Oral Daily    losartan  25 mg Oral Daily    melatonin  3 mg Oral Nightly    multivitamin  1 tablet Oral Daily    rivaroxaban  10 mg Oral Q24H    sodium chloride flush  5-40 mL IntraVENous 2 times per day    sennosides-docusate sodium  1 tablet Oral BID      sodium chloride 100 mL/hr at 23 1039     potassium chloride **OR** potassium alternative oral replacement **OR** potassium chloride, magnesium sulfate, baclofen, sodium chloride flush, sodium chloride, ondansetron **OR** ondansetron, acetaminophen, morphine **OR** morphine, magnesium hydroxide, sodium phosphate, oxyCODONE-acetaminophen, oxyCODONE-acetaminophen      LABS:     CBC:   Recent Labs     01/05/23  0445   WBC 7.8   HGB 8.3*   *       CULTURES:   UA: No results for input(s): SPECGRAV, PHUR, COLORU, CLARITYU, MUCUS, PROTEINU, BLOODU, RBCUA, WBCUA, BACTERIA, NITRU, GLUCOSEU, BILIRUBINUR, UROBILINOGEN, KETUA, LABCAST, LABCASTTY, AMORPHOS in the last 72 hours. Invalid input(s): CRYSTALS  Micro:   Lab Results   Component Value Date/Time    BC No growth-preliminary No growth 06/10/2022 03:00 PM    BC No growth-preliminary No growth 06/10/2022 03:00 PM       Problem list of patient:     Patient Active Problem List   Diagnosis Code    Diabetes mellitus type 2, noninsulin dependent [facility] E11.9    Hyperlipemia E78.5    THORNTON (nonalcoholic steatohepatitis) K75.81    Obesity E66.9    Tobacco abuse Z72.0    Vitamin D deficiency E55.9    Seborrheic keratosis L82.1    Dyshidrotic eczema L30.1    Displaced articular fracture of head of right femur, sequela S72.061S    Neida-prosthetic supracondylar fracture of femur M97. 8XXA, [id number]    Primary hypertension I10    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) T84.53XA    History of total knee replacement, right Z96.651    Hypokalemia E87.6    Enterocolitis due to Clostridium difficile, not specified as recurrent A04.72    Recurrent major depressive disorder ([address] F33.9    Aftercare following knee joint replacement surgery Z47.1, Z96.659    Post-operative state Z98.890         ASSESSMENT/PLAN   Right knee infected prosthesis status post first stage revision  Infection with MSSA  Continue IV Ancef.     Awaiting precertification for ECF  Will need weekly cbc bmp crp while on iv antibiotics  Anemia: on iron    [place] MD Blanco MD, Min tSephens  1/2/23

## 2023-01-06 NOTE — PROGRESS NOTES
[facility]  Occupational Therapy  Daily Note  Time:   Time In: 1321  Time Out: 1345  Timed Code Treatment Minutes: 24 Minutes  Minutes: 24          Date: 2023  Patient Name: Abdullahi Cordero,   Gender: female      Room: [facility]10/010-A  MRN: [id number]  : 1960  (58 y.o.)  Referring Practitioner: CRISTAL Llanos  Diagnosis: Infection of total right knee  Additional Pertinent Hx: STAGE 1 RIGHT KNEE with insertion of antibiotic spacer  by Dr Samaria Quigley; extensive knee history with several surgeries prior to this    Restrictions/Precautions:  Restrictions/Precautions: Fall Risk, Weight Bearing, General Precautions  Right Lower Extremity Weight Bearing: Weight Bearing As Tolerated  Position Activity Restriction  Other position/activity restrictions: B LE contractures     SUBJECTIVE: RN okayed OT session. Upon arrival patient was sitting up in w/c. Pt was agreeable to OT session. Pt is reporting nausea. PAIN: 6/10: Right leg     Vitals: Vitals not assessed per clinical judgement, see nursing flowsheet    COGNITION: Decreased Insight, Decreased Problem Solving, and Decreased Safety Awareness    ADL:   No ADL's completed this session. Alicia Ruffing BALANCE:  Sitting Balance:  Stand By Assistance. Standing Balance: not tested. BED MOBILITY:  Sit to Supine: Contact Guard Assistance, with head of bed flat, with rail, with increased time for completion    Scooting: Contact Guard Assistance, with increased time for completion      TRANSFERS:  Sliding Board: [address], X 1, with increased time for completion, cues for hand placement, with verbal cues. W/c to EOB. Pt was dependent to place slide board. FUNCTIONAL MOBILITY:  Assistive Device: Wheelchair  Assist Level:  Stand By Assistance. Distance:  around room. Very slow pace     ADDITIONAL ACTIVITIES:  Pt completed B UE exs with green resistance band x 10 reps, x 1 set, while following a handout.  fair tolerance of exs, with 1 RBs throughout, and min cues for tech with resistance band. ASSESSMENT:     Activity Tolerance:  Patient tolerance of  treatment: good. Discharge Recommendations: Continue to assess pending progress and Subacute/skilled nursing facility  Equipment Recommendations: Equipment Needed: No  Plan: Times Per Week: 3-5x  Current Treatment Recommendations: Strengthening, Balance training, Functional mobility training, Endurance training, Patient/Caregiver education & training, Return to work related activity, Self-Care / ADL, Home management training, Safety education & training    Patient Education  Patient Education: Role of OT, Plan of Care, Home Exercise Program, Precautions, Importance of Increasing Activity, and Assistive Device Safety    Goals  Short Term Goals  Time Frame for Short Term Goals: by discharge  Short Term Goal 1: Pt will complete sit pivot t/fs with no > than CGA and min VC for technique to increase indep with ADLs  Short Term Goal 2: Pt will complete LB ADLs with Min A and adaptive techniques prn to increase indep with dressing  Short Term Goal 3: Pt will complete ADL item retrieval from w/c level with Mod I and min safety cues for adaptations to increase indep with self care routine    Following session, patient left in safe position with all fall risk precautions in place.

## 2023-01-06 NOTE — PROGRESS NOTES
Orthopaedic Progress Note      SUBJECTIVE   Ms. Bria Garnett is post op day # 15    Patient s/p Stage I revision right knee with antibiotic spacer. no adverse events overnight  Tolerating oral diet  Mobilizing minimally  Incision c/d/i  Resting on exam  No change overall  Erroneous draw of abg, stable    OBJECTIVE      Physical    VITALS:  /65   Pulse 82   Temp 98.1 °F (36.7 °C)   Resp 18   Ht 5' 7\" (1.702 m)   Wt 160 lb (72.6 kg)   SpO2 94%   BMI 25.06 kg/m²   I/O last 3 completed shifts: In: 1800 [P.O.:1800]  Out: 600 [Urine:600]    5/10 pain  Gen: alert and oriented, resting today  Head: normorcephalic, atraumatic  Resp: unlabored, room air  Pelvis: stable  RLE:  Dressing clean, dry, and intact. No active drainage from incision. ROM limited secondary to spacer. Able to perform SLR. NVI. Dorsalis pedal and posterior tibialis pulses strong and regular.       Data  CBC:   Lab Results   Component Value Date/Time    WBC 7.8 01/05/2023 04:45 AM    HGB 8.3 01/05/2023 04:45 AM     01/05/2023 04:45 AM     BMP:    Lab Results   Component Value Date/Time     01/05/2023 04:45 AM    K 3.4 01/05/2023 08:19 PM    K 4.6 05/09/2022 01:05 PM     01/05/2023 04:45 AM    CO2 28 01/05/2023 04:45 AM    BUN 5 01/05/2023 04:45 AM    CREATININE 0.4 01/05/2023 04:45 AM    CALCIUM 7.9 01/05/2023 04:45 AM    GLUCOSE 64 01/05/2023 04:45 AM    GLUCOSE 113 12/10/2020 10:01 AM     Uric Acid:  No components found for: URIC  PT/INR:    Lab Results   Component Value Date/Time    PROTIME 11.0 10/05/2016 03:39 PM    INR 1.44 12/01/2022 03:59 PM     Troponin:  No results found for: TROPONINI  Urine Culture:  No components found for: CURINE      Current Inpatient Medications    Current Facility-Administered Medications: potassium chloride (KLOR-CON M) extended release tablet 40 mEq, 40 mEq, Oral, PRN **OR** potassium bicarb-citric acid (EFFER-K) effervescent tablet 40 mEq, 40 mEq, Oral, PRN **OR** potassium chloride 10 mEq/100 mL IVPB (Peripheral Line), 10 mEq, IntraVENous, PRN  magnesium sulfate 2000 mg in 50 mL IVPB premix, 2,000 mg, IntraVENous, PRN  ferrous sulfate (IRON 325) tablet 325 mg, 325 mg, Oral, BID WC  miconazole (MICOTIN) 2 % powder, , Topical, BID  lidocaine PF 1 % injection 5 mL, 5 mL, IntraDERmal, Once  ceFAZolin (ANCEF) 2000 mg in dextrose 5 % 50 mL IVPB, 2,000 mg, IntraVENous, Q8H  atorvastatin (LIPITOR) tablet 40 mg, 40 mg, Oral, Nightly  baclofen (LIORESAL) tablet 10 mg, 10 mg, Oral, Q6H PRN  doxepin (SINEQUAN) capsule 40 mg, 40 mg, Oral, Nightly  fenofibrate (TRIGLIDE) tablet 160 mg, 160 mg, Oral, Daily  losartan (COZAAR) tablet 25 mg, 25 mg, Oral, Daily  melatonin tablet 3 mg, 3 mg, Oral, Nightly  multivitamin 1 tablet, 1 tablet, Oral, Daily  rivaroxaban (XARELTO) tablet 10 mg, 10 mg, Oral, Q24H  sodium chloride flush 0.9 % injection 5-40 mL, 5-40 mL, IntraVENous, 2 times per day  sodium chloride flush 0.9 % injection 5-40 mL, 5-40 mL, IntraVENous, PRN  0.9 % sodium chloride infusion, , IntraVENous, PRN  ondansetron (ZOFRAN-ODT) disintegrating tablet 4 mg, 4 mg, Oral, Q8H PRN **OR** ondansetron (ZOFRAN) injection 4 mg, 4 mg, IntraVENous, Q6H PRN  acetaminophen (TYLENOL) tablet 650 mg, 650 mg, Oral, Q4H PRN  morphine (PF) injection 2 mg, 2 mg, IntraVENous, Q2H PRN **OR** morphine injection 4 mg, 4 mg, IntraVENous, Q2H PRN  sennosides-docusate sodium (SENOKOT-S) 8.6-50 MG tablet 1 tablet, 1 tablet, Oral, BID  magnesium hydroxide (MILK OF MAGNESIA) 400 MG/5ML suspension 30 mL, 30 mL, Oral, Daily PRN  sodium phosphate (FLEET) rectal enema 1 enema, 1 enema, Rectal, Daily PRN  oxyCODONE-acetaminophen (PERCOCET) 5-325 MG per tablet 1 tablet, 1 tablet, Oral, Q4H PRN  oxyCODONE-acetaminophen (PERCOCET) 5-325 MG per tablet 2 tablet, 2 tablet, Oral, Q4H PRN        PLAN       1)  Cont with current medical management  2)  WBAT RLE, activity as tolerated  3)  Ice and elevation  4)  ID evaluated, picc placed  5)  ECF upon discharge  6)  dry dressing changes PRN  7)  PT OT  8)  hbg hct pending, will follow  9)  stable for discharge from ortho standpoint, pending SNF placement, precert started 58/76/69  10) follow up one week with Dr Jaden Dallas if discharged 1/6/23  11) if still in house 1/8/23, will remove sutures and have follow up with Dr Jaden Dallas in 2 weeks

## 2023-01-07 PROCEDURE — 6360000002 HC RX W HCPCS: Performed by: INTERNAL MEDICINE

## 2023-01-07 PROCEDURE — 6370000000 HC RX 637 (ALT 250 FOR IP): Performed by: PHYSICIAN ASSISTANT

## 2023-01-07 PROCEDURE — 2580000003 HC RX 258: Performed by: PHYSICIAN ASSISTANT

## 2023-01-07 PROCEDURE — 6370000000 HC RX 637 (ALT 250 FOR IP): Performed by: INTERNAL MEDICINE

## 2023-01-07 PROCEDURE — 1200000000 HC SEMI PRIVATE

## 2023-01-07 PROCEDURE — 2580000003 HC RX 258: Performed by: INTERNAL MEDICINE

## 2023-01-07 RX ADMIN — CEFAZOLIN 2000 MG: 10 INJECTION, POWDER, FOR SOLUTION INTRAVENOUS at 16:43

## 2023-01-07 RX ADMIN — SODIUM CHLORIDE, PRESERVATIVE FREE 10 ML: 5 INJECTION INTRAVENOUS at 21:35

## 2023-01-07 RX ADMIN — FERROUS SULFATE TAB 325 MG (65 MG ELEMENTAL FE) 325 MG: 325 (65 FE) TAB at 16:33

## 2023-01-07 RX ADMIN — OXYCODONE AND ACETAMINOPHEN 2 TABLET: 5; 325 TABLET ORAL at 16:33

## 2023-01-07 RX ADMIN — ATORVASTATIN CALCIUM 40 MG: 40 TABLET, FILM COATED ORAL at 21:35

## 2023-01-07 RX ADMIN — OXYCODONE AND ACETAMINOPHEN 2 TABLET: 5; 325 TABLET ORAL at 21:40

## 2023-01-07 RX ADMIN — FENOFIBRATE 160 MG: 160 TABLET ORAL at 09:56

## 2023-01-07 RX ADMIN — Medication 3 MG: at 21:35

## 2023-01-07 RX ADMIN — CEFAZOLIN 2000 MG: 10 INJECTION, POWDER, FOR SOLUTION INTRAVENOUS at 23:56

## 2023-01-07 RX ADMIN — LOSARTAN POTASSIUM 25 MG: 25 TABLET, FILM COATED ORAL at 09:57

## 2023-01-07 RX ADMIN — CEFAZOLIN 2000 MG: 10 INJECTION, POWDER, FOR SOLUTION INTRAVENOUS at 09:58

## 2023-01-07 RX ADMIN — SODIUM CHLORIDE, PRESERVATIVE FREE 10 ML: 5 INJECTION INTRAVENOUS at 09:57

## 2023-01-07 RX ADMIN — RIVAROXABAN 10 MG: 10 TABLET, FILM COATED ORAL at 23:56

## 2023-01-07 RX ADMIN — ONDANSETRON 4 MG: 4 TABLET, ORALLY DISINTEGRATING ORAL at 16:36

## 2023-01-07 RX ADMIN — OXYCODONE AND ACETAMINOPHEN 2 TABLET: 5; 325 TABLET ORAL at 09:56

## 2023-01-07 RX ADMIN — BACLOFEN 10 MG: 10 TABLET ORAL at 00:31

## 2023-01-07 RX ADMIN — BACLOFEN 10 MG: 10 TABLET ORAL at 16:33

## 2023-01-07 RX ADMIN — FERROUS SULFATE TAB 325 MG (65 MG ELEMENTAL FE) 325 MG: 325 (65 FE) TAB at 09:57

## 2023-01-07 RX ADMIN — MICONAZOLE NITRATE: 2 POWDER TOPICAL at 09:49

## 2023-01-07 RX ADMIN — OXYCODONE AND ACETAMINOPHEN 2 TABLET: 5; 325 TABLET ORAL at 03:30

## 2023-01-07 RX ADMIN — MICONAZOLE NITRATE: 2 POWDER TOPICAL at 21:36

## 2023-01-07 RX ADMIN — Medication 1 TABLET: at 09:56

## 2023-01-07 RX ADMIN — BACLOFEN 10 MG: 10 TABLET ORAL at 09:57

## 2023-01-07 RX ADMIN — DOXEPIN HYDROCHLORIDE 40 MG: 10 CAPSULE ORAL at 21:35

## 2023-01-07 ASSESSMENT — PAIN DESCRIPTION - LOCATION: LOCATION: LEG

## 2023-01-07 ASSESSMENT — PAIN SCALES - GENERAL
PAINLEVEL_OUTOF10: 7
PAINLEVEL_OUTOF10: 10

## 2023-01-07 ASSESSMENT — PAIN DESCRIPTION - ORIENTATION: ORIENTATION: RIGHT

## 2023-01-07 NOTE — PLAN OF CARE
Problem: Discharge Planning  Goal: Discharge to home or other facility with appropriate resources  Outcome: Progressing  Flowsheets (Taken 1/6/2023 1700 by Garo Moreno RN)  Discharge to home or other facility with appropriate resources: Identify barriers to discharge with patient and caregiver     Problem: Chronic Conditions and Co-morbidities  Goal: Patient's chronic conditions and co-morbidity symptoms are monitored and maintained or improved  Outcome: Progressing  Flowsheets (Taken 1/5/2023 2022 by Darline Arenas RN)  Care Plan - Patient's Chronic Conditions and Co-Morbidity Symptoms are Monitored and Maintained or Improved: Monitor and assess patient's chronic conditions and comorbid symptoms for stability, deterioration, or improvement     Problem: Pain  Goal: Verbalizes/displays adequate comfort level or baseline comfort level  Outcome: Progressing  Flowsheets (Taken 1/6/2023 1700 by Garo Moreno RN)  Verbalizes/displays adequate comfort level or baseline comfort level:   Encourage patient to monitor pain and request assistance   Assess pain using appropriate pain scale     Problem: Safety - Adult  Goal: Free from fall injury  Outcome: Progressing  Flowsheets (Taken 1/6/2023 1700 by Garo Moreno RN)  Free From Fall Injury: Instruct family/caregiver on patient safety     Problem: ABCDS Injury Assessment  Goal: Absence of physical injury  Outcome: Progressing  Flowsheets (Taken 1/6/2023 1700 by Garo Moreno RN)  Absence of Physical Injury: Implement safety measures based on patient assessment     Problem: Musculoskeletal - Adult  Goal: Return ADL status to a safe level of function  Outcome: Progressing     Problem: Genitourinary - Adult  Goal: Absence of urinary retention  Outcome: Progressing  Flowsheets (Taken 1/6/2023 1700 by Garo Moreno RN)  Absence of urinary retention: Assess patients ability to void and empty bladder     Problem: Hematologic - Adult  Goal: Maintains hematologic stability  Outcome: Progressing     Problem: Nutrition Deficit:  Goal: Optimize nutritional status  Outcome: Progressing

## 2023-01-07 NOTE — PROGRESS NOTES
Orthopaedic Progress Note      SUBJECTIVE   Ms. Josiah Chavez is post op day # 12      Patient notes s/p stage 1 revision of the Right knee  Cultures staph and corynebacteria  Sutures remain intact  Leg in position of extension lacking 10 degrees   Severe contraction of the left knee        OBJECTIVE      Physical    VITALS:  /85   Pulse (!) 101   Temp 98.1 °F (36.7 °C) (Oral)   Resp 18   Ht 5' 7\" (1.702 m)   Wt 160 lb (72.6 kg)   SpO2 92%   BMI 25.06 kg/m²   I/O last 3 completed shifts:   In: 1400 [P.O.:1400]  Out: -       Pain improved  NVI sensation intact  Incision clean and dry  Dressing taken down       Data  CBC:   Lab Results   Component Value Date/Time    WBC 7.8 01/05/2023 04:45 AM    HGB 8.3 01/05/2023 04:45 AM     01/05/2023 04:45 AM     BMP:    Lab Results   Component Value Date/Time     01/06/2023 06:45 AM    K 3.4 01/06/2023 06:45 AM    K 4.6 05/09/2022 01:05 PM     01/06/2023 06:45 AM    CO2 26 01/06/2023 06:45 AM    BUN 5 01/06/2023 06:45 AM    CREATININE 0.4 01/06/2023 06:45 AM    CALCIUM 7.7 01/06/2023 06:45 AM    GLUCOSE 65 01/06/2023 06:45 AM    GLUCOSE 113 12/10/2020 10:01 AM     Uric Acid:  No components found for: URIC  PT/INR:    Lab Results   Component Value Date/Time    PROTIME 11.0 10/05/2016 03:39 PM    INR 1.44 12/01/2022 03:59 PM     Troponin:  No results found for: TROPONINI  Urine Culture:  No components found for: CURINE      Current Inpatient Medications    Current Facility-Administered Medications: potassium chloride (KLOR-CON M) extended release tablet 40 mEq, 40 mEq, Oral, PRN **OR** potassium bicarb-citric acid (EFFER-K) effervescent tablet 40 mEq, 40 mEq, Oral, PRN **OR** potassium chloride 10 mEq/100 mL IVPB (Peripheral Line), 10 mEq, IntraVENous, PRN  magnesium sulfate 2000 mg in 50 mL IVPB premix, 2,000 mg, IntraVENous, PRN  ferrous sulfate (IRON 325) tablet 325 mg, 325 mg, Oral, BID WC  miconazole (MICOTIN) 2 % powder, , Topical, BID  lidocaine PF 1 % injection 5 mL, 5 mL, IntraDERmal, Once  ceFAZolin (ANCEF) 2000 mg in dextrose 5 % 50 mL IVPB, 2,000 mg, IntraVENous, Q8H  atorvastatin (LIPITOR) tablet 40 mg, 40 mg, Oral, Nightly  baclofen (LIORESAL) tablet 10 mg, 10 mg, Oral, Q6H PRN  doxepin (SINEQUAN) capsule 40 mg, 40 mg, Oral, Nightly  fenofibrate (TRIGLIDE) tablet 160 mg, 160 mg, Oral, Daily  losartan (COZAAR) tablet 25 mg, 25 mg, Oral, Daily  melatonin tablet 3 mg, 3 mg, Oral, Nightly  multivitamin 1 tablet, 1 tablet, Oral, Daily  rivaroxaban (XARELTO) tablet 10 mg, 10 mg, Oral, Q24H  sodium chloride flush 0.9 % injection 5-40 mL, 5-40 mL, IntraVENous, 2 times per day  sodium chloride flush 0.9 % injection 5-40 mL, 5-40 mL, IntraVENous, PRN  0.9 % sodium chloride infusion, , IntraVENous, PRN  ondansetron (ZOFRAN-ODT) disintegrating tablet 4 mg, 4 mg, Oral, Q8H PRN **OR** ondansetron (ZOFRAN) injection 4 mg, 4 mg, IntraVENous, Q6H PRN  acetaminophen (TYLENOL) tablet 650 mg, 650 mg, Oral, Q4H PRN  morphine (PF) injection 2 mg, 2 mg, IntraVENous, Q2H PRN **OR** morphine injection 4 mg, 4 mg, IntraVENous, Q2H PRN  sennosides-docusate sodium (SENOKOT-S) 8.6-50 MG tablet 1 tablet, 1 tablet, Oral, BID  magnesium hydroxide (MILK OF MAGNESIA) 400 MG/5ML suspension 30 mL, 30 mL, Oral, Daily PRN  sodium phosphate (FLEET) rectal enema 1 enema, 1 enema, Rectal, Daily PRN  oxyCODONE-acetaminophen (PERCOCET) 5-325 MG per tablet 1 tablet, 1 tablet, Oral, Q4H PRN  oxyCODONE-acetaminophen (PERCOCET) 5-325 MG per tablet 2 tablet, 2 tablet, Oral, Q4H PRN        PLAN    Continue antibiotic therapy per infectious disease   Continue pain medication as needed  NEEDS to work with therapy   Change dressing today  Await discharge planning ECF pending precert   Will plan suture removal tomorrow

## 2023-01-08 PROCEDURE — 6360000002 HC RX W HCPCS: Performed by: INTERNAL MEDICINE

## 2023-01-08 PROCEDURE — 2580000003 HC RX 258: Performed by: PHYSICIAN ASSISTANT

## 2023-01-08 PROCEDURE — 6370000000 HC RX 637 (ALT 250 FOR IP): Performed by: INTERNAL MEDICINE

## 2023-01-08 PROCEDURE — 1200000000 HC SEMI PRIVATE

## 2023-01-08 PROCEDURE — 2580000003 HC RX 258: Performed by: INTERNAL MEDICINE

## 2023-01-08 PROCEDURE — 6370000000 HC RX 637 (ALT 250 FOR IP): Performed by: PHYSICIAN ASSISTANT

## 2023-01-08 RX ADMIN — CEFAZOLIN 2000 MG: 10 INJECTION, POWDER, FOR SOLUTION INTRAVENOUS at 16:33

## 2023-01-08 RX ADMIN — DOXEPIN HYDROCHLORIDE 40 MG: 10 CAPSULE ORAL at 20:29

## 2023-01-08 RX ADMIN — CEFAZOLIN 2000 MG: 10 INJECTION, POWDER, FOR SOLUTION INTRAVENOUS at 23:59

## 2023-01-08 RX ADMIN — BACLOFEN 10 MG: 10 TABLET ORAL at 08:31

## 2023-01-08 RX ADMIN — RIVAROXABAN 10 MG: 10 TABLET, FILM COATED ORAL at 23:59

## 2023-01-08 RX ADMIN — SODIUM CHLORIDE, PRESERVATIVE FREE 10 ML: 5 INJECTION INTRAVENOUS at 20:30

## 2023-01-08 RX ADMIN — SENNOSIDES AND DOCUSATE SODIUM 1 TABLET: 50; 8.6 TABLET ORAL at 20:30

## 2023-01-08 RX ADMIN — OXYCODONE AND ACETAMINOPHEN 2 TABLET: 5; 325 TABLET ORAL at 16:32

## 2023-01-08 RX ADMIN — OXYCODONE AND ACETAMINOPHEN 2 TABLET: 5; 325 TABLET ORAL at 02:21

## 2023-01-08 RX ADMIN — SENNOSIDES AND DOCUSATE SODIUM 1 TABLET: 50; 8.6 TABLET ORAL at 08:31

## 2023-01-08 RX ADMIN — LOSARTAN POTASSIUM 25 MG: 25 TABLET, FILM COATED ORAL at 08:31

## 2023-01-08 RX ADMIN — FERROUS SULFATE TAB 325 MG (65 MG ELEMENTAL FE) 325 MG: 325 (65 FE) TAB at 16:33

## 2023-01-08 RX ADMIN — FENOFIBRATE 160 MG: 160 TABLET ORAL at 08:31

## 2023-01-08 RX ADMIN — FERROUS SULFATE TAB 325 MG (65 MG ELEMENTAL FE) 325 MG: 325 (65 FE) TAB at 08:31

## 2023-01-08 RX ADMIN — ATORVASTATIN CALCIUM 40 MG: 40 TABLET, FILM COATED ORAL at 20:30

## 2023-01-08 RX ADMIN — OXYCODONE AND ACETAMINOPHEN 2 TABLET: 5; 325 TABLET ORAL at 22:37

## 2023-01-08 RX ADMIN — BACLOFEN 10 MG: 10 TABLET ORAL at 16:32

## 2023-01-08 RX ADMIN — Medication 3 MG: at 20:30

## 2023-01-08 RX ADMIN — Medication 1 TABLET: at 08:31

## 2023-01-08 RX ADMIN — CEFAZOLIN 2000 MG: 10 INJECTION, POWDER, FOR SOLUTION INTRAVENOUS at 11:33

## 2023-01-08 RX ADMIN — MICONAZOLE NITRATE: 2 POWDER TOPICAL at 08:32

## 2023-01-08 RX ADMIN — OXYCODONE AND ACETAMINOPHEN 2 TABLET: 5; 325 TABLET ORAL at 08:31

## 2023-01-08 RX ADMIN — SODIUM CHLORIDE, PRESERVATIVE FREE 10 ML: 5 INJECTION INTRAVENOUS at 08:32

## 2023-01-08 RX ADMIN — MICONAZOLE NITRATE: 2 POWDER TOPICAL at 20:31

## 2023-01-08 ASSESSMENT — PAIN SCALES - GENERAL
PAINLEVEL_OUTOF10: 8
PAINLEVEL_OUTOF10: 8
PAINLEVEL_OUTOF10: 5
PAINLEVEL_OUTOF10: 8

## 2023-01-08 ASSESSMENT — PAIN DESCRIPTION - LOCATION: LOCATION: LEG

## 2023-01-08 ASSESSMENT — PAIN DESCRIPTION - ORIENTATION: ORIENTATION: RIGHT

## 2023-01-08 NOTE — PROGRESS NOTES
Orthopaedic Progress Note      SUBJECTIVE   Ms. Virginia Aviles is post op day # 16     Patient notes s/p stage 1 revision right knee. Sutures out today        OBJECTIVE      Physical    VITALS:  BP (!) 107/51   Pulse 93   Temp 97.8 °F (36.6 °C) (Oral)   Resp 16   Ht 5' 7\" (1.702 m)   Wt 160 lb (72.6 kg)   SpO2 91%   BMI 25.06 kg/m²   I/O last 3 completed shifts:   In: 360 [P.O.:360]  Out: 200 [Urine:200]      Dressing dry and intact  NVI   Sensation intact        Data  CBC:   Lab Results   Component Value Date/Time    WBC 7.8 01/05/2023 04:45 AM    HGB 8.3 01/05/2023 04:45 AM     01/05/2023 04:45 AM     BMP:    Lab Results   Component Value Date/Time     01/06/2023 06:45 AM    K 3.4 01/06/2023 06:45 AM    K 4.6 05/09/2022 01:05 PM     01/06/2023 06:45 AM    CO2 26 01/06/2023 06:45 AM    BUN 5 01/06/2023 06:45 AM    CREATININE 0.4 01/06/2023 06:45 AM    CALCIUM 7.7 01/06/2023 06:45 AM    GLUCOSE 65 01/06/2023 06:45 AM    GLUCOSE 113 12/10/2020 10:01 AM     Uric Acid:  No components found for: URIC  PT/INR:    Lab Results   Component Value Date/Time    PROTIME 11.0 10/05/2016 03:39 PM    INR 1.44 12/01/2022 03:59 PM     Troponin:  No results found for: TROPONINI  Urine Culture:  No components found for: CURINE      Current Inpatient Medications    Current Facility-Administered Medications: potassium chloride (KLOR-CON M) extended release tablet 40 mEq, 40 mEq, Oral, PRN **OR** potassium bicarb-citric acid (EFFER-K) effervescent tablet 40 mEq, 40 mEq, Oral, PRN **OR** potassium chloride 10 mEq/100 mL IVPB (Peripheral Line), 10 mEq, IntraVENous, PRN  magnesium sulfate 2000 mg in 50 mL IVPB premix, 2,000 mg, IntraVENous, PRN  ferrous sulfate (IRON 325) tablet 325 mg, 325 mg, Oral, BID WC  miconazole (MICOTIN) 2 % powder, , Topical, BID  lidocaine PF 1 % injection 5 mL, 5 mL, IntraDERmal, Once  ceFAZolin (ANCEF) 2000 mg in dextrose 5 % 50 mL IVPB, 2,000 mg, IntraVENous, Q8H  atorvastatin (LIPITOR) tablet 40 mg, 40 mg, Oral, Nightly  baclofen (LIORESAL) tablet 10 mg, 10 mg, Oral, Q6H PRN  doxepin (SINEQUAN) capsule 40 mg, 40 mg, Oral, Nightly  fenofibrate (TRIGLIDE) tablet 160 mg, 160 mg, Oral, Daily  losartan (COZAAR) tablet 25 mg, 25 mg, Oral, Daily  melatonin tablet 3 mg, 3 mg, Oral, Nightly  multivitamin 1 tablet, 1 tablet, Oral, Daily  rivaroxaban (XARELTO) tablet 10 mg, 10 mg, Oral, Q24H  sodium chloride flush 0.9 % injection 5-40 mL, 5-40 mL, IntraVENous, 2 times per day  sodium chloride flush 0.9 % injection 5-40 mL, 5-40 mL, IntraVENous, PRN  0.9 % sodium chloride infusion, , IntraVENous, PRN  ondansetron (ZOFRAN-ODT) disintegrating tablet 4 mg, 4 mg, Oral, Q8H PRN **OR** ondansetron (ZOFRAN) injection 4 mg, 4 mg, IntraVENous, Q6H PRN  acetaminophen (TYLENOL) tablet 650 mg, 650 mg, Oral, Q4H PRN  morphine (PF) injection 2 mg, 2 mg, IntraVENous, Q2H PRN **OR** morphine injection 4 mg, 4 mg, IntraVENous, Q2H PRN  sennosides-docusate sodium (SENOKOT-S) 8.6-50 MG tablet 1 tablet, 1 tablet, Oral, BID  magnesium hydroxide (MILK OF MAGNESIA) 400 MG/5ML suspension 30 mL, 30 mL, Oral, Daily PRN  sodium phosphate (FLEET) rectal enema 1 enema, 1 enema, Rectal, Daily PRN  oxyCODONE-acetaminophen (PERCOCET) 5-325 MG per tablet 1 tablet, 1 tablet, Oral, Q4H PRN  oxyCODONE-acetaminophen (PERCOCET) 5-325 MG per tablet 2 tablet, 2 tablet, Oral, Q4H PRN        PLAN    Sutures out today  Weightbearing as tolerated

## 2023-01-08 NOTE — PLAN OF CARE
Problem: Discharge Planning  Goal: Discharge to home or other facility with appropriate resources  Outcome: Progressing  Flowsheets (Taken 1/6/2023 1700 by Berenice Ricardo RN)  Discharge to home or other facility with appropriate resources: Identify barriers to discharge with patient and caregiver     Problem: Chronic Conditions and Co-morbidities  Goal: Patient's chronic conditions and co-morbidity symptoms are monitored and maintained or improved  Outcome: Progressing  Flowsheets (Taken 1/5/2023 2022 by Doug Jacobson RN)  Care Plan - Patient's Chronic Conditions and Co-Morbidity Symptoms are Monitored and Maintained or Improved: Monitor and assess patient's chronic conditions and comorbid symptoms for stability, deterioration, or improvement     Problem: Pain  Goal: Verbalizes/displays adequate comfort level or baseline comfort level  Outcome: Progressing  Flowsheets (Taken 1/6/2023 1700 by Berenice Ricardo RN)  Verbalizes/displays adequate comfort level or baseline comfort level:   Encourage patient to monitor pain and request assistance   Assess pain using appropriate pain scale     Problem: Safety - Adult  Goal: Free from fall injury  Outcome: Progressing  Flowsheets (Taken 1/6/2023 1700 by Berenice Ricardo RN)  Free From Fall Injury: Instruct family/caregiver on patient safety     Problem: ABCDS Injury Assessment  Goal: Absence of physical injury  Outcome: Progressing  Flowsheets (Taken 1/6/2023 1700 by Berenice Ricardo RN)  Absence of Physical Injury: Implement safety measures based on patient assessment     Problem: Skin/Tissue Integrity - Adult  Goal: Incisions, wounds, or drain sites healing without S/S of infection  Outcome: Progressing  Flowsheets (Taken 1/6/2023 1700 by Berenice Ricardo RN)  Incisions, Wounds, or Drain Sites Healing Without Sign and Symptoms of Infection: TWICE DAILY: Assess and document skin integrity     Problem: Musculoskeletal - Adult  Goal: Return ADL status to a safe level of function  Outcome: Progressing  Flowsheets (Taken 1/6/2023 1700 by Erica Solis RN)  Return ADL Status to a Safe Level of Function: Assess activities of daily living deficits and provide assistive devices as needed     Problem: Genitourinary - Adult  Goal: Absence of urinary retention  Outcome: Progressing  Flowsheets (Taken 1/6/2023 1700 by Erica Solis RN)  Absence of urinary retention: Assess patients ability to void and empty bladder     Problem: Hematologic - Adult  Goal: Maintains hematologic stability  Outcome: Progressing  Flowsheets (Taken 1/6/2023 1700 by Erica Solis RN)  Maintains hematologic stability: Assess for signs and symptoms of bleeding or hemorrhage     Problem: Nutrition Deficit:  Goal: Optimize nutritional status  Outcome: Progressing  Flowsheets (Taken 1/6/2023 1700 by Erica Solis RN)  Nutrient intake appropriate for improving, restoring, or maintaining nutritional needs:   Assess nutritional status and recommend course of action   Recommend appropriate diets, oral nutritional supplements, and vitamin/mineral supplements

## 2023-01-09 VITALS
HEART RATE: 93 BPM | SYSTOLIC BLOOD PRESSURE: 109 MMHG | RESPIRATION RATE: 18 BRPM | BODY MASS INDEX: 25.11 KG/M2 | WEIGHT: 160 LBS | TEMPERATURE: 98 F | DIASTOLIC BLOOD PRESSURE: 54 MMHG | OXYGEN SATURATION: 94 % | HEIGHT: 67 IN

## 2023-01-09 PROCEDURE — 97110 THERAPEUTIC EXERCISES: CPT

## 2023-01-09 PROCEDURE — 6370000000 HC RX 637 (ALT 250 FOR IP): Performed by: INTERNAL MEDICINE

## 2023-01-09 PROCEDURE — 6360000002 HC RX W HCPCS: Performed by: INTERNAL MEDICINE

## 2023-01-09 PROCEDURE — 2580000003 HC RX 258: Performed by: PHYSICIAN ASSISTANT

## 2023-01-09 PROCEDURE — 6370000000 HC RX 637 (ALT 250 FOR IP): Performed by: PHYSICIAN ASSISTANT

## 2023-01-09 PROCEDURE — 2580000003 HC RX 258: Performed by: INTERNAL MEDICINE

## 2023-01-09 RX ORDER — OXYCODONE HYDROCHLORIDE AND ACETAMINOPHEN 5; 325 MG/1; MG/1
1 TABLET ORAL EVERY 6 HOURS PRN
Qty: 28 TABLET | Refills: 0 | Status: SHIPPED | OUTPATIENT
Start: 2023-01-09 | End: 2023-01-16

## 2023-01-09 RX ADMIN — Medication 1 TABLET: at 09:51

## 2023-01-09 RX ADMIN — CEFAZOLIN 2000 MG: 10 INJECTION, POWDER, FOR SOLUTION INTRAVENOUS at 09:44

## 2023-01-09 RX ADMIN — SENNOSIDES AND DOCUSATE SODIUM 1 TABLET: 50; 8.6 TABLET ORAL at 09:43

## 2023-01-09 RX ADMIN — OXYCODONE AND ACETAMINOPHEN 2 TABLET: 5; 325 TABLET ORAL at 09:43

## 2023-01-09 RX ADMIN — FERROUS SULFATE TAB 325 MG (65 MG ELEMENTAL FE) 325 MG: 325 (65 FE) TAB at 09:51

## 2023-01-09 RX ADMIN — OXYCODONE AND ACETAMINOPHEN 2 TABLET: 5; 325 TABLET ORAL at 05:45

## 2023-01-09 RX ADMIN — FENOFIBRATE 160 MG: 160 TABLET ORAL at 09:51

## 2023-01-09 RX ADMIN — MICONAZOLE NITRATE: 2 POWDER TOPICAL at 09:52

## 2023-01-09 RX ADMIN — SODIUM CHLORIDE, PRESERVATIVE FREE 10 ML: 5 INJECTION INTRAVENOUS at 09:44

## 2023-01-09 ASSESSMENT — PAIN SCALES - GENERAL
PAINLEVEL_OUTOF10: 7
PAINLEVEL_OUTOF10: 7
PAINLEVEL_OUTOF10: 8

## 2023-01-09 NOTE — CARE COORDINATION
1/9/23, 8:59 AM EST    DISCHARGE PLANNING EVALUATION    Precert is complete, [facility] can accept today. Spoke with Shari Devries, she is in agreement with discharge today to [facility].

## 2023-01-09 NOTE — PLAN OF CARE
Problem: Discharge Planning  Goal: Discharge to home or other facility with appropriate resources  1/9/2023 1323 by Dava Najjar, RN  Outcome: Completed     Problem: Chronic Conditions and Co-morbidities  Goal: Patient's chronic conditions and co-morbidity symptoms are monitored and maintained or improved  1/9/2023 1323 by Dava Najjar, RN  Outcome: Completed     Problem: Pain  Goal: Verbalizes/displays adequate comfort level or baseline comfort level  1/9/2023 1323 by Dava Najjar, RN  Outcome: Completed     Problem: Safety - Adult  Goal: Free from fall injury  1/9/2023 1323 by Dava Najjar, RN  Outcome: Completed     Problem: Safety - Adult  Goal: Absence of central venous catheter-associated infection  Description: Absence of central venous catheter-associated infection  1/9/2023 1323 by Dava Najjar, RN  Outcome: Completed     Problem: ABCDS Injury Assessment  Goal: Absence of physical injury  1/9/2023 1323 by Dava Najjar, RN  Outcome: Completed     Problem: Skin/Tissue Integrity - Adult  Goal: Incisions, wounds, or drain sites healing without S/S of infection  1/9/2023 1323 by Dava Najjar, RN  Outcome: Completed     Problem: Musculoskeletal - Adult  Goal: Return ADL status to a safe level of function  1/9/2023 1323 by Dava Najjar, RN  Outcome: Completed     Problem: Genitourinary - Adult  Goal: Absence of urinary retention  1/9/2023 1323 by Dava Najjar, RN  Outcome: Completed     Problem: Nutrition Deficit:  Goal: Optimize nutritional status  1/9/2023 1323 by Dava Najjar, RN  Outcome: Completed     Problem: Hematologic - Adult  Goal: Maintains hematologic stability  1/9/2023 1323 by Dava Najjar, RN  Outcome: Completed   Care plan reviewed with patient. Patient verbalize understanding of the plan of care and contribute to goal setting.

## 2023-01-09 NOTE — PROGRESS NOTES
[facility] transported patient to [facility], left phone number for them to call back for report as I have tried 3 times.

## 2023-01-09 NOTE — DISCHARGE SUMMARY
Physician Discharge Summary     Patient ID:  Adrian Ashley  [id number]  27 y.o.  1960    Admit date: 12/22/2022    Discharge date and time: 1/9/2023  2:06 PM     Admitting Physician: Felicia Toney MD     Discharge Physician: Felicia Toney MD     Admission Diagnoses: Infection of total right knee replacement, subsequent encounter [T84.53XD]  Post-operative state [Z98.890]    Discharge Diagnoses:  Infection of total right knee replacement, subsequent encounter [T84.53XD]  Post-operative state [Z98.890]    Admission Condition: good    Discharged Condition: good    Indication for Admission: unstable orthopaedic injury    Hospital Course: Patient is now s/p R TKA spacer placement by Dr. Melecio Harding. On the day of surgery, patient was identified in the pre-operative holding area and agreeable to proceed with surgery. Written consent was obtained. Please see operative note for further details of this procedure. Patient received franklin-operative antibiotics. Patient recovered in PACU before transfer to a regular nursing floor. Patient was started on tylenol and norco for pain control and Xarelto for dvt prophylaxis for3 weeks. On the day of discharge, patient was afebrile with stable vital signs, stable upon physical exam. Patient was discharged with prescriptions for pain and dvt ppx. Patient was deemed stable for discharge to SNF as recommended by PT/OT. Patient will follow up with Dr Melecio Harding in 2 weeks for post-operative visit. Discharge Exam:  Please see final progress note for appropriate discharge exam      Disposition: SNF    In process/preliminary results:  Outstanding Order Results       No orders found from 11/23/2022 to 12/23/2022. Patient Instructions:   Discharge Medication List as of 1/9/2023 11:18 AM        CONTINUE these medications which have CHANGED    Details   oxyCODONE-acetaminophen (PERCOCET) 5-325 MG per tablet Take 1 tablet by mouth every 6 hours as needed for Pain for up to 7 days.  Max Daily Amount: 4 tablets, Disp-28 tablet, R-0Print      rivaroxaban (XARELTO) 10 MG TABS tablet Take 1 tablet by mouth every 24 hours, Disp-18 tablet, R-0Print           CONTINUE these medications which have NOT CHANGED    Details   tiZANidine (ZANAFLEX) 2 MG tablet Take 1 tablet by mouth 3 times daily as needed (muscle cramps), Disp-30 tablet, R-2Normal      atorvastatin (LIPITOR) 40 MG tablet TAKE 1 TABLET NIGHTLY, Disp-90 tablet, R-3Normal      fenofibrate (TRIGLIDE) 160 MG tablet TAKE 1 TABLET DAILY, Disp-90 tablet, R-3Normal      doxepin (SINEQUAN) 10 MG capsule TAKE 2 CAPSULES NIGHTLY, Disp-180 capsule, R-3Normal      losartan (COZAAR) 25 MG tablet TAKE 1 TABLET DAILY, Disp-90 tablet, R-3Normal      baclofen (LIORESAL) 10 MG tablet Take 1 tablet by mouth daily as needed (muscle spasms), Disp-30 tablet, R-0Normal      melatonin 3 MG TABS tablet Take 3 mg by mouth daily Historical Med      Acetaminophen 500 MG CAPS Take 1,000 mg by mouth as needed for Pain Historical Med      KRILL OIL PO Take 350 mg by mouth dailyHistorical Med      Multiple Vitamins-Minerals (THERAPEUTIC MULTIVITAMIN-MINERALS) tablet Take 1 tablet by mouth daily      aspirin 81 MG tablet Take 81 mg by mouth daily. STOP taking these medications       doxycycline hyclate (VIBRA-TABS) 100 MG tablet Comments:   Reason for Stopping:         hydrocortisone 2.5 % cream Comments:   Reason for Stopping:             Activity: activity as tolerated  Diet: regular diet  Wound Care: keep wound clean and dry    Follow-up with Dr Jennelle Goldmann in 2 week.     Signed:  Prudence Tompkins PA-C    1/9/2023  2:19 PM

## 2023-01-09 NOTE — PLAN OF CARE
Problem: Discharge Planning  Goal: Discharge to home or other facility with appropriate resources  Outcome: Progressing     Problem: Chronic Conditions and Co-morbidities  Goal: Patient's chronic conditions and co-morbidity symptoms are monitored and maintained or improved  Outcome: Progressing     Problem: Pain  Goal: Verbalizes/displays adequate comfort level or baseline comfort level  Outcome: Progressing     Problem: Safety - Adult  Goal: Free from fall injury  Outcome: Progressing  Goal: Absence of central venous catheter-associated infection  Description: Absence of central venous catheter-associated infection  Outcome: Progressing     Problem: ABCDS Injury Assessment  Goal: Absence of physical injury  Outcome: Progressing     Problem: Skin/Tissue Integrity - Adult  Goal: Incisions, wounds, or drain sites healing without S/S of infection  Outcome: Progressing     Problem: Musculoskeletal - Adult  Goal: Return ADL status to a safe level of function  Outcome: Progressing     Problem: Genitourinary - Adult  Goal: Absence of urinary retention  Outcome: Progressing     Problem: Nutrition Deficit:  Goal: Optimize nutritional status  Outcome: Progressing     Problem: Hematologic - Adult  Goal: Maintains hematologic stability  Outcome: Progressing

## 2023-01-09 NOTE — PROGRESS NOTES
Orthopaedic Progress Note      SUBJECTIVE   Ms. Varela is post op day # 18    Patient s/p Stage I revision right knee with antibiotic spacer.   no adverse events overnight  Tolerating oral diet  Mobilizing minimally  Incision c/d/i  Resting on exam  No change overall    OBJECTIVE      Physical    VITALS:  /78   Pulse 80   Temp 98.2 °F (36.8 °C) (Oral)   Resp 20   Ht 5' 7\" (1.702 m)   Wt 160 lb (72.6 kg)   SpO2 96%   BMI 25.06 kg/m²   I/O last 3 completed shifts:  In: 840 [P.O.:840]  Out: 650 [Urine:650]    4/10 pain  Gen: alert and oriented, resting today  Head: normorcephalic, atraumatic  Resp: unlabored, room air  Pelvis: stable  RLE:  Dressing clean, dry, and intact. No active drainage from incision.  ROM limited secondary to spacer. Able to perform SLR. NVI. Dorsalis pedal and posterior tibialis pulses strong and regular.      Data  CBC:   Lab Results   Component Value Date/Time    WBC 7.8 01/05/2023 04:45 AM    HGB 8.3 01/05/2023 04:45 AM     01/05/2023 04:45 AM     BMP:    Lab Results   Component Value Date/Time     01/06/2023 06:45 AM    K 3.4 01/06/2023 06:45 AM    K 4.6 05/09/2022 01:05 PM     01/06/2023 06:45 AM    CO2 26 01/06/2023 06:45 AM    BUN 5 01/06/2023 06:45 AM    CREATININE 0.4 01/06/2023 06:45 AM    CALCIUM 7.7 01/06/2023 06:45 AM    GLUCOSE 65 01/06/2023 06:45 AM    GLUCOSE 113 12/10/2020 10:01 AM     Uric Acid:  No components found for: URIC  PT/INR:    Lab Results   Component Value Date/Time    PROTIME 11.0 10/05/2016 03:39 PM    INR 1.44 12/01/2022 03:59 PM     Troponin:  No results found for: TROPONINI  Urine Culture:  No components found for: CURINE      Current Inpatient Medications    Current Facility-Administered Medications: potassium chloride (KLOR-CON M) extended release tablet 40 mEq, 40 mEq, Oral, PRN **OR** potassium bicarb-citric acid (EFFER-K) effervescent tablet 40 mEq, 40 mEq, Oral, PRN **OR** potassium chloride 10 mEq/100 mL IVPB (Peripheral  Line), 10 mEq, IntraVENous, PRN  magnesium sulfate 2000 mg in 50 mL IVPB premix, 2,000 mg, IntraVENous, PRN  ferrous sulfate (IRON 325) tablet 325 mg, 325 mg, Oral, BID WC  miconazole (MICOTIN) 2 % powder, , Topical, BID  lidocaine PF 1 % injection 5 mL, 5 mL, IntraDERmal, Once  ceFAZolin (ANCEF) 2000 mg in dextrose 5 % 50 mL IVPB, 2,000 mg, IntraVENous, Q8H  atorvastatin (LIPITOR) tablet 40 mg, 40 mg, Oral, Nightly  baclofen (LIORESAL) tablet 10 mg, 10 mg, Oral, Q6H PRN  doxepin (SINEQUAN) capsule 40 mg, 40 mg, Oral, Nightly  fenofibrate (TRIGLIDE) tablet 160 mg, 160 mg, Oral, Daily  losartan (COZAAR) tablet 25 mg, 25 mg, Oral, Daily  melatonin tablet 3 mg, 3 mg, Oral, Nightly  multivitamin 1 tablet, 1 tablet, Oral, Daily  rivaroxaban (XARELTO) tablet 10 mg, 10 mg, Oral, Q24H  sodium chloride flush 0.9 % injection 5-40 mL, 5-40 mL, IntraVENous, 2 times per day  sodium chloride flush 0.9 % injection 5-40 mL, 5-40 mL, IntraVENous, PRN  0.9 % sodium chloride infusion, , IntraVENous, PRN  ondansetron (ZOFRAN-ODT) disintegrating tablet 4 mg, 4 mg, Oral, Q8H PRN **OR** ondansetron (ZOFRAN) injection 4 mg, 4 mg, IntraVENous, Q6H PRN  acetaminophen (TYLENOL) tablet 650 mg, 650 mg, Oral, Q4H PRN  morphine (PF) injection 2 mg, 2 mg, IntraVENous, Q2H PRN **OR** morphine injection 4 mg, 4 mg, IntraVENous, Q2H PRN  sennosides-docusate sodium (SENOKOT-S) 8.6-50 MG tablet 1 tablet, 1 tablet, Oral, BID  magnesium hydroxide (MILK OF MAGNESIA) 400 MG/5ML suspension 30 mL, 30 mL, Oral, Daily PRN  sodium phosphate (FLEET) rectal enema 1 enema, 1 enema, Rectal, Daily PRN  oxyCODONE-acetaminophen (PERCOCET) 5-325 MG per tablet 1 tablet, 1 tablet, Oral, Q4H PRN  oxyCODONE-acetaminophen (PERCOCET) 5-325 MG per tablet 2 tablet, 2 tablet, Oral, Q4H PRN        PLAN       1)  Cont with current medical management  2)  WBAT RLE, activity as tolerated  3)  Ice and elevation  4)  ID evaluated, picc placed  5)  ECF upon discharge  6)  dry dressing changes PRN  7)  PT OT is absolutely necessary  8)  hbg hct pending, will follow  9)  stable for discharge from ortho standpoint, pending SNF placement, precert started 02/68/55, discharge order placed tentative social planning  10) sutures removed, c/d/I, dry dressing prn  11) 2-3 week post op follow up with Dr Janna Stahl

## 2023-01-09 NOTE — CARE COORDINATION
1/9/23, 4:27 PM EST    DISCHARGE PLANNING EVALUATION    SW was notified by Chris Gonzalez with [facility] for [facility], they did not received the order for IV ATB. [facility] notified attending. Order received.   SVEN faxed AVS

## 2023-01-09 NOTE — PROGRESS NOTES
[address] [facility]  Occupational Therapy  Daily Note  Time:   Time In: 0950  Time Out: 1020  Timed Code Treatment Minutes: 30 Minutes  Minutes: 30          Date: 2023  Patient Name: Michael Fajardo,   Gender: female      Room: [facility]10/010-A  MRN: [id number]  : 1960  (58 y.o.)  Referring Practitioner: CRISTAL Lomeli  Diagnosis: Infection of total right knee  Additional Pertinent Hx: STAGE 1 RIGHT KNEE with insertion of antibiotic spacer  by Dr Lillette Schirmer; extensive knee history with several surgeries prior to this    Restrictions/Precautions:  Restrictions/Precautions: Fall Risk, Weight Bearing, General Precautions  Right Lower Extremity Weight Bearing: Weight Bearing As Tolerated  Position Activity Restriction  Other position/activity restrictions: B LE contractures;     SUBJECTIVE: Pleasant and cooperative  RN approved session. Pt reports having nausea when she sits up for over 1 hour. PAIN: 6-7/10: Muscle spasms which are brief. Pt had medication for pain including muscle relaxant prior to session. Vitals: Vitals not assessed per clinical judgement, see nursing flowsheet    COGNITION: WFL    ADL:   No ADL's completed this session. Pt refused at this time. BALANCE:  Not demonstrated. BED MOBILITY:  Not Tested    TRANSFERS:  Not demonstrated. Pt indicated that she was going to wait until her spouse had arrived before she got into the chair. ADDITIONAL ACTIVITIES:  Patient completed BUE strengthening exercises x 10 reps x 5 sets this date with a moderate resistance stretch band in all joints and all planes. Patient tolerated well. Reviewed with pt various other exercises which she can do including W/C pushups. Isometric exercises were discussed. Pt completed L ankle plantarflexion against resistance using the moderate resistance stretch band x 10 reps.   Exercises completed in order to increase strength and improve activity tolerance required for functional toilet and shower transfers and ADL routine. ASSESSMENT:     Activity Tolerance:  Patient tolerance of  treatment: fair. Pt had some sleepiness as the session progressed. Discharge Recommendations: Subacute/skilled nursing facility  Equipment Recommendations: Equipment Needed: No  Plan: Times Per Week: 3-5x  Current Treatment Recommendations: Strengthening, Balance training, Functional mobility training, Endurance training, Patient/Caregiver education & training, Return to work related activity, Self-Care / ADL, Home management training, Safety education & training    Patient Education  Patient Education: Home Exercise Program    Goals  Short Term Goals  Time Frame for Short Term Goals: by discharge  Short Term Goal 1: Pt will complete sit pivot t/fs with no > than CGA and min VC for technique to increase indep with ADLs  Short Term Goal 2: Pt will complete LB ADLs with Min A and adaptive techniques prn to increase indep with dressing  Short Term Goal 3: Pt will complete ADL item retrieval from w/c level with Mod I and min safety cues for adaptations to increase indep with self care routine    Following session, patient left in safe position with all fall risk precautions in place.

## 2023-01-10 VITALS
HEART RATE: 75 BPM | TEMPERATURE: 97.2 F | WEIGHT: 158 LBS | BODY MASS INDEX: 24.75 KG/M2 | SYSTOLIC BLOOD PRESSURE: 138 MMHG | RESPIRATION RATE: 18 BRPM | DIASTOLIC BLOOD PRESSURE: 72 MMHG | OXYGEN SATURATION: 97 %

## 2023-01-10 PROBLEM — F51.01 PRIMARY INSOMNIA: Status: ACTIVE | Noted: 2023-01-10

## 2023-01-10 NOTE — PROGRESS NOTES
[facility] Progress Note  Acute visit for   No chief complaint on file. Jonathan Patella NAME: Stefania Flood  DATE: 22  : 1960  Code Status: Full code      History obtained from chart review, staff, resident. SUBJECTIVE:  HPI: Stefania Flood is a 58 y.o. female. Pt seen and examined at bedside. [address] is being seen for 1 month follow-up of chronic conditions. On assessment today, patient tells me she is overall doing well. She followed up with Ortho yesterday, Dr. Christelle Lindsay. She is now weightbearing as tolerated. Patient denies concerns with appetite, sleep, mood. She tells me her goal is to go home. When discussed pain management. She tells me her pain is managed with Percocet. Would like to try to wean down to [place], she tells me she is only taking the Percocet at nighttime prior to sleep. Now that she is weightbearing, she would like to revisit pain management next week before weaning down to [place]. We will continue Xarelto for DVT prophylaxis until patient is more ambulatory. Patient is making progress. Weights and vitals have been stable. Patient denies, SOB, abdominal pain, chest pain, chills, fatigue, fever or weakness. Allergies and Medications were reviewed through the [facility] EMR. All medications reviewed and reconciled, including OTC and herbal medications.      Patient Active Problem List    Diagnosis Date Noted    Post-operative state 2022     Priority: Medium    Enterocolitis due to Clostridium difficile, not specified as recurrent 2022     Priority: Medium    Recurrent major depressive disorder ([address] 2022     Priority: Medium    Aftercare following knee joint replacement surgery 2022     Priority: Medium    History of total knee replacement, right      Priority: Medium    Hypokalemia      Priority: Medium    Infection of total right knee replacement ([address] 06/10/2022     Priority: Medium    Primary hypertension 2022     Priority: Medium    Displaced articular fracture of head of right femur, sequela 05/09/2022     Priority: Medium    Neida-prosthetic supracondylar fracture of femur 05/09/2022     Priority: Medium    Dyshidrotic eczema 05/03/2016    Seborrheic keratosis 05/08/2013    Diabetes mellitus type 2, noninsulin dependent ([facility] 08/03/2011     CONTROLLED      Hyperlipemia 08/03/2011    THORNTON (nonalcoholic steatohepatitis) 08/03/2011     RESOLVED      Obesity 08/03/2011    Tobacco abuse 08/03/2011    Vitamin D deficiency 08/03/2011       Past Medical History:   Diagnosis Date    Arthritis     Atypical ductal hyperplasia, breast     left breast, 2016, lumpectomy, Tamoxifen, LMH    Cancer ([facility]     Hx of breast biopsy 01/08/2016    Hyperlipidemia     Hypertension     Type II or unspecified type diabetes mellitus without mention of complication, not stated as uncontrolled        Past Surgical History:   Procedure Laterality Date    BLADDER SUSPENSION      BREAST BIOPSY Left     atypical hyperlasia, 2016, [facility]    BREAST LUMPECTOMY Left     atypical hyperplasia, 2016, [facility]    COLONOSCOPY  08/28/2020    HERNIA REPAIR  10/17/2016    JOINT REPLACEMENT Left 2021    knee    KNEE SURGERY      Right knee    REVISION TOTAL KNEE ARTHROPLASTY Right 05/10/2022    RIGHT KNEE TOTAL ARTHROPLASTY REVISION performed by Barrera Eng MD at [address] [facility] Right 06/12/2022    Right Knee Incision and Drainage of Total Knee With Poly Exchange performed by Barrera Eng MD at [address] Right 12/22/2022    STAGE 1 RIGHT KNEE performed by Barrera Eng MD at [address] [facility] EXTRACTION         Allergies   Allergen Reactions    Adhesive Tape        Social History     Tobacco Use    Smoking status: Every Day     Packs/day: 1.00     Years: 49.00     Pack years: 49.00     Types: Cigarettes    Smokeless tobacco: Never   Substance Use Topics    Alcohol use: No     Comment: monthly glass of wine        Family History Problem Relation Age of Onset    High Cholesterol Mother     High Blood Pressure Mother     Pacemaker Mother     Other Mother         Sundowners syndrome    Crohn's Disease Mother     Cancer Mother         Uterine and cervical and vagina    Thyroid Disease Mother     Dementia Mother     Colon Polyps Mother     Other Father         Pneumonia    Dementia Father     Diabetes Sister     Breast Cancer Sister     Heart Disease Brother     Colon Cancer Neg Hx        Review of Systems  Positive responses are highlighted in bold    Constitutional:  Fever, Chills, Night Sweats, Fatigue, Unexpected changes in weight  Eyes:  Eye discharge, Eye pain, Eye redness, Visual disturbances   HENT:  Ear pain, Tinnitus, Nosebleeds, Trouble swallowing, Hearing loss, Sore throat  Cardiovascular:  Chest Pain, Palpitations, Orthopnea, Paroxysmal Nocturnal Dyspnea  Respiratory:  Cough, Wheezing, Shortness of breath, Chest tightness, Apnea  Gastrointestinal:  Nausea, Vomiting, Diarrhea, Constipation, Heartburn, Blood in stool  Genitourinary:  Difficulty or painful urination, Flank pain, Change in frequency, Urgency  Skin:  Color change, Rash, Itching, Wound  Psychiatric:  Hallucinations, Anxiety, Depression, Suicidal ideation  Hematological:  Enlarged glands, Easy bleeding, Easily bruising  Musculoskeletal:  Joint pain, Back pain, Gait problems, Joint swelling, Myalgias, knee pain  Neurological:  Dizziness, Headaches, Presyncope, Numbness, Seizures, Tremors  Allergy:  Environmental allergies, Food allergies  Endocrine:  Heat Intolerance, Cold Intolerance, Polydipsia, Polyphagia, Polyuria    PHYSICAL EXAM:  /72   Pulse 75   Temp 97.2 °F (36.2 °C)   Resp 18   Wt 158 lb (71.7 kg)   SpO2 97%   BMI 24.75 kg/m²       VS Reviewed  General Appearance: chronically ill and debilitated, in no acute distress  Head: normocephalic and atraumatic  Eyes: pupils equal, round, and reactive to light, conjunctivae and eye lids without erythema  ENT: external ear and ear canal normal bilaterally, nose without deformity, nasal mucosa and turbinates normal without polyps, oropharynx normal, dentition is normal for age, no lip or gum lesions noted  Neck: supple and non-tender without mass, no thyromegaly or thyroid nodules, no cervical lymphadenopathy  Pulmonary/Chest: clear to auscultation bilaterally- no wheezes, rales or rhonchi, normal air movement, no respiratory distress or retractions. Requires no supplemental oxygen at time of assessment. Cardiovascular: normal rate, regular rhythm, normal S1 and S2, no murmurs, rubs, clicks, or gallops, distal pulses intact  Abdomen: soft, non-tender, non-distended, bowel sounds physiologic,  no rebound or guarding, no masses or hernias noted. Liver and spleen without enlargement. Extremities: no cyanosis, clubbing or edema of the lower extremities  Musculoskeletal: No joint swelling or gross deformity. Wearing right leg immobilizer. Neuro:  Alert, 5/5 strength globally and symmetrically, 2+ patellar reflexes b/l,  normal speech, no focal findings or movement disorder noted  Psych:  Normal affect without evidence of depression or anxiety, insight and judgement are intact, memory appears intact. Skin: pink, warm and dry, no rash or erythema  Lymph:  No cervical, auricular or supraclavicular lymph nodes palpated    LABS/IMAGING  Baseline CBC and BMP ordered for Wednesday 6/1/22. -Weekly CBC, BMP and CRP fax results to Dr. Tr Pla office    ASSESSMENT & PLAN  Displaced articular fracture of head of right femur/total right knee replacement periprosthetic supracondylar fracture of femur, sequela/Aftercare following right knee joint replacement surgery  -PT/OT. -Percocet, Zanaflex for pain control. -DVT prevention Xarelto until patient becomes more ambulatory.  -Follows with Ortho Dr. Jennelle Goldmann last appt. 10/19/22. Now is weightbearing as tolerated.  -Rt.  Knee Incision/sutures; paint with Betadine cover with 4x4/ABD Pad secure with Kerlix and ACE wrap daily.  -Follows with I&D Dr. Yamila Flores. -Doxy completed yesterday. CDiff-  Vancomycin    Depression  -Continue doxepin    -Denies SI/HI    Type 2 diabetes mellitus with other specified complication, unspecified whether long term insulin use (HCC)  Metformin  mg daily.  -Blood glucose stable. Pure hypercholesterolemia  Continue atorvastatin and fenofibrate. Vitamin D deficiency  History of. No supplementation on home medication list.    Class 2 severe obesity due to excess calories with serious comorbidity and body mass index (BMI) of 35.0 to 35.9 in adult [facility])  -Encourage diet and exercise. Insomnia, unspecified type  -Melatonin 3 mg daily. THORNTON (nonalcoholic steatohepatitis)  Hx of    Hypertension  Losartan 25 mg daily. Blood pressure and heart rate controlled      No future appointments. Disposition:  Assessment and plan as stated above. Follow up per routine and as warranted. .   Total time spent on date of service was 60 minutes. Time spent includes some or all of the following, both face-to-face time and non face-to-face, but is not limited to: reviewing records or discussing history or plan with colleagues; obtaining and/or reviewing the history; performing a medically appropriate examination/evaluation; counseling patient and/or caregiver; documentation, placing orders/referrals, and coordinating care. I have reviewed the patient's past medical history, past surgical history, allergies,medications, social and family history and I have made updates where appropriate. Patient made aware. Resident has hypertension, diabetes, hyperlipidemia, obesity, insomnia, and these chronic conditions are expected to last 12 or more months. These chronic conditions place the resident at a significant risk of death, acute exacerbation, or functional decline. The patient's comprehensive plan was monitored today. I spent 20 minutes reviewing the plan. **This report has been created using voice recognition software. It may contain minor errors which are inherent in voice recognition technology. **       Plan of care reviewed with Dr. Mikki Tenorio DO.   Electronically signed by DEREK Koch CNP on 1/10/2023 at 8:55 AM

## 2023-01-17 DIAGNOSIS — E11.9 CONTROLLED TYPE 2 DIABETES MELLITUS WITHOUT COMPLICATION, WITHOUT LONG-TERM CURRENT USE OF INSULIN (HCC): ICD-10-CM

## 2023-01-17 RX ORDER — FENOFIBRATE 160 MG/1
TABLET ORAL
Qty: 30 TABLET | Refills: 11 | Status: SHIPPED | OUTPATIENT
Start: 2023-01-17 | End: 2023-01-18 | Stop reason: SDUPTHER

## 2023-01-18 DIAGNOSIS — E11.9 CONTROLLED TYPE 2 DIABETES MELLITUS WITHOUT COMPLICATION, WITHOUT LONG-TERM CURRENT USE OF INSULIN (HCC): ICD-10-CM

## 2023-01-18 RX ORDER — FENOFIBRATE 160 MG/1
TABLET ORAL
Qty: 90 TABLET | Refills: 3 | Status: SHIPPED | OUTPATIENT
Start: 2023-01-18

## 2023-01-21 PROBLEM — Z98.890 POST-OPERATIVE STATE: Status: RESOLVED | Noted: 2022-12-22 | Resolved: 2023-01-21

## 2023-01-23 DIAGNOSIS — E78.5 HYPERLIPIDEMIA, UNSPECIFIED HYPERLIPIDEMIA TYPE: ICD-10-CM

## 2023-01-23 RX ORDER — ATORVASTATIN CALCIUM 40 MG/1
TABLET, FILM COATED ORAL
Qty: 30 TABLET | Refills: 11 | Status: SHIPPED | OUTPATIENT
Start: 2023-01-23

## 2023-02-10 ENCOUNTER — TELEPHONE (OUTPATIENT)
Dept: WOUND CARE | Age: 63
End: 2023-02-10

## 2023-02-10 NOTE — TELEPHONE ENCOUNTER
Tanya from [facility] called to see if patient needs any follow up with Dr. Rock Dudley as she is being discharged home. Called Dr. Rock Dudley and he reviewed and said patient does not need a follow up but will need to continue to follow up with ortho. Notified Tanya of this. No further concerns.

## 2023-02-20 DIAGNOSIS — M25.561 ACUTE PAIN OF RIGHT KNEE: ICD-10-CM

## 2023-02-20 RX ORDER — TRAMADOL HYDROCHLORIDE 50 MG/1
50 TABLET ORAL EVERY 6 HOURS PRN
Qty: 28 TABLET | Refills: 0 | Status: SHIPPED | OUTPATIENT
Start: 2023-02-20 | End: 2023-02-27

## 2023-02-21 ENCOUNTER — OFFICE VISIT (OUTPATIENT)
Dept: FAMILY MEDICINE CLINIC | Age: 63
End: 2023-02-21
Payer: OTHER GOVERNMENT

## 2023-02-21 VITALS
DIASTOLIC BLOOD PRESSURE: 58 MMHG | RESPIRATION RATE: 16 BRPM | OXYGEN SATURATION: 98 % | HEART RATE: 101 BPM | SYSTOLIC BLOOD PRESSURE: 102 MMHG

## 2023-02-21 DIAGNOSIS — R53.81 PHYSICAL DECONDITIONING: ICD-10-CM

## 2023-02-21 DIAGNOSIS — T84.53XD INFECTION OF TOTAL RIGHT KNEE REPLACEMENT, SUBSEQUENT ENCOUNTER: Primary | ICD-10-CM

## 2023-02-21 DIAGNOSIS — G47.00 INSOMNIA, UNSPECIFIED TYPE: ICD-10-CM

## 2023-02-21 DIAGNOSIS — Z98.890 S/P HARDWARE REMOVAL: ICD-10-CM

## 2023-02-21 DIAGNOSIS — E78.5 DYSLIPIDEMIA: ICD-10-CM

## 2023-02-21 DIAGNOSIS — I10 HYPERTENSION, ESSENTIAL: ICD-10-CM

## 2023-02-21 DIAGNOSIS — E11.9 TYPE 2 DIABETES, DIET CONTROLLED (HCC): ICD-10-CM

## 2023-02-21 PROCEDURE — 99214 OFFICE O/P EST MOD 30 MIN: CPT | Performed by: FAMILY MEDICINE

## 2023-02-21 PROCEDURE — 3074F SYST BP LT 130 MM HG: CPT | Performed by: FAMILY MEDICINE

## 2023-02-21 PROCEDURE — 3078F DIAST BP <80 MM HG: CPT | Performed by: FAMILY MEDICINE

## 2023-02-21 SDOH — ECONOMIC STABILITY: FOOD INSECURITY: WITHIN THE PAST 12 MONTHS, YOU WORRIED THAT YOUR FOOD WOULD RUN OUT BEFORE YOU GOT MONEY TO BUY MORE.: NEVER TRUE

## 2023-02-21 SDOH — ECONOMIC STABILITY: INCOME INSECURITY: HOW HARD IS IT FOR YOU TO PAY FOR THE VERY BASICS LIKE FOOD, HOUSING, MEDICAL CARE, AND HEATING?: NOT HARD AT ALL

## 2023-02-21 SDOH — ECONOMIC STABILITY: HOUSING INSECURITY
IN THE LAST 12 MONTHS, WAS THERE A TIME WHEN YOU DID NOT HAVE A STEADY PLACE TO SLEEP OR SLEPT IN A SHELTER (INCLUDING NOW)?: NO

## 2023-02-21 SDOH — ECONOMIC STABILITY: FOOD INSECURITY: WITHIN THE PAST 12 MONTHS, THE FOOD YOU BOUGHT JUST DIDN'T LAST AND YOU DIDN'T HAVE MONEY TO GET MORE.: NEVER TRUE

## 2023-02-21 ASSESSMENT — PATIENT HEALTH QUESTIONNAIRE - PHQ9
6. FEELING BAD ABOUT YOURSELF - OR THAT YOU ARE A FAILURE OR HAVE LET YOURSELF OR YOUR FAMILY DOWN: 0
4. FEELING TIRED OR HAVING LITTLE ENERGY: 3
10. IF YOU CHECKED OFF ANY PROBLEMS, HOW DIFFICULT HAVE THESE PROBLEMS MADE IT FOR YOU TO DO YOUR WORK, TAKE CARE OF THINGS AT HOME, OR GET ALONG WITH OTHER PEOPLE: 0
SUM OF ALL RESPONSES TO PHQ QUESTIONS 1-9: 8
3. TROUBLE FALLING OR STAYING ASLEEP: 3
5. POOR APPETITE OR OVEREATING: 0
7. TROUBLE CONCENTRATING ON THINGS, SUCH AS READING THE NEWSPAPER OR WATCHING TELEVISION: 0
9. THOUGHTS THAT YOU WOULD BE BETTER OFF DEAD, OR OF HURTING YOURSELF: 0
8. MOVING OR SPEAKING SO SLOWLY THAT OTHER PEOPLE COULD HAVE NOTICED. OR THE OPPOSITE, BEING SO FIGETY OR RESTLESS THAT YOU HAVE BEEN MOVING AROUND A LOT MORE THAN USUAL: 0
SUM OF ALL RESPONSES TO PHQ QUESTIONS 1-9: 8
2. FEELING DOWN, DEPRESSED OR HOPELESS: 1
1. LITTLE INTEREST OR PLEASURE IN DOING THINGS: 1
SUM OF ALL RESPONSES TO PHQ9 QUESTIONS 1 & 2: 2

## 2023-02-21 ASSESSMENT — ENCOUNTER SYMPTOMS
GASTROINTESTINAL NEGATIVE: 1
RESPIRATORY NEGATIVE: 1

## 2023-02-21 NOTE — PROGRESS NOTES
Kaycee Varela (:  1960) is a 58 y.o. female,Established patient, here for evaluation of the following chief complaint(s):  Follow-Up from Hospital (Patient was discharged from [facility] on 2022 to [facility]. Patient was discharged from [facility] 2023 to home.) and Knee Pain (Patient has been experiencing right knee pain and swelling.)        Subjective   SUBJECTIVE/OBJECTIVE:  HPI:    Chief Complaint   Patient presents with    Follow-Up from Hospital     Patient was discharged from [facility] on 2022 to [facility]. Patient was discharged from [facility] 2023 to home. Knee Pain     Patient has been experiencing right knee pain and swelling. Admit date: 2022     Discharge date and time: 2023  2:06 PM      Admitting Physician: Alisa Angeles MD      Discharge Physician: Alisa Angeles MD      Admission Diagnoses: Infection of total right knee replacement, subsequent encounter [T84.53XD]  Post-operative state [Z98.890]     Discharge Diagnoses:  Infection of total right knee replacement, subsequent encounter [T84.53XD]  Post-operative state [Z98.890]     Admission Condition: good     Discharged Condition: good     Indication for Admission: unstable orthopaedic injury     Hospital Course: Patient is now s/p R TKA spacer placement by Dr. Myron Willard. On the day of surgery, patient was identified in the pre-operative holding area and agreeable to proceed with surgery. Written consent was obtained. Please see operative note for further details of this procedure. Patient received franklin-operative antibiotics. Patient recovered in PACU before transfer to a regular nursing floor. Patient was started on tylenol and norco for pain control and Xarelto for dvt prophylaxis for3 weeks. On the day of discharge, patient was afebrile with stable vital signs, stable upon physical exam. Patient was discharged with prescriptions for pain and dvt ppx.  Patient was deemed stable for discharge to SNF as recommended by PT/OT. Patient will follow up with Dr Ra Cole in 2 weeks for post-operative visit. Has follow up scheduled with Dr. Ra Cole in 5 weeks. BP on low end, occasional lightheadedness. BP Readings from Last 3 Encounters:   02/21/23 (!) 102/58   01/10/23 138/72   01/09/23 (!) 109/54       Patient Active Problem List   Diagnosis    Diabetes mellitus type 2, noninsulin dependent ([address]    Hyperlipemia    THORNTON (nonalcoholic steatohepatitis)    Obesity    Tobacco abuse    Vitamin D deficiency    Seborrheic keratosis    Dyshidrotic eczema    Displaced articular fracture of head of right femur, sequela    Neida-prosthetic supracondylar fracture of femur    Primary hypertension    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)    History of total knee replacement, right    Hypokalemia    Enterocolitis due to Clostridium difficile, not specified as recurrent    Recurrent major depressive disorder [facility])    Aftercare following knee joint replacement surgery    Primary insomnia     Past Surgical History:   Procedure Laterality Date    BLADDER SUSPENSION      BREAST BIOPSY Left     atypical hyperlasia, 2016, [facility]    BREAST LUMPECTOMY Left     atypical hyperplasia, 2016, [facility]    COLONOSCOPY  08/28/2020    HERNIA REPAIR  10/17/2016    JOINT REPLACEMENT Left 2021    knee    KNEE SURGERY      Right knee    REVISION TOTAL KNEE ARTHROPLASTY Right 05/10/2022    RIGHT KNEE TOTAL ARTHROPLASTY REVISION performed by Sergio Low MD at [address] [place] [address] Right 06/12/2022    Right Knee Incision and Drainage of Total Knee With Poly Exchange performed by Sergio Low MD at [facility] Right 12/22/2022    STAGE 1 RIGHT KNEE performed by Sergio Low MD at [address] [facility] EXTRACTION         Review of Systems   Constitutional: Negative. HENT: Negative. Respiratory: Negative. Cardiovascular: Negative. Gastrointestinal: Negative. Musculoskeletal:  Positive for arthralgias and joint swelling (knee pain and swelling). All other systems reviewed and are negative. Objective   Physical Exam  Vitals and nursing note reviewed. Constitutional:       General: She is not in acute distress. Appearance: Normal appearance. She is well-developed. HENT:      Head: Normocephalic and atraumatic. Right Ear: Tympanic membrane normal.      Left Ear: Tympanic membrane normal.   Eyes:      Conjunctiva/sclera: Conjunctivae normal.   Cardiovascular:      Rate and Rhythm: Normal rate and regular rhythm. Heart sounds: Normal heart sounds. No murmur heard. Pulmonary:      Effort: Pulmonary effort is normal.      Breath sounds: Normal breath sounds. No wheezing, rhonchi or rales. Abdominal:      General: There is no distension. Musculoskeletal:      Cervical back: Neck supple. Skin:     General: Skin is warm and dry. Findings: No rash (on exposed surfaces). Neurological:      General: No focal deficit present. Mental Status: She is alert. Psychiatric:         Attention and Perception: Attention normal.         Mood and Affect: Mood normal.         Speech: Speech normal.         Behavior: Behavior normal. Behavior is cooperative. Thought Content: Thought content normal.         Judgment: Judgment normal.             ASSESSMENT/PLAN:  1. Infection of total right knee replacement, subsequent encounter  2. S/P hardware removal  3. Physical deconditioning  4. Type 2 diabetes, diet controlled ([address]  5. Hypertension, essential  6. Dyslipidemia  7. Insomnia, unspecified type    -  Chronic medical problems stable  -  Continue current medications  -  Follow up with specialists as scheduled    Return in about 3 months (around 5/21/2023) for HTN. An electronic signature was used to authenticate this note.     --Keila Trimble DO

## 2023-03-10 ENCOUNTER — TELEPHONE (OUTPATIENT)
Dept: WOUND CARE | Age: 63
End: 2023-03-10

## 2023-03-10 NOTE — TELEPHONE ENCOUNTER
----- Message from Roly Palma sent at 3/9/2023  1:26 PM EST -----  MANUEL AT [facility] OFFICE CALLED TO SAY THEY SENT LABS AND THAT YAIMA HAS AN OFFICE VISIT NEXT WED 3/15 AND THEN SHE WILL BE HAVING ANOTHER SURGERY

## 2023-03-11 ENCOUNTER — HOSPITAL ENCOUNTER (INPATIENT)
Age: 63
LOS: 9 days | Discharge: HOME HEALTH | DRG: 474 | End: 2023-03-20
Attending: EMERGENCY MEDICINE | Admitting: ORTHOPAEDIC SURGERY
Payer: OTHER GOVERNMENT

## 2023-03-11 ENCOUNTER — APPOINTMENT (OUTPATIENT)
Dept: GENERAL RADIOLOGY | Age: 63
DRG: 474 | End: 2023-03-11
Payer: OTHER GOVERNMENT

## 2023-03-11 DIAGNOSIS — S72.91XA CLOSED FRACTURE OF RIGHT FEMUR, UNSPECIFIED FRACTURE MORPHOLOGY, UNSPECIFIED PORTION OF FEMUR, INITIAL ENCOUNTER (HCC): ICD-10-CM

## 2023-03-11 DIAGNOSIS — G89.18 POST-OP PAIN: ICD-10-CM

## 2023-03-11 DIAGNOSIS — S72.141A CLOSED COMMINUTED INTERTROCHANTERIC FRACTURE OF RIGHT FEMUR, INITIAL ENCOUNTER (HCC): ICD-10-CM

## 2023-03-11 DIAGNOSIS — T84.53XA INFECTION OF TOTAL RIGHT KNEE REPLACEMENT, INITIAL ENCOUNTER (HCC): Primary | ICD-10-CM

## 2023-03-11 PROBLEM — Z89.529 ACQUIRED ABSENCE OF KNEE JOINT FOLLOWING EXPLANTATION OF JOINT PROSTHESIS WITH PRESENCE OF ANTIBIOTIC-IMPREGNATED CEMENT SPACER: Status: ACTIVE | Noted: 2023-03-11

## 2023-03-11 LAB
ALBUMIN SERPL BCG-MCNC: 3.9 G/DL (ref 3.5–5.1)
ALP SERPL-CCNC: 69 U/L (ref 38–126)
ALT SERPL W/O P-5'-P-CCNC: 13 U/L (ref 11–66)
ANION GAP SERPL CALC-SCNC: 10 MEQ/L (ref 8–16)
AST SERPL-CCNC: 24 U/L (ref 5–40)
BACTERIA URNS QL MICRO: ABNORMAL /HPF
BASOPHILS ABSOLUTE: 0 THOU/MM3 (ref 0–0.1)
BASOPHILS NFR BLD AUTO: 0.2 %
BILIRUB CONJ SERPL-MCNC: < 0.2 MG/DL (ref 0–0.3)
BILIRUB SERPL-MCNC: 0.3 MG/DL (ref 0.3–1.2)
BILIRUB UR QL STRIP.AUTO: NEGATIVE
BUN SERPL-MCNC: 21 MG/DL (ref 7–22)
CALCIUM SERPL-MCNC: 9.7 MG/DL (ref 8.5–10.5)
CASTS #/AREA URNS LPF: ABNORMAL /LPF
CASTS 2: ABNORMAL /LPF
CHARACTER UR: CLEAR
CHLORIDE SERPL-SCNC: 100 MEQ/L (ref 98–111)
CO2 SERPL-SCNC: 25 MEQ/L (ref 23–33)
COLOR: YELLOW
CREAT SERPL-MCNC: 0.5 MG/DL (ref 0.4–1.2)
CRYSTALS URNS MICRO: ABNORMAL
DEPRECATED RDW RBC AUTO: 50.4 FL (ref 35–45)
EOSINOPHIL NFR BLD AUTO: 0.3 %
EOSINOPHILS ABSOLUTE: 0 THOU/MM3 (ref 0–0.4)
EPITHELIAL CELLS, UA: ABNORMAL /HPF
ERYTHROCYTE [DISTWIDTH] IN BLOOD BY AUTOMATED COUNT: 16.3 % (ref 11.5–14.5)
FLUAV RNA RESP QL NAA+PROBE: NOT DETECTED
FLUBV RNA RESP QL NAA+PROBE: NOT DETECTED
GFR SERPL CREATININE-BSD FRML MDRD: > 60 ML/MIN/1.73M2
GLUCOSE SERPL-MCNC: 103 MG/DL (ref 70–108)
GLUCOSE UR QL STRIP.AUTO: NEGATIVE MG/DL
HCT VFR BLD AUTO: 43.7 % (ref 37–47)
HGB BLD-MCNC: 13.6 GM/DL (ref 12–16)
HGB UR QL STRIP.AUTO: NEGATIVE
IMM GRANULOCYTES # BLD AUTO: 0.07 THOU/MM3 (ref 0–0.07)
IMM GRANULOCYTES NFR BLD AUTO: 0.5 %
KETONES UR QL STRIP.AUTO: NEGATIVE
LACTATE SERPL-SCNC: 1.1 MMOL/L (ref 0.5–2)
LYMPHOCYTES ABSOLUTE: 0.7 THOU/MM3 (ref 1–4.8)
LYMPHOCYTES NFR BLD AUTO: 4.9 %
MCH RBC QN AUTO: 26.5 PG (ref 26–33)
MCHC RBC AUTO-ENTMCNC: 31.1 GM/DL (ref 32.2–35.5)
MCV RBC AUTO: 85 FL (ref 81–99)
MISCELLANEOUS 2: ABNORMAL
MONOCYTES ABSOLUTE: 0.9 THOU/MM3 (ref 0.4–1.3)
MONOCYTES NFR BLD AUTO: 5.9 %
NEUTROPHILS NFR BLD AUTO: 88.2 %
NITRITE UR QL STRIP: NEGATIVE
NRBC BLD AUTO-RTO: 0 /100 WBC
OSMOLALITY SERPL CALC.SUM OF ELEC: 273.3 MOSMOL/KG (ref 275–300)
PH UR STRIP.AUTO: 7.5 [PH] (ref 5–9)
PLATELET # BLD AUTO: 363 THOU/MM3 (ref 130–400)
PMV BLD AUTO: 11.5 FL (ref 9.4–12.4)
POTASSIUM SERPL-SCNC: 4.3 MEQ/L (ref 3.5–5.2)
PROCALCITONIN SERPL IA-MCNC: 0.13 NG/ML (ref 0.01–0.09)
PROT SERPL-MCNC: 7.1 G/DL (ref 6.1–8)
PROT UR STRIP.AUTO-MCNC: NEGATIVE MG/DL
RBC # BLD AUTO: 5.14 MILL/MM3 (ref 4.2–5.4)
RBC URINE: ABNORMAL /HPF
RENAL EPI CELLS #/AREA URNS HPF: ABNORMAL /[HPF]
SARS-COV-2 RNA RESP QL NAA+PROBE: NOT DETECTED
SEGMENTED NEUTROPHILS ABSOLUTE COUNT: 12.9 THOU/MM3 (ref 1.8–7.7)
SODIUM SERPL-SCNC: 135 MEQ/L (ref 135–145)
SP GR UR REFRACT.AUTO: 1.02 (ref 1–1.03)
UROBILINOGEN, URINE: 0.2 EU/DL (ref 0–1)
WBC # BLD AUTO: 14.6 THOU/MM3 (ref 4.8–10.8)
WBC #/AREA URNS HPF: ABNORMAL /HPF
WBC #/AREA URNS HPF: ABNORMAL /[HPF]
YEAST LIKE FUNGI URNS QL MICRO: ABNORMAL

## 2023-03-11 PROCEDURE — 6360000002 HC RX W HCPCS: Performed by: EMERGENCY MEDICINE

## 2023-03-11 PROCEDURE — 2580000003 HC RX 258: Performed by: PHYSICIAN ASSISTANT

## 2023-03-11 PROCEDURE — 99285 EMERGENCY DEPT VISIT HI MDM: CPT

## 2023-03-11 PROCEDURE — 73560 X-RAY EXAM OF KNEE 1 OR 2: CPT

## 2023-03-11 PROCEDURE — 96374 THER/PROPH/DIAG INJ IV PUSH: CPT

## 2023-03-11 PROCEDURE — 80053 COMPREHEN METABOLIC PANEL: CPT

## 2023-03-11 PROCEDURE — 81001 URINALYSIS AUTO W/SCOPE: CPT

## 2023-03-11 PROCEDURE — P9612 CATHETERIZE FOR URINE SPEC: HCPCS

## 2023-03-11 PROCEDURE — 85025 COMPLETE CBC W/AUTO DIFF WBC: CPT

## 2023-03-11 PROCEDURE — 93005 ELECTROCARDIOGRAM TRACING: CPT | Performed by: EMERGENCY MEDICINE

## 2023-03-11 PROCEDURE — 2580000003 HC RX 258: Performed by: EMERGENCY MEDICINE

## 2023-03-11 PROCEDURE — 99254 IP/OBS CNSLTJ NEW/EST MOD 60: CPT | Performed by: PHYSICIAN ASSISTANT

## 2023-03-11 PROCEDURE — 87040 BLOOD CULTURE FOR BACTERIA: CPT

## 2023-03-11 PROCEDURE — 36415 COLL VENOUS BLD VENIPUNCTURE: CPT

## 2023-03-11 PROCEDURE — 6360000002 HC RX W HCPCS: Performed by: PHYSICIAN ASSISTANT

## 2023-03-11 PROCEDURE — 6370000000 HC RX 637 (ALT 250 FOR IP): Performed by: EMERGENCY MEDICINE

## 2023-03-11 PROCEDURE — 87636 SARSCOV2 & INF A&B AMP PRB: CPT

## 2023-03-11 PROCEDURE — 84145 PROCALCITONIN (PCT): CPT

## 2023-03-11 PROCEDURE — 82248 BILIRUBIN DIRECT: CPT

## 2023-03-11 PROCEDURE — 1200000000 HC SEMI PRIVATE

## 2023-03-11 PROCEDURE — 71045 X-RAY EXAM CHEST 1 VIEW: CPT

## 2023-03-11 PROCEDURE — 83605 ASSAY OF LACTIC ACID: CPT

## 2023-03-11 RX ORDER — ACETAMINOPHEN 325 MG/1
650 TABLET ORAL EVERY 4 HOURS PRN
Status: DISCONTINUED | OUTPATIENT
Start: 2023-03-11 | End: 2023-03-12

## 2023-03-11 RX ORDER — OXYCODONE HYDROCHLORIDE 5 MG/1
5 TABLET ORAL EVERY 4 HOURS PRN
Status: DISCONTINUED | OUTPATIENT
Start: 2023-03-11 | End: 2023-03-20 | Stop reason: HOSPADM

## 2023-03-11 RX ORDER — OXYCODONE HYDROCHLORIDE 5 MG/1
10 TABLET ORAL EVERY 4 HOURS PRN
Status: DISCONTINUED | OUTPATIENT
Start: 2023-03-11 | End: 2023-03-20 | Stop reason: HOSPADM

## 2023-03-11 RX ORDER — SODIUM CHLORIDE 9 MG/ML
INJECTION, SOLUTION INTRAVENOUS PRN
Status: DISCONTINUED | OUTPATIENT
Start: 2023-03-11 | End: 2023-03-20 | Stop reason: HOSPADM

## 2023-03-11 RX ORDER — MORPHINE SULFATE 2 MG/ML
2 INJECTION, SOLUTION INTRAMUSCULAR; INTRAVENOUS
Status: DISCONTINUED | OUTPATIENT
Start: 2023-03-11 | End: 2023-03-20 | Stop reason: HOSPADM

## 2023-03-11 RX ORDER — ONDANSETRON 2 MG/ML
4 INJECTION INTRAMUSCULAR; INTRAVENOUS ONCE
Status: COMPLETED | OUTPATIENT
Start: 2023-03-11 | End: 2023-03-11

## 2023-03-11 RX ORDER — TIZANIDINE 4 MG/1
2 TABLET ORAL 3 TIMES DAILY PRN
Status: DISCONTINUED | OUTPATIENT
Start: 2023-03-11 | End: 2023-03-13

## 2023-03-11 RX ORDER — SODIUM CHLORIDE 9 MG/ML
INJECTION, SOLUTION INTRAVENOUS CONTINUOUS
Status: DISCONTINUED | OUTPATIENT
Start: 2023-03-11 | End: 2023-03-11

## 2023-03-11 RX ORDER — SODIUM CHLORIDE 0.9 % (FLUSH) 0.9 %
5-40 SYRINGE (ML) INJECTION EVERY 12 HOURS SCHEDULED
Status: DISCONTINUED | OUTPATIENT
Start: 2023-03-11 | End: 2023-03-20 | Stop reason: HOSPADM

## 2023-03-11 RX ORDER — MULTIVITAMIN WITH IRON
1 TABLET ORAL DAILY
Status: DISCONTINUED | OUTPATIENT
Start: 2023-03-12 | End: 2023-03-20 | Stop reason: HOSPADM

## 2023-03-11 RX ORDER — DOXEPIN HYDROCHLORIDE 10 MG/1
40 CAPSULE ORAL NIGHTLY
Status: DISCONTINUED | OUTPATIENT
Start: 2023-03-12 | End: 2023-03-20 | Stop reason: HOSPADM

## 2023-03-11 RX ORDER — 0.9 % SODIUM CHLORIDE 0.9 %
1000 INTRAVENOUS SOLUTION INTRAVENOUS ONCE
Status: COMPLETED | OUTPATIENT
Start: 2023-03-11 | End: 2023-03-12

## 2023-03-11 RX ORDER — SENNA PLUS 8.6 MG/1
1 TABLET ORAL DAILY PRN
Status: DISCONTINUED | OUTPATIENT
Start: 2023-03-11 | End: 2023-03-20 | Stop reason: HOSPADM

## 2023-03-11 RX ORDER — BACLOFEN 10 MG/1
10 TABLET ORAL EVERY 6 HOURS PRN
Status: DISCONTINUED | OUTPATIENT
Start: 2023-03-11 | End: 2023-03-11 | Stop reason: ALTCHOICE

## 2023-03-11 RX ORDER — ATORVASTATIN CALCIUM 40 MG/1
40 TABLET, FILM COATED ORAL DAILY
Status: DISCONTINUED | OUTPATIENT
Start: 2023-03-12 | End: 2023-03-20 | Stop reason: HOSPADM

## 2023-03-11 RX ORDER — ONDANSETRON 4 MG/1
4 TABLET, ORALLY DISINTEGRATING ORAL EVERY 8 HOURS PRN
Status: DISCONTINUED | OUTPATIENT
Start: 2023-03-11 | End: 2023-03-20 | Stop reason: HOSPADM

## 2023-03-11 RX ORDER — SODIUM CHLORIDE 0.9 % (FLUSH) 0.9 %
5-40 SYRINGE (ML) INJECTION PRN
Status: DISCONTINUED | OUTPATIENT
Start: 2023-03-11 | End: 2023-03-20 | Stop reason: HOSPADM

## 2023-03-11 RX ORDER — ONDANSETRON 2 MG/ML
4 INJECTION INTRAMUSCULAR; INTRAVENOUS EVERY 6 HOURS PRN
Status: DISCONTINUED | OUTPATIENT
Start: 2023-03-11 | End: 2023-03-20 | Stop reason: HOSPADM

## 2023-03-11 RX ORDER — MORPHINE SULFATE 4 MG/ML
4 INJECTION, SOLUTION INTRAMUSCULAR; INTRAVENOUS
Status: DISCONTINUED | OUTPATIENT
Start: 2023-03-11 | End: 2023-03-20 | Stop reason: HOSPADM

## 2023-03-11 RX ORDER — FENOFIBRATE 160 MG/1
160 TABLET ORAL DAILY
Status: DISCONTINUED | OUTPATIENT
Start: 2023-03-12 | End: 2023-03-20 | Stop reason: HOSPADM

## 2023-03-11 RX ORDER — ACETAMINOPHEN 325 MG/1
650 TABLET ORAL ONCE
Status: COMPLETED | OUTPATIENT
Start: 2023-03-11 | End: 2023-03-11

## 2023-03-11 RX ORDER — 0.9 % SODIUM CHLORIDE 0.9 %
1000 INTRAVENOUS SOLUTION INTRAVENOUS ONCE
Status: COMPLETED | OUTPATIENT
Start: 2023-03-11 | End: 2023-03-11

## 2023-03-11 RX ADMIN — SODIUM CHLORIDE 1000 ML: 9 INJECTION, SOLUTION INTRAVENOUS at 23:00

## 2023-03-11 RX ADMIN — PIPERACILLIN AND TAZOBACTAM 4500 MG: 4; .5 INJECTION, POWDER, FOR SOLUTION INTRAVENOUS at 21:21

## 2023-03-11 RX ADMIN — VANCOMYCIN HYDROCHLORIDE 1000 MG: 1 INJECTION, POWDER, LYOPHILIZED, FOR SOLUTION INTRAVENOUS at 21:55

## 2023-03-11 RX ADMIN — SODIUM CHLORIDE 1000 ML: 9 INJECTION, SOLUTION INTRAVENOUS at 19:17

## 2023-03-11 RX ADMIN — HYDROMORPHONE HYDROCHLORIDE 0.5 MG: 1 INJECTION, SOLUTION INTRAMUSCULAR; INTRAVENOUS; SUBCUTANEOUS at 19:40

## 2023-03-11 RX ADMIN — ACETAMINOPHEN 650 MG: 325 TABLET ORAL at 19:14

## 2023-03-11 RX ADMIN — ONDANSETRON 4 MG: 2 INJECTION INTRAMUSCULAR; INTRAVENOUS at 20:23

## 2023-03-11 ASSESSMENT — PAIN DESCRIPTION - PAIN TYPE
TYPE: ACUTE PAIN
TYPE: ACUTE PAIN

## 2023-03-11 ASSESSMENT — PAIN DESCRIPTION - LOCATION
LOCATION: KNEE

## 2023-03-11 ASSESSMENT — PAIN SCALES - GENERAL
PAINLEVEL_OUTOF10: 8
PAINLEVEL_OUTOF10: 6
PAINLEVEL_OUTOF10: 10
PAINLEVEL_OUTOF10: 8

## 2023-03-11 ASSESSMENT — PAIN DESCRIPTION - ORIENTATION
ORIENTATION: RIGHT

## 2023-03-11 ASSESSMENT — PAIN - FUNCTIONAL ASSESSMENT
PAIN_FUNCTIONAL_ASSESSMENT: 0-10
PAIN_FUNCTIONAL_ASSESSMENT: 0-10

## 2023-03-11 ASSESSMENT — PAIN DESCRIPTION - FREQUENCY
FREQUENCY: CONTINUOUS
FREQUENCY: CONTINUOUS

## 2023-03-11 ASSESSMENT — LIFESTYLE VARIABLES: HOW OFTEN DO YOU HAVE A DRINK CONTAINING ALCOHOL: NEVER

## 2023-03-11 NOTE — ED TRIAGE NOTES
Patient to room 37 via [facility] for complaint of pain to her right knee. Patient is being treated by Dr. Bouchra Shields and Dr. Terri Tabor for repeated infection to this area. Per report, patient received 100 mcg of Fentanyl en route. Patient is crying in pain on arrival.  Patient noted to be febrile, EKG completed.

## 2023-03-12 LAB
ANION GAP SERPL CALC-SCNC: 10 MEQ/L (ref 8–16)
BASOPHILS ABSOLUTE: 0 THOU/MM3 (ref 0–0.1)
BASOPHILS NFR BLD AUTO: 0.2 %
BUN SERPL-MCNC: 16 MG/DL (ref 7–22)
CALCIUM SERPL-MCNC: 8.8 MG/DL (ref 8.5–10.5)
CHLORIDE SERPL-SCNC: 107 MEQ/L (ref 98–111)
CO2 SERPL-SCNC: 21 MEQ/L (ref 23–33)
CREAT SERPL-MCNC: 0.4 MG/DL (ref 0.4–1.2)
DEPRECATED RDW RBC AUTO: 51.9 FL (ref 35–45)
EKG ATRIAL RATE: 185 BPM
EKG P AXIS: 72 DEGREES
EKG Q-T INTERVAL: 294 MS
EKG QRS DURATION: 98 MS
EKG QTC CALCULATION (BAZETT): 434 MS
EKG R AXIS: 92 DEGREES
EKG T AXIS: 33 DEGREES
EKG VENTRICULAR RATE: 131 BPM
EOSINOPHIL NFR BLD AUTO: 0.3 %
EOSINOPHILS ABSOLUTE: 0 THOU/MM3 (ref 0–0.4)
ERYTHROCYTE [DISTWIDTH] IN BLOOD BY AUTOMATED COUNT: 16.2 % (ref 11.5–14.5)
GFR SERPL CREATININE-BSD FRML MDRD: > 60 ML/MIN/1.73M2
GLUCOSE BLD STRIP.AUTO-MCNC: 74 MG/DL (ref 70–108)
GLUCOSE BLD STRIP.AUTO-MCNC: 85 MG/DL (ref 70–108)
GLUCOSE SERPL-MCNC: 72 MG/DL (ref 70–108)
HCT VFR BLD AUTO: 37.2 % (ref 37–47)
HGB BLD-MCNC: 11.2 GM/DL (ref 12–16)
IMM GRANULOCYTES # BLD AUTO: 0.07 THOU/MM3 (ref 0–0.07)
IMM GRANULOCYTES NFR BLD AUTO: 0.5 %
INR PPP: 1.09 (ref 0.85–1.13)
LYMPHOCYTES ABSOLUTE: 1.3 THOU/MM3 (ref 1–4.8)
LYMPHOCYTES NFR BLD AUTO: 9.3 %
MCH RBC QN AUTO: 26.4 PG (ref 26–33)
MCHC RBC AUTO-ENTMCNC: 30.1 GM/DL (ref 32.2–35.5)
MCV RBC AUTO: 87.7 FL (ref 81–99)
MONOCYTES ABSOLUTE: 1.2 THOU/MM3 (ref 0.4–1.3)
MONOCYTES NFR BLD AUTO: 8.3 %
NEUTROPHILS NFR BLD AUTO: 81.4 %
NRBC BLD AUTO-RTO: 0 /100 WBC
PLATELET # BLD AUTO: 284 THOU/MM3 (ref 130–400)
PMV BLD AUTO: 11.8 FL (ref 9.4–12.4)
POTASSIUM SERPL-SCNC: 4 MEQ/L (ref 3.5–5.2)
RBC # BLD AUTO: 4.24 MILL/MM3 (ref 4.2–5.4)
SEGMENTED NEUTROPHILS ABSOLUTE COUNT: 11.6 THOU/MM3 (ref 1.8–7.7)
SODIUM SERPL-SCNC: 138 MEQ/L (ref 135–145)
WBC # BLD AUTO: 14.3 THOU/MM3 (ref 4.8–10.8)

## 2023-03-12 PROCEDURE — 80048 BASIC METABOLIC PNL TOTAL CA: CPT

## 2023-03-12 PROCEDURE — 2580000003 HC RX 258: Performed by: PHYSICIAN ASSISTANT

## 2023-03-12 PROCEDURE — 1200000000 HC SEMI PRIVATE

## 2023-03-12 PROCEDURE — 6360000002 HC RX W HCPCS: Performed by: PHYSICIAN ASSISTANT

## 2023-03-12 PROCEDURE — 93010 ELECTROCARDIOGRAM REPORT: CPT | Performed by: INTERNAL MEDICINE

## 2023-03-12 PROCEDURE — 85025 COMPLETE CBC W/AUTO DIFF WBC: CPT

## 2023-03-12 PROCEDURE — 82948 REAGENT STRIP/BLOOD GLUCOSE: CPT

## 2023-03-12 PROCEDURE — 85610 PROTHROMBIN TIME: CPT

## 2023-03-12 PROCEDURE — 6370000000 HC RX 637 (ALT 250 FOR IP): Performed by: PHYSICIAN ASSISTANT

## 2023-03-12 PROCEDURE — 36415 COLL VENOUS BLD VENIPUNCTURE: CPT

## 2023-03-12 PROCEDURE — 99232 SBSQ HOSP IP/OBS MODERATE 35: CPT | Performed by: PHYSICIAN ASSISTANT

## 2023-03-12 RX ORDER — ACETAMINOPHEN 325 MG/1
650 TABLET ORAL EVERY 6 HOURS PRN
Status: DISCONTINUED | OUTPATIENT
Start: 2023-03-13 | End: 2023-03-20 | Stop reason: HOSPADM

## 2023-03-12 RX ORDER — SODIUM CHLORIDE, SODIUM LACTATE, POTASSIUM CHLORIDE, CALCIUM CHLORIDE 600; 310; 30; 20 MG/100ML; MG/100ML; MG/100ML; MG/100ML
INJECTION, SOLUTION INTRAVENOUS CONTINUOUS
Status: ACTIVE | OUTPATIENT
Start: 2023-03-12 | End: 2023-03-12

## 2023-03-12 RX ORDER — 0.9 % SODIUM CHLORIDE 0.9 %
500 INTRAVENOUS SOLUTION INTRAVENOUS ONCE
Status: DISCONTINUED | OUTPATIENT
Start: 2023-03-12 | End: 2023-03-12

## 2023-03-12 RX ORDER — 0.9 % SODIUM CHLORIDE 0.9 %
500 INTRAVENOUS SOLUTION INTRAVENOUS ONCE
Status: COMPLETED | OUTPATIENT
Start: 2023-03-12 | End: 2023-03-12

## 2023-03-12 RX ADMIN — SODIUM CHLORIDE 500 ML: 9 INJECTION, SOLUTION INTRAVENOUS at 05:39

## 2023-03-12 RX ADMIN — OXYCODONE 5 MG: 5 TABLET ORAL at 16:50

## 2023-03-12 RX ADMIN — Medication 2000 MG: at 11:35

## 2023-03-12 RX ADMIN — Medication 2000 MG: at 04:59

## 2023-03-12 RX ADMIN — ACETAMINOPHEN 650 MG: 325 TABLET ORAL at 21:01

## 2023-03-12 RX ADMIN — TIZANIDINE 2 MG: 4 TABLET ORAL at 05:38

## 2023-03-12 RX ADMIN — SODIUM CHLORIDE, POTASSIUM CHLORIDE, SODIUM LACTATE AND CALCIUM CHLORIDE: 600; 310; 30; 20 INJECTION, SOLUTION INTRAVENOUS at 09:09

## 2023-03-12 RX ADMIN — Medication 1 TABLET: at 09:02

## 2023-03-12 RX ADMIN — DOXEPIN HYDROCHLORIDE 40 MG: 10 CAPSULE ORAL at 21:01

## 2023-03-12 RX ADMIN — Medication 2000 MG: at 19:44

## 2023-03-12 RX ADMIN — FENOFIBRATE 160 MG: 160 TABLET ORAL at 09:02

## 2023-03-12 RX ADMIN — HYDROMORPHONE HYDROCHLORIDE 0.25 MG: 1 INJECTION, SOLUTION INTRAMUSCULAR; INTRAVENOUS; SUBCUTANEOUS at 05:55

## 2023-03-12 RX ADMIN — ATORVASTATIN CALCIUM 40 MG: 40 TABLET, FILM COATED ORAL at 09:02

## 2023-03-12 RX ADMIN — OXYCODONE 10 MG: 5 TABLET ORAL at 09:09

## 2023-03-12 RX ADMIN — OXYCODONE 5 MG: 5 TABLET ORAL at 23:54

## 2023-03-12 RX ADMIN — SODIUM CHLORIDE, PRESERVATIVE FREE 10 ML: 5 INJECTION INTRAVENOUS at 09:00

## 2023-03-12 RX ADMIN — TIZANIDINE 2 MG: 4 TABLET ORAL at 21:01

## 2023-03-12 ASSESSMENT — PAIN DESCRIPTION - ORIENTATION
ORIENTATION: RIGHT

## 2023-03-12 ASSESSMENT — PAIN SCALES - GENERAL
PAINLEVEL_OUTOF10: 8
PAINLEVEL_OUTOF10: 8
PAINLEVEL_OUTOF10: 4
PAINLEVEL_OUTOF10: 0
PAINLEVEL_OUTOF10: 8
PAINLEVEL_OUTOF10: 7
PAINLEVEL_OUTOF10: 7

## 2023-03-12 ASSESSMENT — PAIN - FUNCTIONAL ASSESSMENT: PAIN_FUNCTIONAL_ASSESSMENT: PREVENTS OR INTERFERES WITH MANY ACTIVE NOT PASSIVE ACTIVITIES

## 2023-03-12 ASSESSMENT — PAIN DESCRIPTION - ONSET: ONSET: ON-GOING

## 2023-03-12 ASSESSMENT — PAIN DESCRIPTION - LOCATION
LOCATION: HIP;KNEE
LOCATION: KNEE
LOCATION: HIP;KNEE
LOCATION: HIP;KNEE

## 2023-03-12 ASSESSMENT — PAIN DESCRIPTION - DESCRIPTORS
DESCRIPTORS: OTHER (COMMENT)
DESCRIPTORS: ACHING;CRAMPING
DESCRIPTORS: ACHING;CRAMPING
DESCRIPTORS: ACHING
DESCRIPTORS: ACHING

## 2023-03-12 ASSESSMENT — PAIN DESCRIPTION - FREQUENCY: FREQUENCY: CONTINUOUS

## 2023-03-12 NOTE — H&P
multivitamin  1 tablet Oral Daily    sodium chloride flush  5-40 mL IntraVENous 2 times per day    ceFAZolin  2,000 mg IntraVENous Q8H     Continuous Infusions:   sodium chloride       PRN Meds:.tiZANidine, sodium chloride flush, sodium chloride, ondansetron **OR** ondansetron, acetaminophen, morphine **OR** morphine, oxyCODONE, oxyCODONE, senna, magnesium hydroxide      Allergies:  Adhesive tape    Social History:   Social History     Socioeconomic History    Marital status:      Spouse name: Sandra Hull     Number of children: 1    Years of education: 16    Highest education level: Associate degree: academic program   Tobacco Use    Smoking status: Every Day     Packs/day: 1.00     Years: 49.00     Pack years: 49.00     Types: Cigarettes    Smokeless tobacco: Never   Vaping Use    Vaping Use: Never used   Substance and Sexual Activity    Alcohol use: No     Comment: monthly glass of wine    Drug use: No     Social Determinants of Health     Financial Resource Strain: Low Risk     Difficulty of Paying Living Expenses: Not hard at all   Food Insecurity: No Food Insecurity    Worried About Running Out of Food in the Last Year: Never true    Ran Out of Food in the Last Year: Never true   Transportation Needs: Unknown    Lack of Transportation (Non-Medical): No   Housing Stability: Unknown    Unstable Housing in the Last Year: No     Social History     Tobacco Use   Smoking Status Every Day    Packs/day: 1.00    Years: 49.00    Pack years: 49.00    Types: Cigarettes   Smokeless Tobacco Never     Social History     Substance and Sexual Activity   Alcohol Use No    Comment: monthly glass of wine     Social History     Substance and Sexual Activity   Drug Use No       Family History:  Family History   Problem Relation Age of Onset    High Cholesterol Mother     High Blood Pressure Mother     Pacemaker Mother     Other Mother         Sundowners syndrome    Crohn's Disease Mother     Cancer Mother         Uterine and

## 2023-03-12 NOTE — ED NOTES
Upon first contact with patient this RN receives bedside shift report from Sheridan Keita [facility]. Pt resting on cot in position of comfort. Pt is A&Ox4, resps easy and unlabored. IV infusing and shows no s/s of infection or infiltration. Pt currently rating pain 8/10 in R knee. Spoke with provider regarding something for pain. Awaiting orders. Radiology at bedside for XR. Will monitor.      Mala Patiño RN  03/11/23 1613

## 2023-03-12 NOTE — ED NOTES
In to complete orders, patient in severe pain at this time and unable to tolerate position changes.      Dennis Yost RN  03/11/23 [id number]

## 2023-03-12 NOTE — CONSULTS
primary    Code Status: Prior    PT/OT Eval Status: Active and ongoing    Electronically signed by CRISTAL Perkins on 3/11/2023 at 7:49 PM

## 2023-03-12 NOTE — ED PROVIDER NOTES
records: yes  Discuss the patient with other providers: yes (CRISTAL Eckert)  Independent visualization of images, tracings, or specimens: yes    Risk of Complications, Morbidity, and/or Mortality  Presenting problems: moderate  Diagnostic procedures: moderate  Management options: moderate    Patient Progress  Patient progress: stable  /   Vitals Reviewed:    Vitals:    03/11/23 1839 03/11/23 1943   BP:  (!) 100/46   Pulse: 68 (!) 127   Resp: 18 25   Temp: (!) 100.6 °F (38.1 °C)    TempSrc: Oral    SpO2: 95% 96%   Weight: 146 lb (66.2 kg)    Height: 5' 7\" (1.702 m)        The patient was seen and examined. Appropriate diagnostic testing was performed and results reviewed with the patient      The results of pertinent diagnostic studies and exam findings were discussed. The patients provisional diagnosis and plan of care were discussed with the patient and present family who expressed understanding. Patient is referred to orthopedic surgery, CRISTAL Eckert for Dr. Kristian Pena graciously admitted the patient. PROCEDURES: (None if blank)  Procedures:     CRITICAL CARE:  None      FINAL IMPRESSION      1. Infection of total right knee replacement, initial encounter ([facility]    2. Closed comminuted distal fracture of right femur, initial encounter [facility])          DISPOSITION/PLAN   DISPOSITION Admitted 03/11/2023 07:50:39 PM      PATIENT REFERRED TO:  No follow-up provider specified. Patient is admitted to orthopedic services, Dr. Kristian Pena by CRISTAL Eckert. (Please note that portions of this note were completed with a voice recognition program and electronically transcribed. Efforts were made to edit the dictations but occasionally words are mis-transcribed . The transcription may contain errors not detected in proofreading.   This transcription was electronically signed.)     03/11/23 8:18 PM      Robel Gimenez MD      Emergency room physician             Jonah Whitaker MD  03/11/23 2023

## 2023-03-12 NOTE — ED NOTES
ED to inpatient nurses report    Chief Complaint   Patient presents with    Knee Pain     Right    Wound Infection      Present to ED from home  LOC: alert and orientated to name, place, date  Vital signs   Vitals:    03/11/23 1839 03/11/23 1943   BP:  (!) 100/46   Pulse: 68 (!) 127   Resp: 18 25   Temp: (!) 100.6 °F (38.1 °C)    TempSrc: Oral    SpO2: 95% 96%   Weight: 146 lb (66.2 kg)    Height: 5' 7\" (1.702 m)       Oxygen Baseline Room Air    Current needs required Room Air Bipap/Cpap No  LDAs:   Peripheral IV 03/11/23 Right Hand (Active)   Site Assessment Clean, dry & intact 03/11/23 1905     Mobility: Requires assistance * 2  Pending ED orders: UA, Zosyn, Continuous infusion  Present condition: Pt resting on cot in position of comfort. Pt is A&Ox4, resps easy and unlabored. Pt c/o 8-10 pain in R knee.  IV shows no s/s of infection or infiltration  Person of contract, phone number   Our promise was given to patient    C-SSRS Risk of Suicide: No Risk  Swallow Screening    Preferred Language: English     Electronically signed by Era Ignacio RN on 3/11/2023 at 8:40 PM       Phong Mullen  03/11/23 2041

## 2023-03-12 NOTE — ED NOTES
ED nurse-to-nurse bedside report    Chief Complaint   Patient presents with    Knee Pain     Right    Wound Infection      LOC: alert and orientated to name, place, date  Vital signs   Vitals:    03/11/23 1839   Pulse: 68   Resp: 18   Temp: (!) 100.6 °F (38.1 °C)   TempSrc: Oral   SpO2: 95%   Weight: 146 lb (66.2 kg)   Height: 5' 7\" (1.702 m)      Pain:    Pain Interventions: Fentanyl en route  Pain Goal: 4  Oxygen: No    Current needs required Room Air   Telemetry: Yes  LDAs:   Peripheral IV 03/11/23 Right Hand (Active)   Site Assessment Clean, dry & intact 03/11/23 1905     Continuous Infusions:    sodium chloride       Mobility: Requires assistance * 2  Kumar Fall Risk Score: No flowsheet data found.   Fall Interventions: Hourly Rounding, Side Rails x 2  Report given to: Sangeeta Bob RN  03/11/23 9348

## 2023-03-12 NOTE — ED NOTES
In to medicate pt for nausea.  at bedside states \"something is wrong. Something is leaking, there's blood all over. \" Lights turned on to find that pt had pulled IV out while attempting to get comfortable in bed. Infusion held and new IV established. IV flushed and shows no s/s of infection or infiltration. Infusion continued and medicated pt per [facility]. Updated pt and family on room assignment and pending transport. No needs voiced. Monitor.      Giorgio Cano RN  03/11/23 8926

## 2023-03-13 PROBLEM — E44.0 MODERATE MALNUTRITION (HCC): Status: ACTIVE | Noted: 2023-03-13

## 2023-03-13 LAB
ABO: NORMAL
ANTIBODY SCREEN: NORMAL
RH FACTOR: NORMAL

## 2023-03-13 PROCEDURE — 86923 COMPATIBILITY TEST ELECTRIC: CPT

## 2023-03-13 PROCEDURE — 2580000003 HC RX 258: Performed by: PHYSICIAN ASSISTANT

## 2023-03-13 PROCEDURE — 86901 BLOOD TYPING SEROLOGIC RH(D): CPT

## 2023-03-13 PROCEDURE — 6360000002 HC RX W HCPCS: Performed by: PHYSICIAN ASSISTANT

## 2023-03-13 PROCEDURE — 6370000000 HC RX 637 (ALT 250 FOR IP): Performed by: PHYSICIAN ASSISTANT

## 2023-03-13 PROCEDURE — 1200000000 HC SEMI PRIVATE

## 2023-03-13 PROCEDURE — 99232 SBSQ HOSP IP/OBS MODERATE 35: CPT | Performed by: INTERNAL MEDICINE

## 2023-03-13 PROCEDURE — 86900 BLOOD TYPING SEROLOGIC ABO: CPT

## 2023-03-13 PROCEDURE — 36415 COLL VENOUS BLD VENIPUNCTURE: CPT

## 2023-03-13 PROCEDURE — 86850 RBC ANTIBODY SCREEN: CPT

## 2023-03-13 RX ORDER — SODIUM CHLORIDE 9 MG/ML
INJECTION, SOLUTION INTRAVENOUS PRN
Status: DISCONTINUED | OUTPATIENT
Start: 2023-03-13 | End: 2023-03-20 | Stop reason: HOSPADM

## 2023-03-13 RX ORDER — NICOTINE 21 MG/24HR
1 PATCH, TRANSDERMAL 24 HOURS TRANSDERMAL DAILY
Status: DISCONTINUED | OUTPATIENT
Start: 2023-03-13 | End: 2023-03-13

## 2023-03-13 RX ORDER — NICOTINE 21 MG/24HR
1 PATCH, TRANSDERMAL 24 HOURS TRANSDERMAL DAILY
Status: DISCONTINUED | OUTPATIENT
Start: 2023-03-13 | End: 2023-03-20 | Stop reason: HOSPADM

## 2023-03-13 RX ORDER — CYCLOBENZAPRINE HCL 10 MG
10 TABLET ORAL 3 TIMES DAILY PRN
Status: DISCONTINUED | OUTPATIENT
Start: 2023-03-13 | End: 2023-03-20 | Stop reason: HOSPADM

## 2023-03-13 RX ORDER — 0.9 % SODIUM CHLORIDE 0.9 %
500 INTRAVENOUS SOLUTION INTRAVENOUS ONCE
Status: COMPLETED | OUTPATIENT
Start: 2023-03-13 | End: 2023-03-13

## 2023-03-13 RX ADMIN — FENOFIBRATE 160 MG: 160 TABLET ORAL at 08:20

## 2023-03-13 RX ADMIN — SODIUM CHLORIDE, PRESERVATIVE FREE 5 ML: 5 INJECTION INTRAVENOUS at 08:16

## 2023-03-13 RX ADMIN — OXYCODONE 10 MG: 5 TABLET ORAL at 14:48

## 2023-03-13 RX ADMIN — SODIUM CHLORIDE, PRESERVATIVE FREE 10 ML: 5 INJECTION INTRAVENOUS at 20:34

## 2023-03-13 RX ADMIN — MORPHINE SULFATE 4 MG: 4 INJECTION, SOLUTION INTRAMUSCULAR; INTRAVENOUS at 02:40

## 2023-03-13 RX ADMIN — OXYCODONE 10 MG: 5 TABLET ORAL at 18:48

## 2023-03-13 RX ADMIN — Medication 2000 MG: at 18:39

## 2023-03-13 RX ADMIN — CYCLOBENZAPRINE 10 MG: 10 TABLET, FILM COATED ORAL at 18:48

## 2023-03-13 RX ADMIN — Medication 1 TABLET: at 08:20

## 2023-03-13 RX ADMIN — SODIUM CHLORIDE 500 ML: 9 INJECTION, SOLUTION INTRAVENOUS at 02:12

## 2023-03-13 RX ADMIN — CYCLOBENZAPRINE 10 MG: 10 TABLET, FILM COATED ORAL at 08:20

## 2023-03-13 RX ADMIN — OXYCODONE 10 MG: 5 TABLET ORAL at 10:52

## 2023-03-13 RX ADMIN — Medication 2000 MG: at 10:49

## 2023-03-13 RX ADMIN — DOXEPIN HYDROCHLORIDE 40 MG: 10 CAPSULE ORAL at 20:34

## 2023-03-13 RX ADMIN — Medication 2000 MG: at 03:27

## 2023-03-13 RX ADMIN — ATORVASTATIN CALCIUM 40 MG: 40 TABLET, FILM COATED ORAL at 08:20

## 2023-03-13 ASSESSMENT — PAIN SCALES - GENERAL
PAINLEVEL_OUTOF10: 8
PAINLEVEL_OUTOF10: 6
PAINLEVEL_OUTOF10: 8

## 2023-03-13 ASSESSMENT — PAIN DESCRIPTION - DESCRIPTORS: DESCRIPTORS: ACHING;CRAMPING

## 2023-03-13 ASSESSMENT — PAIN DESCRIPTION - ORIENTATION
ORIENTATION: RIGHT
ORIENTATION: RIGHT

## 2023-03-13 ASSESSMENT — PAIN DESCRIPTION - LOCATION
LOCATION: KNEE
LOCATION: LEG

## 2023-03-13 NOTE — CARE COORDINATION
Case Management Assessment  Initial Evaluation    Date/Time of Evaluation: 3/13/2023 1:36 PM  Assessment Completed by: Sukhdeep Clifton RN    If patient is discharged prior to next notation, then this note serves as note for discharge by case management. 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 Yes    Chart Reviewed: Yes      History Provided by: Patient  Patient Orientation: Alert and Oriented    Patient Cognition: Alert    Hospitalization in the last 30 days (Readmission):  No    If yes, Readmission Assessment in CM Navigator will be completed. Advance Directives:      Code Status: Full Code   Patient's Primary Decision Maker is: Patient Declined (Legal Next of Kin Remains as Decision Maker)      Discharge Planning:    Patient lives with: Spouse/Significant Other Type of Home: House  Primary Care Giver: Self  Patient Support Systems include: Family Members   Current Financial resources:  Other (Comment) ([facility])  Current community resources:    Current services prior to admission: Durable Medical Equipment            Current DME: Wheelchair, Walker            Type of Home Care services:  PT, OT, Nursing Services    ADLS  Prior functional level: Assistance with the following:, Housework, Cooking, Mobility, Shopping  Current functional level: Assistance with the following:, Cooking, Housework, Mobility,

## 2023-03-13 NOTE — DISCHARGE INSTRUCTIONS
Zeke Iglesias MD       ACTIVITY / WEIGHTBEARING  INSTRUCTIONS:  Non Weightbearing as tolerated surgical extremity. PT/OT     DIET:  Increase Fluid/Water intake, eat foods high in fiber, fruits and vegetables to help to prevent constipation. WOUND/DRESSING INSTRUCTIONS:  Always ensure you wash your hands before and after caring for your wound/dressing. Keep your dressing clean and dry. Change dressing as needed. Can wash around incision. Do not place antibiotic ointment, lotion, creams on surgical incision. Smoking impairs adequate wound healing. If smoking, consider quitting. May apply ice to surgical incision to help to prevent swelling. MEDICATION INSTRUCTIONS:  Take pain medication as prescribed. See orders regarding resuming your home medications and any new medications. Continue blood thinner if ordered by your physician. FOLLOW-UP CARE:  If a follow-up appointment was not made for you at discharge, call [phone] [facility] office) or [phone] [facility] office) to schedule an appointment for 3 weeks. Call Dr Gillian Keyes office with any concerns. Signs of infection such as fever, chills, redness, pus, or bad smelling discharge from incision site. Excessive bleeding, swelling, increasing pain, or discharge around incision site. The stitches or staples come apart at the incision site. Cough shortness of breath, or chest pain. Severe nausea or vomiting. Pain that you cannot control with the medications you have been given or  pain becomes worse. Numbness, tingling, or loss of feeling in your leg, knee, or foot. Pain, burning, urgency, or frequency of urination. If a medical emergerncy, please call 101 or go to your local emergency room.

## 2023-03-13 NOTE — CARE COORDINATION
3/13/23, 12:13 PM EDT    DISCHARGE PLANNING EVALUATION     From home with [facility], family support. Unsure of needs after surgery. Will follow.

## 2023-03-14 ENCOUNTER — ANESTHESIA EVENT (OUTPATIENT)
Dept: OPERATING ROOM | Age: 63
End: 2023-03-14
Payer: OTHER GOVERNMENT

## 2023-03-14 ENCOUNTER — ANESTHESIA (OUTPATIENT)
Dept: OPERATING ROOM | Age: 63
End: 2023-03-14
Payer: OTHER GOVERNMENT

## 2023-03-14 LAB
ANION GAP SERPL CALC-SCNC: 9 MEQ/L (ref 8–16)
BASOPHILS ABSOLUTE: 0 THOU/MM3 (ref 0–0.1)
BASOPHILS NFR BLD AUTO: 0.4 %
BUN SERPL-MCNC: 14 MG/DL (ref 7–22)
CALCIUM SERPL-MCNC: 8.6 MG/DL (ref 8.5–10.5)
CHLORIDE SERPL-SCNC: 106 MEQ/L (ref 98–111)
CO2 SERPL-SCNC: 24 MEQ/L (ref 23–33)
CREAT SERPL-MCNC: 0.4 MG/DL (ref 0.4–1.2)
DEPRECATED RDW RBC AUTO: 48.7 FL (ref 35–45)
EOSINOPHIL NFR BLD AUTO: 2 %
EOSINOPHILS ABSOLUTE: 0.1 THOU/MM3 (ref 0–0.4)
ERYTHROCYTE [DISTWIDTH] IN BLOOD BY AUTOMATED COUNT: 15.7 % (ref 11.5–14.5)
GFR SERPL CREATININE-BSD FRML MDRD: > 60 ML/MIN/1.73M2
GLUCOSE BLD STRIP.AUTO-MCNC: 104 MG/DL (ref 70–108)
GLUCOSE BLD STRIP.AUTO-MCNC: 112 MG/DL (ref 70–108)
GLUCOSE BLD STRIP.AUTO-MCNC: 80 MG/DL (ref 70–108)
GLUCOSE SERPL-MCNC: 91 MG/DL (ref 70–108)
HCT VFR BLD AUTO: 35.9 % (ref 37–47)
HGB BLD-MCNC: 11 GM/DL (ref 12–16)
IMM GRANULOCYTES # BLD AUTO: 0.02 THOU/MM3 (ref 0–0.07)
IMM GRANULOCYTES NFR BLD AUTO: 0.3 %
LYMPHOCYTES ABSOLUTE: 1.1 THOU/MM3 (ref 1–4.8)
LYMPHOCYTES NFR BLD AUTO: 14.7 %
MAGNESIUM SERPL-MCNC: 1.3 MG/DL (ref 1.6–2.4)
MCH RBC QN AUTO: 26.2 PG (ref 26–33)
MCHC RBC AUTO-ENTMCNC: 30.6 GM/DL (ref 32.2–35.5)
MCV RBC AUTO: 85.5 FL (ref 81–99)
MONOCYTES ABSOLUTE: 0.7 THOU/MM3 (ref 0.4–1.3)
MONOCYTES NFR BLD AUTO: 8.9 %
NEUTROPHILS NFR BLD AUTO: 73.7 %
NRBC BLD AUTO-RTO: 0 /100 WBC
PLATELET # BLD AUTO: 261 THOU/MM3 (ref 130–400)
PMV BLD AUTO: 11.6 FL (ref 9.4–12.4)
POTASSIUM SERPL-SCNC: 3.8 MEQ/L (ref 3.5–5.2)
RBC # BLD AUTO: 4.2 MILL/MM3 (ref 4.2–5.4)
SEGMENTED NEUTROPHILS ABSOLUTE COUNT: 5.5 THOU/MM3 (ref 1.8–7.7)
SODIUM SERPL-SCNC: 139 MEQ/L (ref 135–145)
WBC # BLD AUTO: 7.4 THOU/MM3 (ref 4.8–10.8)

## 2023-03-14 PROCEDURE — 36415 COLL VENOUS BLD VENIPUNCTURE: CPT

## 2023-03-14 PROCEDURE — 80048 BASIC METABOLIC PNL TOTAL CA: CPT

## 2023-03-14 PROCEDURE — 1200000000 HC SEMI PRIVATE

## 2023-03-14 PROCEDURE — 2580000003 HC RX 258: Performed by: NURSE ANESTHETIST, CERTIFIED REGISTERED

## 2023-03-14 PROCEDURE — 82948 REAGENT STRIP/BLOOD GLUCOSE: CPT

## 2023-03-14 PROCEDURE — 6360000002 HC RX W HCPCS: Performed by: NURSE PRACTITIONER

## 2023-03-14 PROCEDURE — 6370000000 HC RX 637 (ALT 250 FOR IP): Performed by: NURSE PRACTITIONER

## 2023-03-14 PROCEDURE — 2709999900 HC NON-CHARGEABLE SUPPLY: Performed by: ORTHOPAEDIC SURGERY

## 2023-03-14 PROCEDURE — 87077 CULTURE AEROBIC IDENTIFY: CPT

## 2023-03-14 PROCEDURE — 3700000001 HC ADD 15 MINUTES (ANESTHESIA): Performed by: ORTHOPAEDIC SURGERY

## 2023-03-14 PROCEDURE — 3600000003 HC SURGERY LEVEL 3 BASE: Performed by: ORTHOPAEDIC SURGERY

## 2023-03-14 PROCEDURE — 6360000002 HC RX W HCPCS: Performed by: ANESTHESIOLOGY

## 2023-03-14 PROCEDURE — 7100000001 HC PACU RECOVERY - ADDTL 15 MIN: Performed by: ORTHOPAEDIC SURGERY

## 2023-03-14 PROCEDURE — 2580000003 HC RX 258: Performed by: PHYSICIAN ASSISTANT

## 2023-03-14 PROCEDURE — 85025 COMPLETE CBC W/AUTO DIFF WBC: CPT

## 2023-03-14 PROCEDURE — 2580000003 HC RX 258: Performed by: NURSE PRACTITIONER

## 2023-03-14 PROCEDURE — 6360000002 HC RX W HCPCS: Performed by: NURSE ANESTHETIST, CERTIFIED REGISTERED

## 2023-03-14 PROCEDURE — 87075 CULTR BACTERIA EXCEPT BLOOD: CPT

## 2023-03-14 PROCEDURE — 3600000013 HC SURGERY LEVEL 3 ADDTL 15MIN: Performed by: ORTHOPAEDIC SURGERY

## 2023-03-14 PROCEDURE — 87070 CULTURE OTHR SPECIMN AEROBIC: CPT

## 2023-03-14 PROCEDURE — 87205 SMEAR GRAM STAIN: CPT

## 2023-03-14 PROCEDURE — 3700000000 HC ANESTHESIA ATTENDED CARE: Performed by: ORTHOPAEDIC SURGERY

## 2023-03-14 PROCEDURE — 6360000002 HC RX W HCPCS: Performed by: PHYSICIAN ASSISTANT

## 2023-03-14 PROCEDURE — 0Y6C0Z3 DETACHMENT AT RIGHT UPPER LEG, LOW, OPEN APPROACH: ICD-10-PCS | Performed by: ORTHOPAEDIC SURGERY

## 2023-03-14 PROCEDURE — 2500000003 HC RX 250 WO HCPCS: Performed by: NURSE ANESTHETIST, CERTIFIED REGISTERED

## 2023-03-14 PROCEDURE — 6360000002 HC RX W HCPCS

## 2023-03-14 PROCEDURE — 83735 ASSAY OF MAGNESIUM: CPT

## 2023-03-14 PROCEDURE — 7100000000 HC PACU RECOVERY - FIRST 15 MIN: Performed by: ORTHOPAEDIC SURGERY

## 2023-03-14 PROCEDURE — 87186 SC STD MICRODIL/AGAR DIL: CPT

## 2023-03-14 RX ORDER — MAGNESIUM SULFATE IN WATER 40 MG/ML
2000 INJECTION, SOLUTION INTRAVENOUS PRN
Status: DISCONTINUED | OUTPATIENT
Start: 2023-03-14 | End: 2023-03-20 | Stop reason: HOSPADM

## 2023-03-14 RX ORDER — FENTANYL CITRATE 50 UG/ML
50 INJECTION, SOLUTION INTRAMUSCULAR; INTRAVENOUS EVERY 5 MIN PRN
Status: DISCONTINUED | OUTPATIENT
Start: 2023-03-14 | End: 2023-03-14 | Stop reason: HOSPADM

## 2023-03-14 RX ORDER — ROCURONIUM BROMIDE 10 MG/ML
INJECTION, SOLUTION INTRAVENOUS PRN
Status: DISCONTINUED | OUTPATIENT
Start: 2023-03-14 | End: 2023-03-14 | Stop reason: SDUPTHER

## 2023-03-14 RX ORDER — SODIUM CHLORIDE 9 MG/ML
INJECTION, SOLUTION INTRAVENOUS CONTINUOUS PRN
Status: DISCONTINUED | OUTPATIENT
Start: 2023-03-14 | End: 2023-03-14 | Stop reason: SDUPTHER

## 2023-03-14 RX ORDER — DROPERIDOL 2.5 MG/ML
0.62 INJECTION, SOLUTION INTRAMUSCULAR; INTRAVENOUS ONCE
Status: COMPLETED | OUTPATIENT
Start: 2023-03-14 | End: 2023-03-14

## 2023-03-14 RX ORDER — POTASSIUM CHLORIDE 20 MEQ/1
40 TABLET, EXTENDED RELEASE ORAL PRN
Status: DISCONTINUED | OUTPATIENT
Start: 2023-03-14 | End: 2023-03-20 | Stop reason: HOSPADM

## 2023-03-14 RX ORDER — ONDANSETRON 2 MG/ML
INJECTION INTRAMUSCULAR; INTRAVENOUS PRN
Status: DISCONTINUED | OUTPATIENT
Start: 2023-03-14 | End: 2023-03-14 | Stop reason: SDUPTHER

## 2023-03-14 RX ORDER — FENTANYL CITRATE 50 UG/ML
INJECTION, SOLUTION INTRAMUSCULAR; INTRAVENOUS PRN
Status: DISCONTINUED | OUTPATIENT
Start: 2023-03-14 | End: 2023-03-14 | Stop reason: SDUPTHER

## 2023-03-14 RX ORDER — FENTANYL CITRATE 50 UG/ML
INJECTION, SOLUTION INTRAMUSCULAR; INTRAVENOUS
Status: COMPLETED
Start: 2023-03-14 | End: 2023-03-14

## 2023-03-14 RX ORDER — DROPERIDOL 2.5 MG/ML
INJECTION, SOLUTION INTRAMUSCULAR; INTRAVENOUS
Status: COMPLETED
Start: 2023-03-14 | End: 2023-03-14

## 2023-03-14 RX ORDER — PROPOFOL 10 MG/ML
INJECTION, EMULSION INTRAVENOUS PRN
Status: DISCONTINUED | OUTPATIENT
Start: 2023-03-14 | End: 2023-03-14 | Stop reason: SDUPTHER

## 2023-03-14 RX ORDER — DEXAMETHASONE SODIUM PHOSPHATE 10 MG/ML
INJECTION, EMULSION INTRAMUSCULAR; INTRAVENOUS PRN
Status: DISCONTINUED | OUTPATIENT
Start: 2023-03-14 | End: 2023-03-14 | Stop reason: SDUPTHER

## 2023-03-14 RX ORDER — POTASSIUM CHLORIDE 7.45 MG/ML
10 INJECTION INTRAVENOUS PRN
Status: DISCONTINUED | OUTPATIENT
Start: 2023-03-14 | End: 2023-03-20 | Stop reason: HOSPADM

## 2023-03-14 RX ORDER — GABAPENTIN 100 MG/1
100 CAPSULE ORAL 3 TIMES DAILY
Status: DISCONTINUED | OUTPATIENT
Start: 2023-03-14 | End: 2023-03-15

## 2023-03-14 RX ADMIN — GABAPENTIN 100 MG: 100 CAPSULE ORAL at 20:41

## 2023-03-14 RX ADMIN — HYDROMORPHONE HYDROCHLORIDE 0.5 MG: 1 INJECTION, SOLUTION INTRAMUSCULAR; INTRAVENOUS; SUBCUTANEOUS at 13:31

## 2023-03-14 RX ADMIN — DROPERIDOL 0.62 MG: 2.5 INJECTION, SOLUTION INTRAMUSCULAR; INTRAVENOUS at 13:49

## 2023-03-14 RX ADMIN — Medication 2000 MG: at 20:46

## 2023-03-14 RX ADMIN — SUGAMMADEX 200 MG: 100 INJECTION, SOLUTION INTRAVENOUS at 13:13

## 2023-03-14 RX ADMIN — FENTANYL CITRATE 100 MCG: 50 INJECTION, SOLUTION INTRAMUSCULAR; INTRAVENOUS at 12:33

## 2023-03-14 RX ADMIN — ONDANSETRON 4 MG: 2 INJECTION INTRAMUSCULAR; INTRAVENOUS at 12:54

## 2023-03-14 RX ADMIN — FENTANYL CITRATE 50 MCG: 50 INJECTION, SOLUTION INTRAMUSCULAR; INTRAVENOUS at 13:27

## 2023-03-14 RX ADMIN — Medication 2000 MG: at 01:49

## 2023-03-14 RX ADMIN — FENTANYL CITRATE 50 MCG: 50 INJECTION, SOLUTION INTRAMUSCULAR; INTRAVENOUS at 14:10

## 2023-03-14 RX ADMIN — MORPHINE SULFATE 4 MG: 4 INJECTION, SOLUTION INTRAMUSCULAR; INTRAVENOUS at 16:15

## 2023-03-14 RX ADMIN — FENTANYL CITRATE 50 MCG: 50 INJECTION, SOLUTION INTRAMUSCULAR; INTRAVENOUS at 14:00

## 2023-03-14 RX ADMIN — HYDROMORPHONE HYDROCHLORIDE 0.5 MG: 1 INJECTION, SOLUTION INTRAMUSCULAR; INTRAVENOUS; SUBCUTANEOUS at 13:37

## 2023-03-14 RX ADMIN — HYDROMORPHONE HYDROCHLORIDE 0.5 MG: 1 INJECTION, SOLUTION INTRAMUSCULAR; INTRAVENOUS; SUBCUTANEOUS at 13:54

## 2023-03-14 RX ADMIN — Medication 80 MG: at 12:33

## 2023-03-14 RX ADMIN — MORPHINE SULFATE 4 MG: 4 INJECTION, SOLUTION INTRAMUSCULAR; INTRAVENOUS at 04:51

## 2023-03-14 RX ADMIN — ROCURONIUM BROMIDE 30 MG: 10 INJECTION INTRAVENOUS at 12:33

## 2023-03-14 RX ADMIN — SODIUM CHLORIDE: 9 INJECTION, SOLUTION INTRAVENOUS at 12:59

## 2023-03-14 RX ADMIN — MORPHINE SULFATE 4 MG: 4 INJECTION, SOLUTION INTRAMUSCULAR; INTRAVENOUS at 20:25

## 2023-03-14 RX ADMIN — Medication 2000 MG: at 11:32

## 2023-03-14 RX ADMIN — FENTANYL CITRATE 50 MCG: 50 INJECTION, SOLUTION INTRAMUSCULAR; INTRAVENOUS at 13:18

## 2023-03-14 RX ADMIN — PROPOFOL 130 MG: 10 INJECTION, EMULSION INTRAVENOUS at 12:33

## 2023-03-14 RX ADMIN — HYDROMORPHONE HYDROCHLORIDE 0.5 MG: 1 INJECTION, SOLUTION INTRAMUSCULAR; INTRAVENOUS; SUBCUTANEOUS at 13:42

## 2023-03-14 RX ADMIN — ROCURONIUM BROMIDE 20 MG: 10 INJECTION INTRAVENOUS at 12:48

## 2023-03-14 RX ADMIN — DOXEPIN HYDROCHLORIDE 40 MG: 10 CAPSULE ORAL at 20:41

## 2023-03-14 RX ADMIN — MORPHINE SULFATE 4 MG: 4 INJECTION, SOLUTION INTRAMUSCULAR; INTRAVENOUS at 09:10

## 2023-03-14 RX ADMIN — SODIUM CHLORIDE: 9 INJECTION, SOLUTION INTRAVENOUS at 12:28

## 2023-03-14 RX ADMIN — DEXAMETHASONE SODIUM PHOSPHATE 10 MG: 10 INJECTION, EMULSION INTRAMUSCULAR; INTRAVENOUS at 12:54

## 2023-03-14 ASSESSMENT — PAIN DESCRIPTION - LOCATION
LOCATION: LEG
LOCATION: LEG
LOCATION: KNEE

## 2023-03-14 ASSESSMENT — PAIN SCALES - GENERAL
PAINLEVEL_OUTOF10: 10
PAINLEVEL_OUTOF10: 7
PAINLEVEL_OUTOF10: 10

## 2023-03-14 ASSESSMENT — PAIN DESCRIPTION - FREQUENCY: FREQUENCY: CONTINUOUS

## 2023-03-14 ASSESSMENT — PAIN DESCRIPTION - DESCRIPTORS: DESCRIPTORS: SHARP

## 2023-03-14 ASSESSMENT — PAIN DESCRIPTION - ORIENTATION
ORIENTATION: LEFT
ORIENTATION: RIGHT

## 2023-03-14 ASSESSMENT — PAIN SCALES - WONG BAKER: WONGBAKER_NUMERICALRESPONSE: 4

## 2023-03-14 ASSESSMENT — PAIN DESCRIPTION - PAIN TYPE: TYPE: SURGICAL PAIN

## 2023-03-14 NOTE — ANESTHESIA PRE PROCEDURE
Department of Anesthesiology  Preprocedure Note       Name:  Garnetta Apgar   Age:  58 y.o.  :  1960                                          MRN:  [id number]         Date:  3/14/2023      Surgeon: Domi Miner):  Roopa Denise MD    Procedure: Procedure(s):  Right Above Knee Amputation    Medications prior to admission:   Prior to Admission medications    Medication Sig Start Date End Date Taking? Authorizing Provider   atorvastatin (LIPITOR) 40 MG tablet TAKE 1 TABLET DAILY FOR HIGH CHOLESTEROL 23   [facility] Current, DO   fenofibrate (TRIGLIDE) 160 MG tablet TAKE 1 TABLET DAILY 23   [facility] Current, DO   rivaroxaban (XARELTO) 10 MG TABS tablet Take 1 tablet by mouth every 24 hours 22   [facility] Plan. KIRBY Rich   tiZANidine (ZANAFLEX) 2 MG tablet Take 1 tablet by mouth 3 times daily as needed (muscle cramps) 22   [facility] Current, DO   doxepin (SINEQUAN) 10 MG capsule TAKE 2 CAPSULES NIGHTLY  Patient taking differently: Take 40 mg by mouth nightly TAKE4 CAPSULES NIGHTLY 22   [facility] Current, DO   baclofen (LIORESAL) 10 MG tablet Take 1 tablet by mouth daily as needed (muscle spasms)  Patient taking differently: Take 10 mg by mouth every 6 hours as needed (muscle spasms) 21   [facility] Current, DO   Acetaminophen 500 MG CAPS Take 1,000 mg by mouth as needed for Pain     Historical Provider, MD   KRILL OIL PO Take 350 mg by mouth daily    Historical Provider, MD   Multiple Vitamins-Minerals (THERAPEUTIC MULTIVITAMIN-MINERALS) tablet Take 1 tablet by mouth daily    Historical Provider, MD   aspirin 81 MG tablet Take 81 mg by mouth daily.       Historical Provider, MD       Current medications:    Current Facility-Administered Medications   Medication Dose Route Frequency Provider Last Rate Last Admin    potassium chloride (KLOR-CON M) extended release tablet 40 mEq  40 mEq Oral PRN Minna Pugh PA-C        Or    potassium bicarb-citric acid (EFFER-K) effervescent tablet 40 mEq  40 mEq Oral PRN Minna Pugh PA-C        Or    potassium chloride 10 mEq/100 mL IVPB (Peripheral Line)  10 mEq IntraVENous PRN Minna Pugh PA-C        magnesium sulfate 2000 mg in 50 mL IVPB premix  2,000 mg IntraVENous PRN Tamy Perry PA-C        nicotine (NICODERM CQ) 21 MG/24HR 1 patch  1 patch TransDERmal Daily CRISTAL Acuña   1 patch at 03/13/23 7161    cyclobenzaprine (FLEXERIL) tablet 10 mg  10 mg Oral TID PRN CRISTAL Acuña   10 mg at 03/13/23 1848    0.9 % sodium chloride infusion   IntraVENous PRN Devika Rich PA-C        acetaminophen (TYLENOL) tablet 650 mg  650 mg Oral Q6H PRN Navjot Logan PA-C        atorvastatin (LIPITOR) tablet 40 mg  40 mg Oral Daily Alen Rocha, [address]   40 mg at 03/13/23 0820    doxepin (SINEQUAN) capsule 40 mg  40 mg Oral Nightly CRISTAL Araujo   40 mg at 03/13/23 2034    fenofibrate (TRIGLIDE) tablet 160 mg  160 mg Oral Daily Alen Rocha, [address]   160 mg at 03/13/23 0820    multivitamin 1 tablet  1 tablet Oral Daily Alen Rocha, [address]   1 tablet at 03/13/23 0820    sodium chloride flush 0.9 % injection 5-40 mL  5-40 mL IntraVENous 2 times per day CRISTAL Araujo   10 mL at 03/13/23 2034    sodium chloride flush 0.9 % injection 5-40 mL  5-40 mL IntraVENous PRN CRISTAL Araujo        0.9 % sodium chloride infusion   IntraVENous PRN CRISTAL Araujo        ondansetron (ZOFRAN-ODT) disintegrating tablet 4 mg  4 mg Oral Q8H PRN CRISTAL Araujo        Or    ondansetron [facility]) injection 4 mg  4 mg IntraVENous Q6H PRN CRISTAL Araujo        morphine (PF) injection 2 mg  2 mg IntraVENous Q2H PRN CRISTAL Araujo        Or    morphine injection 4 mg  4 mg IntraVENous Q2H PRN CRISTAL Araujo   4 mg at 03/14/23 0910    oxyCODONE (ROXICODONE) immediate release tablet 5 mg  5 mg Oral Q4H PRN CRISTAL Araujo   5 mg at 03/12/23 3964    oxyCODONE (ROXICODONE) immediate release tablet 10 mg  10 mg Oral Q4H PRN Kecia Cespedes CRISTAL Moore   10 mg at 03/13/23 1848    ceFAZolin (ANCEF) 2000 mg in 0.9% sodium chloride 50 mL IVPB  2,000 mg IntraVENous Q8H CRISTAL Villalobos 100 mL/hr at 03/14/23 1132 2,000 mg at 03/14/23 1132    senna (SENOKOT) tablet 8.6 mg  1 tablet Oral Daily PRN CRISTAL Villalobos        magnesium hydroxide (MILK OF MAGNESIA) 400 MG/5ML suspension 30 mL  30 mL Oral Daily PRN CRISTAL Villalobos           Allergies: Allergies   Allergen Reactions    Adhesive Tape        Problem List:    Patient Active Problem List   Diagnosis Code    Diabetes mellitus type 2, noninsulin dependent ([address] E11.9    Hyperlipemia E78.5    THORNTON (nonalcoholic steatohepatitis) K75.81    Obesity E66.9    Tobacco abuse Z72.0    Vitamin D deficiency E55.9    Seborrheic keratosis L82.1    Dyshidrotic eczema L30.1    Displaced articular fracture of head of right femur, sequela S72.061S    Neida-prosthetic supracondylar fracture of femur M97. 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 with presence of antibiotic-impregnated cement spacer Z89.529    Moderate malnutrition ([address] E44.0       Past Medical History:        Diagnosis Date    Arthritis     Atypical ductal hyperplasia, breast     left breast, 2016, lumpectomy, Tamoxifen, LMH    Cancer ([address]     Hx of breast biopsy 01/08/2016    Hyperlipidemia     Hypertension     Type II or unspecified type diabetes mellitus without mention of complication, not stated as uncontrolled        Past Surgical History:        Procedure Laterality Date    BLADDER SUSPENSION      BREAST [facility]SY [facility]     [facility]    BREAST LUMPECTOMY Left     atypical hyperplasia, 2016, [facility]    COLONOSCOPY  08/28/2020    HERNIA REPAIR  10/17/2016    JOINT REPLACEMENT Left 2021    knee    KNEE SURGERY      Right knee    REVISION TOTAL KNEE ARTHROPLASTY Right 05/10/2022    RIGHT KNEE TOTAL ARTHROPLASTY REVISION performed by Nilda Mantilla MD at [address] Right 06/12/2022    Right Knee Incision and Drainage of Total Knee With Poly Exchange performed by Nilda Mantilla MD at [address] Right 12/22/2022    STAGE 1 RIGHT KNEE performed by Nilda Mantilla MD at [address] [facility] EXTRACTION         Social History:    Social History     Tobacco Use    Smoking status: Every Day     Packs/day: 1.00     Years: 49.00     Pack years: 49.00     Types: Cigarettes    Smokeless tobacco: Never   Substance Use Topics    Alcohol use: No     Comment: monthly glass of wine                                Ready to quit: Not Answered  Counseling given: Not Answered      Vital Signs (Current):   Vitals:    03/14/23 0603 03/14/23 0812 03/14/23 0910 03/14/23 1130   BP: 128/78 (!) 103/50  (!) 116/48   Pulse: 78 85  87   Resp: 16 18 16 16   Temp: 97.8 °F (36.6 °C) 98.8 °F (37.1 °C)     TempSrc: Oral      SpO2: 100% 96%  95%   Weight:       Height:                                                  BP Readings from Last 3 Encounters:   03/14/23 (!) 116/48   02/21/23 (!) 102/58   01/10/23 138/72       NPO Status:                                                                                 BMI:   Wt Readings from Last 3 Encounters:   03/11/23 156 lb 15.5 oz (71.2 kg)   01/10/23 158 lb (71.7 kg)   12/22/22 160 lb (72.6 kg)     Body mass index is 24.58 kg/m².     CBC:   Lab Results   Component Value Date/Time    WBC 7.4 03/14/2023 06:05 AM    RBC 4.20 03/14/2023 06:05 AM    RBC 5.62 12/10/2020 10:01 AM HGB 11.0 03/14/2023 06:05 AM    HCT 35.9 03/14/2023 06:05 AM    MCV 85.5 03/14/2023 06:05 AM    RDW 19.1 12/10/2020 10:01 AM     03/14/2023 06:05 AM       CMP:   Lab Results   Component Value Date/Time     03/14/2023 06:05 AM    K 3.8 03/14/2023 06:05 AM    K 4.0 03/12/2023 07:40 AM     03/14/2023 06:05 AM    CO2 24 03/14/2023 06:05 AM    BUN 14 03/14/2023 06:05 AM    CREATININE 0.4 03/14/2023 06:05 AM    AGRATIO 1.8 12/21/2016 09:51 AM    LABGLOM >60 03/14/2023 06:05 AM    GLUCOSE 91 03/14/2023 06:05 AM    GLUCOSE 113 12/10/2020 10:01 AM    PROT 7.1 03/11/2023 07:15 PM    CALCIUM 8.6 03/14/2023 06:05 AM    BILITOT 0.3 03/11/2023 07:15 PM    ALKPHOS 69 03/11/2023 07:15 PM    AST 24 03/11/2023 07:15 PM    ALT 13 03/11/2023 07:15 PM       POC Tests:   Recent Labs     03/14/23  1054   POCGLU 80       Coags:   Lab Results   Component Value Date/Time    PROTIME 11.0 10/05/2016 03:39 PM    INR 1.09 03/12/2023 07:40 AM       HCG (If Applicable): No results found for: PREGTESTUR, PREGSERUM, HCG, HCGQUANT     ABGs: No results found for: PHART, PO2ART, ARM0PBZ, WBJ1XOC, BEART, C1SPUWCD     Type & Screen (If Applicable):  Lab Results   Component Value Date    LABRH POS 03/13/2023       Drug/Infectious Status (If Applicable):  Lab Results   Component Value Date/Time    HEPCAB NEGATIVE 05/02/2018 08:28 AM       COVID-19 Screening (If Applicable):   Lab Results   Component Value Date/Time    COVID19 NOT DETECTED 03/11/2023 07:45 PM           Anesthesia Evaluation   no history of anesthetic complications:   Airway: Mallampati: II  TM distance: >3 FB   Neck ROM: full  Mouth opening: > = 3 FB   Dental:          Pulmonary:normal exam              Patient did not smoke on day of surgery.                  Cardiovascular:    (+) hypertension:, hyperlipidemia                  Neuro/Psych:   (+) psychiatric history:            GI/Hepatic/Renal:   (+) liver disease:,           Endo/Other:    (+) DiabetesType II DM, , .          Pt had no PAT visit       Abdominal:             Vascular: Other Findings:           Anesthesia Plan      general     ASA 3       Induction: intravenous. MIPS: Postoperative opioids intended and Prophylactic antiemetics administered. Anesthetic plan and risks discussed with patient. Plan discussed with CRNA.                     Salud Goetz MD   3/14/2023

## 2023-03-14 NOTE — CARE COORDINATION
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 Joint effusion and extensive subcutaneous emphysema  3/14: Rt. AKA  Barriers to Discharge: Ortho and hospitalist following. OR Today. Pain control. IV ancef. PT/OT. PCP: Dominic Salazar DO  Readmission Risk Score: 18.5%  Patient Goals/Plan/Treatment Preferences: From home with spouse and [facility]. Follow for post-op needs.

## 2023-03-14 NOTE — OP NOTE
elected to proceed. Narrative  Patient was taken the operating room underwent general anesthetic. Right lower extremity was prepped draped normal sterile fashion. Timeout was taken consent was confirmed. She is already on IV antibiotics. Still with a fishmouth incision proximal to her midline incision from the total knee. This makes her amputation a little bit higher but I think is her best option of getting that the heel. We inflated the tourniquet to 300 mmHg. Incision was then extended medially and laterally proximally and then more posterior it began to become more inferiorly. Skeletonized the femur. We then made an incision to the previous arthrotomy site of OOB the amputated portion. This allows exposure for the bar clamp construct which was acting as her spacer. We are able to remove the cement and removed that carbon fiber jennifer. This allowed us to cut the femur without adding that mess from the carbon fiber jennifer shavings. We then placed Zion's around the femur little more proximally. A wafer of bone out. This allowed exposure posterior structures. Nerves were pulled cut mild retract. Vascular bone was identified double tied using Vicryl suture. We completed the amputation. Tourniquet was let down and a few smaller bleeders were cauterized and ligated. Blood pressure was allowed to increase to decrease her chance of having bleeders postoperatively. We then mobilized the musculature and sutured this over the end of the bone. Large Vicryl was utilized. The skin was then closed with 2-0 Vicryl and skin staples. Dry dressings were applied. Patient was then awakened and taken recovery room in good condition. Postoperative plan  Nonweightbearing on that side. Dry dressings as necessary. First postoperative visit be in 2 to 3 weeks staple removal.  I question whether she will be a candidate for any kind of prosthesis with her muscular problems.     Electronically signed by Angelica Hurst

## 2023-03-15 LAB
ANION GAP SERPL CALC-SCNC: 9 MEQ/L (ref 8–16)
BASOPHILS ABSOLUTE: 0 THOU/MM3 (ref 0–0.1)
BASOPHILS NFR BLD AUTO: 0.1 %
BUN SERPL-MCNC: 12 MG/DL (ref 7–22)
CALCIUM SERPL-MCNC: 8.7 MG/DL (ref 8.5–10.5)
CHLORIDE SERPL-SCNC: 104 MEQ/L (ref 98–111)
CO2 SERPL-SCNC: 26 MEQ/L (ref 23–33)
CREAT SERPL-MCNC: 0.3 MG/DL (ref 0.4–1.2)
DEPRECATED RDW RBC AUTO: 48.6 FL (ref 35–45)
EOSINOPHIL NFR BLD AUTO: 0 %
EOSINOPHILS ABSOLUTE: 0 THOU/MM3 (ref 0–0.4)
ERYTHROCYTE [DISTWIDTH] IN BLOOD BY AUTOMATED COUNT: 15.4 % (ref 11.5–14.5)
GFR SERPL CREATININE-BSD FRML MDRD: > 60 ML/MIN/1.73M2
GLUCOSE SERPL-MCNC: 97 MG/DL (ref 70–108)
HCT VFR BLD AUTO: 33.8 % (ref 37–47)
HGB BLD-MCNC: 10.2 GM/DL (ref 12–16)
IMM GRANULOCYTES # BLD AUTO: 0.08 THOU/MM3 (ref 0–0.07)
IMM GRANULOCYTES NFR BLD AUTO: 1.2 %
LYMPHOCYTES ABSOLUTE: 0.7 THOU/MM3 (ref 1–4.8)
LYMPHOCYTES NFR BLD AUTO: 10.4 %
MAGNESIUM SERPL-MCNC: 1.3 MG/DL (ref 1.6–2.4)
MCH RBC QN AUTO: 26.2 PG (ref 26–33)
MCHC RBC AUTO-ENTMCNC: 30.2 GM/DL (ref 32.2–35.5)
MCV RBC AUTO: 86.9 FL (ref 81–99)
MONOCYTES ABSOLUTE: 0.8 THOU/MM3 (ref 0.4–1.3)
MONOCYTES NFR BLD AUTO: 11.7 %
NEUTROPHILS NFR BLD AUTO: 76.6 %
NRBC BLD AUTO-RTO: 0 /100 WBC
PLATELET # BLD AUTO: 273 THOU/MM3 (ref 130–400)
PMV BLD AUTO: 11.6 FL (ref 9.4–12.4)
POTASSIUM SERPL-SCNC: 3.8 MEQ/L (ref 3.5–5.2)
RBC # BLD AUTO: 3.89 MILL/MM3 (ref 4.2–5.4)
SEGMENTED NEUTROPHILS ABSOLUTE COUNT: 5.3 THOU/MM3 (ref 1.8–7.7)
SODIUM SERPL-SCNC: 139 MEQ/L (ref 135–145)
WBC # BLD AUTO: 6.9 THOU/MM3 (ref 4.8–10.8)

## 2023-03-15 PROCEDURE — 6370000000 HC RX 637 (ALT 250 FOR IP): Performed by: PHYSICIAN ASSISTANT

## 2023-03-15 PROCEDURE — 6370000000 HC RX 637 (ALT 250 FOR IP): Performed by: NURSE PRACTITIONER

## 2023-03-15 PROCEDURE — 97530 THERAPEUTIC ACTIVITIES: CPT

## 2023-03-15 PROCEDURE — 99233 SBSQ HOSP IP/OBS HIGH 50: CPT | Performed by: PHYSICIAN ASSISTANT

## 2023-03-15 PROCEDURE — 6360000002 HC RX W HCPCS: Performed by: NURSE PRACTITIONER

## 2023-03-15 PROCEDURE — 97535 SELF CARE MNGMENT TRAINING: CPT

## 2023-03-15 PROCEDURE — 36415 COLL VENOUS BLD VENIPUNCTURE: CPT

## 2023-03-15 PROCEDURE — 1200000000 HC SEMI PRIVATE

## 2023-03-15 PROCEDURE — 83735 ASSAY OF MAGNESIUM: CPT

## 2023-03-15 PROCEDURE — 2580000003 HC RX 258: Performed by: NURSE PRACTITIONER

## 2023-03-15 PROCEDURE — 85025 COMPLETE CBC W/AUTO DIFF WBC: CPT

## 2023-03-15 PROCEDURE — 80048 BASIC METABOLIC PNL TOTAL CA: CPT

## 2023-03-15 PROCEDURE — 97163 PT EVAL HIGH COMPLEX 45 MIN: CPT

## 2023-03-15 PROCEDURE — 97167 OT EVAL HIGH COMPLEX 60 MIN: CPT

## 2023-03-15 RX ORDER — GABAPENTIN 300 MG/1
300 CAPSULE ORAL 3 TIMES DAILY
Status: DISCONTINUED | OUTPATIENT
Start: 2023-03-15 | End: 2023-03-20 | Stop reason: HOSPADM

## 2023-03-15 RX ORDER — DOCUSATE SODIUM 100 MG/1
100 CAPSULE, LIQUID FILLED ORAL DAILY
Status: DISCONTINUED | OUTPATIENT
Start: 2023-03-15 | End: 2023-03-16

## 2023-03-15 RX ORDER — POLYETHYLENE GLYCOL 3350 17 G/17G
17 POWDER, FOR SOLUTION ORAL DAILY
Status: DISCONTINUED | OUTPATIENT
Start: 2023-03-15 | End: 2023-03-20 | Stop reason: HOSPADM

## 2023-03-15 RX ADMIN — MORPHINE SULFATE 4 MG: 4 INJECTION, SOLUTION INTRAMUSCULAR; INTRAVENOUS at 11:14

## 2023-03-15 RX ADMIN — SODIUM CHLORIDE, PRESERVATIVE FREE 10 ML: 5 INJECTION INTRAVENOUS at 09:40

## 2023-03-15 RX ADMIN — ACETAMINOPHEN 650 MG: 325 TABLET ORAL at 22:05

## 2023-03-15 RX ADMIN — Medication 2000 MG: at 13:30

## 2023-03-15 RX ADMIN — DOCUSATE SODIUM 100 MG: 100 CAPSULE, LIQUID FILLED ORAL at 13:02

## 2023-03-15 RX ADMIN — POLYETHYLENE GLYCOL 3350 17 G: 17 POWDER, FOR SOLUTION ORAL at 13:02

## 2023-03-15 RX ADMIN — DOXEPIN HYDROCHLORIDE 40 MG: 10 CAPSULE ORAL at 20:13

## 2023-03-15 RX ADMIN — MORPHINE SULFATE 4 MG: 4 INJECTION, SOLUTION INTRAMUSCULAR; INTRAVENOUS at 07:26

## 2023-03-15 RX ADMIN — MAGNESIUM SULFATE HEPTAHYDRATE 2000 MG: 40 INJECTION, SOLUTION INTRAVENOUS at 15:05

## 2023-03-15 RX ADMIN — CYCLOBENZAPRINE 10 MG: 10 TABLET, FILM COATED ORAL at 09:25

## 2023-03-15 RX ADMIN — OXYCODONE 10 MG: 5 TABLET ORAL at 22:05

## 2023-03-15 RX ADMIN — Medication 2000 MG: at 20:18

## 2023-03-15 RX ADMIN — MAGNESIUM SULFATE HEPTAHYDRATE 2000 MG: 40 INJECTION, SOLUTION INTRAVENOUS at 11:13

## 2023-03-15 RX ADMIN — OXYCODONE 10 MG: 5 TABLET ORAL at 17:37

## 2023-03-15 RX ADMIN — ATORVASTATIN CALCIUM 40 MG: 40 TABLET, FILM COATED ORAL at 09:39

## 2023-03-15 RX ADMIN — OXYCODONE 10 MG: 5 TABLET ORAL at 09:25

## 2023-03-15 RX ADMIN — GABAPENTIN 300 MG: 300 CAPSULE ORAL at 14:06

## 2023-03-15 RX ADMIN — GABAPENTIN 300 MG: 300 CAPSULE ORAL at 20:13

## 2023-03-15 RX ADMIN — MORPHINE SULFATE 4 MG: 4 INJECTION, SOLUTION INTRAMUSCULAR; INTRAVENOUS at 05:04

## 2023-03-15 RX ADMIN — CYCLOBENZAPRINE 10 MG: 10 TABLET, FILM COATED ORAL at 17:17

## 2023-03-15 RX ADMIN — Medication 1 TABLET: at 09:39

## 2023-03-15 RX ADMIN — OXYCODONE 10 MG: 5 TABLET ORAL at 14:05

## 2023-03-15 RX ADMIN — FENOFIBRATE 160 MG: 160 TABLET ORAL at 09:39

## 2023-03-15 RX ADMIN — Medication 2000 MG: at 03:27

## 2023-03-15 RX ADMIN — MORPHINE SULFATE 4 MG: 4 INJECTION, SOLUTION INTRAMUSCULAR; INTRAVENOUS at 00:09

## 2023-03-15 RX ADMIN — MORPHINE SULFATE 4 MG: 4 INJECTION, SOLUTION INTRAMUSCULAR; INTRAVENOUS at 03:24

## 2023-03-15 RX ADMIN — GABAPENTIN 100 MG: 100 CAPSULE ORAL at 09:39

## 2023-03-15 RX ADMIN — SENNOSIDES 8.6 MG: 8.6 TABLET, FILM COATED ORAL at 09:39

## 2023-03-15 ASSESSMENT — PAIN SCALES - GENERAL
PAINLEVEL_OUTOF10: 10
PAINLEVEL_OUTOF10: 7
PAINLEVEL_OUTOF10: 9
PAINLEVEL_OUTOF10: 7
PAINLEVEL_OUTOF10: 10
PAINLEVEL_OUTOF10: 8
PAINLEVEL_OUTOF10: 8
PAINLEVEL_OUTOF10: 5
PAINLEVEL_OUTOF10: 5

## 2023-03-15 ASSESSMENT — PAIN DESCRIPTION - ORIENTATION: ORIENTATION: RIGHT

## 2023-03-15 ASSESSMENT — PAIN DESCRIPTION - LOCATION
LOCATION: LEG

## 2023-03-15 NOTE — ANESTHESIA POSTPROCEDURE EVALUATION
Department of Anesthesiology  Postprocedure Note    Patient: Trisha Vallejo  MRN: [id number]  YOB: 1960  Date of evaluation: 3/15/2023      Procedure Summary     Date: 03/14/23 Room / Location: [address] TERRI Bridges    Anesthesia Start: 3424 Anesthesia Stop: 5692    Procedure: Right Above Knee Amputation (Right: Leg Upper) Diagnosis:       Closed fracture of right femur, unspecified fracture morphology, unspecified portion of femur, initial encounter ([facility]      (Closed fracture of right femur, unspecified fracture morphology, unspecified portion of femur, [facility] Jacky Carvajal)    Surgeons: Traci Marcus MD Responsible Provider: Chris Contreras MD    Anesthesia Type: general ASA Status: 3          Anesthesia Type: No value filed.     Gary Phase I: Gary Score: 8    Gary Phase II:        Anesthesia Post Evaluation    Patient location during evaluation: PACU  Patient participation: complete - patient participated  Level of consciousness: awake and alert  Airway patency: patent  Nausea & Vomiting: no nausea  Complications: no  Cardiovascular status: blood pressure returned to baseline and hemodynamically stable  Respiratory status: acceptable and spontaneous ventilation  Hydration status: euvolemic

## 2023-03-15 NOTE — CARE COORDINATION
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 Joint effusion and extensive subcutaneous emphysema  3/14 Right Above Knee Amputation, hardware removal deep  Barriers to Discharge: Hospitalist and ortho following. POD 1. Pain control. PT/OT. Hgb 10.2. Mag 1.3. Electrolyte replacement protocols. IV ancef. Pain control. PCP: Norma Mcghee DO  Readmission Risk Score: 19.1%  Patient Goals/Plan/Treatment Preferences: From home with  and current [facility]. SW following. Follow post-op PT/OT recommendations.

## 2023-03-16 LAB
ANION GAP SERPL CALC-SCNC: 10 MEQ/L (ref 8–16)
BACTERIA BLD AEROBE CULT: NORMAL
BACTERIA BLD AEROBE CULT: NORMAL
BUN SERPL-MCNC: 13 MG/DL (ref 7–22)
CALCIUM SERPL-MCNC: 8.2 MG/DL (ref 8.5–10.5)
CHLORIDE SERPL-SCNC: 106 MEQ/L (ref 98–111)
CO2 SERPL-SCNC: 26 MEQ/L (ref 23–33)
CREAT SERPL-MCNC: 0.3 MG/DL (ref 0.4–1.2)
DEPRECATED RDW RBC AUTO: 48.6 FL (ref 35–45)
ERYTHROCYTE [DISTWIDTH] IN BLOOD BY AUTOMATED COUNT: 15.5 % (ref 11.5–14.5)
GFR SERPL CREATININE-BSD FRML MDRD: > 60 ML/MIN/1.73M2
GLUCOSE SERPL-MCNC: 77 MG/DL (ref 70–108)
HCT VFR BLD AUTO: 33.4 % (ref 37–47)
HGB BLD-MCNC: 10.2 GM/DL (ref 12–16)
MAGNESIUM SERPL-MCNC: 1.6 MG/DL (ref 1.6–2.4)
MCH RBC QN AUTO: 26.4 PG (ref 26–33)
MCHC RBC AUTO-ENTMCNC: 30.5 GM/DL (ref 32.2–35.5)
MCV RBC AUTO: 86.3 FL (ref 81–99)
PLATELET # BLD AUTO: 285 THOU/MM3 (ref 130–400)
PMV BLD AUTO: 11.1 FL (ref 9.4–12.4)
POTASSIUM SERPL-SCNC: 3.9 MEQ/L (ref 3.5–5.2)
RBC # BLD AUTO: 3.87 MILL/MM3 (ref 4.2–5.4)
SODIUM SERPL-SCNC: 142 MEQ/L (ref 135–145)
WBC # BLD AUTO: 6.2 THOU/MM3 (ref 4.8–10.8)

## 2023-03-16 PROCEDURE — 36415 COLL VENOUS BLD VENIPUNCTURE: CPT

## 2023-03-16 PROCEDURE — 6360000002 HC RX W HCPCS: Performed by: NURSE PRACTITIONER

## 2023-03-16 PROCEDURE — 97542 WHEELCHAIR MNGMENT TRAINING: CPT

## 2023-03-16 PROCEDURE — 85027 COMPLETE CBC AUTOMATED: CPT

## 2023-03-16 PROCEDURE — 97530 THERAPEUTIC ACTIVITIES: CPT

## 2023-03-16 PROCEDURE — 80048 BASIC METABOLIC PNL TOTAL CA: CPT

## 2023-03-16 PROCEDURE — 99231 SBSQ HOSP IP/OBS SF/LOW 25: CPT | Performed by: PHYSICIAN ASSISTANT

## 2023-03-16 PROCEDURE — 6370000000 HC RX 637 (ALT 250 FOR IP): Performed by: NURSE PRACTITIONER

## 2023-03-16 PROCEDURE — 2580000003 HC RX 258: Performed by: NURSE PRACTITIONER

## 2023-03-16 PROCEDURE — 1200000000 HC SEMI PRIVATE

## 2023-03-16 PROCEDURE — 83735 ASSAY OF MAGNESIUM: CPT

## 2023-03-16 PROCEDURE — 6370000000 HC RX 637 (ALT 250 FOR IP): Performed by: PHYSICIAN ASSISTANT

## 2023-03-16 PROCEDURE — 97535 SELF CARE MNGMENT TRAINING: CPT

## 2023-03-16 RX ORDER — DOCUSATE SODIUM 100 MG/1
200 CAPSULE, LIQUID FILLED ORAL 2 TIMES DAILY
Status: DISCONTINUED | OUTPATIENT
Start: 2023-03-16 | End: 2023-03-20 | Stop reason: HOSPADM

## 2023-03-16 RX ADMIN — POLYETHYLENE GLYCOL 3350 17 G: 17 POWDER, FOR SOLUTION ORAL at 10:28

## 2023-03-16 RX ADMIN — DOXEPIN HYDROCHLORIDE 40 MG: 10 CAPSULE ORAL at 20:25

## 2023-03-16 RX ADMIN — ATORVASTATIN CALCIUM 40 MG: 40 TABLET, FILM COATED ORAL at 10:36

## 2023-03-16 RX ADMIN — CYCLOBENZAPRINE 10 MG: 10 TABLET, FILM COATED ORAL at 16:39

## 2023-03-16 RX ADMIN — GABAPENTIN 300 MG: 300 CAPSULE ORAL at 10:26

## 2023-03-16 RX ADMIN — OXYCODONE 10 MG: 5 TABLET ORAL at 15:55

## 2023-03-16 RX ADMIN — OXYCODONE 10 MG: 5 TABLET ORAL at 10:27

## 2023-03-16 RX ADMIN — CYCLOBENZAPRINE 10 MG: 10 TABLET, FILM COATED ORAL at 10:27

## 2023-03-16 RX ADMIN — Medication 2000 MG: at 03:52

## 2023-03-16 RX ADMIN — Medication 1 TABLET: at 10:26

## 2023-03-16 RX ADMIN — NALOXEGOL OXALATE 12.5 MG: 12.5 TABLET, FILM COATED ORAL at 03:49

## 2023-03-16 RX ADMIN — SODIUM CHLORIDE, PRESERVATIVE FREE 10 ML: 5 INJECTION INTRAVENOUS at 10:26

## 2023-03-16 RX ADMIN — ACETAMINOPHEN 650 MG: 325 TABLET ORAL at 10:27

## 2023-03-16 RX ADMIN — Medication 2000 MG: at 20:31

## 2023-03-16 RX ADMIN — OXYCODONE 10 MG: 5 TABLET ORAL at 20:25

## 2023-03-16 RX ADMIN — FENOFIBRATE 160 MG: 160 TABLET ORAL at 10:26

## 2023-03-16 RX ADMIN — SODIUM CHLORIDE, PRESERVATIVE FREE 10 ML: 5 INJECTION INTRAVENOUS at 20:26

## 2023-03-16 RX ADMIN — Medication 2000 MG: at 12:19

## 2023-03-16 RX ADMIN — DOCUSATE SODIUM 100 MG: 100 CAPSULE, LIQUID FILLED ORAL at 10:27

## 2023-03-16 RX ADMIN — GABAPENTIN 300 MG: 300 CAPSULE ORAL at 15:55

## 2023-03-16 RX ADMIN — DOCUSATE SODIUM 200 MG: 100 CAPSULE, LIQUID FILLED ORAL at 20:25

## 2023-03-16 RX ADMIN — GABAPENTIN 300 MG: 300 CAPSULE ORAL at 20:25

## 2023-03-16 ASSESSMENT — PAIN - FUNCTIONAL ASSESSMENT: PAIN_FUNCTIONAL_ASSESSMENT: PREVENTS OR INTERFERES SOME ACTIVE ACTIVITIES AND ADLS

## 2023-03-16 ASSESSMENT — PAIN DESCRIPTION - LOCATION: LOCATION: LEG

## 2023-03-16 ASSESSMENT — PAIN SCALES - GENERAL
PAINLEVEL_OUTOF10: 8
PAINLEVEL_OUTOF10: 6
PAINLEVEL_OUTOF10: 7
PAINLEVEL_OUTOF10: 8

## 2023-03-16 ASSESSMENT — PAIN DESCRIPTION - ONSET: ONSET: ON-GOING

## 2023-03-16 ASSESSMENT — PAIN DESCRIPTION - PAIN TYPE: TYPE: SURGICAL PAIN

## 2023-03-16 ASSESSMENT — PAIN DESCRIPTION - ORIENTATION: ORIENTATION: RIGHT

## 2023-03-16 ASSESSMENT — PAIN DESCRIPTION - FREQUENCY: FREQUENCY: INTERMITTENT

## 2023-03-16 ASSESSMENT — PAIN DESCRIPTION - DESCRIPTORS: DESCRIPTORS: ACHING

## 2023-03-16 NOTE — CARE COORDINATION
DISCHARGE PLANNING EVALUATION  3/16/23, 2:53 PM EDT    Reason for Referral: discharge plan  Mental Status: alert, oriented   Decision Making:  makes own decisions   Family/Social/Home Environment:  Mary Ojeda lives at home with her . She has good family support. She has needed equipment and feels that she can safely return home. Current Services including food security, transportation and housekeeping:  current with [facility], no food security, housekeeping or transportation concerns   Current Equipment: wheelchair, slide board  Payment Source:    Concerns or Barriers to Discharge: plans home with family and [address] provider list was provided to patient. Patient was informed of their freedom to choose [facility] provider. Discussed and offered to show the patient the relevant [facility] Providers quality and resource use measures on Medicare Compare web site via computer based on patient's goals of care and treatment preferences. Questions regarding selection process were answered. Declined list, prefers to continue with [facility]      Teach Back Method used with patient regarding care plan and discharge plan  Patient  verbalizes understanding of the plan of care and contributes  to goal setting. Patient goals, treatment preferences and discharge plan:  spoke with Mary Ojeda about discharge needs. She plans to return home with family, continuing with [facility]. She declines ecf.     Electronically signed by DYLAN Gr on 3/16/2023 at 2:53 PM

## 2023-03-16 NOTE — CARE COORDINATION
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 Joint effusion and extensive subcutaneous emphysema  3/14 Right Above Knee Amputation, hardware removal deep  Barriers to Discharge: continue to work with PT/OT, IV Ancef q8hr, Movantik PO added-no BM documented, PO PRN oxycodone and flexeril  PCP: Rio Cheek DO  Readmission Risk Score: 19.2%  Patient Goals/Plan/Treatment Preferences: From home with spouse and [facility]. SW following (may need SNF placement for therapy?) Pt has own wheelchair and walker.

## 2023-03-17 PROCEDURE — 97535 SELF CARE MNGMENT TRAINING: CPT

## 2023-03-17 PROCEDURE — 6360000002 HC RX W HCPCS: Performed by: NURSE PRACTITIONER

## 2023-03-17 PROCEDURE — 6370000000 HC RX 637 (ALT 250 FOR IP): Performed by: NURSE PRACTITIONER

## 2023-03-17 PROCEDURE — 2580000003 HC RX 258: Performed by: NURSE PRACTITIONER

## 2023-03-17 PROCEDURE — 1200000000 HC SEMI PRIVATE

## 2023-03-17 PROCEDURE — 6370000000 HC RX 637 (ALT 250 FOR IP): Performed by: PHYSICIAN ASSISTANT

## 2023-03-17 PROCEDURE — 97530 THERAPEUTIC ACTIVITIES: CPT

## 2023-03-17 RX ORDER — CYCLOBENZAPRINE HCL 10 MG
10 TABLET ORAL 3 TIMES DAILY PRN
Qty: 30 TABLET | Refills: 0 | Status: SHIPPED | OUTPATIENT
Start: 2023-03-17 | End: 2023-03-27

## 2023-03-17 RX ORDER — GABAPENTIN 300 MG/1
300 CAPSULE ORAL 3 TIMES DAILY
Qty: 30 CAPSULE | Refills: 0 | Status: SHIPPED | OUTPATIENT
Start: 2023-03-17 | End: 2023-03-27

## 2023-03-17 RX ORDER — PSEUDOEPHEDRINE HCL 30 MG
200 TABLET ORAL 2 TIMES DAILY
Qty: 20 CAPSULE | Refills: 0 | Status: SHIPPED | OUTPATIENT
Start: 2023-03-17 | End: 2023-03-27

## 2023-03-17 RX ORDER — OXYCODONE HYDROCHLORIDE AND ACETAMINOPHEN 5; 325 MG/1; MG/1
1 TABLET ORAL EVERY 6 HOURS PRN
Qty: 28 TABLET | Refills: 0 | Status: SHIPPED | OUTPATIENT
Start: 2023-03-17 | End: 2023-03-24

## 2023-03-17 RX ADMIN — OXYCODONE 10 MG: 5 TABLET ORAL at 19:00

## 2023-03-17 RX ADMIN — OXYCODONE 10 MG: 5 TABLET ORAL at 23:47

## 2023-03-17 RX ADMIN — SODIUM CHLORIDE, PRESERVATIVE FREE 10 ML: 5 INJECTION INTRAVENOUS at 08:31

## 2023-03-17 RX ADMIN — NALOXEGOL OXALATE 12.5 MG: 12.5 TABLET, FILM COATED ORAL at 05:59

## 2023-03-17 RX ADMIN — GABAPENTIN 300 MG: 300 CAPSULE ORAL at 19:54

## 2023-03-17 RX ADMIN — Medication 2000 MG: at 18:55

## 2023-03-17 RX ADMIN — ATORVASTATIN CALCIUM 40 MG: 40 TABLET, FILM COATED ORAL at 08:28

## 2023-03-17 RX ADMIN — GABAPENTIN 300 MG: 300 CAPSULE ORAL at 08:30

## 2023-03-17 RX ADMIN — GABAPENTIN 300 MG: 300 CAPSULE ORAL at 15:47

## 2023-03-17 RX ADMIN — Medication 2000 MG: at 02:50

## 2023-03-17 RX ADMIN — FENOFIBRATE 160 MG: 160 TABLET ORAL at 08:30

## 2023-03-17 RX ADMIN — OXYCODONE 10 MG: 5 TABLET ORAL at 02:45

## 2023-03-17 RX ADMIN — OXYCODONE 10 MG: 5 TABLET ORAL at 12:45

## 2023-03-17 RX ADMIN — CYCLOBENZAPRINE 10 MG: 10 TABLET, FILM COATED ORAL at 05:59

## 2023-03-17 RX ADMIN — DOXEPIN HYDROCHLORIDE 40 MG: 10 CAPSULE ORAL at 19:54

## 2023-03-17 RX ADMIN — CYCLOBENZAPRINE 10 MG: 10 TABLET, FILM COATED ORAL at 16:53

## 2023-03-17 RX ADMIN — OXYCODONE 10 MG: 5 TABLET ORAL at 07:39

## 2023-03-17 RX ADMIN — POLYETHYLENE GLYCOL 3350 17 G: 17 POWDER, FOR SOLUTION ORAL at 08:25

## 2023-03-17 RX ADMIN — MORPHINE SULFATE 4 MG: 4 INJECTION, SOLUTION INTRAMUSCULAR; INTRAVENOUS at 08:50

## 2023-03-17 RX ADMIN — Medication 1 TABLET: at 08:30

## 2023-03-17 RX ADMIN — DOCUSATE SODIUM 200 MG: 100 CAPSULE, LIQUID FILLED ORAL at 08:25

## 2023-03-17 RX ADMIN — DOCUSATE SODIUM 200 MG: 100 CAPSULE, LIQUID FILLED ORAL at 19:54

## 2023-03-17 ASSESSMENT — PAIN DESCRIPTION - ONSET
ONSET: ON-GOING

## 2023-03-17 ASSESSMENT — PAIN DESCRIPTION - PAIN TYPE
TYPE: SURGICAL PAIN

## 2023-03-17 ASSESSMENT — PAIN SCALES - WONG BAKER
WONGBAKER_NUMERICALRESPONSE: 4

## 2023-03-17 ASSESSMENT — PAIN DESCRIPTION - ORIENTATION
ORIENTATION: RIGHT

## 2023-03-17 ASSESSMENT — PAIN DESCRIPTION - DESCRIPTORS
DESCRIPTORS: ACHING;DISCOMFORT
DESCRIPTORS: SHARP;SORE
DESCRIPTORS: SHARP
DESCRIPTORS: ACHING
DESCRIPTORS: SHARP;SORE
DESCRIPTORS: SHARP

## 2023-03-17 ASSESSMENT — PAIN - FUNCTIONAL ASSESSMENT
PAIN_FUNCTIONAL_ASSESSMENT: PREVENTS OR INTERFERES SOME ACTIVE ACTIVITIES AND ADLS

## 2023-03-17 ASSESSMENT — PAIN DESCRIPTION - LOCATION
LOCATION: LEG
LOCATION: KNEE
LOCATION: LEG
LOCATION: LEG

## 2023-03-17 ASSESSMENT — PAIN SCALES - GENERAL
PAINLEVEL_OUTOF10: 8
PAINLEVEL_OUTOF10: 8
PAINLEVEL_OUTOF10: 7
PAINLEVEL_OUTOF10: 4
PAINLEVEL_OUTOF10: 7
PAINLEVEL_OUTOF10: 8
PAINLEVEL_OUTOF10: 10
PAINLEVEL_OUTOF10: 8
PAINLEVEL_OUTOF10: 9
PAINLEVEL_OUTOF10: 8
PAINLEVEL_OUTOF10: 8
PAINLEVEL_OUTOF10: 6

## 2023-03-17 ASSESSMENT — PAIN DESCRIPTION - FREQUENCY
FREQUENCY: CONTINUOUS
FREQUENCY: INTERMITTENT
FREQUENCY: INTERMITTENT

## 2023-03-17 NOTE — CARE COORDINATION
3/17/23, 1:29 PM EDT    DISCHARGE PLANNING EVALUATION   Notified [facility] of discharge planned for tomorrow. [facility] will follow at home. 3/17/23, 1:30 PM EDT    Patient goals/plan/ treatment preferences discussed by  and . Patient goals/plan/ treatment preferences reviewed with patient/ family. Patient/ family verbalize understanding of discharge plan and are in agreement with goal/plan/treatment preferences. Understanding was demonstrated using the teach back method. AVS provided by RN at time of discharge, which includes all necessary medical information pertaining to the patients current course of illness, treatment, post-discharge goals of care, and treatment preferences.      Services At/After Discharge: Home Health

## 2023-03-17 NOTE — CARE COORDINATION
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) [T84.53XA]  Closed comminuted intertrochanteric fracture of right femur, initial encounter ([facility] Donnita Blower   Procedure: : 3/11: XR right knee: Comminuted fracture of the distal femur. Joint effusion and extensive subcutaneous emphysema  3/14 Right Above Knee Amputation, hardware removal deep  Barriers to Discharge:  continue to work with PT/OT, IV Ancef q8hr, Movantik PO added yesterday-no BM documented, PO PRN oxycodone and flexeril  PCP: Norma Mcghee DO  Readmission Risk Score: 19.2%  Patient Goals/Plan/Treatment Preferences: Plans to return home with spouse and continuing with [facility]. Has needed DME. (Wheelchair, walker).

## 2023-03-18 PROCEDURE — 6370000000 HC RX 637 (ALT 250 FOR IP): Performed by: NURSE PRACTITIONER

## 2023-03-18 PROCEDURE — 6370000000 HC RX 637 (ALT 250 FOR IP): Performed by: PHYSICIAN ASSISTANT

## 2023-03-18 PROCEDURE — 1200000000 HC SEMI PRIVATE

## 2023-03-18 PROCEDURE — 6360000002 HC RX W HCPCS: Performed by: NURSE PRACTITIONER

## 2023-03-18 PROCEDURE — 2580000003 HC RX 258: Performed by: NURSE PRACTITIONER

## 2023-03-18 PROCEDURE — 97530 THERAPEUTIC ACTIVITIES: CPT

## 2023-03-18 RX ADMIN — DOXEPIN HYDROCHLORIDE 40 MG: 10 CAPSULE ORAL at 20:08

## 2023-03-18 RX ADMIN — NALOXEGOL OXALATE 12.5 MG: 12.5 TABLET, FILM COATED ORAL at 05:27

## 2023-03-18 RX ADMIN — GABAPENTIN 300 MG: 300 CAPSULE ORAL at 09:36

## 2023-03-18 RX ADMIN — GABAPENTIN 300 MG: 300 CAPSULE ORAL at 20:08

## 2023-03-18 RX ADMIN — MORPHINE SULFATE 4 MG: 4 INJECTION, SOLUTION INTRAMUSCULAR; INTRAVENOUS at 09:36

## 2023-03-18 RX ADMIN — CYCLOBENZAPRINE 10 MG: 10 TABLET, FILM COATED ORAL at 05:27

## 2023-03-18 RX ADMIN — GABAPENTIN 300 MG: 300 CAPSULE ORAL at 13:52

## 2023-03-18 RX ADMIN — OXYCODONE 10 MG: 5 TABLET ORAL at 20:08

## 2023-03-18 RX ADMIN — FENOFIBRATE 160 MG: 160 TABLET ORAL at 09:36

## 2023-03-18 RX ADMIN — CYCLOBENZAPRINE 10 MG: 10 TABLET, FILM COATED ORAL at 09:36

## 2023-03-18 RX ADMIN — DOCUSATE SODIUM 200 MG: 100 CAPSULE, LIQUID FILLED ORAL at 20:08

## 2023-03-18 RX ADMIN — SODIUM CHLORIDE, PRESERVATIVE FREE 10 ML: 5 INJECTION INTRAVENOUS at 09:37

## 2023-03-18 RX ADMIN — Medication 1 TABLET: at 09:36

## 2023-03-18 RX ADMIN — OXYCODONE 10 MG: 5 TABLET ORAL at 12:02

## 2023-03-18 RX ADMIN — ATORVASTATIN CALCIUM 40 MG: 40 TABLET, FILM COATED ORAL at 09:36

## 2023-03-18 RX ADMIN — Medication 2000 MG: at 18:38

## 2023-03-18 RX ADMIN — Medication 2000 MG: at 10:49

## 2023-03-18 RX ADMIN — Medication 2000 MG: at 03:23

## 2023-03-18 RX ADMIN — DOCUSATE SODIUM 200 MG: 100 CAPSULE, LIQUID FILLED ORAL at 09:36

## 2023-03-18 RX ADMIN — MORPHINE SULFATE 4 MG: 4 INJECTION, SOLUTION INTRAMUSCULAR; INTRAVENOUS at 21:44

## 2023-03-18 RX ADMIN — CYCLOBENZAPRINE 10 MG: 10 TABLET, FILM COATED ORAL at 23:41

## 2023-03-18 ASSESSMENT — PAIN SCALES - GENERAL
PAINLEVEL_OUTOF10: 10
PAINLEVEL_OUTOF10: 5
PAINLEVEL_OUTOF10: 7
PAINLEVEL_OUTOF10: 9
PAINLEVEL_OUTOF10: 7
PAINLEVEL_OUTOF10: 6
PAINLEVEL_OUTOF10: 9
PAINLEVEL_OUTOF10: 7
PAINLEVEL_OUTOF10: 6
PAINLEVEL_OUTOF10: 10

## 2023-03-18 ASSESSMENT — PAIN - FUNCTIONAL ASSESSMENT
PAIN_FUNCTIONAL_ASSESSMENT: PREVENTS OR INTERFERES SOME ACTIVE ACTIVITIES AND ADLS
PAIN_FUNCTIONAL_ASSESSMENT: PREVENTS OR INTERFERES SOME ACTIVE ACTIVITIES AND ADLS
PAIN_FUNCTIONAL_ASSESSMENT: ACTIVITIES ARE NOT PREVENTED

## 2023-03-18 ASSESSMENT — PAIN DESCRIPTION - PAIN TYPE
TYPE: SURGICAL PAIN

## 2023-03-18 ASSESSMENT — PAIN DESCRIPTION - ORIENTATION
ORIENTATION: RIGHT

## 2023-03-18 ASSESSMENT — PAIN DESCRIPTION - ONSET
ONSET: ON-GOING

## 2023-03-18 ASSESSMENT — PAIN DESCRIPTION - FREQUENCY
FREQUENCY: INTERMITTENT
FREQUENCY: INTERMITTENT

## 2023-03-18 ASSESSMENT — PAIN DESCRIPTION - DESCRIPTORS
DESCRIPTORS: ACHING;SHARP;SHOOTING
DESCRIPTORS: SHARP
DESCRIPTORS: SHOOTING;SHARP
DESCRIPTORS: SHARP

## 2023-03-18 ASSESSMENT — PAIN DESCRIPTION - LOCATION
LOCATION: LEG

## 2023-03-19 LAB
BACTERIA SPEC AEROBE CULT: ABNORMAL
BACTERIA SPEC AEROBE CULT: ABNORMAL
BACTERIA SPEC ANAEROBE CULT: ABNORMAL
GRAM STN SPEC: ABNORMAL
ORGANISM: ABNORMAL

## 2023-03-19 PROCEDURE — 6370000000 HC RX 637 (ALT 250 FOR IP): Performed by: NURSE PRACTITIONER

## 2023-03-19 PROCEDURE — 97530 THERAPEUTIC ACTIVITIES: CPT

## 2023-03-19 PROCEDURE — 2580000003 HC RX 258: Performed by: NURSE PRACTITIONER

## 2023-03-19 PROCEDURE — 1200000000 HC SEMI PRIVATE

## 2023-03-19 PROCEDURE — 6370000000 HC RX 637 (ALT 250 FOR IP): Performed by: PHYSICIAN ASSISTANT

## 2023-03-19 PROCEDURE — 97535 SELF CARE MNGMENT TRAINING: CPT

## 2023-03-19 PROCEDURE — 6360000002 HC RX W HCPCS: Performed by: NURSE PRACTITIONER

## 2023-03-19 RX ADMIN — Medication 1 TABLET: at 08:05

## 2023-03-19 RX ADMIN — SODIUM CHLORIDE, PRESERVATIVE FREE 10 ML: 5 INJECTION INTRAVENOUS at 21:25

## 2023-03-19 RX ADMIN — DOXEPIN HYDROCHLORIDE 40 MG: 10 CAPSULE ORAL at 21:26

## 2023-03-19 RX ADMIN — DOCUSATE SODIUM 200 MG: 100 CAPSULE, LIQUID FILLED ORAL at 08:05

## 2023-03-19 RX ADMIN — OXYCODONE 10 MG: 5 TABLET ORAL at 08:04

## 2023-03-19 RX ADMIN — OXYCODONE 10 MG: 5 TABLET ORAL at 21:26

## 2023-03-19 RX ADMIN — NALOXEGOL OXALATE 12.5 MG: 12.5 TABLET, FILM COATED ORAL at 05:52

## 2023-03-19 RX ADMIN — SODIUM CHLORIDE, PRESERVATIVE FREE 5 ML: 5 INJECTION INTRAVENOUS at 08:05

## 2023-03-19 RX ADMIN — Medication 2000 MG: at 18:33

## 2023-03-19 RX ADMIN — CYCLOBENZAPRINE 10 MG: 10 TABLET, FILM COATED ORAL at 21:26

## 2023-03-19 RX ADMIN — OXYCODONE 10 MG: 5 TABLET ORAL at 04:11

## 2023-03-19 RX ADMIN — GABAPENTIN 300 MG: 300 CAPSULE ORAL at 14:32

## 2023-03-19 RX ADMIN — Medication 2000 MG: at 02:56

## 2023-03-19 RX ADMIN — GABAPENTIN 300 MG: 300 CAPSULE ORAL at 21:26

## 2023-03-19 RX ADMIN — FENOFIBRATE 160 MG: 160 TABLET ORAL at 08:05

## 2023-03-19 RX ADMIN — OXYCODONE 10 MG: 5 TABLET ORAL at 14:46

## 2023-03-19 RX ADMIN — ATORVASTATIN CALCIUM 40 MG: 40 TABLET, FILM COATED ORAL at 08:05

## 2023-03-19 RX ADMIN — Medication 2000 MG: at 10:44

## 2023-03-19 RX ADMIN — GABAPENTIN 300 MG: 300 CAPSULE ORAL at 08:05

## 2023-03-19 ASSESSMENT — PAIN DESCRIPTION - LOCATION
LOCATION: LEG
LOCATION: LEG

## 2023-03-19 ASSESSMENT — PAIN DESCRIPTION - FREQUENCY
FREQUENCY: INTERMITTENT
FREQUENCY: INTERMITTENT

## 2023-03-19 ASSESSMENT — PAIN DESCRIPTION - PAIN TYPE
TYPE: SURGICAL PAIN
TYPE: SURGICAL PAIN

## 2023-03-19 ASSESSMENT — PAIN DESCRIPTION - DESCRIPTORS
DESCRIPTORS: DULL
DESCRIPTORS: DULL

## 2023-03-19 ASSESSMENT — PAIN SCALES - GENERAL
PAINLEVEL_OUTOF10: 8
PAINLEVEL_OUTOF10: 6
PAINLEVEL_OUTOF10: 8
PAINLEVEL_OUTOF10: 7
PAINLEVEL_OUTOF10: 8

## 2023-03-19 ASSESSMENT — PAIN DESCRIPTION - ORIENTATION
ORIENTATION: RIGHT
ORIENTATION: RIGHT

## 2023-03-19 ASSESSMENT — PAIN DESCRIPTION - ONSET
ONSET: ON-GOING
ONSET: ON-GOING

## 2023-03-19 ASSESSMENT — PAIN SCALES - WONG BAKER: WONGBAKER_NUMERICALRESPONSE: 4

## 2023-03-19 NOTE — PLAN OF CARE
Consult received.   Please see progress note 03/13/23
Hospitalist following for medication management and preop clearance. Pt is in OR today for right above knee amputation, hardware removal with Dr. Fabiano Rosario. Chart reviewed, Mg low. Replacement ordered.  Repeat in AM. Hospitalist will see in AM.       Electronically signed by Torsten Arriaga PA-C on 3/14/2023 at 4:24 PM
Patient has discharge orders place. Hospitalist have been following for med management, constipation, electrolyte abnormalities. Electrolyte abn have resolved, recommend patient to follow up with PCP for consideration of repeat BMP/Mg. Per chart review, she has not yet had a BM but is tolerating a general diet with no abd pain or N/V, is passing gas. Med rec was reviewed, the patient was discharged with bowel regimen per primary. Pt to follow up with PCP. Hospitalist will sign off. Please contact me with any further questions or concerns.      Electronically signed by Maureen Fisher PA-C on 3/18/2023 at 10:52 AM
Problem: Discharge Planning  Goal: Discharge to home or other facility with appropriate resources  3/13/2023 0021 by Lydia Escoto RN  Outcome: Progressing  Flowsheets  Taken 3/13/2023 0021  Discharge to home or other facility with appropriate resources:   Identify barriers to discharge with patient and caregiver   Arrange for needed discharge resources and transportation as appropriate   Identify discharge learning needs (meds, wound care, etc)  Taken 3/12/2023 2045  Discharge to home or other facility with appropriate resources:   Identify barriers to discharge with patient and caregiver   Arrange for needed discharge resources and transportation as appropriate     Problem: Pain  Goal: Verbalizes/displays adequate comfort level or baseline comfort level  3/13/2023 0021 by Lydia Escoto RN  Outcome: Progressing  Flowsheets (Taken 3/13/2023 0021)  Verbalizes/displays adequate comfort level or baseline comfort level:   Encourage patient to monitor pain and request assistance   Assess pain using appropriate pain scale   Administer analgesics based on type and severity of pain and evaluate response   Implement non-pharmacological measures as appropriate and evaluate response   Consider cultural and social influences on pain and pain management   Notify Licensed Independent Practitioner if interventions unsuccessful or patient reports new pain     Problem: Skin/Tissue Integrity  Goal: Absence of new skin breakdown  Description: 1. Monitor for areas of redness and/or skin breakdown  2. Assess vascular access sites hourly  3. Every 4-6 hours minimum:  Change oxygen saturation probe site  4. Every 4-6 hours:  If on nasal continuous positive airway pressure, respiratory therapy assess nares and determine need for appliance change or resting period. 3/13/2023 0021 by Lydia Escoto RN  Outcome: Progressing  Note: No evidence of new skin breakdown. Pt. Al Jasmin and repositions self.      Problem: Chronic
Problem: Discharge Planning  Goal: Discharge to home or other facility with appropriate resources  3/14/2023 0001 by Perfecto Kumar RN  Outcome: Progressing  Flowsheets (Taken 3/14/2023 0001)  Discharge to home or other facility with appropriate resources:   Identify barriers to discharge with patient and caregiver   Identify discharge learning needs (meds, wound care, etc)   Refer to discharge planning if patient needs post-hospital services based on physician order or complex needs related to functional status, cognitive ability or social support system  3/13/2023 1146 by Merlinda Nelson, RN  Outcome: Progressing  Flowsheets (Taken 3/13/2023 1146)  Discharge to home or other facility with appropriate resources:   Identify barriers to discharge with patient and caregiver   Identify discharge learning needs (meds, wound care, etc)   Refer to discharge planning if patient needs post-hospital services based on physician order or complex needs related to functional status, cognitive ability or social support system     Problem: Pain  Goal: Verbalizes/displays adequate comfort level or baseline comfort level  3/14/2023 0001 by Perfecto Kumar RN  Outcome: Progressing  Flowsheets (Taken 3/14/2023 0001)  Verbalizes/displays adequate comfort level or baseline comfort level:   Assess pain using appropriate pain scale   Administer analgesics based on type and severity of pain and evaluate response  3/13/2023 1146 by Merlinda Nelson, RN  Outcome: Progressing  Flowsheets (Taken 3/13/2023 1146)  Verbalizes/displays adequate comfort level or baseline comfort level:   Encourage patient to monitor pain and request assistance   Assess pain using appropriate pain scale   Administer analgesics based on type and severity of pain and evaluate response   Implement non-pharmacological measures as appropriate and evaluate response     Problem: Skin/Tissue Integrity  Goal: Absence of new skin breakdown  Description: 1.   Monitor for areas of
Problem: Discharge Planning  Goal: Discharge to home or other facility with appropriate resources  3/17/2023 2227 by Jimbo Ernst RN  Outcome: Progressing  Flowsheets (Taken 3/17/2023 2227)  Discharge to home or other facility with appropriate resources:   Identify barriers to discharge with patient and caregiver   Arrange for needed discharge resources and transportation as appropriate   Identify discharge learning needs (meds, wound care, etc)  Note: Patient plans home with  at discharge. Care manager and social working helping with discharge needs. Problem: Pain  Goal: Verbalizes/displays adequate comfort level or baseline comfort level  3/17/2023 2227 by Jimbo Ernst RN  Outcome: Progressing  Flowsheets (Taken 3/17/2023 2227)  Verbalizes/displays adequate comfort level or baseline comfort level:   Encourage patient to monitor pain and request assistance   Assess pain using appropriate pain scale   Administer analgesics based on type and severity of pain and evaluate response  Note: Patient taking pain medication on MAR as needed to control pain. Use of non-pharmacologic pain management including repositioning. Patient pain goal is 2. Problem: Skin/Tissue Integrity  Goal: Absence of new skin breakdown  Description: 1. Monitor for areas of redness and/or skin breakdown  2. Assess vascular access sites hourly  3. Every 4-6 hours minimum:  Change oxygen saturation probe site  4. Every 4-6 hours:  If on nasal continuous positive airway pressure, respiratory therapy assess nares and determine need for appliance change or resting period. 3/16/2023 2227 by Jimbo Ernst RN  Outcome: Progressing  Note: Assess skin every 4 hours     Problem: Safety - Adult  Goal: Free from fall injury  3/17/2023 2227 by Jimbo Ernst RN  Outcome: Progressing  Flowsheets (Taken 3/17/2023 2227)  Free From Fall Injury: Instruct family/caregiver on patient safety  Note: Patient up with staff assistance.  Able
Problem: Discharge Planning  Goal: Discharge to home or other facility with appropriate resources  Outcome: Progressing     Problem: Pain  Goal: Verbalizes/displays adequate comfort level or baseline comfort level  Outcome: Progressing     Problem: Skin/Tissue Integrity  Goal: Absence of new skin breakdown  Description: 1. Monitor for areas of redness and/or skin breakdown  2. Assess vascular access sites hourly  3. Every 4-6 hours minimum:  Change oxygen saturation probe site  4. Every 4-6 hours:  If on nasal continuous positive airway pressure, respiratory therapy assess nares and determine need for appliance change or resting period.   Outcome: Progressing     Problem: Safety - Adult  Goal: Free from fall injury  Outcome: Progressing     Problem: Chronic Conditions and Co-morbidities  Goal: Patient's chronic conditions and co-morbidity symptoms are monitored and maintained or improved  Outcome: Progressing     Problem: ABCDS Injury Assessment  Goal: Absence of physical injury  Outcome: Progressing     Problem: Nutrition Deficit:  Goal: Optimize nutritional status  Outcome: Progressing     Problem: Skin/Tissue Integrity - Adult  Goal: Incisions, wounds, or drain sites healing without S/S of infection  Outcome: Progressing     Problem: Musculoskeletal - Adult  Goal: Return mobility to safest level of function  Outcome: Progressing     Problem: Infection - Adult  Goal: Absence of infection at discharge  Outcome: Progressing
Problem: Discharge Planning  Goal: Discharge to home or other facility with appropriate resources  Outcome: Progressing     Problem: Pain  Goal: Verbalizes/displays adequate comfort level or baseline comfort level  Outcome: Progressing     Problem: Skin/Tissue Integrity  Goal: Absence of new skin breakdown  Description: 1. Monitor for areas of redness and/or skin breakdown  2. Assess vascular access sites hourly  3. Every 4-6 hours minimum:  Change oxygen saturation probe site  4. Every 4-6 hours:  If on nasal continuous positive airway pressure, respiratory therapy assess nares and determine need for appliance change or resting period.   Outcome: Progressing     Problem: Safety - Adult  Goal: Free from fall injury  Outcome: Progressing     Problem: Chronic Conditions and Co-morbidities  Goal: Patient's chronic conditions and co-morbidity symptoms are monitored and maintained or improved  Outcome: Progressing     Problem: ABCDS Injury Assessment  Goal: Absence of physical injury  Outcome: Progressing     Problem: Skin/Tissue Integrity - Adult  Goal: Incisions, wounds, or drain sites healing without S/S of infection  Outcome: Progressing  Flowsheets (Taken 3/15/2023 1236)  Incisions, Wounds, or Drain Sites Healing Without Sign and Symptoms of Infection:   TWICE DAILY: Assess and document skin integrity   ADMISSION and DAILY: Assess and document risk factors for pressure ulcer development   TWICE DAILY: Assess and document dressing/incision, wound bed, drain sites and surrounding tissue   Implement wound care per orders     Problem: Musculoskeletal - Adult  Goal: Return mobility to safest level of function  Outcome: Progressing  Note: Patient up to chair and back to bed
Improved:   Monitor and assess patient's chronic conditions and comorbid symptoms for stability, deterioration, or improvement   Collaborate with multidisciplinary team to address chronic and comorbid conditions and prevent exacerbation or deterioration   Update acute care plan with appropriate goals if chronic or comorbid symptoms are exacerbated and prevent overall improvement and discharge     Problem: ABCDS Injury Assessment  Goal: Absence of physical injury  Outcome: Progressing  Flowsheets (Taken 3/13/2023 1146 by Sathya Terrell RN)  Absence of Physical Injury: Implement safety measures based on patient assessment     Problem: Nutrition Deficit:  Goal: Optimize nutritional status  Outcome: Progressing  Flowsheets (Taken 3/14/2023 0001)  Nutrient intake appropriate for improving, restoring, or maintaining nutritional needs:   Monitor oral intake, labs, and treatment plans   Assess nutritional status and recommend course of action     Problem: Skin/Tissue Integrity - Adult  Goal: Incisions, wounds, or drain sites healing without S/S of infection  Outcome: Progressing  Flowsheets (Taken 3/14/2023 2206)  Incisions, Wounds, or Drain Sites Healing Without Sign and Symptoms of Infection: TWICE DAILY: Assess and document dressing/incision, wound bed, drain sites and surrounding tissue     Problem: Musculoskeletal - Adult  Goal: Return mobility to safest level of function  Outcome: Progressing  Flowsheets (Taken 3/14/2023 2206)  Return Mobility to Safest Level of Function:   Assess patient stability and activity tolerance for standing, transferring and ambulating with or without assistive devices   Assist with transfers and ambulation using safe patient handling equipment as needed   Ensure adequate protection for wounds/incisions during mobilization   Obtain physical therapy/occupational therapy consults as needed  Goal: Maintain proper alignment of affected body part  Outcome: Progressing  Flowsheets (Taken 3/14/2023
Physical Injury: Implement safety measures based on patient assessment     Problem: Nutrition Deficit:  Goal: Optimize nutritional status  Outcome: Progressing  Flowsheets (Taken 3/14/2023 0001 by Radha Mendoza RN)  Nutrient intake appropriate for improving, restoring, or maintaining nutritional needs:   Monitor oral intake, labs, and treatment plans   Assess nutritional status and recommend course of action     Problem: Skin/Tissue Integrity - Adult  Goal: Incisions, wounds, or drain sites healing without S/S of infection  Outcome: Progressing  Flowsheets (Taken 3/16/2023 1156)  Incisions, Wounds, or Drain Sites Healing Without Sign and Symptoms of Infection:   ADMISSION and DAILY: Assess and document risk factors for pressure ulcer development   TWICE DAILY: Assess and document skin integrity     Problem: Musculoskeletal - Adult  Goal: Return mobility to safest level of function  Outcome: Progressing  Flowsheets (Taken 3/15/2023 2012 by Fernie Mohan RN)  Return Mobility to Safest Level of Function: Assess patient stability and activity tolerance for standing, transferring and ambulating with or without assistive devices     Problem: Infection - Adult  Goal: Absence of infection at discharge  Outcome: Progressing  Flowsheets (Taken 3/15/2023 2012 by Fernie Mohan RN)  Absence of infection at discharge: Assess and monitor for signs and symptoms of infection   Care plan reviewed with patient. Patient verbalize understanding of the plan of care and contribute to goal setting.
conditions and co-morbidity symptoms are monitored and maintained or improved  Outcome: Progressing  Flowsheets (Taken 3/12/2023 5250)  Care Plan - Patient's Chronic Conditions and Co-Morbidity Symptoms are Monitored and Maintained or Improved: Monitor and assess patient's chronic conditions and comorbid symptoms for stability, deterioration, or improvement  Note: Glucose checked & covered per physician's orders. Care plan reviewed with patient. Patient verbalizes understanding of the care plan and contributed to goal setting.
Musculoskeletal - Adult  Goal: Return mobility to safest level of function  3/16/2023 2227 by Aliza Crawford RN  Outcome: Progressing  Flowsheets (Taken 3/16/2023 2227)  Return Mobility to Safest Level of Function:   Assess patient stability and activity tolerance for standing, transferring and ambulating with or without assistive devices   Assist with transfers and ambulation using safe patient handling equipment as needed   Ensure adequate protection for wounds/incisions during mobilization     Problem: Infection - Adult  Goal: Absence of infection at discharge  3/16/2023 2227 by Aliza Crawford RN  Outcome: Progressing  Flowsheets (Taken 3/16/2023 2227)  Absence of infection at discharge:   Assess and monitor for signs and symptoms of infection   Monitor lab/diagnostic results   Monitor all insertion sites i.e., indwelling lines, tubes and drains     Care plan reviewed with patient. Patient verbalized understanding of the plan of care and contribute to goal setting.
Musculoskeletal - Adult  Goal: Return mobility to safest level of function  3/18/2023 2227 by Sher Mustafa RN  Outcome: Progressing  Flowsheets (Taken 3/18/2023 2227)  Return Mobility to Safest Level of Function:   Assess patient stability and activity tolerance for standing, transferring and ambulating with or without assistive devices   Assist with transfers and ambulation using safe patient handling equipment as needed   Ensure adequate protection for wounds/incisions during mobilization     Problem: Infection - Adult  Goal: Absence of infection at discharge  3/18/2023 2227 by Sher Mustafa RN  Outcome: Progressing  Flowsheets (Taken 3/18/2023 2227)  Absence of infection at discharge:   Assess and monitor for signs and symptoms of infection   Monitor lab/diagnostic results   Monitor all insertion sites i.e., indwelling lines, tubes and drains     Care plan reviewed with patient. Patient verbalized understanding of the plan of care and contribute to goal setting.
protection for wounds/incisions during mobilization   Instruct patient/family in ordered activity level     Problem: Infection - Adult  Goal: Absence of infection at discharge  Outcome: Progressing  Flowsheets (Taken 3/18/2023 0082)  Absence of infection at discharge:   Assess and monitor for signs and symptoms of infection   Monitor lab/diagnostic results   Monitor all insertion sites i.e., indwelling lines, tubes and drains   Administer medications as ordered
protection for wounds/incisions during mobilization   Instruct patient/family in ordered activity level     Problem: Infection - Adult  Goal: Absence of infection at discharge  Outcome: Progressing  Flowsheets (Taken 3/18/2023 5422)  Absence of infection at discharge:   Assess and monitor for signs and symptoms of infection   Monitor lab/diagnostic results   Monitor all insertion sites i.e., indwelling lines, tubes and drains   Administer medications as ordered
Progressing  Flowsheets (Taken 3/13/2023 0021)  Absence of Physical Injury: Implement safety measures based on patient assessment

## 2023-03-20 VITALS
TEMPERATURE: 98.2 F | WEIGHT: 156.97 LBS | HEIGHT: 67 IN | BODY MASS INDEX: 24.64 KG/M2 | HEART RATE: 90 BPM | SYSTOLIC BLOOD PRESSURE: 102 MMHG | RESPIRATION RATE: 16 BRPM | DIASTOLIC BLOOD PRESSURE: 50 MMHG | OXYGEN SATURATION: 90 %

## 2023-03-20 PROCEDURE — 6370000000 HC RX 637 (ALT 250 FOR IP): Performed by: NURSE PRACTITIONER

## 2023-03-20 PROCEDURE — 97530 THERAPEUTIC ACTIVITIES: CPT

## 2023-03-20 PROCEDURE — 6370000000 HC RX 637 (ALT 250 FOR IP): Performed by: PHYSICIAN ASSISTANT

## 2023-03-20 PROCEDURE — 2580000003 HC RX 258: Performed by: NURSE PRACTITIONER

## 2023-03-20 PROCEDURE — 97542 WHEELCHAIR MNGMENT TRAINING: CPT

## 2023-03-20 PROCEDURE — 97535 SELF CARE MNGMENT TRAINING: CPT

## 2023-03-20 PROCEDURE — 6360000002 HC RX W HCPCS: Performed by: NURSE PRACTITIONER

## 2023-03-20 RX ADMIN — OXYCODONE 10 MG: 5 TABLET ORAL at 06:22

## 2023-03-20 RX ADMIN — OXYCODONE 10 MG: 5 TABLET ORAL at 01:58

## 2023-03-20 RX ADMIN — FENOFIBRATE 160 MG: 160 TABLET ORAL at 09:32

## 2023-03-20 RX ADMIN — Medication 2000 MG: at 10:32

## 2023-03-20 RX ADMIN — CYCLOBENZAPRINE 10 MG: 10 TABLET, FILM COATED ORAL at 06:21

## 2023-03-20 RX ADMIN — OXYCODONE 10 MG: 5 TABLET ORAL at 10:22

## 2023-03-20 RX ADMIN — GABAPENTIN 300 MG: 300 CAPSULE ORAL at 09:32

## 2023-03-20 RX ADMIN — OXYCODONE 10 MG: 5 TABLET ORAL at 14:49

## 2023-03-20 RX ADMIN — Medication 1 TABLET: at 09:32

## 2023-03-20 RX ADMIN — MORPHINE SULFATE 4 MG: 4 INJECTION, SOLUTION INTRAMUSCULAR; INTRAVENOUS at 03:02

## 2023-03-20 RX ADMIN — SODIUM CHLORIDE: 9 INJECTION, SOLUTION INTRAVENOUS at 03:06

## 2023-03-20 RX ADMIN — ATORVASTATIN CALCIUM 40 MG: 40 TABLET, FILM COATED ORAL at 09:32

## 2023-03-20 RX ADMIN — CYCLOBENZAPRINE 10 MG: 10 TABLET, FILM COATED ORAL at 14:49

## 2023-03-20 RX ADMIN — NALOXEGOL OXALATE 12.5 MG: 12.5 TABLET, FILM COATED ORAL at 06:19

## 2023-03-20 RX ADMIN — Medication 2000 MG: at 03:07

## 2023-03-20 ASSESSMENT — PAIN DESCRIPTION - LOCATION: LOCATION: LEG

## 2023-03-20 ASSESSMENT — PAIN SCALES - GENERAL
PAINLEVEL_OUTOF10: 9
PAINLEVEL_OUTOF10: 8
PAINLEVEL_OUTOF10: 9
PAINLEVEL_OUTOF10: 8

## 2023-03-20 ASSESSMENT — PAIN DESCRIPTION - ORIENTATION: ORIENTATION: LEFT

## 2023-03-20 ASSESSMENT — PAIN SCALES - WONG BAKER: WONGBAKER_NUMERICALRESPONSE: 4

## 2023-03-20 NOTE — CARE COORDINATION
3/20/23, 10:04 AM EDT    Patient goals/plan/ treatment preferences discussed by  and . Patient goals/plan/ treatment preferences reviewed with patient/ family. Patient/ family verbalize understanding of discharge plan and are in agreement with goal/plan/treatment preferences. Understanding was demonstrated using the teach back method. AVS provided by RN at time of discharge, which includes all necessary medical information pertaining to the patients current course of illness, treatment, post-discharge goals of care, and treatment preferences. Services At/After Discharge: [address] with Kevin Zhu at [facility] and updated of discharge. No further needs voiced. Spoke with patient and she continues to plan and denied any other needs. Stated her spouse would be picking her up. It was reported that patient was unable to get into spouses car. [facility] set up for ride and patient and spouse aware that their may be a cost.  No further needs voiced.

## 2023-03-20 NOTE — PROGRESS NOTES
1325: Pt arrives to pacu, awakens to verbal stimuli. Pt on room air, respirations easy and unlabored. VSS  1327: Pt c/o pain, fentanyl administered per crna  1331: No change in pain, 0.5mg of dilaudid administered  1337: No change in pain, 0.5mg of dilaudid given  1342: No change in pain, 0.5mg of dilaudid given  1349: Pt c/o nausea, 0.625mg of droperidol administered  1354: No change in pain, 0.5mg of dilaudid administered  1400: Pt continues to states pain 10/10, 50mcg of fentanyl administered  1410: Report given to [place] on 7K. 50mcg of fentanyl given  1415: Pt sleeping at this time, no signs of distress  1430: Pt meets criteria for discharge from pacu, awaiting transport  1443: Pt transported to [address] [place] [address] in stable condition.  VSS
Assisted patient alongside OT changing brief and transferring form bed to wheelchair. Patient tolerated well, able to do 25-50% unassisted. Patient left in chair with call light in reach.
Carlos Obando 59  OCCUPATIONAL THERAPY MISSED TREATMENT NOTE  [facility] ORTHOPEDICS 7K  7K-11/011-A      Date: 3/14/2023  Patient Name: Zacarias Zeng        CSN: [id number]   : 1960  (58 y.o.)  Gender: female                REASON FOR MISSED TREATMENT:  Pt is scheduled to AKA at 1200 today. OT will see 3/15/23 if medically appropriate.
Carlos Obando 59  PHYSICAL THERAPY MISSED TREATMENT NOTE  [facility] ORTHOPEDICS 7K    Date: 3/13/2023  Patient Name: Tiara Najera        MRN: [id number]   : 1960  (58 y.o.)  Gender: female                REASON FOR MISSED TREATMENT:  Missed Treat. Per ortho note, plan for OR tomorrow with Dr Magui ndiaye for R AKA. Will check back after surgical intervention.
Carlos Obando 59  PHYSICAL THERAPY MISSED TREATMENT NOTE  [facility] 7K    Date: 3/17/2023  Patient Name: Pepe Preciado        MRN: [id number]   : 1960  (58 y.o.)  Gender: female   Referring Practitioner: DEREK Fan CNP  Diagnosis: Acquired absence of knee joint following explanation of joint prosthesis with prescence of antibiotic-impregnated cement spacer         REASON FOR MISSED TREATMENT:  Missed Treat.       1st attempt pt requested PTA check back later due to fatigue ( 3330)  2nd attempt pt had just gotten back into bed with nursing staff [phone]  3rd attempt pt reporting she is too \"doped up\" on pain meds to stay awake (7605)    Will try back per POC
Carlos Obando 59  PHYSICAL THERAPY MISSED TREATMENT NOTE  [facility] ORTHOPED[facility] 7K    Date: 3/12/2023  Patient Name: Leona Eaton        MRN: [id number]   : 1960  (58 y.o.)  Gender: female                REASON FOR MISSED TREATMENT:  Missed Treat. Attempted to see pt around 0900. Pt in bed, moaning and groaning due to increased pain, nursing about to administer pain meds at time of attempt. PT will check back as time allows.
[facility]  OCCUPATIONAL THERAPY MISSED TREATMENT NOTE  [facility] ORTHOPEDICS 7K  7K-11/011-A      Date: 3/13/2023  Patient Name: Kam Vargas        CSN: [id number]   : 1960  (58 y.o.)  Gender: female                REASON FOR MISSED TREATMENT: Missed Treat. Per ortho note, plan for OR tomorrow with Dr Elie ndiaye for R AKA. Will check back after surgical intervention.
[facility]  OCCUPATIONAL THERAPY MISSED TREATMENT NOTE  [facility] 7K  7K-11/011-A      Date: 3/12/2023  Patient Name: Romy Nichols        CSN: [id number]   : 1960  (58 y.o.)  Gender: female                REASON FOR MISSED TREATMENT:  Pt has bedrest orders in place at this time.  OT will check back when off of bedrest.
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'  -Management per Orthopedic surgery, primary- Likely patient will need surgical intervention for explantation of hardware and anti-biotic spacer   -Continue analgesics and antiemetics     Pre-operative risk stratifcation:  Per colleague, \"RCRI score appears to be 0 at this time     Due to the past history of procedures and patient's medical history with current discomfort from the primary issue it is recommended she proceed with the procedure than delay for more testing\"     Type II diabetes:  -Carb controlled diet   -Patient reportedly previously on metformin, however no longer   -Consider sliding scale insulin as needed      HLD  -Continue Lipitor 40 mg, fenofibrate      Primary hypertension    -Previously on losartan per colleague, no antihypertensives currently scheduled, continue to monitor blood pressure    Chief Complaint: recurrent infection of right knee replacement       Subjective: 58 y.o. female admitted to the orthopedic surgery service for recurrent infection of right TKA. Rates her pain an 8/10 in severity. She denies shortness of breath. She denies chest pain. She denies vomiting.       Medications:    Infusion Medications    lactated ringers IV soln 100 mL/hr at 03/12/23 0909    sodium chloride       Scheduled Medications    atorvastatin  40 mg Oral Daily    doxepin  40 mg Oral Nightly    fenofibrate  160 mg Oral Daily    multivitamin  1 tablet Oral Daily    sodium chloride flush  5-40 mL IntraVENous 2 times per day    ceFAZolin  2,000 mg IntraVENous Q8H     PRN Meds: tiZANidine, sodium chloride flush, sodium chloride, ondansetron **OR** ondansetron, acetaminophen, morphine **OR** morphine, oxyCODONE,
Hospitalist Progress Note    Patient:  Patricia Mendez    Unit/Bed:7K-11/011-A  YOB: 1960  MRN: [id number]   Acct: [de-identified]   PCP: Gaby Suero DO  Code Status: Full Code  Date of Admission: 3/11/2023    Expected Discharge: per primary  Disposition: replace Mg. Bowel regimen added. Assessment/Plan:    Osteomyelitis right knee, recurrent:   Ortho surgery is primary. S/p right above knee amputation, deep hardware removal on 3/14/23 with Dr. Crys Caldwell. Management per primary. Hypomagnesemia: Resolved. PRN replacement protocol is ordered. NIDDMII: per history. Not on any medications. Glucose well controlled. HLD: resume home statin, fenofibrate. Essential HTN: per history. BP well controlled, not on any medications. Constipation, opioid induced: Add movantik, colace, glycolax daily. MOM and Senna were already ordered PRN per primary. 3/16- No BM. Increase colace to 200mg BID. Chief Complaint: recurrent infection of right knee replacement     HPI / Hospital Course: 58year old female with PMHx HTN, HLD, DMII who was admitted on 3/11 by Dr. Crys Caldwell. She has a history of septic right total knee arthroplasty treated with numerous surgeries, most recently antibiotic spacer placement. Hospitalist were consulted for medical management and pre op clearance. S/p right above knee amputation, deep hardware removal on 3/14/23 with Dr. Crys Caldwell. 3/14: Patient reports muscle spasms and severe pain in the right stump. She states she has not had a BM since arrival. Is passing gas. No abd pain, N/V. No f/c, sob, cp. Patient is asking about getting a CT scan of her stump to assure infection was all removed. Advised the patient that this is not something that is typically done but she would need to discuss it with primary. Subjective (past 24 hours): Ortho is seeing the patient to change dressing. Patient states she has not yet had a BM. No abdominal pain, tolerating diet.
O2 status checked after 10 minutes.  O2 level remained @ 93%
O2 stopped @0839. Immediate O2 OF 93. Pulse ox applied to monitor for 10 minutes.
Orthopaedic Progress Note      SUBJECTIVE   Ms. Elida Wu is hospital day 2    Seen at bedside this morning  no adverse events overnight  Pain control modest  Denies any numbness/paresthesia in operative extremity  Continued with significant drainage      OBJECTIVE      Physical    VITALS:  BP (!) 111/56   Pulse 97   Temp 98.7 °F (37.1 °C) (Oral)   Resp 16   Ht 5' 7\" (1.702 m)   Wt 156 lb 15.5 oz (71.2 kg)   SpO2 94%   BMI 24.58 kg/m²   I/O last 3 completed shifts: In: 628 [P.O.:860; I.V.:10]  Out: -     5/10 pain  Gen: alert and oriented  Head: normorcephalic, atraumatic  Resp: unlabored, room air  Pelvis: stable  RLE: Moderate swelling with slight deformity noted to the right knee. Copious amounts of drainage at the knee from anterolateral joint line, seropurulent, Skin is otherwise in good repair with no obvious rash, lesion noted. Decreased range of motion of the right knee with crepitus secondary to pain, swelling, antibiotic spacer. Calf compartment soft and nontender. Range of motion of the foot and ankle intact. Dorsalis pedal posterior tibialis pulses weak but palpable. Sensation intact distally.       Data  CBC:   Lab Results   Component Value Date/Time    WBC 14.3 03/12/2023 07:40 AM    HGB 11.2 03/12/2023 07:40 AM     03/12/2023 07:40 AM     BMP:    Lab Results   Component Value Date/Time     03/12/2023 07:40 AM    K 4.0 03/12/2023 07:40 AM     03/12/2023 07:40 AM    CO2 21 03/12/2023 07:40 AM    BUN 16 03/12/2023 07:40 AM    CREATININE 0.4 03/12/2023 07:40 AM    CALCIUM 8.8 03/12/2023 07:40 AM    GLUCOSE 72 03/12/2023 07:40 AM    GLUCOSE 113 12/10/2020 10:01 AM     Uric Acid:  No components found for: URIC  PT/INR:    Lab Results   Component Value Date/Time    PROTIME 11.0 10/05/2016 03:39 PM    INR 1.09 03/12/2023 07:40 AM     Troponin:  No results found for: TROPONINI  Urine Culture:  No components found for: CURINE      Current Inpatient Medications    Current
Orthopaedic Progress Note      SUBJECTIVE   Ms. [facility] is post op day # 4     Patient notes that she is having severe spasms in that right lower extremity. Status post right lower extremity above-knee amputation. She would like to go home today with her . OBJECTIVE      Physical    VITALS:  BP (!) 100/58   Pulse 91   Temp 98.3 °F (36.8 °C) (Oral)   Resp 17   Ht 5' 7\" (1.702 m)   Wt 156 lb 15.5 oz (71.2 kg)   SpO2 91%   BMI 24.58 kg/m²   INTAKE/OUTPUT:    Intake/Output Summary (Last 24 hours) at 3/18/2023 0726  Last data filed at 3/17/2023 2015  Gross per 24 hour   Intake 120 ml   Output 650 ml   Net -530 ml     I/O last 3 completed shifts: In: 520 [P.O.:520]  Out: 1050 [Urine:1050]      Right lower extremity has a dry dressing in place.       Data  CBC:   Lab Results   Component Value Date/Time    WBC 6.2 03/16/2023 08:56 AM    HGB 10.2 03/16/2023 08:56 AM     03/16/2023 08:56 AM     BMP:    Lab Results   Component Value Date/Time     03/16/2023 08:56 AM    K 3.9 03/16/2023 08:56 AM    K 4.0 03/12/2023 07:40 AM     03/16/2023 08:56 AM    CO2 26 03/16/2023 08:56 AM    BUN 13 03/16/2023 08:56 AM    CREATININE 0.3 03/16/2023 08:56 AM    CALCIUM 8.2 03/16/2023 08:56 AM    GLUCOSE 77 03/16/2023 08:56 AM    GLUCOSE 113 12/10/2020 10:01 AM     Uric Acid:  No components found for: URIC  PT/INR:    Lab Results   Component Value Date/Time    PROTIME 11.0 10/05/2016 03:39 PM    INR 1.09 03/12/2023 07:40 AM     Troponin:  No results found for: TROPONINI  Urine Culture:  No components found for: CURINE      Current Inpatient Medications    Current Facility-Administered Medications: docusate sodium (COLACE) capsule 200 mg, 200 mg, Oral, BID  naloxegol (MOVANTIK) tablet 12.5 mg, 12.5 mg, Oral, QAM AC  polyethylene glycol (GLYCOLAX) packet 17 g, 17 g, Oral, Daily  gabapentin (NEURONTIN) capsule 300 mg, 300 mg, Oral, TID  potassium chloride (KLOR-CON M) extended release tablet 40 mEq, 40 mEq,
Orthopaedic Progress Note      SUBJECTIVE   Ms. Jossie Ramos is post op day # 5     Patient notes that she is not ready for home  S/p right AKA      OBJECTIVE      Physical    VITALS:  /60   Pulse 90   Temp 97.7 °F (36.5 °C) (Oral)   Resp 18   Ht 5' 7\" (1.702 m)   Wt 156 lb 15.5 oz (71.2 kg)   SpO2 93%   BMI 24.58 kg/m²   INTAKE/OUTPUT:    Intake/Output Summary (Last 24 hours) at 3/19/2023 1348  Last data filed at 3/19/2023 1020  Gross per 24 hour   Intake 460 ml   Output 800 ml   Net -340 ml     I/O last 3 completed shifts:   In: 500 [P.O.:500]  Out: 0 [Urine:1050]    RLE with a dry dressing in place      Data  CBC:   Lab Results   Component Value Date/Time    WBC 6.2 03/16/2023 08:56 AM    HGB 10.2 03/16/2023 08:56 AM     03/16/2023 08:56 AM     BMP:    Lab Results   Component Value Date/Time     03/16/2023 08:56 AM    K 3.9 03/16/2023 08:56 AM    K 4.0 03/12/2023 07:40 AM     03/16/2023 08:56 AM    CO2 26 03/16/2023 08:56 AM    BUN 13 03/16/2023 08:56 AM    CREATININE 0.3 03/16/2023 08:56 AM    CALCIUM 8.2 03/16/2023 08:56 AM    GLUCOSE 77 03/16/2023 08:56 AM    GLUCOSE 113 12/10/2020 10:01 AM     Uric Acid:  No components found for: URIC  PT/INR:    Lab Results   Component Value Date/Time    PROTIME 11.0 10/05/2016 03:39 PM    INR 1.09 03/12/2023 07:40 AM     Troponin:  No results found for: TROPONINI  Urine Culture:  No components found for: CURINE      Current Inpatient Medications    Current Facility-Administered Medications: docusate sodium (COLACE) capsule 200 mg, 200 mg, Oral, BID  naloxegol (MOVANTIK) tablet 12.5 mg, 12.5 mg, Oral, QAM AC  polyethylene glycol (GLYCOLAX) packet 17 g, 17 g, Oral, Daily  gabapentin (NEURONTIN) capsule 300 mg, 300 mg, Oral, TID  potassium chloride (KLOR-CON M) extended release tablet 40 mEq, 40 mEq, Oral, PRN **OR** potassium bicarb-citric acid (EFFER-K) effervescent tablet 40 mEq, 40 mEq, Oral, PRN **OR** potassium chloride 10 mEq/100 mL IVPB
Patient arrived to OR holding with multiple family members. Consents verified. Checked in per ASHA Manuel.
Patient discharged to home with  and home health. Patient left with all belongings, AVS, prescriptions to  at her pharmacy. Patient sent home with [facility] soap for infection prevention. All questions answered at this time. Patient has follow up appointments scheduled with PCP and  Dr Jennifer Vincent.
Perfect serve message sent to [place], [place] asking about medications at home. Pt was previously on aspirin and xarelto. [place], [place] stated pt does not need these.
Pt admitted to  7K11 from ED. Complaints: Knee Pain    IV normal saline infusing into the wrist right, condition patent and no redness at a rate of 999 mls/ hour with about 100 mls in the bag still. IV site free of s/s of infection or infiltration. Vital signs obtained. Assessment and data collection initiated. Two nurse skin assessment performed by [address] and [facility]. Oriented to room. Explained patients right to have family, representative or physician notified of their admission. Patient has Declined for physician to be notified. Patient has Declined for family/representative to be notified. The patient is interested in [facility]. [place]s meds to beds program?:  No    Policies and procedures for  explained. All questions answered with no further questions at this time. Fall prevention and safety brochure discussed with patient. Bed alarm on. Call light in reach.
Pt. Given 10mg of Oxycodone. Pt. Christal Taylor asleep while holding pills and dropped them on the floor. Pills disposed of.
Report given to primary [facility].
Report received from Primary nurse Nicanor Claudio R.N.
**OR** potassium bicarb-citric acid (EFFER-K) effervescent tablet 40 mEq, 40 mEq, Oral, PRN **OR** potassium chloride 10 mEq/100 mL IVPB (Peripheral Line), 10 mEq, IntraVENous, PRN  magnesium sulfate 2000 mg in 50 mL IVPB premix, 2,000 mg, IntraVENous, PRN  nicotine (NICODERM CQ) 21 MG/24HR 1 patch, 1 patch, TransDERmal, Daily  cyclobenzaprine (FLEXERIL) tablet 10 mg, 10 mg, Oral, TID PRN  0.9 % sodium chloride infusion, , IntraVENous, PRN  acetaminophen (TYLENOL) tablet 650 mg, 650 mg, Oral, Q6H PRN  atorvastatin (LIPITOR) tablet 40 mg, 40 mg, Oral, Daily  doxepin (SINEQUAN) capsule 40 mg, 40 mg, Oral, Nightly  fenofibrate (TRIGLIDE) tablet 160 mg, 160 mg, Oral, Daily  multivitamin 1 tablet, 1 tablet, Oral, Daily  sodium chloride flush 0.9 % injection 5-40 mL, 5-40 mL, IntraVENous, 2 times per day  sodium chloride flush 0.9 % injection 5-40 mL, 5-40 mL, IntraVENous, PRN  0.9 % sodium chloride infusion, , IntraVENous, PRN  ondansetron (ZOFRAN-ODT) disintegrating tablet 4 mg, 4 mg, Oral, Q8H PRN **OR** ondansetron (ZOFRAN) injection 4 mg, 4 mg, IntraVENous, Q6H PRN  morphine (PF) injection 2 mg, 2 mg, IntraVENous, Q2H PRN **OR** morphine injection 4 mg, 4 mg, IntraVENous, Q2H PRN  oxyCODONE (ROXICODONE) immediate release tablet 5 mg, 5 mg, Oral, Q4H PRN  oxyCODONE (ROXICODONE) immediate release tablet 10 mg, 10 mg, Oral, Q4H PRN  ceFAZolin (ANCEF) 2000 mg in 0.9% sodium chloride 50 mL IVPB, 2,000 mg, IntraVENous, Q8H  senna (SENOKOT) tablet 8.6 mg, 1 tablet, Oral, Daily PRN  magnesium hydroxide (MILK OF MAGNESIA) 400 MG/5ML suspension 30 mL, 30 mL, Oral, Daily PRN        PLAN    Ms. Mame Dai is post op day #6 R AKA    Hgb hct stable  Multimodal pain control  PT OT as tolerates  Dry dressing as needed  Regular diet  Dispo- will plan HHC today after PT OT
Commode  Bathroom Accessibility: Not accessible    Receives Help From: Family, Home health  ADL Assistance: Needs assistance  Ambulation Assistance: Non-ambulatory  Transfer Assistance: Independent (Sit pivots)  Active : No  Additional Comments: Sponge bathes and utilizes bedside commode, as her home bathroom is not wheelchair accessible. Utilizes wheelchair within the home as it is her primary way of transferring to/from rooms. Patient is non-ambulatory but can perform squat pivot transfers to/from . Restrictions/Precautions:  Restrictions/Precautions: General Precautions, Fall Risk, Weight Bearing  Right Lower Extremity Weight Bearing: Non Weight Bearing  Position Activity Restriction  Other position/activity restrictions: H/o L knee contracture. Encourage positioning to avoid further contracture of both LEs. SUBJECTIVE: Pt. Eliel Mora in her bed and pleasantly agrees to therapy session. RN approved therapy session. Pt. Requiring min encouragement to complete transfer to [facility] this morning. Breakfast tray present. Pt. Declines use of slide board. Pain: Not rated: R LE    Vitals:   Patient Vitals for the past 8 hrs:   BP Patient Position Temp Temp src Pulse Resp SpO2 O2 Device   03/18/23 0315 (!) 100/58 Semi fowlers 98.3 °F (36.8 °C) Oral 91 17 91 % None (Room air)     *Vitals may reflect data entered into the flowsheet prior to or after PT session was completed. OBJECTIVE:  Bed Mobility:  Supine to Sit: Moderate Assistance, with increased time for completion  Scooting: Stand By Assistance, with increased time for completion    Transfers:  Sit Pivot:Stand By Assistance, [employer], with increased time for completion. Multiple short scoots to complete. Pt. With decreased L LE facilitation throughout transfer, relying on UEs to complete.      Ambulation:  None    Stairs:  None    Balance:  Static Sitting Balance:  Supervision    Neuromuscular
Commode  Bathroom Accessibility: Not accessible    Receives Help From: Family, Home health  ADL Assistance: Needs assistance  Ambulation Assistance: Non-ambulatory  Transfer Assistance: Independent (Sit pivots)  Active : No  Additional Comments: Sponge bathes and utilizes bedside commode, as her home bathroom is not wheelchair accessible. Utilizes wheelchair within the home as it is her primary way of transferring to/from rooms. Patient is non-ambulatory but can perform squat pivot transfers to/from . Restrictions/Precautions:  Restrictions/Precautions: General Precautions, Fall Risk, Weight Bearing  Right Lower Extremity Weight Bearing: Non Weight Bearing  Position Activity Restriction  Other position/activity restrictions: H/o L knee contracture. Encourage positioning to avoid further contracture of both LEs. SUBJECTIVE: RN approved session. Pt up in wheelchair upon arrival and agrees to therapy. Pt ready to return to bed. Incontinent of urine needing assist for depends change and clean up. Pt using personal pillow between LEs- did note significant staining on both sides of pillow. PAIN: R stump/hip. Vitals: Vitals not assessed per clinical judgement, see nursing flowsheet    OBJECTIVE:  Bed Mobility:  Rolling to Left: Contact Guard Assistance   Rolling to Right: Contact Guard Assistance   Sit to Supine: Contact Guard Assistance   Scooting: Stand By Assistance    Transfers:  Sit Pivot:Minimal Assistance, with increased time for completion, cues for hand placement, with verbal cues, from WC to EOB toward R side per pt preference, pt neeidng mult small scoots and extra time to achieve.  Recommended trial with slideboard next session- pt agrees and states \"yeah that might help, I might have to use the board to get into my car anyway\"    Plan to trial slideboard transfer next session    Wheelchair Mobility:  Stand By Assistance  Extremities Used: Bilateral Upper Extremities  Type of
LEs.    Subjective  Chart Reviewed: Yes, Orders, Progress Notes, History and Physical, Imaging, Operative Notes  Patient assessed for rehabilitation services?: Yes    Subjective: RN okayed OT session. Upon arrival patient was resting in bed. Pt was agreeable to OT session. Pain: 9/10: Right Residual Limb     Vitals: Vitals not assessed per clinical judgement, see nursing flowsheet    Social/Functional History:  Lives With: Spouse  Type of Home: House  Home Layout: One level  Home Access: Ramped entrance  Home Equipment: Sable Prima, rolling, Wheelchair-manual   Bathroom Shower/Tub: Tub/Shower unit  Bathroom Toilet: Standard  Bathroom Equipment: Toilet raiser, Shower chair, Grab bars around toilet, Commode  Bathroom Accessibility: Not accessible    Receives Help From: Family, Home health  ADL Assistance: Needs assistance  Ambulation Assistance: Non-ambulatory  Transfer Assistance:  ([address])    Active : No  Patient's  Info:   Mode of Transportation: SUV     Additional Comments: Sponge bathes and utilizes bedside commode, as her home bathroom is not wheelchair accessible. Utilizes wheelchair within the home as it is her primary way of transferring to/from rooms. Patient is non-ambulatory but can perform squat pivot transfers to/from [facility].    VISION:WFL    HEARING:  WFL    COGNITION: Slow Processing, Decreased Problem Solving, and Decreased Safety Awareness    RANGE OF MOTION:  Bilateral Upper Extremity:  WFL    STRENGTH:  Bilateral Upper Extremity:  Impaired - deconditioned     ADL:   Toileting: Max Assist. External cath leaked prior to session and pt soiled. Pt required Max Assist for franklin care. Footwear Management: Dependent. BALANCE:  Sitting Balance:  Stand By Assistance. Sitting EOB. Pt reporting increased pain in Right residual.     BED MOBILITY:  Supine to Sit: Minimal Assistance, X 1    Scooting: Contact Guard Assistance to scoot B hips to EOB.      TRANSFERS:  Sit Pivot: Minimal
Medications    Current Facility-Administered Medications: nicotine (NICODERM CQ) 21 MG/24HR 1 patch, 1 patch, TransDERmal, Daily  cyclobenzaprine (FLEXERIL) tablet 10 mg, 10 mg, Oral, TID PRN  0.9 % sodium chloride infusion, , IntraVENous, PRN  acetaminophen (TYLENOL) tablet 650 mg, 650 mg, Oral, Q6H PRN  atorvastatin (LIPITOR) tablet 40 mg, 40 mg, Oral, Daily  doxepin (SINEQUAN) capsule 40 mg, 40 mg, Oral, Nightly  fenofibrate (TRIGLIDE) tablet 160 mg, 160 mg, Oral, Daily  multivitamin 1 tablet, 1 tablet, Oral, Daily  sodium chloride flush 0.9 % injection 5-40 mL, 5-40 mL, IntraVENous, 2 times per day  sodium chloride flush 0.9 % injection 5-40 mL, 5-40 mL, IntraVENous, PRN  0.9 % sodium chloride infusion, , IntraVENous, PRN  ondansetron (ZOFRAN-ODT) disintegrating tablet 4 mg, 4 mg, Oral, Q8H PRN **OR** ondansetron (ZOFRAN) injection 4 mg, 4 mg, IntraVENous, Q6H PRN  morphine (PF) injection 2 mg, 2 mg, IntraVENous, Q2H PRN **OR** morphine injection 4 mg, 4 mg, IntraVENous, Q2H PRN  oxyCODONE (ROXICODONE) immediate release tablet 5 mg, 5 mg, Oral, Q4H PRN  oxyCODONE (ROXICODONE) immediate release tablet 10 mg, 10 mg, Oral, Q4H PRN  ceFAZolin (ANCEF) 2000 mg in 0.9% sodium chloride 50 mL IVPB, 2,000 mg, IntraVENous, Q8H  senna (SENOKOT) tablet 8.6 mg, 1 tablet, Oral, Daily PRN  magnesium hydroxide (MILK OF MAGNESIA) 400 MG/5ML suspension 30 mL, 30 mL, Oral, Daily PRN        PLAN    Ms. Robin Bhatt is hospital day 3    Has recurrence of infection to her right knee with copious amounts of drainage on exam today, considering imaging, physical exam, and recurrence of infection, a R AKA was recommended to the patient  -risks benefits alternatives to this were discussed at length,patient expressed understanding  -NWB RLE  -dry dressing as tolerates  -NPO   -AC held  -ready for OR today with Dr Jennifer Vincent  -surgical consent completed  -blood product consent completed  -2 units on hold
Oral, PRN **OR** potassium chloride 10 mEq/100 mL IVPB (Peripheral Line), 10 mEq, IntraVENous, PRN  magnesium sulfate 2000 mg in 50 mL IVPB premix, 2,000 mg, IntraVENous, PRN  gabapentin (NEURONTIN) capsule 100 mg, 100 mg, Oral, TID  nicotine (NICODERM CQ) 21 MG/24HR 1 patch, 1 patch, TransDERmal, Daily  cyclobenzaprine (FLEXERIL) tablet 10 mg, 10 mg, Oral, TID PRN  0.9 % sodium chloride infusion, , IntraVENous, PRN  acetaminophen (TYLENOL) tablet 650 mg, 650 mg, Oral, Q6H PRN  atorvastatin (LIPITOR) tablet 40 mg, 40 mg, Oral, Daily  doxepin (SINEQUAN) capsule 40 mg, 40 mg, Oral, Nightly  fenofibrate (TRIGLIDE) tablet 160 mg, 160 mg, Oral, Daily  multivitamin 1 tablet, 1 tablet, Oral, Daily  sodium chloride flush 0.9 % injection 5-40 mL, 5-40 mL, IntraVENous, 2 times per day  sodium chloride flush 0.9 % injection 5-40 mL, 5-40 mL, IntraVENous, PRN  0.9 % sodium chloride infusion, , IntraVENous, PRN  ondansetron (ZOFRAN-ODT) disintegrating tablet 4 mg, 4 mg, Oral, Q8H PRN **OR** ondansetron (ZOFRAN) injection 4 mg, 4 mg, IntraVENous, Q6H PRN  morphine (PF) injection 2 mg, 2 mg, IntraVENous, Q2H PRN **OR** morphine injection 4 mg, 4 mg, IntraVENous, Q2H PRN  oxyCODONE (ROXICODONE) immediate release tablet 5 mg, 5 mg, Oral, Q4H PRN  oxyCODONE (ROXICODONE) immediate release tablet 10 mg, 10 mg, Oral, Q4H PRN  ceFAZolin (ANCEF) 2000 mg in 0.9% sodium chloride 50 mL IVPB, 2,000 mg, IntraVENous, Q8H  senna (SENOKOT) tablet 8.6 mg, 1 tablet, Oral, Daily PRN  magnesium hydroxide (MILK OF MAGNESIA) 400 MG/5ML suspension 30 mL, 30 mL, Oral, Daily PRN        PLAN    Ms. Kumar Thurston is post op day # 1 R AKA    Hgb hct stable  Multimodal pain control, will modify  PT OT as tolerates  Regular diet  Dispo- SNF
bicarb-citric acid (EFFER-K) effervescent tablet 40 mEq, 40 mEq, Oral, PRN **OR** potassium chloride 10 mEq/100 mL IVPB (Peripheral Line), 10 mEq, IntraVENous, PRN  magnesium sulfate 2000 mg in 50 mL IVPB premix, 2,000 mg, IntraVENous, PRN  nicotine (NICODERM CQ) 21 MG/24HR 1 patch, 1 patch, TransDERmal, Daily  cyclobenzaprine (FLEXERIL) tablet 10 mg, 10 mg, Oral, TID PRN  0.9 % sodium chloride infusion, , IntraVENous, PRN  acetaminophen (TYLENOL) tablet 650 mg, 650 mg, Oral, Q6H PRN  atorvastatin (LIPITOR) tablet 40 mg, 40 mg, Oral, Daily  doxepin (SINEQUAN) capsule 40 mg, 40 mg, Oral, Nightly  fenofibrate (TRIGLIDE) tablet 160 mg, 160 mg, Oral, Daily  multivitamin 1 tablet, 1 tablet, Oral, Daily  sodium chloride flush 0.9 % injection 5-40 mL, 5-40 mL, IntraVENous, 2 times per day  sodium chloride flush 0.9 % injection 5-40 mL, 5-40 mL, IntraVENous, PRN  0.9 % sodium chloride infusion, , IntraVENous, PRN  ondansetron (ZOFRAN-ODT) disintegrating tablet 4 mg, 4 mg, Oral, Q8H PRN **OR** ondansetron (ZOFRAN) injection 4 mg, 4 mg, IntraVENous, Q6H PRN  morphine (PF) injection 2 mg, 2 mg, IntraVENous, Q2H PRN **OR** morphine injection 4 mg, 4 mg, IntraVENous, Q2H PRN  oxyCODONE (ROXICODONE) immediate release tablet 5 mg, 5 mg, Oral, Q4H PRN  oxyCODONE (ROXICODONE) immediate release tablet 10 mg, 10 mg, Oral, Q4H PRN  ceFAZolin (ANCEF) 2000 mg in 0.9% sodium chloride 50 mL IVPB, 2,000 mg, IntraVENous, Q8H  senna (SENOKOT) tablet 8.6 mg, 1 tablet, Oral, Daily PRN  magnesium hydroxide (MILK OF MAGNESIA) 400 MG/5ML suspension 30 mL, 30 mL, Oral, Daily PRN        PLAN    Ms. Duval is post op day #3 R AKA    Hgb hct stable  Multimodal pain control  PT OT as tolerates  Dry dressing change as needed, changed POD2  Regular diet  Dispo- with [facility] tomorrow 3/18/23
dislikes hospital food,  bringing food in)  Average Supplements Intake: Unable to assess (pt asleep and  unsure)  ADULT DIET; Regular  ADULT ORAL NUTRITION SUPPLEMENT; Breakfast, Dinner, Lunch; Other Oral Supplement; Yogurt  ADULT ORAL NUTRITION SUPPLEMENT; Breakfast, Dinner; Wound Healing Oral Supplement    Anthropometric Measures:  Height: 5' 7\" (170.2 cm)  Ideal Body Weight (IBW): 135 lbs (61 kg)    Admission Body Weight: 156 lb 15.5 oz (71.2 kg) (3/11; No Edema)  Current Body Weight: 156 lb 15.5 oz (71.2 kg), 116.3 % IBW. Weight Source: Bed Scale  Current BMI (kg/m2): 24.6  Usual Body Weight:  (per pt.  256# a few years ago; per EMR: 6/12/20: 248# 14 oz, 12/15/21: 177# 14 oz, 7/29/22: 173# 13 oz)     Weight Adjustment For: Amputation  Total Adjusted Percentage (Calculated): 10.1  Adjusted Ideal Body Weight (lbs) (Calculated): 121.4 lbs  Adjusted Ideal Body Weight (kg) (Calculated): 55.18 kg  Adjusted % Ideal Body Weight (Calculated): 129.3  Adjusted BMI (kg/m2) (Calculated): 27.1       Estimated Daily Nutrient Needs:  Energy Requirements Based On: Kcal/kg  Weight Used for Energy Requirements: Current (71.2 kg)  Energy (kcal/day): 9479-5311 (25-30 kcal/kg)  Weight Used for Protein Requirements: Current (71.2 kg)  Protein (g/day): 100-114 g/day (1.4-1.6 g/kg ABW) (Increased needs to support post-op healing)       Nutrition Diagnosis:   Moderate malnutrition related to inadequate protein-energy intake as evidenced by Criteria as identified in malnutrition assessment    Nutrition Interventions:   Food and/or Nutrient Delivery: Continue Current Diet, Continue Oral Nutrition Supplement  Nutrition Education/Counseling:  (Discussed with - encouraged PO intake at best efforts, discussed ONS and high protein options)  Coordination of Nutrition Care: Continue to monitor while inpatient, Interdisciplinary Rounds, Coordination of Care       Goals:     Goals: Meet at least 75% of estimated needs, by next
knee contracture. Encourage positioning to avoid further contracture of both LEs. SUBJECTIVE: Pt laying in bed upon arrival, pt agreeable to OT session, RN Gave verbal approval for session, pt's  present for education on slide board transfer     PAIN: 0/10:     Vitals: Nurse checked vitals prior to session    COGNITION: Slow Processing    ADL:   Upper Extremity Dressing: Stand By Assistance. For donning gown  Lower Extremity Dressing: Maximum Assistance. For donning brief with pt rolling side to side in bed . BALANCE:  Sitting Balance:  Stand By Assistance. With pt using railing for support with pt sitting for 2-3 minutes in anticipation for t/f     BED MOBILITY:  Rolling to Left: Contact Guard Assistance with pt using railing  Rolling to Right: [address] with pt using railing  Supine to Sit: Moderate Assistance with the HOB slightly elevated    TRANSFERS:  Sliding Board: Minimal Assistance. From the EOB to the w/c    ADDITIONAL ACTIVITIES:  Pt and  educated on slide board transfers due to pt's  wanting to use slide board to the car. However, pt unable to complete due to car height at 29 inches and pt's w/c at 21 inches and unable to complete due to height. ASSESSMENT:     Activity Tolerance:  Patient tolerance of  treatment: Good treatment tolerance      Discharge Recommendations: Home with assist as needed and Home with Home Health OT  Equipment Recommendations: Equipment Needed: No  Other: Pt owns w/c, hospital bed, [facility]. Plan: Times Per Week: 5-6x  Times Per Day:  Once a day  Current Treatment Recommendations: Strengthening, Balance training, Endurance training, Wheelchair mobility training, Safety education & training, Patient/Caregiver education & training, Equipment evaluation, education, & procurement, Self-Care / ADL    Patient Education  Patient Education: IADL's    Goals  Short Term Goals  Time Frame for Short Term Goals: Until discharge  Short Term Goal 1:
knee contracture. Encourage positioning to avoid further contracture of both LEs. SUBJECTIVE: Pt laying in bed upon arrival, pt agreeable to OT session, RN gave verbal approval for session     PAIN: 5/10: with pt medicated prior to session     Vitals: Nurse checked vitals prior to session    COGNITION: Slow Processing    ADL:   Grooming: Maximum Assistance. With washing hair, combing and drying   Lower Extremity Dressing: Maximum Assistance. For donning/donning brief supine in bed  . BALANCE:  Sitting Balance:  Contact Guard Assistance. With using railing for support in anticipation for transfer      BED MOBILITY:  Supine to Sit: Moderate Assistance with assistance with bringing trunk upright    TRANSFERS:  Sit Pivot: Maximum Assistance. From the EOB to the w/c     ASSESSMENT:     Activity Tolerance:  Patient tolerance of  treatment: Good treatment tolerance      Discharge Recommendations: 24 hour assistance or supervision and Home with Home Health OT  Equipment Recommendations: Equipment Needed: No  Other: Pt owns w/c, hospital bed, [facility]. Plan: Times Per Week: 5-6x  Times Per Day: Once a day  Current Treatment Recommendations: Strengthening, Balance training, Endurance training, Wheelchair mobility training, Safety education & training, Patient/Caregiver education & training, Equipment evaluation, education, & procurement, Self-Care / ADL    Patient Education  Patient Education: ADL's    Goals  Short Term Goals  Time Frame for Short Term Goals: Until discharge  Short Term Goal 1: Pt will complete BUE strengthening exercises with min vcs for technique to increase indep and endurance with all self cares and transfers. Short Term Goal 2: Pt will complete dynamic sitting task x 10 minutes with S and min vcs for safety to increase indep and endurance with all sinkside grooming.   Short Term Goal 3: Pt will complete sit pivot transfers to/from various surfaces with S and min vcs for safety to increase indep with
tablet 40 mEq, 40 mEq, Oral, PRN **OR** potassium chloride 10 mEq/100 mL IVPB (Peripheral Line), 10 mEq, IntraVENous, PRN  magnesium sulfate 2000 mg in 50 mL IVPB premix, 2,000 mg, IntraVENous, PRN  nicotine (NICODERM CQ) 21 MG/24HR 1 patch, 1 patch, TransDERmal, Daily  cyclobenzaprine (FLEXERIL) tablet 10 mg, 10 mg, Oral, TID PRN  0.9 % sodium chloride infusion, , IntraVENous, PRN  acetaminophen (TYLENOL) tablet 650 mg, 650 mg, Oral, Q6H PRN  atorvastatin (LIPITOR) tablet 40 mg, 40 mg, Oral, Daily  doxepin (SINEQUAN) capsule 40 mg, 40 mg, Oral, Nightly  fenofibrate (TRIGLIDE) tablet 160 mg, 160 mg, Oral, Daily  multivitamin 1 tablet, 1 tablet, Oral, Daily  sodium chloride flush 0.9 % injection 5-40 mL, 5-40 mL, IntraVENous, 2 times per day  sodium chloride flush 0.9 % injection 5-40 mL, 5-40 mL, IntraVENous, PRN  0.9 % sodium chloride infusion, , IntraVENous, PRN  ondansetron (ZOFRAN-ODT) disintegrating tablet 4 mg, 4 mg, Oral, Q8H PRN **OR** ondansetron (ZOFRAN) injection 4 mg, 4 mg, IntraVENous, Q6H PRN  morphine (PF) injection 2 mg, 2 mg, IntraVENous, Q2H PRN **OR** morphine injection 4 mg, 4 mg, IntraVENous, Q2H PRN  oxyCODONE (ROXICODONE) immediate release tablet 5 mg, 5 mg, Oral, Q4H PRN  oxyCODONE (ROXICODONE) immediate release tablet 10 mg, 10 mg, Oral, Q4H PRN  ceFAZolin (ANCEF) 2000 mg in 0.9% sodium chloride 50 mL IVPB, 2,000 mg, IntraVENous, Q8H  senna (SENOKOT) tablet 8.6 mg, 1 tablet, Oral, Daily PRN  magnesium hydroxide (MILK OF MAGNESIA) 400 MG/5ML suspension 30 mL, 30 mL, Oral, Daily PRN        PLAN    Ms. Rodrigue Gallardo is post op day #2 R AKA    Hgb hct stable  Multimodal pain control, will modify  PT OT as tolerates  Dressing changed today, tolerated this well, dry dressing   Regular diet  Dispo- SNF
Diet, Modify Oral Nutrition Supplement  Nutrition Education/Counseling: Education initiated (Dicussed ONS, good po intake, and protein intake for wound healing planning R AKA 3/14)  Coordination of Nutrition Care: Continue to monitor while inpatient       Goals:     Goals: PO intake 75% or greater, by next RD assessment       Nutrition Monitoring and Evaluation:   Behavioral-Environmental Outcomes: None Identified  Food/Nutrient Intake Outcomes: Food and Nutrient Intake, Supplement Intake  Physical Signs/Symptoms Outcomes: Biochemical Data, GI Status, Fluid Status or Edema, Weight, Skin    Discharge Planning:     Too soon to determine     Celio Farley, [address] [place] [address]  Contact: [phone]
Final report electronically signed by Dr. Darrion Bundy on 3/11/2023 7:46 PM      XR KNEE RIGHT (1-2 VIEWS)   Final Result   1. Comminuted fracture of the distal femur. 2. Joint effusion and extensive subcutaneous emphysema. Final report electronically signed by Dr. Darrion Bundy on 3/11/2023 7:48 PM          Diet: ADULT DIET;  Regular  ADULT ORAL NUTRITION SUPPLEMENT; Lunch, Dinner; Clear Liquid Oral Supplement  Diet NPO    Code Status: Full Code    PT/OT Eval:         Electronically signed by Cordell Sung MD on 3/13/2023 at 11:16 AM
Pt will complete dynamic sitting task x 10 minutes with S and min vcs for safety to increase indep and endurance with all sinkside grooming. Short Term Goal 3: Pt will complete sit pivot transfers to/from various surfaces with S and min vcs for safety to increase indep with toileting. Short Term Goal 4: Pt will complete LB dressing with min A and min vcs for safety to increase indep and endurance within home environment. Additional Goals?: No    Following session, patient left in safe position with all fall risk precautions in place.
adenopathy. Neurologic:  No focal deficit. No seizures. Data: (All radiographs, tracings, PFTs, and imaging are personally viewed and interpreted unless otherwise noted)  Labs:   Recent Labs     03/14/23  0605 03/15/23  0528   WBC 7.4 6.9   HGB 11.0* 10.2*   HCT 35.9* 33.8*    273     Recent Labs     03/14/23  0605 03/15/23  0528    139   K 3.8 3.8    104   CO2 24 26   BUN 14 12   CREATININE 0.4 0.3*   CALCIUM 8.6 8.7     No results for input(s): AST, ALT, BILIDIR, BILITOT, ALKPHOS in the last 72 hours. No results for input(s): INR in the last 72 hours. No results for input(s): Salome Moreno in the last 72 hours. Urinalysis:   Lab Results   Component Value Date/Time    NITRU NEGATIVE 03/11/2023 09:55 PM    WBCUA 0-2 03/11/2023 09:55 PM    BACTERIA NONE SEEN 03/11/2023 09:55 PM    RBCUA 0-2 03/11/2023 09:55 PM    BLOODU NEGATIVE 03/11/2023 09:55 PM    GLUCOSEU NEGATIVE 03/11/2023 09:55 PM     Urine culture: No results found for: LABURIN  Micro:   Blood culture #1:   Lab Results   Component Value Date/Time    BC No growth 24 hours. No growth 48 hours. 03/11/2023 07:15 PM     Blood culture #2:No results found for: [place]  Organism:  Lab Results   Component Value Date/Time    ORG gram positive cocci 03/14/2023 12:58 PM         Lab Results   Component Value Date/Time    LABGRAM  03/14/2023 12:58 PM     Rare segmented neutrophils observed. Rare epithelial cells observed. Rare gram positive cocci occurring singly and in pairs. MRSA culture only:No results found for: [address] [place] [address]  Respiratory culture: No results found for: CULTRESP  Aerobic and Anaerobic :  Lab Results   Component Value Date/Time    LABAERO heavy growth 03/14/2023 12:58 PM     Lab Results   Component Value Date/Time    LABANAE No growth-preliminary No growth 12/22/2022 08:23 AM       Radiology Reports:  XR CHEST PORTABLE   Final Result      Left lower lung atelectasis/infiltrate.                **This report has been
grooming. Short Term Goal 3: Pt will complete sit pivot transfers to/from various surfaces with S and min vcs for safety to increase indep with toileting. Short Term Goal 4: Pt will complete LB dressing with min A and min vcs for safety to increase indep and endurance within home environment. Additional Goals?: No    Following session, patient left in safe position with all fall risk precautions in place.
with S for safety at home  Short Term Goal 3: Patient to demonstrate WC mobility with S 100 ft for home distances  Short Term Goal 4: Patient to tolerate 10-20 reps of ther ex for improved mobility  Short Term Goal 5: Patient to demonstrate transferring into/out of a car with S for the return home  Additional Goals?: Yes  Short Term Goal 6: Pt to verbalize understanding and demonstrate correct positioning for contracture management  Long Term Goals  Time Frame for Long Term Goals : NA due to ELOS    Following session, patient left in safe position with all fall risk precautions in place.
activities  General Plan:  (6x O)    Goals:  Patient Goals : \"Able to get up, get to my chair, and back\"  Short Term Goals  Time Frame for Short Term Goals: By discharge  Short Term Goal 1: Pt to demonstrate supine to/from sit transfer with S for the return home  Short Term Goal 2: Pt to perform squat-pivot transfer to/from R [address] with S for safety at home  Short Term Goal 3: Patient to demonstrate WC mobility with S 100 ft for home distances  Short Term Goal 4: Patient to tolerate 10-20 reps of ther ex for improved mobility  Short Term Goal 5: Patient to demonstrate transferring into/out of a car with S for the return home  Additional Goals?: Yes  Short Term Goal 6: Pt to verbalize understanding and demonstrate correct positioning for contracture management  Long Term Goals  Time Frame for Long Term Goals : NA due to ELOS    Following session, patient left in safe position with all fall risk precautions in place. Pt in bed following session, all needs and call light in reach, alarm on.

## 2023-03-20 NOTE — DISCHARGE SUMMARY
KRILL OIL PO Take 350 mg by mouth daily      Multiple Vitamins-Minerals (THERAPEUTIC MULTIVITAMIN-MINERALS) tablet Take 1 tablet by mouth daily      aspirin 81 MG tablet Take 81 mg by mouth daily.              STOP taking these medications       baclofen (LIORESAL) 10 MG tablet Comments:   Reason for Stopping:             Activity: activity as tolerated  Diet: regular diet  Wound Care: keep wound clean and dry    Follow-up with Dr Tuyet Isabel in 2 weeks    Signed:  Tomeka Castro PA-C    3/20/2023  7:53 AM

## 2023-03-21 ENCOUNTER — TELEPHONE (OUTPATIENT)
Dept: FAMILY MEDICINE CLINIC | Age: 63
End: 2023-03-21

## 2023-03-21 NOTE — TELEPHONE ENCOUNTER
Care Transitions Initial Follow Up Call    Outreach made within 2 business days of discharge: Yes    Patient: Navarro Zazueta Patient : 1960   MRN: [id number]  Reason for Admission: There are no discharge diagnoses documented for the most recent discharge. Discharge Date: 3/20/23       Spoke with: Pt    Discharge department/facility: [facility]    TCM Interactive Patient Contact:  Was patient able to fill all prescriptions: No, will get them filled today   Was patient instructed to bring all medications to the follow-up visit: Yes  Is patient taking all medications as directed in the discharge summary? Yes  Does patient understand their discharge instructions: Yes  Does patient have questions or concerns that need addressed prior to 7-14 day follow up office visit: no    Scheduled appointment with PCP within 7-14 days. Pt will give us a call back later today to schedule.     Follow Up  Future Appointments   Date Time Provider Elham Alejandro   2023 11:15 AM DO Tricia Gómez & Isreal Natarajan [facility] [address] Frida [place])

## 2023-04-01 LAB
EKG ATRIAL RATE: 185 BPM
EKG P AXIS: 72 DEGREES
EKG Q-T INTERVAL: 294 MS
EKG QRS DURATION: 98 MS
EKG QTC CALCULATION (BAZETT): 434 MS
EKG R AXIS: 92 DEGREES
EKG T AXIS: 33 DEGREES
EKG VENTRICULAR RATE: 131 BPM

## 2023-04-24 RX ORDER — TIZANIDINE 2 MG/1
2 TABLET ORAL 3 TIMES DAILY PRN
Qty: 30 TABLET | Refills: 2 | Status: SHIPPED | OUTPATIENT
Start: 2023-04-24

## 2023-05-03 RX ORDER — DOXEPIN HYDROCHLORIDE 50 MG/1
50 CAPSULE ORAL NIGHTLY
Qty: 90 CAPSULE | Refills: 3 | Status: SHIPPED | OUTPATIENT
Start: 2023-05-03 | End: 2024-05-02

## 2023-05-06 ENCOUNTER — PATIENT MESSAGE (OUTPATIENT)
Dept: FAMILY MEDICINE CLINIC | Age: 63
End: 2023-05-06

## 2023-05-08 RX ORDER — GABAPENTIN 300 MG/1
300 CAPSULE ORAL 3 TIMES DAILY
Qty: 270 CAPSULE | Refills: 3 | Status: SHIPPED | OUTPATIENT
Start: 2023-05-08 | End: 2024-05-07

## 2023-05-08 NOTE — TELEPHONE ENCOUNTER
From: Johnathon Varela  To: Dr. Webb Lesches: 5/6/2023 4:35 PM EDT  Subject: Zahra Juan 300 MG capsules     I know you didn't prescribe this but it really helps with the phantom discomfort and the pain and cramps in both legs. Could you possibly send in a script to [facility] for me Dr Sixto Lazo is done with me.

## 2023-05-19 ENCOUNTER — HOSPITAL ENCOUNTER (OUTPATIENT)
Age: 63
Discharge: HOME | End: 2023-05-19
Payer: OTHER GOVERNMENT

## 2023-05-19 DIAGNOSIS — E11.9 CONTROLLED TYPE 2 DIABETES MELLITUS WITHOUT COMPLICATION, WITHOUT LONG-TERM CURRENT USE OF INSULIN (HCC): ICD-10-CM

## 2023-05-19 LAB
DEPRECATED MEAN GLUCOSE BLD GHB EST-ACNC: 81 MG/DL (ref 70–126)
HBA1C MFR BLD HPLC: 4.7 % (ref 4.4–6.4)

## 2023-05-19 PROCEDURE — 36415 COLL VENOUS BLD VENIPUNCTURE: CPT

## 2023-05-19 PROCEDURE — 83036 HEMOGLOBIN GLYCOSYLATED A1C: CPT

## 2023-05-23 ENCOUNTER — OFFICE VISIT (OUTPATIENT)
Dept: FAMILY MEDICINE CLINIC | Age: 63
End: 2023-05-23
Payer: OTHER GOVERNMENT

## 2023-05-23 VITALS — HEART RATE: 92 BPM | RESPIRATION RATE: 24 BRPM | SYSTOLIC BLOOD PRESSURE: 104 MMHG | DIASTOLIC BLOOD PRESSURE: 68 MMHG

## 2023-05-23 DIAGNOSIS — I10 HYPERTENSION, ESSENTIAL: ICD-10-CM

## 2023-05-23 DIAGNOSIS — E78.5 DYSLIPIDEMIA: ICD-10-CM

## 2023-05-23 DIAGNOSIS — E11.9 TYPE 2 DIABETES, DIET CONTROLLED (HCC): ICD-10-CM

## 2023-05-23 DIAGNOSIS — E78.5 HYPERLIPIDEMIA, UNSPECIFIED HYPERLIPIDEMIA TYPE: ICD-10-CM

## 2023-05-23 DIAGNOSIS — Z89.611 S/P AKA (ABOVE KNEE AMPUTATION) UNILATERAL, RIGHT (HCC): Primary | ICD-10-CM

## 2023-05-23 DIAGNOSIS — G47.00 INSOMNIA, UNSPECIFIED TYPE: ICD-10-CM

## 2023-05-23 PROCEDURE — 99214 OFFICE O/P EST MOD 30 MIN: CPT | Performed by: FAMILY MEDICINE

## 2023-05-23 PROCEDURE — 3074F SYST BP LT 130 MM HG: CPT | Performed by: FAMILY MEDICINE

## 2023-05-23 PROCEDURE — 3078F DIAST BP <80 MM HG: CPT | Performed by: FAMILY MEDICINE

## 2023-05-23 PROCEDURE — 3044F HG A1C LEVEL LT 7.0%: CPT | Performed by: FAMILY MEDICINE

## 2023-05-23 RX ORDER — TIZANIDINE 4 MG/1
4 TABLET ORAL 3 TIMES DAILY
Qty: 90 TABLET | Refills: 2 | Status: SHIPPED | OUTPATIENT
Start: 2023-05-23

## 2023-05-23 RX ORDER — TRAMADOL HYDROCHLORIDE 50 MG/1
50 TABLET ORAL EVERY 8 HOURS PRN
Qty: 30 TABLET | Refills: 0 | Status: SHIPPED | OUTPATIENT
Start: 2023-05-23 | End: 2023-06-02

## 2023-05-23 ASSESSMENT — ENCOUNTER SYMPTOMS
GASTROINTESTINAL NEGATIVE: 1
RESPIRATORY NEGATIVE: 1

## 2023-05-23 NOTE — PROGRESS NOTES
Nanda Varela (:  1960) is a 58 y.o. female,Established patient, here for evaluation of the following chief complaint(s):  3 Month Follow-Up          Subjective   SUBJECTIVE/OBJECTIVE:  HPI  Chief Complaint   Patient presents with    3 Month Follow-Up     3 month eval.    S/P right AKA with Dr. Zafar Mortensen. Now set up with [facility]. Still having pain and spasms in the leg, no longer seeing Ortho. Requesting medication. Sugars still doing great, diet controlled. Hx of DM.   Lab Results   Component Value Date    LABA1C 4.7 2023    LABA1C 5.3 2021    LABA1C 5.1 2021     Lab Results   Component Value Date    GLUF 141 (H) 2016    LABMICR 10.05 2021    LDLCALC 84 2021    CREATININE 0.3 (L) 2023     BP Readings from Last 3 Encounters:   23 104/68   23 (!) 102/50   23 (!) 102/58     Wt Readings from Last 3 Encounters:   23 156 lb 15.5 oz (71.2 kg)   01/10/23 158 lb (71.7 kg)   22 160 lb (72.6 kg)       Patient Active Problem List   Diagnosis    Diabetes mellitus type 2, noninsulin dependent (HCC)    Hyperlipemia    THORNTON (nonalcoholic steatohepatitis)    Obesity    Tobacco abuse    Vitamin D deficiency    Seborrheic keratosis    Dyshidrotic eczema    Displaced articular fracture of head of right femur, sequela    Neida-prosthetic supracondylar fracture of femur    Primary hypertension    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

## 2023-08-25 RX ORDER — TIZANIDINE 4 MG/1
4 TABLET ORAL 3 TIMES DAILY
Qty: 90 TABLET | Refills: 2 | Status: SHIPPED | OUTPATIENT
Start: 2023-08-25

## 2023-10-03 ENCOUNTER — TELEPHONE (OUTPATIENT)
Dept: FAMILY MEDICINE CLINIC | Age: 63
End: 2023-10-03

## 2023-10-03 DIAGNOSIS — Z89.611 S/P AKA (ABOVE KNEE AMPUTATION) UNILATERAL, RIGHT (HCC): ICD-10-CM

## 2023-10-03 DIAGNOSIS — R53.81 PHYSICAL DECONDITIONING: ICD-10-CM

## 2023-10-03 DIAGNOSIS — M24.562 FLEXION CONTRACTURE OF LEFT KNEE: Primary | ICD-10-CM

## 2023-10-03 DIAGNOSIS — E11.9 CONTROLLED TYPE 2 DIABETES MELLITUS WITHOUT COMPLICATION, WITHOUT LONG-TERM CURRENT USE OF INSULIN (HCC): ICD-10-CM

## 2023-10-03 DIAGNOSIS — M23.52 RECURRENT LEFT KNEE INSTABILITY: ICD-10-CM

## 2023-10-03 DIAGNOSIS — I10 HYPERTENSION, ESSENTIAL: ICD-10-CM

## 2023-10-03 RX ORDER — OXYCODONE HYDROCHLORIDE AND ACETAMINOPHEN 5; 325 MG/1; MG/1
1 TABLET ORAL EVERY 8 HOURS PRN
Qty: 30 TABLET | Refills: 0 | Status: SHIPPED | OUTPATIENT
Start: 2023-10-03 | End: 2023-10-13

## 2023-10-03 NOTE — TELEPHONE ENCOUNTER
----- Message from Pilar Garcia sent at 10/3/2023 10:43 AM EDT -----  Subject: Refill Request    QUESTIONS  Name of Medication? oxyCODONE-acetaminophen (PERCOCET) 5-325 MG per tablet  Patient-reported dosage and instructions? 5-325 mg as needed for pain  How many days do you have left? 0  Preferred Pharmacy? [address] [place], [place] [id number]  Pharmacy phone number (if available)? [phone]  Additional Information for Provider? Patient is in a lot of pain and will   be in even more pain if she starts therapy.   ---------------------------------------------------------------------------  --------------  CALL BACK INFO  What is the best way for the office to contact you? OK to leave message on   voicemail  Preferred Call Back Phone Number? [phone]  ---------------------------------------------------------------------------  --------------  SCRIPT ANSWERS  Relationship to Patient?  Self

## 2023-10-03 NOTE — TELEPHONE ENCOUNTER
Called and spoke with the patient, she is requesting [facility] for therapy. She is also asking for pain medication. She states that her right leg is contracted and she is in severe pain and is not able to sleep. The patient uses [facility]. If no call back the patient will check with her pharmacy after 4:00pm.      The patient is aware that [facility] will call her to schedule.

## 2023-10-03 NOTE — TELEPHONE ENCOUNTER
----- Message from Doyle Whitt sent at 10/3/2023 10:41 AM EDT -----  Subject: Referral Request    Reason for referral request? Patient needs home health therapy for   prosthetic legs. Provider patient wants to be referred to(if known):     Provider Phone Number(if known):     Additional Information for Provider?   ---------------------------------------------------------------------------  --------------  [address]    [phone]; OK to leave message on voicemail  ---------------------------------------------------------------------------  --------------

## 2023-11-07 ENCOUNTER — HOSPITAL ENCOUNTER (OUTPATIENT)
Dept: MAMMOGRAPHY | Age: 63
Discharge: HOME | End: 2023-11-07
Payer: OTHER GOVERNMENT

## 2023-11-07 DIAGNOSIS — I10 HYPERTENSION, ESSENTIAL: ICD-10-CM

## 2023-11-07 DIAGNOSIS — Z12.31 VISIT FOR SCREENING MAMMOGRAM: ICD-10-CM

## 2023-11-07 DIAGNOSIS — E78.5 HYPERLIPIDEMIA, UNSPECIFIED HYPERLIPIDEMIA TYPE: ICD-10-CM

## 2023-11-07 DIAGNOSIS — E11.9 TYPE 2 DIABETES, DIET CONTROLLED (HCC): ICD-10-CM

## 2023-11-07 LAB
ALBUMIN SERPL BCG-MCNC: 4 G/DL (ref 3.5–5.1)
ALP SERPL-CCNC: 72 U/L (ref 38–126)
ALT SERPL W/O P-5'-P-CCNC: 13 U/L (ref 11–66)
ANION GAP SERPL CALC-SCNC: 13 MEQ/L (ref 8–16)
AST SERPL-CCNC: 22 U/L (ref 5–40)
BASOPHILS ABSOLUTE: 0 THOU/MM3 (ref 0–0.1)
BASOPHILS NFR BLD AUTO: 0.5 %
BILIRUB SERPL-MCNC: 0.4 MG/DL (ref 0.3–1.2)
BUN SERPL-MCNC: 19 MG/DL (ref 7–22)
CALCIUM SERPL-MCNC: 10 MG/DL (ref 8.5–10.5)
CHLORIDE SERPL-SCNC: 101 MEQ/L (ref 98–111)
CHOLEST SERPL-MCNC: 158 MG/DL (ref 100–199)
CO2 SERPL-SCNC: 25 MEQ/L (ref 23–33)
CREAT SERPL-MCNC: 0.5 MG/DL (ref 0.4–1.2)
DEPRECATED MEAN GLUCOSE BLD GHB EST-ACNC: 99 MG/DL (ref 70–126)
DEPRECATED RDW RBC AUTO: 53.1 FL (ref 35–45)
EOSINOPHIL NFR BLD AUTO: 4 %
EOSINOPHILS ABSOLUTE: 0.3 THOU/MM3 (ref 0–0.4)
ERYTHROCYTE [DISTWIDTH] IN BLOOD BY AUTOMATED COUNT: 15.4 % (ref 11.5–14.5)
GFR SERPL CREATININE-BSD FRML MDRD: > 60 ML/MIN/1.73M2
GLUCOSE SERPL-MCNC: 98 MG/DL (ref 70–108)
HBA1C MFR BLD HPLC: 5.3 % (ref 4.4–6.4)
HCT VFR BLD AUTO: 50.3 % (ref 37–47)
HDLC SERPL-MCNC: 36 MG/DL
HGB BLD-MCNC: 15.8 GM/DL (ref 12–16)
IMM GRANULOCYTES # BLD AUTO: 0.03 THOU/MM3 (ref 0–0.07)
IMM GRANULOCYTES NFR BLD AUTO: 0.4 %
LDLC SERPL CALC-MCNC: 88 MG/DL
LYMPHOCYTES ABSOLUTE: 1.8 THOU/MM3 (ref 1–4.8)
LYMPHOCYTES NFR BLD AUTO: 20.6 %
MCH RBC QN AUTO: 29.5 PG (ref 26–33)
MCHC RBC AUTO-ENTMCNC: 31.4 GM/DL (ref 32.2–35.5)
MCV RBC AUTO: 93.8 FL (ref 81–99)
MONOCYTES ABSOLUTE: 0.6 THOU/MM3 (ref 0.4–1.3)
MONOCYTES NFR BLD AUTO: 6.7 %
NEUTROPHILS NFR BLD AUTO: 67.8 %
NRBC BLD AUTO-RTO: 0 /100 WBC
PLATELET # BLD AUTO: 275 THOU/MM3 (ref 130–400)
PMV BLD AUTO: 11.9 FL (ref 9.4–12.4)
POTASSIUM SERPL-SCNC: 4.1 MEQ/L (ref 3.5–5.2)
PROT SERPL-MCNC: 7.7 G/DL (ref 6.1–8)
RBC # BLD AUTO: 5.36 MILL/MM3 (ref 4.2–5.4)
SEGMENTED NEUTROPHILS ABSOLUTE COUNT: 5.8 THOU/MM3 (ref 1.8–7.7)
SODIUM SERPL-SCNC: 139 MEQ/L (ref 135–145)
TRIGL SERPL-MCNC: 170 MG/DL (ref 0–199)
TSH SERPL DL<=0.005 MIU/L-ACNC: 1.88 UIU/ML (ref 0.4–4.2)
WBC # BLD AUTO: 8.5 THOU/MM3 (ref 4.8–10.8)

## 2023-11-07 PROCEDURE — 84443 ASSAY THYROID STIM HORMONE: CPT

## 2023-11-07 PROCEDURE — 83036 HEMOGLOBIN GLYCOSYLATED A1C: CPT

## 2023-11-07 PROCEDURE — 77067 SCR MAMMO BI INCL CAD: CPT

## 2023-11-07 PROCEDURE — 80053 COMPREHEN METABOLIC PANEL: CPT

## 2023-11-07 PROCEDURE — 36415 COLL VENOUS BLD VENIPUNCTURE: CPT

## 2023-11-07 PROCEDURE — 80061 LIPID PANEL: CPT

## 2023-11-07 PROCEDURE — 85025 COMPLETE CBC W/AUTO DIFF WBC: CPT

## 2023-11-22 ENCOUNTER — OFFICE VISIT (OUTPATIENT)
Dept: FAMILY MEDICINE CLINIC | Age: 63
End: 2023-11-22
Payer: OTHER GOVERNMENT

## 2023-11-22 VITALS — DIASTOLIC BLOOD PRESSURE: 64 MMHG | SYSTOLIC BLOOD PRESSURE: 122 MMHG | HEART RATE: 80 BPM | RESPIRATION RATE: 16 BRPM

## 2023-11-22 DIAGNOSIS — G47.00 INSOMNIA, UNSPECIFIED TYPE: ICD-10-CM

## 2023-11-22 DIAGNOSIS — E78.5 DYSLIPIDEMIA: ICD-10-CM

## 2023-11-22 DIAGNOSIS — E78.5 HYPERLIPIDEMIA, UNSPECIFIED HYPERLIPIDEMIA TYPE: ICD-10-CM

## 2023-11-22 DIAGNOSIS — Z89.611 S/P AKA (ABOVE KNEE AMPUTATION) UNILATERAL, RIGHT (HCC): ICD-10-CM

## 2023-11-22 DIAGNOSIS — R73.01 IFG (IMPAIRED FASTING GLUCOSE): Primary | ICD-10-CM

## 2023-11-22 DIAGNOSIS — I10 HYPERTENSION, ESSENTIAL: ICD-10-CM

## 2023-11-22 PROCEDURE — 99214 OFFICE O/P EST MOD 30 MIN: CPT | Performed by: FAMILY MEDICINE

## 2023-11-22 PROCEDURE — 3074F SYST BP LT 130 MM HG: CPT | Performed by: FAMILY MEDICINE

## 2023-11-22 PROCEDURE — 3078F DIAST BP <80 MM HG: CPT | Performed by: FAMILY MEDICINE

## 2023-11-22 RX ORDER — TIZANIDINE 4 MG/1
4 TABLET ORAL 3 TIMES DAILY
Qty: 90 TABLET | Refills: 2 | Status: SHIPPED | OUTPATIENT
Start: 2023-11-22

## 2023-11-22 ASSESSMENT — ENCOUNTER SYMPTOMS
RESPIRATORY NEGATIVE: 1
GASTROINTESTINAL NEGATIVE: 1

## 2023-11-22 NOTE — PROGRESS NOTES
Frank Shelton (:  1960) is a 58 y.o. female,Established patient, here for evaluation of the following chief complaint(s):  6 Month Follow-Up and Discuss Labs          Subjective   SUBJECTIVE/OBJECTIVE:  HPI  Chief Complaint   Patient presents with    6 Month Follow-Up    Discuss Labs     6 month eval.    Sugars continue to look fine off all medication. Lab Results   Component Value Date    LABA1C 5.3 2023    LABA1C 4.7 2023    LABA1C 5.3 2021     Lab Results   Component Value Date    GLUF 141 (H) 2016    MALBCR 124 (H) 2021    LDLCALC 88 2023    CREATININE 0.5 2023     BP Readings from Last 3 Encounters:   23 122/64   23 104/68   23 (!) 102/50     Wt Readings from Last 3 Encounters:   23 71.2 kg (156 lb 15.5 oz)   01/10/23 71.7 kg (158 lb)   22 72.6 kg (160 lb)       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)    History of total knee replacement, right    Enterocolitis due to Clostridium difficile, not specified as recurrent    Recurrent major depressive disorder ([address])    Aftercare following knee joint replacement surgery    Primary insomnia    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  [address] [place] [address]

## 2023-12-01 ENCOUNTER — PATIENT MESSAGE (OUTPATIENT)
Dept: FAMILY MEDICINE CLINIC | Age: 63
End: 2023-12-01

## 2023-12-01 DIAGNOSIS — R53.81 PHYSICAL DECONDITIONING: ICD-10-CM

## 2023-12-01 DIAGNOSIS — M24.562 FLEXION CONTRACTURE OF LEFT KNEE: ICD-10-CM

## 2023-12-01 DIAGNOSIS — M23.52 RECURRENT LEFT KNEE INSTABILITY: ICD-10-CM

## 2023-12-01 DIAGNOSIS — Z89.611 S/P AKA (ABOVE KNEE AMPUTATION) UNILATERAL, RIGHT (HCC): Primary | ICD-10-CM
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
## 2023-12-10 NOTE — PLAN OF CARE
Problem: Discharge Planning  Goal: Discharge to home or other facility with appropriate resources  Outcome: Progressing  Flowsheets (Taken 6/21/2022 0334)  Discharge to home or other facility with appropriate resources:   Identify barriers to discharge with patient and caregiver   Arrange for needed discharge resources and transportation as appropriate   Identify discharge learning needs (meds, wound care, etc)     Problem: Pain  Goal: Verbalizes/displays adequate comfort level or baseline comfort level  Outcome: Progressing  Flowsheets (Taken 6/21/2022 0334)  Verbalizes/displays adequate comfort level or baseline comfort level:   Encourage patient to monitor pain and request assistance   Assess pain using appropriate pain scale   Administer analgesics based on type and severity of pain and evaluate response     Problem: Safety - Adult  Goal: Free from fall injury  Outcome: Progressing  Flowsheets  Taken 6/21/2022 0334  Free From Fall Injury:   Instruct family/caregiver on patient safety   Based on caregiver fall risk screen, instruct family/caregiver to ask for assistance with transferring infant if caregiver noted to have fall risk factors  Taken 6/21/2022 0108  Free From Fall Injury: Instruct family/caregiver on patient safety     Problem: Chronic Conditions and Co-morbidities  Goal: Patient's chronic conditions and co-morbidity symptoms are monitored and maintained or improved  Outcome: Progressing  Flowsheets (Taken 6/21/2022 0334)  Care Plan - Patient's Chronic Conditions and Co-Morbidity Symptoms are Monitored and Maintained or Improved:   Collaborate with multidisciplinary team to address chronic and comorbid conditions and prevent exacerbation or deterioration   Update acute care plan with appropriate goals if chronic or comorbid symptoms are exacerbated and prevent overall improvement and discharge   Monitor and assess patient's chronic conditions and comorbid symptoms for stability, deterioration, or improvement     Problem: Skin/Tissue Integrity  Goal: Absence of new skin breakdown  Description: 1. Monitor for areas of redness and/or skin breakdown  2. Assess vascular access sites hourly  3. Every 4-6 hours minimum:  Change oxygen saturation probe site  4. Every 4-6 hours:  If on nasal continuous positive airway pressure, respiratory therapy assess nares and determine need for appliance change or resting period. Outcome: Progressing  Note: No new skin breakdown noted at this time.      Problem: Musculoskeletal - Adult  Goal: Return mobility to safest level of function  Outcome: Progressing  Flowsheets (Taken 6/21/2022 0334)  Return Mobility to Safest Level of Function:   Assess patient stability and activity tolerance for standing, transferring and ambulating with or without assistive devices   Assist with transfers and ambulation using safe patient handling equipment as needed   Ensure adequate protection for wounds/incisions during mobilization  Goal: Maintain proper alignment of affected body part  Outcome: Progressing  Flowsheets (Taken 6/21/2022 0334)  Maintain proper alignment of affected body part:   Support and protect limb and body alignment per provider's orders   Instruct and reinforce with patient and family use of appropriate assistive device and precautions (e.g. spinal or hip dislocation precautions)     Problem: Skin/Tissue Integrity - Adult  Goal: Incisions, wounds, or drain sites healing without S/S of infection  Outcome: Progressing  Flowsheets  Taken 6/21/2022 0334  Incisions, Wounds, or Drain Sites Healing Without Sign and Symptoms of Infection: TWICE DAILY: Assess and document skin integrity  Taken 6/21/2022 0108  Incisions, Wounds, or Drain Sites Healing Without Sign and Symptoms of Infection: TWICE DAILY: Assess and document skin integrity     Problem: Infection - Adult  Goal: Absence of infection at discharge  Outcome: Progressing  Flowsheets (Taken 6/21/2022 0334)  Absence of infection at discharge:   Assess and monitor for signs and symptoms of infection   Monitor lab/diagnostic results   Monitor all insertion sites i.e., indwelling lines, tubes and drains  Goal: Absence of infection during hospitalization  Outcome: Progressing  Flowsheets (Taken 6/21/2022 0334)  Absence of infection during hospitalization:   Assess and monitor for signs and symptoms of infection   Monitor lab/diagnostic results   Monitor all insertion sites i.e., indwelling lines, tubes and drains     Problem: Nutrition Deficit:  Goal: Optimize nutritional status  Outcome: Progressing  Flowsheets (Taken 6/21/2022 0334)  Nutrient intake appropriate for improving, restoring, or maintaining nutritional needs:   Assess nutritional status and recommend course of action   Recommend appropriate diets, oral nutritional supplements, and vitamin/mineral supplements   Order, calculate, and assess calorie counts as needed     Care plan reviewed with patient. Patient verbalizes understanding of the plan of care and contributes to goal setting. No

## 2024-01-08 DIAGNOSIS — E11.9 CONTROLLED TYPE 2 DIABETES MELLITUS WITHOUT COMPLICATION, WITHOUT LONG-TERM CURRENT USE OF INSULIN (HCC): ICD-10-CM

## 2024-01-08 DIAGNOSIS — E78.5 HYPERLIPIDEMIA, UNSPECIFIED HYPERLIPIDEMIA TYPE: ICD-10-CM

## 2024-01-08 RX ORDER — FENOFIBRATE 160 MG/1
TABLET ORAL
Qty: 90 TABLET | Refills: 3 | Status: SHIPPED | OUTPATIENT
Start: 2024-01-08

## 2024-01-08 RX ORDER — ATORVASTATIN CALCIUM 40 MG/1
TABLET, FILM COATED ORAL
Qty: 30 TABLET | Refills: 0 | Status: SHIPPED | OUTPATIENT
Start: 2024-01-08

## 2024-01-08 RX ORDER — ATORVASTATIN CALCIUM 40 MG/1
TABLET, FILM COATED ORAL
Qty: 90 TABLET | Refills: 3 | Status: SHIPPED | OUTPATIENT
Start: 2024-01-08 | End: 2024-01-08 | Stop reason: SDUPTHER

## 2024-01-28 DIAGNOSIS — E78.5 HYPERLIPIDEMIA, UNSPECIFIED HYPERLIPIDEMIA TYPE: ICD-10-CM

## 2024-01-29 RX ORDER — ATORVASTATIN CALCIUM 40 MG/1
TABLET, FILM COATED ORAL
Qty: 90 TABLET | Refills: 3 | Status: SHIPPED | OUTPATIENT
Start: 2024-01-29

## 2024-02-06 ENCOUNTER — PATIENT MESSAGE (OUTPATIENT)
Dept: FAMILY MEDICINE CLINIC | Age: 64
End: 2024-02-06

## 2024-02-07 NOTE — TELEPHONE ENCOUNTER
From: Kaycee Varela  To: Dr. Jose Lazaro  Sent: 2/6/2024 12:46 PM EST  Subject: Depression     Can I get something for depression.  I don't sleep very good I'm up till 4 sometimes I'm up till 6 or 7 in the morning. Melatonin does not help.  I'm not hungry and have no energy or desire to work on getting my leg strait.  If you could help me with this I'd appreciate it.

## 2024-02-29 RX ORDER — TIZANIDINE 4 MG/1
4 TABLET ORAL 3 TIMES DAILY
Qty: 90 TABLET | Refills: 2 | Status: SHIPPED | OUTPATIENT
Start: 2024-02-29

## 2024-03-19 ENCOUNTER — HOSPITAL ENCOUNTER (EMERGENCY)
Age: 64
Discharge: HOME | End: 2024-03-19
Attending: EMERGENCY MEDICINE
Payer: MEDICARE

## 2024-03-19 ENCOUNTER — TELEPHONE (OUTPATIENT)
Dept: FAMILY MEDICINE CLINIC | Age: 64
End: 2024-03-19

## 2024-03-19 ENCOUNTER — APPOINTMENT (OUTPATIENT)
Dept: CT IMAGING | Age: 64
End: 2024-03-19
Payer: MEDICARE

## 2024-03-19 VITALS
HEART RATE: 95 BPM | RESPIRATION RATE: 18 BRPM | DIASTOLIC BLOOD PRESSURE: 71 MMHG | SYSTOLIC BLOOD PRESSURE: 170 MMHG | TEMPERATURE: 97.8 F | OXYGEN SATURATION: 93 %

## 2024-03-19 DIAGNOSIS — S20.211A RIB CONTUSION, RIGHT, INITIAL ENCOUNTER: ICD-10-CM

## 2024-03-19 DIAGNOSIS — S00.03XA CONTUSION OF SCALP, INITIAL ENCOUNTER: Primary | ICD-10-CM

## 2024-03-19 PROCEDURE — 70450 CT HEAD/BRAIN W/O DYE: CPT

## 2024-03-19 PROCEDURE — 6370000000 HC RX 637 (ALT 250 FOR IP): Performed by: EMERGENCY MEDICINE

## 2024-03-19 PROCEDURE — 99284 EMERGENCY DEPT VISIT MOD MDM: CPT

## 2024-03-19 PROCEDURE — 93005 ELECTROCARDIOGRAM TRACING: CPT | Performed by: EMERGENCY MEDICINE

## 2024-03-19 PROCEDURE — 71250 CT THORAX DX C-: CPT

## 2024-03-19 PROCEDURE — 72125 CT NECK SPINE W/O DYE: CPT

## 2024-03-19 RX ORDER — ACETAMINOPHEN 325 MG/1
650 TABLET ORAL ONCE
Status: DISCONTINUED | OUTPATIENT
Start: 2024-03-19 | End: 2024-03-19 | Stop reason: HOSPADM

## 2024-03-19 RX ORDER — LIDOCAINE 4 G/G
1 PATCH TOPICAL ONCE
Status: DISCONTINUED | OUTPATIENT
Start: 2024-03-19 | End: 2024-03-19 | Stop reason: HOSPADM

## 2024-03-19 ASSESSMENT — PAIN - FUNCTIONAL ASSESSMENT: PAIN_FUNCTIONAL_ASSESSMENT: 0-10

## 2024-03-19 ASSESSMENT — ENCOUNTER SYMPTOMS
SHORTNESS OF BREATH: 1
ABDOMINAL PAIN: 0
NAUSEA: 0
VOMITING: 0
BACK PAIN: 0

## 2024-03-19 ASSESSMENT — PAIN DESCRIPTION - LOCATION: LOCATION: RIB CAGE

## 2024-03-19 ASSESSMENT — PAIN SCALES - GENERAL: PAINLEVEL_OUTOF10: 1

## 2024-03-19 ASSESSMENT — PAIN DESCRIPTION - ORIENTATION: ORIENTATION: RIGHT

## 2024-03-19 NOTE — TELEPHONE ENCOUNTER
Patient called and stated that she just got a refill on her zoloft and it ended up in the trash. She stated that the trash just went out and it was picked up by the garage company so she is unable to get the medication back.     She did call [facility] in [place] and they stated that patient will need a new prescription to get her to the next refill date.     Asked patient if she received a 90 day supply and she thought it was 30 day or 60 day supply.     Patient will check with the pharmacy around 3:00 pm today

## 2024-03-19 NOTE — DISCHARGE INSTRUCTIONS
Use the incentive spirometer 10 times every hour while home and awake.  This will help you take nice deep breaths and prevent pneumonia.  Removed with numbing patch at 5 AM.  If it was very helpful you can get over-the-counter lidocaine patches and use 12 hours on 12 hours off.  When patches not on can use Sportscreme like Bengay or IcYummlyHot.  Can take Tylenol as needed.  Get rechecked for any rapidly worsening symptoms or new concerns.
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
## 2024-03-19 NOTE — ED PROVIDER NOTES
includes Breast Cancer (age of onset: 60) in her sister; Cancer in her mother; Colon Polyps in her mother; Crohn's Disease in her mother; Dementia in her father and mother; Diabetes in her sister; Heart Disease in her brother; High Blood Pressure in her mother; High Cholesterol in her mother; Other in her father and mother; Pacemaker in her mother; Thyroid Disease in her mother.    SOCIAL HISTORY      reports that she has been smoking cigarettes. She has a 49.0 pack-year smoking history. She has never used smokeless tobacco. She reports that she does not drink alcohol and does not use drugs.    PHYSICAL EXAM     INITIAL VITALS:  oral temperature is 97.8 °F (36.6 °C). Her blood pressure is 170/71 (abnormal) and her pulse is 95. Her respiration is 18 and oxygen saturation is 93%.   Physical Exam  Vitals and nursing note reviewed.   Constitutional:       General: She is not in acute distress.     Appearance: She is well-developed. She is not ill-appearing, toxic-appearing or diaphoretic.   HENT:      Head: Normocephalic and atraumatic.   Pulmonary:      Effort: Pulmonary effort is normal.      Breath sounds: Normal breath sounds. No decreased breath sounds.   Chest:      Chest wall: Tenderness present.          Comments: Tender over right lateral ribs without any paradoxical movements or deformity.  No bruising or ecchymosis.  Abdominal:      Palpations: Abdomen is soft.      Tenderness: There is no abdominal tenderness. There is no guarding or rebound.   Musculoskeletal:         General: Normal range of motion.      Cervical back: Full passive range of motion without pain, normal range of motion and neck supple. No pain with movement or muscular tenderness.      Comments: Right leg with above-the-knee amputation.  Left leg normal.  No tenderness.  Pelvis stable.  Upper extremities normal at major joints   Neurological:      General: No focal deficit present.      Mental Status: She is alert and oriented to person,

## 2024-03-20 ENCOUNTER — TELEPHONE (OUTPATIENT)
Dept: FAMILY MEDICINE CLINIC | Age: 64
End: 2024-03-20

## 2024-03-20 LAB
EKG ATRIAL RATE: 97 BPM
EKG P AXIS: 75 DEGREES
EKG P-R INTERVAL: 138 MS
EKG Q-T INTERVAL: 374 MS
EKG QRS DURATION: 118 MS
EKG QTC CALCULATION (BAZETT): 474 MS
EKG R AXIS: 63 DEGREES
EKG T AXIS: 45 DEGREES
EKG VENTRICULAR RATE: 97 BPM

## 2024-03-20 PROCEDURE — 93010 ELECTROCARDIOGRAM REPORT: CPT | Performed by: INTERNAL MEDICINE

## 2024-04-18 ENCOUNTER — TELEPHONE (OUTPATIENT)
Dept: FAMILY MEDICINE CLINIC | Age: 64
End: 2024-04-18

## 2024-04-18 DIAGNOSIS — R07.81 RIB PAIN: Primary | ICD-10-CM

## 2024-04-18 RX ORDER — TRAMADOL HYDROCHLORIDE 50 MG/1
50 TABLET ORAL EVERY 6 HOURS PRN
Qty: 12 TABLET | Refills: 0 | Status: SHIPPED | OUTPATIENT
Start: 2024-04-18 | End: 2024-04-21
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
## 2024-04-19 ENCOUNTER — TELEPHONE (OUTPATIENT)
Dept: FAMILY MEDICINE CLINIC | Age: 64
End: 2024-04-19

## 2024-04-19 DIAGNOSIS — Z89.619: Primary | ICD-10-CM

## 2024-04-19 NOTE — TELEPHONE ENCOUNTER
Patient requesting Rx for Bariatric hospital bed to [facility]. \"I want a bariatric one because it is wider.\"  Aware provider is gone for today.  Will be addressed on Monday.  She voices understanding.

## 2024-04-22 DIAGNOSIS — R07.81 RIB PAIN: ICD-10-CM

## 2024-04-22 RX ORDER — TRAMADOL HYDROCHLORIDE 50 MG/1
50 TABLET ORAL EVERY 6 HOURS PRN
Qty: 20 TABLET | Refills: 0 | Status: SHIPPED | OUTPATIENT
Start: 2024-04-22 | End: 2024-04-27

## 2024-04-22 NOTE — TELEPHONE ENCOUNTER
Called and updated the patient, she voiced understanding.  She is asking for a refill on Tramadol 50mg to be sent to [facility] [place].      If no call back the patient will check with her pharmacy after 2pm.

## 2024-04-22 NOTE — TELEPHONE ENCOUNTER
Called and updated mom, advised her that we will call her when the script is able to be printed.  She voiced understanding.

## 2024-04-22 NOTE — TELEPHONE ENCOUNTER
Rx sent.  In regards to the bed, please tell her to hold off for now.  I'm unable to print it out at the moment.

## 2024-04-25 ENCOUNTER — HOSPITAL ENCOUNTER (EMERGENCY)
Age: 64
Discharge: HOME | End: 2024-04-25
Attending: EMERGENCY MEDICINE
Payer: MEDICARE

## 2024-04-25 ENCOUNTER — APPOINTMENT (OUTPATIENT)
Dept: CT IMAGING | Age: 64
End: 2024-04-25
Payer: MEDICARE

## 2024-04-25 VITALS
HEIGHT: 67 IN | TEMPERATURE: 98.4 F | WEIGHT: 170 LBS | HEART RATE: 90 BPM | DIASTOLIC BLOOD PRESSURE: 61 MMHG | BODY MASS INDEX: 26.68 KG/M2 | OXYGEN SATURATION: 93 % | RESPIRATION RATE: 22 BRPM | SYSTOLIC BLOOD PRESSURE: 162 MMHG

## 2024-04-25 DIAGNOSIS — K11.20 PAROTIDITIS: Primary | ICD-10-CM

## 2024-04-25 LAB
ANION GAP SERPL CALC-SCNC: 11 MEQ/L (ref 8–16)
BASOPHILS ABSOLUTE: 0 THOU/MM3 (ref 0–0.1)
BASOPHILS NFR BLD AUTO: 0.3 %
BUN SERPL-MCNC: 11 MG/DL (ref 7–22)
CALCIUM SERPL-MCNC: 9.2 MG/DL (ref 8.5–10.5)
CHLORIDE SERPL-SCNC: 97 MEQ/L (ref 98–111)
CO2 SERPL-SCNC: 26 MEQ/L (ref 23–33)
CREAT SERPL-MCNC: 0.4 MG/DL (ref 0.4–1.2)
CRP SERPL-MCNC: 8.64 MG/DL (ref 0–1)
CRP SERPL-MCNC: NORMAL MG/DL (ref 0–1)
DEPRECATED RDW RBC AUTO: 48.3 FL (ref 35–45)
EKG ATRIAL RATE: 94 BPM
EKG P AXIS: 52 DEGREES
EKG P-R INTERVAL: 134 MS
EKG Q-T INTERVAL: 386 MS
EKG QRS DURATION: 120 MS
EKG QTC CALCULATION (BAZETT): 482 MS
EKG R AXIS: 62 DEGREES
EKG T AXIS: 34 DEGREES
EKG VENTRICULAR RATE: 94 BPM
EOSINOPHIL NFR BLD AUTO: 1.1 %
EOSINOPHILS ABSOLUTE: 0.1 THOU/MM3 (ref 0–0.4)
ERYTHROCYTE [DISTWIDTH] IN BLOOD BY AUTOMATED COUNT: 15 % (ref 11.5–14.5)
ERYTHROCYTE [SEDIMENTATION RATE] IN BLOOD BY WESTERGREN METHOD: 60 MM/HR (ref 0–20)
GFR SERPL CREATININE-BSD FRML MDRD: > 90 ML/MIN/1.73M2
GLUCOSE SERPL-MCNC: 117 MG/DL (ref 70–108)
HCT VFR BLD AUTO: 42.6 % (ref 37–47)
HGB BLD-MCNC: 13.6 GM/DL (ref 12–16)
IMM GRANULOCYTES # BLD AUTO: 0.03 THOU/MM3 (ref 0–0.07)
IMM GRANULOCYTES NFR BLD AUTO: 0.3 %
LYMPHOCYTES ABSOLUTE: 0.9 THOU/MM3 (ref 1–4.8)
LYMPHOCYTES NFR BLD AUTO: 10.4 %
MCH RBC QN AUTO: 28 PG (ref 26–33)
MCHC RBC AUTO-ENTMCNC: 31.9 GM/DL (ref 32.2–35.5)
MCV RBC AUTO: 87.8 FL (ref 81–99)
MONOCYTES ABSOLUTE: 0.9 THOU/MM3 (ref 0.4–1.3)
MONOCYTES NFR BLD AUTO: 10.2 %
NEUTROPHILS NFR BLD AUTO: 77.7 %
NRBC BLD AUTO-RTO: 0 /100 WBC
NT-PROBNP SERPL IA-MCNC: 367.1 PG/ML (ref 0–124)
OSMOLALITY SERPL CALC.SUM OF ELEC: 268.7 MOSMOL/KG (ref 275–300)
PLATELET # BLD AUTO: 319 THOU/MM3 (ref 130–400)
PMV BLD AUTO: 10.4 FL (ref 9.4–12.4)
POTASSIUM SERPL-SCNC: 3.9 MEQ/L (ref 3.5–5.2)
RBC # BLD AUTO: 4.85 MILL/MM3 (ref 4.2–5.4)
SEGMENTED NEUTROPHILS ABSOLUTE COUNT: 7 THOU/MM3 (ref 1.8–7.7)
SODIUM SERPL-SCNC: 134 MEQ/L (ref 135–145)
WBC # BLD AUTO: 9 THOU/MM3 (ref 4.8–10.8)

## 2024-04-25 PROCEDURE — 93005 ELECTROCARDIOGRAM TRACING: CPT | Performed by: EMERGENCY MEDICINE

## 2024-04-25 PROCEDURE — 36415 COLL VENOUS BLD VENIPUNCTURE: CPT

## 2024-04-25 PROCEDURE — 6360000002 HC RX W HCPCS

## 2024-04-25 PROCEDURE — 80048 BASIC METABOLIC PNL TOTAL CA: CPT

## 2024-04-25 PROCEDURE — 83880 ASSAY OF NATRIURETIC PEPTIDE: CPT

## 2024-04-25 PROCEDURE — 96374 THER/PROPH/DIAG INJ IV PUSH: CPT

## 2024-04-25 PROCEDURE — 99284 EMERGENCY DEPT VISIT MOD MDM: CPT

## 2024-04-25 PROCEDURE — 85651 RBC SED RATE NONAUTOMATED: CPT

## 2024-04-25 PROCEDURE — 86140 C-REACTIVE PROTEIN: CPT

## 2024-04-25 PROCEDURE — 85025 COMPLETE CBC W/AUTO DIFF WBC: CPT

## 2024-04-25 PROCEDURE — 93010 ELECTROCARDIOGRAM REPORT: CPT | Performed by: NUCLEAR MEDICINE

## 2024-04-25 RX ORDER — AMOXICILLIN AND CLAVULANATE POTASSIUM 875; 125 MG/1; MG/1
1 TABLET, FILM COATED ORAL 2 TIMES DAILY
Qty: 14 TABLET | Refills: 0 | Status: SHIPPED | OUTPATIENT
Start: 2024-04-25 | End: 2024-05-02

## 2024-04-25 RX ORDER — DEXAMETHASONE SODIUM PHOSPHATE 4 MG/ML
6 INJECTION, SOLUTION INTRA-ARTICULAR; INTRALESIONAL; INTRAMUSCULAR; INTRAVENOUS; SOFT TISSUE ONCE
Status: COMPLETED | OUTPATIENT
Start: 2024-04-25 | End: 2024-04-25

## 2024-04-25 RX ADMIN — DEXAMETHASONE SODIUM PHOSPHATE 6 MG: 4 INJECTION, SOLUTION INTRA-ARTICULAR; INTRALESIONAL; INTRAMUSCULAR; INTRAVENOUS; SOFT TISSUE at 11:08

## 2024-04-25 NOTE — DISCHARGE INSTRUCTIONS
You are seen in the department today for pericarditis.   You have an antibiotic ready to be picked up at your pharmacy Augmentin.  Please follow-up with your PCP.  If you have any concerning symptoms of worsening left facial swelling/pain, difficulty breathing, excessive drooling, chest pain or shortness of breath go to your nearest emergency department  Take Motrin and Tylenol for pain as needed.

## 2024-04-25 NOTE — ED PROVIDER NOTES
[facility]  EMERGENCY MEDICINE ATTENDING ATTESTATION      Evaluation of Kaycee Varela.   Case discussed and care plan developed with resident physician.   I agree with the resident physician documentation and plan as documented by her, except if my documentation differs.   Patient seen, interviewed and examined by me  I reviewed the medical, surgical, family and social history, medications and allergies.   I have reviewed and interpreted all available lab, radiology and ekg results available at the moment.  I have reviewed the nursing documentation.       Please see the resident physician completed note for final disposition except as documented on this attestation.   I have reviewed and interpreted all available lab, radiology and ekg results available at the moment.  Diagnosis, treatment and disposition plans were discussed and agreed upon by patient.   This transcription was electronically signed. It was dictated by use of voice recognition software and electronically transcribed. The transcription may contain errors not detected in proofreading.     I performed direct supervision and was present for the critical portion following procedures: None  Critical care time on this case: None    Electronically signed by Gary Hector MD on 4/25/24 at 11:51 AM Gary Cano MD  04/25/24 1954    
normal limits   BASIC METABOLIC PANEL W/ REFLEX TO MG FOR LOW K   C-REACTIVE PROTEIN   SEDIMENTATION RATE     (A negative COVID-19 test should be interpreted as COVID no longer suspected unless otherwise noted in this encounter documentation note)  (Any cultures that may have been sent were not resulted at the time of this patient ED visit)    MEDICAL DECISION MAKING     Initial Differential: Includes but is not limited to parotiditis, viral/bacterial abscess, mass, dental caries, sialadenosis     MDM/Assessment     Medical Decision Making     64-year-old female was seen in the emergency department today for left facial swelling localized to the parotid gland.  She was given 6 mg of Decadron with improvement of symptoms.   Bedside POCUS showed no abscess or cellulitis.  Patient diagnosed with parotitis take Augmentin for 7 days.  Patient had called her PCP this morning before coming to the emergency department PCP had sent the Augmentin and is ready for pickup at the pharmacy confirm with the patient.  I told patient that I will not prescribe additional Augmentin just take the 1 that the PCP gave.  Return precautions provided stable for discharge.    ED COURSE   ED Medications administered this visit (None if left blank):   Medications   iopamidol (ISOVUE-370) 76 % injection 80 mL (has no administration in time range)   dexAMETHasone (DECADRON) injection 6 mg (6 mg IntraVENous Given 4/25/24 1108)       ED Course as of 04/25/24 1200   Thu Apr 25, 2024   1156 Patient PCP sent her Augmentin this AM ready for  at the pharmacy. Confirmed with patient.  [SJ]      ED Course User Index  [SJ] Marshal Faith DO       Procedures: (None if left blank)  Procedures:     Consultants:  None    Decision Rules/Clinical Scores utilized:  Not Applicable     CRITICAL CARE:  None    MEDICATION CHANGES     New Prescriptions    No medications on file       FINAL IMPRESSION     Final diagnoses:   Parotiditis       DISPOSITION

## 2024-04-26 RX ORDER — DOXEPIN HYDROCHLORIDE 50 MG/1
50 CAPSULE ORAL EVERY EVENING
Qty: 90 CAPSULE | Refills: 3 | Status: SHIPPED | OUTPATIENT
Start: 2024-04-26

## 2024-05-01 ENCOUNTER — OFFICE VISIT (OUTPATIENT)
Dept: FAMILY MEDICINE CLINIC | Age: 64
End: 2024-05-01
Payer: MEDICARE

## 2024-05-01 VITALS — DIASTOLIC BLOOD PRESSURE: 64 MMHG | RESPIRATION RATE: 24 BRPM | SYSTOLIC BLOOD PRESSURE: 110 MMHG | HEART RATE: 88 BPM

## 2024-05-01 DIAGNOSIS — Z89.619: ICD-10-CM

## 2024-05-01 DIAGNOSIS — Z91.81 AT HIGH RISK FOR FALLS: ICD-10-CM

## 2024-05-01 DIAGNOSIS — K11.20 PAROTITIS: Primary | ICD-10-CM

## 2024-05-01 PROCEDURE — G8419 CALC BMI OUT NRM PARAM NOF/U: HCPCS | Performed by: FAMILY MEDICINE

## 2024-05-01 PROCEDURE — 3017F COLORECTAL CA SCREEN DOC REV: CPT | Performed by: FAMILY MEDICINE

## 2024-05-01 PROCEDURE — 1090F PRES/ABSN URINE INCON ASSESS: CPT | Performed by: FAMILY MEDICINE

## 2024-05-01 PROCEDURE — 3074F SYST BP LT 130 MM HG: CPT | Performed by: FAMILY MEDICINE

## 2024-05-01 PROCEDURE — G8427 DOCREV CUR MEDS BY ELIG CLIN: HCPCS | Performed by: FAMILY MEDICINE

## 2024-05-01 PROCEDURE — 3078F DIAST BP <80 MM HG: CPT | Performed by: FAMILY MEDICINE

## 2024-05-01 PROCEDURE — G2211 COMPLEX E/M VISIT ADD ON: HCPCS | Performed by: FAMILY MEDICINE

## 2024-05-01 PROCEDURE — 1124F ACP DISCUSS-NO DSCNMKR DOCD: CPT | Performed by: FAMILY MEDICINE

## 2024-05-01 PROCEDURE — 99213 OFFICE O/P EST LOW 20 MIN: CPT | Performed by: FAMILY MEDICINE

## 2024-05-01 PROCEDURE — G8400 PT W/DXA NO RESULTS DOC: HCPCS | Performed by: FAMILY MEDICINE

## 2024-05-01 PROCEDURE — 4004F PT TOBACCO SCREEN RCVD TLK: CPT | Performed by: FAMILY MEDICINE

## 2024-05-01 SDOH — ECONOMIC STABILITY: INCOME INSECURITY: HOW HARD IS IT FOR YOU TO PAY FOR THE VERY BASICS LIKE FOOD, HOUSING, MEDICAL CARE, AND HEATING?: NOT HARD AT ALL

## 2024-05-01 SDOH — ECONOMIC STABILITY: FOOD INSECURITY: WITHIN THE PAST 12 MONTHS, YOU WORRIED THAT YOUR FOOD WOULD RUN OUT BEFORE YOU GOT MONEY TO BUY MORE.: NEVER TRUE

## 2024-05-01 SDOH — ECONOMIC STABILITY: FOOD INSECURITY: WITHIN THE PAST 12 MONTHS, THE FOOD YOU BOUGHT JUST DIDN'T LAST AND YOU DIDN'T HAVE MONEY TO GET MORE.: NEVER TRUE

## 2024-05-01 ASSESSMENT — PATIENT HEALTH QUESTIONNAIRE - PHQ9
1. LITTLE INTEREST OR PLEASURE IN DOING THINGS: NEARLY EVERY DAY
7. TROUBLE CONCENTRATING ON THINGS, SUCH AS READING THE NEWSPAPER OR WATCHING TELEVISION: NOT AT ALL
SUM OF ALL RESPONSES TO PHQ QUESTIONS 1-9: 20
4. FEELING TIRED OR HAVING LITTLE ENERGY: NEARLY EVERY DAY
6. FEELING BAD ABOUT YOURSELF - OR THAT YOU ARE A FAILURE OR HAVE LET YOURSELF OR YOUR FAMILY DOWN: NEARLY EVERY DAY
2. FEELING DOWN, DEPRESSED OR HOPELESS: NEARLY EVERY DAY
8. MOVING OR SPEAKING SO SLOWLY THAT OTHER PEOPLE COULD HAVE NOTICED. OR THE OPPOSITE, BEING SO FIGETY OR RESTLESS THAT YOU HAVE BEEN MOVING AROUND A LOT MORE THAN USUAL: SEVERAL DAYS
10. IF YOU CHECKED OFF ANY PROBLEMS, HOW DIFFICULT HAVE THESE PROBLEMS MADE IT FOR YOU TO DO YOUR WORK, TAKE CARE OF THINGS AT HOME, OR GET ALONG WITH OTHER PEOPLE: SOMEWHAT DIFFICULT
3. TROUBLE FALLING OR STAYING ASLEEP: NEARLY EVERY DAY
SUM OF ALL RESPONSES TO PHQ QUESTIONS 1-9: 20
SUM OF ALL RESPONSES TO PHQ9 QUESTIONS 1 & 2: 6
9. THOUGHTS THAT YOU WOULD BE BETTER OFF DEAD, OR OF HURTING YOURSELF: SEVERAL DAYS
5. POOR APPETITE OR OVEREATING: NEARLY EVERY DAY
SUM OF ALL RESPONSES TO PHQ QUESTIONS 1-9: 19
SUM OF ALL RESPONSES TO PHQ QUESTIONS 1-9: 20

## 2024-05-01 ASSESSMENT — COLUMBIA-SUICIDE SEVERITY RATING SCALE - C-SSRS
1. WITHIN THE PAST MONTH, HAVE YOU WISHED YOU WERE DEAD OR WISHED YOU COULD GO TO SLEEP AND NOT WAKE UP?: YES
6. HAVE YOU EVER DONE ANYTHING, STARTED TO DO ANYTHING, OR PREPARED TO DO ANYTHING TO END YOUR LIFE?: NO
2. HAVE YOU ACTUALLY HAD ANY THOUGHTS OF KILLING YOURSELF?: NO

## 2024-05-01 ASSESSMENT — ENCOUNTER SYMPTOMS
RESPIRATORY NEGATIVE: 1
GASTROINTESTINAL NEGATIVE: 1

## 2024-05-01 NOTE — PROGRESS NOTES
just take the 1 that the PCP gave.  Return precautions provided stable for discharge.    Parotid gland has returned to normal, 1 more day of abx left.    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)    History of total knee replacement, right    Enterocolitis due to Clostridium difficile, not specified as recurrent    Recurrent major depressive disorder (HCC)    Aftercare following knee joint replacement surgery    Primary insomnia    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 with presence of antibiotic-impregnated cement spacer    Moderate malnutrition (HCC)       Current Outpatient Medications   Medication Sig Dispense Refill    doxepin (SINEQUAN) 50 MG capsule take 1 capsule by mouth every evening 90 capsule 3    amoxicillin-clavulanate (AUGMENTIN) 875-125 MG per tablet Take 1 tablet by mouth 2 times daily for 7 days 14 tablet 0    sertraline (ZOLOFT) 50 MG tablet Take 1 tablet by mouth daily 90 tablet 3    tiZANidine (ZANAFLEX) 4 MG tablet Take 1 tablet by mouth in the morning, at noon, and at bedtime 90 tablet 2    atorvastatin (LIPITOR) 40 MG tablet take 1 tablet by mouth once daily 90 tablet 3    fenofibrate (TRIGLIDE) 160 MG tablet TAKE 1 TABLET DAILY 90 tablet 3    NONFORMULARY Take 2 each by mouth nightly Hemp Gummies      gabapentin (NEURONTIN) 300 MG capsule Take 1 capsule by mouth 3 times daily. 270 capsule 3    Acetaminophen 500 MG CAPS Take 2 capsules by mouth as needed for Pain      KRILL OIL PO Take 350 mg by mouth daily      Multiple Vitamins-Minerals (THERAPEUTIC MULTIVITAMIN-MINERALS) tablet Take 1 tablet by mouth daily      aspirin 81 MG tablet Take 1

## 2024-05-01 NOTE — PATIENT INSTRUCTIONS
You may receive a survey regarding the care you received during your visit.  Your input is valuable to us.  We encourage you to complete and return your survey.  We hope you will choose us in the future for your healthcare needs.    Tobacco Cessation Programs     Telephonic behavior modification  1-800-QUIT-NOW (154-7820)  Counseling service for those who are ready to quit using tobacco.    Available for uninsured [place] residents, Medicaid recipients, pregnant women, or patients whose health plans or employers are members of the [place] [employer]    Online behavior modification  http://[place].Quitlogix.org  Online support program to help patients through each step of the quitting process.  Available 24 hours a day 7 days a week.  Provides up to date researched based tool, step-by-step guides, and motivational messages.    Online behavior modification  www.lungusa.org/stop-smoking/how-to-quit  HelpLine: 1-800-LUNG-USA (524-5701)  Email questions to:  questions@[employer].org   Website offers resources to help tobacco users figure out their reasons for quitting and then take the big step of quitting for good.    Hypnosis  Location: [address] [place] [address]  Contact: Baldemar Duarte, PhD at [phone]  Hypnosis for tobacco cessation  Cost $225 for the initial session and $175 for each session afterwards.  Most patients require 6-8 sessions.  There is the option to submit through the patient’s insurance.    Hypnosis and behavior modification  Location: [address] [address].[place], [place]  Contact: Reji Fuentes, PhD at [phone]  Counseling and hypnosis for nicotine addition  Cost: For uninsured patients:  Please call above phone number  Cost for insured patients depends on patient’s insurance plan.    Behavior modification  Location: [facility], [address] [place], [place]  Contact: [phone]  Services include four one-on-one appointments between the patient and a

## 2024-05-21 RX ORDER — GABAPENTIN 300 MG/1
300 CAPSULE ORAL 3 TIMES DAILY
Qty: 270 CAPSULE | Refills: 3 | Status: SHIPPED | OUTPATIENT
Start: 2024-05-21 | End: 2025-05-21

## 2024-05-21 RX ORDER — DOXEPIN HYDROCHLORIDE 50 MG/1
50 CAPSULE ORAL EVERY EVENING
Qty: 90 CAPSULE | Refills: 3 | Status: SHIPPED | OUTPATIENT
Start: 2024-05-21

## 2024-05-30 ENCOUNTER — OFFICE VISIT (OUTPATIENT)
Dept: FAMILY MEDICINE CLINIC | Age: 64
End: 2024-05-30

## 2024-05-30 VITALS — RESPIRATION RATE: 16 BRPM | HEART RATE: 88 BPM | DIASTOLIC BLOOD PRESSURE: 70 MMHG | SYSTOLIC BLOOD PRESSURE: 128 MMHG

## 2024-05-30 DIAGNOSIS — E11.69 TYPE 2 DIABETES MELLITUS WITH OTHER SPECIFIED COMPLICATION, UNSPECIFIED WHETHER LONG TERM INSULIN USE (HCC): ICD-10-CM

## 2024-05-30 DIAGNOSIS — G54.6 PHANTOM PAIN AFTER AMPUTATION OF LOWER EXTREMITY (HCC): ICD-10-CM

## 2024-05-30 DIAGNOSIS — F33.9 RECURRENT MAJOR DEPRESSIVE DISORDER, REMISSION STATUS UNSPECIFIED (HCC): ICD-10-CM

## 2024-05-30 DIAGNOSIS — M25.611 STIFFNESS OF RIGHT SHOULDER JOINT: Primary | ICD-10-CM

## 2024-05-30 DIAGNOSIS — I47.20 VENTRICULAR TACHYCARDIA (HCC): ICD-10-CM

## 2024-05-30 DIAGNOSIS — E44.0 MODERATE MALNUTRITION (HCC): ICD-10-CM

## 2024-05-30 DIAGNOSIS — J01.40 SUBACUTE PANSINUSITIS: ICD-10-CM

## 2024-05-30 PROBLEM — E11.9 TYPE 2 DIABETES MELLITUS (HCC): Status: ACTIVE | Noted: 2024-05-30

## 2024-05-30 RX ORDER — GABAPENTIN 600 MG/1
600 TABLET ORAL 3 TIMES DAILY
Qty: 270 TABLET | Refills: 1 | Status: SHIPPED | OUTPATIENT
Start: 2024-05-30 | End: 2024-11-26

## 2024-05-30 RX ORDER — DOXYCYCLINE HYCLATE 100 MG
100 TABLET ORAL 2 TIMES DAILY
Qty: 20 TABLET | Refills: 0 | Status: SHIPPED | OUTPATIENT
Start: 2024-05-30 | End: 2024-06-09

## 2024-05-30 ASSESSMENT — ENCOUNTER SYMPTOMS
ABDOMINAL PAIN: 0
SINUS PRESSURE: 1
SHORTNESS OF BREATH: 0
RHINORRHEA: 1
NAUSEA: 0
COUGH: 1
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
## 2024-05-30 NOTE — PATIENT INSTRUCTIONS
You may receive a survey about your visit with us today. The feedback from our patients helps us identify what is working well and where the service to all patients can be enhanced. Thank you!     Tobacco Cessation Programs     Telephonic behavior modification  1-800-QUIT-NOW (063-8227)  Counseling service for those who are ready to quit using tobacco.    Available for uninsured [place] residents, Medicaid recipients, pregnant women, or patients whose health plans or employers are members of the [place] [employer]    Online behavior modification  http://[place].Quitlogix.org  Online support program to help patients through each step of the quitting process.  Available 24 hours a day 7 days a week.  Provides up to date researched based tool, step-by-step guides, and motivational messages.    Online behavior modification  www.lungusa.org/stop-smoking/how-to-quit  HelpLine: 1-800-LUNG-USA (769-0143)  Email questions to:  questions@[facility].org   Website offers resources to help tobacco users figure out their reasons for quitting and then take the big step of quitting for good.    Hypnosis  Location: [address] [place] [address]  Contact: Baldemar Duarte, PhD at [phone]  Hypnosis for tobacco cessation  Cost $225 for the initial session and $175 for each session afterwards.  Most patients require 6-8 sessions.  There is the option to submit through the patient’s insurance.    Hypnosis and behavior modification  Location: [address] [place] [address]  Contact: Reji Fuentes, PhD at [phone]  Counseling and hypnosis for nicotine addition  Cost: For uninsured patients:  Please call above phone number  Cost for insured patients depends on patient’s insurance plan.    Behavior modification  Location: [facility], [address] [place] [address]  Contact: [phone]  Services include four one-on-one appointments between the patient and a respiratory therapist.  The four appointments

## 2024-06-07 ENCOUNTER — HOSPITAL ENCOUNTER (OUTPATIENT)
Dept: GENERAL RADIOLOGY | Age: 64
Discharge: HOME | End: 2024-06-07
Payer: MEDICARE

## 2024-06-07 ENCOUNTER — HOSPITAL ENCOUNTER (OUTPATIENT)
Age: 64
Discharge: HOME | End: 2024-06-07
Payer: MEDICARE

## 2024-06-07 DIAGNOSIS — M25.611 STIFFNESS OF RIGHT SHOULDER JOINT: ICD-10-CM

## 2024-06-07 PROCEDURE — 73030 X-RAY EXAM OF SHOULDER: CPT

## 2024-06-07 PROCEDURE — 73010 X-RAY EXAM OF SHOULDER BLADE: CPT

## 2024-06-12 RX ORDER — TIZANIDINE 4 MG/1
4 TABLET ORAL 3 TIMES DAILY
Qty: 90 TABLET | Refills: 2 | Status: SHIPPED | OUTPATIENT
Start: 2024-06-12

## 2024-06-13 RX ORDER — TIZANIDINE 4 MG/1
4 TABLET ORAL 3 TIMES DAILY
Qty: 90 TABLET | Refills: 2 | OUTPATIENT
Start: 2024-06-13

## 2024-06-28 RX ORDER — DOXYCYCLINE HYCLATE 100 MG
100 TABLET ORAL 2 TIMES DAILY
Qty: 14 TABLET | Refills: 0 | Status: SHIPPED | OUTPATIENT
Start: 2024-06-28 | End: 2024-07-05

## 2024-08-07 ENCOUNTER — TELEMEDICINE (OUTPATIENT)
Dept: FAMILY MEDICINE CLINIC | Age: 64
End: 2024-08-07
Payer: MEDICARE

## 2024-08-07 DIAGNOSIS — J06.9 VIRAL URI: Primary | ICD-10-CM

## 2024-08-07 PROCEDURE — 1124F ACP DISCUSS-NO DSCNMKR DOCD: CPT | Performed by: FAMILY MEDICINE

## 2024-08-07 PROCEDURE — 99213 OFFICE O/P EST LOW 20 MIN: CPT | Performed by: FAMILY MEDICINE

## 2024-08-07 PROCEDURE — G8419 CALC BMI OUT NRM PARAM NOF/U: HCPCS | Performed by: FAMILY MEDICINE

## 2024-08-07 PROCEDURE — 3017F COLORECTAL CA SCREEN DOC REV: CPT | Performed by: FAMILY MEDICINE

## 2024-08-07 PROCEDURE — G8428 CUR MEDS NOT DOCUMENT: HCPCS | Performed by: FAMILY MEDICINE

## 2024-08-07 PROCEDURE — 1090F PRES/ABSN URINE INCON ASSESS: CPT | Performed by: FAMILY MEDICINE

## 2024-08-07 PROCEDURE — G8400 PT W/DXA NO RESULTS DOC: HCPCS | Performed by: FAMILY MEDICINE

## 2024-08-07 PROCEDURE — 4004F PT TOBACCO SCREEN RCVD TLK: CPT | Performed by: FAMILY MEDICINE

## 2024-08-07 NOTE — PROGRESS NOTES
Kaycee Varela (:  1960) is a Established patient, here for evaluation of the following:    Subjective   HPI:    Chief Complaint   Patient presents with    Sinusitis     Pt presents with sinus drainage and mild cough for the last week.  Denies fevers.  Denies sinus pressure or pain.    Clear phlegm.      No OTCs.    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    BREAST LUMPECTOMY Left     atypical hyperplasia, 2016, [facility]    COLONOSCOPY  2020    HERNIA REPAIR  10/17/2016    JOINT REPLACEMENT Left     knee    KNEE SURGERY      Right knee    LEG SURGERY Right 3/14/2023    Right Above Knee Amputation performed by Rigo Fischer MD at [facility] OR    REVISION TOTAL KNEE ARTHROPLASTY Right 05/10/2022    RIGHT KNEE TOTAL ARTHROPLASTY REVISION performed by Rigo Fischer MD at [facility] OR    REVISION TOTAL KNEE ARTHROPLASTY Right 2022    Right Knee Incision and Drainage of Total Knee With Poly Exchange

## 2024-08-08 ASSESSMENT — ENCOUNTER SYMPTOMS
RHINORRHEA: 1
SINUS PAIN: 0
RESPIRATORY NEGATIVE: 1
GASTROINTESTINAL NEGATIVE: 1
SINUS PRESSURE: 0

## 2024-08-12 RX ORDER — TIZANIDINE 2 MG/1
TABLET ORAL
Qty: 30 TABLET | Refills: 2 | OUTPATIENT
Start: 2024-08-12

## 2024-08-21 ENCOUNTER — PATIENT MESSAGE (OUTPATIENT)
Dept: FAMILY MEDICINE CLINIC | Age: 64
End: 2024-08-21

## 2024-09-19 ENCOUNTER — HOSPITAL ENCOUNTER (EMERGENCY)
Age: 64
Discharge: HOME | End: 2024-09-19
Attending: FAMILY MEDICINE
Payer: MEDICARE

## 2024-09-19 ENCOUNTER — APPOINTMENT (OUTPATIENT)
Dept: CT IMAGING | Age: 64
End: 2024-09-19
Payer: MEDICARE

## 2024-09-19 VITALS
BODY MASS INDEX: 26.63 KG/M2 | SYSTOLIC BLOOD PRESSURE: 146 MMHG | OXYGEN SATURATION: 96 % | TEMPERATURE: 98.3 F | RESPIRATION RATE: 28 BRPM | WEIGHT: 170 LBS | DIASTOLIC BLOOD PRESSURE: 73 MMHG | HEART RATE: 92 BPM

## 2024-09-19 DIAGNOSIS — R10.84 GENERALIZED ABDOMINAL PAIN: ICD-10-CM

## 2024-09-19 DIAGNOSIS — R19.7 DIARRHEA, UNSPECIFIED TYPE: Primary | ICD-10-CM

## 2024-09-19 DIAGNOSIS — M54.40 CHRONIC MIDLINE LOW BACK PAIN WITH SCIATICA, SCIATICA LATERALITY UNSPECIFIED: ICD-10-CM

## 2024-09-19 DIAGNOSIS — G89.29 CHRONIC MIDLINE LOW BACK PAIN WITH SCIATICA, SCIATICA LATERALITY UNSPECIFIED: ICD-10-CM

## 2024-09-19 LAB
ALBUMIN SERPL BCG-MCNC: 3.8 G/DL (ref 3.5–5.1)
ALP SERPL-CCNC: 82 U/L (ref 38–126)
ALT SERPL W/O P-5'-P-CCNC: 9 U/L (ref 11–66)
ANION GAP SERPL CALC-SCNC: 11 MEQ/L (ref 8–16)
AST SERPL-CCNC: 16 U/L (ref 5–40)
BASOPHILS ABSOLUTE: 0 THOU/MM3 (ref 0–0.1)
BASOPHILS NFR BLD AUTO: 0.3 %
BILIRUB CONJ SERPL-MCNC: < 0.1 MG/DL (ref 0.1–13.8)
BILIRUB SERPL-MCNC: 0.3 MG/DL (ref 0.3–1.2)
BUN SERPL-MCNC: 10 MG/DL (ref 7–22)
CALCIUM SERPL-MCNC: 9.1 MG/DL (ref 8.5–10.5)
CHLORIDE SERPL-SCNC: 99 MEQ/L (ref 98–111)
CO2 SERPL-SCNC: 26 MEQ/L (ref 23–33)
CREAT SERPL-MCNC: 0.4 MG/DL (ref 0.4–1.2)
DEPRECATED RDW RBC AUTO: 53.5 FL (ref 35–45)
EOSINOPHIL NFR BLD AUTO: 2.4 %
EOSINOPHILS ABSOLUTE: 0.2 THOU/MM3 (ref 0–0.4)
ERYTHROCYTE [DISTWIDTH] IN BLOOD BY AUTOMATED COUNT: 17.2 % (ref 11.5–14.5)
GFR SERPL CREATININE-BSD FRML MDRD: > 90 ML/MIN/1.73M2
GLUCOSE SERPL-MCNC: 112 MG/DL (ref 70–108)
HCT VFR BLD AUTO: 47.6 % (ref 37–47)
HGB BLD-MCNC: 14.8 GM/DL (ref 12–16)
IMM GRANULOCYTES # BLD AUTO: 0.02 THOU/MM3 (ref 0–0.07)
IMM GRANULOCYTES NFR BLD AUTO: 0.3 %
LACTIC ACID, SEPSIS: 0.8 MMOL/L (ref 0.5–1.9)
LIPASE SERPL-CCNC: 24.2 U/L (ref 5.6–51.3)
LYMPHOCYTES ABSOLUTE: 1.1 THOU/MM3 (ref 1–4.8)
LYMPHOCYTES NFR BLD AUTO: 14.9 %
MCH RBC QN AUTO: 27.3 PG (ref 26–33)
MCHC RBC AUTO-ENTMCNC: 31.1 GM/DL (ref 32.2–35.5)
MCV RBC AUTO: 87.7 FL (ref 81–99)
MONOCYTES ABSOLUTE: 0.5 THOU/MM3 (ref 0.4–1.3)
MONOCYTES NFR BLD AUTO: 7.1 %
NEUTROPHILS ABSOLUTE: 5.6 THOU/MM3 (ref 1.8–7.7)
NEUTROPHILS NFR BLD AUTO: 75 %
NRBC BLD AUTO-RTO: 0 /100 WBC
NT-PROBNP SERPL IA-MCNC: 167.5 PG/ML (ref 0–124)
OSMOLALITY SERPL CALC.SUM OF ELEC: 271.8 MOSMOL/KG (ref 275–300)
PLATELET # BLD AUTO: 273 THOU/MM3 (ref 130–400)
PMV BLD AUTO: 10.9 FL (ref 9.4–12.4)
POTASSIUM SERPL-SCNC: 3.7 MEQ/L (ref 3.5–5.2)
PROT SERPL-MCNC: 7.2 G/DL (ref 6.1–8)
RBC # BLD AUTO: 5.43 MILL/MM3 (ref 4.2–5.4)
SODIUM SERPL-SCNC: 136 MEQ/L (ref 135–145)
WBC # BLD AUTO: 7.4 THOU/MM3 (ref 4.8–10.8)

## 2024-09-19 PROCEDURE — 96374 THER/PROPH/DIAG INJ IV PUSH: CPT

## 2024-09-19 PROCEDURE — 6360000004 HC RX CONTRAST MEDICATION: Performed by: FAMILY MEDICINE

## 2024-09-19 PROCEDURE — 80048 BASIC METABOLIC PNL TOTAL CA: CPT

## 2024-09-19 PROCEDURE — 99285 EMERGENCY DEPT VISIT HI MDM: CPT

## 2024-09-19 PROCEDURE — 74177 CT ABD & PELVIS W/CONTRAST: CPT

## 2024-09-19 PROCEDURE — 85025 COMPLETE CBC W/AUTO DIFF WBC: CPT

## 2024-09-19 PROCEDURE — 6360000002 HC RX W HCPCS: Performed by: FAMILY MEDICINE

## 2024-09-19 PROCEDURE — 83605 ASSAY OF LACTIC ACID: CPT

## 2024-09-19 PROCEDURE — 96375 TX/PRO/DX INJ NEW DRUG ADDON: CPT

## 2024-09-19 PROCEDURE — 36415 COLL VENOUS BLD VENIPUNCTURE: CPT

## 2024-09-19 PROCEDURE — 83690 ASSAY OF LIPASE: CPT

## 2024-09-19 PROCEDURE — 83880 ASSAY OF NATRIURETIC PEPTIDE: CPT

## 2024-09-19 PROCEDURE — 76376 3D RENDER W/INTRP POSTPROCES: CPT

## 2024-09-19 PROCEDURE — 80076 HEPATIC FUNCTION PANEL: CPT

## 2024-09-19 RX ORDER — ONDANSETRON 2 MG/ML
4 INJECTION INTRAMUSCULAR; INTRAVENOUS ONCE
Status: COMPLETED | OUTPATIENT
Start: 2024-09-19 | End: 2024-09-19

## 2024-09-19 RX ORDER — TRAMADOL HYDROCHLORIDE 50 MG/1
50 TABLET ORAL EVERY 6 HOURS PRN
Qty: 20 TABLET | Refills: 0 | Status: SHIPPED | OUTPATIENT
Start: 2024-09-19 | End: 2024-11-11

## 2024-09-19 RX ORDER — IOPAMIDOL 755 MG/ML
80 INJECTION, SOLUTION INTRAVASCULAR
Status: COMPLETED | OUTPATIENT
Start: 2024-09-19 | End: 2024-09-19

## 2024-09-19 RX ORDER — MORPHINE SULFATE 4 MG/ML
4 INJECTION, SOLUTION INTRAMUSCULAR; INTRAVENOUS ONCE
Status: COMPLETED | OUTPATIENT
Start: 2024-09-19 | End: 2024-09-19

## 2024-09-19 RX ORDER — TRAMADOL HYDROCHLORIDE 50 MG/1
50 TABLET ORAL ONCE
Status: DISCONTINUED | OUTPATIENT
Start: 2024-09-19 | End: 2024-09-19 | Stop reason: HOSPADM

## 2024-09-19 RX ADMIN — IOPAMIDOL 80 ML: 755 INJECTION, SOLUTION INTRAVENOUS at 16:00

## 2024-09-19 RX ADMIN — MORPHINE SULFATE 4 MG: 4 INJECTION, SOLUTION INTRAMUSCULAR; INTRAVENOUS at 16:29

## 2024-09-19 RX ADMIN — ONDANSETRON 4 MG: 2 INJECTION INTRAMUSCULAR; INTRAVENOUS at 16:28

## 2024-09-19 ASSESSMENT — PAIN - FUNCTIONAL ASSESSMENT: PAIN_FUNCTIONAL_ASSESSMENT: NONE - DENIES PAIN

## 2024-09-27 ENCOUNTER — TELEMEDICINE (OUTPATIENT)
Dept: FAMILY MEDICINE CLINIC | Age: 64
End: 2024-09-27
Payer: MEDICARE

## 2024-09-27 DIAGNOSIS — R19.7 DIARRHEA, UNSPECIFIED TYPE: Primary | ICD-10-CM

## 2024-09-27 PROCEDURE — G2211 COMPLEX E/M VISIT ADD ON: HCPCS | Performed by: FAMILY MEDICINE

## 2024-09-27 PROCEDURE — 99213 OFFICE O/P EST LOW 20 MIN: CPT | Performed by: FAMILY MEDICINE

## 2024-09-27 PROCEDURE — G8400 PT W/DXA NO RESULTS DOC: HCPCS | Performed by: FAMILY MEDICINE

## 2024-09-27 PROCEDURE — 1090F PRES/ABSN URINE INCON ASSESS: CPT | Performed by: FAMILY MEDICINE

## 2024-09-27 PROCEDURE — 1124F ACP DISCUSS-NO DSCNMKR DOCD: CPT | Performed by: FAMILY MEDICINE

## 2024-09-27 PROCEDURE — G8428 CUR MEDS NOT DOCUMENT: HCPCS | Performed by: FAMILY MEDICINE

## 2024-09-27 PROCEDURE — 3017F COLORECTAL CA SCREEN DOC REV: CPT | Performed by: FAMILY MEDICINE

## 2024-09-30 ASSESSMENT — ENCOUNTER SYMPTOMS
DIARRHEA: 1
RESPIRATORY NEGATIVE: 1

## 2024-10-21 ENCOUNTER — TELEPHONE (OUTPATIENT)
Dept: FAMILY MEDICINE CLINIC | Age: 64
End: 2024-10-21

## 2024-10-21 NOTE — TELEPHONE ENCOUNTER
Patient called and stated that chery is having trouble getting the tizanidine. She would [facility]

## 2024-10-22 RX ORDER — NITROFURANTOIN 25; 75 MG/1; MG/1
100 CAPSULE ORAL 2 TIMES DAILY
Qty: 10 CAPSULE | Refills: 0 | Status: SHIPPED | OUTPATIENT
Start: 2024-10-22 | End: 2024-10-27

## 2024-11-13 DIAGNOSIS — E11.9 CONTROLLED TYPE 2 DIABETES MELLITUS WITHOUT COMPLICATION, WITHOUT LONG-TERM CURRENT USE OF INSULIN (HCC): ICD-10-CM

## 2024-11-14 RX ORDER — FENOFIBRATE 160 MG/1
TABLET ORAL
Qty: 90 TABLET | Refills: 3 | Status: SHIPPED | OUTPATIENT
Start: 2024-11-14

## 2024-12-02 ENCOUNTER — COMMUNITY OUTREACH (OUTPATIENT)
Dept: FAMILY MEDICINE CLINIC | Age: 64
End: 2024-12-02

## 2024-12-16 DIAGNOSIS — E78.5 HYPERLIPIDEMIA, UNSPECIFIED HYPERLIPIDEMIA TYPE: ICD-10-CM

## 2024-12-16 RX ORDER — ATORVASTATIN CALCIUM 40 MG/1
TABLET, FILM COATED ORAL
Qty: 90 TABLET | Refills: 3 | Status: SHIPPED | OUTPATIENT
Start: 2024-12-16

## 2025-01-02 ENCOUNTER — TELEMEDICINE (OUTPATIENT)
Dept: FAMILY MEDICINE CLINIC | Age: 65
End: 2025-01-02

## 2025-01-02 DIAGNOSIS — R21 RASH AND NONSPECIFIC SKIN ERUPTION: Primary | ICD-10-CM

## 2025-01-02 RX ORDER — FLUOCINONIDE 0.5 MG/G
CREAM TOPICAL
Qty: 30 G | Refills: 0 | Status: SHIPPED | OUTPATIENT
Start: 2025-01-02

## 2025-01-02 RX ORDER — PREDNISONE 20 MG/1
20 TABLET ORAL 2 TIMES DAILY
Qty: 10 TABLET | Refills: 0 | Status: SHIPPED | OUTPATIENT
Start: 2025-01-02 | End: 2025-01-07

## 2025-01-02 ASSESSMENT — ENCOUNTER SYMPTOMS
RESPIRATORY NEGATIVE: 1
GASTROINTESTINAL NEGATIVE: 1

## 2025-01-02 NOTE — PROGRESS NOTES
Incision and Drainage of Total Knee With Poly Exchange performed by Rigo Fischer MD at [facility] OR    REVISION TOTAL KNEE ARTHROPLASTY Right 12/22/2022    STAGE 1 RIGHT KNEE performed by Rigo Fishcer MD at [facility] OR    TUBAL LIGATION      WISDOM TOOTH EXTRACTION       Social History     Tobacco Use    Smoking status: Every Day     Current packs/day: 1.00     Average packs/day: 1 pack/day for 49.0 years (49.0 ttl pk-yrs)     Types: Cigarettes    Smokeless tobacco: Never   Vaping Use    Vaping status: Never Used   Substance Use Topics    Alcohol use: No     Comment: monthly glass of wine    Drug use: No       Review of Systems   Constitutional: Negative.    HENT: Negative.     Respiratory: Negative.     Cardiovascular: Negative.    Gastrointestinal: Negative.    Musculoskeletal: Negative.    Skin:  Positive for rash (left forearm and chest region).   All other systems reviewed and are negative.         Objective   Patient-Reported Vitals  No data recorded     Physical Exam  Constitutional:       General: She is not in acute distress.     Appearance: Normal appearance. She is well-developed. She is not ill-appearing.   HENT:      Head: Normocephalic and atraumatic.      Right Ear: External ear normal.      Left Ear: External ear normal.   Eyes:      Conjunctiva/sclera: Conjunctivae normal.   Pulmonary:      Effort: Pulmonary effort is normal. No respiratory distress.   Skin:     Findings: Rash (red, erythematous rash to left forearm and antecubital space.  Hard to tell virtually but appears to be a contact derm of some sort.) present.   Neurological:      Mental Status: She is alert and oriented to person, place, and time.   Psychiatric:         Mood and Affect: Mood normal.         Behavior: Behavior normal.         Thought Content: Thought content normal.         Judgment: Judgment normal.              Assessment & Plan   Below is the assessment and plan developed based on review of pertinent history, physical exam,

## 2025-01-09 DIAGNOSIS — R21 RASH AND NONSPECIFIC SKIN ERUPTION: ICD-10-CM

## 2025-01-09 RX ORDER — FLUOCINONIDE 0.5 MG/G
CREAM TOPICAL
Qty: 60 G | Refills: 2 | Status: SHIPPED | OUTPATIENT
Start: 2025-01-09

## 2025-02-19 ENCOUNTER — TELEPHONE (OUTPATIENT)
Dept: FAMILY MEDICINE CLINIC | Age: 65
End: 2025-02-19

## 2025-02-19 RX ORDER — FLUCONAZOLE 150 MG/1
150 TABLET ORAL ONCE
Qty: 2 TABLET | Refills: 0 | Status: SHIPPED | OUTPATIENT
Start: 2025-02-19 | End: 2025-02-19

## 2025-02-19 RX ORDER — SULFAMETHOXAZOLE AND TRIMETHOPRIM 800; 160 MG/1; MG/1
1 TABLET ORAL 2 TIMES DAILY
Qty: 14 TABLET | Refills: 0 | Status: SHIPPED | OUTPATIENT
Start: 2025-02-19 | End: 2025-02-26

## 2025-02-19 NOTE — TELEPHONE ENCOUNTER
Patient called in with complaints of rash in [place] area, believes she has a yeast infection. Stated she has blisters around her vulva and vagina. Stated it has been going on x 2 weeks. Requesting antibiotic. Patient uses [facility]. Please advise.     Called back in to update that she also has a boil on her bottom as well.

## 2025-02-20 ENCOUNTER — TELEPHONE (OUTPATIENT)
Dept: FAMILY MEDICINE CLINIC | Age: 65
End: 2025-02-20

## 2025-02-20 DIAGNOSIS — E78.5 HYPERLIPIDEMIA, UNSPECIFIED HYPERLIPIDEMIA TYPE: Primary | ICD-10-CM

## 2025-02-20 DIAGNOSIS — I10 HYPERTENSION, ESSENTIAL: ICD-10-CM

## 2025-02-20 DIAGNOSIS — E11.9 CONTROLLED TYPE 2 DIABETES MELLITUS WITHOUT COMPLICATION, WITHOUT LONG-TERM CURRENT USE OF INSULIN (HCC): ICD-10-CM

## 2025-02-20 NOTE — TELEPHONE ENCOUNTER
Called patient and she would like to do the yearly labs. She will stop into the urgent care in [place] next week to get them completed.     She will call the office once they are completed to schedule an appointment.

## 2025-02-24 ENCOUNTER — HOSPITAL ENCOUNTER (EMERGENCY)
Age: 65
Discharge: HOME | End: 2025-02-24
Payer: MEDICARE

## 2025-02-24 VITALS
DIASTOLIC BLOOD PRESSURE: 73 MMHG | TEMPERATURE: 97.9 F | OXYGEN SATURATION: 92 % | SYSTOLIC BLOOD PRESSURE: 134 MMHG | BODY MASS INDEX: 31.39 KG/M2 | WEIGHT: 200 LBS | RESPIRATION RATE: 20 BRPM | HEIGHT: 67 IN | HEART RATE: 98 BPM

## 2025-02-24 DIAGNOSIS — B37.31 VAGINAL CANDIDA: Primary | ICD-10-CM

## 2025-02-24 LAB
ALBUMIN SERPL BCG-MCNC: 4 G/DL (ref 3.5–5.1)
ALP SERPL-CCNC: 79 U/L (ref 38–126)
ALT SERPL W/O P-5'-P-CCNC: 9 U/L (ref 11–66)
ANION GAP SERPL CALC-SCNC: 20 MEQ/L (ref 8–16)
AST SERPL-CCNC: 16 U/L (ref 5–40)
BASOPHILS ABSOLUTE: 0 THOU/MM3 (ref 0–0.1)
BASOPHILS NFR BLD AUTO: 0.6 %
BILIRUB SERPL-MCNC: 0.3 MG/DL (ref 0.3–1.2)
BUN SERPL-MCNC: 13 MG/DL (ref 7–22)
CALCIUM SERPL-MCNC: 9.3 MG/DL (ref 8.2–9.6)
CHLORIDE SERPL-SCNC: 91 MEQ/L (ref 98–111)
CO2 SERPL-SCNC: 23 MEQ/L (ref 23–33)
CREAT SERPL-MCNC: 0.6 MG/DL (ref 0.4–1.2)
DEPRECATED RDW RBC AUTO: 50.1 FL (ref 35–45)
EOSINOPHIL NFR BLD AUTO: 3.6 %
EOSINOPHILS ABSOLUTE: 0.2 THOU/MM3 (ref 0–0.4)
ERYTHROCYTE [DISTWIDTH] IN BLOOD BY AUTOMATED COUNT: 15.6 % (ref 11.5–14.5)
FLUAV RNA RESP QL NAA+PROBE: NOT DETECTED
FLUBV RNA RESP QL NAA+PROBE: NOT DETECTED
GFR SERPL CREATININE-BSD FRML MDRD: > 90 ML/MIN/1.73M2
GLUCOSE SERPL-MCNC: 105 MG/DL (ref 70–108)
HCT VFR BLD AUTO: 44.7 % (ref 37–47)
HGB BLD-MCNC: 13.9 GM/DL (ref 12–16)
IMM GRANULOCYTES # BLD AUTO: 0.01 THOU/MM3 (ref 0–0.07)
IMM GRANULOCYTES NFR BLD AUTO: 0.2 %
KOH PREP SPEC: NORMAL
LYMPHOCYTES ABSOLUTE: 0.8 THOU/MM3 (ref 1–4.8)
LYMPHOCYTES NFR BLD AUTO: 13.6 %
MCH RBC QN AUTO: 27.1 PG (ref 26–33)
MCHC RBC AUTO-ENTMCNC: 31.1 GM/DL (ref 32.2–35.5)
MCV RBC AUTO: 87.1 FL (ref 81–99)
MONOCYTES ABSOLUTE: 0.6 THOU/MM3 (ref 0.4–1.3)
MONOCYTES NFR BLD AUTO: 8.9 %
NEUTROPHILS ABSOLUTE: 4.5 THOU/MM3 (ref 1.8–7.7)
NEUTROPHILS NFR BLD AUTO: 73.1 %
NRBC BLD AUTO-RTO: 0 /100 WBC
OSMOLALITY SERPL CALC.SUM OF ELEC: 268.7 MOSMOL/KG (ref 275–300)
PLATELET # BLD AUTO: 398 THOU/MM3 (ref 130–400)
PMV BLD AUTO: 9.7 FL (ref 9.4–12.4)
POTASSIUM SERPL-SCNC: 4.4 MEQ/L (ref 3.5–5.2)
PROT SERPL-MCNC: 7.2 G/DL (ref 6.1–8)
RBC # BLD AUTO: 5.13 MILL/MM3 (ref 4.2–5.4)
SARS-COV-2 RNA RESP QL NAA+PROBE: NOT DETECTED
SODIUM SERPL-SCNC: 134 MEQ/L (ref 135–145)
TRICHOMONAS PREP: NORMAL
WBC # BLD AUTO: 6.2 THOU/MM3 (ref 4.8–10.8)

## 2025-02-24 PROCEDURE — 87636 SARSCOV2 & INF A&B AMP PRB: CPT

## 2025-02-24 PROCEDURE — 87220 TISSUE EXAM FOR FUNGI: CPT

## 2025-02-24 PROCEDURE — 6370000000 HC RX 637 (ALT 250 FOR IP)

## 2025-02-24 PROCEDURE — 87210 SMEAR WET MOUNT SALINE/INK: CPT

## 2025-02-24 PROCEDURE — 87070 CULTURE OTHR SPECIMN AEROBIC: CPT

## 2025-02-24 PROCEDURE — 99283 EMERGENCY DEPT VISIT LOW MDM: CPT

## 2025-02-24 PROCEDURE — 80053 COMPREHEN METABOLIC PANEL: CPT

## 2025-02-24 PROCEDURE — 85025 COMPLETE CBC W/AUTO DIFF WBC: CPT

## 2025-02-24 PROCEDURE — 36415 COLL VENOUS BLD VENIPUNCTURE: CPT

## 2025-02-24 PROCEDURE — 87205 SMEAR GRAM STAIN: CPT

## 2025-02-24 RX ORDER — FLUCONAZOLE 100 MG/1
150 TABLET ORAL ONCE
Status: COMPLETED | OUTPATIENT
Start: 2025-02-24 | End: 2025-02-24

## 2025-02-24 RX ADMIN — FLUCONAZOLE 150 MG: 100 TABLET ORAL at 17:12

## 2025-02-24 ASSESSMENT — PAIN - FUNCTIONAL ASSESSMENT: PAIN_FUNCTIONAL_ASSESSMENT: NONE - DENIES PAIN

## 2025-02-24 NOTE — ED PROVIDER NOTES
[facility] EMERGENCY DEPARTMENT      EMERGENCY MEDICINE     Pt Name: Kaycee Varela  MRN: [id number]  Birthdate 1960  Date of evaluation: 2/24/2025  Provider: Darius Schneider PA-C    CHIEF COMPLAINT       Chief Complaint   Patient presents with    Rash     On vulva and in between legs     HISTORY OF PRESENT ILLNESS   Kaycee Varela is a pleasant 65 y.o. female who presents to the emergency department from from home, by private vehicle for evaluation of rash on the vulva and between the legs.  Patient reports that this has been going on for around 2 weeks.  She reports no change in intensity, sprain, quality of the rash.  Patient denies tenderness or pain with the rash but does state that it is very itchy.  Patient denies dysuria.  She is going to the bathroom to urinate more but she attributes that to drinking more water.  Patient is not concerned about STDs.  Patient has a chronic cough with no change recently, patient denies nausea vomiting, patient denies fever chills.  Patient is on a wheelchair at baseline due to only having her left leg.  Right leg was amputated.  Patient states the reason for amputation was \"knee replacement surgery\".  Patient also reports of a rash that appeared on her chest area.  It has been present for 2 weeks.  Patient stated she did not tell her PCP or gynecologist about this.    PASTMEDICAL HISTORY     Past Medical History:   Diagnosis Date    Arthritis     Atypical ductal hyperplasia, breast     left breast, 2016, lumpectomy, Tamoxifen, [facility]    Cancer (HCC)     Hx of breast biopsy 01/08/2016    Hyperlipidemia     Hypertension     Type II or unspecified type diabetes mellitus without mention of complication, not stated as uncontrolled        Patient Active Problem List   Diagnosis Code    Type 2 diabetes mellitus with other specified complication, unspecified whether long term insulin use (HCC) E11.69    Hyperlipemia E78.5    THORNTON (nonalcoholic steatohepatitis) K75.81    Obesity

## 2025-02-24 NOTE — DISCHARGE INSTRUCTIONS
You were given one 150 mg tablet of flucanazole for the yeast infection.  If your symptoms do not resolve in 3 days a second dose could be warranted.  Let your family doctor know that you were seen in the ED and was diagnosed with vaginal candidiasis.  Follow-up with him in the few days to monitor for resolution of your symptoms.        If you develop fever chills, nausea vomiting, chest pain, difficulty breathing, or any other concerning signs return to the ED immediately.

## 2025-02-24 NOTE — ED NOTES
Pt to ed with c/o rash on vulva and in between both legs mostly around groin area. Denies taking anything or uses any creams. Pt states she used powder but it did not help much. Pt states she had a boil that popped about 5 days ago.

## 2025-03-02 LAB
BACTERIA GENITAL AEROBE CULT: NORMAL
GRAM STN GENITAL: NORMAL

## 2025-03-04 ENCOUNTER — APPOINTMENT (OUTPATIENT)
Dept: GENERAL RADIOLOGY | Age: 65
DRG: 534 | End: 2025-03-04
Payer: MEDICARE

## 2025-03-04 ENCOUNTER — HOSPITAL ENCOUNTER (INPATIENT)
Age: 65
LOS: 7 days | Discharge: NURSING, REHAB OR LONG-TERM CARE | DRG: 534 | End: 2025-03-11
Attending: EMERGENCY MEDICINE | Admitting: ORTHOPAEDIC SURGERY
Payer: MEDICARE

## 2025-03-04 DIAGNOSIS — S72.402A CLOSED FRACTURE OF DISTAL END OF LEFT FEMUR, UNSPECIFIED FRACTURE MORPHOLOGY, INITIAL ENCOUNTER: Primary | ICD-10-CM

## 2025-03-04 LAB
ANION GAP SERPL CALC-SCNC: 13 MEQ/L (ref 8–16)
APTT PPP: 29.1 SECONDS (ref 22–38)
BASOPHILS ABSOLUTE: 0 THOU/MM3 (ref 0–0.1)
BASOPHILS NFR BLD AUTO: 0.3 %
BUN SERPL-MCNC: 15 MG/DL (ref 8–23)
CALCIUM SERPL-MCNC: 9.1 MG/DL (ref 8.8–10.2)
CHLORIDE SERPL-SCNC: 97 MEQ/L (ref 98–111)
CO2 SERPL-SCNC: 26 MEQ/L (ref 22–29)
CREAT SERPL-MCNC: 0.5 MG/DL (ref 0.5–0.9)
DEPRECATED RDW RBC AUTO: 50.7 FL (ref 35–45)
EOSINOPHIL NFR BLD AUTO: 2.7 %
EOSINOPHILS ABSOLUTE: 0.2 THOU/MM3 (ref 0–0.4)
ERYTHROCYTE [DISTWIDTH] IN BLOOD BY AUTOMATED COUNT: 15.7 % (ref 11.5–14.5)
GFR SERPL CREATININE-BSD FRML MDRD: > 90 ML/MIN/1.73M2
GLUCOSE SERPL-MCNC: 134 MG/DL (ref 74–109)
HCT VFR BLD AUTO: 42.8 % (ref 37–47)
HGB BLD-MCNC: 13.3 GM/DL (ref 12–16)
IMM GRANULOCYTES # BLD AUTO: 0.03 THOU/MM3 (ref 0–0.07)
IMM GRANULOCYTES NFR BLD AUTO: 0.3 %
INR PPP: 0.97 (ref 0.85–1.13)
LYMPHOCYTES ABSOLUTE: 0.7 THOU/MM3 (ref 1–4.8)
LYMPHOCYTES NFR BLD AUTO: 8.2 %
MCH RBC QN AUTO: 27.4 PG (ref 26–33)
MCHC RBC AUTO-ENTMCNC: 31.1 GM/DL (ref 32.2–35.5)
MCV RBC AUTO: 88.2 FL (ref 81–99)
MONOCYTES ABSOLUTE: 0.5 THOU/MM3 (ref 0.4–1.3)
MONOCYTES NFR BLD AUTO: 5.7 %
NEUTROPHILS ABSOLUTE: 7.1 THOU/MM3 (ref 1.8–7.7)
NEUTROPHILS NFR BLD AUTO: 82.8 %
NRBC BLD AUTO-RTO: 0 /100 WBC
OSMOLALITY SERPL CALC.SUM OF ELEC: 274.8 MOSMOL/KG (ref 275–300)
PLATELET # BLD AUTO: 355 THOU/MM3 (ref 130–400)
PMV BLD AUTO: 9.8 FL (ref 9.4–12.4)
POTASSIUM SERPL-SCNC: 3.9 MEQ/L (ref 3.5–5.2)
RBC # BLD AUTO: 4.85 MILL/MM3 (ref 4.2–5.4)
SODIUM SERPL-SCNC: 136 MEQ/L (ref 135–145)
WBC # BLD AUTO: 8.6 THOU/MM3 (ref 4.8–10.8)

## 2025-03-04 PROCEDURE — 73590 X-RAY EXAM OF LOWER LEG: CPT

## 2025-03-04 PROCEDURE — 73630 X-RAY EXAM OF FOOT: CPT

## 2025-03-04 PROCEDURE — 6370000000 HC RX 637 (ALT 250 FOR IP): Performed by: NURSE PRACTITIONER

## 2025-03-04 PROCEDURE — 99285 EMERGENCY DEPT VISIT HI MDM: CPT

## 2025-03-04 PROCEDURE — 85025 COMPLETE CBC W/AUTO DIFF WBC: CPT

## 2025-03-04 PROCEDURE — 71045 X-RAY EXAM CHEST 1 VIEW: CPT

## 2025-03-04 PROCEDURE — 6360000002 HC RX W HCPCS: Performed by: PHYSICIAN ASSISTANT

## 2025-03-04 PROCEDURE — 6820000001 HC L2 TRAUMA SURGERY EVALUATION: Performed by: ORTHOPAEDIC SURGERY

## 2025-03-04 PROCEDURE — 1200000000 HC SEMI PRIVATE

## 2025-03-04 PROCEDURE — 85610 PROTHROMBIN TIME: CPT

## 2025-03-04 PROCEDURE — 99223 1ST HOSP IP/OBS HIGH 75: CPT

## 2025-03-04 PROCEDURE — 85730 THROMBOPLASTIN TIME PARTIAL: CPT

## 2025-03-04 PROCEDURE — 6370000000 HC RX 637 (ALT 250 FOR IP): Performed by: PHYSICIAN ASSISTANT

## 2025-03-04 PROCEDURE — 6360000002 HC RX W HCPCS: Performed by: NURSE PRACTITIONER

## 2025-03-04 PROCEDURE — 80048 BASIC METABOLIC PNL TOTAL CA: CPT

## 2025-03-04 PROCEDURE — 1200000003 HC TELEMETRY R&B

## 2025-03-04 PROCEDURE — 93005 ELECTROCARDIOGRAM TRACING: CPT

## 2025-03-04 PROCEDURE — 36415 COLL VENOUS BLD VENIPUNCTURE: CPT

## 2025-03-04 PROCEDURE — 73560 X-RAY EXAM OF KNEE 1 OR 2: CPT

## 2025-03-04 RX ORDER — GABAPENTIN 300 MG/1
300 CAPSULE ORAL 3 TIMES DAILY
Status: DISCONTINUED | OUTPATIENT
Start: 2025-03-04 | End: 2025-03-11 | Stop reason: HOSPADM

## 2025-03-04 RX ORDER — ASPIRIN 81 MG/1
81 TABLET, CHEWABLE ORAL DAILY
Status: DISCONTINUED | OUTPATIENT
Start: 2025-03-05 | End: 2025-03-11 | Stop reason: HOSPADM

## 2025-03-04 RX ORDER — ONDANSETRON 2 MG/ML
4 INJECTION INTRAMUSCULAR; INTRAVENOUS EVERY 6 HOURS PRN
Status: DISCONTINUED | OUTPATIENT
Start: 2025-03-04 | End: 2025-03-11 | Stop reason: HOSPADM

## 2025-03-04 RX ORDER — HYDROCODONE BITARTRATE AND ACETAMINOPHEN 5; 325 MG/1; MG/1
1 TABLET ORAL ONCE
Status: COMPLETED | OUTPATIENT
Start: 2025-03-04 | End: 2025-03-04

## 2025-03-04 RX ORDER — MORPHINE SULFATE 2 MG/ML
2 INJECTION, SOLUTION INTRAMUSCULAR; INTRAVENOUS
Status: DISCONTINUED | OUTPATIENT
Start: 2025-03-04 | End: 2025-03-05

## 2025-03-04 RX ORDER — MORPHINE SULFATE 2 MG/ML
2 INJECTION, SOLUTION INTRAMUSCULAR; INTRAVENOUS ONCE
Status: COMPLETED | OUTPATIENT
Start: 2025-03-04 | End: 2025-03-04

## 2025-03-04 RX ORDER — DOXEPIN HYDROCHLORIDE 50 MG/1
50 CAPSULE ORAL EVERY EVENING
Status: DISCONTINUED | OUTPATIENT
Start: 2025-03-04 | End: 2025-03-05

## 2025-03-04 RX ORDER — HYDROCODONE BITARTRATE AND ACETAMINOPHEN 5; 325 MG/1; MG/1
1 TABLET ORAL EVERY 4 HOURS PRN
Status: DISCONTINUED | OUTPATIENT
Start: 2025-03-04 | End: 2025-03-05

## 2025-03-04 RX ORDER — FENOFIBRATE 160 MG/1
160 TABLET ORAL DAILY
Status: DISCONTINUED | OUTPATIENT
Start: 2025-03-05 | End: 2025-03-11 | Stop reason: HOSPADM

## 2025-03-04 RX ORDER — ATORVASTATIN CALCIUM 40 MG/1
40 TABLET, FILM COATED ORAL DAILY
Status: DISCONTINUED | OUTPATIENT
Start: 2025-03-05 | End: 2025-03-11 | Stop reason: HOSPADM

## 2025-03-04 RX ORDER — HYDROCODONE BITARTRATE AND ACETAMINOPHEN 5; 325 MG/1; MG/1
2 TABLET ORAL EVERY 4 HOURS PRN
Status: DISCONTINUED | OUTPATIENT
Start: 2025-03-04 | End: 2025-03-05

## 2025-03-04 RX ADMIN — MORPHINE SULFATE 2 MG: 2 INJECTION, SOLUTION INTRAMUSCULAR; INTRAVENOUS at 19:39

## 2025-03-04 RX ADMIN — MORPHINE SULFATE 2 MG: 2 INJECTION, SOLUTION INTRAMUSCULAR; INTRAVENOUS at 17:38

## 2025-03-04 RX ADMIN — MORPHINE SULFATE 2 MG: 2 INJECTION, SOLUTION INTRAMUSCULAR; INTRAVENOUS at 23:00

## 2025-03-04 RX ADMIN — HYDROCODONE BITARTRATE AND ACETAMINOPHEN 1 TABLET: 5; 325 TABLET ORAL at 16:22

## 2025-03-04 RX ADMIN — HYDROCODONE BITARTRATE AND ACETAMINOPHEN 2 TABLET: 5; 325 TABLET ORAL at 21:53

## 2025-03-04 ASSESSMENT — PAIN SCALES - GENERAL
PAINLEVEL_OUTOF10: 8
PAINLEVEL_OUTOF10: 10
PAINLEVEL_OUTOF10: 10
PAINLEVEL_OUTOF10: 6
PAINLEVEL_OUTOF10: 5
PAINLEVEL_OUTOF10: 10
PAINLEVEL_OUTOF10: 10

## 2025-03-04 ASSESSMENT — PAIN DESCRIPTION - LOCATION
LOCATION: LEG

## 2025-03-04 ASSESSMENT — PAIN DESCRIPTION - ORIENTATION
ORIENTATION: LEFT
ORIENTATION: LEFT

## 2025-03-04 NOTE — ED NOTES
ED to inpatient nurses report      Chief Complaint:  No chief complaint on file.    Present to ED from: Home    MOA:     LOC: alert and orientated to name, place, date  Mobility: Fully dependent  Oxygen Baseline: 0    Current needs required: 0     Code Status:   Prior    What abnormal results were found and what did you give/do to treat them? Femur fx  Any procedures or intervention occur? No    Mental Status:  Level of Consciousness: Alert (0)    Psych Assessment:        Vitals:  Patient Vitals for the past 24 hrs:   BP Temp Temp src Pulse Resp SpO2   03/04/25 1738 (!) 142/79 -- -- 94 18 94 %   03/04/25 1557 (!) 179/72 98 °F (36.7 °C) Oral 97 20 92 %        LDAs:   Peripheral IV 03/04/25 Left;Posterior Hand (Active)   Site Assessment Clean, dry & intact 03/04/25 1738   Line Status Normal saline locked 03/04/25 1738   Phlebitis Assessment No symptoms 03/04/25 1738   Infiltration Assessment 0 03/04/25 1738   Alcohol Cap Used Yes 03/04/25 1738   Dressing Status Clean, dry & intact 03/04/25 1738   Dressing Type Transparent 03/04/25 1738       Ambulatory Status:  Presents to emergency department  because of falls (Syncope, seizure, or loss of consciousness): Yes, Age > 70: No, Altered Mental Status, Intoxication with alcohol or substance confusion (Disorientation, impaired judgment, poor safety awaremess, or inability to follow instructions): No, Impaired Mobility: Ambulates or transfers with assistive devices or assistance; Unable to ambulate or transer.: Yes, Nursing Judgement: Yes    Diagnosis:  DISPOSITION Admitted 03/04/2025 05:38:08 PM   Final diagnoses:   Closed fracture of distal end of left femur, unspecified fracture morphology, initial encounter (HCC)        Consults:  IP CONSULT TO HOSPITALIST     Pain Score:  Pain Assessment  Pain Level: 5  Pain Location: Leg  Pain Orientation: Left    C-SSRS:        Sepsis Screening:       [place]inn Fall Risk:  [place] 1 Fall Risk  Presents to emergency department  because of  falls (Syncope, seizure, or loss of consciousness): Yes  Age > 70: No  Altered Mental Status, Intoxication with alcohol or substance confusion (Disorientation, impaired judgment, poor safety awaremess, or inability to follow instructions): No  Impaired Mobility: Ambulates or transfers with assistive devices or assistance; Unable to ambulate or transer.: Yes  Nursing Judgement: Yes    Swallow Screening        Preferred Language:   English      ALLERGIES     Adhesive tape    SURGICAL HISTORY       Past Surgical History:   Procedure Laterality Date    BLADDER SUSPENSION      BREAST BIOPSY Left     atypical hyperlasia, 2016, [facility]    BREAST LUMPECTOMY Left     atypical hyperplasia, 2016, [facility]    COLONOSCOPY  08/28/2020    HERNIA REPAIR  10/17/2016    JOINT REPLACEMENT Left 2021    knee    KNEE SURGERY      Right knee    LEG SURGERY Right 3/14/2023    Right Above Knee Amputation performed by Rigo Fischer MD at [facility] OR    REVISION TOTAL KNEE ARTHROPLASTY Right 05/10/2022    RIGHT KNEE TOTAL ARTHROPLASTY REVISION performed by Rigo Fischer MD at [facility] OR    REVISION TOTAL KNEE ARTHROPLASTY Right 06/12/2022    Right Knee Incision and Drainage of Total Knee With Poly Exchange performed by Rigo Fischer MD at [facility] OR    REVISION TOTAL KNEE ARTHROPLASTY Right 12/22/2022    STAGE 1 RIGHT KNEE performed by Rigo Fischer MD at [facility] OR    TUBAL LIGATION      WISDOM TOOTH EXTRACTION         PAST MEDICAL HISTORY       Past Medical History:   Diagnosis Date    Arthritis     Atypical ductal hyperplasia, breast     left breast, 2016, lumpectomy, Tamoxifen, [facility]    Cancer (HCC)     Hx of breast biopsy 01/08/2016    Hyperlipidemia     Hypertension     Type II or unspecified type diabetes mellitus without mention of complication, not stated as uncontrolled            Electronically signed by Mame Garcia RN on 3/4/2025 at 5:48 PM

## 2025-03-04 NOTE — ED TRIAGE NOTES
Patient presents to ED from home via EMS with C/O fall. Patient states she was sleeping in her motorized wheelchair. Patient states she fell forward and landed on her knees then proceeded down to the floor and caught herself with her arms. Patient denies hitting head or LOC. Patient states she is having left knee pain. Patient had knee surgery 2 years ago. Patient states she has a right leg amputation due to a knee replacement gone bad. VS obtained.

## 2025-03-04 NOTE — ED PROVIDER NOTES
[facility] EMERGENCY DEPARTMENT      EMERGENCY MEDICINE     Pt Name: Kaycee Varela  MRN: [id number]  Birthdate 1960  Date of evaluation: 3/4/2025  Provider: DEREK Natarajan CNP    CHIEF COMPLAINT     No chief complaint on file.    HISTORY OF PRESENT ILLNESS   Kaycee Varela is a pleasant 65 y.o. female with a past medical history of hyperlipidemia, hypertension, diabetes, right above-the-knee amputation, who is confined to a wheelchair.  She presents for evaluation of a left lower extremity injury sustained on just prior to arrival.  The patient states that she fell asleep while sitting in her electronic wheelchair and slid forward, landing on the left knee.  She is adamant that she did not strike her head or lose consciousness.  She denies any other injuries from the fall itself.    PASTMEDICAL HISTORY     Past Medical History:   Diagnosis Date    Arthritis     Atypical ductal hyperplasia, breast     left breast, 2016, lumpectomy, Tamoxifen, [facility]    Cancer (HCC)     Hx of breast biopsy 01/08/2016    Hyperlipidemia     Hypertension     Type II or unspecified type diabetes mellitus without mention of complication, not stated as uncontrolled        Patient Active Problem List   Diagnosis Code    Type 2 diabetes mellitus with other specified complication, unspecified whether long term insulin use (HCC) E11.69    Hyperlipemia E78.5    THORNTON (nonalcoholic steatohepatitis) K75.81    Obesity E66.9    Tobacco abuse Z72.0    Vitamin D deficiency E55.9    Dyshidrotic eczema L30.1    Displaced articular fracture of head of right femur, sequela S72.061S    Neida-prosthetic supracondylar fracture of femur M97.8XXA, Z96.659    Primary hypertension I10    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    History of total knee replacement, right Z96.651    Enterocolitis due to Clostridium difficile, not specified as recurrent A04.72    Recurrent major depressive disorder F33.9    Aftercare following knee joint

## 2025-03-05 LAB
EKG ATRIAL RATE: 105 BPM
EKG P AXIS: 73 DEGREES
EKG P-R INTERVAL: 154 MS
EKG Q-T INTERVAL: 350 MS
EKG QRS DURATION: 104 MS
EKG QTC CALCULATION (BAZETT): 462 MS
EKG R AXIS: 76 DEGREES
EKG T AXIS: 48 DEGREES
EKG VENTRICULAR RATE: 105 BPM

## 2025-03-05 PROCEDURE — 6370000000 HC RX 637 (ALT 250 FOR IP): Performed by: PHYSICIAN ASSISTANT

## 2025-03-05 PROCEDURE — 99232 SBSQ HOSP IP/OBS MODERATE 35: CPT | Performed by: PHYSICIAN ASSISTANT

## 2025-03-05 PROCEDURE — 6370000000 HC RX 637 (ALT 250 FOR IP)

## 2025-03-05 PROCEDURE — 6360000002 HC RX W HCPCS

## 2025-03-05 PROCEDURE — 1200000003 HC TELEMETRY R&B

## 2025-03-05 PROCEDURE — 1200000000 HC SEMI PRIVATE

## 2025-03-05 RX ORDER — DOCUSATE SODIUM 100 MG/1
100 CAPSULE, LIQUID FILLED ORAL DAILY
Status: DISCONTINUED | OUTPATIENT
Start: 2025-03-05 | End: 2025-03-11 | Stop reason: HOSPADM

## 2025-03-05 RX ORDER — MORPHINE SULFATE 2 MG/ML
2 INJECTION, SOLUTION INTRAMUSCULAR; INTRAVENOUS
Status: DISCONTINUED | OUTPATIENT
Start: 2025-03-05 | End: 2025-03-07

## 2025-03-05 RX ORDER — MORPHINE SULFATE 4 MG/ML
4 INJECTION, SOLUTION INTRAMUSCULAR; INTRAVENOUS
Status: DISCONTINUED | OUTPATIENT
Start: 2025-03-05 | End: 2025-03-06

## 2025-03-05 RX ORDER — ACETAMINOPHEN 325 MG/1
650 TABLET ORAL EVERY 4 HOURS PRN
Status: DISCONTINUED | OUTPATIENT
Start: 2025-03-05 | End: 2025-03-11 | Stop reason: HOSPADM

## 2025-03-05 RX ORDER — ENOXAPARIN SODIUM 100 MG/ML
40 INJECTION SUBCUTANEOUS DAILY
Status: DISCONTINUED | OUTPATIENT
Start: 2025-03-06 | End: 2025-03-11 | Stop reason: HOSPADM

## 2025-03-05 RX ORDER — OXYCODONE HYDROCHLORIDE 5 MG/1
10 TABLET ORAL EVERY 4 HOURS PRN
Status: DISCONTINUED | OUTPATIENT
Start: 2025-03-05 | End: 2025-03-11 | Stop reason: HOSPADM

## 2025-03-05 RX ORDER — SENNOSIDES A AND B 8.6 MG/1
1 TABLET, FILM COATED ORAL NIGHTLY
Status: DISCONTINUED | OUTPATIENT
Start: 2025-03-05 | End: 2025-03-11 | Stop reason: HOSPADM

## 2025-03-05 RX ORDER — OXYCODONE HYDROCHLORIDE 5 MG/1
5 TABLET ORAL EVERY 4 HOURS PRN
Status: DISCONTINUED | OUTPATIENT
Start: 2025-03-05 | End: 2025-03-11 | Stop reason: HOSPADM

## 2025-03-05 RX ORDER — DOXEPIN HYDROCHLORIDE 50 MG/1
50 CAPSULE ORAL NIGHTLY PRN
Status: DISCONTINUED | OUTPATIENT
Start: 2025-03-05 | End: 2025-03-11 | Stop reason: HOSPADM

## 2025-03-05 RX ADMIN — HYDROMORPHONE HYDROCHLORIDE 1 MG: 1 INJECTION, SOLUTION INTRAMUSCULAR; INTRAVENOUS; SUBCUTANEOUS at 02:58

## 2025-03-05 RX ADMIN — OXYCODONE 5 MG: 5 TABLET ORAL at 09:43

## 2025-03-05 RX ADMIN — ATORVASTATIN CALCIUM 40 MG: 40 TABLET, FILM COATED ORAL at 09:43

## 2025-03-05 RX ADMIN — GABAPENTIN 300 MG: 300 CAPSULE ORAL at 09:44

## 2025-03-05 RX ADMIN — GABAPENTIN 300 MG: 300 CAPSULE ORAL at 00:17

## 2025-03-05 RX ADMIN — GABAPENTIN 300 MG: 300 CAPSULE ORAL at 13:29

## 2025-03-05 RX ADMIN — TIZANIDINE 4 MG: 4 TABLET ORAL at 20:20

## 2025-03-05 RX ADMIN — TIZANIDINE 4 MG: 4 TABLET ORAL at 09:43

## 2025-03-05 RX ADMIN — FENOFIBRATE 160 MG: 160 TABLET ORAL at 09:43

## 2025-03-05 RX ADMIN — ASPIRIN 81 MG: 81 TABLET, CHEWABLE ORAL at 09:43

## 2025-03-05 RX ADMIN — HYDROMORPHONE HYDROCHLORIDE 0.5 MG: 1 INJECTION, SOLUTION INTRAMUSCULAR; INTRAVENOUS; SUBCUTANEOUS at 06:45

## 2025-03-05 RX ADMIN — GABAPENTIN 300 MG: 300 CAPSULE ORAL at 20:31

## 2025-03-05 RX ADMIN — TIZANIDINE 4 MG: 4 TABLET ORAL at 00:17

## 2025-03-05 ASSESSMENT — PAIN SCALES - GENERAL
PAINLEVEL_OUTOF10: 0
PAINLEVEL_OUTOF10: 0
PAINLEVEL_OUTOF10: 10
PAINLEVEL_OUTOF10: 8
PAINLEVEL_OUTOF10: 10
PAINLEVEL_OUTOF10: 0
PAINLEVEL_OUTOF10: 0

## 2025-03-05 ASSESSMENT — PAIN DESCRIPTION - ORIENTATION: ORIENTATION: LEFT

## 2025-03-05 ASSESSMENT — PAIN SCALES - WONG BAKER
WONGBAKER_NUMERICALRESPONSE: NO HURT

## 2025-03-05 ASSESSMENT — PAIN DESCRIPTION - LOCATION: LOCATION: HIP

## 2025-03-05 ASSESSMENT — PAIN DESCRIPTION - DESCRIPTORS: DESCRIPTORS: SHARP

## 2025-03-05 NOTE — PROGRESS NOTES
Attempted trauma services SBIRT screening. Pt with medical staff at this time giving care. GABI to re-attempt.

## 2025-03-05 NOTE — PALLIATIVE CARE
Initial Evaluation        Patient:   Kaycee Varela  YOB: 1960  Age:  65 y.o.  Room:  [facility]06/006-  MRN:  [id number]   Acct: [id number]    Date of Admission:  3/4/2025  3:53 PM  Date of Service:  3/5/2025  Completed By:  Aliza Arellano RN        Reason for Palliative Care Evaluation:-   Inoperable fracture, pain     Current Concerns   pain and drowsiness     Palliative Performance Scale   30%  Bed Bound; Extensive disease; Total care; intake reduced; LOC full/confusion     History    Patient with history of HLD, HTN, DM and Right AKA.  Patient is confined to a wheelchair.  Patient admitted after a fall from her wheelchair in which she suffered a fracture of the distal left femur.     Goals of Care Discussions and Plan         Advance Care Planning   Goals of Care/Advance Care Planning (ACP) Conversation    Date of Conversation: 03/05/25    Individuals present for the conversation: Patient, Spouse Luis Carlos, and Sister       ACP documents on file prior to discussion:  -None    Previously completed document/s not on file: Patient / participant reports that there are no previously executed ACP documents.    Healthcare Power of /Healthcare Surrogate Decision Makers:  Per [place] hierarchy of surrogate decision makers:  Luis Carlos.  Patient does have one child whom lives out of state.    Conversation Summary: Patient resting in bed.  Patient is very lethargic but occasionally opens eyes.  Patient c/o left leg pain and is moaning intermittently.  Patient unable to maintain alertness for conversation.  Patient's  and sister are at the bedside.  Palliative care introduced.  Discussion about advance directives and that this could be completed if the patient is able to maintain alertness and indicate her wishes.   does indicate that the patient would likely prefer her sister to make decisions as opposed to him but he comments that they will make \"joint\" decisions at this time if needed.

## 2025-03-05 NOTE — PROGRESS NOTES
Hospitalist Progress Note      Patient:  Kaycee Varela 65 y.o. female     Unit/Bed:-06/006-A    Date of Admission: 3/4/2025      ASSESSMENT AND PLAN    Active Problems  Fall resulting in L distal femur fx  Ortho primary managing  Plan is for closed treatment  NWB LLE  Immobilization  PT/OT  Pain control per primary  SW for disposition planning    Accelerated hypertension, improving:  Likely exacerbated in the setting of pain  BP on arrival 179/72  Patient has a history of essential hypertension, however she is not on any home meds    Supplemental O2 use  Did have one reading of 84% SpO2 per RN following administration of pain medication  Continue to wean as tolerated  Monitor  I-S when able and away    Sinus tachycardia  Suspect this is in the setting of uncontrolled pain, confirmed on EKG  Maintain telemetry    Goals of care /CODE STATUS  Palliative care has been consulted, appreciate assistance    Preoperative risk assessment  Completed by previous provider  Patient presenting with 0 points on RCRI, this represents 3.9% risk of having a cardiac event associated with surgery.  Denies any cardiac history or history of CVA/TIA.  Echo from June 2022 showing normal EF of 60 to 65%.    Chest x-ray obtained today shows no acute process.    EKG showing sinus tachycardia without acute ischemic changes.    Denies any chest pain, shortness of breath, lightheadedness or dizziness.    METS score less than 4.    Discussed cardiac risk with patient however states that she would like to wait and discuss this with her  in the morning when he comes in  Planning nonoperative treatment per Ortho    Resolved Problems  Hyponatremia    Chronic Conditions (reviewed and stable unless otherwise stated)  R AKA with hip in flexed position  Hx of such due to complications following replacement per pt  NIDDMII:   Reports diet controlled.  Glucose on arrival 134.  Will monitor with daily BMP, carb controlled diet.  Peripheral  At time of my exam, patient is resting in bed.  Does appear to be in distress related to pain, currently rates pain as a 10/10.  Noted to have abrasions to anterior left shin.  Denies any significant cardiac history. \"    Medications:    Infusion Medications  Scheduled Medications    aspirin  81 mg Oral Daily    atorvastatin  40 mg Oral Daily    doxepin  50 mg Oral QPM    fenofibrate  160 mg Oral Daily    gabapentin  300 mg Oral TID    tiZANidine  4 mg Oral BID    PRN Meds: morphine **OR** morphine, oxyCODONE **OR** oxyCODONE, acetaminophen, ondansetron    Exam:  BP (!) 148/74   Pulse (!) 101   Temp 98 °F (36.7 °C) (Oral)   Resp 14   SpO2 93%   General: Chronically ill-appearing, drowsy but arousable, no distress, appears stated age.  Eyes:  PERRL. Conjunctivae/corneas clear.  HENT: Head normal appearing. Nares normal. Oral mucosa moist.  Hearing intact.   Neck: Supple, with full range of motion. Trachea midline.  No gross JVD appreciated.  Respiratory:  Normal effort. Clear to auscultation, without rales or wheezes or rhonchi.  Cardiovascular: Normal rate, regular rhythm with normal S1/S2 without murmurs.    No lower extremity edema.   Abdomen: Soft, non-tender, non-distended with normal bowel sounds.  Musculoskeletal: No joint swelling or tenderness. Normal tone. No abnormal movements.   Skin: Warm and dry. No rashes or lesions.  Neurologic:  No focal sensory/motor deficits in the upper or lower extremities. Cranial nerves:  grossly non-focal 2-12.     Psychiatric: Alert and oriented, normal insight and thought content.         Labs/Radiology: See chart or assessment above.     Electronically signed by Carol Tucker PA-C on 3/5/2025 at 8:22 AM

## 2025-03-05 NOTE — PROGRESS NOTES
Pharmacist Review and Automatic Dose Adjustment of Prophylactic Enoxaparin    Reviewed reason(s) for admission/hospital problem list    The reviewing pharmacist has made an adjustment to the ordered enoxaparin dose or converted to UFH per the approved [facility] protocol and table as identified below.        Kaycee Varela is a 65 y.o. female.     Recent Labs     03/04/25  1718   CREATININE 0.5       CrCl ~158     Recent Labs     03/04/25  1718   HGB 13.3   HCT 42.8        Recent Labs     03/04/25  1718   INR 0.97       Height:   Ht Readings from Last 1 Encounters:   02/24/25 1.702 m (5' 7\")     Weight:  Wt Readings from Last 1 Encounters:   02/24/25 90.7 kg (200 lb)               Plan: Based upon the patient's weight and renal function    Ordered: Enoxaparin 30mg SUBQ Daily    Changed/converted to    New Order: Enoxaparin 40mg SUBQ Daily      Thank you,  Fatoumata Carty, [facility]  3/5/2025, 10:40 AM

## 2025-03-05 NOTE — PROCEDURES
PROCEDURE NOTE  Date: 3/4/2025   Name: Kaycee Varela  YOB: 1960    Procedures        EKG was completed and handed to RN

## 2025-03-05 NOTE — PLAN OF CARE
Problem: Chronic Conditions and Co-morbidities  Goal: Patient's chronic conditions and co-morbidity symptoms are monitored and maintained or improved  Outcome: Progressing  Flowsheets (Taken 3/4/2025 2036)  Care Plan - Patient's Chronic Conditions and Co-Morbidity Symptoms are Monitored and Maintained or Improved:   Monitor and assess patient's chronic conditions and comorbid symptoms for stability, deterioration, or improvement   Collaborate with multidisciplinary team to address chronic and comorbid conditions and prevent exacerbation or deterioration     Problem: Discharge Planning  Goal: Discharge to home or other facility with appropriate resources  Outcome: Progressing  Flowsheets (Taken 3/4/2025 2036)  Discharge to home or other facility with appropriate resources:   Identify barriers to discharge with patient and caregiver   Arrange for needed discharge resources and transportation as appropriate     Problem: Skin/Tissue Integrity  Goal: Skin integrity remains intact  Description: 1.  Monitor for areas of redness and/or skin breakdown  2.  Assess vascular access sites hourly  3.  Every 4-6 hours minimum:  Change oxygen saturation probe site  4.  Every 4-6 hours:  If on nasal continuous positive airway pressure, respiratory therapy assess nares and determine need for appliance change or resting period  Outcome: Progressing  Flowsheets (Taken 3/4/2025 2036)  Skin Integrity Remains Intact:   Monitor for areas of redness and/or skin breakdown   Assess vascular access sites hourly     Problem: Safety - Adult  Goal: Free from fall injury  Outcome: Progressing     Problem: ABCDS Injury Assessment  Goal: Absence of physical injury  Outcome: Progressing

## 2025-03-05 NOTE — PLAN OF CARE
Problem: Chronic Conditions and Co-morbidities  Goal: Patient's chronic conditions and co-morbidity symptoms are monitored and maintained or improved  3/5/2025 1450 by Lissette Jon RN  Outcome: Progressing  Flowsheets (Taken 3/5/2025 0930)  Care Plan - Patient's Chronic Conditions and Co-Morbidity Symptoms are Monitored and Maintained or Improved:   Monitor and assess patient's chronic conditions and comorbid symptoms for stability, deterioration, or improvement   Collaborate with multidisciplinary team to address chronic and comorbid conditions and prevent exacerbation or deterioration  3/5/2025 0336 by Zunilda Dior RN  Outcome: Progressing  Flowsheets (Taken 3/4/2025 2036)  Care Plan - Patient's Chronic Conditions and Co-Morbidity Symptoms are Monitored and Maintained or Improved:   Monitor and assess patient's chronic conditions and comorbid symptoms for stability, deterioration, or improvement   Collaborate with multidisciplinary team to address chronic and comorbid conditions and prevent exacerbation or deterioration     Problem: Discharge Planning  Goal: Discharge to home or other facility with appropriate resources  3/5/2025 1450 by Lissette Jon RN  Outcome: Progressing  Flowsheets (Taken 3/5/2025 0930)  Discharge to home or other facility with appropriate resources:   Identify barriers to discharge with patient and caregiver   Arrange for needed discharge resources and transportation as appropriate   Identify discharge learning needs (meds, wound care, etc)   Refer to discharge planning if patient needs post-hospital services based on physician order or complex needs related to functional status, cognitive ability or social support system  3/5/2025 0336 by Zunilda Dior RN  Outcome: Progressing  Flowsheets (Taken 3/4/2025 2036)  Discharge to home or other facility with appropriate resources:   Identify barriers to discharge with patient and caregiver   Arrange for needed discharge resources and  transportation as appropriate     Problem: Skin/Tissue Integrity  Goal: Skin integrity remains intact  Description: 1.  Monitor for areas of redness and/or skin breakdown  2.  Assess vascular access sites hourly  3.  Every 4-6 hours minimum:  Change oxygen saturation probe site  4.  Every 4-6 hours:  If on nasal continuous positive airway pressure, respiratory therapy assess nares and determine need for appliance change or resting period  3/5/2025 1450 by Lissette Jon RN  Outcome: Progressing  Flowsheets  Taken 3/5/2025 0930  Skin Integrity Remains Intact:   Monitor for areas of redness and/or skin breakdown   Assess vascular access sites hourly  Taken 3/5/2025 0815  Skin Integrity Remains Intact:   Monitor for areas of redness and/or skin breakdown   Assess vascular access sites hourly  3/5/2025 0336 by Zunilda Dior RN  Outcome: Progressing  Flowsheets (Taken 3/4/2025 2036)  Skin Integrity Remains Intact:   Monitor for areas of redness and/or skin breakdown   Assess vascular access sites hourly     Problem: Safety - Adult  Goal: Free from fall injury  3/5/2025 1450 by Lissette Jon RN  Outcome: Progressing  Flowsheets (Taken 3/5/2025 0815)  Free From Fall Injury: Instruct family/caregiver on patient safety  3/5/2025 0336 by Zunilda Dior RN  Outcome: Progressing     Problem: ABCDS Injury Assessment  Goal: Absence of physical injury  3/5/2025 1450 by Lissette Jon RN  Outcome: Progressing  Flowsheets (Taken 3/5/2025 0815)  Absence of Physical Injury: Implement safety measures based on patient assessment  3/5/2025 0336 by Zunilda Dior RN  Outcome: Progressing     Problem: Pain  Goal: Verbalizes/displays adequate comfort level or baseline comfort level  Outcome: Progressing   Care plan reviewed with patient.  Patient verbalizes understanding of the plan of care and contribute to goal setting.

## 2025-03-05 NOTE — H&P
Orthopaedic H&P  Patient:  Kaycee Varela  YOB: 1960  MRN: [id number]     Acct: [id number]    PCP: Jose Lazaro DO  Date of Admission: 3/4/2025  Date of Service: Pt seen/examined on 3/5/2025     Chief Complaint: L knee pain, fall   History Of Present Illness: 65 y.o. female who presents with L knee pain after she slid out of her motorized wheelchair when falling asleep, fell forward. She landed on her left knee, witnessed, did not hit head, no loc. Imaging available revealed a L distal femur fracture for which ortho was asked to admit. Pain is at the left anterior knee, worsened with any mobilization through the TKA, relieved by IV medications, no hip pain. She uses a scooter for mobilization, does not ambulate, sits with her R AKA hip in flexed position, her L knee has contracture but she states she can sit in her mobilization chair so it is not to what extent she is on examination at this time, L hip also has stiffness but there is most mobility here. She denies any left hip or groin pain, all at the distal femur.     Prior TKAs Dr Bowden, complicated by adhesions/contracture, infection, fracture  Prior R AKA on 3/14/23 Dr Fischer         Past Medical History:        Diagnosis Date    Arthritis     Atypical ductal hyperplasia, breast     left breast, 2016, lumpectomy, Tamoxifen, [facility]    Cancer (HCC)     Hx of breast biopsy 01/08/2016    Hyperlipidemia     Hypertension     Type II or unspecified type diabetes mellitus without mention of complication, not stated as uncontrolled        Past Surgical History:        Procedure Laterality Date    BLADDER SUSPENSION      BREAST BIOPSY Left     atypical hyperlasia, 2016, [facility]    BREAST LUMPECTOMY Left     atypical hyperplasia, 2016, [facility]    COLONOSCOPY  08/28/2020    HERNIA REPAIR  10/17/2016    JOINT REPLACEMENT Left 2021    knee    KNEE SURGERY      Right knee    LEG SURGERY Right 3/14/2023    Right Above Knee Amputation performed by Rigo BAE  view and by positioning on the   lateral view with hyperflexion. There is a fracture of the distal   metaphysis of the femur extending to the level of the hardware. Mild   displacement and posterior angulation at the fracture site. Osteopenia is   present.  No joint effusion.  No radiopaque foreign body.  Prior left knee replacement.     IMPRESSION:  Acute fracture of the distal femur. Osteopenia.  Radiology report reviewed.    ASSESSMENT:  65 y.o. female with a left distal femur periprosthetic fracture. No operative intervention is recommended at this time considering contracture, fracture pattern, patient mobility baseline. This was discussed with the patient at length, she expressed understanding, will notify spouse as well considering pain medication administration.       PLAN:    -closed treatment L distal femur fracture  -advance diet  -NWB LLE  -multimodal pain control  -lovenox  -immobilization will be difficult considering contracture, will continue to attempt pain control  -PT OT      Patient history physical and plan were reviewed with Dr Fischer, he was in agreement with the plan.

## 2025-03-05 NOTE — CONSULTS
Hospitalist Initial Consult Note      Patient:  Kaycee Varela    Unit/Bed:7K-06/006-A  YOB: 1960  MRN: [id number]   Acct: [id number]     PCP: Jose Lazaro DO  Date of Admission: 3/4/2025     Chief Complaint: Left knee pain  Reason for consult medical management    Date of Service: Pt seen/examined in consultation on 3/4/2025     History Of Present Illness:      Kaycee Varela65 y.o. female who we are asked to see/evaluate by Rigo Fischer MD for medical management of hyperlipidemia, hypertension, diabetes mellitus type 2, neuropathy.  Patient presenting to the ER tonight with complaints of left lower extremity pain, primarily in left knee.  States she was at home with her  and her motorized wheelchair when she fell asleep and tumbled forward landing on her left knee.   was present at time of fall and patient was not on the floor for an extended period of time.  Denies hitting her head or any loss of consciousness.  Denies any shortness of breath, chest pain, lightheadedness or dizziness prior to or since the fall.  States she only fell asleep and then fell forward.  Does have a history of prior right above-knee amputation following knee replacement with recurrent infections.  Nonambulatory and left lower extremity is contractured.  In ER found to have distal femur fracture involving periprosthetic hardware.  Patient admitted under orthopedic service.  Hospitalist consulted for medical management.  At time of my exam, patient is resting in bed.  Does appear to be in distress related to pain, currently rates pain as a 10/10.  Noted to have abrasions to anterior left shin.  Denies any significant cardiac history.      Assessment and Plan:-  Distal left femur fracture s/p fall: Patient reports fall from motorized wheelchair after falling asleep and \"tumbling forward onto the floor.\" X-ray showing fracture of the distal metaphysis of the femur extending to the    HCT 42.8        Recent Labs     03/04/25  1718      K 3.9   CL 97*   CO2 26   BUN 15   CREATININE 0.5   CALCIUM 9.1     No results for input(s): \"AST\", \"ALT\", \"BILIDIR\", \"BILITOT\", \"ALKPHOS\" in the last 72 hours.  Recent Labs     03/04/25  1718   INR 0.97     No results for input(s): \"CKTOTAL\", \"TROPONINI\" in the last 72 hours.    Urinalysis:    Lab Results   Component Value Date/Time    NITRU NEGATIVE 03/11/2023 09:55 PM    WBCUA 0-2 03/11/2023 09:55 PM    BACTERIA NONE SEEN 03/11/2023 09:55 PM    RBCUA 0-2 03/11/2023 09:55 PM    BLOODU NEGATIVE 03/11/2023 09:55 PM    GLUCOSEU NEGATIVE 03/11/2023 09:55 PM       Radiology:   XR TIBIA FIBULA LEFT (2 VIEWS)   Final Result   Acute fracture of the distal femur.      This document has been electronically signed by: Raquel Colin MD on    03/04/2025 05:27 PM      XR KNEE LEFT (1-2 VIEWS)   Final Result   Acute fracture of the distal femur. Osteopenia.      This document has been electronically signed by: Raquel Colin MD on    03/04/2025 05:27 PM      XR FOOT LEFT (MIN 3 VIEWS)   Final Result   No acute bony abnormality. Chronic findings as above.      This document has been electronically signed by: Raquel Colin MD on    03/04/2025 05:25 PM      XR CHEST PORTABLE    (Results Pending)     XR TIBIA FIBULA LEFT (2 VIEWS)    Result Date: 3/4/2025  1 view left tibia-fibula Comparison: None Findings Prior left knee replacement. Acute fracture of the distal metaphysis of the femur. Mild displacement and posterior angulation at the fracture site. Evaluation limited as only lateral views were obtained. No obvious fracture of the tibia or fibula. Osteopenia is present. No joint effusion. No significant arthritic change. No radiopaque foreign body.     Acute fracture of the distal femur. This document has been electronically signed by: Raquel Colin MD on 03/04/2025 05:27 PM    XR KNEE LEFT (1-2 VIEWS)    Result Date: 3/4/2025  1 view left knee Comparison:

## 2025-03-06 LAB
ANION GAP SERPL CALC-SCNC: 13 MEQ/L (ref 8–16)
BUN SERPL-MCNC: 14 MG/DL (ref 8–23)
CALCIUM SERPL-MCNC: 9.4 MG/DL (ref 8.8–10.2)
CHLORIDE SERPL-SCNC: 96 MEQ/L (ref 98–111)
CO2 SERPL-SCNC: 25 MEQ/L (ref 22–29)
CREAT SERPL-MCNC: 0.5 MG/DL (ref 0.5–0.9)
DEPRECATED RDW RBC AUTO: 50.3 FL (ref 35–45)
ERYTHROCYTE [DISTWIDTH] IN BLOOD BY AUTOMATED COUNT: 15.8 % (ref 11.5–14.5)
GFR SERPL CREATININE-BSD FRML MDRD: > 90 ML/MIN/1.73M2
GLUCOSE SERPL-MCNC: 114 MG/DL (ref 74–109)
HCT VFR BLD AUTO: 41.3 % (ref 37–47)
HGB BLD-MCNC: 13 GM/DL (ref 12–16)
MCH RBC QN AUTO: 27.4 PG (ref 26–33)
MCHC RBC AUTO-ENTMCNC: 31.5 GM/DL (ref 32.2–35.5)
MCV RBC AUTO: 87.1 FL (ref 81–99)
PLATELET # BLD AUTO: 327 THOU/MM3 (ref 130–400)
PMV BLD AUTO: 9.8 FL (ref 9.4–12.4)
POTASSIUM SERPL-SCNC: 4.3 MEQ/L (ref 3.5–5.2)
RBC # BLD AUTO: 4.74 MILL/MM3 (ref 4.2–5.4)
SODIUM SERPL-SCNC: 134 MEQ/L (ref 135–145)
WBC # BLD AUTO: 10.3 THOU/MM3 (ref 4.8–10.8)

## 2025-03-06 PROCEDURE — 6370000000 HC RX 637 (ALT 250 FOR IP): Performed by: PHYSICIAN ASSISTANT

## 2025-03-06 PROCEDURE — 85027 COMPLETE CBC AUTOMATED: CPT

## 2025-03-06 PROCEDURE — 80048 BASIC METABOLIC PNL TOTAL CA: CPT

## 2025-03-06 PROCEDURE — 6360000002 HC RX W HCPCS: Performed by: PHYSICIAN ASSISTANT

## 2025-03-06 PROCEDURE — 97167 OT EVAL HIGH COMPLEX 60 MIN: CPT

## 2025-03-06 PROCEDURE — 1200000000 HC SEMI PRIVATE

## 2025-03-06 PROCEDURE — 6370000000 HC RX 637 (ALT 250 FOR IP)

## 2025-03-06 PROCEDURE — 97530 THERAPEUTIC ACTIVITIES: CPT

## 2025-03-06 PROCEDURE — 36415 COLL VENOUS BLD VENIPUNCTURE: CPT

## 2025-03-06 PROCEDURE — 97110 THERAPEUTIC EXERCISES: CPT

## 2025-03-06 PROCEDURE — 99232 SBSQ HOSP IP/OBS MODERATE 35: CPT | Performed by: PHYSICIAN ASSISTANT

## 2025-03-06 PROCEDURE — 97163 PT EVAL HIGH COMPLEX 45 MIN: CPT

## 2025-03-06 RX ORDER — PANTOPRAZOLE SODIUM 40 MG/1
40 TABLET, DELAYED RELEASE ORAL
Status: DISCONTINUED | OUTPATIENT
Start: 2025-03-06 | End: 2025-03-11 | Stop reason: HOSPADM

## 2025-03-06 RX ADMIN — FENOFIBRATE 160 MG: 160 TABLET ORAL at 09:22

## 2025-03-06 RX ADMIN — TIZANIDINE 4 MG: 4 TABLET ORAL at 21:31

## 2025-03-06 RX ADMIN — MORPHINE SULFATE 2 MG: 2 INJECTION, SOLUTION INTRAMUSCULAR; INTRAVENOUS at 15:32

## 2025-03-06 RX ADMIN — OXYCODONE HYDROCHLORIDE 10 MG: 5 TABLET ORAL at 13:50

## 2025-03-06 RX ADMIN — ASPIRIN 81 MG: 81 TABLET, CHEWABLE ORAL at 09:22

## 2025-03-06 RX ADMIN — SENNOSIDES 8.6 MG: 8.6 TABLET ORAL at 21:30

## 2025-03-06 RX ADMIN — ATORVASTATIN CALCIUM 40 MG: 40 TABLET, FILM COATED ORAL at 09:22

## 2025-03-06 RX ADMIN — GABAPENTIN 300 MG: 300 CAPSULE ORAL at 09:22

## 2025-03-06 RX ADMIN — GABAPENTIN 300 MG: 300 CAPSULE ORAL at 21:31

## 2025-03-06 RX ADMIN — OXYCODONE HYDROCHLORIDE 10 MG: 5 TABLET ORAL at 09:21

## 2025-03-06 RX ADMIN — DOCUSATE SODIUM 100 MG: 100 CAPSULE, LIQUID FILLED ORAL at 09:22

## 2025-03-06 RX ADMIN — MORPHINE SULFATE 2 MG: 2 INJECTION, SOLUTION INTRAMUSCULAR; INTRAVENOUS at 23:41

## 2025-03-06 RX ADMIN — OXYCODONE HYDROCHLORIDE 10 MG: 5 TABLET ORAL at 21:30

## 2025-03-06 RX ADMIN — ENOXAPARIN SODIUM 40 MG: 100 INJECTION SUBCUTANEOUS at 09:22

## 2025-03-06 RX ADMIN — PANTOPRAZOLE SODIUM 40 MG: 40 TABLET, DELAYED RELEASE ORAL at 09:22

## 2025-03-06 RX ADMIN — TIZANIDINE 4 MG: 4 TABLET ORAL at 09:21

## 2025-03-06 RX ADMIN — OXYCODONE HYDROCHLORIDE 10 MG: 5 TABLET ORAL at 04:53

## 2025-03-06 RX ADMIN — ACETAMINOPHEN 650 MG: 325 TABLET ORAL at 21:32

## 2025-03-06 RX ADMIN — GABAPENTIN 300 MG: 300 CAPSULE ORAL at 13:50

## 2025-03-06 ASSESSMENT — PAIN SCALES - GENERAL
PAINLEVEL_OUTOF10: 0
PAINLEVEL_OUTOF10: 7
PAINLEVEL_OUTOF10: 6
PAINLEVEL_OUTOF10: 6
PAINLEVEL_OUTOF10: 3
PAINLEVEL_OUTOF10: 7
PAINLEVEL_OUTOF10: 0
PAINLEVEL_OUTOF10: 8
PAINLEVEL_OUTOF10: 5
PAINLEVEL_OUTOF10: 10

## 2025-03-06 ASSESSMENT — PAIN DESCRIPTION - LOCATION: LOCATION: LEG

## 2025-03-06 ASSESSMENT — PAIN DESCRIPTION - DESCRIPTORS: DESCRIPTORS: ACHING

## 2025-03-06 ASSESSMENT — PAIN DESCRIPTION - ORIENTATION: ORIENTATION: LEFT

## 2025-03-06 NOTE — PROGRESS NOTES
Hospitalist Progress Note      Patient:  Kaycee Varela 65 y.o. female     Unit/Bed:-06/006-A    Date of Admission: 3/4/2025      ASSESSMENT AND PLAN    Active Problems  Fall resulting in L distal femur fx  Ortho primary managing  Closed, non-operative management  NWB LLE  Immobilization  PT/OT  Pain control per primary  SW for disposition planning    Primary Hypertension, improving:  Hx but not on home meds, pressure improved, continue to manage patient's pain as able    Transient Hypoxia requiring Supplemental O2 use:  Did have one reading of 84% SpO2 per RN following administration of pain medication  Continue to wean as tolerated  Monitor  Pulmonary hygiene    Goals of care /CODE STATUS  Palliative care has been consulted, appreciate assistance    Preoperative risk assessment  Completed by previous provider  Patient presenting with 0 points on RCRI, this represents 3.9% risk of having a cardiac event associated with surgery.  Denies any cardiac history or history of CVA/TIA.  Echo from June 2022 showing normal EF of 60 to 65%.    Chest x-ray obtained today shows no acute process.    EKG showing sinus tachycardia without acute ischemic changes.    Denies any chest pain, shortness of breath, lightheadedness or dizziness.    METS score less than 4.    Discussed cardiac risk with patient however states that she would like to wait and discuss this with her  in the morning when he comes in  Planning nonoperative treatment per Ortho    Resolved Problems  Hyponatremia    Chronic Conditions (reviewed and stable unless otherwise stated)  R AKA with hip in flexed position  Hx of such due to complications following replacement per pt  NIDDMII:   Reports diet controlled.  Glucose on arrival 134.  Will monitor with daily BMP, carb controlled diet.  Peripheral neuropathy/phantom pain:  Continue home gabapentin.  Hyperlipidemia:  Continue home fenofibrate, atorvastatin.  Depression:  Continue home  anterior left shin.  Denies any significant cardiac history. \"    Medications:    Infusion Medications  Scheduled Medications    pantoprazole  40 mg Oral QAM AC    enoxaparin  40 mg SubCUTAneous Daily    docusate sodium  100 mg Oral Daily    senna  1 tablet Oral Nightly    aspirin  81 mg Oral Daily    atorvastatin  40 mg Oral Daily    fenofibrate  160 mg Oral Daily    gabapentin  300 mg Oral TID    tiZANidine  4 mg Oral BID    PRN Meds: morphine **OR** [DISCONTINUED] morphine, oxyCODONE **OR** oxyCODONE, acetaminophen, doxepin, ondansetron    Physical Exam  Constitutional:       Interventions: She is not intubated.     Comments: Patient sleeping   Cardiovascular:      Rate and Rhythm: Normal rate and regular rhythm.   Pulmonary:      Effort: No respiratory distress. She is not intubated.   Psychiatric:         Speech: She is communicative.         Labs/Radiology: See chart or assessment above.     Electronically signed by Kulwant Kathleen PA-C on 3/6/2025 at 9:27 AM

## 2025-03-06 NOTE — PROGRESS NOTES
Orthopaedic Progress Note      SUBJECTIVE   Ms. Varela is hospital day 2, L distal femur fx    Seen at bedside this morning  no adverse events overnight  Pain control improving slowly, more alert and cooperative with exam today  No PT OT yesterday   No new concerns     OBJECTIVE      Physical    VITALS:  BP (!) 144/62   Pulse (!) 102   Temp 98.6 °F (37 °C) (Oral)   Resp 16   SpO2 98%   I/O last 3 completed shifts:  In: 470 [P.O.:470]  Out: -     7/10 pain  Gen: alert and oriented,  improvement in participation of examination and alertness today  Head: normorcephalic, atraumatic  Resp: unlabored, room air  Pelvis: stable  LLE:- she sits at nearly 90* flexion at the hip, she does tolerate IR ER at the hip but does not perform extension due to guarding at the knee, her knee is sitting in complete flexion, her heel nearly to her glute, she states this is near her baseline but does tolerate about 10* flexion from this fixed position, distally her ankle and foot ROM are intact       Data  CBC:   Lab Results   Component Value Date/Time    WBC 10.3 03/06/2025 06:42 AM    HGB 13.0 03/06/2025 06:42 AM     03/06/2025 06:42 AM     BMP:    Lab Results   Component Value Date/Time     03/06/2025 06:42 AM    K 4.3 03/06/2025 06:42 AM    K 3.7 09/19/2024 02:35 PM    CL 96 03/06/2025 06:42 AM    CO2 25 03/06/2025 06:42 AM    BUN 14 03/06/2025 06:42 AM    CREATININE 0.5 03/06/2025 06:42 AM    CALCIUM 9.4 03/06/2025 06:42 AM    GLUCOSE 114 03/06/2025 06:42 AM    GLUCOSE 113 12/10/2020 10:01 AM     Uric Acid:  No components found for: \"URIC\"  PT/INR:    Lab Results   Component Value Date/Time    PROTIME 11.0 10/05/2016 03:39 PM    INR 0.97 03/04/2025 05:18 PM     Troponin:  No results found for: \"TROPONINI\"  Urine Culture:  No components found for: \"CURINE\"      Current Inpatient Medications    Current Facility-Administered Medications: morphine (PF) injection 2 mg, 2 mg, IntraVENous, Q2H PRN **OR** [DISCONTINUED]  morphine (PF) injection 4 mg, 4 mg, IntraVENous, Q2H PRN  oxyCODONE (ROXICODONE) immediate release tablet 5 mg, 5 mg, Oral, Q4H PRN **OR** oxyCODONE (ROXICODONE) immediate release tablet 10 mg, 10 mg, Oral, Q4H PRN  acetaminophen (TYLENOL) tablet 650 mg, 650 mg, Oral, Q4H PRN  enoxaparin (LOVENOX) injection 40 mg, 40 mg, SubCUTAneous, Daily  doxepin (SINEQUAN) capsule 50 mg, 50 mg, Oral, Nightly PRN  docusate sodium (COLACE) capsule 100 mg, 100 mg, Oral, Daily  senna (SENOKOT) tablet 8.6 mg, 1 tablet, Oral, Nightly  ondansetron (ZOFRAN) injection 4 mg, 4 mg, IntraVENous, Q6H PRN  aspirin chewable tablet 81 mg, 81 mg, Oral, Daily  atorvastatin (LIPITOR) tablet 40 mg, 40 mg, Oral, Daily  fenofibrate tablet 160 mg, 160 mg, Oral, Daily  gabapentin (NEURONTIN) capsule 300 mg, 300 mg, Oral, TID  tiZANidine (ZANAFLEX) tablet 4 mg, 4 mg, Oral, BID        PLAN    Ms. Varela is hospital day 2, L distal femur fx    Closed treatment    NWB LLE  Modified splint applied   Pain control stabilizing, continue to wean  PT OT today, none yesterday, imperative to proceed today  Maintain splint as able  Pulmonary hygiene  Lovenox   Medically stable, pending placement

## 2025-03-06 NOTE — PROGRESS NOTES
[facility]  INPATIENT OCCUPATIONAL THERAPY  [facility] 7K  EVALUATION      Discharge Recommendations: 24 hour supervision or assist, Long Term Care with OT  Equipment Recommendations: Yes        Time In: 1330  Time Out: 1358  Timed Code Treatment Minutes: 19 Minutes  Minutes: 28          Date: 3/6/2025  Patient Name: Kaycee Varela,   Gender: female      MRN: [id number]  : 1960  (65 y.o.)  Referring Practitioner: Rebekah Rich PA-C  Diagnosis: Closed fracture of distal end of left femur, initial encounter ([facility])  Additional Pertinent Hx: per chart review; \"Kaycee Varela is a pleasant 65 y.o. female with a past medical history of hyperlipidemia, hypertension, diabetes, right above-the-knee amputation, who is confined to a wheelchair.  She presents for evaluation of a left lower extremity injury sustained on just prior to arrival.  The patient states that she fell asleep while sitting in her electronic wheelchair and slid forward, landing on the left knee.  She is adamant that she did not strike her head or lose consciousness.  She denies any other injuries from the fall itself.\"    Restrictions/Precautions:  Restrictions/Precautions: Weight Bearing, Fall Risk, General Precautions  Left Lower Extremity Weight Bearing: Non Weight Bearing  Position Activity Restriction  Other Position/Activity Restrictions: hx R AKA and LLE contracture    Subjective  Chart Reviewed: Yes, Orders, Progress Notes, History and Physical, Imaging  Patient assessed for rehabilitation services?: Yes  Family / Caregiver Present: Yes (spouse and another visitor in and out of room.)    Subjective: patient supine in bed and agreeable to eval with hesitancy, patient's spouse present and then left room. patient A & O x 3. bed alarm activated at end of session.    Pain: 10/10: L LE; informed RN and provided pain meds that were due. Patient constantly facial grimacing.     Vitals: Vitals not assessed per clinical  Positioning, Equipment evaluation, education, & procurement, Patient/Caregiver education & training, Safety education & training, Self-Care / ADL, Pain management, Coordination training.  See long-term goal time frame for expected duration of plan of care.  If no long-term goals established, a short length of stay is anticipated.    Goals:  Patient goals : return home at [facility]  Short Term Goals  Time Frame for Short Term Goals: by discharge  Short Term Goal 1: patient will progress to EOB sitting and demo (F+) dyn sitting balance x 10 min to increase activity tolerance for ADLs and prep for slide board transfers  Short Term Goal 2: patient will demo 5/5 (B) UE strength to increase ease with bed mobility and slide board transfers.  Short Term Goal 3: patient will complete UB ADLs with SBA and 1-2 cues to initiate and sequence task.    AM-PAC Inpatient Daily Activity Raw Score: 10  AM-PAC Inpatient ADL T-Scale Score : 27.31    Following session, patient left in safe position with all fall risk precautions in place.

## 2025-03-06 NOTE — PALLIATIVE CARE
Follow Up / Progress Note        Patient:   Kaycee Varela  YOB: 1960  Age:  65 y.o.  Room:  [facility]  MRN:  [id number]         Advance Care Planning     Palliative Team Advance Care Planning (ACP) Conversation    Date of Conversation: 25    Individuals present for the conversation: Patient with decision making capacity and Spouse Luis Carlos     ACP documents on file prior to discussion:  -None    Previously completed document/s not on file: Patient / participant reports that there are no previously executed ACP documents.    Healthcare Decision Maker:    Primary Decision Maker: Luis Carlos Zeng - Spouse - [phone]     Conversation Summary:  Physical therapy in and assisted patient to sit on edge of bed.  Patient is alert and oriented to all spheres.  Palliative care introduced.  Patient is  and has no biological children (has a step daughter), parents are  and she has only one sibling.  Discussed advanced directives and their purpose.  At this time, patient does not wish to complete these documents.  Discussion about code status levels and what each level entails.  Discussed complications of rib fractures, brain and organ damage associated with resuscitative measures.  Patient verbalizes understanding that she is a full code and if a change is desired, she must alert staff.  Discussed the challenges of the patient's current situation with pain control and alertness.  Discussed risks of blood clots, pneumonia and bed sores with decreased mobility and discussed importance of trying to prevent these complications.  Discussed that a pain management clinic may be best to address her complex needs at this point.  Emotional support provided.    Discussed with primary RNKathryn.  Pain management referral suggested as the patient has acute on chronic pain.    Resuscitation Status: Full    Documentation Completed:  -No new documents completed.    I spent 10 minutes

## 2025-03-06 NOTE — DISCHARGE INSTR - COC
Agency   Name: [facility]   Address: [address] [place]  [address]  Phone: [phone]  Fax: [phone]    Dialysis Facility (if applicable)   Name:  Address:  Dialysis Schedule:  Phone:  Fax:    / signature: Electronically signed by DYLAN Davis on 3/8/25 at 11:34 AM EST    PHYSICIAN SECTION    Prognosis: Fair    Condition at Discharge: Stable    Rehab Potential (if transferring to Rehab): Good    Physician Certification: I certify the above information and transfer of Kaycee Varela  is necessary for the continuing treatment of the diagnosis listed and that she requires Skilled Nursing Facility for greater 30 days.     Update Admission H&P: No change in H&P    PHYSICIAN SIGNATURE:  Electronically signed by Rebekah Rich PA-C on 3/11/25 at  6:26 AM EST

## 2025-03-06 NOTE — PROGRESS NOTES
Attempted trauma services SBIRT screening. Arrived to pt room. Pt has two visitors. Pt asleep in bed. Family advises pt is sleeping and not to disturb the patient. GABI to re-attempt.

## 2025-03-06 NOTE — PROGRESS NOTES
[facility]  INPATIENT PHYSICAL THERAPY  EVALUATION  [facility] ORTHOPEDICS 7K - 7K-06/006-A    Discharge Recommendations: Continue to assess pending progress, Subacute/Skilled Nursing Facility, Patient would benefit from continued therapy after discharge  Equipment Recommendations: No               Time In: 0959  Time Out: 1022  Timed Code Treatment Minutes: 11 Minutes  Minutes: 23          Date: 3/6/2025  Patient Name: Kaycee Varela,  Gender:  female        MRN: [id number]  : 1960  (65 y.o.)      Referring Practitioner: GUILLERMO Rich CNP  Diagnosis: Closed fracture of distal end of left femur, initial encounter ([facility])  Additional Pertinent Hx: 65 y.o. female who presents with L knee pain after she slid out of her motorized wheelchair when falling asleep, fell forward. She landed on her left knee, witnessed, did not hit head, no loc. Imaging available revealed a L distal femur fracture for which ortho was asked to admit. Pain is at the left anterior knee, worsened with any mobilization through the TKA, relieved by IV medications, no hip pain. She uses a scooter for mobilization, does not ambulate, sits with her R AKA hip in flexed position, her L knee has contracture but she states she can sit in her mobilization chair so it is not to what extent she is on examination at this time, L hip also has stiffness but there is most mobility here. She denies any left hip or groin pain, all at the distal femur.      Prior TKAs Dr Bowden, complicated by adhesions/contracture, infection, fracture  Prior R AKA on 3/14/23 Dr Fischer. per ortho-NWB LLE  Modified splint applied.   Pain control stabilizing, continue to wean.  PT OT today, none yesterday, imperative to proceed today.  Maintain splint as able.     Restrictions/Precautions:  Restrictions/Precautions: Weight Bearing, Fall Risk  Left Lower Extremity Weight Bearing: Non Weight Bearing    Other Position/Activity Restrictions: hx R AKA and LLE contracture

## 2025-03-06 NOTE — PLAN OF CARE
Problem: Chronic Conditions and Co-morbidities  Goal: Patient's chronic conditions and co-morbidity symptoms are monitored and maintained or improved  3/5/2025 1450 by Lissette Jon RN  Outcome: Progressing  Flowsheets (Taken 3/5/2025 0930)  Care Plan - Patient's Chronic Conditions and Co-Morbidity Symptoms are Monitored and Maintained or Improved:   Monitor and assess patient's chronic conditions and comorbid symptoms for stability, deterioration, or improvement   Collaborate with multidisciplinary team to address chronic and comorbid conditions and prevent exacerbation or deterioration     Problem: Discharge Planning  Goal: Discharge to home or other facility with appropriate resources  3/5/2025 1450 by Lissette Jon, RN  Outcome: Progressing  Flowsheets (Taken 3/5/2025 0930)  Discharge to home or other facility with appropriate resources:   Identify barriers to discharge with patient and caregiver   Arrange for needed discharge resources and transportation as appropriate   Identify discharge learning needs (meds, wound care, etc)   Refer to discharge planning if patient needs post-hospital services based on physician order or complex needs related to functional status, cognitive ability or social support system     Problem: Skin/Tissue Integrity  Goal: Skin integrity remains intact  Description: 1.  Monitor for areas of redness and/or skin breakdown  2.  Assess vascular access sites hourly  3.  Every 4-6 hours minimum:  Change oxygen saturation probe site  4.  Every 4-6 hours:  If on nasal continuous positive airway pressure, respiratory therapy assess nares and determine need for appliance change or resting period  3/6/2025 0305 by Selena Gauthier, RN  Outcome: Progressing  Flowsheets (Taken 3/6/2025 0305)  Skin Integrity Remains Intact:   Monitor for areas of redness and/or skin breakdown   Assess vascular access sites hourly   Every 4-6 hours minimum: Change oxygen saturation probe site  Note: Care plan

## 2025-03-07 PROCEDURE — 6360000002 HC RX W HCPCS: Performed by: PHYSICIAN ASSISTANT

## 2025-03-07 PROCEDURE — 99232 SBSQ HOSP IP/OBS MODERATE 35: CPT | Performed by: PHYSICIAN ASSISTANT

## 2025-03-07 PROCEDURE — 6370000000 HC RX 637 (ALT 250 FOR IP)

## 2025-03-07 PROCEDURE — 1200000000 HC SEMI PRIVATE

## 2025-03-07 PROCEDURE — 6370000000 HC RX 637 (ALT 250 FOR IP): Performed by: PHYSICIAN ASSISTANT

## 2025-03-07 PROCEDURE — 97530 THERAPEUTIC ACTIVITIES: CPT

## 2025-03-07 PROCEDURE — 97110 THERAPEUTIC EXERCISES: CPT

## 2025-03-07 RX ORDER — PSEUDOEPHEDRINE HCL 30 MG
100 TABLET ORAL 2 TIMES DAILY PRN
Qty: 20 CAPSULE | Refills: 0 | Status: SHIPPED | OUTPATIENT
Start: 2025-03-07 | End: 2025-03-17

## 2025-03-07 RX ORDER — PANTOPRAZOLE SODIUM 40 MG/1
40 TABLET, DELAYED RELEASE ORAL
Qty: 21 TABLET | Refills: 0 | Status: SHIPPED | OUTPATIENT
Start: 2025-03-08 | End: 2025-03-29

## 2025-03-07 RX ORDER — OXYCODONE HYDROCHLORIDE 5 MG/1
5 TABLET ORAL EVERY 6 HOURS PRN
Qty: 28 TABLET | Refills: 0 | Status: SHIPPED | OUTPATIENT
Start: 2025-03-07 | End: 2025-03-14

## 2025-03-07 RX ORDER — ENOXAPARIN SODIUM 100 MG/ML
40 INJECTION SUBCUTANEOUS DAILY
Qty: 8.4 ML | Refills: 0 | Status: SHIPPED | OUTPATIENT
Start: 2025-03-07 | End: 2025-03-28

## 2025-03-07 RX ADMIN — PANTOPRAZOLE SODIUM 40 MG: 40 TABLET, DELAYED RELEASE ORAL at 05:42

## 2025-03-07 RX ADMIN — ACETAMINOPHEN 650 MG: 325 TABLET ORAL at 02:52

## 2025-03-07 RX ADMIN — GABAPENTIN 300 MG: 300 CAPSULE ORAL at 08:38

## 2025-03-07 RX ADMIN — TIZANIDINE 4 MG: 4 TABLET ORAL at 22:51

## 2025-03-07 RX ADMIN — OXYCODONE HYDROCHLORIDE 10 MG: 5 TABLET ORAL at 02:52

## 2025-03-07 RX ADMIN — SENNOSIDES 8.6 MG: 8.6 TABLET ORAL at 22:54

## 2025-03-07 RX ADMIN — MORPHINE SULFATE 2 MG: 2 INJECTION, SOLUTION INTRAMUSCULAR; INTRAVENOUS at 06:22

## 2025-03-07 RX ADMIN — DOCUSATE SODIUM 100 MG: 100 CAPSULE, LIQUID FILLED ORAL at 08:38

## 2025-03-07 RX ADMIN — OXYCODONE HYDROCHLORIDE 10 MG: 5 TABLET ORAL at 22:50

## 2025-03-07 RX ADMIN — FENOFIBRATE 160 MG: 160 TABLET ORAL at 08:38

## 2025-03-07 RX ADMIN — ENOXAPARIN SODIUM 40 MG: 100 INJECTION SUBCUTANEOUS at 08:39

## 2025-03-07 RX ADMIN — ACETAMINOPHEN 650 MG: 325 TABLET ORAL at 22:53

## 2025-03-07 RX ADMIN — GABAPENTIN 300 MG: 300 CAPSULE ORAL at 12:51

## 2025-03-07 RX ADMIN — OXYCODONE HYDROCHLORIDE 10 MG: 5 TABLET ORAL at 12:51

## 2025-03-07 RX ADMIN — ACETAMINOPHEN 650 MG: 325 TABLET ORAL at 08:38

## 2025-03-07 RX ADMIN — ACETAMINOPHEN 650 MG: 325 TABLET ORAL at 12:51

## 2025-03-07 RX ADMIN — GABAPENTIN 300 MG: 300 CAPSULE ORAL at 22:51

## 2025-03-07 RX ADMIN — ATORVASTATIN CALCIUM 40 MG: 40 TABLET, FILM COATED ORAL at 08:40

## 2025-03-07 RX ADMIN — ASPIRIN 81 MG: 81 TABLET, CHEWABLE ORAL at 08:38

## 2025-03-07 RX ADMIN — TIZANIDINE 4 MG: 4 TABLET ORAL at 08:38

## 2025-03-07 ASSESSMENT — PAIN DESCRIPTION - LOCATION: LOCATION: FOOT

## 2025-03-07 ASSESSMENT — PAIN SCALES - GENERAL
PAINLEVEL_OUTOF10: 8
PAINLEVEL_OUTOF10: 2
PAINLEVEL_OUTOF10: 8
PAINLEVEL_OUTOF10: 0
PAINLEVEL_OUTOF10: 4
PAINLEVEL_OUTOF10: 0
PAINLEVEL_OUTOF10: 3
PAINLEVEL_OUTOF10: 7
PAINLEVEL_OUTOF10: 3
PAINLEVEL_OUTOF10: 6

## 2025-03-07 ASSESSMENT — PAIN SCALES - WONG BAKER
WONGBAKER_NUMERICALRESPONSE: NO HURT

## 2025-03-07 ASSESSMENT — PAIN DESCRIPTION - ORIENTATION: ORIENTATION: LEFT

## 2025-03-07 NOTE — PLAN OF CARE
Problem: Discharge Planning  Goal: Discharge to home or other facility with appropriate resources  3/7/2025 0046 by Sharifa Anaya RN  Outcome: Progressing  3/6/2025 2241 by Sharifa Anaya RN  Outcome: Progressing  3/6/2025 1355 by Carly Santos LSW  Outcome: Progressing     Problem: Safety - Adult  Goal: Free from fall injury  3/7/2025 0046 by Sharifa Anaya RN  Outcome: Progressing  3/6/2025 2241 by Sharifa Anaya RN  Outcome: Progressing     Problem: Pain  Goal: Verbalizes/displays adequate comfort level or baseline comfort level  3/7/2025 0046 by Sharifa Anaya RN  Outcome: Progressing  3/6/2025 2241 by Sharifa Anaya RN  Outcome: Progressing  Flowsheets (Taken 3/6/2025 2115)  Verbalizes/displays adequate comfort level or baseline comfort level: Encourage patient to monitor pain and request assistance

## 2025-03-07 NOTE — PLAN OF CARE
Problem: Discharge Planning  Goal: Discharge to home or other facility with appropriate resources  3/6/2025 2241 by Sharifa Anaya RN  Outcome: Progressing  3/6/2025 1355 by Carly Santos LSW  Outcome: Progressing     Problem: Safety - Adult  Goal: Free from fall injury  Outcome: Progressing     Problem: Pain  Goal: Verbalizes/displays adequate comfort level or baseline comfort level  Outcome: Progressing  Flowsheets (Taken 3/6/2025 2115)  Verbalizes/displays adequate comfort level or baseline comfort level: Encourage patient to monitor pain and request assistance

## 2025-03-07 NOTE — PLAN OF CARE
Problem: Chronic Conditions and Co-morbidities  Goal: Patient's chronic conditions and co-morbidity symptoms are monitored and maintained or improved  Outcome: Completed  Flowsheets (Taken 3/5/2025 0930)  Care Plan - Patient's Chronic Conditions and Co-Morbidity Symptoms are Monitored and Maintained or Improved:   Monitor and assess patient's chronic conditions and comorbid symptoms for stability, deterioration, or improvement   Collaborate with multidisciplinary team to address chronic and comorbid conditions and prevent exacerbation or deterioration     Problem: Discharge Planning  Goal: Discharge to home or other facility with appropriate resources  3/7/2025 1026 by Lissette Jon RN  Outcome: Completed  Flowsheets (Taken 3/5/2025 0930)  Discharge to home or other facility with appropriate resources:   Identify barriers to discharge with patient and caregiver   Arrange for needed discharge resources and transportation as appropriate   Identify discharge learning needs (meds, wound care, etc)   Refer to discharge planning if patient needs post-hospital services based on physician order or complex needs related to functional status, cognitive ability or social support system  3/7/2025 0046 by Sharifa Anaya, RN  Outcome: Progressing  3/6/2025 2241 by Sharifa Anaya, RN  Outcome: Progressing     Problem: Skin/Tissue Integrity  Goal: Skin integrity remains intact  Description: 1.  Monitor for areas of redness and/or skin breakdown  2.  Assess vascular access sites hourly  3.  Every 4-6 hours minimum:  Change oxygen saturation probe site  4.  Every 4-6 hours:  If on nasal continuous positive airway pressure, respiratory therapy assess nares and determine need for appliance change or resting period  Outcome: Completed  Flowsheets (Taken 3/7/2025 0832)  Skin Integrity Remains Intact:   Monitor for areas of redness and/or skin breakdown   Assess vascular access sites hourly     Problem: Safety -  Adult  Goal: Free from fall injury  3/7/2025 1026 by Lissette Jon RN  Outcome: Completed  Flowsheets (Taken 3/7/2025 0832)  Free From Fall Injury: Instruct family/caregiver on patient safety  3/7/2025 0046 by Sharifa Anaya RN  Outcome: Progressing  3/6/2025 2241 by Sharifa Anaya RN  Outcome: Progressing     Problem: ABCDS Injury Assessment  Goal: Absence of physical injury  Outcome: Completed  Flowsheets (Taken 3/7/2025 0832)  Absence of Physical Injury: Implement safety measures based on patient assessment     Problem: Pain  Goal: Verbalizes/displays adequate comfort level or baseline comfort level  3/7/2025 1026 by Lissette Jon RN  Outcome: Completed  Flowsheets  Taken 3/7/2025 0830 by Lissette Jon RN  Verbalizes/displays adequate comfort level or baseline comfort level:   Encourage patient to monitor pain and request assistance   Assess pain using appropriate pain scale   Administer analgesics based on type and severity of pain and evaluate response  Taken 3/7/2025 0300 by Sharifa Anaya RN  Verbalizes/displays adequate comfort level or baseline comfort level: Encourage patient to monitor pain and request assistance  3/7/2025 0046 by Sharifa Anaya RN  Outcome: Progressing  3/6/2025 2241 by Sharifa Anaya RN  Outcome: Progressing  Flowsheets (Taken 3/6/2025 2115)  Verbalizes/displays adequate comfort level or baseline comfort level: Encourage patient to monitor pain and Care plan reviewed with patient.  Patient verbalizes understanding of the plan of care and contribute to goal setting.

## 2025-03-07 NOTE — PROGRESS NOTES
Orthopaedic Progress Note      SUBJECTIVE   Ms. Varela is hospital day 3, L distal femur fx    Seen at bedside this morning  no adverse events overnight  Pain control improving slowly, continues to improve in alertness   Did work with therapy yesterday   No new concerns   Eating well    OBJECTIVE      Physical    VITALS:  BP (!) 150/65   Pulse 100   Temp 98.1 °F (36.7 °C) (Oral)   Resp 18   SpO2 91%   I/O last 3 completed shifts:  In: 460 [P.O.:460]  Out: 180 [Urine:180]    6/10 pain  Gen: alert and oriented,  improvement in participation of examination and alertness today  Head: normorcephalic, atraumatic  Resp: unlabored, Nc but not in nares  Pelvis: stable  LLE:- she sits at nearly 90* flexion at the hip, she does tolerate IR ER at the hip but does not perform extension due to guarding at the knee, her knee is sitting in complete flexion, her heel nearly to her glute, she states this is near her baseline but does tolerate about 10* flexion from this fixed position, distally her ankle and foot ROM are intact       Data  CBC:   Lab Results   Component Value Date/Time    WBC 10.3 03/06/2025 06:42 AM    HGB 13.0 03/06/2025 06:42 AM     03/06/2025 06:42 AM     BMP:    Lab Results   Component Value Date/Time     03/06/2025 06:42 AM    K 4.3 03/06/2025 06:42 AM    K 3.7 09/19/2024 02:35 PM    CL 96 03/06/2025 06:42 AM    CO2 25 03/06/2025 06:42 AM    BUN 14 03/06/2025 06:42 AM    CREATININE 0.5 03/06/2025 06:42 AM    CALCIUM 9.4 03/06/2025 06:42 AM    GLUCOSE 114 03/06/2025 06:42 AM    GLUCOSE 113 12/10/2020 10:01 AM     Uric Acid:  No components found for: \"URIC\"  PT/INR:    Lab Results   Component Value Date/Time    PROTIME 11.0 10/05/2016 03:39 PM    INR 0.97 03/04/2025 05:18 PM     Troponin:  No results found for: \"TROPONINI\"  Urine Culture:  No components found for: \"CURINE\"      Current Inpatient Medications    Current Facility-Administered Medications: pantoprazole (PROTONIX) tablet 40 mg, 40 mg,  Oral, QAM AC  morphine (PF) injection 2 mg, 2 mg, IntraVENous, Q2H PRN **OR** [DISCONTINUED] morphine (PF) injection 4 mg, 4 mg, IntraVENous, Q2H PRN  oxyCODONE (ROXICODONE) immediate release tablet 5 mg, 5 mg, Oral, Q4H PRN **OR** oxyCODONE (ROXICODONE) immediate release tablet 10 mg, 10 mg, Oral, Q4H PRN  acetaminophen (TYLENOL) tablet 650 mg, 650 mg, Oral, Q4H PRN  enoxaparin (LOVENOX) injection 40 mg, 40 mg, SubCUTAneous, Daily  doxepin (SINEQUAN) capsule 50 mg, 50 mg, Oral, Nightly PRN  docusate sodium (COLACE) capsule 100 mg, 100 mg, Oral, Daily  senna (SENOKOT) tablet 8.6 mg, 1 tablet, Oral, Nightly  ondansetron (ZOFRAN) injection 4 mg, 4 mg, IntraVENous, Q6H PRN  aspirin chewable tablet 81 mg, 81 mg, Oral, Daily  atorvastatin (LIPITOR) tablet 40 mg, 40 mg, Oral, Daily  fenofibrate tablet 160 mg, 160 mg, Oral, Daily  gabapentin (NEURONTIN) capsule 300 mg, 300 mg, Oral, TID  tiZANidine (ZANAFLEX) tablet 4 mg, 4 mg, Oral, BID        PLAN    Ms. Varela is hospital day 3, L distal femur fx    Closed treatment    NWB LLE  Pain control stabilizing, continue to wean  PT OT  Splint applied, maintain as able  Pulmonary hygiene  Lovenox   SNF, medically stable

## 2025-03-07 NOTE — PROGRESS NOTES
Attempted Trauma Services SBIRT. Patient was sleeping upon arrival and did not awaken when name was called multiple times. GABI will re-attempt.

## 2025-03-07 NOTE — PROGRESS NOTES
Hospitalist Progress Note    Patient:  Kaycee Varela      Unit/Bed:7K-06/006-A    YOB: 1960    MRN: [id number]       Acct: [id number]     PCP: Jose Lazaro DO    Date of Admission: 3/4/2025    Assessment/Plan:    Active Problems:  Fall resulting in L distal femur fx  Ortho primary managing  Closed, non-operative management  NWB LLE  Immobilization  PT/OT  Pain control per primary  SW for disposition planning  Basic labs ordered for tomorrow     Primary Hypertension, improving:  Hx but not on home meds, pressure improved, continue to manage patient's pain as able     Hyponatremia: Mild, 134 on 3/6/25  Encourage PO hydration  Repeat BMP tomorrow    Transient Hypoxia requiring Supplemental O2 use:  Did have one reading of 84% SpO2 per RN following administration of pain medication  Continue to wean as tolerated  Monitor  Pulmonary hygiene     Goals of care /CODE STATUS  Palliative care has been consulted, appreciate assistance     Preoperative risk assessment  Completed by previous provider  Patient presenting with 0 points on RCRI, this represents 3.9% risk of having a cardiac event associated with surgery.  Denies any cardiac history or history of CVA/TIA.  Echo from June 2022 showing normal EF of 60 to 65%.    Chest x-ray obtained today shows no acute process.    EKG showing sinus tachycardia without acute ischemic changes.    Denies any chest pain, shortness of breath, lightheadedness or dizziness.    METS score less than 4.    Discussed cardiac risk with patient however states that she would like to wait and discuss this with her  in the morning when he comes in  Planning nonoperative treatment per Ortho     Resolved Problems:  Hyponatremia     Chronic Conditions: (reviewed and stable unless otherwise stated)  R AKA with hip in flexed position  Hx of such due to complications following replacement per pt  NIDDMII:   Reports diet controlled.  Glucose on arrival 134.  Will monitor  03/06/25  0642    134*   K 3.9 4.3   CL 97* 96*   CO2 26 25   BUN 15 14   CREATININE 0.5 0.5   CALCIUM 9.1 9.4     No results for input(s): \"AST\", \"ALT\", \"BILIDIR\", \"BILITOT\", \"ALKPHOS\" in the last 72 hours.  Recent Labs     03/04/25  1718   INR 0.97     No results for input(s): \"CKTOTAL\", \"TROPONINI\" in the last 72 hours.    Urinalysis:      Lab Results   Component Value Date/Time    NITRU NEGATIVE 03/11/2023 09:55 PM    WBCUA 0-2 03/11/2023 09:55 PM    BACTERIA NONE SEEN 03/11/2023 09:55 PM    RBCUA 0-2 03/11/2023 09:55 PM    BLOODU NEGATIVE 03/11/2023 09:55 PM    GLUCOSEU NEGATIVE 03/11/2023 09:55 PM       Radiology:  XR CHEST PORTABLE   Final Result   Impression:      No acute processes      This document has been electronically signed by: Siva Owens MD on    03/04/2025 11:09 PM      XR TIBIA FIBULA LEFT (2 VIEWS)   Final Result   Acute fracture of the distal femur.      This document has been electronically signed by: Raquel Colin MD on    03/04/2025 05:27 PM      XR KNEE LEFT (1-2 VIEWS)   Final Result   Acute fracture of the distal femur. Osteopenia.      This document has been electronically signed by: Raquel Colin MD on    03/04/2025 05:27 PM      XR FOOT LEFT (MIN 3 VIEWS)   Final Result   No acute bony abnormality. Chronic findings as above.      This document has been electronically signed by: Raquel Colin MD on    03/04/2025 05:25 PM          Diet: ADULT DIET; Regular; 5 carb choices (75 gm/meal)      Code Status: Full Code      Electronically signed by Kulwant Kathleen PA-C on 3/7/2025 at 8:50 AM

## 2025-03-07 NOTE — PROGRESS NOTES
[facility]  INPATIENT PHYSICAL THERAPY  DAILY NOTE  [facility] ORTHOPEDICS 7K - 7K-06/006-A      Discharge Recommendations: Subacute/Skilled Nursing Facility and ECF with PT   Equipment Recommendations: No                 Time In: 0956  Time Out: 1019  Timed Code Treatment Minutes: 23 Minutes  Minutes: 23          Date: 3/7/2025  Patient Name: Kaycee Varela,  Gender:  female        MRN: [id number]  : 1960  (65 y.o.)     Referring Practitioner: GUILLERMO Rich CNP  Diagnosis: Closed fracture of distal end of left femur, initial encounter ([facility])  Additional Pertinent Hx: 65 y.o. female who presents with L knee pain after she slid out of her motorized wheelchair when falling asleep, fell forward. She landed on her left knee, witnessed, did not hit head, no loc. Imaging available revealed a L distal femur fracture for which ortho was asked to admit. Pain is at the left anterior knee, worsened with any mobilization through the TKA, relieved by IV medications, no hip pain. She uses a scooter for mobilization, does not ambulate, sits with her R AKA hip in flexed position, her L knee has contracture but she states she can sit in her mobilization chair so it is not to what extent she is on examination at this time, L hip also has stiffness but there is most mobility here. She denies any left hip or groin pain, all at the distal femur.      Prior TKAs Dr Bowden, complicated by adhesions/contracture, infection, fracture  Prior R AKA on 3/14/23 Dr Fischer. per ortho-NWB LLE  Modified splint applied.   Pain control stabilizing, continue to wean.  PT OT today, none yesterday, imperative to proceed today.  Maintain splint as able.     Prior Level of Function:  Lives With: Spouse  Type of Home: House  Home Layout: One level  Home Access: Ramped entrance  Home Equipment: Grab bars, Wheelchair - Electric, Sliding Board, Hospital bed, Walker - Rolling   Bathroom Shower/Tub:  (takes sponge baths only)  Bathroom Toilet:

## 2025-03-07 NOTE — PROGRESS NOTES
Hospitalist Progress Note        Patient:  Kaycee Varela 65 y.o. female     Unit/Bed:-06/006-A                  Date of Admission: 3/4/2025        ASSESSMENT AND PLAN     Active Problems  Fall resulting in L distal femur fx  Ortho primary managing  Closed, non-operative management  NWB LLE  Immobilization  PT/OT  Pain control per primary  SW for disposition planning     Primary Hypertension, improving:  Hx but not on home meds, pressure improved, continue to manage patient's pain as able     Transient Hypoxia requiring Supplemental O2 use:  Did have one reading of 84% SpO2 per RN following administration of pain medication  Continue to wean as tolerated  Monitor  Pulmonary hygiene     Goals of care /CODE STATUS  Palliative care has been consulted, appreciate assistance     Preoperative risk assessment  Completed by previous provider  Patient presenting with 0 points on RCRI, this represents 3.9% risk of having a cardiac event associated with surgery.  Denies any cardiac history or history of CVA/TIA.  Echo from June 2022 showing normal EF of 60 to 65%.    Chest x-ray obtained today shows no acute process.    EKG showing sinus tachycardia without acute ischemic changes.    Denies any chest pain, shortness of breath, lightheadedness or dizziness.    METS score less than 4.    Discussed cardiac risk with patient however states that she would like to wait and discuss this with her  in the morning when he comes in  Planning nonoperative treatment per Ortho     Resolved Problems  Hyponatremia     Chronic Conditions (reviewed and stable unless otherwise stated)  R AKA with hip in flexed position  Hx of such due to complications following replacement per pt  NIDDMII:   Reports diet controlled.  Glucose on arrival 134.  Will monitor with daily BMP, carb controlled diet.  Peripheral neuropathy/phantom pain:  Continue home gabapentin.  Hyperlipidemia:  Continue home fenofibrate,

## 2025-03-08 LAB
ANION GAP SERPL CALC-SCNC: 13 MEQ/L (ref 8–16)
BASOPHILS ABSOLUTE: 0 THOU/MM3 (ref 0–0.1)
BASOPHILS NFR BLD AUTO: 0.3 %
BUN SERPL-MCNC: 20 MG/DL (ref 8–23)
CALCIUM SERPL-MCNC: 8.9 MG/DL (ref 8.8–10.2)
CHLORIDE SERPL-SCNC: 97 MEQ/L (ref 98–111)
CO2 SERPL-SCNC: 26 MEQ/L (ref 22–29)
CREAT SERPL-MCNC: 0.5 MG/DL (ref 0.5–0.9)
DEPRECATED RDW RBC AUTO: 49.4 FL (ref 35–45)
EOSINOPHIL NFR BLD AUTO: 4.4 %
EOSINOPHILS ABSOLUTE: 0.3 THOU/MM3 (ref 0–0.4)
ERYTHROCYTE [DISTWIDTH] IN BLOOD BY AUTOMATED COUNT: 15.3 % (ref 11.5–14.5)
GFR SERPL CREATININE-BSD FRML MDRD: > 90 ML/MIN/1.73M2
GLUCOSE SERPL-MCNC: 112 MG/DL (ref 74–109)
HCT VFR BLD AUTO: 39.7 % (ref 37–47)
HGB BLD-MCNC: 12.2 GM/DL (ref 12–16)
IMM GRANULOCYTES # BLD AUTO: 0.02 THOU/MM3 (ref 0–0.07)
IMM GRANULOCYTES NFR BLD AUTO: 0.3 %
LYMPHOCYTES ABSOLUTE: 0.8 THOU/MM3 (ref 1–4.8)
LYMPHOCYTES NFR BLD AUTO: 10.2 %
MCH RBC QN AUTO: 27.2 PG (ref 26–33)
MCHC RBC AUTO-ENTMCNC: 30.7 GM/DL (ref 32.2–35.5)
MCV RBC AUTO: 88.6 FL (ref 81–99)
MONOCYTES ABSOLUTE: 0.7 THOU/MM3 (ref 0.4–1.3)
MONOCYTES NFR BLD AUTO: 9.5 %
NEUTROPHILS ABSOLUTE: 5.8 THOU/MM3 (ref 1.8–7.7)
NEUTROPHILS NFR BLD AUTO: 75.3 %
NRBC BLD AUTO-RTO: 0 /100 WBC
PLATELET # BLD AUTO: 329 THOU/MM3 (ref 130–400)
PMV BLD AUTO: 10.2 FL (ref 9.4–12.4)
POTASSIUM SERPL-SCNC: 3.8 MEQ/L (ref 3.5–5.2)
RBC # BLD AUTO: 4.48 MILL/MM3 (ref 4.2–5.4)
SODIUM SERPL-SCNC: 136 MEQ/L (ref 135–145)
WBC # BLD AUTO: 7.7 THOU/MM3 (ref 4.8–10.8)

## 2025-03-08 PROCEDURE — 1200000000 HC SEMI PRIVATE

## 2025-03-08 PROCEDURE — 99232 SBSQ HOSP IP/OBS MODERATE 35: CPT

## 2025-03-08 PROCEDURE — 6370000000 HC RX 637 (ALT 250 FOR IP): Performed by: PHYSICIAN ASSISTANT

## 2025-03-08 PROCEDURE — 6360000002 HC RX W HCPCS: Performed by: PHYSICIAN ASSISTANT

## 2025-03-08 PROCEDURE — 85025 COMPLETE CBC W/AUTO DIFF WBC: CPT

## 2025-03-08 PROCEDURE — 6370000000 HC RX 637 (ALT 250 FOR IP)

## 2025-03-08 PROCEDURE — 36415 COLL VENOUS BLD VENIPUNCTURE: CPT

## 2025-03-08 PROCEDURE — 80048 BASIC METABOLIC PNL TOTAL CA: CPT

## 2025-03-08 RX ADMIN — TIZANIDINE 4 MG: 4 TABLET ORAL at 07:52

## 2025-03-08 RX ADMIN — GABAPENTIN 300 MG: 300 CAPSULE ORAL at 07:52

## 2025-03-08 RX ADMIN — SENNOSIDES 8.6 MG: 8.6 TABLET ORAL at 19:41

## 2025-03-08 RX ADMIN — GABAPENTIN 300 MG: 300 CAPSULE ORAL at 14:23

## 2025-03-08 RX ADMIN — GABAPENTIN 300 MG: 300 CAPSULE ORAL at 19:41

## 2025-03-08 RX ADMIN — OXYCODONE HYDROCHLORIDE 10 MG: 5 TABLET ORAL at 14:24

## 2025-03-08 RX ADMIN — TIZANIDINE 4 MG: 4 TABLET ORAL at 19:41

## 2025-03-08 RX ADMIN — PANTOPRAZOLE SODIUM 40 MG: 40 TABLET, DELAYED RELEASE ORAL at 05:11

## 2025-03-08 RX ADMIN — ENOXAPARIN SODIUM 40 MG: 100 INJECTION SUBCUTANEOUS at 07:52

## 2025-03-08 RX ADMIN — ACETAMINOPHEN 650 MG: 325 TABLET ORAL at 19:41

## 2025-03-08 RX ADMIN — ACETAMINOPHEN 650 MG: 325 TABLET ORAL at 05:11

## 2025-03-08 RX ADMIN — DOCUSATE SODIUM 100 MG: 100 CAPSULE, LIQUID FILLED ORAL at 07:52

## 2025-03-08 RX ADMIN — OXYCODONE HYDROCHLORIDE 10 MG: 5 TABLET ORAL at 19:41

## 2025-03-08 RX ADMIN — FENOFIBRATE 160 MG: 160 TABLET ORAL at 07:52

## 2025-03-08 RX ADMIN — ASPIRIN 81 MG: 81 TABLET, CHEWABLE ORAL at 07:52

## 2025-03-08 RX ADMIN — ACETAMINOPHEN 650 MG: 325 TABLET ORAL at 14:24

## 2025-03-08 RX ADMIN — OXYCODONE HYDROCHLORIDE 10 MG: 5 TABLET ORAL at 05:11

## 2025-03-08 RX ADMIN — ATORVASTATIN CALCIUM 40 MG: 40 TABLET, FILM COATED ORAL at 07:52

## 2025-03-08 ASSESSMENT — PATIENT HEALTH QUESTIONNAIRE - PHQ9
10. IF YOU CHECKED OFF ANY PROBLEMS, HOW DIFFICULT HAVE THESE PROBLEMS MADE IT FOR YOU TO DO YOUR WORK, TAKE CARE OF THINGS AT HOME, OR GET ALONG WITH OTHER PEOPLE: SOMEWHAT DIFFICULT
8. MOVING OR SPEAKING SO SLOWLY THAT OTHER PEOPLE COULD HAVE NOTICED. OR THE OPPOSITE, BEING SO FIGETY OR RESTLESS THAT YOU HAVE BEEN MOVING AROUND A LOT MORE THAN USUAL: NOT AT ALL
9. THOUGHTS THAT YOU WOULD BE BETTER OFF DEAD, OR OF HURTING YOURSELF: MORE THAN HALF THE DAYS
4. FEELING TIRED OR HAVING LITTLE ENERGY: MORE THAN HALF THE DAYS
5. POOR APPETITE OR OVEREATING: MORE THAN HALF THE DAYS
2. FEELING DOWN, DEPRESSED OR HOPELESS: NOT AT ALL
1. LITTLE INTEREST OR PLEASURE IN DOING THINGS: NEARLY EVERY DAY
SUM OF ALL RESPONSES TO PHQ QUESTIONS 1-9: 12
SUM OF ALL RESPONSES TO PHQ QUESTIONS 1-9: 12
3. TROUBLE FALLING OR STAYING ASLEEP: SEVERAL DAYS
SUM OF ALL RESPONSES TO PHQ QUESTIONS 1-9: 10
7. TROUBLE CONCENTRATING ON THINGS, SUCH AS READING THE NEWSPAPER OR WATCHING TELEVISION: NOT AT ALL
6. FEELING BAD ABOUT YOURSELF - OR THAT YOU ARE A FAILURE OR HAVE LET YOURSELF OR YOUR FAMILY DOWN: MORE THAN HALF THE DAYS
SUM OF ALL RESPONSES TO PHQ QUESTIONS 1-9: 12

## 2025-03-08 ASSESSMENT — PAIN SCALES - GENERAL
PAINLEVEL_OUTOF10: 3
PAINLEVEL_OUTOF10: 4
PAINLEVEL_OUTOF10: 7
PAINLEVEL_OUTOF10: 7
PAINLEVEL_OUTOF10: 8
PAINLEVEL_OUTOF10: 0

## 2025-03-08 ASSESSMENT — PAIN SCALES - WONG BAKER
WONGBAKER_NUMERICALRESPONSE: NO HURT
WONGBAKER_NUMERICALRESPONSE: NO HURT

## 2025-03-08 ASSESSMENT — PAIN - FUNCTIONAL ASSESSMENT: PAIN_FUNCTIONAL_ASSESSMENT: PREVENTS OR INTERFERES WITH MANY ACTIVE NOT PASSIVE ACTIVITIES

## 2025-03-08 ASSESSMENT — LIFESTYLE VARIABLES
HOW OFTEN DO YOU HAVE A DRINK CONTAINING ALCOHOL: NEVER
HOW MANY STANDARD DRINKS CONTAINING ALCOHOL DO YOU HAVE ON A TYPICAL DAY: PATIENT DOES NOT DRINK

## 2025-03-08 ASSESSMENT — PAIN DESCRIPTION - ORIENTATION
ORIENTATION: LEFT
ORIENTATION: LEFT

## 2025-03-08 ASSESSMENT — PAIN DESCRIPTION - LOCATION
LOCATION: LEG
LOCATION: LEG

## 2025-03-08 ASSESSMENT — PAIN DESCRIPTION - DESCRIPTORS: DESCRIPTORS: SHARP;SPASM

## 2025-03-08 NOTE — PLAN OF CARE
Problem: Skin/Tissue Integrity - Adult  Goal: Skin integrity remains intact  Outcome: Progressing  Flowsheets  Taken 3/8/2025 0747  Skin Integrity Remains Intact:   Monitor for areas of redness and/or skin breakdown   Assess vascular access sites hourly  Taken 3/8/2025 0745  Skin Integrity Remains Intact:   Monitor for areas of redness and/or skin breakdown   Assess vascular access sites hourly  Goal: Incisions, wounds, or drain sites healing without S/S of infection  Outcome: Progressing  Goal: Oral mucous membranes remain intact  Outcome: Progressing     Problem: Musculoskeletal - Adult  Goal: Return mobility to safest level of function  Outcome: Progressing  Goal: Maintain proper alignment of affected body part  Outcome: Progressing  Goal: Return ADL status to a safe level of function  Outcome: Progressing     Problem: Nutrition Deficit:  Goal: Optimize nutritional status  Outcome: Progressing   Care plan reviewed with patient.  Patient verbalizes understanding of the plan of care and contribute to goal setting.

## 2025-03-08 NOTE — PROGRESS NOTES
Brief Intervention and Referral to Treatment Summary    Patient was provided PHQ-9, AUDIT-C and DAST Screening:      PHQ-9 Score: 12  AUDIT-C Score:  0  DAST Score:  0    Patient’s substance use is considered     Low Risk/Healthy      Patient’s depression is considered:     Moderate    Brief Education Was Provided    Patient was not receptive      Brief Intervention Is Provided (Only for AUDIT-C or DAST)     Patient reports readiness to decrease and/or stop use and a plan was discussed   Patient denies readiness to decrease and/or stop use and a plan was not discussed      Injured Trauma Survivor Screening  1.  When you were injured or right afterward   Did you think you were going to die? RESPONSES; YES+1/NO: NO  Do you think this was done to you intentionally? NO    Since your injury  Have you felt more restless, tense or jumpy than usual? RESPONSES; YES+1/NO: NO  Have you found yourself unable to stop worrying? RESPONSES; YES+1/NO: NO  Do you find yourself thinking that the world is unsafe and that people are not to be trusted? RESPONSES; YES+1/NO: NO    TOTAL SCORE from ITSS Questions 1 and 2: 0  NOTE: A score of greater than or equal to 2 is considered positive for PTSD risk and is to receive a community resource packet to link with appropriate providers.    Recommendations/Referrals for Brief and/or Specialized Treatment Provided to Patient:  Consulted with patients nurse to discuss patients suicidal thoughts without a plan. Nurse to reach out doctor about a psychiatric consult.

## 2025-03-08 NOTE — PROGRESS NOTES
Hospitalist Progress Note      Patient:  Kaycee Varela 65 y.o. female       : 1960  Unit/Bed:-006-A    Date of Admission: 3/4/2025      ASSESSMENT AND PLAN    Active Problems  Fall resulting in L distal femur fx  Ortho primary, managing-closed, nonoperative management  NWB LLE, immobilization  PT/OT  Pain control per primary  SW for disposition planning  Suicidal ideation, no plan  Primary RN notified provider patient reporting suicidal thoughts with no plan.  History of depression  Recommended primary service consult psychiatry for further evaluation  Suicidal precautions in place  Transient hypoxia requiring supplemental O2 use  Patient's oxygen saturation intermittently drops <90% requiring supplemental oxygen.   CXR 3/4 demonstrated no acute process with stable chronic interstitial fibrosis pattern  Encourage pulmonary hygiene and wean oxygen as tolerated to maintain SpO2 greater than 90%  Goals of care:   Palliative care has been consulted, likely defer conversation until cleared by psychiatry  Preoperative risk assessment  Completed by previous provider  Patient presenting with 0 points on RCRI, this represents 3.9% risk of having a cardiac event associated with surgery.  Denies any cardiac history or history of CVA/TIA.  Echo from 2022 showing normal EF of 60 to 65%.    Chest x-ray obtained today shows no acute process.    EKG showing sinus tachycardia without acute ischemic changes.    Denies any chest pain, shortness of breath, lightheadedness or dizziness.    METS score less than 4.    Discussed cardiac risk with patient however states that she would like to wait and discuss this with her  in the morning when he comes in  Planning nonoperative treatment per Ortho    Resolved Problems  Hyponatremia    Chronic Conditions (reviewed, stable, and home medications resumed, unless otherwise stated)  History of essential HTN, improving: Not on medications from home.  Blood pressure  Oral Daily    gabapentin  300 mg Oral TID    tiZANidine  4 mg Oral BID    PRN Meds: oxyCODONE **OR** oxyCODONE, acetaminophen, doxepin, ondansetron    Exam:  /62   Pulse 94   Temp 97.9 °F (36.6 °C) (Oral)   Resp 18   SpO2 93%   General: Chronically ill-appearing, no distress, appears stated age.   Eyes:  PERRL. Conjunctivae/corneas clear.  HENT: Head normal appearing. Nares normal. Oral mucosa moist.  Hearing intact.   Neck: Supple, with full range of motion. Trachea midline.    Respiratory:  Normal effort.  Slight wheezing in the left upper lobe   cardiovascular: Normal rate, regular rhythm with normal S1/S2 without murmurs.    Abdomen: Soft, non-tender, non-distended with normal bowel sounds.  Musculoskeletal: Right AKA, left BKA.  Normal tone. No abnormal movements.   Skin: Warm and dry. No rashes or lesions.  Neurologic:  No focal sensory/motor deficits in the upper extremities.  Cranial nerves:  grossly non-focal 2-12.     Psychiatric: Alert and oriented, normal insight and thought content.   Capillary Refill: Brisk,< 3 seconds.      Labs/Radiology: See chart or assessment above.     Electronically signed by Zakia Pretty PA-C on 3/8/2025 at 10:15 AM

## 2025-03-08 NOTE — PROGRESS NOTES
Orthopaedic Progress Note      SUBJECTIVE   Ms. Varela is hospital day stay #4.  Non op supracondylar distal femur fracture    Patient notes no new concerns  Pain is improving with splint in place        OBJECTIVE      Physical    VITALS:  /62   Pulse 94   Temp 97.9 °F (36.6 °C) (Oral)   Resp 18   SpO2 93%   INTAKE/OUTPUT:    Intake/Output Summary (Last 24 hours) at 3/8/2025 1149  Last data filed at 3/7/2025 2320  Gross per 24 hour   Intake 520 ml   Output --   Net 520 ml     I/O last 3 completed shifts:  In: 880 [P.O.:880]  Out: 100 [Urine:100]      Left lower extremity sits at 90 degrees of flexion.  She has a anterior-based splint in place she states this is baseline for regards to her knee flexion distally her foot and ankle motion is intact.      Data  CBC:   Lab Results   Component Value Date/Time    WBC 7.7 03/08/2025 06:18 AM    HGB 12.2 03/08/2025 06:18 AM     03/08/2025 06:18 AM     BMP:    Lab Results   Component Value Date/Time     03/08/2025 06:18 AM    K 3.8 03/08/2025 06:18 AM    CL 97 03/08/2025 06:18 AM    CO2 26 03/08/2025 06:18 AM    BUN 20 03/08/2025 06:18 AM    CREATININE 0.5 03/08/2025 06:18 AM    CALCIUM 8.9 03/08/2025 06:18 AM    GLUCOSE 112 03/08/2025 06:18 AM    GLUCOSE 113 12/10/2020 10:01 AM     Uric Acid:  No components found for: \"URIC\"  PT/INR:    Lab Results   Component Value Date/Time    PROTIME 11.0 10/05/2016 03:39 PM    INR 0.97 03/04/2025 05:18 PM     Troponin:  No results found for: \"TROPONINI\"  Urine Culture:  No components found for: \"CURINE\"      Current Inpatient Medications    Current Facility-Administered Medications: pantoprazole (PROTONIX) tablet 40 mg, 40 mg, Oral, QAM AC  oxyCODONE (ROXICODONE) immediate release tablet 5 mg, 5 mg, Oral, Q4H PRN **OR** oxyCODONE (ROXICODONE) immediate release tablet 10 mg, 10 mg, Oral, Q4H PRN  acetaminophen (TYLENOL) tablet 650 mg, 650 mg, Oral, Q4H PRN  enoxaparin (LOVENOX) injection 40 mg, 40 mg, SubCUTAneous,

## 2025-03-09 PROCEDURE — 6370000000 HC RX 637 (ALT 250 FOR IP)

## 2025-03-09 PROCEDURE — 99232 SBSQ HOSP IP/OBS MODERATE 35: CPT

## 2025-03-09 PROCEDURE — 6370000000 HC RX 637 (ALT 250 FOR IP): Performed by: PHYSICIAN ASSISTANT

## 2025-03-09 PROCEDURE — 1200000000 HC SEMI PRIVATE

## 2025-03-09 PROCEDURE — 6360000002 HC RX W HCPCS: Performed by: PHYSICIAN ASSISTANT

## 2025-03-09 RX ADMIN — GABAPENTIN 300 MG: 300 CAPSULE ORAL at 13:19

## 2025-03-09 RX ADMIN — ENOXAPARIN SODIUM 40 MG: 100 INJECTION SUBCUTANEOUS at 08:10

## 2025-03-09 RX ADMIN — ASPIRIN 81 MG: 81 TABLET, CHEWABLE ORAL at 08:08

## 2025-03-09 RX ADMIN — DOCUSATE SODIUM 100 MG: 100 CAPSULE, LIQUID FILLED ORAL at 08:08

## 2025-03-09 RX ADMIN — PANTOPRAZOLE SODIUM 40 MG: 40 TABLET, DELAYED RELEASE ORAL at 06:00

## 2025-03-09 RX ADMIN — TIZANIDINE 4 MG: 4 TABLET ORAL at 21:26

## 2025-03-09 RX ADMIN — TIZANIDINE 4 MG: 4 TABLET ORAL at 15:15

## 2025-03-09 RX ADMIN — OXYCODONE HYDROCHLORIDE 10 MG: 5 TABLET ORAL at 13:17

## 2025-03-09 RX ADMIN — FENOFIBRATE 160 MG: 160 TABLET ORAL at 08:09

## 2025-03-09 RX ADMIN — TIZANIDINE 4 MG: 4 TABLET ORAL at 08:09

## 2025-03-09 RX ADMIN — OXYCODONE HYDROCHLORIDE 10 MG: 5 TABLET ORAL at 06:00

## 2025-03-09 RX ADMIN — OXYCODONE HYDROCHLORIDE 10 MG: 5 TABLET ORAL at 21:26

## 2025-03-09 RX ADMIN — SENNOSIDES 8.6 MG: 8.6 TABLET ORAL at 21:26

## 2025-03-09 RX ADMIN — ATORVASTATIN CALCIUM 40 MG: 40 TABLET, FILM COATED ORAL at 08:10

## 2025-03-09 RX ADMIN — GABAPENTIN 300 MG: 300 CAPSULE ORAL at 08:08

## 2025-03-09 RX ADMIN — GABAPENTIN 300 MG: 300 CAPSULE ORAL at 21:27

## 2025-03-09 ASSESSMENT — PAIN DESCRIPTION - LOCATION
LOCATION: LEG

## 2025-03-09 ASSESSMENT — PAIN DESCRIPTION - DESCRIPTORS
DESCRIPTORS: ACHING
DESCRIPTORS: DISCOMFORT
DESCRIPTORS: DISCOMFORT

## 2025-03-09 ASSESSMENT — PAIN SCALES - GENERAL
PAINLEVEL_OUTOF10: 7
PAINLEVEL_OUTOF10: 4
PAINLEVEL_OUTOF10: 7
PAINLEVEL_OUTOF10: 6
PAINLEVEL_OUTOF10: 8

## 2025-03-09 ASSESSMENT — PAIN DESCRIPTION - ORIENTATION
ORIENTATION: LEFT

## 2025-03-09 NOTE — PROGRESS NOTES
Patient educated on how to use incentive spirometer. Patient verbalized understanding and demonstrated proper use. Emphasized importance and usage of device, with coughing and deep breathing every 4 hours while awake.   Reinforcement needed.

## 2025-03-09 NOTE — PROGRESS NOTES
Hospitalist Progress Note      Patient:  Kaycee Varela 65 y.o. female       : 1960  Unit/Bed:--A    Date of Admission: 3/4/2025      ASSESSMENT AND PLAN    Active Problems  Fall resulting in L distal femur fx  Ortho primary, managing-closed, nonoperative management  NWB LLE, immobilization  PT/OT  Pain control per primary  SW for disposition planning  Transient hypoxia requiring supplemental O2 use  Patient's oxygen saturation intermittently drops <90% requiring supplemental oxygen.   CXR 3/4 demonstrated no acute process with stable chronic interstitial fibrosis pattern  Encourage pulmonary hygiene and wean oxygen as tolerated to maintain SpO2 greater than 90%  Weaned off oxygen 3/9- continue to encourage incentive spirometry and movement.   Goals of care:   Palliative care has been consulted, likely defer conversation until cleared by psychiatry  Preoperative risk assessment  Completed by previous provider  Patient presenting with 0 points on RCRI, this represents 3.9% risk of having a cardiac event associated with surgery.  Denies any cardiac history or history of CVA/TIA.  Echo from 2022 showing normal EF of 60 to 65%.    Chest x-ray obtained today shows no acute process.    EKG showing sinus tachycardia without acute ischemic changes.    Denies any chest pain, shortness of breath, lightheadedness or dizziness.    METS score less than 4.    Discussed cardiac risk with patient however states that she would like to wait and discuss this with her  in the morning when he comes in  Planning nonoperative treatment per Ortho    Resolved Problems  Suicidal ideation, no plan  Primary RN notified provider patient reporting suicidal thoughts with no plan 3/8.  Patient denies any thoughts of suicide or homicidal ideation 3/9 state.  States she was just in pain 3/8 and that she does not actually feel suicidal.  Recommended primary service consult psychiatry for further evaluation  Suicidal

## 2025-03-09 NOTE — CONSULTS
Patient interviewed  Meds and chart reviewed  Orders written  Yes  Full consult dictated  Thanks for the consult.

## 2025-03-09 NOTE — PROGRESS NOTES
Orthopaedic Progress Note      SUBJECTIVE   Ms. Varela is hospital stay day #5  Known periprosthetic left supracondylar distal femur fracture  Nonoperative management  Anterior placed splint in place.    Patient notes no new concerns at this time    Speaking to nursing staff patient is no longer expressing thoughts of hurting herself.  Sitter still present at bedside during my evaluation  Psychiatric consultation has been placed with provider notified 3/8/2025      OBJECTIVE      Physical    VITALS:  BP (!) 141/64   Pulse 90   Temp 98.2 °F (36.8 °C) (Oral)   Resp 16   SpO2 95%   INTAKE/OUTPUT:    Intake/Output Summary (Last 24 hours) at 3/9/2025 1319  Last data filed at 3/9/2025 1314  Gross per 24 hour   Intake 1510 ml   Output --   Net 1510 ml     I/O last 3 completed shifts:  In: 1290 [P.O.:1290]  Out: -       Patient is awake and alert x 3 at this time  She is cooperative today.  Pain is minimal.  Left lower extremity anterior-based splint is in place with knee flexed past 90 degrees she is grossly neurovascular intact to the left foot.      Data  CBC:   Lab Results   Component Value Date/Time    WBC 7.7 03/08/2025 06:18 AM    HGB 12.2 03/08/2025 06:18 AM     03/08/2025 06:18 AM     BMP:    Lab Results   Component Value Date/Time     03/08/2025 06:18 AM    K 3.8 03/08/2025 06:18 AM    CL 97 03/08/2025 06:18 AM    CO2 26 03/08/2025 06:18 AM    BUN 20 03/08/2025 06:18 AM    CREATININE 0.5 03/08/2025 06:18 AM    CALCIUM 8.9 03/08/2025 06:18 AM    GLUCOSE 112 03/08/2025 06:18 AM    GLUCOSE 113 12/10/2020 10:01 AM     Uric Acid:  No components found for: \"URIC\"  PT/INR:    Lab Results   Component Value Date/Time    PROTIME 11.0 10/05/2016 03:39 PM    INR 0.97 03/04/2025 05:18 PM     Troponin:  No results found for: \"TROPONINI\"  Urine Culture:  No components found for: \"CURINE\"      Current Inpatient Medications    Current Facility-Administered Medications: pantoprazole (PROTONIX) tablet 40 mg, 40 mg,  Oral, QAM AC  oxyCODONE (ROXICODONE) immediate release tablet 5 mg, 5 mg, Oral, Q4H PRN **OR** oxyCODONE (ROXICODONE) immediate release tablet 10 mg, 10 mg, Oral, Q4H PRN  acetaminophen (TYLENOL) tablet 650 mg, 650 mg, Oral, Q4H PRN  enoxaparin (LOVENOX) injection 40 mg, 40 mg, SubCUTAneous, Daily  doxepin (SINEQUAN) capsule 50 mg, 50 mg, Oral, Nightly PRN  docusate sodium (COLACE) capsule 100 mg, 100 mg, Oral, Daily  senna (SENOKOT) tablet 8.6 mg, 1 tablet, Oral, Nightly  ondansetron (ZOFRAN) injection 4 mg, 4 mg, IntraVENous, Q6H PRN  aspirin chewable tablet 81 mg, 81 mg, Oral, Daily  atorvastatin (LIPITOR) tablet 40 mg, 40 mg, Oral, Daily  fenofibrate tablet 160 mg, 160 mg, Oral, Daily  gabapentin (NEURONTIN) capsule 300 mg, 300 mg, Oral, TID  tiZANidine (ZANAFLEX) tablet 4 mg, 4 mg, Oral, BID        PLAN    Patient is hospital day stay #5 for closed treatment of left distal femur fracture    Nonweightbearing left lower extremity  Continue to work on pain control although seems better today  PT/OT nonweightbearing left lower extremity  Lovenox for DVT prophylaxis  SNF when approved and when mentally stable for discharge  Appreciate psychiatry input

## 2025-03-09 NOTE — PROGRESS NOTES
Reinforced suicide precautions with patient.  Reinforced that visitors will be screened and may be limited at the discretion of the nurse.  Visitor belongings are subject to be searched.  Belongings may not be allowed into the patient room.    Reviewed any personal belongings from the previous shift believed to be a threat to patient safety removed from room.  Patient refusing to give up phone and phone . CRISTAL Flores notified. Dane Painter supervisor notified.    Room screened for safety, items removed to create a safe environment include:   Blood pressure cuff   Loose or extra cords, tubing (not of medical necessity)   Extra Linens   Telephone   Toiletries   Trash Liners   Patient's cell phone and charging cord (must be removed from room)- patient is refusing to give phone.       Safety tray ordered: yes    Expectations were discussed with sitter (unit support aide).  Sitter positioned near exit.  Sitter reminded that patient should be observed at all times including toileting and bathing.  Sitter understands required electronic charting.     Patient and sitter included in hourly rounds.        3/8/2025     7:21 PM   C-SSRS Suicide Screening   1) Within the past month, have you wished you were dead or wished you could go to sleep and not wake up?  No   2) Have you actually had any thoughts of killing yourself?  No   6) Have you ever done anything, started to do anything, or prepared to do anything to end your life? No

## 2025-03-10 LAB
ANION GAP SERPL CALC-SCNC: 12 MEQ/L (ref 8–16)
BASOPHILS ABSOLUTE: 0 THOU/MM3 (ref 0–0.1)
BASOPHILS NFR BLD AUTO: 0.4 %
BUN SERPL-MCNC: 13 MG/DL (ref 8–23)
CALCIUM SERPL-MCNC: 9 MG/DL (ref 8.8–10.2)
CHLORIDE SERPL-SCNC: 96 MEQ/L (ref 98–111)
CO2 SERPL-SCNC: 25 MEQ/L (ref 22–29)
CREAT SERPL-MCNC: 0.5 MG/DL (ref 0.5–0.9)
DEPRECATED RDW RBC AUTO: 48.4 FL (ref 35–45)
EOSINOPHIL NFR BLD AUTO: 7.9 %
EOSINOPHILS ABSOLUTE: 0.6 THOU/MM3 (ref 0–0.4)
ERYTHROCYTE [DISTWIDTH] IN BLOOD BY AUTOMATED COUNT: 15.1 % (ref 11.5–14.5)
GFR SERPL CREATININE-BSD FRML MDRD: > 90 ML/MIN/1.73M2
GLUCOSE SERPL-MCNC: 101 MG/DL (ref 74–109)
HCT VFR BLD AUTO: 39.3 % (ref 37–47)
HGB BLD-MCNC: 12.1 GM/DL (ref 12–16)
IMM GRANULOCYTES # BLD AUTO: 0.03 THOU/MM3 (ref 0–0.07)
IMM GRANULOCYTES NFR BLD AUTO: 0.4 %
LYMPHOCYTES ABSOLUTE: 1 THOU/MM3 (ref 1–4.8)
LYMPHOCYTES NFR BLD AUTO: 13.8 %
MCH RBC QN AUTO: 27.2 PG (ref 26–33)
MCHC RBC AUTO-ENTMCNC: 30.8 GM/DL (ref 32.2–35.5)
MCV RBC AUTO: 88.3 FL (ref 81–99)
MONOCYTES ABSOLUTE: 0.8 THOU/MM3 (ref 0.4–1.3)
MONOCYTES NFR BLD AUTO: 11.1 %
NEUTROPHILS ABSOLUTE: 4.6 THOU/MM3 (ref 1.8–7.7)
NEUTROPHILS NFR BLD AUTO: 66.4 %
NRBC BLD AUTO-RTO: 0 /100 WBC
PLATELET # BLD AUTO: 346 THOU/MM3 (ref 130–400)
PMV BLD AUTO: 9.9 FL (ref 9.4–12.4)
POTASSIUM SERPL-SCNC: 4.1 MEQ/L (ref 3.5–5.2)
RBC # BLD AUTO: 4.45 MILL/MM3 (ref 4.2–5.4)
SODIUM SERPL-SCNC: 133 MEQ/L (ref 135–145)
WBC # BLD AUTO: 7 THOU/MM3 (ref 4.8–10.8)

## 2025-03-10 PROCEDURE — 6370000000 HC RX 637 (ALT 250 FOR IP): Performed by: PHYSICIAN ASSISTANT

## 2025-03-10 PROCEDURE — 6360000002 HC RX W HCPCS: Performed by: PHYSICIAN ASSISTANT

## 2025-03-10 PROCEDURE — 80048 BASIC METABOLIC PNL TOTAL CA: CPT

## 2025-03-10 PROCEDURE — 1200000000 HC SEMI PRIVATE

## 2025-03-10 PROCEDURE — 36415 COLL VENOUS BLD VENIPUNCTURE: CPT

## 2025-03-10 PROCEDURE — 85025 COMPLETE CBC W/AUTO DIFF WBC: CPT

## 2025-03-10 PROCEDURE — 99232 SBSQ HOSP IP/OBS MODERATE 35: CPT

## 2025-03-10 PROCEDURE — 97530 THERAPEUTIC ACTIVITIES: CPT

## 2025-03-10 PROCEDURE — 6370000000 HC RX 637 (ALT 250 FOR IP)

## 2025-03-10 RX ORDER — HYDROXYZINE HYDROCHLORIDE 25 MG/1
25 TABLET, FILM COATED ORAL ONCE
Status: COMPLETED | OUTPATIENT
Start: 2025-03-10 | End: 2025-03-10

## 2025-03-10 RX ORDER — DIPHENHYDRAMINE HYDROCHLORIDE, ZINC ACETATE 2; .1 G/100G; G/100G
CREAM TOPICAL 3 TIMES DAILY PRN
Status: DISCONTINUED | OUTPATIENT
Start: 2025-03-10 | End: 2025-03-11 | Stop reason: HOSPADM

## 2025-03-10 RX ADMIN — HYDROXYZINE HYDROCHLORIDE 25 MG: 25 TABLET, FILM COATED ORAL at 02:55

## 2025-03-10 RX ADMIN — TIZANIDINE 4 MG: 4 TABLET ORAL at 08:39

## 2025-03-10 RX ADMIN — DOCUSATE SODIUM 100 MG: 100 CAPSULE, LIQUID FILLED ORAL at 08:39

## 2025-03-10 RX ADMIN — PANTOPRAZOLE SODIUM 40 MG: 40 TABLET, DELAYED RELEASE ORAL at 06:30

## 2025-03-10 RX ADMIN — OXYCODONE HYDROCHLORIDE 10 MG: 5 TABLET ORAL at 11:31

## 2025-03-10 RX ADMIN — FENOFIBRATE 160 MG: 160 TABLET ORAL at 08:39

## 2025-03-10 RX ADMIN — ENOXAPARIN SODIUM 40 MG: 100 INJECTION SUBCUTANEOUS at 08:39

## 2025-03-10 RX ADMIN — DIPHENHYDRAMINE HYDROCHLORIDE, ZINC ACETATE: 2; .1 CREAM TOPICAL at 02:55

## 2025-03-10 RX ADMIN — OXYCODONE HYDROCHLORIDE 10 MG: 5 TABLET ORAL at 02:58

## 2025-03-10 RX ADMIN — GABAPENTIN 300 MG: 300 CAPSULE ORAL at 08:39

## 2025-03-10 RX ADMIN — OXYCODONE HYDROCHLORIDE 10 MG: 5 TABLET ORAL at 21:08

## 2025-03-10 RX ADMIN — ATORVASTATIN CALCIUM 40 MG: 40 TABLET, FILM COATED ORAL at 08:39

## 2025-03-10 RX ADMIN — TIZANIDINE 4 MG: 4 TABLET ORAL at 15:46

## 2025-03-10 RX ADMIN — ASPIRIN 81 MG: 81 TABLET, CHEWABLE ORAL at 08:39

## 2025-03-10 RX ADMIN — GABAPENTIN 300 MG: 300 CAPSULE ORAL at 15:46

## 2025-03-10 RX ADMIN — GABAPENTIN 300 MG: 300 CAPSULE ORAL at 21:09

## 2025-03-10 RX ADMIN — TIZANIDINE 4 MG: 4 TABLET ORAL at 21:09

## 2025-03-10 ASSESSMENT — PAIN SCALES - GENERAL
PAINLEVEL_OUTOF10: 6
PAINLEVEL_OUTOF10: 7
PAINLEVEL_OUTOF10: 9
PAINLEVEL_OUTOF10: 7
PAINLEVEL_OUTOF10: 2
PAINLEVEL_OUTOF10: 8

## 2025-03-10 ASSESSMENT — PAIN - FUNCTIONAL ASSESSMENT
PAIN_FUNCTIONAL_ASSESSMENT: PREVENTS OR INTERFERES WITH MANY ACTIVE NOT PASSIVE ACTIVITIES
PAIN_FUNCTIONAL_ASSESSMENT: PREVENTS OR INTERFERES SOME ACTIVE ACTIVITIES AND ADLS

## 2025-03-10 ASSESSMENT — PAIN DESCRIPTION - ORIENTATION
ORIENTATION: LEFT
ORIENTATION: LEFT

## 2025-03-10 ASSESSMENT — PAIN DESCRIPTION - LOCATION
LOCATION: LEG
LOCATION: LEG;KNEE

## 2025-03-10 ASSESSMENT — PAIN DESCRIPTION - DESCRIPTORS
DESCRIPTORS: ACHING;SHARP;SPASM
DESCRIPTORS: ACHING;SORE

## 2025-03-10 NOTE — PLAN OF CARE
Problem: Skin/Tissue Integrity - Adult  Goal: Skin integrity remains intact  Outcome: Progressing  Flowsheets (Taken 3/10/2025 0040)  Skin Integrity Remains Intact:   Monitor for areas of redness and/or skin breakdown   Assess vascular access sites hourly  Goal: Incisions, wounds, or drain sites healing without S/S of infection  Outcome: Progressing  Flowsheets (Taken 3/10/2025 0040)  Incisions, Wounds, or Drain Sites Healing Without Sign and Symptoms of Infection:   TWICE DAILY: Assess and document dressing/incision, wound bed, drain sites and surrounding tissue   TWICE DAILY: Assess and document skin integrity  Goal: Oral mucous membranes remain intact  Outcome: Progressing  Flowsheets (Taken 3/10/2025 0040)  Oral Mucous Membranes Remain Intact: Assess oral mucosa and hygiene practices     Problem: Musculoskeletal - Adult  Goal: Return mobility to safest level of function  Outcome: Progressing  Flowsheets (Taken 3/10/2025 0040)  Return Mobility to Safest Level of Function:   Assess patient stability and activity tolerance for standing, transferring and ambulating with or without assistive devices   Assist with transfers and ambulation using safe patient handling equipment as needed   Ensure adequate protection for wounds/incisions during mobilization   Obtain physical therapy/occupational therapy consults as needed  Goal: Maintain proper alignment of affected body part  Outcome: Progressing  Flowsheets (Taken 3/10/2025 0040)  Maintain proper alignment of affected body part:   Support and protect limb and body alignment per provider's orders   Instruct and reinforce with patient and family use of appropriate assistive device and precautions (e.g. spinal or hip dislocation precautions)  Goal: Return ADL status to a safe level of function  Outcome: Progressing  Flowsheets (Taken 3/10/2025 0040)  Return ADL Status to a Safe Level of Function:   Administer medication as ordered   Assess activities of daily living

## 2025-03-10 NOTE — PROGRESS NOTES
[facility] Nurse  Progress Note       Kaycee Varela  AGE: 65 y.o.   GENDER: female  : 1960  UNIT: 7K-06/006-A  TODAY'S DATE:  3/10/2025  ADMISSION DATE: 3/4/2025  3:53 PM    Subjective   Reason for [facility] Evaluation and Assessment: DTI left outer heel, wound labia      Kaycee Varela is a 65 y.o. female referred by:   [] Physician/ Resident/ PA/ APRN-CNP  [x] Nursing  [] Other:     Wound Identification:  Wound Type:pressure  Wound Location: left heel    Objective     Librado Risk Score: Librado Scale Score: 13      Assessment     Encounter: Present to pt room. Pt in bed. Wound photo, assessment and treatment recommendations below. Left heel assessed. Challenging due to contracture and post-operative pain. Area noted of non-blanchable purple/red discoloration to left lateral heel. Sacral foam dressing applied. Patient has intact, non-draining wound to left labia, etiology unknown. Recommend Triad BID and PRN. Patient in bed, call light in reach.     03/10/25 1337   Wound 25 Heel Left purple area with nonblanchable redness   Date First Assessed/Time First Assessed: 25   Present on Original Admission: No  Primary Wound Type: Pressure Injury  Location: Heel  Wound Location Orientation: Left  Wound Description (Comments): purple area with nonblanchable redness   Wound Image    Wound Etiology Deep tissue/Injury   Dressing Status New dressing applied   Wound Cleansed Cleansed with saline   Dressing/Treatment Foam;Silicone border   Dressing Change Due 25   Wound Length (cm) 0.7 cm   Wound Width (cm) 1 cm   Wound Depth (cm) 0.05 cm   Wound Surface Area (cm^2) 0.7 cm^2   Wound Volume (cm^3) 0.035 cm^3   Wound Assessment Purple/maroon;Non-blanchable erythema   Drainage Amount None (dry)   Odor None   Neida-wound Assessment Blanchable erythema   Margins Attached edges   Wound 25 Vagina Open bleeding MASD skin issue   Date First Assessed/Time First Assessed: 25    Primary Wound Type: (c) Pressure Injury  Location: Vagina  Wound Description (Comments): Open bleeding MASD skin issue   Wound Etiology Other  (Unknown)   Dressing Status Dry   Wound Cleansed Not Cleansed   Dressing/Treatment Open to air   Wound Assessment Denuded;Pink/red   Drainage Amount None (dry)   Neida-wound Assessment Denuded;Fragile       Plan     Treatment Recommendations:   Left heel- Apply silicone bordered foam dressing. Change every other day. Offload extremity as patient allows.    Pressure Injury Prevention:   [x] Support Surface: [place]   [] Turned with wedges   [] Turned with pillows  [] Offloading boots   [] No depends  [] Chux pad  [] External urinary device  [] Other:    Discharge Plan:  Placement for patient upon discharge:   [] Home (self or family)  [] Home with home health  [] Inpatient Rehab  [x] SNF/ECF  [] [facility]/ LTAC    Patient appropriate for Outpatient Wound Care Center: Course pending

## 2025-03-10 NOTE — PROGRESS NOTES
[facility]  INPATIENT PHYSICAL THERAPY  DAILY NOTE  [facility] ORTHOPEDICS 7K - 7K-06/006-A      Discharge Recommendations: Subacute/Skilled Nursing Facility  Equipment Recommendations: No               Time In: 1356  Time Out: 1415  Timed Code Treatment Minutes: 19 Minutes  Minutes: 19          Date: 3/10/2025  Patient Name: Kaycee Varela,  Gender:  female        MRN: [id number]  : 1960  (65 y.o.)     Referring Practitioner: GUILLERMO Rich CNP  Diagnosis: Closed fracture of distal end of left femur, initial encounter ([facility])  Additional Pertinent Hx: 65 y.o. female who presents with L knee pain after she slid out of her motorized wheelchair when falling asleep, fell forward. She landed on her left knee, witnessed, did not hit head, no loc. Imaging available revealed a L distal femur fracture for which ortho was asked to admit. Pain is at the left anterior knee, worsened with any mobilization through the TKA, relieved by IV medications, no hip pain. She uses a scooter for mobilization, does not ambulate, sits with her R AKA hip in flexed position, her L knee has contracture but she states she can sit in her mobilization chair so it is not to what extent she is on examination at this time, L hip also has stiffness but there is most mobility here. She denies any left hip or groin pain, all at the distal femur.      Prior TKAs Dr Bowden, complicated by adhesions/contracture, infection, fracture  Prior R AKA on 3/14/23 Dr Fischer. per ortho-NWB LLE  Modified splint applied.   Pain control stabilizing, continue to wean.  PT OT today, none yesterday, imperative to proceed today.  Maintain splint as able.     Prior Level of Function:  Lives With: Spouse  Type of Home: House  Home Layout: One level  Home Access: Ramped entrance  Home Equipment: Grab bars, Wheelchair - Electric, Sliding Board, Hospital bed, Walker - Rolling   Bathroom Shower/Tub:  (takes sponge baths only)  Bathroom Toilet:  (uses a diaper and

## 2025-03-10 NOTE — CONSULTS
[address] [address]                              CONSULTATION      PATIENT NAME: YAIMA GONCALVES                : 1960  MED REC NO: [id number]                       ROOM: [facility]  ACCOUNT NO: [id number]                       ADMIT DATE: 2025  PROVIDER: Natanael Krause MD      CONSULT DATE: 2025    CONSULT TO:  CRISTAL Santana    REASON FOR CONSULT:  Depression and suicidal thoughts.    SOURCES OF INFORMATION:  Patient and electronic medical records.    IDENTIFYING INFORMATION:  The patient is a 65-year-old   female.  She is the mother of 1 son who is .  She lives with her .  She is retired.    HISTORY OF PRESENT ILLNESS:  The patient presented to [facility] ED due to left knee pain.  According to record, she slid out of her motorized wheelchair while falling asleep.  She sustained a left distal femur fracture.  She was admitted for further management.  She denies history of mental illness, but yesterday she reports that she was frustrated with her care and she said something that she did not mean to say.  She reported then that she was having suicidal thoughts, which she denies currently.  Again, she denies history of mental illness.  She admits she is frustrated with her care since she lost her right leg 2 years ago.  She says she had knee surgery about 3 years ago and was having a knee infection for 1 year until she lost her leg.  She denies history of mood swing or psychotic symptoms.  No history of abuse or trauma.    PAST PSYCHIATRIC HISTORY:  Noncontributory.  No inpatient psychiatric treatment.  No history of suicide attempts.  She has never been on psychotropics.    FAMILY HISTORY:  Noncontributory.    SOCIAL HISTORY:  The patient was born and raised in the [facility].  Parents were , they are both .  She has 2 sisters and a brother.  She is the

## 2025-03-10 NOTE — PROGRESS NOTES
was just in pain 3/8 and that she does not actually feel suicidal.  Recommended primary service consult psychiatry for further evaluation  Evaluated by psychiatry, second set of precautions removed  Hyponatremia      Chronic Conditions (reviewed, stable, and home medications resumed, unless otherwise stated)  History of essential HTN, improving: Not on medications from home.  Blood pressure overall improved.  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  Glucose on arrival 134.  Will monitor with daily BMP, carb controlled diet.  Peripheral neuropathy/phantom pain:  Continue home gabapentin.  Hyperlipidemia:  Continue home fenofibrate, atorvastatin.  Depression:  Continue home doxepin.      Hospitalist will sign off.  Thank you for the consult.  If you have any questions or concerns please reach out.    ===================================================================    Chief Complaint: Left knee pain  Subjective (past 24 hours):   3/10: Patient reports she is feeling okay today, has no complaints.  Denies chest pain, shortness of breath, abdominal pain, nausea or vomiting.      Initial HPI 3/4/2025 per chart review:  65 y.o. female who presents with L knee pain after she slid out of her motorized wheelchair when falling asleep, fell forward. She landed on her left knee, witnessed, did not hit head, no loc. Imaging available revealed a L distal femur fracture for which ortho was asked to admit. Pain is at the left anterior knee, worsened with any mobilization through the TKA, relieved by IV medications, no hip pain. She uses a scooter for mobilization, does not ambulate, sits with her R AKA hip in flexed position, her L knee has contracture but she states she can sit in her mobilization chair so it is not to what extent she is on examination at this time, L hip also has stiffness but there is most mobility

## 2025-03-10 NOTE — PROGRESS NOTES
Spiritual Health History and Assessment/Progress Note  [facility]    (P) Initial Encounter,  ,  ,      Name: Kaycee Varela MRN: [id number]    Age: 65 y.o.     Sex: female   Language: English   Anabaptism: None   Closed fracture of distal end of left femur, initial encounter ([facility])     Date: 3/10/2025            Total Time Calculated: (P) 16 min              Spiritual Assessment began in [facility]THOPED[facility] 7K        Referral/Consult From: (P) Rounding   Encounter Overview/Reason: (P) Initial Encounter  Service Provided For: (P) Patient and family together    Myriam, Belief, Meaning:   Patient identifies as spiritual  Family/Friends identify as spiritual      Importance and Influence:  Patient has spiritual/personal beliefs that influence decisions regarding their health  Family/Friends have spiritual/personal beliefs that influence decisions regarding the patient's health    Community:  Patient Other: None  Family/Friends Other: None    Assessment and Plan of Care:     Patient Interventions include: Facilitated expression of thoughts and feelings  Family/Friends Interventions include: Facilitated expression of thoughts and feelings    Patient Plan of Care: Spiritual Care available upon further referral  Family/Friends Plan of Care: Spiritual Care available upon further referral    Electronically signed by TYSHAWN Vogel on 3/10/2025 at 1:17 PM

## 2025-03-10 NOTE — PROGRESS NOTES
Orthopaedic Progress Note      SUBJECTIVE   Ms. Varela is hospital day 6, L distal femur fx    Seen at bedside this morning  no adverse events overnight  No active SI HI at this time, psych has evaluated, no intervention  Did work with therapy yesterday   No new concerns   Eating well    OBJECTIVE      Physical    VITALS:  /68   Pulse 98   Temp 98 °F (36.7 °C) (Oral)   Resp 20   SpO2 91%   I/O last 3 completed shifts:  In: 1440 [P.O.:1440]  Out: 50 [Urine:50]    6/10 pain  Gen: alert and oriented,  improvement in participation of examination and alertness today  Head: normorcephalic, atraumatic  Resp: unlabored, Nc but not in nares  Pelvis: stable  LLE:- she sits at nearly 90* flexion at the hip, she does tolerate IR ER at the hip but does not perform extension due to guarding at the knee, her knee is sitting in complete flexion, her heel nearly to her glute, she states this is near her baseline but does tolerate about 10* flexion from this fixed position, distally her ankle and foot ROM are intact       Data  CBC:   Lab Results   Component Value Date/Time    WBC 7.0 03/10/2025 07:15 AM    HGB 12.1 03/10/2025 07:15 AM     03/10/2025 07:15 AM     BMP:    Lab Results   Component Value Date/Time     03/10/2025 07:15 AM    K 4.1 03/10/2025 07:15 AM    CL 96 03/10/2025 07:15 AM    CO2 25 03/10/2025 07:15 AM    BUN 13 03/10/2025 07:15 AM    CREATININE 0.5 03/10/2025 07:15 AM    CALCIUM 9.0 03/10/2025 07:15 AM    GLUCOSE 101 03/10/2025 07:15 AM    GLUCOSE 113 12/10/2020 10:01 AM     Uric Acid:  No components found for: \"URIC\"  PT/INR:    Lab Results   Component Value Date/Time    PROTIME 11.0 10/05/2016 03:39 PM    INR 0.97 03/04/2025 05:18 PM     Troponin:  No results found for: \"TROPONINI\"  Urine Culture:  No components found for: \"CURINE\"      Current Inpatient Medications    Current Facility-Administered Medications: diphenhydrAMINE-zinc acetate 2-0.1 % cream, , Topical, TID PRN  tiZANidine

## 2025-03-10 NOTE — PLAN OF CARE
Problem: Skin/Tissue Integrity - Adult  Goal: Skin integrity remains intact  Outcome: Progressing  Flowsheets (Taken 3/10/2025 0750 by Uvaldo Romero, RN)  Skin Integrity Remains Intact: Monitor for areas of redness and/or skin breakdown     Problem: Skin/Tissue Integrity - Adult  Goal: Incisions, wounds, or drain sites healing without S/S of infection  Outcome: Progressing  Flowsheets (Taken 3/10/2025 0750 by Uvaldo Romero, RN)  Incisions, Wounds, or Drain Sites Healing Without Sign and Symptoms of Infection:   TWICE DAILY: Assess and document skin integrity   TWICE DAILY: Assess and document dressing/incision, wound bed, drain sites and surrounding tissue    Problem: Musculoskeletal - Adult  Goal: Return mobility to safest level of function  Outcome: Progressing  Flowsheets (Taken 3/10/2025 0040 by Ariadne Pugh, RN)  Return Mobility to Safest Level of Function:   Assess patient stability and activity tolerance for standing, transferring and ambulating with or without assistive devices   Assist with transfers and ambulation using safe patient handling equipment as needed   Ensure adequate protection for wounds/incisions during mobilization   Obtain physical therapy/occupational therapy consults as needed     Problem: Musculoskeletal - Adult  Goal: Maintain proper alignment of affected body part  Outcome: Progressing  Flowsheets (Taken 3/10/2025 0040 by Ariadne Pugh, RN)  Maintain proper alignment of affected body part:   Support and protect limb and body alignment per provider's orders   Instruct and reinforce with patient and family use of appropriate assistive device and precautions (e.g. spinal or hip dislocation precautions)   Careplan reviewed with patient. Patient verbalizes understanding of plan of care and contributes towards goals of care.

## 2025-03-10 NOTE — PROGRESS NOTES
[facility]  OCCUPATIONAL THERAPY MISSED TREATMENT NOTE  [facility] 7K  7K-06/006-A      Date: 3/10/2025  Patient Name: Kaycee Varela        CSN: [id number]   : 1960  (65 y.o.)  Gender: female   Referring Practitioner: Rebekah Rich PA-C            REASON FOR MISSED TREATMENT:  Patient with PT, will attempt next available time.

## 2025-03-11 VITALS
WEIGHT: 190.04 LBS | OXYGEN SATURATION: 90 % | BODY MASS INDEX: 29.83 KG/M2 | HEART RATE: 88 BPM | SYSTOLIC BLOOD PRESSURE: 116 MMHG | RESPIRATION RATE: 18 BRPM | TEMPERATURE: 98.4 F | HEIGHT: 67 IN | DIASTOLIC BLOOD PRESSURE: 57 MMHG

## 2025-03-11 PROCEDURE — 6370000000 HC RX 637 (ALT 250 FOR IP)

## 2025-03-11 PROCEDURE — 6370000000 HC RX 637 (ALT 250 FOR IP): Performed by: PHYSICIAN ASSISTANT

## 2025-03-11 PROCEDURE — 6360000002 HC RX W HCPCS: Performed by: PHYSICIAN ASSISTANT

## 2025-03-11 RX ADMIN — OXYCODONE HYDROCHLORIDE 10 MG: 5 TABLET ORAL at 14:01

## 2025-03-11 RX ADMIN — GABAPENTIN 300 MG: 300 CAPSULE ORAL at 13:59

## 2025-03-11 RX ADMIN — PANTOPRAZOLE SODIUM 40 MG: 40 TABLET, DELAYED RELEASE ORAL at 05:15

## 2025-03-11 RX ADMIN — OXYCODONE HYDROCHLORIDE 10 MG: 5 TABLET ORAL at 05:16

## 2025-03-11 RX ADMIN — ACETAMINOPHEN 650 MG: 325 TABLET ORAL at 12:17

## 2025-03-11 RX ADMIN — FENOFIBRATE 160 MG: 160 TABLET ORAL at 08:54

## 2025-03-11 RX ADMIN — OXYCODONE HYDROCHLORIDE 10 MG: 5 TABLET ORAL at 01:26

## 2025-03-11 RX ADMIN — DIPHENHYDRAMINE HYDROCHLORIDE, ZINC ACETATE: 2; .1 CREAM TOPICAL at 05:17

## 2025-03-11 RX ADMIN — ENOXAPARIN SODIUM 40 MG: 100 INJECTION SUBCUTANEOUS at 08:54

## 2025-03-11 RX ADMIN — ASPIRIN 81 MG: 81 TABLET, CHEWABLE ORAL at 08:54

## 2025-03-11 RX ADMIN — DOCUSATE SODIUM 100 MG: 100 CAPSULE, LIQUID FILLED ORAL at 08:54

## 2025-03-11 RX ADMIN — GABAPENTIN 300 MG: 300 CAPSULE ORAL at 08:54

## 2025-03-11 RX ADMIN — ATORVASTATIN CALCIUM 40 MG: 40 TABLET, FILM COATED ORAL at 08:54

## 2025-03-11 RX ADMIN — TIZANIDINE 4 MG: 4 TABLET ORAL at 12:19

## 2025-03-11 RX ADMIN — OXYCODONE HYDROCHLORIDE 10 MG: 5 TABLET ORAL at 10:34

## 2025-03-11 RX ADMIN — TIZANIDINE 4 MG: 4 TABLET ORAL at 08:54

## 2025-03-11 ASSESSMENT — PAIN - FUNCTIONAL ASSESSMENT
PAIN_FUNCTIONAL_ASSESSMENT: ACTIVITIES ARE NOT PREVENTED
PAIN_FUNCTIONAL_ASSESSMENT: ACTIVITIES ARE NOT PREVENTED
PAIN_FUNCTIONAL_ASSESSMENT: PREVENTS OR INTERFERES WITH MANY ACTIVE NOT PASSIVE ACTIVITIES
PAIN_FUNCTIONAL_ASSESSMENT: ACTIVITIES ARE NOT PREVENTED
PAIN_FUNCTIONAL_ASSESSMENT: PREVENTS OR INTERFERES SOME ACTIVE ACTIVITIES AND ADLS

## 2025-03-11 ASSESSMENT — PAIN DESCRIPTION - LOCATION
LOCATION: LEG
LOCATION: LEG;KNEE
LOCATION: KNEE
LOCATION: LEG
LOCATION: LEG

## 2025-03-11 ASSESSMENT — PAIN DESCRIPTION - DESCRIPTORS
DESCRIPTORS: SORE;ACHING
DESCRIPTORS: BURNING
DESCRIPTORS: ACHING;SORE
DESCRIPTORS: ACHING
DESCRIPTORS: TIGHTNESS

## 2025-03-11 ASSESSMENT — PAIN DESCRIPTION - ORIENTATION
ORIENTATION: LEFT

## 2025-03-11 ASSESSMENT — PAIN SCALES - GENERAL
PAINLEVEL_OUTOF10: 10
PAINLEVEL_OUTOF10: 8
PAINLEVEL_OUTOF10: 5
PAINLEVEL_OUTOF10: 7
PAINLEVEL_OUTOF10: 6
PAINLEVEL_OUTOF10: 10
PAINLEVEL_OUTOF10: 8

## 2025-03-11 NOTE — PROGRESS NOTES
This Student received report from Primary RN.   On assessment the patient is 65 years old female that is in supine position.  Speech is clear. Patient is alert and oriented to person, place, time and situation.      Vital Signs:  BP: 132/63  HR: 86  O2: 90  Temp: 98.2    No lesions noted in oral cavity. Mucus membranes and lips are pink and moist.     Pupils are equal and reactive to light going from 5mm to 3mm consensually.     Skin tone is pink, warm and dry.   Patient has a wound on her left heel of her foot, she also has an open sore on her coccyx.   Patient is a right below the leg amputee.   Patient has muscle contracture in her left leg.     Bilateral hand  are strong and purposeful to command. Negative for arm drift.     Nail beds are pink, capillary refill less than 3 seconds for upper extremities.     Radial pulse is 85 BPM.   Respiratory rate is 17.   Lung sounds are clear, breathing is unlabored. Chest rise and fall is symmetrical.     Bowel sounds are active in all 4 quadrants. No notes of last BM and patient is unaware when last BM was.     Bilateral pedal push and pull is weak in the left leg, patient does not have a right lower leg.   Capillary refill is less than 3 seconds in left leg.     Pulses:   Dorsalis Pedis +1 left leg  Posterior tibia +1 left leg    Edema is not present in left leg.     Call light placed in reach of patient.     Carla Salazar  [facility] Nursing Student.     PT was discharged at 2pm to facility, [facility].    contacted about discharge.

## 2025-03-11 NOTE — PROGRESS NOTES
Patient discharged in stable condition as per order of attending physician to Skilled Facility per staff at Time: 1414 and [facility]    AVS provided by RN at time of discharge, which includes all necessary medical information pertaining to the patients current course of illness, treatment, medications, post-discharge goals of care, and treatment preferences.     Patient/ family verbalize understanding of discharge plan and are in agreement with goal/plan/treatment preferences.     Belongings including  cellphone and   sent with patient.      Home medications sent home with patient N/A    Availability of \"My Chart\" offered to patient as a tool for updated health record.  Steps for activation discussed with patient as mentioned on AVS.

## 2025-03-11 NOTE — DISCHARGE SUMMARY
Physician Discharge Summary     Patient ID:  Kaycee Varela  [id number]  65 y.o.  1960    Admit date: 3/4/2025    Discharge date and time: 3/11/25    Admitting Physician: Rigo Fischer MD     Discharge Physician: Rigo Fischer MD     Admission Diagnoses: Closed fracture of distal end of left femur, initial encounter ([facility]) [S72.402A]  Closed fracture of distal end of left femur, unspecified fracture morphology, initial encounter ([facility]) [S72.402A]    Discharge Diagnoses: Closed fracture of distal end of left femur, initial encounter ([facility]) [S72.402A]  Closed fracture of distal end of left femur, unspecified fracture morphology, initial encounter ([facility]) [S72.402A]    Admission Condition: fair    Discharged Condition: fair    Indication for Admission: unstable orthopaedic injury    Hospital Course: Patient presented after a witnessed mechanical fall, landing on her L knee, found to have a left distal femur fracture, orthopaedics was asked to admit for pain control. Closed treatment of her fracture was recommended, splint applied, she did well with PT OT , her pain control improved, our trauma screening process did elicit concern for SI, however this was determined not to be acute and psychiatrist evaluated, hospitalist evaluated as well and she was deemed stable for discharge, on the day of discharge all vitals were stable and she was deemed appropriate     Consults: hospitalist     Discharge Exam:  Please see final progress note for appropriate discharge exam    Disposition: SNF    In process/preliminary results:  Outstanding Order Results       No orders found from 2/3/2025 to 3/5/2025.            Patient Instructions:   Current Discharge Medication List        START taking these medications    Details   enoxaparin (LOVENOX) 40 MG/0.4ML Inject 0.4 mLs into the skin daily for 21 days  Qty: 8.4 mL, Refills: 0      docusate sodium (COLACE, DULCOLAX) 100 MG CAPS Take 100 mg by mouth 2 times daily as needed for  Constipation  Qty: 20 capsule, Refills: 0      oxyCODONE (ROXICODONE) 5 MG immediate release tablet Take 1 tablet by mouth every 6 hours as needed for Pain for up to 7 days. Max Daily Amount: 20 mg  Qty: 28 tablet, Refills: 0    Comments: Reduce doses taken as pain becomes manageable  Associated Diagnoses: Closed fracture of distal end of left femur, unspecified fracture morphology, initial encounter ([facility])      pantoprazole (PROTONIX) 40 MG tablet Take 1 tablet by mouth every morning (before breakfast) for 21 days  Qty: 21 tablet, Refills: 0           CONTINUE these medications which have NOT CHANGED    Details   fluocinonide (LIDEX) 0.05 % cream Apply topically 2 times daily.  Qty: 60 g, Refills: 2    Associated Diagnoses: Rash and nonspecific skin eruption      atorvastatin (LIPITOR) 40 MG tablet TAKE 1 TABLET DAILY FOR HIGH CHOLESTEROL  Qty: 90 tablet, Refills: 3    Associated Diagnoses: Hyperlipidemia, unspecified hyperlipidemia type      fenofibrate 160 MG tablet TAKE 1 TABLET DAILY  Qty: 90 tablet, Refills: 3    Associated Diagnoses: Controlled type 2 diabetes mellitus without complication, without long-term current use of insulin ([facility])      tiZANidine (ZANAFLEX) 4 MG tablet Take 1 tablet by mouth in the morning, at noon, and at bedtime  Qty: 270 tablet, Refills: 3      sertraline (ZOLOFT) 50 MG tablet Take 1 tablet by mouth daily  Qty: 90 tablet, Refills: 3      gabapentin (NEURONTIN) 600 MG tablet Take 1 tablet by mouth 3 times daily for 180 days.  Qty: 270 tablet, Refills: 1    Associated Diagnoses: Phantom pain after amputation of lower extremity ([facility])      doxepin (SINEQUAN) 50 MG capsule Take 1 capsule by mouth every evening  Qty: 90 capsule, Refills: 3      NONFORMULARY Take 2 each by mouth nightly Hemp Gummies      Acetaminophen 500 MG CAPS Take 2 capsules by mouth as needed for Pain      KRILL OIL PO Take 350 mg by mouth daily      Multiple Vitamins-Minerals (THERAPEUTIC MULTIVITAMIN-MINERALS) tablet

## 2025-03-11 NOTE — PLAN OF CARE
Problem: Skin/Tissue Integrity - Adult  Goal: Skin integrity remains intact  Outcome: Adequate for Discharge     Problem: Musculoskeletal - Adult  Goal: Return mobility to safest level of function  Outcome: Adequate for Discharge     Problem: Nutrition Deficit:  Goal: Optimize nutritional status  Outcome: Adequate for Discharge

## 2025-03-11 NOTE — PROGRESS NOTES
[facility]  PHYSICAL THERAPY MISSED TREATMENT NOTE  [facility] 7K    Date: 3/11/2025  Patient Name: Kaycee Varela        MRN: [id number]   : 1960  (65 y.o.)  Gender: female   Referring Practitioner: GUILLERMO Rich CNP            REASON FOR MISSED TREATMENT:  Missed Treat.      Pt refused phy therapy.  Pt stated she is going SNF today.

## 2025-03-11 NOTE — PROGRESS NOTES
Orthopaedic Progress Note      SUBJECTIVE   Ms. Varela is hospital day 7, L distal femur fx    Seen at bedside this morning  no adverse events overnight  No active SI HI at this time, psych has evaluated, no intervention  No new concerns   Eating well  Good efforts therapy  Pleasant and cooperative this morning    OBJECTIVE      Physical    VITALS:  BP (!) 144/54   Pulse 84   Temp 98.1 °F (36.7 °C) (Oral)   Resp 18   SpO2 90%   I/O last 3 completed shifts:  In: 1340 [P.O.:1340]  Out: 50 [Urine:50]    4/10 pain  Gen: alert and oriented,  improvement in participation of examination and alertness today  Head: normorcephalic, atraumatic  Resp: unlabored, room air  Pelvis: stable  LLE:-modified splint intact without skin breakdown       Data  CBC:   Lab Results   Component Value Date/Time    WBC 7.0 03/10/2025 07:15 AM    HGB 12.1 03/10/2025 07:15 AM     03/10/2025 07:15 AM     BMP:    Lab Results   Component Value Date/Time     03/10/2025 07:15 AM    K 4.1 03/10/2025 07:15 AM    CL 96 03/10/2025 07:15 AM    CO2 25 03/10/2025 07:15 AM    BUN 13 03/10/2025 07:15 AM    CREATININE 0.5 03/10/2025 07:15 AM    CALCIUM 9.0 03/10/2025 07:15 AM    GLUCOSE 101 03/10/2025 07:15 AM    GLUCOSE 113 12/10/2020 10:01 AM     Uric Acid:  No components found for: \"URIC\"  PT/INR:    Lab Results   Component Value Date/Time    PROTIME 11.0 10/05/2016 03:39 PM    INR 0.97 03/04/2025 05:18 PM     Troponin:  No results found for: \"TROPONINI\"  Urine Culture:  No components found for: \"CURINE\"      Current Inpatient Medications    Current Facility-Administered Medications: diphenhydrAMINE-zinc acetate 2-0.1 % cream, , Topical, TID PRN  tiZANidine (ZANAFLEX) tablet 4 mg, 4 mg, Oral, TID  pantoprazole (PROTONIX) tablet 40 mg, 40 mg, Oral, QAM AC  oxyCODONE (ROXICODONE) immediate release tablet 5 mg, 5 mg, Oral, Q4H PRN **OR** oxyCODONE (ROXICODONE) immediate release tablet 10 mg, 10 mg, Oral, Q4H PRN  acetaminophen (TYLENOL) tablet

## 2025-03-11 NOTE — CARE COORDINATION
3/10/25, 7:16 AM EDT    DISCHARGE PLANNING EVALUATION    Planning [facility] at discharge  
3/11/25, 7:05 AM EDT    DISCHARGE PLANNING EVALUATION    [facility] will accept today, transport scheduled for 2:00 pm      3/11/25, 10:00 AM EDT    Patient goals/plan/ treatment preferences discussed by  and .  Patient goals/plan/ treatment preferences reviewed with patient/ family.  Patient/ family verbalize understanding of discharge plan and are in agreement with goal/plan/treatment preferences.  Understanding was demonstrated using the teach back method.  AVS provided by RN at time of discharge, which includes all necessary medical information pertaining to the patients current course of illness, treatment, post-discharge goals of care, and treatment preferences.     Services At/After Discharge: Skilled Nursing Facility (SNF) and In ambulance        
3/5/25, 1:50 PM EST    DISCHARGE PLANNING EVALUATION    Consult for discharge planning, PT/OT and palliative care consults planned, will assess after these consults are complete and more information on needs is available and options for care can be determined based on needs.    
3/6/25, 2:37 PM EST    DISCHARGE ON GOING EVALUATION    Kaycee Varela       [facility] day: 2  Location: -06/006-A Reason for admit: Closed fracture of distal end of left femur, initial encounter ([facility]) [S72.402A]  Closed fracture of distal end of left femur, unspecified fracture morphology, initial encounter ([facility]) [S72.402A]     Procedures: none  non op Left distal femur fracture    Imaging since last note: na    Barriers to Discharge: PT and OT started, cont pain control.  NWB to LLE, splint to LLE, ortho primary.    PCP: Jose Lazaro DO  Readmission Risk Score: 14.1    Patient Goals/Plan/Treatment Preferences: requesting SNF: [facility] follows.    
3/7/25, 8:06 AM EST    DISCHARGE PLANNING EVALUATION    [facility].  Referral [facility] to [facility]     Spoke with family, they plan to tour both facilities and will have a decision later today.     [facility] can accept, [facility] can also accept.      Call made to patient's , they have chosen [facility], confirmed with facility.   
DISCHARGE PLANNING EVALUATION  3/6/25, 1:55 PM EST    Reason for Referral: snf   Decision Maker: makes own decisions with support from , no POA  Current Services: none   New Services Requested: snf for rehab   Family/ Social/ Home environment: Kaycee lives at home with her , uses wheelchair.  Family assists with care, requesting snf for rehab before returning home   Payment Source:medicare   Transportation at Discharge: ambulance  Post-acute (PAC) provider list was provided to patient. Patient was informed of their freedom to choose PAC provider. Discussed and offered to show the patient the relevant PAC Providers quality and resource use measures on Medicare Compare web site via computer based on patient's goals of care and treatment preferences. Questions regarding selection process were answered.      Teach Back Method used with  and family members regarding care plan and discharge plan  Patient's  and family members verbalized understanding of the plan of care and contribute to goal setting.       Patient preferences and discharge plan: spoke with patient's  and family members while patient was working with therapy.  Family is requesting [facility], referral initiated.       Electronically signed by DYLAN Davis on 3/6/2025 at 1:55 PM         
Members   Patient's Healthcare Decision Maker is: Legal Next of Kin   PCP Verified by CM Yes   Last Visit to PCP Within last 6 months   Prior Functional Level Assistance with the following:;Bathing;Dressing;Cooking;Housework;Shopping;Toileting  (uses diaper, incont stool urine)   Current Functional Level Assistance with the following:;Bathing;Dressing;Toileting;Cooking;Housework;Shopping  (uses diaper, incont stool and urine)   Can patient return to prior living arrangement Yes   Ability to make needs known: Fair   Family able to assist with home care needs: Yes   Would you like for me to discuss the discharge plan with any other family members/significant others, and if so, who? No   Financial Resources Medicare;Other (Comment)  ()   Community Resources None   CM/SW Referral DME;Other (see comment)  (SNF?)   Social/Functional History   Lives With Spouse   Type of Home House   Home Layout One level   Home Access Ramped entrance   Home Equipment Grab bars;Wheelchair - Electric;Sliding Board   Active  No   Patient's  Info  drives   Discharge Planning   Type of Residence House   Living Arrangements Spouse/Significant Other   Current Services Prior To Admission Durable Medical Equipment   Current DME Prior to Arrival Walker;Wheelchair;Hospital Bed;Bedside Commode;Shower Chair   Potential Assistance Needed Skilled Nursing Facility   DME Ordered? No   Potential Assistance Purchasing Medications No   Type of Home Care Services None   Patient expects to be discharged to: House   Follow Up Appointment: Best Day/Time  Wednesday AM   One/Two Story Residence One story   History of falls? 1   Services At/After Discharge   Transition of Care Consult (CM Consult) Discharge Planning   Services At/After Discharge Skilled Nursing Facility (SNF)   Condition of Participation: Discharge Planning   The Plan for Transition of Care is related to the following treatment goals: get figured out what we are going to

## 2025-03-12 ENCOUNTER — CARE COORDINATION (OUTPATIENT)
Dept: CARE COORDINATION | Age: 65
End: 2025-03-12

## 2025-03-17 NOTE — PROGRESS NOTES
determine / Unknown  -- Refer to Clinical Documentation Reviewer    PROVIDER RESPONSE TEXT:    This patient has a pathological left distal femur fracture due to osteopenia.    Query created by: David Chung on 3/17/2025 12:51 PM      Electronically signed by:  Rebekah Rich PA-C 3/17/2025 1:55 PM

## 2025-03-31 ENCOUNTER — APPOINTMENT (OUTPATIENT)
Dept: ULTRASOUND IMAGING | Age: 65
DRG: 580 | End: 2025-03-31
Payer: MEDICARE

## 2025-03-31 ENCOUNTER — HOSPITAL ENCOUNTER (INPATIENT)
Age: 65
LOS: 8 days | Discharge: NURSING, REHAB OR LONG-TERM CARE | DRG: 580 | End: 2025-04-08
Attending: EMERGENCY MEDICINE | Admitting: STUDENT IN AN ORGANIZED HEALTH CARE EDUCATION/TRAINING PROGRAM
Payer: MEDICARE

## 2025-03-31 DIAGNOSIS — L12.0 BULLOUS PEMPHIGOID: ICD-10-CM

## 2025-03-31 DIAGNOSIS — G89.18 POSTOPERATIVE PAIN: ICD-10-CM

## 2025-03-31 DIAGNOSIS — R00.0 TACHYCARDIA: ICD-10-CM

## 2025-03-31 DIAGNOSIS — L98.9 SKIN DISEASE: Primary | ICD-10-CM

## 2025-03-31 DIAGNOSIS — R21 MACULOPAPULAR RASH: ICD-10-CM

## 2025-03-31 PROBLEM — F41.8 DEPRESSION WITH ANXIETY: Status: ACTIVE | Noted: 2025-03-31

## 2025-03-31 PROBLEM — E87.1 HYPONATREMIA: Status: ACTIVE | Noted: 2025-03-31

## 2025-03-31 PROBLEM — Z89.511 HX OF RIGHT BKA (HCC): Status: ACTIVE | Noted: 2025-03-31

## 2025-03-31 PROBLEM — N17.9 AKI (ACUTE KIDNEY INJURY): Status: ACTIVE | Noted: 2025-03-31

## 2025-03-31 PROBLEM — E87.20 METABOLIC ACIDOSIS, NORMAL ANION GAP (NAG): Status: ACTIVE | Noted: 2025-03-31

## 2025-03-31 PROBLEM — G62.9 NEUROPATHY: Status: ACTIVE | Noted: 2025-03-31

## 2025-03-31 LAB
ALBUMIN SERPL BCG-MCNC: 3.2 G/DL (ref 3.4–4.9)
ALP SERPL-CCNC: 68 U/L (ref 35–104)
ALT SERPL W/O P-5'-P-CCNC: 14 U/L (ref 10–35)
ANION GAP SERPL CALC-SCNC: 13 MEQ/L (ref 8–16)
AST SERPL-CCNC: 36 U/L (ref 10–35)
BACTERIA URNS QL MICRO: ABNORMAL /HPF
BASOPHILS ABSOLUTE: 0 THOU/MM3 (ref 0–0.1)
BASOPHILS NFR BLD AUTO: 0.4 %
BILIRUB SERPL-MCNC: 0.7 MG/DL (ref 0.3–1.2)
BILIRUB UR QL STRIP.AUTO: ABNORMAL
BUN SERPL-MCNC: 42 MG/DL (ref 8–23)
CALCIUM SERPL-MCNC: 8.6 MG/DL (ref 8.8–10.2)
CASTS #/AREA URNS LPF: ABNORMAL /LPF
CHARACTER UR: ABNORMAL
CHLORIDE SERPL-SCNC: 98 MEQ/L (ref 98–111)
CK SERPL-CCNC: 33 U/L (ref 26–192)
CO2 SERPL-SCNC: 21 MEQ/L (ref 22–29)
COLOR, UA: ABNORMAL
CREAT SERPL-MCNC: 1.1 MG/DL (ref 0.5–0.9)
CREAT UR-MCNC: 285 MG/DL
CRYSTALS URNS MICRO: ABNORMAL
DEPRECATED RDW RBC AUTO: 52.2 FL (ref 35–45)
EOSINOPHIL NFR BLD AUTO: 6.9 %
EOSINOPHILS ABSOLUTE: 0.5 THOU/MM3 (ref 0–0.4)
EPITHELIAL CELLS, UA: ABNORMAL /HPF
ERYTHROCYTE [DISTWIDTH] IN BLOOD BY AUTOMATED COUNT: 18 % (ref 11.5–14.5)
GFR SERPL CREATININE-BSD FRML MDRD: 55 ML/MIN/1.73M2
GLUCOSE SERPL-MCNC: 145 MG/DL (ref 74–109)
GLUCOSE UR QL STRIP.AUTO: NEGATIVE MG/DL
HCT VFR BLD AUTO: 46.8 % (ref 37–47)
HGB BLD-MCNC: 14.7 GM/DL (ref 12–16)
HGB UR QL STRIP.AUTO: NEGATIVE
IMM GRANULOCYTES # BLD AUTO: 0.04 THOU/MM3 (ref 0–0.07)
IMM GRANULOCYTES NFR BLD AUTO: 0.5 %
KETONES UR QL STRIP.AUTO: ABNORMAL
LACTATE SERPL-SCNC: 1.6 MMOL/L (ref 0.5–2)
LACTIC ACID, SEPSIS: 1.4 MMOL/L (ref 0.5–1.9)
LYMPHOCYTES ABSOLUTE: 0.8 THOU/MM3 (ref 1–4.8)
LYMPHOCYTES NFR BLD AUTO: 9.8 %
MCH RBC QN AUTO: 26.9 PG (ref 26–33)
MCHC RBC AUTO-ENTMCNC: 31.4 GM/DL (ref 32.2–35.5)
MCV RBC AUTO: 85.7 FL (ref 81–99)
MONOCYTES ABSOLUTE: 0.3 THOU/MM3 (ref 0.4–1.3)
MONOCYTES NFR BLD AUTO: 3.9 %
NEUTROPHILS ABSOLUTE: 6 THOU/MM3 (ref 1.8–7.7)
NEUTROPHILS NFR BLD AUTO: 78.5 %
NITRITE UR QL STRIP: NEGATIVE
NRBC BLD AUTO-RTO: 0 /100 WBC
OSMOLALITY SERPL CALC.SUM OF ELEC: 277.6 MOSMOL/KG (ref 275–300)
PH UR STRIP.AUTO: 5 [PH] (ref 5–9)
PLATELET # BLD AUTO: 331 THOU/MM3 (ref 130–400)
PMV BLD AUTO: 10.9 FL (ref 9.4–12.4)
POTASSIUM SERPL-SCNC: 4.5 MEQ/L (ref 3.5–5.2)
PROT SERPL-MCNC: 5.8 G/DL (ref 6.4–8.3)
PROT UR STRIP.AUTO-MCNC: 30 MG/DL
RBC # BLD AUTO: 5.46 MILL/MM3 (ref 4.2–5.4)
RBC URINE: ABNORMAL /HPF
SODIUM SERPL-SCNC: 132 MEQ/L (ref 135–145)
SODIUM UR-SCNC: < 20 MEQ/L
SP GR UR REFRACT.AUTO: > 1.03 (ref 1–1.03)
UROBILINOGEN, URINE: 1 EU/DL (ref 0–1)
WBC # BLD AUTO: 7.7 THOU/MM3 (ref 4.8–10.8)
WBC #/AREA URNS HPF: ABNORMAL /HPF
WBC #/AREA URNS HPF: NEGATIVE /[HPF]

## 2025-03-31 PROCEDURE — 82570 ASSAY OF URINE CREATININE: CPT

## 2025-03-31 PROCEDURE — 6360000002 HC RX W HCPCS

## 2025-03-31 PROCEDURE — 83605 ASSAY OF LACTIC ACID: CPT

## 2025-03-31 PROCEDURE — 2060000000 HC ICU INTERMEDIATE R&B

## 2025-03-31 PROCEDURE — 2500000003 HC RX 250 WO HCPCS

## 2025-03-31 PROCEDURE — 96375 TX/PRO/DX INJ NEW DRUG ADDON: CPT

## 2025-03-31 PROCEDURE — 96361 HYDRATE IV INFUSION ADD-ON: CPT

## 2025-03-31 PROCEDURE — 87255 GENET VIRUS ISOLATE HSV: CPT

## 2025-03-31 PROCEDURE — 6360000002 HC RX W HCPCS: Performed by: EMERGENCY MEDICINE

## 2025-03-31 PROCEDURE — 2580000003 HC RX 258: Performed by: EMERGENCY MEDICINE

## 2025-03-31 PROCEDURE — 82550 ASSAY OF CK (CPK): CPT

## 2025-03-31 PROCEDURE — 80053 COMPREHEN METABOLIC PANEL: CPT

## 2025-03-31 PROCEDURE — 36415 COLL VENOUS BLD VENIPUNCTURE: CPT

## 2025-03-31 PROCEDURE — 99223 1ST HOSP IP/OBS HIGH 75: CPT | Performed by: STUDENT IN AN ORGANIZED HEALTH CARE EDUCATION/TRAINING PROGRAM

## 2025-03-31 PROCEDURE — 81001 URINALYSIS AUTO W/SCOPE: CPT

## 2025-03-31 PROCEDURE — 76770 US EXAM ABDO BACK WALL COMP: CPT

## 2025-03-31 PROCEDURE — 6370000000 HC RX 637 (ALT 250 FOR IP)

## 2025-03-31 PROCEDURE — 2580000003 HC RX 258

## 2025-03-31 PROCEDURE — 85025 COMPLETE CBC W/AUTO DIFF WBC: CPT

## 2025-03-31 PROCEDURE — 84300 ASSAY OF URINE SODIUM: CPT

## 2025-03-31 PROCEDURE — 87040 BLOOD CULTURE FOR BACTERIA: CPT

## 2025-03-31 PROCEDURE — 96374 THER/PROPH/DIAG INJ IV PUSH: CPT

## 2025-03-31 PROCEDURE — 87086 URINE CULTURE/COLONY COUNT: CPT

## 2025-03-31 PROCEDURE — 96376 TX/PRO/DX INJ SAME DRUG ADON: CPT

## 2025-03-31 PROCEDURE — 99285 EMERGENCY DEPT VISIT HI MDM: CPT

## 2025-03-31 RX ORDER — MORPHINE SULFATE 10 MG/ML
6 INJECTION, SOLUTION INTRAMUSCULAR; INTRAVENOUS ONCE
Refills: 0 | Status: COMPLETED | OUTPATIENT
Start: 2025-03-31 | End: 2025-03-31

## 2025-03-31 RX ORDER — SODIUM CHLORIDE 9 MG/ML
INJECTION, SOLUTION INTRAVENOUS PRN
Status: DISCONTINUED | OUTPATIENT
Start: 2025-03-31 | End: 2025-04-08 | Stop reason: HOSPADM

## 2025-03-31 RX ORDER — ONDANSETRON 2 MG/ML
4 INJECTION INTRAMUSCULAR; INTRAVENOUS EVERY 6 HOURS PRN
Status: DISCONTINUED | OUTPATIENT
Start: 2025-03-31 | End: 2025-04-08 | Stop reason: HOSPADM

## 2025-03-31 RX ORDER — ASPIRIN 81 MG/1
81 TABLET, CHEWABLE ORAL DAILY
Status: DISCONTINUED | OUTPATIENT
Start: 2025-04-01 | End: 2025-04-08 | Stop reason: HOSPADM

## 2025-03-31 RX ORDER — ACETAMINOPHEN 650 MG/1
650 SUPPOSITORY RECTAL EVERY 6 HOURS PRN
Status: DISCONTINUED | OUTPATIENT
Start: 2025-03-31 | End: 2025-04-08 | Stop reason: HOSPADM

## 2025-03-31 RX ORDER — DIPHENHYDRAMINE HCL 25 MG
25 CAPSULE ORAL EVERY 4 HOURS PRN
COMMUNITY

## 2025-03-31 RX ORDER — ONDANSETRON 4 MG/1
4 TABLET, ORALLY DISINTEGRATING ORAL EVERY 8 HOURS PRN
Status: DISCONTINUED | OUTPATIENT
Start: 2025-03-31 | End: 2025-04-08 | Stop reason: HOSPADM

## 2025-03-31 RX ORDER — ENOXAPARIN SODIUM 100 MG/ML
INJECTION SUBCUTANEOUS DAILY
Status: ON HOLD | COMMUNITY
Start: 2025-03-11 | End: 2025-04-08 | Stop reason: HOSPADM

## 2025-03-31 RX ORDER — MAGNESIUM SULFATE IN WATER 40 MG/ML
2000 INJECTION, SOLUTION INTRAVENOUS PRN
Status: DISCONTINUED | OUTPATIENT
Start: 2025-03-31 | End: 2025-04-08 | Stop reason: HOSPADM

## 2025-03-31 RX ORDER — GABAPENTIN 300 MG/1
300 CAPSULE ORAL 3 TIMES DAILY
Status: DISCONTINUED | OUTPATIENT
Start: 2025-03-31 | End: 2025-04-02

## 2025-03-31 RX ORDER — PANTOPRAZOLE SODIUM 40 MG/1
40 TABLET, DELAYED RELEASE ORAL
Status: DISCONTINUED | OUTPATIENT
Start: 2025-04-01 | End: 2025-04-01

## 2025-03-31 RX ORDER — MUPIROCIN 20 MG/G
OINTMENT TOPICAL 3 TIMES DAILY
Status: ON HOLD | COMMUNITY
Start: 2025-03-25 | End: 2025-04-08 | Stop reason: HOSPADM

## 2025-03-31 RX ORDER — SODIUM CHLORIDE, SODIUM LACTATE, POTASSIUM CHLORIDE, CALCIUM CHLORIDE 600; 310; 30; 20 MG/100ML; MG/100ML; MG/100ML; MG/100ML
INJECTION, SOLUTION INTRAVENOUS CONTINUOUS
Status: DISCONTINUED | OUTPATIENT
Start: 2025-03-31 | End: 2025-04-02

## 2025-03-31 RX ORDER — LEVOFLOXACIN 750 MG/1
750 TABLET, FILM COATED ORAL DAILY
Status: ON HOLD | COMMUNITY
Start: 2025-03-24 | End: 2025-04-08 | Stop reason: HOSPADM

## 2025-03-31 RX ORDER — ENOXAPARIN SODIUM 100 MG/ML
40 INJECTION SUBCUTANEOUS DAILY
Status: DISCONTINUED | OUTPATIENT
Start: 2025-03-31 | End: 2025-04-08 | Stop reason: HOSPADM

## 2025-03-31 RX ORDER — POTASSIUM CHLORIDE 1500 MG/1
40 TABLET, EXTENDED RELEASE ORAL PRN
Status: DISCONTINUED | OUTPATIENT
Start: 2025-03-31 | End: 2025-04-08 | Stop reason: HOSPADM

## 2025-03-31 RX ORDER — ATORVASTATIN CALCIUM 40 MG/1
40 TABLET, FILM COATED ORAL DAILY
Status: DISCONTINUED | OUTPATIENT
Start: 2025-04-01 | End: 2025-04-08 | Stop reason: HOSPADM

## 2025-03-31 RX ORDER — DOCUSATE SODIUM 100 MG/1
100 CAPSULE, LIQUID FILLED ORAL 2 TIMES DAILY PRN
COMMUNITY

## 2025-03-31 RX ORDER — SODIUM CHLORIDE 0.9 % (FLUSH) 0.9 %
5-40 SYRINGE (ML) INJECTION PRN
Status: DISCONTINUED | OUTPATIENT
Start: 2025-03-31 | End: 2025-04-08 | Stop reason: HOSPADM

## 2025-03-31 RX ORDER — DIPHENHYDRAMINE HYDROCHLORIDE 50 MG/ML
25 INJECTION, SOLUTION INTRAMUSCULAR; INTRAVENOUS ONCE
Status: COMPLETED | OUTPATIENT
Start: 2025-03-31 | End: 2025-03-31

## 2025-03-31 RX ORDER — OXYCODONE HYDROCHLORIDE 5 MG/1
5 TABLET ORAL EVERY 6 HOURS PRN
Status: ON HOLD | COMMUNITY
Start: 2025-03-19 | End: 2025-04-07 | Stop reason: HOSPADM

## 2025-03-31 RX ORDER — POLYETHYLENE GLYCOL 3350 17 G/17G
17 POWDER, FOR SOLUTION ORAL DAILY PRN
Status: DISCONTINUED | OUTPATIENT
Start: 2025-03-31 | End: 2025-04-08 | Stop reason: HOSPADM

## 2025-03-31 RX ORDER — MULTIVITAMIN WITH IRON
1 TABLET ORAL DAILY
Status: DISCONTINUED | OUTPATIENT
Start: 2025-04-01 | End: 2025-04-08 | Stop reason: HOSPADM

## 2025-03-31 RX ORDER — 0.9 % SODIUM CHLORIDE 0.9 %
500 INTRAVENOUS SOLUTION INTRAVENOUS ONCE
Status: COMPLETED | OUTPATIENT
Start: 2025-03-31 | End: 2025-03-31

## 2025-03-31 RX ORDER — BACILLUS COAGULANS/INULIN 1B-250 MG
1 CAPSULE ORAL DAILY
COMMUNITY
Start: 2025-03-25

## 2025-03-31 RX ORDER — FENOFIBRATE 160 MG/1
160 TABLET ORAL DAILY
Status: DISCONTINUED | OUTPATIENT
Start: 2025-04-01 | End: 2025-04-08 | Stop reason: HOSPADM

## 2025-03-31 RX ORDER — POTASSIUM CHLORIDE 7.45 MG/ML
10 INJECTION INTRAVENOUS PRN
Status: DISCONTINUED | OUTPATIENT
Start: 2025-03-31 | End: 2025-04-08 | Stop reason: HOSPADM

## 2025-03-31 RX ORDER — ACETAMINOPHEN 325 MG/1
650 TABLET ORAL EVERY 6 HOURS PRN
Status: DISCONTINUED | OUTPATIENT
Start: 2025-03-31 | End: 2025-04-08 | Stop reason: HOSPADM

## 2025-03-31 RX ORDER — SODIUM CHLORIDE 0.9 % (FLUSH) 0.9 %
5-40 SYRINGE (ML) INJECTION EVERY 12 HOURS SCHEDULED
Status: DISCONTINUED | OUTPATIENT
Start: 2025-03-31 | End: 2025-04-08 | Stop reason: HOSPADM

## 2025-03-31 RX ORDER — PREDNISONE 20 MG/1
20 TABLET ORAL DAILY
COMMUNITY
Start: 2025-03-26 | End: 2025-04-03

## 2025-03-31 RX ORDER — KRILL/OM-3/DHA/EPA/PHOSPHO/AST 500MG-86MG
350 CAPSULE ORAL DAILY
Status: DISCONTINUED | OUTPATIENT
Start: 2025-03-31 | End: 2025-03-31 | Stop reason: RX

## 2025-03-31 RX ORDER — DIPHENHYDRAMINE HYDROCHLORIDE 50 MG/ML
50 INJECTION, SOLUTION INTRAMUSCULAR; INTRAVENOUS EVERY 6 HOURS PRN
Status: DISCONTINUED | OUTPATIENT
Start: 2025-03-31 | End: 2025-04-07

## 2025-03-31 RX ORDER — FLUOCINONIDE 0.5 MG/G
CREAM TOPICAL 2 TIMES DAILY
Status: DISCONTINUED | OUTPATIENT
Start: 2025-03-31 | End: 2025-04-01

## 2025-03-31 RX ORDER — DOXEPIN HYDROCHLORIDE 50 MG/1
50 CAPSULE ORAL EVERY EVENING
Status: DISCONTINUED | OUTPATIENT
Start: 2025-03-31 | End: 2025-04-08 | Stop reason: HOSPADM

## 2025-03-31 RX ORDER — CETIRIZINE HYDROCHLORIDE 10 MG/1
10 TABLET ORAL DAILY
Status: DISCONTINUED | OUTPATIENT
Start: 2025-03-31 | End: 2025-04-08 | Stop reason: HOSPADM

## 2025-03-31 RX ADMIN — MORPHINE SULFATE 6 MG: 10 INJECTION, SOLUTION INTRAMUSCULAR; INTRAVENOUS at 14:14

## 2025-03-31 RX ADMIN — FLUOCINONIDE: 0.5 CREAM TOPICAL at 20:52

## 2025-03-31 RX ADMIN — SODIUM CHLORIDE, PRESERVATIVE FREE 10 ML: 5 INJECTION INTRAVENOUS at 21:09

## 2025-03-31 RX ADMIN — SODIUM CHLORIDE 500 ML: 0.9 INJECTION, SOLUTION INTRAVENOUS at 14:10

## 2025-03-31 RX ADMIN — MORPHINE SULFATE 6 MG: 10 INJECTION, SOLUTION INTRAMUSCULAR; INTRAVENOUS at 15:56

## 2025-03-31 RX ADMIN — CETIRIZINE HYDROCHLORIDE 10 MG: 10 TABLET, FILM COATED ORAL at 21:12

## 2025-03-31 RX ADMIN — ENOXAPARIN SODIUM 40 MG: 100 INJECTION SUBCUTANEOUS at 21:08

## 2025-03-31 RX ADMIN — WATER 45.35 MG: 1 INJECTION INTRAMUSCULAR; INTRAVENOUS; SUBCUTANEOUS at 21:08

## 2025-03-31 RX ADMIN — SODIUM CHLORIDE, SODIUM LACTATE, POTASSIUM CHLORIDE, AND CALCIUM CHLORIDE: .6; .31; .03; .02 INJECTION, SOLUTION INTRAVENOUS at 20:38

## 2025-03-31 RX ADMIN — DIPHENHYDRAMINE HYDROCHLORIDE 25 MG: 50 INJECTION INTRAMUSCULAR; INTRAVENOUS at 15:56

## 2025-03-31 ASSESSMENT — PAIN DESCRIPTION - PAIN TYPE
TYPE: ACUTE PAIN

## 2025-03-31 ASSESSMENT — PAIN DESCRIPTION - LOCATION
LOCATION: GENERALIZED

## 2025-03-31 ASSESSMENT — PAIN DESCRIPTION - FREQUENCY
FREQUENCY: CONTINUOUS

## 2025-03-31 ASSESSMENT — PAIN DESCRIPTION - ONSET
ONSET: ON-GOING

## 2025-03-31 ASSESSMENT — PAIN SCALES - GENERAL
PAINLEVEL_OUTOF10: 10
PAINLEVEL_OUTOF10: 8

## 2025-03-31 ASSESSMENT — PAIN - FUNCTIONAL ASSESSMENT
PAIN_FUNCTIONAL_ASSESSMENT: 0-10
PAIN_FUNCTIONAL_ASSESSMENT: ACTIVITIES ARE NOT PREVENTED

## 2025-03-31 ASSESSMENT — PAIN DESCRIPTION - DESCRIPTORS
DESCRIPTORS: ACHING;ITCHING
DESCRIPTORS: ITCHING;BURNING

## 2025-03-31 NOTE — ED NOTES
Spoke with CHANDA Bailey to approve pt transport to [facility]. Pt in stable condition at this time.

## 2025-03-31 NOTE — H&P
History & Physical  Internal Medicine Resident         Patient: Kaycee Varela 65 y.o. female      : 1960  Date of Admission: 3/31/2025  Date of Service: Pt seen/examined on 25 and Admitted to Inpatient with expected LOS greater than two midnights due to medical therapy.       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 Patient presented with maculopapular rash starting 4 weeks ago; was on course of Bactrim.  Started from groin region; diffuse spread with no sparing and profuse itching.  Evidence of sloughed skin 2/2 itching. Currently afebrile, no leukocytosis. Elevated eosinophils and LFTS. Started on levofloxacin 3/24. Was on course of prednisone 20 nursing home starting 3/11. Lactic acid 1.4.   Solu-Medrol 45.3 Mg IV daily  Benadryl 50 Mg IV every 6 hours as needed  Zyrtec 10 Mg p.o. daily  Offending agents discontinued  ID consulted; appreciate recommendations  CBC, vitals  Acute kidney injury: Likely prerenal in etiology secondary to reduced p.o. intake.  Creatinine 1.1; baseline 0.4 - 0.5. Urine sodium and creatinine ordered.  Renal US ordered. maintenance fluids.  LR 75 cc/hour in setting of NAGMA  Pending urine sodium and creatinine  Pending renal US  Strict I's and O's  Avoid nephrotoxic agents  Hyponatremia: Possibly 2/2 decreased p.o. intake. Sodium 132.  On LR 75 cc/hour trend BMP.  NAGMA: Bicarb 21, anion gap 13. Currently on LR 75 cc/hour in setting of SANJIV  Abnormal UA: UA positive for bilirubin, trace ketones, specific gravity over 1.030, protein 30. Endorses no dysuria. Afebrile and no leukocytosis. Trend CBC, vitals.    Chronic Conditions (reviewed and stable unless otherwise stated)   Right BKA: Noted.  Left leg fracture: S/p accident 4 weeks ago  HLD: Continue Lipitor 40 Mg p.o. daily and fenofibrate 160 Mg p.o. daily  Anxiety/depression: Continue Zoloft 50 Mg p.o. daily  Neuropathy: Continue Neurontin 600 Mg p.o. 3 times daily and

## 2025-03-31 NOTE — ED NOTES
ED to inpatient nurses report      Chief Complaint:  Chief Complaint   Patient presents with    whole body sores and pain     Present to ED from: [facility]    MOA:     LOC: alert and orientated to name, place, date  Mobility: Fully dependent  Oxygen Baseline: room air     Current needs required: room air     Code Status:   Full Code    What abnormal results were found and what did you give/do to treat them? All over sores of varying stages  Any procedures or intervention occur? Infectious disease    Mental Status:  Level of Consciousness: Alert (0)    Psych Assessment:        Vitals:  Patient Vitals for the past 24 hrs:   BP Temp Temp src Pulse Resp SpO2 Weight   03/31/25 1555 -- -- -- 87 16 100 % --   03/31/25 1417 (!) 106/57 -- -- -- 16 100 % --   03/31/25 1225 -- -- -- -- -- 93 % --   03/31/25 1140 120/71 97.9 °F (36.6 °C) Oral 97 20 92 % 90.7 kg (200 lb)        LDAs:   Peripheral IV 03/31/25 Right Other (Comment) (Active)       Ambulatory Status:  Presents to emergency department  because of falls (Syncope, seizure, or loss of consciousness): No, Age > 70: No, Altered Mental Status, Intoxication with alcohol or substance confusion (Disorientation, impaired judgment, poor safety awaremess, or inability to follow instructions): No, Impaired Mobility: Ambulates or transfers with assistive devices or assistance; Unable to ambulate or transer.: Yes, Nursing Judgement: Yes    Diagnosis:  DISPOSITION Admitted 03/31/2025 05:37:24 PM   Final diagnoses:   Skin disease        Consults:  IP CONSULT TO INFECTIOUS DISEASES     Pain Score:  Pain Assessment  Pain Assessment: 0-10  Pain Level: 8  Pain Location: Generalized  Pain Descriptors: Itching, Burning  Pain Type: Acute pain  Pain Frequency: Continuous  Pain Onset: On-going    C-SSRS:   Risk of Suicide: No Risk    Sepsis Screening:       Finn Fall Risk:  [place] 1 Fall Risk  Presents to emergency department  because of falls (Syncope, seizure, or loss of

## 2025-03-31 NOTE — ED NOTES
Patient resting in cot with call light in reach. Lights dimmed for comfort. Patient talking with family at bedside.

## 2025-03-31 NOTE — ED NOTES
Patient medicated  per MAR. Cultures and urine sent at this time. Patient requested lights be dimmed for comfort. No signs of distress. Call light in reach. VSS>

## 2025-03-31 NOTE — ED NOTES
Patient presents to the ED via [facility] from an [facility] in [place] for concerns of ongoing sores over her whole body for 3 weeks. Patient states she was living at home up until she broke her left leg, which caused her to be in a cast and unable to care for herself at home due to also having an above the right knee amputee. Patient states about 3 weeks ago she broke out in an all over rash and sores, some open and some scabbed over, which the nursing home providers at unsure the reason or what they are. She reports being in an itching/burning pain and can no longer take it, so she wanted transported for evaluation. Patient rates pain at a 8/10. VSS.

## 2025-03-31 NOTE — ED PROVIDER NOTES
[facility] EMERGENCY DEPARTMENT      EMERGENCY MEDICINE     Pt Name: Kaycee Varela  MRN: [id number]  Birthdate 1960  Date of evaluation: 3/31/2025  Provider: PIYUSH LONGORIA DO, FACEP    CHIEF COMPLAINT       Chief Complaint   Patient presents with    whole body sores and pain     HISTORY OF PRESENT ILLNESS   Kaycee Varela is a pleasant 65 y.o. female who presents to the emergency department for evaluation of multiple diffuse body painful and pruritic lesions.  Patient states has been ongoing for the past 3 weeks, started when she arrived at the nursing facility where she is currently secondary to have fracture of the left distal femur.  She states the rash has been worsening, initially started in the groin area, that she noted in her hands, and subsequently and quickly has been diffuse.  Initially starts as red itchy areas, progresses to large blistering, blisters drain, and at some point crust.  No fevers, no recent illnesses.  She was given a course of low-dose prednisone and Bactrim subsequently, with no response for this      PASTMEDICAL HISTORY/Co-Morbid Conditions:     Past Medical History:   Diagnosis Date    Arthritis     Atypical ductal hyperplasia, breast     left breast, 2016, lumpectomy, Tamoxifen, LM    Cancer (HCC)     Hx of breast biopsy 01/08/2016    Hyperlipidemia     Hypertension     Type II or unspecified type diabetes mellitus without mention of complication, not stated as uncontrolled        Patient Active Problem List   Diagnosis Code    Type 2 diabetes mellitus with other specified complication, unspecified whether long term insulin use (HCC) E11.69    Hyperlipemia E78.5    THORNTON (nonalcoholic steatohepatitis) K75.81    Obesity E66.9    Tobacco abuse Z72.0    Vitamin D deficiency E55.9    Dyshidrotic eczema L30.1    Displaced articular fracture of head of right femur, sequela S72.061S    Neida-prosthetic supracondylar fracture of femur M97.8XXA, Z96.659    Primary hypertension  TABLET    TAKE 1 TABLET DAILY    FLUOCINONIDE (LIDEX) 0.05 % CREAM    Apply topically 2 times daily.    GABAPENTIN (NEURONTIN) 600 MG TABLET    Take 1 tablet by mouth 3 times daily for 180 days.    KRILL OIL PO    Take 350 mg by mouth daily    MULTIPLE VITAMINS-MINERALS (THERAPEUTIC MULTIVITAMIN-MINERALS) TABLET    Take 1 tablet by mouth daily    NONFORMULARY    Take 2 each by mouth nightly Hemp Gummies    PANTOPRAZOLE (PROTONIX) 40 MG TABLET    Take 1 tablet by mouth every morning (before breakfast) for 21 days    SERTRALINE (ZOLOFT) 50 MG TABLET    Take 1 tablet by mouth daily    TIZANIDINE (ZANAFLEX) 4 MG TABLET    Take 1 tablet by mouth in the morning, at noon, and at bedtime       ALLERGIES     is allergic to adhesive tape.    FAMILY HISTORY     She indicated that her mother is . She indicated that her father is . She indicated that both of her sisters are alive. She indicated that her brother is alive. She indicated that the status of her neg hx is unknown.       SOCIAL HISTORY       Social History     Tobacco Use    Smoking status: Every Day     Current packs/day: 1.00     Average packs/day: 1 pack/day for 49.0 years (49.0 ttl pk-yrs)     Types: Cigarettes    Smokeless tobacco: Never   Vaping Use    Vaping status: Never Used   Substance Use Topics    Alcohol use: No     Comment: monthly glass of wine    Drug use: No       PHYSICAL EXAM       ED Triage Vitals [25 1140]   BP Systolic BP Percentile Diastolic BP Percentile Temp Temp Source Pulse Respirations SpO2   120/71 -- -- 97.9 °F (36.6 °C) Oral 97 20 92 %      Height Weight - Scale         -- 90.7 kg (200 lb)             Additional Vital Signs:  Vitals:    25 1417   BP: (!) 106/57   Pulse:    Resp: 16   Temp:    SpO2: 100%     Physical Exam  Vitals and nursing note reviewed.   Constitutional:       General: She is not in acute distress.     Appearance: She is well-developed. She is ill-appearing. She is not toxic-appearing or

## 2025-03-31 NOTE — ED NOTES
Contact Plus and Droplet Plus precautions initiated at this time isolation cart outside of room with supplies.

## 2025-04-01 ENCOUNTER — APPOINTMENT (OUTPATIENT)
Dept: GENERAL RADIOLOGY | Age: 65
DRG: 580 | End: 2025-04-01
Payer: MEDICARE

## 2025-04-01 PROBLEM — L12.0 BULLOUS PEMPHIGOID: Status: ACTIVE | Noted: 2025-04-01

## 2025-04-01 LAB
ALBUMIN SERPL BCG-MCNC: 2.9 G/DL (ref 3.4–4.9)
ALP SERPL-CCNC: 58 U/L (ref 35–104)
ALT SERPL W/O P-5'-P-CCNC: 12 U/L (ref 10–35)
ANION GAP SERPL CALC-SCNC: 9 MEQ/L (ref 8–16)
AST SERPL-CCNC: 31 U/L (ref 10–35)
BASE EXCESS BLDA CALC-SCNC: 1.8 MMOL/L (ref -2–3)
BASOPHILS ABSOLUTE: 0 THOU/MM3 (ref 0–0.1)
BASOPHILS NFR BLD AUTO: 0.4 %
BILIRUB CONJ SERPL-MCNC: 0.4 MG/DL (ref 0–0.2)
BILIRUB SERPL-MCNC: 0.5 MG/DL (ref 0.3–1.2)
BUN SERPL-MCNC: 37 MG/DL (ref 8–23)
CA-I BLD ISE-SCNC: 1.17 MMOL/L (ref 1.12–1.32)
CALCIUM SERPL-MCNC: 8.7 MG/DL (ref 8.8–10.2)
CHLORIDE SERPL-SCNC: 103 MEQ/L (ref 98–111)
CO2 SERPL-SCNC: 24 MEQ/L (ref 22–29)
COLLECTED BY:: ABNORMAL
CREAT SERPL-MCNC: 0.8 MG/DL (ref 0.5–0.9)
DEPRECATED RDW RBC AUTO: 53 FL (ref 35–45)
DEVICE: ABNORMAL
EOSINOPHIL NFR BLD AUTO: 7.2 %
EOSINOPHILS ABSOLUTE: 0.3 THOU/MM3 (ref 0–0.4)
ERYTHROCYTE [DISTWIDTH] IN BLOOD BY AUTOMATED COUNT: 17.2 % (ref 11.5–14.5)
FIO2 ON VENT O2 ANALYZER: 21 %
GFR SERPL CREATININE-BSD FRML MDRD: 81 ML/MIN/1.73M2
GLUCOSE SERPL-MCNC: 120 MG/DL (ref 74–109)
HCO3 BLDA-SCNC: 28 MMOL/L (ref 23–28)
HCT VFR BLD AUTO: 41.6 % (ref 37–47)
HGB BLD-MCNC: 13.1 GM/DL (ref 12–16)
IGE SERPL-ACNC: 644 IU/ML (ref 0–100)
IMM GRANULOCYTES # BLD AUTO: 0.02 THOU/MM3 (ref 0–0.07)
IMM GRANULOCYTES NFR BLD AUTO: 0.4 %
LYMPHOCYTES ABSOLUTE: 1 THOU/MM3 (ref 1–4.8)
LYMPHOCYTES NFR BLD AUTO: 22.1 %
MCH RBC QN AUTO: 26.8 PG (ref 26–33)
MCHC RBC AUTO-ENTMCNC: 31.5 GM/DL (ref 32.2–35.5)
MCV RBC AUTO: 85.2 FL (ref 81–99)
MONOCYTES ABSOLUTE: 0.3 THOU/MM3 (ref 0.4–1.3)
MONOCYTES NFR BLD AUTO: 7.2 %
NEUTROPHILS ABSOLUTE: 2.9 THOU/MM3 (ref 1.8–7.7)
NEUTROPHILS NFR BLD AUTO: 62.7 %
NRBC BLD AUTO-RTO: 0 /100 WBC
PCO2 TEMP ADJ BLDMV: 47 MMHG (ref 41–51)
PH BLDMV: 7.38 [PH] (ref 7.31–7.41)
PLATELET # BLD AUTO: 271 THOU/MM3 (ref 130–400)
PMV BLD AUTO: 10.1 FL (ref 9.4–12.4)
PO2 BLDMV: 49 MMHG (ref 25–40)
POTASSIUM SERPL-SCNC: 4.2 MEQ/L (ref 3.5–5.2)
PROT SERPL-MCNC: 5.2 G/DL (ref 6.4–8.3)
RBC # BLD AUTO: 4.88 MILL/MM3 (ref 4.2–5.4)
REASON FOR REJECTION: NORMAL
REJECTED TEST: NORMAL
SAO2 % BLDMV: 83 %
SITE: ABNORMAL
SODIUM SERPL-SCNC: 136 MEQ/L (ref 135–145)
VENTILATION MODE VENT: ABNORMAL
WBC # BLD AUTO: 4.7 THOU/MM3 (ref 4.8–10.8)

## 2025-04-01 PROCEDURE — 6360000002 HC RX W HCPCS: Performed by: INTERNAL MEDICINE

## 2025-04-01 PROCEDURE — 36415 COLL VENOUS BLD VENIPUNCTURE: CPT

## 2025-04-01 PROCEDURE — 6370000000 HC RX 637 (ALT 250 FOR IP)

## 2025-04-01 PROCEDURE — 51798 US URINE CAPACITY MEASURE: CPT

## 2025-04-01 PROCEDURE — 82785 ASSAY OF IGE: CPT

## 2025-04-01 PROCEDURE — 0HBBXZX EXCISION OF RIGHT UPPER ARM SKIN, EXTERNAL APPROACH, DIAGNOSTIC: ICD-10-PCS | Performed by: INTERNAL MEDICINE

## 2025-04-01 PROCEDURE — 82248 BILIRUBIN DIRECT: CPT

## 2025-04-01 PROCEDURE — 2580000003 HC RX 258

## 2025-04-01 PROCEDURE — 73560 X-RAY EXAM OF KNEE 1 OR 2: CPT

## 2025-04-01 PROCEDURE — 2060000000 HC ICU INTERMEDIATE R&B

## 2025-04-01 PROCEDURE — 82330 ASSAY OF CALCIUM: CPT

## 2025-04-01 PROCEDURE — 87081 CULTURE SCREEN ONLY: CPT

## 2025-04-01 PROCEDURE — 80053 COMPREHEN METABOLIC PANEL: CPT

## 2025-04-01 PROCEDURE — 2500000003 HC RX 250 WO HCPCS

## 2025-04-01 PROCEDURE — 99233 SBSQ HOSP IP/OBS HIGH 50: CPT | Performed by: STUDENT IN AN ORGANIZED HEALTH CARE EDUCATION/TRAINING PROGRAM

## 2025-04-01 PROCEDURE — 85025 COMPLETE CBC W/AUTO DIFF WBC: CPT

## 2025-04-01 PROCEDURE — 6360000002 HC RX W HCPCS

## 2025-04-01 PROCEDURE — 82803 BLOOD GASES ANY COMBINATION: CPT

## 2025-04-01 RX ORDER — MORPHINE SULFATE 2 MG/ML
2 INJECTION, SOLUTION INTRAMUSCULAR; INTRAVENOUS
Refills: 0 | Status: DISCONTINUED | OUTPATIENT
Start: 2025-04-01 | End: 2025-04-08 | Stop reason: HOSPADM

## 2025-04-01 RX ORDER — PANTOPRAZOLE SODIUM 40 MG/1
40 TABLET, DELAYED RELEASE ORAL
Status: DISCONTINUED | OUTPATIENT
Start: 2025-04-01 | End: 2025-04-08 | Stop reason: HOSPADM

## 2025-04-01 RX ORDER — LIDOCAINE HYDROCHLORIDE 10 MG/ML
5 INJECTION, SOLUTION EPIDURAL; INFILTRATION; INTRACAUDAL; PERINEURAL ONCE
Status: COMPLETED | OUTPATIENT
Start: 2025-04-01 | End: 2025-04-01

## 2025-04-01 RX ORDER — FLUOCINONIDE 0.5 MG/G
OINTMENT TOPICAL 2 TIMES DAILY
Status: DISCONTINUED | OUTPATIENT
Start: 2025-04-02 | End: 2025-04-02

## 2025-04-01 RX ORDER — MORPHINE SULFATE 4 MG/ML
4 INJECTION, SOLUTION INTRAMUSCULAR; INTRAVENOUS
Refills: 0 | Status: DISCONTINUED | OUTPATIENT
Start: 2025-04-01 | End: 2025-04-06

## 2025-04-01 RX ADMIN — ACETAMINOPHEN 650 MG: 325 TABLET ORAL at 21:35

## 2025-04-01 RX ADMIN — PANTOPRAZOLE SODIUM 40 MG: 40 TABLET, DELAYED RELEASE ORAL at 08:04

## 2025-04-01 RX ADMIN — DIPHENHYDRAMINE HYDROCHLORIDE 50 MG: 50 INJECTION INTRAMUSCULAR; INTRAVENOUS at 21:14

## 2025-04-01 RX ADMIN — ENOXAPARIN SODIUM 40 MG: 100 INJECTION SUBCUTANEOUS at 08:05

## 2025-04-01 RX ADMIN — MORPHINE SULFATE 2 MG: 2 INJECTION, SOLUTION INTRAMUSCULAR; INTRAVENOUS at 23:04

## 2025-04-01 RX ADMIN — GABAPENTIN 300 MG: 300 CAPSULE ORAL at 13:30

## 2025-04-01 RX ADMIN — PANTOPRAZOLE SODIUM 40 MG: 40 TABLET, DELAYED RELEASE ORAL at 17:25

## 2025-04-01 RX ADMIN — GABAPENTIN 300 MG: 300 CAPSULE ORAL at 21:17

## 2025-04-01 RX ADMIN — ATORVASTATIN CALCIUM 40 MG: 40 TABLET, FILM COATED ORAL at 08:04

## 2025-04-01 RX ADMIN — TIZANIDINE 4 MG: 4 TABLET ORAL at 21:17

## 2025-04-01 RX ADMIN — DOXEPIN HYDROCHLORIDE 50 MG: 50 CAPSULE ORAL at 00:11

## 2025-04-01 RX ADMIN — FENOFIBRATE 160 MG: 160 TABLET ORAL at 08:05

## 2025-04-01 RX ADMIN — FLUOCINONIDE: 0.5 CREAM TOPICAL at 21:16

## 2025-04-01 RX ADMIN — FLUOCINONIDE: 0.5 CREAM TOPICAL at 08:08

## 2025-04-01 RX ADMIN — LIDOCAINE HYDROCHLORIDE 5 ML: 10 INJECTION, SOLUTION EPIDURAL; INFILTRATION; INTRACAUDAL; PERINEURAL at 15:11

## 2025-04-01 RX ADMIN — SODIUM CHLORIDE, SODIUM LACTATE, POTASSIUM CHLORIDE, AND CALCIUM CHLORIDE: .6; .31; .03; .02 INJECTION, SOLUTION INTRAVENOUS at 11:08

## 2025-04-01 RX ADMIN — ASPIRIN 81 MG: 81 TABLET, CHEWABLE ORAL at 08:05

## 2025-04-01 RX ADMIN — GABAPENTIN 300 MG: 300 CAPSULE ORAL at 00:11

## 2025-04-01 RX ADMIN — TIZANIDINE 4 MG: 4 TABLET ORAL at 00:11

## 2025-04-01 RX ADMIN — ACETAMINOPHEN 650 MG: 325 TABLET ORAL at 13:30

## 2025-04-01 RX ADMIN — GABAPENTIN 300 MG: 300 CAPSULE ORAL at 08:05

## 2025-04-01 RX ADMIN — SERTRALINE 50 MG: 50 TABLET, FILM COATED ORAL at 08:05

## 2025-04-01 RX ADMIN — DOXEPIN HYDROCHLORIDE 50 MG: 50 CAPSULE ORAL at 17:25

## 2025-04-01 RX ADMIN — Medication 1 TABLET: at 08:05

## 2025-04-01 RX ADMIN — TIZANIDINE 4 MG: 4 TABLET ORAL at 08:05

## 2025-04-01 RX ADMIN — WATER 45.35 MG: 1 INJECTION INTRAMUSCULAR; INTRAVENOUS; SUBCUTANEOUS at 21:28

## 2025-04-01 RX ADMIN — CETIRIZINE HYDROCHLORIDE 10 MG: 10 TABLET, FILM COATED ORAL at 08:05

## 2025-04-01 ASSESSMENT — PAIN - FUNCTIONAL ASSESSMENT
PAIN_FUNCTIONAL_ASSESSMENT: ACTIVITIES ARE NOT PREVENTED
PAIN_FUNCTIONAL_ASSESSMENT: PREVENTS OR INTERFERES SOME ACTIVE ACTIVITIES AND ADLS
PAIN_FUNCTIONAL_ASSESSMENT: PREVENTS OR INTERFERES SOME ACTIVE ACTIVITIES AND ADLS
PAIN_FUNCTIONAL_ASSESSMENT: ACTIVITIES ARE NOT PREVENTED
PAIN_FUNCTIONAL_ASSESSMENT: PREVENTS OR INTERFERES SOME ACTIVE ACTIVITIES AND ADLS
PAIN_FUNCTIONAL_ASSESSMENT: ACTIVITIES ARE NOT PREVENTED

## 2025-04-01 ASSESSMENT — PAIN DESCRIPTION - DESCRIPTORS
DESCRIPTORS: BURNING;ITCHING
DESCRIPTORS: BURNING;STABBING;ITCHING
DESCRIPTORS: BURNING;ITCHING
DESCRIPTORS: ITCHING
DESCRIPTORS: BURNING;ITCHING

## 2025-04-01 ASSESSMENT — PAIN DESCRIPTION - LOCATION
LOCATION: GENERALIZED

## 2025-04-01 ASSESSMENT — PAIN DESCRIPTION - FREQUENCY
FREQUENCY: CONTINUOUS

## 2025-04-01 ASSESSMENT — PAIN SCALES - GENERAL
PAINLEVEL_OUTOF10: 6
PAINLEVEL_OUTOF10: 8
PAINLEVEL_OUTOF10: 3
PAINLEVEL_OUTOF10: 4
PAINLEVEL_OUTOF10: 8

## 2025-04-01 ASSESSMENT — PAIN DESCRIPTION - ONSET
ONSET: ON-GOING

## 2025-04-01 NOTE — DISCHARGE INSTR - COC
Continuity of Care Form    Patient Name: Kaycee Varela   :  1960  MRN:  [id number]    Admit date:  3/31/2025  Discharge date:  2025    Code Status Order: Full Code   Advance Directives:     Admitting Physician:  Ashita Behl, MD  PCP: Jose Lazaro DO    [facility]-A  Discharging Unit Phone Number: [phone]    Emergency Contact:   Extended Emergency Contact Information  Primary Emergency Contact: Luis Carlos Zeng  Address: [address] [place] [address] [address]           [place] [address] [place] [facility]  Home Phone: [phone]  Relation: Spouse  Secondary Emergency Contact: Adelita Person  Mobile Phone: [phone]  Relation: Step Child  Preferred language: English   needed? No    Past Surgical History:  Past Surgical History:   Procedure Laterality Date    BLADDER SUSPENSION      BREAST BIOPSY Left     atypical hyperlasia, , [facility]    BREAST LUMPECTOMY Left     atypical hyperplasia, , [facility]    COLONOSCOPY  2020    HERNIA REPAIR  10/17/2016    JOINT REPLACEMENT Left     knee    KNEE SURGERY      Right knee    LEG SURGERY Right 3/14/2023    Right Above Knee Amputation performed by Rigo Fischer MD at [facility] OR    REVISION TOTAL KNEE ARTHROPLASTY Right 05/10/2022    RIGHT KNEE TOTAL ARTHROPLASTY REVISION performed by Rigo Fischer MD at [facility] OR    REVISION TOTAL KNEE ARTHROPLASTY Right 2022    Right Knee Incision and Drainage of Total Knee With Poly Exchange performed by Rigo Fischer MD at [facility] OR    REVISION TOTAL KNEE ARTHROPLASTY Right 2022    STAGE 1 RIGHT KNEE performed by Rigo Fischer MD at [facility] OR    TUBAL LIGATION      WISDOM TOOTH EXTRACTION         Immunization History:   Immunization History   Administered Date(s) Administered    COVID-19, MODERNA Bivalent, (age 12y+), IM, 50 mcg/0.5 mL 2022    COVID-19, MODERNA Booster BLUE border, (age 18y+), IM, 50mcg/0.25mL 2022    COVID-19, PFIZER  General    Routes of Feeding: Oral  Liquids: Thin Liquids  Daily Fluid Restriction: no  Last Modified Barium Swallow with Video (Video Swallowing Test): not done    Treatments at the Time of Hospital Discharge:   Respiratory Treatments: n/a  Oxygen Therapy:  is not on home oxygen therapy.  Ventilator:    - No ventilator support    Rehab Therapies: Physical Therapy and Occupational Therapy  Weight Bearing Status/Restrictions: No weight bearing restrictions  Other Medical Equipment (for information only, NOT a DME order):  Double AKA  Other Treatments: n/a    Patient's personal belongings (please select all that are sent with patient):  None    RN SIGNATURE:  Electronically signed by Wilmer Cleveland RN on 4/8/25 at 2:27 PM EDT    CASE MANAGEMENT/SOCIAL WORK SECTION    Inpatient Status Date: 3/31/2025    Readmission Risk Assessment Score:  [facility] RISK OF UNPLANNED READMISSION 2.0             22.5 Total Score        Discharging to Facility/ Agency   Name: [facility]  Address: [facility] [place] [address]   Phone:  [phone]   Fax: [phone]    Dialysis Facility (if applicable)   Name:  Address:  Dialysis Schedule:  Phone:  Fax:    / signature: Electronically signed by DYLAN Heath on 4/1/25 at 10:37 AM EDT    PHYSICIAN SECTION    Prognosis: Fair    Condition at Discharge: Stable    Rehab Potential (if transferring to Rehab): Fair    Recommended Labs or Other Treatments After Discharge: routine    Physician Certification: I certify the above information and transfer of Kaycee Varela  is necessary for the continuing treatment of the diagnosis listed and that she requires Skilled Nursing Facility for greater 30 days.     Update Admission H&P: No change in H&P    PHYSICIAN SIGNATURE:  Electronically signed by Santo Colindres MD on 4/8/25 at 11:37 AM EDT

## 2025-04-01 NOTE — PLAN OF CARE
Problem: Chronic Conditions and Co-morbidities  Goal: Patient's chronic conditions and co-morbidity symptoms are monitored and maintained or improved  Flowsheets (Taken 4/1/2025 0104)  Care Plan - Patient's Chronic Conditions and Co-Morbidity Symptoms are Monitored and Maintained or Improved:   Monitor and assess patient's chronic conditions and comorbid symptoms for stability, deterioration, or improvement   Collaborate with multidisciplinary team to address chronic and comorbid conditions and prevent exacerbation or deterioration   Update acute care plan with appropriate goals if chronic or comorbid symptoms are exacerbated and prevent overall improvement and discharge     Problem: Discharge Planning  Goal: Discharge to home or other facility with appropriate resources  Flowsheets (Taken 4/1/2025 0104)  Discharge to home or other facility with appropriate resources:   Identify barriers to discharge with patient and caregiver   Arrange for needed discharge resources and transportation as appropriate   Identify discharge learning needs (meds, wound care, etc)     Problem: Pain  Goal: Verbalizes/displays adequate comfort level or baseline comfort level  Flowsheets (Taken 4/1/2025 0104)  Verbalizes/displays adequate comfort level or baseline comfort level:   Encourage patient to monitor pain and request assistance   Assess pain using appropriate pain scale   Administer analgesics based on type and severity of pain and evaluate response   Implement non-pharmacological measures as appropriate and evaluate response   Consider cultural and social influences on pain and pain management   Notify Licensed Independent Practitioner if interventions unsuccessful or patient reports new pain     Problem: Safety - Adult  Goal: Free from fall injury  Flowsheets (Taken 4/1/2025 0104)  Free From Fall Injury:   Instruct family/caregiver on patient safety   Based on caregiver fall risk screen, instruct family/caregiver to ask for

## 2025-04-01 NOTE — PLAN OF CARE
Problem: Discharge Planning  Goal: Discharge to home or other facility with appropriate resources  4/1/2025 1035 by Faith Maria, GAGANW  Outcome: Progressing  See SW note

## 2025-04-01 NOTE — PLAN OF CARE
Problem: Chronic Conditions and Co-morbidities  Goal: Patient's chronic conditions and co-morbidity symptoms are monitored and maintained or improved  Outcome: Progressing     Problem: Discharge Planning  Goal: Discharge to home or other facility with appropriate resources  4/1/2025 1559 by Wilmer Rodríguez RN  Outcome: Progressing  4/1/2025 1035 by Faith Maria, LSW  Outcome: Progressing     Problem: Pain  Goal: Verbalizes/displays adequate comfort level or baseline comfort level  Outcome: Progressing     Problem: Safety - Adult  Goal: Free from fall injury  Outcome: Progressing     Problem: Skin/Tissue Integrity  Goal: Skin integrity remains intact  Description: 1.  Monitor for areas of redness and/or skin breakdown  2.  Assess vascular access sites hourly  3.  Every 4-6 hours minimum:  Change oxygen saturation probe site  4.  Every 4-6 hours:  If on nasal continuous positive airway pressure, respiratory therapy assess nares and determine need for appliance change or resting period  Outcome: Progressing     Care plan reviewed with patient.  Patient verbalize understanding of the plan of care and contribute to goal setting.

## 2025-04-01 NOTE — CARE COORDINATION
Case Management Assessment Initial Evaluation    Date/Time of Evaluation: 4/1/2025 1:49 PM  Assessment Completed by: Gonzalo Bradford RN    If patient is discharged prior to next notation, then this note serves as note for discharge by case management.    Patient Name: Kaycee Varela                   YOB: 1960  Diagnosis: Maculopapular rash [R21]  Skin disease [L98.9]                   Date / Time: 3/31/2025 11:36 AM  Location: [address] [place] [address]     Patient Admission Status: Inpatient   Readmission Risk Low 0-14, Mod 15-19), High > 20: Readmission Risk Score: 22.9    Current PCP: Jose Lazaro,   Health Care Decision Makers:   Primary Decision Maker: Luis Carlos Zeng - Spouse - [phone]    Secondary Decision Maker: Adelita Person - Zackery Child - [phone]    Additional Case Management Notes:   Rash/SANJIV  PMH: Active Smoker, Bactrim Use Recently, Fall, LLE Fracture, Right AKA, LLE Contracture, DM  Await Cultures  Procedures:  4/1 Rash w Punch Biopsy  4/2 Left AKA  Patient Goals/Plan/Treatment Preferences: plans return [facility] SNF, has walker, WC, Saeed, KARANE Splint

## 2025-04-01 NOTE — CARE COORDINATION
4/1/25, 10:33 AM EDT  Discharge Planning Evaluation  Social work consult received, patient from [facility].    Patient/Family preference is to return to [facility].    The patient's current payor source at the facility is Medicare.   Medicare skilled days available: Yes  Medicare does the patient have a three midnight qualifying stay? Yes  Insurance precert:  No  Spoke with Cindy at the facility.  Patient bed hold: No  Anticipated transport plan: Ambulance  Patient's Healthcare Decision Maker: Legal Next of Kin    Readmission Risk Low 0-14, Mod 15-19), High > 20: Readmission Risk Score: 22.5    Current PCP: Jose Lazaro, DO  PCP verified by CM? Yes    Patient Orientation: Alert and Oriented    Patient Cognition: Alert  History Provided by: Patient    Advance Directives:      Code Status: Full Code   Patient's Primary Decision Maker is: Legal Next of Kin    Primary Decision Maker: Luis Carlos Zeng - Spouse - [phone]    Secondary Decision Maker: Adelita Person Child - [phone]     Discharge Planning:    Patient lives with:   Type of Home: Skilled Nursing Facility  Primary Care Giver: Other (Comment) (ECF)  Patient Support Systems include: Family Members, Other (Comment) (ECF)   Current Financial resources: None  Current community resources: ECF/Home Care  Current services prior to admission: Skilled Nursing Facility            Current DME:  None            Type of Home Care services:   None    ADLS  Prior functional level: Assistance with the following:, Bathing, Dressing, Toileting, Cooking, Housework, Shopping, Mobility  Current functional level: Assistance with the following:, Bathing, Dressing, Toileting, Cooking, Housework, Shopping, Mobility    Family can provide assistance at DC: No  Would you like Case Management to discuss the discharge plan with any other family members/significant others, and if so, who? No  Plans to Return to Present Housing: Yes  Other Identified Issues/Barriers to  RETURNING to current housing: None  Potential Assistance needed at discharge: Skilled Nursing Facility            Potential DME:    Patient expects to discharge to: Skilled nursing facility  Plan for transportation at discharge:      Financial  Payor: MEDICARE / Plan: MEDICARE PART A AND B / Product Type: *No Product type* /     Potential assistance Purchasing Medications: No

## 2025-04-01 NOTE — CONSULTS
[place] [id number]                              CONSULTATION      PATIENT NAME: YAIMA GONCALVES                : 1960  MED REC NO: [id number]                       ROOM: [facility]  ACCOUNT NO: [id number]                       ADMIT DATE: 2025  PROVIDER: Rolf Escalona MD      CONSULT DATE: 2025    REASON FOR CONSULTATION:  Generalized rash.    HISTORY OF PRESENT ILLNESS:  She is a 65-year-old female patient admitted to the hospital due to diffuse itchy rash of 3 weeks' duration.  Patient reports that she was on Bactrim and subsequently developed this diffuse rash involving almost the whole body without involving the mouth and her private area.  It is very itchy, blistering, and has been scratching it.  The patient has history of right above-knee amputation and has left contracted knee, anxiety, depression, and previously treated for infected right knee prosthesis.  Unfortunately, she had periprosthetic fracture complicated with infection requiring amputation.  She was on Bactrim and developed the above maculopapular rash.  The patient reports that years ago she had a blistering disorder which she never had for many years.  She was started on steroid and was brought to the hospital.  There is not any report of fever or chills.  She has a lot of crusting, erosions, and some areas of bullae on the skin.  The itching is driving her nuts.    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  She has contracted left knee, osteoarthritis, history of hyperlipidemia, hypertension, diabetes.    PAST SURGICAL HISTORY:  Includes bladder suspension, breast biopsy, lumpectomy, colonoscopy, hernia repair, knee replacement, right above-knee amputation.  She has wisdom tooth extraction.    FAMILY HISTORY:  Her mother had cholesterol, hypertension, Crohn disease,

## 2025-04-01 NOTE — PROGRESS NOTES
0716 Report taken from nurse Doll.    Head to Toe Assessment    [facility] Student Nurse  Head to Toe Assessment Performed at 0812  Skin  General skin appearance / Description: wound / rash scattered throughout body (see pictures in media)  Incisions / Wounds: scattered rash    Neuro/Head  LOC: A+O x 4  Speech: clear  Eyes PERRLA 3 mm  Symmetry of face / tongue: symmetrical  JVD of neck: absent    Chest  Heart sounds / Apical Pulse: 76  Dyspnea: absent   Lung sounds: diminished   Cough: absent     Abdomen/ Pelvis  Bowel sounds: hypoactive x4  Passing flatus: yes  Palpation / Inspection: rounded, nontender, soft  Last BM: 3/17/25 - per patient report  Stool assessment: AUGUSTIN  Any GI issues: constipation  Urinary issues: decreased output  Continence: absent  Urine color / turbidity: julissa. AUGUSTIN turbidity    Extremities  Edema: none  Altered Sensation: burning  Upper extremity push / pulls: weak, equal  Left Arm Radial Pulse: moderate, equal   Left Upper extremity Capillary Refill: <3 sec.   Right Arm Radial Pulse: moderate. equal   Right Upper extremity Capillary Refill: <3 sec.  Lower extremity pedal push / pulls: AUGUSTIN,   Left pedal pulse: weak   Left posterior tibialis pulse: weak   Left lower extremity capillary refill: <3 sec.  Right pedal pulse: AUGUSTIN   Right posterior tibialis pulse: AUGUSTIN   Right lower extremity capillary refill: AUGUSTIN  Drift of extremities: AUGUSTIN  Pain: 3/10 continuous itching, burning    Other issues: none    1110 Reassessment done; no changes noted   1348 Reported off to primary RN, João.    Electronically signed by Radha Herbert on 4/1/2025 at 1:48 PM RSC /

## 2025-04-01 NOTE — PROCEDURES
PROCEDURE NOTE  Date: 4/1/2025   Name: Kaycee Varela  YOB: 1960    Procedures : Punch skin biopsy  Indication: blistering skin eruption: concern for erythema multiformie vs Bullous mephigoid.  Patient has blistering disease in the past, might have been precipitated by the recent use of bactrim  Two three mm skin punch biopsy taken from the right upper arm where there was a blistering eruption.   Consent obtained  Area cleaned  Lidocaine infiltrated and the punch biopsy done.  She tolerated the procedure well  Dressing applied, no active bleeding noted  Specimen sent to pathology.

## 2025-04-01 NOTE — PROGRESS NOTES
Hospitalist Progress Note  Internal Medicine Resident      Patient: Kaycee Varela 65 y.o. female      Unit/Bed: -20/020-A    Admit Date: 3/31/2025        ASSESSMENT AND PLAN  Active Problems  Bullous pemphigoid: maculopapular rash, itchy, multiple scabs, recent history of Bactrim and Levaquin use.  Patient has previous history of blistering skin disorder.  Afebrile without leukocytosis.  Continuing methylprednisolone 0.5 mg/kg IV daily  GI prophylaxis Protonix 40 mg twice daily  Benadryl 50 mg IV every 6 hours as noted  Zyrtec 10 mg p.o. daily  Infectious disease consulted and following  SANJIV: Presented with creatinine 1.1, FENa 0.1%.  Prerenal.  Improved, creatinine baseline 0.4-0.5.  US kidneys attempted, right kidney was unremarkable but left unable to be visualized due to patient refusal to continue study.  IVF LR 75 cc/hour  Strict I's and O's  Avoid nephrotoxins  Renally dose medications when appropriate  Constipation: Patient has not passed bowel movement monitor BM output and escalate bowel regimen if necessary.  Resolved Problems  Hyponatremia  NAGMA  Chronic Conditions (reviewed and stable unless otherwise stated)  Right BKA: Noted  Left leg fracture: Due to accident 4 weeks ago  HLD: Continuing Lipitor 40 mg daily, fenofibrate 160 mg daily  Anxiety/depression: Zoloft 50 mg daily  Neuropathy: Neurontin 600 mg 3 times daily and Zanaflex 4 mg  Tobacco use: Current smoker, 2 packs/day      LDA: []CVC / []PICC / []Midline / []Keita / []Drains / []Mediport / [x]None  Antibiotics: None  Steroids: Methylprednisolone  Labs (still needed?): [x]Yes / []No  IVF (still needed?): [x]Yes / []No    Level of care: [x]Step Down / []Med-Surg  Bed Status: [x]Inpatient / []Observation  Telemetry: [x]Yes / []No  PT/OT: []Yes / [x]No    DVT Prophylaxis: [x] Lovenox / [] Heparin / [] SCDs / [] Already on Systemic Anticoagulation / [] None     Expected discharge date: 4/7  Disposition: TBD     Code status: Full Code  age.  Eyes:  PERRL. Conjunctivae/corneas clear.  HENT: Head normal appearing. Nares normal. Oral mucosa moist.  Hearing intact.   Neck: Supple, with full range of motion. Trachea midline.  No gross JVD appreciated.  Respiratory:  Normal effort. Clear to auscultation, without rales or wheezes or rhonchi.  Cardiovascular: Normal rate, regular rhythm with normal S1/S2 without murmurs.    No lower extremity edema.   Abdomen: Soft, non-tender, non-distended with normal bowel sounds.  Musculoskeletal: No joint swelling or tenderness. Normal tone. No abnormal movements.   Skin: Warm and dry. No rashes or lesions.  Neurologic:  No focal sensory/motor deficits in the upper or lower extremities. Cranial nerves:  grossly non-focal 2-12.     Psychiatric: Alert and oriented, normal insight and thought content.   Capillary Refill: Brisk,< 3 seconds.  Peripheral Pulses: +2 palpable, equal bilaterally.       Labs/Radiology: See chart or assessment above.     Electronically signed by Santo Colindres MD IM PGY-1 on 4/1/2025 at 7:42 AM  Case was discussed with Attending, Dr. Ariela Olea.

## 2025-04-02 ENCOUNTER — ANESTHESIA (OUTPATIENT)
Dept: OPERATING ROOM | Age: 65
End: 2025-04-02
Payer: MEDICARE

## 2025-04-02 ENCOUNTER — ANESTHESIA EVENT (OUTPATIENT)
Dept: OPERATING ROOM | Age: 65
End: 2025-04-02
Payer: MEDICARE

## 2025-04-02 LAB
ABO GROUP BLD: NORMAL
ALBUMIN SERPL BCG-MCNC: 2.7 G/DL (ref 3.4–4.9)
ALP SERPL-CCNC: 53 U/L (ref 35–104)
ALT SERPL W/O P-5'-P-CCNC: 14 U/L (ref 10–35)
ANION GAP SERPL CALC-SCNC: 8 MEQ/L (ref 8–16)
AST SERPL-CCNC: 40 U/L (ref 10–35)
BACTERIA UR CULT: ABNORMAL
BASOPHILS ABSOLUTE: 0 THOU/MM3 (ref 0–0.1)
BASOPHILS NFR BLD AUTO: 0.1 %
BILIRUB CONJ SERPL-MCNC: 0.3 MG/DL (ref 0–0.2)
BILIRUB SERPL-MCNC: 0.5 MG/DL (ref 0.3–1.2)
BUN SERPL-MCNC: 34 MG/DL (ref 8–23)
CALCIUM SERPL-MCNC: 8.1 MG/DL (ref 8.8–10.2)
CHLORIDE SERPL-SCNC: 104 MEQ/L (ref 98–111)
CO2 SERPL-SCNC: 23 MEQ/L (ref 22–29)
CREAT SERPL-MCNC: 0.7 MG/DL (ref 0.5–0.9)
DEPRECATED RDW RBC AUTO: 54.9 FL (ref 35–45)
DIGOXIN SERPL-MCNC: < 0.3 NG/ML (ref 0.5–2)
EOSINOPHIL NFR BLD AUTO: 8.2 %
EOSINOPHILS ABSOLUTE: 0.6 THOU/MM3 (ref 0–0.4)
ERYTHROCYTE [DISTWIDTH] IN BLOOD BY AUTOMATED COUNT: 17.6 % (ref 11.5–14.5)
GFR SERPL CREATININE-BSD FRML MDRD: > 90 ML/MIN/1.73M2
GLUCOSE BLD STRIP.AUTO-MCNC: 105 MG/DL (ref 70–108)
GLUCOSE BLD STRIP.AUTO-MCNC: 90 MG/DL (ref 70–108)
GLUCOSE BLD STRIP.AUTO-MCNC: 92 MG/DL (ref 70–108)
GLUCOSE BLD STRIP.AUTO-MCNC: 93 MG/DL (ref 70–108)
GLUCOSE SERPL-MCNC: 146 MG/DL (ref 74–109)
HCT VFR BLD AUTO: 42 % (ref 37–47)
HGB BLD-MCNC: 13.1 GM/DL (ref 12–16)
IAT IGG-SP REAG SERPL QL: NORMAL
IMM GRANULOCYTES # BLD AUTO: 0.03 THOU/MM3 (ref 0–0.07)
IMM GRANULOCYTES NFR BLD AUTO: 0.4 %
LYMPHOCYTES ABSOLUTE: 0.6 THOU/MM3 (ref 1–4.8)
LYMPHOCYTES NFR BLD AUTO: 8.9 %
MAGNESIUM SERPL-MCNC: 1.9 MG/DL (ref 1.6–2.6)
MCH RBC QN AUTO: 27 PG (ref 26–33)
MCHC RBC AUTO-ENTMCNC: 31.2 GM/DL (ref 32.2–35.5)
MCV RBC AUTO: 86.6 FL (ref 81–99)
MONOCYTES ABSOLUTE: 0.2 THOU/MM3 (ref 0.4–1.3)
MONOCYTES NFR BLD AUTO: 2.3 %
MRSA SPEC QL CULT: NORMAL
NEUTROPHILS ABSOLUTE: 5.7 THOU/MM3 (ref 1.8–7.7)
NEUTROPHILS NFR BLD AUTO: 80.1 %
NRBC BLD AUTO-RTO: 0 /100 WBC
ORGANISM: ABNORMAL
PLATELET # BLD AUTO: 261 THOU/MM3 (ref 130–400)
PMV BLD AUTO: 10.3 FL (ref 9.4–12.4)
POTASSIUM SERPL-SCNC: 4.3 MEQ/L (ref 3.5–5.2)
PROT SERPL-MCNC: 4.9 G/DL (ref 6.4–8.3)
RBC # BLD AUTO: 4.85 MILL/MM3 (ref 4.2–5.4)
RH BLD: NORMAL
SODIUM SERPL-SCNC: 135 MEQ/L (ref 135–145)
WBC # BLD AUTO: 7.1 THOU/MM3 (ref 4.8–10.8)

## 2025-04-02 PROCEDURE — 80162 ASSAY OF DIGOXIN TOTAL: CPT

## 2025-04-02 PROCEDURE — 88307 TISSUE EXAM BY PATHOLOGIST: CPT

## 2025-04-02 PROCEDURE — 86923 COMPATIBILITY TEST ELECTRIC: CPT

## 2025-04-02 PROCEDURE — 2500000003 HC RX 250 WO HCPCS

## 2025-04-02 PROCEDURE — 2060000000 HC ICU INTERMEDIATE R&B

## 2025-04-02 PROCEDURE — 83735 ASSAY OF MAGNESIUM: CPT

## 2025-04-02 PROCEDURE — 2580000003 HC RX 258

## 2025-04-02 PROCEDURE — 2500000003 HC RX 250 WO HCPCS: Performed by: NURSE ANESTHETIST, CERTIFIED REGISTERED

## 2025-04-02 PROCEDURE — 7100000001 HC PACU RECOVERY - ADDTL 15 MIN: Performed by: STUDENT IN AN ORGANIZED HEALTH CARE EDUCATION/TRAINING PROGRAM

## 2025-04-02 PROCEDURE — 2720000010 HC SURG SUPPLY STERILE: Performed by: STUDENT IN AN ORGANIZED HEALTH CARE EDUCATION/TRAINING PROGRAM

## 2025-04-02 PROCEDURE — 6360000002 HC RX W HCPCS: Performed by: STUDENT IN AN ORGANIZED HEALTH CARE EDUCATION/TRAINING PROGRAM

## 2025-04-02 PROCEDURE — 6370000000 HC RX 637 (ALT 250 FOR IP)

## 2025-04-02 PROCEDURE — 3700000000 HC ANESTHESIA ATTENDED CARE: Performed by: STUDENT IN AN ORGANIZED HEALTH CARE EDUCATION/TRAINING PROGRAM

## 2025-04-02 PROCEDURE — 86900 BLOOD TYPING SEROLOGIC ABO: CPT

## 2025-04-02 PROCEDURE — 99233 SBSQ HOSP IP/OBS HIGH 50: CPT | Performed by: STUDENT IN AN ORGANIZED HEALTH CARE EDUCATION/TRAINING PROGRAM

## 2025-04-02 PROCEDURE — P9016 RBC LEUKOCYTES REDUCED: HCPCS

## 2025-04-02 PROCEDURE — 3700000001 HC ADD 15 MINUTES (ANESTHESIA): Performed by: STUDENT IN AN ORGANIZED HEALTH CARE EDUCATION/TRAINING PROGRAM

## 2025-04-02 PROCEDURE — 82248 BILIRUBIN DIRECT: CPT

## 2025-04-02 PROCEDURE — 2580000003 HC RX 258: Performed by: STUDENT IN AN ORGANIZED HEALTH CARE EDUCATION/TRAINING PROGRAM

## 2025-04-02 PROCEDURE — 3600000003 HC SURGERY LEVEL 3 BASE: Performed by: STUDENT IN AN ORGANIZED HEALTH CARE EDUCATION/TRAINING PROGRAM

## 2025-04-02 PROCEDURE — 6360000002 HC RX W HCPCS: Performed by: NURSE ANESTHETIST, CERTIFIED REGISTERED

## 2025-04-02 PROCEDURE — 6360000002 HC RX W HCPCS

## 2025-04-02 PROCEDURE — 82948 REAGENT STRIP/BLOOD GLUCOSE: CPT

## 2025-04-02 PROCEDURE — 85025 COMPLETE CBC W/AUTO DIFF WBC: CPT

## 2025-04-02 PROCEDURE — 88305 TISSUE EXAM BY PATHOLOGIST: CPT

## 2025-04-02 PROCEDURE — 36415 COLL VENOUS BLD VENIPUNCTURE: CPT

## 2025-04-02 PROCEDURE — 6370000000 HC RX 637 (ALT 250 FOR IP): Performed by: STUDENT IN AN ORGANIZED HEALTH CARE EDUCATION/TRAINING PROGRAM

## 2025-04-02 PROCEDURE — 7100000000 HC PACU RECOVERY - FIRST 15 MIN: Performed by: STUDENT IN AN ORGANIZED HEALTH CARE EDUCATION/TRAINING PROGRAM

## 2025-04-02 PROCEDURE — 3600000013 HC SURGERY LEVEL 3 ADDTL 15MIN: Performed by: STUDENT IN AN ORGANIZED HEALTH CARE EDUCATION/TRAINING PROGRAM

## 2025-04-02 PROCEDURE — 30233N1 TRANSFUSION OF NONAUTOLOGOUS RED BLOOD CELLS INTO PERIPHERAL VEIN, PERCUTANEOUS APPROACH: ICD-10-PCS | Performed by: STUDENT IN AN ORGANIZED HEALTH CARE EDUCATION/TRAINING PROGRAM

## 2025-04-02 PROCEDURE — 86885 COOMBS TEST INDIRECT QUAL: CPT

## 2025-04-02 PROCEDURE — 80053 COMPREHEN METABOLIC PANEL: CPT

## 2025-04-02 PROCEDURE — 0Y6D0Z3 DETACHMENT AT LEFT UPPER LEG, LOW, OPEN APPROACH: ICD-10-PCS | Performed by: INTERNAL MEDICINE

## 2025-04-02 PROCEDURE — 2709999900 HC NON-CHARGEABLE SUPPLY: Performed by: STUDENT IN AN ORGANIZED HEALTH CARE EDUCATION/TRAINING PROGRAM

## 2025-04-02 PROCEDURE — 2500000003 HC RX 250 WO HCPCS: Performed by: STUDENT IN AN ORGANIZED HEALTH CARE EDUCATION/TRAINING PROGRAM

## 2025-04-02 PROCEDURE — 86901 BLOOD TYPING SEROLOGIC RH(D): CPT

## 2025-04-02 RX ORDER — ONDANSETRON 2 MG/ML
4 INJECTION INTRAMUSCULAR; INTRAVENOUS
Status: DISCONTINUED | OUTPATIENT
Start: 2025-04-02 | End: 2025-04-02 | Stop reason: HOSPADM

## 2025-04-02 RX ORDER — PHENYLEPHRINE HCL IN 0.9% NACL 1 MG/10 ML
SYRINGE (ML) INTRAVENOUS
Status: DISCONTINUED | OUTPATIENT
Start: 2025-04-02 | End: 2025-04-02 | Stop reason: SDUPTHER

## 2025-04-02 RX ORDER — CEFAZOLIN SODIUM 1 G/3ML
INJECTION, POWDER, FOR SOLUTION INTRAMUSCULAR; INTRAVENOUS
Status: DISCONTINUED | OUTPATIENT
Start: 2025-04-02 | End: 2025-04-02 | Stop reason: SDUPTHER

## 2025-04-02 RX ORDER — FENTANYL CITRATE 50 UG/ML
50 INJECTION, SOLUTION INTRAMUSCULAR; INTRAVENOUS EVERY 5 MIN PRN
Status: COMPLETED | OUTPATIENT
Start: 2025-04-02 | End: 2025-04-02

## 2025-04-02 RX ORDER — LIDOCAINE HCL/PF 100 MG/5ML
SYRINGE (ML) INJECTION
Status: DISCONTINUED | OUTPATIENT
Start: 2025-04-02 | End: 2025-04-02 | Stop reason: SDUPTHER

## 2025-04-02 RX ORDER — OMEGA-3/DHA/EPA/FISH OIL/KRILL 339-314 MG
1 CAPSULE ORAL DAILY
COMMUNITY

## 2025-04-02 RX ORDER — NALOXONE HYDROCHLORIDE 0.4 MG/ML
INJECTION, SOLUTION INTRAMUSCULAR; INTRAVENOUS; SUBCUTANEOUS PRN
Status: DISCONTINUED | OUTPATIENT
Start: 2025-04-02 | End: 2025-04-02 | Stop reason: HOSPADM

## 2025-04-02 RX ORDER — FENTANYL CITRATE 50 UG/ML
50 INJECTION, SOLUTION INTRAMUSCULAR; INTRAVENOUS EVERY 5 MIN PRN
Status: DISCONTINUED | OUTPATIENT
Start: 2025-04-02 | End: 2025-04-02

## 2025-04-02 RX ORDER — MULTIVITAMIN/IRON/FOLIC ACID 18MG-0.4MG
1 TABLET ORAL DAILY
Status: ON HOLD | COMMUNITY
End: 2025-04-08 | Stop reason: HOSPADM

## 2025-04-02 RX ORDER — FENTANYL CITRATE 50 UG/ML
INJECTION, SOLUTION INTRAMUSCULAR; INTRAVENOUS
Status: DISCONTINUED | OUTPATIENT
Start: 2025-04-02 | End: 2025-04-02 | Stop reason: SDUPTHER

## 2025-04-02 RX ORDER — ROCURONIUM BROMIDE 10 MG/ML
INJECTION, SOLUTION INTRAVENOUS
Status: DISCONTINUED | OUTPATIENT
Start: 2025-04-02 | End: 2025-04-02 | Stop reason: SDUPTHER

## 2025-04-02 RX ORDER — DIPHENHYDRAMINE HYDROCHLORIDE 50 MG/ML
12.5 INJECTION, SOLUTION INTRAMUSCULAR; INTRAVENOUS
Status: DISCONTINUED | OUTPATIENT
Start: 2025-04-02 | End: 2025-04-02 | Stop reason: HOSPADM

## 2025-04-02 RX ORDER — PROPOFOL 10 MG/ML
INJECTION, EMULSION INTRAVENOUS
Status: DISCONTINUED | OUTPATIENT
Start: 2025-04-02 | End: 2025-04-02 | Stop reason: SDUPTHER

## 2025-04-02 RX ORDER — DEXAMETHASONE SODIUM PHOSPHATE 4 MG/ML
INJECTION, SOLUTION INTRA-ARTICULAR; INTRALESIONAL; INTRAMUSCULAR; INTRAVENOUS; SOFT TISSUE
Status: DISCONTINUED | OUTPATIENT
Start: 2025-04-02 | End: 2025-04-02 | Stop reason: SDUPTHER

## 2025-04-02 RX ORDER — SODIUM CHLORIDE 0.9 % (FLUSH) 0.9 %
5-40 SYRINGE (ML) INJECTION PRN
Status: DISCONTINUED | OUTPATIENT
Start: 2025-04-02 | End: 2025-04-02 | Stop reason: HOSPADM

## 2025-04-02 RX ORDER — SODIUM CHLORIDE 0.9 % (FLUSH) 0.9 %
5-40 SYRINGE (ML) INJECTION EVERY 12 HOURS SCHEDULED
Status: DISCONTINUED | OUTPATIENT
Start: 2025-04-02 | End: 2025-04-02 | Stop reason: HOSPADM

## 2025-04-02 RX ORDER — ONDANSETRON 2 MG/ML
INJECTION INTRAMUSCULAR; INTRAVENOUS
Status: DISCONTINUED | OUTPATIENT
Start: 2025-04-02 | End: 2025-04-02 | Stop reason: SDUPTHER

## 2025-04-02 RX ORDER — SODIUM CHLORIDE, SODIUM LACTATE, POTASSIUM CHLORIDE, CALCIUM CHLORIDE 600; 310; 30; 20 MG/100ML; MG/100ML; MG/100ML; MG/100ML
INJECTION, SOLUTION INTRAVENOUS CONTINUOUS
Status: DISCONTINUED | OUTPATIENT
Start: 2025-04-02 | End: 2025-04-03

## 2025-04-02 RX ORDER — SODIUM CHLORIDE 9 MG/ML
INJECTION, SOLUTION INTRAVENOUS PRN
Status: DISCONTINUED | OUTPATIENT
Start: 2025-04-02 | End: 2025-04-02 | Stop reason: HOSPADM

## 2025-04-02 RX ORDER — SUCCINYLCHOLINE/SOD CL,ISO/PF 200MG/10ML
SYRINGE (ML) INTRAVENOUS
Status: DISCONTINUED | OUTPATIENT
Start: 2025-04-02 | End: 2025-04-02 | Stop reason: SDUPTHER

## 2025-04-02 RX ADMIN — FENTANYL CITRATE 50 MCG: 50 INJECTION, SOLUTION INTRAMUSCULAR; INTRAVENOUS at 20:50

## 2025-04-02 RX ADMIN — HYDROMORPHONE HYDROCHLORIDE 0.5 MG: 1 INJECTION, SOLUTION INTRAMUSCULAR; INTRAVENOUS; SUBCUTANEOUS at 21:00

## 2025-04-02 RX ADMIN — FENTANYL CITRATE 50 MCG: 50 INJECTION, SOLUTION INTRAMUSCULAR; INTRAVENOUS at 21:30

## 2025-04-02 RX ADMIN — SODIUM CHLORIDE, SODIUM LACTATE, POTASSIUM CHLORIDE, AND CALCIUM CHLORIDE: .6; .31; .03; .02 INJECTION, SOLUTION INTRAVENOUS at 01:13

## 2025-04-02 RX ADMIN — FLUOCINONIDE: 0.5 OINTMENT TOPICAL at 08:51

## 2025-04-02 RX ADMIN — PROPOFOL 100 MG: 10 INJECTION, EMULSION INTRAVENOUS at 18:21

## 2025-04-02 RX ADMIN — ONDANSETRON 4 MG: 2 INJECTION, SOLUTION INTRAMUSCULAR; INTRAVENOUS at 19:19

## 2025-04-02 RX ADMIN — PANTOPRAZOLE SODIUM 40 MG: 40 TABLET, DELAYED RELEASE ORAL at 05:19

## 2025-04-02 RX ADMIN — SODIUM CHLORIDE, SODIUM LACTATE, POTASSIUM CHLORIDE, AND CALCIUM CHLORIDE: .6; .31; .03; .02 INJECTION, SOLUTION INTRAVENOUS at 22:20

## 2025-04-02 RX ADMIN — ROCURONIUM BROMIDE 25 MG: 10 INJECTION, SOLUTION INTRAVENOUS at 18:39

## 2025-04-02 RX ADMIN — ATORVASTATIN CALCIUM 40 MG: 40 TABLET, FILM COATED ORAL at 08:48

## 2025-04-02 RX ADMIN — SUGAMMADEX 200 MG: 100 INJECTION, SOLUTION INTRAVENOUS at 20:00

## 2025-04-02 RX ADMIN — ACETAMINOPHEN 650 MG: 325 TABLET ORAL at 08:53

## 2025-04-02 RX ADMIN — Medication 100 MG: at 18:21

## 2025-04-02 RX ADMIN — Medication 200 MCG: at 19:58

## 2025-04-02 RX ADMIN — DEXAMETHASONE SODIUM PHOSPHATE 10 MG: 4 INJECTION, SOLUTION INTRAMUSCULAR; INTRAVENOUS at 18:37

## 2025-04-02 RX ADMIN — Medication 200 MCG: at 19:42

## 2025-04-02 RX ADMIN — Medication 200 MCG: at 18:35

## 2025-04-02 RX ADMIN — SODIUM CHLORIDE, PRESERVATIVE FREE 10 ML: 5 INJECTION INTRAVENOUS at 22:32

## 2025-04-02 RX ADMIN — TIZANIDINE 4 MG: 4 TABLET ORAL at 22:32

## 2025-04-02 RX ADMIN — GABAPENTIN 300 MG: 300 CAPSULE ORAL at 08:49

## 2025-04-02 RX ADMIN — CETIRIZINE HYDROCHLORIDE 10 MG: 10 TABLET, FILM COATED ORAL at 08:49

## 2025-04-02 RX ADMIN — SERTRALINE 50 MG: 50 TABLET, FILM COATED ORAL at 08:48

## 2025-04-02 RX ADMIN — FENTANYL CITRATE 50 MCG: 50 INJECTION, SOLUTION INTRAMUSCULAR; INTRAVENOUS at 21:35

## 2025-04-02 RX ADMIN — SODIUM CHLORIDE: 9 INJECTION, SOLUTION INTRAVENOUS at 18:14

## 2025-04-02 RX ADMIN — Medication 200 MCG: at 18:27

## 2025-04-02 RX ADMIN — CEFAZOLIN 2 G: 1 INJECTION, POWDER, FOR SOLUTION INTRAMUSCULAR; INTRAVENOUS at 18:44

## 2025-04-02 RX ADMIN — HYDROMORPHONE HYDROCHLORIDE 0.5 MG: 1 INJECTION, SOLUTION INTRAMUSCULAR; INTRAVENOUS; SUBCUTANEOUS at 21:15

## 2025-04-02 RX ADMIN — FENTANYL CITRATE 50 MCG: 50 INJECTION, SOLUTION INTRAMUSCULAR; INTRAVENOUS at 20:45

## 2025-04-02 RX ADMIN — FENOFIBRATE 160 MG: 160 TABLET ORAL at 08:48

## 2025-04-02 RX ADMIN — PROPOFOL 50 MG: 10 INJECTION, EMULSION INTRAVENOUS at 18:34

## 2025-04-02 RX ADMIN — TIZANIDINE 4 MG: 4 TABLET ORAL at 13:29

## 2025-04-02 RX ADMIN — WATER 2000 MG: 1 INJECTION INTRAMUSCULAR; INTRAVENOUS; SUBCUTANEOUS at 22:25

## 2025-04-02 RX ADMIN — Medication 200 MCG: at 18:30

## 2025-04-02 RX ADMIN — DIPHENHYDRAMINE HYDROCHLORIDE 50 MG: 50 INJECTION INTRAMUSCULAR; INTRAVENOUS at 05:19

## 2025-04-02 RX ADMIN — Medication 1 TABLET: at 08:48

## 2025-04-02 RX ADMIN — SODIUM CHLORIDE: 9 INJECTION, SOLUTION INTRAVENOUS at 20:00

## 2025-04-02 RX ADMIN — WATER 45.35 MG: 1 INJECTION INTRAMUSCULAR; INTRAVENOUS; SUBCUTANEOUS at 22:20

## 2025-04-02 RX ADMIN — TIZANIDINE 4 MG: 4 TABLET ORAL at 08:48

## 2025-04-02 RX ADMIN — FENTANYL CITRATE 50 MCG: 50 INJECTION, SOLUTION INTRAMUSCULAR; INTRAVENOUS at 19:17

## 2025-04-02 RX ADMIN — GABAPENTIN 300 MG: 300 CAPSULE ORAL at 13:28

## 2025-04-02 RX ADMIN — ACETAMINOPHEN 650 MG: 325 TABLET ORAL at 21:07

## 2025-04-02 RX ADMIN — DOXEPIN HYDROCHLORIDE 50 MG: 50 CAPSULE ORAL at 22:31

## 2025-04-02 RX ADMIN — FENTANYL CITRATE 50 MCG: 50 INJECTION, SOLUTION INTRAMUSCULAR; INTRAVENOUS at 19:23

## 2025-04-02 RX ADMIN — FENTANYL CITRATE 100 MCG: 50 INJECTION, SOLUTION INTRAMUSCULAR; INTRAVENOUS at 18:35

## 2025-04-02 RX ADMIN — SODIUM CHLORIDE, PRESERVATIVE FREE 10 ML: 5 INJECTION INTRAVENOUS at 08:50

## 2025-04-02 ASSESSMENT — PAIN DESCRIPTION - ONSET
ONSET: ON-GOING
ONSET: ON-GOING

## 2025-04-02 ASSESSMENT — PAIN SCALES - GENERAL
PAINLEVEL_OUTOF10: 7
PAINLEVEL_OUTOF10: 9
PAINLEVEL_OUTOF10: 8
PAINLEVEL_OUTOF10: 8
PAINLEVEL_OUTOF10: 9
PAINLEVEL_OUTOF10: 7
PAINLEVEL_OUTOF10: 4
PAINLEVEL_OUTOF10: 8
PAINLEVEL_OUTOF10: 4
PAINLEVEL_OUTOF10: 6
PAINLEVEL_OUTOF10: 8

## 2025-04-02 ASSESSMENT — PAIN - FUNCTIONAL ASSESSMENT
PAIN_FUNCTIONAL_ASSESSMENT: PREVENTS OR INTERFERES SOME ACTIVE ACTIVITIES AND ADLS
PAIN_FUNCTIONAL_ASSESSMENT: PREVENTS OR INTERFERES SOME ACTIVE ACTIVITIES AND ADLS
PAIN_FUNCTIONAL_ASSESSMENT: PREVENTS OR INTERFERES WITH ALL ACTIVE AND SOME PASSIVE ACTIVITIES
PAIN_FUNCTIONAL_ASSESSMENT: PREVENTS OR INTERFERES WITH ALL ACTIVE AND SOME PASSIVE ACTIVITIES

## 2025-04-02 ASSESSMENT — PAIN DESCRIPTION - DESCRIPTORS
DESCRIPTORS: SHARP
DESCRIPTORS: SHARP
DESCRIPTORS: ACHING;SORE;TENDER
DESCRIPTORS: ACHING;ITCHING;SHARP
DESCRIPTORS: ACHING;DISCOMFORT;TENDER

## 2025-04-02 ASSESSMENT — PAIN DESCRIPTION - PAIN TYPE
TYPE: CHRONIC PAIN;SURGICAL PAIN
TYPE: CHRONIC PAIN
TYPE: ACUTE PAIN

## 2025-04-02 ASSESSMENT — PAIN DESCRIPTION - FREQUENCY
FREQUENCY: CONTINUOUS
FREQUENCY: CONTINUOUS

## 2025-04-02 ASSESSMENT — PAIN DESCRIPTION - LOCATION
LOCATION: LEG
LOCATION: LEG;OTHER (COMMENT)
LOCATION: GENERALIZED
LOCATION: LEG;OTHER (COMMENT)
LOCATION: GENERALIZED
LOCATION: KNEE

## 2025-04-02 ASSESSMENT — PAIN SCALES - WONG BAKER
WONGBAKER_NUMERICALRESPONSE: NO HURT
WONGBAKER_NUMERICALRESPONSE: NO HURT

## 2025-04-02 ASSESSMENT — PAIN DESCRIPTION - ORIENTATION
ORIENTATION: LEFT

## 2025-04-02 NOTE — PROGRESS NOTES
Perfect serve sent to Dr Shane:    This patient is covered in open sores and blisters which is excruciating to her. I gave the PRN Benadryl and Tylenol, but due to the severity of the wounds/ rash/ blisters, she could really benefit from a stronger pain management plan.   Also pharmacy called and wanted to discuss her Lidex ointment. The pharmacist is concerned that there is too much being applied as a tube is supposed to last a patient a  month. However due to the severity of her wounds the tube has to cover her literal entire body.

## 2025-04-02 NOTE — CONSULTS
Orthopaedic Consult  Patient:  Kaycee Varela  YOB: 1960  MRN: [id number]     Acct: [id number]    PCP: Jose Lazaro DO  Date of Admission: 3/31/2025  Date of Service: Pt seen/examined on 4/2/2025     Chief Complaint: diffuse rash. LLE pain  History Of Present Illness: 65 y.o. female who presents with diffuse rash. Known to orthopaedic service from prior closed treatment of a L distal femur fracture from a fall on 3/4/25, closed treatment was recommended considering contracture, history, lack of mobilization, and fracture pattern. She was splinted, mobilized, discharged to SNF. She presented with a diffuse rash, noted had not yet followed up with ortho as outpatient so we were consulted. Her pain has been constant and unchanged to her LLE. Denies any new or acute trauma. Upon windowing of the splint, noted acute skin changes and blistering different than her rash, concern for fx propagation and skin breakdown.  She predominately uses a wheelchair, does not ambulate or WB through the LLE.     Past Medical History:        Diagnosis Date    Arthritis     Atypical ductal hyperplasia, breast     left breast, 2016, lumpectomy, Tamoxifen, [facility]    Cancer (HCC)     Hx of breast biopsy 01/08/2016    Hyperlipidemia     Hypertension     Type II or unspecified type diabetes mellitus without mention of complication, not stated as uncontrolled        Past Surgical History:        Procedure Laterality Date    BLADDER SUSPENSION      BREAST BIOPSY Left     atypical hyperlasia, 2016, [facility]    BREAST LUMPECTOMY Left     atypical hyperplasia, 2016, [facility]    COLONOSCOPY  08/28/2020    HERNIA REPAIR  10/17/2016    JOINT REPLACEMENT Left 2021    knee    KNEE SURGERY      Right knee    LEG SURGERY Right 3/14/2023    Right Above Knee Amputation performed by Rigo Fischer MD at [facility] OR    REVISION TOTAL KNEE ARTHROPLASTY Right 05/10/2022    RIGHT KNEE TOTAL ARTHROPLASTY REVISION performed by Rigo Fischer MD at [facility]  limited study. Healing distal femoral fracture.        Radiology report reviewed.    ASSESSMENT:  65 y.o. female with displacement of a left distal femur fracture. There is concern that her fracture has propagated and now tenting at the skin, high risk for further infection considering her implant. The necessity of surgical intervention was discussed at length with the patient. Surgery recommended for anatomic restoration and increased functional outcomes post operatively. The risks, benefits, and alternatives to surgical intervention of left above knee amputation were discussed at length. Specifically, soreness, infection, damage to surrounding neurovascular structures, necessity of return to the OR, blood loss requiring transfusion, and death. Post- operative expectations were addressed as well.     The patient expressed understanding, all questions were answered. Patient agreeable to move forward with left AKA this evening, optimized per primary    Discussed with , he is reluctant to proceed with intervention until her family can arrive, we discussed the seriousness of her condition and nature of an open fracture. He understands possible delay of this can significantly put her at risk, he will contemplate her going to surgery today.       PLAN:    -concern for open nature of fracture secondary to propagation   -patient amendable and understands recommend of L AKA at this time for source control and pain control  -multimodal pain control  -perioperative risk assessment per hospitalist, have discussed plan of care with primary team  -NPO  -plan for OR this evening   -surgical consent  -type and screen      Patient history physical and plan were reviewed with Dr Still, he was in agreement with the plan.

## 2025-04-02 NOTE — PROGRESS NOTES
Pt bladder scan 621, st cath performed using sterile technique, repeat bladder scan 0. Dr Bills notified via perfect serve.

## 2025-04-02 NOTE — SIGNIFICANT EVENT
Called the patient's bedside by nursing staff, patient in significant pain given her rash.  Will start morphine IV pain panel to help alleviate patient's pain overnight.

## 2025-04-02 NOTE — PROGRESS NOTES
Progress note: Infectious diseases    Patient - Kaycee Varela,  Age - 65 y.o.    - 1960      Room Number - 4K-20/020-A   MRN -  [id number]   [facility] # - [id number]  Date of Admission -  3/31/2025 11:36 AM    SUBJECTIVE:   No new issues  Going for left AKA  OBJECTIVE   VITALS    weight is 90.7 kg (200 lb). Her oral temperature is 98.9 °F (37.2 °C). Her blood pressure is 114/93 (abnormal) and her pulse is 71. Her respiration is 18 and oxygen saturation is 96%.       Wt Readings from Last 3 Encounters:   25 90.7 kg (200 lb)   25 86.2 kg (190 lb 0.6 oz)   25 90.7 kg (200 lb)       I/O (24 Hours)    Intake/Output Summary (Last 24 hours) at 2025 1622  Last data filed at 2025 1108  Gross per 24 hour   Intake 0 ml   Output --   Net 0 ml       General Appearance  chronically sick looking  HEENT - normocephalic, atraumatic, pink conjunctiva,  anicteric sclera  Neck - Supple, no mass  Lungs -  Bilateral   air entry, no rhonchi, no wheeze  Cardiovascular - Heart sounds are normal.     Abdomen - soft, not distended, nontender,   Neurologic -oriented  Skin - diffuse rash with areas of blisters crusting and excoriation  Extremities - right AKA  , has contracted left knee with a wound    MEDICATIONS:      ceFAZolin  2,000 mg IntraVENous On Call to OR    pantoprazole  40 mg Oral BID AC    sodium chloride flush  5-40 mL IntraVENous 2 times per day    [Held by provider] enoxaparin  40 mg SubCUTAneous Daily    methylPREDNISolone  0.5 mg/kg IntraVENous Daily    cetirizine  10 mg Oral Daily    [Held by provider] aspirin  81 mg Oral Daily    atorvastatin  40 mg Oral Daily    doxepin  50 mg Oral QPM    fenofibrate  160 mg Oral Daily    multivitamin  1 tablet Oral Daily    sertraline  50 mg Oral Daily    tiZANidine  4 mg Oral TID      lactated ringers 100 mL/hr at 25 1147    sodium chloride       morphine **OR**  03/31/2025 01:28 PM            Problem list of patient:     Patient Active Problem List   Diagnosis Code    Type 2 diabetes mellitus with other specified complication, unspecified whether long term insulin use ([facility]) E11.69    Hyperlipemia E78.5    THORNTON (nonalcoholic steatohepatitis) K75.81    Obesity E66.9    Tobacco use Z72.0    Vitamin D deficiency E55.9    Dyshidrotic eczema L30.1    Displaced articular fracture of head of right femur, sequela S72.061S    Neida-prosthetic supracondylar fracture of femur M97.8XXA, Z96.659    Primary hypertension I10    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    History of total knee replacement, right Z96.651    Enterocolitis due to Clostridium difficile, not specified as recurrent A04.72    Recurrent major depressive disorder F33.9    Aftercare following knee joint replacement surgery Z47.1, Z96.659    Primary insomnia F51.01    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 with presence of antibiotic-impregnated cement spacer Z89.529    Moderate malnutrition E44.0    Ventricular tachycardia ([facility]) I47.20    Type 2 diabetes mellitus E11.9    Closed fracture of distal end of left femur, initial encounter S72.402A    Maculopapular rash R21    Skin disease L98.9    SANJIV (acute kidney injury) N17.9    Hyponatremia E87.1    Metabolic acidosis, normal anion gap (NAG) E87.20    Hx of right BKA ([facility]) Z89.511    Depression with anxiety F41.8    Neuropathy G62.9    Bullous pemphigoid L12.0         ASSESSMENT/PLAN   Diffuse blistering eruption following bactrim use concern for Erythema multiforme/drug related  She had hx of blistering skin condition; possibility of BP  Skin biopsy done yesterday  She is going for surgery for left AKA  Discussed with her family      Rolf Escalona MD, 4/2/2025 4:22 PM

## 2025-04-02 NOTE — PLAN OF CARE
Problem: Chronic Conditions and Co-morbidities  Goal: Patient's chronic conditions and co-morbidity symptoms are monitored and maintained or improved  4/2/2025 0128 by Alecia Barnes RN  Outcome: Progressing  Flowsheets (Taken 4/1/2025 1942)  Care Plan - Patient's Chronic Conditions and Co-Morbidity Symptoms are Monitored and Maintained or Improved: Monitor and assess patient's chronic conditions and comorbid symptoms for stability, deterioration, or improvement  4/1/2025 1559 by Wilmer Rodríguez RN  Outcome: Progressing     Problem: Discharge Planning  Goal: Discharge to home or other facility with appropriate resources  4/1/2025 1559 by Wilmer Rodríguez, RN  Outcome: Progressing     Problem: Pain  Goal: Verbalizes/displays adequate comfort level or baseline comfort level  4/2/2025 0128 by Alecia Barnes RN  Outcome: Progressing  Flowsheets (Taken 4/1/2025 1942)  Verbalizes/displays adequate comfort level or baseline comfort level:   Encourage patient to monitor pain and request assistance   Assess pain using appropriate pain scale   Administer analgesics based on type and severity of pain and evaluate response   Implement non-pharmacological measures as appropriate and evaluate response   Consider cultural and social influences on pain and pain management   Notify Licensed Independent Practitioner if interventions unsuccessful or patient reports new pain  4/1/2025 1559 by Wilmer Rodríguez, RN  Outcome: Progressing     Problem: Safety - Adult  Goal: Free from fall injury  4/2/2025 0128 by Alecia Barnes, RN  Outcome: Progressing  Flowsheets (Taken 4/1/2025 0104 by Sharmila Singh, RN)  Free From Fall Injury:   Instruct family/caregiver on patient safety   Based on caregiver fall risk screen, instruct family/caregiver to ask for assistance with transferring infant if caregiver noted to have fall risk factors  4/1/2025 1559 by Wilmer Rodríguez, RN  Outcome: Progressing     Problem: Skin/Tissue Integrity  Goal: Skin

## 2025-04-02 NOTE — PROGRESS NOTES
Hospitalist Progress Note  Internal Medicine Resident      Patient: Kaycee Varela 65 y.o. female      Unit/Bed: -20/020-A    Admit Date: 3/31/2025    Summary statement: PMH right BKA, HLD, anxiety/depression who presented with 3 weeks of diffuse maculopapular rash in setting of recent Bactrim, Levaquin use concerning for bullous pemphigoid versus erythema multiforme versus dress.      ASSESSMENT AND PLAN  Active Problems  Bullous pemphigoid: maculopapular rash, itchy, multiple scabs, recent history of Bactrim and Levaquin use.  Patient has previous history of blistering skin disorder.  Afebrile without leukocytosis.  Punch skin biopsy obtained via ID, sent to pathology  Analgesia morphine 2/4 mg IV every 2 hours as needed for moderate/severe pain  Continuing methylprednisolone 0.5 mg/kg IV daily  GI prophylaxis Protonix 40 mg twice daily  Benadryl 50 mg IV every 6 hours as noted  Zyrtec 10 mg p.o. daily  Infectious disease consulted and following  Displaced left distal femur fracture: Had recent closed treatment of left distal femur fracture with orthopedic surgery after fall on 3/4/25.  Upon this presentation, orthopedic surgery consulted due to concerns about the site, found that fracture has propagated due to tenting at the skin, orthopedic surgery has deemed high risk for further infection.  Planning left AKA 4/2 evening  Preoperative cardiac risk 3.9% as described below.  Holding Lovenox and aspirin until after procedure  Preoperative cardiac risk: Patient has no documented reactions to anesthesia, history of excessive bleeding, history of blood transfusions/reactions of blood transfusions.  Does have history of blood clots.  Patient has class 0 risk of, with a noncardiac surgery and 3.9% estimated risk of myocardial infarction, pulmonary edema, V-fib, cardiac arrest or complete heart block per Jorge Luis revised cardiac risk.  Plan of care note documented.  SANJIV: Presented with creatinine 1.1, FENa 0.1%.   tenderness. Normal tone. No abnormal movements.   Skin: Warm and dry.  Diffuse maculopapular rash, some scabbing lesions, some seeping lesions.  Neurologic:  No focal sensory/motor deficits in the upper or lower extremities. Cranial nerves:  grossly non-focal 2-12.     Psychiatric: Alert and oriented, flat affect, depressed mood.   Capillary Refill: Brisk,< 3 seconds.  Peripheral Pulses: +2 palpable, equal bilaterally.       Labs/Radiology: See chart or assessment above.     Electronically signed by Santo Colindres MD IM PGY-1 on 4/2/2025 at 8:06 AM  Case was discussed with Attending, Dr. Neptali Bills.

## 2025-04-02 NOTE — PROGRESS NOTES
Pharmacy Medication History Note    List of current medications patient is taking is complete.    Source of information: SNF Continuity of care documentation from [facility] [phone].    Changes made to medication list:  Medications marked as not taking (include reason, ex. therapy complete or physician discontinued):  REMOVED  Gabapentin; tapered off at facility. Last dose of 100 mg was 03/18/2025.  Hemp gummies; not being administered in facility (self reported 2023)    Medications added/doses adjusted:  DOSE ADJUSTED  Acetaminophen; chanced from PRN to Q6H PRN    Other notes (ex. Recent course of antibiotics, Coumadin dosing):  Denies use of other OTC or herbal medications.  Had a recent course of bactrim DS BID from 3/19/25 to 3/24/25.     Allergies reviewed    Electronically signed by Tushar Terry PharmD candidate 2025 on 4/2/2025 at 10:28 AM

## 2025-04-02 NOTE — ANESTHESIA PRE PROCEDURE
release tablet 40 mEq  40 mEq Oral PRN Eddie Hughesda S, DO        Or   • potassium bicarb-citric acid (EFFER-K) effervescent tablet 40 mEq  40 mEq Oral PRN Saul, Eddieda S, DO        Or   • potassium chloride 10 mEq/100 mL IVPB (Peripheral Line)  10 mEq IntraVENous PRN Eddie Hughesda S, DO       • magnesium sulfate 2000 mg in 50 mL IVPB premix  2,000 mg IntraVENous PRN Eddie Hughesda S, DO       • [Held by provider] enoxaparin (LOVENOX) injection 40 mg  40 mg SubCUTAneous Daily Eddie Hughesda S, DO   40 mg at 04/01/25 0805   • ondansetron (ZOFRAN-ODT) disintegrating tablet 4 mg  4 mg Oral Q8H PRN Eddie Hughesda S, DO        Or   • ondansetron (ZOFRAN) injection 4 mg  4 mg IntraVENous Q6H PRN Bertram Hughes S, DO       • polyethylene glycol (GLYCOLAX) packet 17 g  17 g Oral Daily PRN Eddie Hughesda S, DO       • acetaminophen (TYLENOL) tablet 650 mg  650 mg Oral Q6H PRN Eddie Hughesda S, DO   650 mg at 04/02/25 0853    Or   • acetaminophen (TYLENOL) suppository 650 mg  650 mg Rectal Q6H PRN Eddie Hughesda S, DO       • diphenhydrAMINE (BENADRYL) injection 50 mg  50 mg IntraVENous Q6H PRN Eddie Hughesda S, DO   50 mg at 04/02/25 0519   • methylPREDNISolone sodium succ (SOLU-MEDROL) 45.35 mg in sterile water 0.7256 mL injection  0.5 mg/kg IntraVENous Daily Eddie Hughesda S, DO   45.35 mg at 04/01/25 2128   • cetirizine (ZYRTEC) tablet 10 mg  10 mg Oral Daily Eddie Hughesda S, DO   10 mg at 04/02/25 0849   • [Held by provider] aspirin chewable tablet 81 mg  81 mg Oral Daily Eddie Hughesda S, DO   81 mg at 04/01/25 0805   • atorvastatin (LIPITOR) tablet 40 mg  40 mg Oral Daily Bertram Hughes DO   40 mg at 04/02/25 0848   • doxepin (SINEQUAN) capsule 50 mg  50 mg Oral QPM Bertram Hughes DO   50 mg at 04/01/25 1725   • fenofibrate tablet 160 mg  160 mg Oral Daily Bertram Hughes DO   160 mg at 04/02/25 0848   •

## 2025-04-02 NOTE — PROGRESS NOTES
0720 Report taken from nurse Ham.    Head to Toe Assessment    [facility] Student Nurse  Head to Toe Assessment Performed at 0745  Skin  General skin appearance / Description: pink, red, moist  Incisions / Wounds: maculopapular rash, vaginal wound, coccyx wound, left heal wound    Neuro/Head  LOC: A+O x 4  Speech: clear and appropriate  Eyes PERRLA 4 mm  Symmetry of face / tongue: not present  JVD of neck: not present    Chest  Heart sounds / Apical Pulse: regular rhythm, +2 amplitude, rate 89 bpm  Dyspnea: on exertion  Lung sounds: clear, diminished  Cough: not present    Abdomen/ Pelvis  Bowel sounds: hypoactive  Passing flatus: yes  Palpation / Inspection: soft, rounded, non tender  Last BM: 3/17/25  Stool assessment: unable to assess  Any GI issues: none  Urinary issues: incontinence  Continence: continent of stool  Urine color / turbidity: unable to assess    Extremities  Edema: none  Altered Sensation: amputation RLE below the knee  Upper extremity push / pulls: weak and equal bilaterally  Left Arm Radial Pulse: +2   Left Upper extremity Capillary Refill: < 3 seconds  Right Arm Radial Pulse: +2   Right Upper extremity Capillary Refill: < 3 seconds  Lower extremity pedal push / pulls: LLE contracture, RLE unable to assess  Left pedal pulse: +1   Left posterior tibialis pulse: +1   Left lower extremity capillary refill: < 3 seconds  Right pedal pulse: unable to assess   Right posterior tibialis pulse: unable to assess   Right lower extremity capillary refill: unable to assess  Drift of extremities: slight tremor BUE  Pain: 8/10. Sharp, left knee    Other issues: none    1045 Reassessment done; no changes  1355 Reported off to primary RNFatoumata.    Marietta Desir  RSTERRI / SN

## 2025-04-02 NOTE — PLAN OF CARE
Name: Kaycee Varela  YOB: 1960  MRN: [id number]  Date: 04/02/25     Plan of Care        Pre-operative Cardiac Risk Assessment: Active    Reactions to anesthesia: none  History of excessive bleeding: none  History of blood clots: yes  History of blood transfusions: none  Reactions to blood transfusion: none  I have reviewed recent diagnostic testing including labs, EKG and Chest Xray.  Please see EKG for interpretation.  The patient has a Class 0 risk of adverse outcomes with non-cardiac surgery and a 3.9% estimated rate of myocardial infarction, pulmonary edema, ventricular fibrillation, cardiac arrest, or complete heart block per Jorge Luis Revised Cardiac Risk.  The patient is in agreement with and verbalizes understanding of the plan of care today and has no additional questions or complaints.   Hold anticoagulation per surgeon.        Electronically signed by Herb Velarde DO on 4/2/2025 at 11:28 AM

## 2025-04-02 NOTE — PROGRESS NOTES
2031 Pt arrives to pacu with simple mask. Pt appears to be in no acute distress at this time. Respirations easy and unlabored. Pt minimally responsive to stimuli at this time. Scattered blisters and scabs across patient. Left AKA dressed with prevena and negative pressure wound therapy. BS 93. VSS.   2045 Pt pain, 8/10. 50mcg Fentanyl given.   2050 Pt pain unchanged. 50mcg Fentanyl given. Pt has ring on R hand, ring finger. Finger is swollen and blistered. Pt educated on risks of keep ring on. Verbal consent received from patient to cut ring off.   2053 Richard RN at bedside to cut ring off.   2055 Pt placed on 2L NC.   2100 Ring successfully removed and placed in pt's bag with earrings. Pt pain 9/10. 0.5mg Dilaudid given. Pt tolerating ice chips well.   2107 PO 650mg Tylenol given to help pain.   2115 Pt pain improving, 7/10. 0.5mg Dilaudid given.   2120 Report called to . Spoke with 4K RN. All questions and concerns addressed at this time.   2130 Pt pain 8/10. 50mcg Fentanyl given. Called pt's spouse, Harjit. Updated on pt's status and recovery. All questions and concern addressed at this time.   2135 Pain slowly improving. 7/10. 50mcg Fentanyl given.   2145 Pt pain 4/10. Pt pain improved, and satisfied.   2155 Pt meets criteria for discharge from pacu. Pt transported to  in stable condition, with belongings.   2200 Update given to 4K RN. All questions and concerns addressed at this time. Pt's jewelry handed to pt's spouse, Harjit.

## 2025-04-03 PROBLEM — S72.402B OPEN FRACTURE OF LEFT DISTAL FEMUR (HCC): Status: ACTIVE | Noted: 2025-04-03

## 2025-04-03 LAB
ALBUMIN SERPL BCG-MCNC: 2.9 G/DL (ref 3.4–4.9)
ALP SERPL-CCNC: 43 U/L (ref 35–104)
ALT SERPL W/O P-5'-P-CCNC: 12 U/L (ref 10–35)
ANION GAP SERPL CALC-SCNC: 8 MEQ/L (ref 8–16)
ANION GAP SERPL CALC-SCNC: 9 MEQ/L (ref 8–16)
ARTERIAL PATENCY WRIST A: POSITIVE
AST SERPL-CCNC: 37 U/L (ref 10–35)
BASE EXCESS BLDA CALC-SCNC: -3.5 MMOL/L (ref -2.5–2.5)
BASOPHILS ABSOLUTE: 0 THOU/MM3 (ref 0–0.1)
BASOPHILS NFR BLD AUTO: 0.2 %
BDY SITE: ABNORMAL
BILIRUB CONJ SERPL-MCNC: 0.5 MG/DL (ref 0–0.2)
BILIRUB SERPL-MCNC: 0.7 MG/DL (ref 0.3–1.2)
BUN SERPL-MCNC: 21 MG/DL (ref 8–23)
BUN SERPL-MCNC: 26 MG/DL (ref 8–23)
CA-I BLD ISE-SCNC: 1.14 MMOL/L (ref 1.12–1.32)
CALCIUM SERPL-MCNC: 7.3 MG/DL (ref 8.8–10.2)
CALCIUM SERPL-MCNC: 7.8 MG/DL (ref 8.8–10.2)
CHLORIDE SERPL-SCNC: 109 MEQ/L (ref 98–111)
CHLORIDE SERPL-SCNC: 110 MEQ/L (ref 98–111)
CO2 SERPL-SCNC: 21 MEQ/L (ref 22–29)
CO2 SERPL-SCNC: 23 MEQ/L (ref 22–29)
COLLECTED BY:: ABNORMAL
CREAT SERPL-MCNC: 0.6 MG/DL (ref 0.5–0.9)
CREAT SERPL-MCNC: 0.6 MG/DL (ref 0.5–0.9)
DEPRECATED RDW RBC AUTO: 58.2 FL (ref 35–45)
DEPRECATED RDW RBC AUTO: 58.4 FL (ref 35–45)
DEVICE: ABNORMAL
EOSINOPHIL NFR BLD AUTO: 0.7 %
EOSINOPHILS ABSOLUTE: 0 THOU/MM3 (ref 0–0.4)
ERYTHROCYTE [DISTWIDTH] IN BLOOD BY AUTOMATED COUNT: 18 % (ref 11.5–14.5)
ERYTHROCYTE [DISTWIDTH] IN BLOOD BY AUTOMATED COUNT: 18.6 % (ref 11.5–14.5)
FIO2 ON VENT O2 ANALYZER: 2 %
GFR SERPL CREATININE-BSD FRML MDRD: > 90 ML/MIN/1.73M2
GFR SERPL CREATININE-BSD FRML MDRD: > 90 ML/MIN/1.73M2
GLUCOSE BLD STRIP.AUTO-MCNC: 103 MG/DL (ref 70–108)
GLUCOSE BLD STRIP.AUTO-MCNC: 114 MG/DL (ref 70–108)
GLUCOSE BLD STRIP.AUTO-MCNC: 67 MG/DL (ref 70–108)
GLUCOSE SERPL-MCNC: 105 MG/DL (ref 74–109)
GLUCOSE SERPL-MCNC: 129 MG/DL (ref 74–109)
HCO3 BLDA-SCNC: 24 MMOL/L (ref 23–28)
HCT VFR BLD AUTO: 35.1 % (ref 37–47)
HCT VFR BLD AUTO: 37.4 % (ref 37–47)
HCT VFR BLD AUTO: 38.4 % (ref 37–47)
HGB BLD-MCNC: 10.7 GM/DL (ref 12–16)
HGB BLD-MCNC: 11.5 GM/DL (ref 12–16)
HGB BLD-MCNC: 12.4 GM/DL (ref 12–16)
HSV SPEC CULT: NORMAL
IMM GRANULOCYTES # BLD AUTO: 0.02 THOU/MM3 (ref 0–0.07)
IMM GRANULOCYTES NFR BLD AUTO: 0.4 %
LACTATE SERPL-SCNC: 0.8 MMOL/L (ref 0.5–2)
LYMPHOCYTES ABSOLUTE: 0.3 THOU/MM3 (ref 1–4.8)
LYMPHOCYTES NFR BLD AUTO: 5.6 %
MCH RBC QN AUTO: 26.8 PG (ref 26–33)
MCH RBC QN AUTO: 27.4 PG (ref 26–33)
MCHC RBC AUTO-ENTMCNC: 30.5 GM/DL (ref 32.2–35.5)
MCHC RBC AUTO-ENTMCNC: 30.7 GM/DL (ref 32.2–35.5)
MCV RBC AUTO: 87.2 FL (ref 81–99)
MCV RBC AUTO: 90 FL (ref 81–99)
MONOCYTES ABSOLUTE: 0.1 THOU/MM3 (ref 0.4–1.3)
MONOCYTES NFR BLD AUTO: 1.4 %
NEUTROPHILS ABSOLUTE: 5.2 THOU/MM3 (ref 1.8–7.7)
NEUTROPHILS NFR BLD AUTO: 91.7 %
NRBC BLD AUTO-RTO: 0 /100 WBC
PCO2 BLDA: 54 MMHG (ref 35–45)
PH BLDA: 7.26 [PH] (ref 7.35–7.45)
PLATELET # BLD AUTO: 217 THOU/MM3 (ref 130–400)
PLATELET # BLD AUTO: 225 THOU/MM3 (ref 130–400)
PMV BLD AUTO: 10.1 FL (ref 9.4–12.4)
PMV BLD AUTO: 10.2 FL (ref 9.4–12.4)
PO2 BLDA: 70 MMHG (ref 71–104)
POTASSIUM SERPL-SCNC: 4.2 MEQ/L (ref 3.5–5.2)
POTASSIUM SERPL-SCNC: 4.3 MEQ/L (ref 3.5–5.2)
PROT SERPL-MCNC: 4.7 G/DL (ref 6.4–8.3)
RBC # BLD AUTO: 3.9 MILL/MM3 (ref 4.2–5.4)
RBC # BLD AUTO: 4.29 MILL/MM3 (ref 4.2–5.4)
SAO2 % BLDA: 91 %
SODIUM SERPL-SCNC: 139 MEQ/L (ref 135–145)
SODIUM SERPL-SCNC: 141 MEQ/L (ref 135–145)
VENTILATION MODE VENT: ABNORMAL
WBC # BLD AUTO: 5.7 THOU/MM3 (ref 4.8–10.8)
WBC # BLD AUTO: 8.1 THOU/MM3 (ref 4.8–10.8)

## 2025-04-03 PROCEDURE — 36430 TRANSFUSION BLD/BLD COMPNT: CPT

## 2025-04-03 PROCEDURE — 94761 N-INVAS EAR/PLS OXIMETRY MLT: CPT

## 2025-04-03 PROCEDURE — 6360000002 HC RX W HCPCS

## 2025-04-03 PROCEDURE — 99233 SBSQ HOSP IP/OBS HIGH 50: CPT | Performed by: STUDENT IN AN ORGANIZED HEALTH CARE EDUCATION/TRAINING PROGRAM

## 2025-04-03 PROCEDURE — 6360000002 HC RX W HCPCS: Performed by: STUDENT IN AN ORGANIZED HEALTH CARE EDUCATION/TRAINING PROGRAM

## 2025-04-03 PROCEDURE — 85025 COMPLETE CBC W/AUTO DIFF WBC: CPT

## 2025-04-03 PROCEDURE — 82803 BLOOD GASES ANY COMBINATION: CPT

## 2025-04-03 PROCEDURE — 2500000003 HC RX 250 WO HCPCS

## 2025-04-03 PROCEDURE — 36600 WITHDRAWAL OF ARTERIAL BLOOD: CPT

## 2025-04-03 PROCEDURE — 2580000003 HC RX 258

## 2025-04-03 PROCEDURE — 36415 COLL VENOUS BLD VENIPUNCTURE: CPT

## 2025-04-03 PROCEDURE — 83605 ASSAY OF LACTIC ACID: CPT

## 2025-04-03 PROCEDURE — 85018 HEMOGLOBIN: CPT

## 2025-04-03 PROCEDURE — 2500000003 HC RX 250 WO HCPCS: Performed by: STUDENT IN AN ORGANIZED HEALTH CARE EDUCATION/TRAINING PROGRAM

## 2025-04-03 PROCEDURE — 82330 ASSAY OF CALCIUM: CPT

## 2025-04-03 PROCEDURE — 80053 COMPREHEN METABOLIC PANEL: CPT

## 2025-04-03 PROCEDURE — 85014 HEMATOCRIT: CPT

## 2025-04-03 PROCEDURE — 51701 INSERT BLADDER CATHETER: CPT

## 2025-04-03 PROCEDURE — 6370000000 HC RX 637 (ALT 250 FOR IP)

## 2025-04-03 PROCEDURE — 82948 REAGENT STRIP/BLOOD GLUCOSE: CPT

## 2025-04-03 PROCEDURE — 85027 COMPLETE CBC AUTOMATED: CPT

## 2025-04-03 PROCEDURE — P9047 ALBUMIN (HUMAN), 25%, 50ML: HCPCS

## 2025-04-03 PROCEDURE — 82248 BILIRUBIN DIRECT: CPT

## 2025-04-03 PROCEDURE — 2700000000 HC OXYGEN THERAPY PER DAY

## 2025-04-03 PROCEDURE — 2060000000 HC ICU INTERMEDIATE R&B

## 2025-04-03 PROCEDURE — 51798 US URINE CAPACITY MEASURE: CPT

## 2025-04-03 RX ORDER — 0.9 % SODIUM CHLORIDE 0.9 %
1000 INTRAVENOUS SOLUTION INTRAVENOUS ONCE
Status: COMPLETED | OUTPATIENT
Start: 2025-04-03 | End: 2025-04-03

## 2025-04-03 RX ORDER — ALBUMIN (HUMAN) 12.5 G/50ML
25 SOLUTION INTRAVENOUS ONCE
Status: COMPLETED | OUTPATIENT
Start: 2025-04-03 | End: 2025-04-03

## 2025-04-03 RX ORDER — SODIUM CHLORIDE 9 MG/ML
INJECTION, SOLUTION INTRAVENOUS PRN
Status: DISCONTINUED | OUTPATIENT
Start: 2025-04-03 | End: 2025-04-08 | Stop reason: HOSPADM

## 2025-04-03 RX ADMIN — WATER 2000 MG: 1 INJECTION INTRAMUSCULAR; INTRAVENOUS; SUBCUTANEOUS at 14:06

## 2025-04-03 RX ADMIN — FENOFIBRATE 160 MG: 160 TABLET ORAL at 08:14

## 2025-04-03 RX ADMIN — Medication 1 TABLET: at 08:14

## 2025-04-03 RX ADMIN — DIPHENHYDRAMINE HYDROCHLORIDE 50 MG: 50 INJECTION INTRAMUSCULAR; INTRAVENOUS at 20:02

## 2025-04-03 RX ADMIN — DIPHENHYDRAMINE HYDROCHLORIDE 50 MG: 50 INJECTION INTRAMUSCULAR; INTRAVENOUS at 14:04

## 2025-04-03 RX ADMIN — SODIUM CHLORIDE 1000 ML: 0.9 INJECTION, SOLUTION INTRAVENOUS at 00:36

## 2025-04-03 RX ADMIN — ALBUMIN (HUMAN) 25 G: 0.25 INJECTION, SOLUTION INTRAVENOUS at 02:09

## 2025-04-03 RX ADMIN — WATER 45.35 MG: 1 INJECTION INTRAMUSCULAR; INTRAVENOUS; SUBCUTANEOUS at 20:02

## 2025-04-03 RX ADMIN — SODIUM CHLORIDE, PRESERVATIVE FREE 10 ML: 5 INJECTION INTRAVENOUS at 20:05

## 2025-04-03 RX ADMIN — MORPHINE SULFATE 2 MG: 2 INJECTION, SOLUTION INTRAMUSCULAR; INTRAVENOUS at 09:53

## 2025-04-03 RX ADMIN — SERTRALINE 50 MG: 50 TABLET, FILM COATED ORAL at 08:14

## 2025-04-03 RX ADMIN — SODIUM CHLORIDE, PRESERVATIVE FREE 10 ML: 5 INJECTION INTRAVENOUS at 08:14

## 2025-04-03 RX ADMIN — WATER 2000 MG: 1 INJECTION INTRAMUSCULAR; INTRAVENOUS; SUBCUTANEOUS at 05:50

## 2025-04-03 RX ADMIN — ATORVASTATIN CALCIUM 40 MG: 40 TABLET, FILM COATED ORAL at 08:14

## 2025-04-03 RX ADMIN — SODIUM CHLORIDE 1000 ML: 0.9 INJECTION, SOLUTION INTRAVENOUS at 01:43

## 2025-04-03 RX ADMIN — MORPHINE SULFATE 2 MG: 2 INJECTION, SOLUTION INTRAMUSCULAR; INTRAVENOUS at 15:09

## 2025-04-03 ASSESSMENT — PAIN DESCRIPTION - LOCATION
LOCATION: OTHER (COMMENT)

## 2025-04-03 ASSESSMENT — PAIN DESCRIPTION - DESCRIPTORS
DESCRIPTORS: ACHING;SORE
DESCRIPTORS: ITCHING
DESCRIPTORS: ACHING;SORE
DESCRIPTORS: ACHING

## 2025-04-03 ASSESSMENT — PAIN SCALES - GENERAL
PAINLEVEL_OUTOF10: 8
PAINLEVEL_OUTOF10: 4
PAINLEVEL_OUTOF10: 6
PAINLEVEL_OUTOF10: 4
PAINLEVEL_OUTOF10: 9

## 2025-04-03 NOTE — SIGNIFICANT EVENT
Notified by RN of low blood pressure.  Patient noted to have a MAP of 55.  Previously noted to have bladder scan of 730 mL, straight cathed with an output of 700 mL.  Received 1 L fluid bolus.  Patient sleeping, easily arouses to stimulation, able to state where she is.  Patient had AKA earlier today.  Received Zyrtec, doxepin, Zanaflex, fentanyl, Dilaudid earlier in the evening.  Will give additional IV fluid bolus.  Hypotension and clinical presentation could be in the setting of medications, decompression of bladder, possibly sepsis however patient is afebrile, not tachycardic.  Also noted to have more than 200 mL blood out of the drain.  Placed patient in Trendelenburg.  Check CBC, BMP, lactic, ABG.  Give IV albumin 25 g.  Will reevaluate blood pressure after additional fluid bolus and IV albumin.  If patient's MAP persist below 65 despite fluids and albumin, may need to transfer to ICU for pressor support.    Update 0300: Labs came back showing hemoglobin 10.7, was 13.1 earlier in the day.  Lactic acid 0.8.  WBC 8.1.  Will give 1 unit PRBC.  Initially called patient's  however went straight to voicemail multiple times.  Called patient's stepdaughter Adelita to obtain blood consent.  Patient's stepdaughter agreed to blood transfusion.  Also informed her that patient may need to be transferred to ICU if blood pressure does not respond to transfusion or if mentation does not improve.  Reviewed ABG results, pH 7.26, pCO2 54, pO2 70.  Unable to do BiPAP as patient is not alert enough to remove mask if she were to become nauseous and vomit.  Discussed patient with ICU provider.    Tomas Dotson MD  PGY-3 Internal Medicine Resident

## 2025-04-03 NOTE — PLAN OF CARE
Problem: Safety - Adult  Goal: Free from fall injury  Outcome: Progressing     Problem: ABCDS Injury Assessment  Goal: Absence of physical injury  Outcome: Progressing     Problem: Chronic Conditions and Co-morbidities  Goal: Patient's chronic conditions and co-morbidity symptoms are monitored and maintained or improved  Outcome: Not Progressing     Problem: Discharge Planning  Goal: Discharge to home or other facility with appropriate resources  Outcome: Not Progressing     Problem: Pain  Goal: Verbalizes/displays adequate comfort level or baseline comfort level  Outcome: Not Progressing     Problem: Skin/Tissue Integrity  Goal: Skin integrity remains intact  Description: 1.  Monitor for areas of redness and/or skin breakdown  2.  Assess vascular access sites hourly  3.  Every 4-6 hours minimum:  Change oxygen saturation probe site  4.  Every 4-6 hours:  If on nasal continuous positive airway pressure, respiratory therapy assess nares and determine need for appliance change or resting period  Outcome: Not Progressing

## 2025-04-03 NOTE — PROGRESS NOTES
Spiritual Health History and Assessment/Progress Note  [facility]    (P) Spiritual/Emotional Needs,  ,  ,      Name: Kaycee Varela MRN: [id number]    Age: 65 y.o.     Sex: female   Language: English   Scientologist: None   Maculopapular rash     Date: 4/3/2025            Total Time Calculated: (P) 10 min              Spiritual Assessment began in [facility] ICU STEPDOWN TELEMETRY 4K        Referral/Consult From: (P) Rounding   Encounter Overview/Reason: (P) Spiritual/Emotional Needs  Service Provided For: (P) Patient and family together    Myriam, Belief, Meaning:   Patient identifies as spiritual and has beliefs or practices that help with coping during difficult times  Family/Friends Other: Did not assess at this time.      Importance and Influence:  Patient has spiritual/personal beliefs that influence decisions regarding their health  Family/Friends No family/friends present    Community:  Patient feels well-supported. Support system includes: Spouse/Partner and Extended family  Family/Friends feel well-supported. Support system includes: Extended family    Assessment and Plan of Care:     Patient, Kaycee, was laying in bed at the time of this visit with her two sisters present at bedside. Patient shared about her current medical situation and the difficulty of dealing with a rash for the last week. She has good support from her family, including two sisters, but has felt despairing as the rash continues. She prays to God for healing \"but that doesn't seem to have worked\" so she has also been praying for the strength to deal with it. Patient is not connected to a myriam community but relies on her myriam to cope at this time.  team will remain available to support patient/family, PRN.     Patient Interventions include: Facilitated expression of thoughts and feelings, Explored spiritual coping/struggle/distress, Engaged in theological reflection, Affirmed coping skills/support systems, and  Provided sacramental/Advent ritual  Family/Friends Interventions include: Facilitated expression of thoughts and feelings and Provided sacramental/Advent ritual    Patient Plan of Care: Spiritual Care available upon further referral  Family/Friends Plan of Care: Spiritual Care available upon further referral    Electronically signed by Chaplain Apolinar on 4/3/2025 at 3:25 PM

## 2025-04-03 NOTE — PLAN OF CARE
Problem: Chronic Conditions and Co-morbidities  Goal: Patient's chronic conditions and co-morbidity symptoms are monitored and maintained or improved  4/3/2025 1507 by Wilmer Rodríguez RN  Outcome: Progressing  4/3/2025 0514 by Pedro Limon RN  Outcome: Not Progressing     Problem: Discharge Planning  Goal: Discharge to home or other facility with appropriate resources  4/3/2025 1507 by Wilmer Rodríguez RN  Outcome: Progressing  4/3/2025 0514 by Pedro Limon RN  Outcome: Not Progressing     Problem: Pain  Goal: Verbalizes/displays adequate comfort level or baseline comfort level  4/3/2025 1507 by Wilmer Rodríguez RN  Outcome: Progressing  4/3/2025 0514 by Pedro Limon RN  Outcome: Not Progressing     Problem: Safety - Adult  Goal: Free from fall injury  4/3/2025 1507 by Wilmer Rodríguez RN  Outcome: Progressing  4/3/2025 0514 by Pedro Limon RN  Outcome: Progressing     Problem: Skin/Tissue Integrity  Goal: Skin integrity remains intact  Description: 1.  Monitor for areas of redness and/or skin breakdown  2.  Assess vascular access sites hourly  3.  Every 4-6 hours minimum:  Change oxygen saturation probe site  4.  Every 4-6 hours:  If on nasal continuous positive airway pressure, respiratory therapy assess nares and determine need for appliance change or resting period  4/3/2025 1507 by Wilmer Rodríguez RN  Outcome: Progressing  4/3/2025 0514 by Pedro Limon RN  Outcome: Not Progressing     Problem: ABCDS Injury Assessment  Goal: Absence of physical injury  4/3/2025 1507 by Wilmer Rodríguez RN  Outcome: Progressing  4/3/2025 0514 by Pedro Limon RN  Outcome: Progressing     Care plan reviewed with patient.  Patient verbalize understanding of the plan of care and contribute to goal setting.

## 2025-04-03 NOTE — PROGRESS NOTES
Four earrings were removed from the patient and placed in a labeled bag. However patient also has a ring that was not able to be removed due to skin blisters and tightness. PACU is made aware of the tight ring, and will see how to proceed.

## 2025-04-03 NOTE — PROGRESS NOTES
Orthopaedic Progress Note      SUBJECTIVE   Ms. Varela is post op day # 1 L AKA    Seen at bedside this morning  Hypotensive last night, transfused, improvement, asymptomatic this AM  Pain appears well controlled  Drain and vac intact  No phantom pain, no significant pain to extremity     OBJECTIVE      Physical    VITALS:  BP (!) 113/53   Pulse 94   Temp 97.7 °F (36.5 °C)   Resp 18   Wt 90.7 kg (200 lb)   SpO2 97%   BMI 31.32 kg/m²   I/O last 3 completed shifts:  In: 270 [P.O.:270]  Out: -     4/10 pain  Gen: alert and oriented  Head: normorcephalic, atraumatic  Resp: unlabored, NC  Pelvis: stable  LLE drain and vac intact, hip ROM intact, skin manifestations stable       Data  CBC:   Lab Results   Component Value Date/Time    WBC 8.1 04/03/2025 01:50 AM    HGB 10.7 04/03/2025 01:50 AM     04/03/2025 01:50 AM     BMP:    Lab Results   Component Value Date/Time     04/03/2025 01:49 AM    K 4.3 04/03/2025 01:49 AM    K 4.5 03/31/2025 01:28 PM     04/03/2025 01:49 AM    CO2 21 04/03/2025 01:49 AM    BUN 26 04/03/2025 01:49 AM    CREATININE 0.6 04/03/2025 01:49 AM    CALCIUM 7.3 04/03/2025 01:49 AM    GLUCOSE 129 04/03/2025 01:49 AM    GLUCOSE 113 12/10/2020 10:01 AM     Uric Acid:  No components found for: \"URIC\"  PT/INR:    Lab Results   Component Value Date/Time    PROTIME 11.0 10/05/2016 03:39 PM    INR 0.97 03/04/2025 05:18 PM     Troponin:  No results found for: \"TROPONINI\"  Urine Culture:  No components found for: \"CURINE\"      Current Inpatient Medications    Current Facility-Administered Medications: 0.9 % sodium chloride infusion, , IntraVENous, PRN  lactated ringers infusion, , IntraVENous, Continuous  ceFAZolin (ANCEF) 2,000 mg in sterile water 20 mL IV syringe, 2,000 mg, IntraVENous, Q8H  pantoprazole (PROTONIX) tablet 40 mg, 40 mg, Oral, BID AC  morphine (PF) injection 2 mg, 2 mg, IntraVENous, Q2H PRN **OR** morphine injection 4 mg, 4 mg, IntraVENous, Q2H PRN  sodium chloride flush  0.9 % injection 5-40 mL, 5-40 mL, IntraVENous, 2 times per day  sodium chloride flush 0.9 % injection 5-40 mL, 5-40 mL, IntraVENous, PRN  0.9 % sodium chloride infusion, , IntraVENous, PRN  potassium chloride (KLOR-CON M) extended release tablet 40 mEq, 40 mEq, Oral, PRN **OR** potassium bicarb-citric acid (EFFER-K) effervescent tablet 40 mEq, 40 mEq, Oral, PRN **OR** potassium chloride 10 mEq/100 mL IVPB (Peripheral Line), 10 mEq, IntraVENous, PRN  magnesium sulfate 2000 mg in 50 mL IVPB premix, 2,000 mg, IntraVENous, PRN  [Held by provider] enoxaparin (LOVENOX) injection 40 mg, 40 mg, SubCUTAneous, Daily  ondansetron (ZOFRAN-ODT) disintegrating tablet 4 mg, 4 mg, Oral, Q8H PRN **OR** ondansetron (ZOFRAN) injection 4 mg, 4 mg, IntraVENous, Q6H PRN  polyethylene glycol (GLYCOLAX) packet 17 g, 17 g, Oral, Daily PRN  acetaminophen (TYLENOL) tablet 650 mg, 650 mg, Oral, Q6H PRN **OR** acetaminophen (TYLENOL) suppository 650 mg, 650 mg, Rectal, Q6H PRN  diphenhydrAMINE (BENADRYL) injection 50 mg, 50 mg, IntraVENous, Q6H PRN  methylPREDNISolone sodium succ (SOLU-MEDROL) 45.35 mg in sterile water 0.7256 mL injection, 0.5 mg/kg, IntraVENous, Daily  [Held by provider] cetirizine (ZYRTEC) tablet 10 mg, 10 mg, Oral, Daily  [Held by provider] aspirin chewable tablet 81 mg, 81 mg, Oral, Daily  atorvastatin (LIPITOR) tablet 40 mg, 40 mg, Oral, Daily  [Held by provider] doxepin (SINEQUAN) capsule 50 mg, 50 mg, Oral, QPM  fenofibrate tablet 160 mg, 160 mg, Oral, Daily  multivitamin 1 tablet, 1 tablet, Oral, Daily  sertraline (ZOLOFT) tablet 50 mg, 50 mg, Oral, Daily  [Held by provider] tiZANidine (ZANAFLEX) tablet 4 mg, 4 mg, Oral, TID        PLAN    Ms. Black is post op day # 1 L AKA    Resume diet from ortho standpoint  regular post op 24 hour course of IV abx then defer necessity of continuation to ID  Maintain wound vac, anticipate removal POD7  Hip ROM   Maintain drain, monitor and record q shift , anticipate removal

## 2025-04-03 NOTE — PROGRESS NOTES
Pt admitted to  [facility]0 via bed @ 2230    Complaints: surgical pain.      IV normal saline infusing into the forearm right, condition patent, no redness, and at a rate of Gravity mls/ hour with about 200 mls in the bag still. IV site free of s/s of infection or infiltration.     Vital signs obtained. Assessment and data collection initiated. Two nurse skin assessment performed by Meghna RN and Pedro RN.    Swallow Screen completed and documented for all patients ages 45 and older. Pass  If patient fails bedside swallow, obtain order for speech therapy consult and keep patient NPO.    Policies and procedures for  explained. All questions answered with no further questions at this time. Fall prevention and safety brochure discussed with patient.  Bed alarm on. Call light in reach. Oriented to room.    Would you like your Primary Care Physician notified?  No  The best day to schedule a follow up Dr appointment is:  Monday a.m.

## 2025-04-03 NOTE — PROGRESS NOTES
[facility]  OCCUPATIONAL THERAPY MISSED TREATMENT NOTE  [facility] ICU STEPDOWN TELEMETRY 4K  4K-20/020-A      Date: 4/3/2025  Patient Name: Kaycee Varela        CSN: [id number]   : 1960  (65 y.o.)  Gender: female                REASON FOR MISSED TREATMENT:  Patient new L AKA however presenting with low BP and decreased mentation. RN initially requesting hold due to BP however when this improved patient declined working with therapy. Will attempt back next availability.     Kavita Brown, OTR/L [id number]

## 2025-04-03 NOTE — PROGRESS NOTES
Patient blood pressure trending significantly lower than initial blood pressure on arrival (see flow sheets for BP trend), patient has noticeably become more lethargic than initial assessment. Resident made aware of concerns who assessed patient at bed side.    Vitals repeated in appropriate intervals, patient blood glucose rechecked which read 114. Patient is still arrousable and oriented at this time but noticeably much more lethargic.    Some improvement to BP with initial 1L NS bolus ordered by Dr. Persaud. Senior resident at bedside after completing bolus to assess patient as well. BP's still soft.    A second 1L bolus ordered, along with labs, and ABG.  Recent sedation and medications reviewed by physician.  >200ml of blood out of accordion drain from 2230 to 0200.  No physical signs of bleeding.

## 2025-04-03 NOTE — PROGRESS NOTES
[facility]  PHYSICAL THERAPY MISSED TREATMENT NOTE  [facility] ICU STEPDOWN TELEMETRY 4K    Date: 4/3/2025  Patient Name: Kaycee Varela        MRN: [id number]   : 1960  (65 y.o.)  Gender: female                REASON FOR MISSED TREATMENT:  Missed Treat.      RN ok'd session stating pt's BP has improved but pt adamantly refused with c/o pain and fatigue. Will check back as able.

## 2025-04-03 NOTE — ANESTHESIA POSTPROCEDURE EVALUATION
Department of Anesthesiology  Postprocedure Note    Patient: Kaycee Varela  MRN: [id number]  YOB: 1960  Date of evaluation: 4/2/2025    Procedure Summary       Date: 04/02/25 Room / Location: [facility] OR 03 / [facility] OR    Anesthesia Start: 1814 Anesthesia Stop: 2043    Procedure: LEFT LEG AMPUTATION ABOVE KNEE (Left) Diagnosis:       Maculopapular rash      (Maculopapular rash [R21])    Surgeons: Jf Still DO Responsible Provider: Chalino Bailey DO    Anesthesia Type: general ASA Status: 3            Anesthesia Type: No value filed.    Gary Phase I:      Gary Phase II:      Anesthesia Post Evaluation    Patient location during evaluation: PACU  Patient participation: complete - patient participated  Level of consciousness: awake  Airway patency: patent  Nausea & Vomiting: no vomiting and no nausea  Cardiovascular status: hemodynamically stable  Respiratory status: acceptable and face mask  Hydration status: stable  Pain management: adequate    No notable events documented.

## 2025-04-03 NOTE — OP NOTE
Operative Note      Patient: Kaycee Varela  YOB: 1960  MRN: [id number]    Date of Procedure: 4/2/2025    Pre-op diagnosis: Left open periprosthetic distal femur fracture    Post-Op Diagnosis: Left open periprosthetic distal femur fracture       Procedure(s):  LEFT LEG AMPUTATION ABOVE KNEE    Surgeon(s):  Jf Still DO    Assistant:   * No surgical staff found *    Anesthesia: General    Estimated Blood Loss (mL): less than 100     Complications: None    Specimens:   ID Type Source Tests Collected by Time Destination   A : Left Above Knee Amputation Tissue Leg SURGICAL PATHOLOGY Jf Still DO 4/2/2025 1915        Implants:  * No implants in log *      Drains:   Closed/Suction Drain Distal;Left;Anterior Thigh Accordion (Active)       [REMOVED] External Urinary Catheter (Removed)   Site Assessment Clean,dry & intact 03/06/25 2200   Placement Replaced 03/08/25 2004   Securement Method Other (Comment) 03/06/25 2200   Catheter Care Catheter/Wick replaced 03/08/25 1329   Perineal Care Yes 03/08/25 1329   Suction 40 mmgHg continuous 03/06/25 2200   Urine Color Other (Comment) 03/06/25 2200   Urine Appearance Cloudy 03/06/25 2200   Urine Odor Malodorous 03/06/25 1306   Output (mL) 50 mL 03/10/25 0301       Findings:  Infection Present At Time Of Surgery (PATOS) (choose all levels that have infection present): Potential infection  Other Findings: Macular papular rash to the whole extremity.      Detailed Description of Procedure:   Indications:  This is a 65 y.o. female who has a open left distal femur periprosthetic fracture.  She has a significant contracture of the left knee as well as now bone protruding through the skin distally.  At this point limb salvage is not achievable.  Do recommend above-knee amputation.. We discussed the nature of the injury as well as the indications for surgical intervention as well as the associated risks, benefits, and alternatives of the procedure. The patient

## 2025-04-03 NOTE — PROGRESS NOTES
Provider placed NIV order and for sitter to sit at bedside. Reminded provider that our policy states that it is an absolute contraindication to place a patient on BiPAP who cannot physically remove the mask themselves and that a sitter is not a solution. NIV order discontinued, provider in room at this time. No new orders at this time.

## 2025-04-03 NOTE — PROGRESS NOTES
Limb is placed in biohazard bag and labeled with patient sticker and a voucher. Limb is sent to the [facility], lab is contacted and made aware along with a copy of the voucher being sent to lab also.

## 2025-04-03 NOTE — PROGRESS NOTES
0340    25g albumin completed along with additional 1LNS bolus (2L total)  1 unit RBC ordered per resident team and consent obtained daughter who verbalizes understanding and rationale of transfusion along with risks.    At this time,  Patient's BP improving with systolics high 90s-110s. Patient still lethargic but seems to be improving. She is alert and oriented x4 with delayed responses.    ADDENDUM 0554:    Pressures at this time are holding MAP of >65 Systolic 100-120 post transfusion. Patient is becoming more interactive and able to voice needs and ask questions.

## 2025-04-03 NOTE — PROGRESS NOTES
Progress note: Infectious diseases    Patient - Kaycee Varela,  Age - 65 y.o.    - 1960      Room Number - 4K-20/020-A   MRN -  [id number]   [facility] # - [id number]  Date of Admission -  3/31/2025 11:36 AM    SUBJECTIVE:   No new issues  Had left AKA  OBJECTIVE   VITALS    weight is 90.7 kg (200 lb). Her oral temperature is 97.8 °F (36.6 °C). Her blood pressure is 128/55 (abnormal) and her pulse is 111 (abnormal). Her respiration is 18 and oxygen saturation is 97%.       Wt Readings from Last 3 Encounters:   25 90.7 kg (200 lb)   25 86.2 kg (190 lb 0.6 oz)   25 90.7 kg (200 lb)       I/O (24 Hours)    Intake/Output Summary (Last 24 hours) at 4/3/2025 0953  Last data filed at 4/3/2025 0538  Gross per 24 hour   Intake 8638.86 ml   Output 1720 ml   Net 6918.86 ml       General Appearance  chronically sick looking, not in distress  HEENT - normocephalic, atraumatic, pink conjunctiva,  anicteric sclera  Neck - Supple, no mass  Lungs -  Bilateral   air entry, no rhonchi, no wheeze  Cardiovascular - Heart sounds are normal.     Abdomen - soft, not distended, nontender,   Neurologic -oriented  Skin - diffuse rash with areas of blisters crusting and excoriation  Extremities - right AKA  , left AKA,vac in place    MEDICATIONS:      ceFAZolin  2,000 mg IntraVENous Q8H    pantoprazole  40 mg Oral BID AC    sodium chloride flush  5-40 mL IntraVENous 2 times per day    [Held by provider] enoxaparin  40 mg SubCUTAneous Daily    methylPREDNISolone  0.5 mg/kg IntraVENous Daily    [Held by provider] cetirizine  10 mg Oral Daily    [Held by provider] aspirin  81 mg Oral Daily    atorvastatin  40 mg Oral Daily    [Held by provider] doxepin  50 mg Oral QPM    fenofibrate  160 mg Oral Daily    multivitamin  1 tablet Oral Daily    sertraline  50 mg Oral Daily    [Held by provider] tiZANidine  4 mg Oral TID      sodium chloride    family      Rolf Milli Escalona MD, 4/3/2025 9:53 AM

## 2025-04-03 NOTE — PROGRESS NOTES
Hospitalist Progress Note  Internal Medicine Resident      Patient: Kaycee Varela 65 y.o. female      Unit/Bed: 4K-20/020-A    Admit Date: 3/31/2025    Summary statement: PMH right BKA, HLD, anxiety/depression who presented with 3 weeks of diffuse maculopapular rash in setting of recent Bactrim, Levaquin use concerning for bullous pemphigoid.  Required left AKA due to splint propagation with consequent high infection risk, complicated by postop hypotension/blood loss requiring 2 units PRBC.    ASSESSMENT AND PLAN  Active Problems  Bullous pemphigoid: maculopapular rash, itchy, multiple scabs, recent history of Bactrim and Levaquin use.  Patient has previous history of blistering skin disorder.  Afebrile without leukocytosis.  Punch skin biopsy obtained via ID, sent to pathology  Analgesia morphine 2/4 mg IV every 2 hours as needed for moderate/severe pain  Continuing methylprednisolone 0.5 mg/kg IV daily  GI prophylaxis Protonix 40 mg twice daily  Benadryl 50 mg IV every 6 hours as needed  Continue Zyrtec 10 mg p.o.   Infectious disease consulted and following  Postoperative hemorrhage: Patient had noted hypotension MAP 55 with more than 200 cc bloody output from drain, hypotension refractory to albumin and 2 L NS, hemoglobin drop from 13.1 earlier in the day to 10.7 upon recheck, delivered 1 unit PRBC.  CBC recheck shows Hgb increased 10.7 => 11.5  Displaced left distal femur fracture s/p left AKA/2: Had recent closed treatment of left distal femur fracture with orthopedic surgery after fall on 3/4/25.  Upon this presentation, orthopedic surgery consulted due to concerns about the site, found that fracture has propagated due to tenting at the skin, orthopedic surgery has deemed high risk for further infection. Preoperative cardiac risk 3.9% as described below  S/p left AKA 4/2 evening; with deep drain placement and wound VAC applied.  Plan to resume Lovenox and aspirin later on postop day 2  Drain can be DC'd  polyethylene glycol, acetaminophen **OR** acetaminophen, diphenhydrAMINE    Exam:  BP (!) 113/53   Pulse 94   Temp 97.7 °F (36.5 °C)   Resp 18   Wt 90.7 kg (200 lb)   SpO2 97%   BMI 31.32 kg/m²   General: Mild distress, appears stated age.  Eyes:  PERRL. Conjunctivae/corneas clear.  HENT: Head normal appearing. Nares normal. Oral mucosa moist.  Hearing intact.   Neck: Supple, with full range of motion. Trachea midline.  No gross JVD appreciated.  Respiratory:  Normal effort. Clear to auscultation, without rales or wheezes or rhonchi.  Cardiovascular: Normal rate, regular rhythm with normal S1/S2 without murmurs.     Extremities: S/p right AKA, and left AKA.  Drain and wound VAC in place on LLE stump  Abdomen: Soft, non-tender, non-distended with normal bowel sounds.  Musculoskeletal: No joint swelling or tenderness. Normal tone. No abnormal movements.   Skin: Warm and dry.  Diffuse maculopapular rash, some scabbing lesions, some seeping lesions.  Neurologic:  No focal sensory/motor deficits in the upper or lower extremities. Cranial nerves:  grossly non-focal 2-12.     Psychiatric: Alert and oriented, flat affect, depressed mood.   Capillary Refill: Brisk,< 3 seconds.  Peripheral Pulses: +2 palpable, equal bilaterally.       Labs/Radiology: See chart or assessment above.     Electronically signed by Santo Colindres MD IM PGY-1 on 4/3/2025 at 6:59 AM  Case was discussed with Attending, Dr. Neptali Bills.

## 2025-04-03 NOTE — CONSENT
Informed Consent for Blood Component Transfusion Note    I have discussed with the daughter the rationale for blood component transfusion; its benefits in treating or preventing fatigue, organ damage, or death; and its risk which includes mild transfusion reactions, rare risk of blood borne infection, or more serious but rare reactions. I have discussed the alternatives to transfusion, including the risk and consequences of not receiving transfusion. The daughter had an opportunity to ask questions and had agreed to proceed with transfusion of blood components.    Electronically signed by Tomas Dotson MD on 4/3/25 at 2:55 AM EDT

## 2025-04-04 ENCOUNTER — APPOINTMENT (OUTPATIENT)
Dept: GENERAL RADIOLOGY | Age: 65
DRG: 580 | End: 2025-04-04
Payer: MEDICARE

## 2025-04-04 LAB
ANION GAP SERPL CALC-SCNC: 13 MEQ/L (ref 8–16)
BUN SERPL-MCNC: 15 MG/DL (ref 8–23)
CALCIUM SERPL-MCNC: 8.6 MG/DL (ref 8.8–10.2)
CHLORIDE SERPL-SCNC: 106 MEQ/L (ref 98–111)
CO2 SERPL-SCNC: 24 MEQ/L (ref 22–29)
CREAT SERPL-MCNC: 0.5 MG/DL (ref 0.5–0.9)
DEPRECATED RDW RBC AUTO: 58.2 FL (ref 35–45)
ERYTHROCYTE [DISTWIDTH] IN BLOOD BY AUTOMATED COUNT: 19.6 % (ref 11.5–14.5)
GFR SERPL CREATININE-BSD FRML MDRD: > 90 ML/MIN/1.73M2
GLUCOSE BLD STRIP.AUTO-MCNC: 48 MG/DL (ref 70–108)
GLUCOSE BLD STRIP.AUTO-MCNC: 63 MG/DL (ref 70–108)
GLUCOSE BLD STRIP.AUTO-MCNC: 89 MG/DL (ref 70–108)
GLUCOSE BLD STRIP.AUTO-MCNC: 90 MG/DL (ref 70–108)
GLUCOSE SERPL-MCNC: 69 MG/DL (ref 74–109)
HCT VFR BLD AUTO: 42.9 % (ref 37–47)
HGB BLD-MCNC: 13.3 GM/DL (ref 12–16)
MAGNESIUM SERPL-MCNC: 1.7 MG/DL (ref 1.6–2.6)
MCH RBC QN AUTO: 26.5 PG (ref 26–33)
MCHC RBC AUTO-ENTMCNC: 31 GM/DL (ref 32.2–35.5)
MCV RBC AUTO: 85.5 FL (ref 81–99)
PLATELET # BLD AUTO: 232 THOU/MM3 (ref 130–400)
PMV BLD AUTO: 9.7 FL (ref 9.4–12.4)
POTASSIUM SERPL-SCNC: 3.9 MEQ/L (ref 3.5–5.2)
RBC # BLD AUTO: 5.02 MILL/MM3 (ref 4.2–5.4)
SODIUM SERPL-SCNC: 143 MEQ/L (ref 135–145)
WBC # BLD AUTO: 5.8 THOU/MM3 (ref 4.8–10.8)

## 2025-04-04 PROCEDURE — 82948 REAGENT STRIP/BLOOD GLUCOSE: CPT

## 2025-04-04 PROCEDURE — 6360000002 HC RX W HCPCS

## 2025-04-04 PROCEDURE — 6370000000 HC RX 637 (ALT 250 FOR IP)

## 2025-04-04 PROCEDURE — 99233 SBSQ HOSP IP/OBS HIGH 50: CPT | Performed by: STUDENT IN AN ORGANIZED HEALTH CARE EDUCATION/TRAINING PROGRAM

## 2025-04-04 PROCEDURE — 74018 RADEX ABDOMEN 1 VIEW: CPT

## 2025-04-04 PROCEDURE — 51798 US URINE CAPACITY MEASURE: CPT

## 2025-04-04 PROCEDURE — 6370000000 HC RX 637 (ALT 250 FOR IP): Performed by: STUDENT IN AN ORGANIZED HEALTH CARE EDUCATION/TRAINING PROGRAM

## 2025-04-04 PROCEDURE — 36415 COLL VENOUS BLD VENIPUNCTURE: CPT

## 2025-04-04 PROCEDURE — 85027 COMPLETE CBC AUTOMATED: CPT

## 2025-04-04 PROCEDURE — 2500000003 HC RX 250 WO HCPCS

## 2025-04-04 PROCEDURE — 80048 BASIC METABOLIC PNL TOTAL CA: CPT

## 2025-04-04 PROCEDURE — 83735 ASSAY OF MAGNESIUM: CPT

## 2025-04-04 PROCEDURE — 2060000000 HC ICU INTERMEDIATE R&B

## 2025-04-04 RX ORDER — SENNOSIDES A AND B 8.6 MG/1
1 TABLET, FILM COATED ORAL 2 TIMES DAILY
Status: DISCONTINUED | OUTPATIENT
Start: 2025-04-04 | End: 2025-04-08 | Stop reason: HOSPADM

## 2025-04-04 RX ORDER — DOCUSATE SODIUM 100 MG/1
100 CAPSULE, LIQUID FILLED ORAL DAILY
Status: DISCONTINUED | OUTPATIENT
Start: 2025-04-04 | End: 2025-04-08 | Stop reason: HOSPADM

## 2025-04-04 RX ADMIN — SENNOSIDES 8.6 MG: 8.6 TABLET, FILM COATED ORAL at 20:12

## 2025-04-04 RX ADMIN — PANTOPRAZOLE SODIUM 40 MG: 40 TABLET, DELAYED RELEASE ORAL at 09:52

## 2025-04-04 RX ADMIN — ATORVASTATIN CALCIUM 40 MG: 40 TABLET, FILM COATED ORAL at 09:52

## 2025-04-04 RX ADMIN — DIPHENHYDRAMINE HYDROCHLORIDE 50 MG: 50 INJECTION INTRAMUSCULAR; INTRAVENOUS at 09:58

## 2025-04-04 RX ADMIN — MORPHINE SULFATE 4 MG: 4 INJECTION, SOLUTION INTRAMUSCULAR; INTRAVENOUS at 09:38

## 2025-04-04 RX ADMIN — Medication 1 TABLET: at 09:52

## 2025-04-04 RX ADMIN — PANTOPRAZOLE SODIUM 40 MG: 40 TABLET, DELAYED RELEASE ORAL at 15:44

## 2025-04-04 RX ADMIN — DOCUSATE SODIUM 100 MG: 100 CAPSULE, LIQUID FILLED ORAL at 15:44

## 2025-04-04 RX ADMIN — MORPHINE SULFATE 2 MG: 2 INJECTION, SOLUTION INTRAMUSCULAR; INTRAVENOUS at 06:22

## 2025-04-04 RX ADMIN — SODIUM CHLORIDE, PRESERVATIVE FREE 10 ML: 5 INJECTION INTRAVENOUS at 20:03

## 2025-04-04 RX ADMIN — SODIUM CHLORIDE, PRESERVATIVE FREE 10 ML: 5 INJECTION INTRAVENOUS at 09:52

## 2025-04-04 RX ADMIN — CETIRIZINE HYDROCHLORIDE 10 MG: 10 TABLET, FILM COATED ORAL at 09:52

## 2025-04-04 RX ADMIN — WATER 45.35 MG: 1 INJECTION INTRAMUSCULAR; INTRAVENOUS; SUBCUTANEOUS at 20:12

## 2025-04-04 RX ADMIN — SENNOSIDES 8.6 MG: 8.6 TABLET, FILM COATED ORAL at 15:43

## 2025-04-04 RX ADMIN — FENOFIBRATE 160 MG: 160 TABLET ORAL at 09:52

## 2025-04-04 RX ADMIN — SERTRALINE 50 MG: 50 TABLET, FILM COATED ORAL at 09:52

## 2025-04-04 ASSESSMENT — PAIN DESCRIPTION - DESCRIPTORS: DESCRIPTORS: ITCHING;SORE

## 2025-04-04 ASSESSMENT — PAIN SCALES - GENERAL
PAINLEVEL_OUTOF10: 8
PAINLEVEL_OUTOF10: 4

## 2025-04-04 ASSESSMENT — PAIN DESCRIPTION - LOCATION: LOCATION: OTHER (COMMENT)

## 2025-04-04 NOTE — PROGRESS NOTES
Progress note: Infectious diseases    Patient - Kaycee Varela,  Age - 65 y.o.    - 1960      Room Number - 4K-20/020-A   MRN -  [id number]   [facility] # - [id number]  Date of Admission -  3/31/2025 11:36 AM    SUBJECTIVE:   She has more clear blisters  Very emotional  OBJECTIVE   VITALS    weight is 89.2 kg (196 lb 10.4 oz). Her oral temperature is 98.2 °F (36.8 °C). Her blood pressure is 142/77 (abnormal) and her pulse is 90. Her respiration is 20 and oxygen saturation is 97%.       Wt Readings from Last 3 Encounters:   25 89.2 kg (196 lb 10.4 oz)   25 86.2 kg (190 lb 0.6 oz)   25 90.7 kg (200 lb)       I/O (24 Hours)    Intake/Output Summary (Last 24 hours) at 2025 0940  Last data filed at 2025 0349  Gross per 24 hour   Intake 1245.49 ml   Output 1030 ml   Net 215.49 ml       General Appearance  chronically sick looking, not in distress  HEENT - normocephalic, atraumatic, pink conjunctiva,  anicteric sclera  Neck - Supple, no mass  Lungs -  Bilateral   air entry, no rhonchi, no wheeze  Cardiovascular - Heart sounds are normal.     Abdomen - soft, not distended, nontender,   Neurologic -oriented  Skin - diffuse rash with areas of new blisters    Extremities - right AKA  , left AKA,vac in place            MEDICATIONS:      pantoprazole  40 mg Oral BID AC    sodium chloride flush  5-40 mL IntraVENous 2 times per day    [Held by provider] enoxaparin  40 mg SubCUTAneous Daily    methylPREDNISolone  0.5 mg/kg IntraVENous Daily    cetirizine  10 mg Oral Daily    [Held by provider] aspirin  81 mg Oral Daily    atorvastatin  40 mg Oral Daily    [Held by provider] doxepin  50 mg Oral QPM    fenofibrate  160 mg Oral Daily    multivitamin  1 tablet Oral Daily    sertraline  50 mg Oral Daily    [Held by provider] tiZANidine  4 mg Oral TID      sodium chloride      sodium chloride       sodium chloride,

## 2025-04-04 NOTE — PROGRESS NOTES
[facility]  OCCUPATIONAL THERAPY MISSED TREATMENT NOTE  [facility] ICU STEPDOWN TELEMETRY 4K  4K-20/020-A      Date: 2025  Patient Name: Kaycee Varela        CSN: [id number]   : 1960  (65 y.o.)  Gender: female                REASON FOR MISSED TREATMENT: Patient Refused.  Pt reports she is still in a lot of pain mostly due to blistering. Pt requests therapy continue tomorrow.

## 2025-04-04 NOTE — PROGRESS NOTES
Orthopaedic Progress Note      SUBJECTIVE   Ms. Varela is post op day #2 L AKA    Seen at bedside this morning  No new concerns, generally doing well, looking better this morning   Pain appears well controlled  Drain and vac intact  No phantom pain, no significant pain to extremity     OBJECTIVE      Physical    VITALS:  BP (!) 155/68   Pulse (!) 109   Temp 98.2 °F (36.8 °C) (Oral)   Resp 20   Wt 89.2 kg (196 lb 10.4 oz)   SpO2 95%   BMI 30.80 kg/m²   I/O last 3 completed shifts:  In: 9884.4 [I.V.:7476.2; Blood:311; IV Piggyback:7.1]  Out: 2750 [Urine:2350; Drains:300; Blood:100]    4/10 pain  Gen: alert and oriented  Head: normorcephalic, atraumatic  Resp: unlabored, NC  Pelvis: stable  LLE drain intact, vac battery had , no significant pooling at sponge, stable, hip ROM intact, skin manifestations stable       Data  CBC:   Lab Results   Component Value Date/Time    WBC 5.8 2025 08:22 AM    HGB 13.3 2025 08:22 AM     2025 08:22 AM     BMP:    Lab Results   Component Value Date/Time     2025 09:32 AM    K 3.9 2025 09:32 AM    K 4.5 2025 01:28 PM     2025 09:32 AM    CO2 24 2025 09:32 AM    BUN 15 2025 09:32 AM    CREATININE 0.5 2025 09:32 AM    CALCIUM 8.6 2025 09:32 AM    GLUCOSE 69 2025 09:32 AM    GLUCOSE 113 12/10/2020 10:01 AM     Uric Acid:  No components found for: \"URIC\"  PT/INR:    Lab Results   Component Value Date/Time    PROTIME 11.0 10/05/2016 03:39 PM    INR 0.97 2025 05:18 PM     Troponin:  No results found for: \"TROPONINI\"  Urine Culture:  No components found for: \"CURINE\"      Current Inpatient Medications    Current Facility-Administered Medications: senna (SENOKOT) tablet 8.6 mg, 1 tablet, Oral, BID  docusate sodium (COLACE) capsule 100 mg, 100 mg, Oral, Daily  [START ON 2025] naloxegol (MOVANTIK) tablet 12.5 mg, 12.5 mg, Oral, QAM AC  0.9 % sodium chloride infusion, , IntraVENous,  PRN  pantoprazole (PROTONIX) tablet 40 mg, 40 mg, Oral, BID AC  morphine (PF) injection 2 mg, 2 mg, IntraVENous, Q2H PRN **OR** morphine injection 4 mg, 4 mg, IntraVENous, Q2H PRN  sodium chloride flush 0.9 % injection 5-40 mL, 5-40 mL, IntraVENous, 2 times per day  sodium chloride flush 0.9 % injection 5-40 mL, 5-40 mL, IntraVENous, PRN  0.9 % sodium chloride infusion, , IntraVENous, PRN  potassium chloride (KLOR-CON M) extended release tablet 40 mEq, 40 mEq, Oral, PRN **OR** potassium bicarb-citric acid (EFFER-K) effervescent tablet 40 mEq, 40 mEq, Oral, PRN **OR** potassium chloride 10 mEq/100 mL IVPB (Peripheral Line), 10 mEq, IntraVENous, PRN  magnesium sulfate 2000 mg in 50 mL IVPB premix, 2,000 mg, IntraVENous, PRN  [Held by provider] enoxaparin (LOVENOX) injection 40 mg, 40 mg, SubCUTAneous, Daily  ondansetron (ZOFRAN-ODT) disintegrating tablet 4 mg, 4 mg, Oral, Q8H PRN **OR** ondansetron (ZOFRAN) injection 4 mg, 4 mg, IntraVENous, Q6H PRN  polyethylene glycol (GLYCOLAX) packet 17 g, 17 g, Oral, Daily PRN  acetaminophen (TYLENOL) tablet 650 mg, 650 mg, Oral, Q6H PRN **OR** acetaminophen (TYLENOL) suppository 650 mg, 650 mg, Rectal, Q6H PRN  diphenhydrAMINE (BENADRYL) injection 50 mg, 50 mg, IntraVENous, Q6H PRN  methylPREDNISolone sodium succ (SOLU-MEDROL) 45.35 mg in sterile water 0.7256 mL injection, 0.5 mg/kg, IntraVENous, Daily  cetirizine (ZYRTEC) tablet 10 mg, 10 mg, Oral, Daily  [Held by provider] aspirin chewable tablet 81 mg, 81 mg, Oral, Daily  atorvastatin (LIPITOR) tablet 40 mg, 40 mg, Oral, Daily  [Held by provider] doxepin (SINEQUAN) capsule 50 mg, 50 mg, Oral, QPM  fenofibrate tablet 160 mg, 160 mg, Oral, Daily  multivitamin 1 tablet, 1 tablet, Oral, Daily  sertraline (ZOLOFT) tablet 50 mg, 50 mg, Oral, Daily  [Held by provider] tiZANidine (ZANAFLEX) tablet 4 mg, 4 mg, Oral, TID        PLAN    Ms. Varela is post op day # 2 L AKA    Resume diet from ortho standpoint  Maintain wound vac, modified

## 2025-04-04 NOTE — PLAN OF CARE
Problem: Chronic Conditions and Co-morbidities  Goal: Patient's chronic conditions and co-morbidity symptoms are monitored and maintained or improved  Outcome: Progressing     Problem: Discharge Planning  Goal: Discharge to home or other facility with appropriate resources  Outcome: Progressing     Problem: Pain  Goal: Verbalizes/displays adequate comfort level or baseline comfort level  Outcome: Progressing     Problem: Safety - Adult  Goal: Free from fall injury  Outcome: Progressing     Problem: Skin/Tissue Integrity  Goal: Skin integrity remains intact  Description: 1.  Monitor for areas of redness and/or skin breakdown  2.  Assess vascular access sites hourly  3.  Every 4-6 hours minimum:  Change oxygen saturation probe site  4.  Every 4-6 hours:  If on nasal continuous positive airway pressure, respiratory therapy assess nares and determine need for appliance change or resting period  Outcome: Progressing     Problem: ABCDS Injury Assessment  Goal: Absence of physical injury  Outcome: Progressing     Care plan reviewed with patient.  Patient verbalize understanding of the plan of care and contribute to goal setting.

## 2025-04-04 NOTE — PROGRESS NOTES
Methylprednisolone  Labs (still needed?): [x]Yes / []No  IVF (still needed?): [x]Yes / []No    Level of care: [x]Step Down / []Med-Surg  Bed Status: [x]Inpatient / []Observation  Telemetry: [x]Yes / []No  PT/OT: []Yes / [x]No    DVT Prophylaxis: [x] Lovenox / [] Heparin / [] SCDs / [] Already on Systemic Anticoagulation / [] None     Expected discharge date: 4/7  Disposition: TBD     Code status: Full Code     ===================================================================    Chief Complaint: Diffuse rash  Subjective (past 24 hours): Patient seen and examined at bedside, no acute events overnight, no new complaints from patient. Will likely go back to [facility] when medically ready however will likely get drain / wound vac removed prior to that, ortho surg continues to follow    HPI / Hospital Course:  65-year-old female PMH right BKA, HLD, anxiety/depression, neuropathy presented with diffuse maculopapular rash.  Background history of fractured left leg 4 weeks ago on 3/12/2025 in an accident, was thereafter placed in a nursing home.  Patient shortly after started developing a maculopapular rash that began in her groin and spread diffusely throughout her entire body.  Patient reported associated itchiness with multiple scabs on healing lesions.  Refractory to short course of steroids and SNF.  Patient has no known allergies, alcohol or drug use.  She denied fever/chills, chest pain, SOB, dysuria.    Medications:    Infusion Medications    sodium chloride      sodium chloride      Scheduled Medications    pantoprazole  40 mg Oral BID AC    sodium chloride flush  5-40 mL IntraVENous 2 times per day    [Held by provider] enoxaparin  40 mg SubCUTAneous Daily    methylPREDNISolone  0.5 mg/kg IntraVENous Daily    cetirizine  10 mg Oral Daily    [Held by provider] aspirin  81 mg Oral Daily    atorvastatin  40 mg Oral Daily    [Held by provider] doxepin  50 mg Oral QPM    fenofibrate  160 mg Oral Daily     multivitamin  1 tablet Oral Daily    sertraline  50 mg Oral Daily    [Held by provider] tiZANidine  4 mg Oral TID    PRN Meds: sodium chloride, morphine **OR** morphine, sodium chloride flush, sodium chloride, potassium chloride **OR** potassium alternative oral replacement **OR** potassium chloride, magnesium sulfate, ondansetron **OR** ondansetron, polyethylene glycol, acetaminophen **OR** acetaminophen, diphenhydrAMINE    Exam:  BP (!) 142/77   Pulse 90   Temp 98.2 °F (36.8 °C) (Oral)   Resp 20   Wt 89.2 kg (196 lb 10.4 oz)   SpO2 97%   BMI 30.80 kg/m²   General: Mild distress, appears stated age.  Eyes:  PERRL. Conjunctivae/corneas clear.  HENT: Head normal appearing. Nares normal. Oral mucosa moist.  Hearing intact.   Neck: Supple, with full range of motion. Trachea midline.  No gross JVD appreciated.  Respiratory:  Normal effort. Clear to auscultation, without rales or wheezes or rhonchi.  Cardiovascular: Normal rate, regular rhythm with normal S1/S2 without murmurs.     Extremities: S/p right AKA, and left AKA.  Drain and wound VAC in place on LLE stump  Abdomen: Soft, non-tender, non-distended with normal bowel sounds.  Musculoskeletal: No joint swelling or tenderness. Normal tone. No abnormal movements.   Skin: Warm and dry.  Diffuse maculopapular rash, some scabbing lesions, some seeping lesions.  Neurologic:  No focal sensory/motor deficits in the upper or lower extremities. Cranial nerves:  grossly non-focal 2-12.     Psychiatric: Alert and oriented, flat affect, depressed mood.   Capillary Refill: Brisk,< 3 seconds.  Peripheral Pulses: +2 palpable, equal bilaterally.       Labs/Radiology: See chart or assessment above.     Electronically signed by Santo Colindres MD IM PGY-1 on 4/4/2025 at 7:47 AM  Case was discussed with Attending, Dr. Neptali Bills.

## 2025-04-04 NOTE — CARE COORDINATION
4/4/25, 9:15 AM EDT    Patient goals/plan/ treatment preferences discussed by  and .  Patient goals/plan/ treatment preferences reviewed with patient/ family.  Patient/ family verbalize understanding of discharge plan and are in agreement with goal/plan/treatment preferences.  Understanding was demonstrated using the teach back method.  AVS provided by RN at time of discharge, which includes all necessary medical information pertaining to the patients current course of illness, treatment, post-discharge goals of care, and treatment preferences.     Services At/After Discharge: Skilled Nursing Facility (SNF) [facility]    Patient to possibly discharge back to [facility] over the weekend.     SVEN notified Cindy at [facility] of potential discharge.    RN made aware and discharge instructions and transport forms placed on chart.       4/4/25, 10:36 AM EDT  DISCHARGE PLANNING EVALUATION    SVEN spoke with patient's spouse about discharge planning. He reported that he would like to see if there is a bed available at the [facility].     SVEN called Cindy at [facility] and she reported that they do not have any open beds.     SVEN notified family of no openings at [facility]. Agreeable to return to [facility].

## 2025-04-05 LAB
ANION GAP SERPL CALC-SCNC: 9 MEQ/L (ref 8–16)
BACTERIA BLD AEROBE CULT: NORMAL
BACTERIA BLD AEROBE CULT: NORMAL
BUN SERPL-MCNC: 14 MG/DL (ref 8–23)
CALCIUM SERPL-MCNC: 7.7 MG/DL (ref 8.8–10.2)
CHLORIDE SERPL-SCNC: 104 MEQ/L (ref 98–111)
CO2 SERPL-SCNC: 23 MEQ/L (ref 22–29)
CREAT SERPL-MCNC: 0.5 MG/DL (ref 0.5–0.9)
DEPRECATED RDW RBC AUTO: 59.6 FL (ref 35–45)
ERYTHROCYTE [DISTWIDTH] IN BLOOD BY AUTOMATED COUNT: 19.6 % (ref 11.5–14.5)
GFR SERPL CREATININE-BSD FRML MDRD: > 90 ML/MIN/1.73M2
GLUCOSE BLD STRIP.AUTO-MCNC: 75 MG/DL (ref 70–108)
GLUCOSE SERPL-MCNC: 100 MG/DL (ref 74–109)
HCT VFR BLD AUTO: 44 % (ref 37–47)
HGB BLD-MCNC: 13.5 GM/DL (ref 12–16)
MAGNESIUM SERPL-MCNC: 1.7 MG/DL (ref 1.6–2.6)
MCH RBC QN AUTO: 26.5 PG (ref 26–33)
MCHC RBC AUTO-ENTMCNC: 30.7 GM/DL (ref 32.2–35.5)
MCV RBC AUTO: 86.3 FL (ref 81–99)
PLATELET # BLD AUTO: 238 THOU/MM3 (ref 130–400)
PMV BLD AUTO: 10.1 FL (ref 9.4–12.4)
POTASSIUM SERPL-SCNC: 4.9 MEQ/L (ref 3.5–5.2)
RBC # BLD AUTO: 5.1 MILL/MM3 (ref 4.2–5.4)
SODIUM SERPL-SCNC: 136 MEQ/L (ref 135–145)
WBC # BLD AUTO: 5.3 THOU/MM3 (ref 4.8–10.8)

## 2025-04-05 PROCEDURE — 6370000000 HC RX 637 (ALT 250 FOR IP)

## 2025-04-05 PROCEDURE — 51798 US URINE CAPACITY MEASURE: CPT

## 2025-04-05 PROCEDURE — 99233 SBSQ HOSP IP/OBS HIGH 50: CPT | Performed by: STUDENT IN AN ORGANIZED HEALTH CARE EDUCATION/TRAINING PROGRAM

## 2025-04-05 PROCEDURE — 80048 BASIC METABOLIC PNL TOTAL CA: CPT

## 2025-04-05 PROCEDURE — 6370000000 HC RX 637 (ALT 250 FOR IP): Performed by: STUDENT IN AN ORGANIZED HEALTH CARE EDUCATION/TRAINING PROGRAM

## 2025-04-05 PROCEDURE — 82948 REAGENT STRIP/BLOOD GLUCOSE: CPT

## 2025-04-05 PROCEDURE — 36415 COLL VENOUS BLD VENIPUNCTURE: CPT

## 2025-04-05 PROCEDURE — 2060000000 HC ICU INTERMEDIATE R&B

## 2025-04-05 PROCEDURE — 6360000002 HC RX W HCPCS

## 2025-04-05 PROCEDURE — 2500000003 HC RX 250 WO HCPCS

## 2025-04-05 PROCEDURE — 97167 OT EVAL HIGH COMPLEX 60 MIN: CPT

## 2025-04-05 PROCEDURE — 97535 SELF CARE MNGMENT TRAINING: CPT

## 2025-04-05 PROCEDURE — 85027 COMPLETE CBC AUTOMATED: CPT

## 2025-04-05 PROCEDURE — 83735 ASSAY OF MAGNESIUM: CPT

## 2025-04-05 RX ADMIN — Medication 1 TABLET: at 08:45

## 2025-04-05 RX ADMIN — ACETAMINOPHEN 650 MG: 325 TABLET ORAL at 16:35

## 2025-04-05 RX ADMIN — DIPHENHYDRAMINE HYDROCHLORIDE 50 MG: 50 INJECTION INTRAMUSCULAR; INTRAVENOUS at 15:33

## 2025-04-05 RX ADMIN — SODIUM CHLORIDE, PRESERVATIVE FREE 10 ML: 5 INJECTION INTRAVENOUS at 08:42

## 2025-04-05 RX ADMIN — ATORVASTATIN CALCIUM 40 MG: 40 TABLET, FILM COATED ORAL at 08:45

## 2025-04-05 RX ADMIN — PANTOPRAZOLE SODIUM 40 MG: 40 TABLET, DELAYED RELEASE ORAL at 06:00

## 2025-04-05 RX ADMIN — SODIUM CHLORIDE, PRESERVATIVE FREE 10 ML: 5 INJECTION INTRAVENOUS at 19:43

## 2025-04-05 RX ADMIN — FENOFIBRATE 160 MG: 160 TABLET ORAL at 08:45

## 2025-04-05 RX ADMIN — SERTRALINE 50 MG: 50 TABLET, FILM COATED ORAL at 08:44

## 2025-04-05 RX ADMIN — PANTOPRAZOLE SODIUM 40 MG: 40 TABLET, DELAYED RELEASE ORAL at 15:33

## 2025-04-05 RX ADMIN — WATER 45.35 MG: 1 INJECTION INTRAMUSCULAR; INTRAVENOUS; SUBCUTANEOUS at 19:41

## 2025-04-05 RX ADMIN — CETIRIZINE HYDROCHLORIDE 10 MG: 10 TABLET, FILM COATED ORAL at 08:44

## 2025-04-05 RX ADMIN — MORPHINE SULFATE 4 MG: 4 INJECTION, SOLUTION INTRAMUSCULAR; INTRAVENOUS at 15:33

## 2025-04-05 RX ADMIN — DOCUSATE SODIUM 100 MG: 100 CAPSULE, LIQUID FILLED ORAL at 08:42

## 2025-04-05 RX ADMIN — ACETAMINOPHEN 650 MG: 325 TABLET ORAL at 08:56

## 2025-04-05 RX ADMIN — DIPHENHYDRAMINE HYDROCHLORIDE 50 MG: 50 INJECTION INTRAMUSCULAR; INTRAVENOUS at 02:17

## 2025-04-05 RX ADMIN — MORPHINE SULFATE 4 MG: 4 INJECTION, SOLUTION INTRAMUSCULAR; INTRAVENOUS at 01:24

## 2025-04-05 ASSESSMENT — PAIN SCALES - GENERAL
PAINLEVEL_OUTOF10: 3
PAINLEVEL_OUTOF10: 8
PAINLEVEL_OUTOF10: 7
PAINLEVEL_OUTOF10: 7

## 2025-04-05 ASSESSMENT — PAIN DESCRIPTION - ORIENTATION: ORIENTATION: RIGHT;LEFT

## 2025-04-05 ASSESSMENT — PAIN DESCRIPTION - DESCRIPTORS
DESCRIPTORS: BURNING
DESCRIPTORS: ACHING
DESCRIPTORS: ACHING

## 2025-04-05 ASSESSMENT — PAIN DESCRIPTION - LOCATION
LOCATION: GENERALIZED

## 2025-04-05 ASSESSMENT — PAIN - FUNCTIONAL ASSESSMENT: PAIN_FUNCTIONAL_ASSESSMENT: PREVENTS OR INTERFERES SOME ACTIVE ACTIVITIES AND ADLS

## 2025-04-05 NOTE — PLAN OF CARE
vascular access sites hourly  3.  Every 4-6 hours minimum:  Change oxygen saturation probe site  4.  Every 4-6 hours:  If on nasal continuous positive airway pressure, respiratory therapy assess nares and determine need for appliance change or resting period  4/5/2025 0021 by Mariia Marshall, RN  Outcome: Progressing  Flowsheets (Taken 4/5/2025 0021)  Skin Integrity Remains Intact:   Monitor for areas of redness and/or skin breakdown   Assess vascular access sites hourly     Problem: ABCDS Injury Assessment  Goal: Absence of physical injury  4/5/2025 0021 by Mariia Marshall, RN  Outcome: Progressing  Flowsheets (Taken 4/5/2025 0021)  Absence of Physical Injury: Implement safety measures based on patient assessment

## 2025-04-05 NOTE — PROGRESS NOTES
[facility]  INPATIENT OCCUPATIONAL THERAPY  STRZ ICU STEPDOWN TELEMETRY 4K  EVALUATION      Discharge Recommendations: Continue to assess pending progress, Subacute/Skilled Nursing Facility, Patient would benefit from continued therapy after discharge, 24 hour supervision or assist  Equipment Recommendations: No continue to monitor      Time In: 1427  Time Out: 1501  Timed Code Treatment Minutes: 23 Minutes  Minutes: 34          Date: 2025  Patient Name: Kaycee Varela,   Gender: female      MRN: [id number]  : 1960  (65 y.o.)  Referring Practitioner: Neptali Bills MD  Diagnosis: Maculopapular rash  Additional Pertinent Hx: 65-year-old female PMH right BKA, HLD, anxiety/depression, neuropathy presented with diffuse maculopapular rash.  Background history of fractured left leg 4 weeks ago on 3/12/2025 in an accident, was thereafter placed in a nursing home.  Patient shortly after started developing a maculopapular rash that began in her groin and spread diffusely throughout her entire body.  Patient reported associated itchiness with multiple scabs on healing lesions.  Refractory to short course of steroids and SNF.  Patient has no known allergies, alcohol or drug use.  She denied fever/chills, chest pain, SOB, dysuria. Required left AKA due to splint propagation with consequent high infection risk, complicated by postop hypotension/blood loss requiring 2 units PRBC.    Restrictions/Precautions:  Restrictions/Precautions: Weight Bearing, Fall Risk, Contact Precautions  Right Lower Extremity Weight Bearing: Non Weight Bearing  Left Lower Extremity Weight Bearing: Non Weight Bearing  Position Activity Restriction  Other Position/Activity Restrictions: B AKA, drains L LE, Contct precautions d/t Maculopapular rash    Subjective  Chart Reviewed: Yes, Orders, Progress Notes, History and Physical, Operative Notes  Patient assessed for rehabilitation services?: Yes  Family / Caregiver Present:  Per Week: 6x  Current Treatment Recommendations: Strengthening, Balance training, ROM, Functional mobility training, Endurance training, Home management training, Self-Care / ADL.  See long-term goal time frame for expected duration of plan of care.  If no long-term goals established, a short length of stay is anticipated.    Goals:     Short Term Goals  Time Frame for Short Term Goals: at discharge  Short Term Goal 1: Pt will complete BUE HEP x 20 reps with 0 vcs for technique to increase indep and endurance with all self cares and functional transfers.  Short Term Goal 2: Pt will demo supine<> sit t/fs mod A in prep for slide board.  Short Term Goal 3: Pt will complete self care tasks set up A to increase indep with ADLs  Short Term Goal 4: Pt will tolerate further assessment of slide baord transfers.  Short Term Goal 5: Pt will sit EOB >5 minutes CGA to increase core strength in prep for slide baord t/fs  Additional Goals?: No  Long Term Goals  Time Frame for Long Term Goals : not set d/t short ELOS    AM-[facility] Inpatient Daily Activity Raw Score: 12  AM-[facility] Inpatient ADL T-Scale Score : 30.6    Following session, patient left in safe position with all fall risk precautions in place.

## 2025-04-05 NOTE — PROGRESS NOTES
Hospitalist Progress Note  Internal Medicine Resident      Patient: Kaycee Varela 65 y.o. female      Unit/Bed: -20/020-A    Admit Date: 3/31/2025    Summary statement: PMH right BKA, HLD, anxiety/depression who presented with 3 weeks of diffuse maculopapular rash in setting of recent Bactrim, Levaquin use concerning for bullous pemphigoid.  Required left AKA due to splint propagation with consequent high infection risk, complicated by postop hypotension/blood loss requiring 2 units PRBC.    ASSESSMENT AND PLAN  Active Problems  Suspected bullous pemphigoid: maculopapular rash, itchy, multiple scabs, recent history of Bactrim and Levaquin use.  Patient has previous history of blistering skin disorder.  Afebrile without leukocytosis.  Punch skin biopsy obtained via ID, sent to pathology, awaiting results  Analgesia morphine 2/4 mg IV every 2 hours as needed for moderate/severe pain  Continuing methylprednisolone 0.5 mg/kg IV daily  GI prophylaxis Protonix 40 mg twice daily  Benadryl 50 mg IV every 6 hours as needed  Continue Zyrtec 10 mg p.o.   Infectious disease consulted and following, will consider antibiotics based on results of pathology report  Urinary retention, improved: patient has had intermittent issues with urinary retention, had required straight cath x1 which drained 700cc urine.  Urine output on 4/4 exceeded 1 L.  Straight cath for >300cc retention   If requires straight cath again, consider urology consult   Sinus tachycardia, improved: Mild sinus tachycardia overnight with HR up to 109, HR returned to WNL by morning.  Postoperative hemorrhage, stable: Patient had noted hypotension MAP 55 with more than 200 cc bloody output from drain, hypotension refractory to albumin and 2 L NS, hemoglobin drop from 13.1 earlier in the day to 10.7 upon recheck, delivered 1 unit PRBC.  4/5 Hgb stable and WNL.  Trend daily CBC  Displaced left distal femur fracture s/p left AKA/2: Had recent closed treatment of

## 2025-04-05 NOTE — PROGRESS NOTES
Progress note: Infectious diseases    Patient - Kaycee Varela,  Age - 65 y.o.    - 1960      Room Number - 4K-20/020-A   MRN -  [id number]   [facility] # - [id number]  Date of Admission -  3/31/2025 11:36 AM    SUBJECTIVE:   No new issues  OBJECTIVE   VITALS    weight is 89.2 kg (196 lb 10.4 oz). Her oral temperature is 97.6 °F (36.4 °C). Her blood pressure is 138/57 (abnormal) and her pulse is 90. Her respiration is 18 and oxygen saturation is 97%.       Wt Readings from Last 3 Encounters:   25 89.2 kg (196 lb 10.4 oz)   25 86.2 kg (190 lb 0.6 oz)   25 90.7 kg (200 lb)       I/O (24 Hours)    Intake/Output Summary (Last 24 hours) at 2025 1357  Last data filed at 2025 0354  Gross per 24 hour   Intake 540 ml   Output 1195 ml   Net -655 ml       General Appearance  chronically sick looking, not in distress  HEENT - normocephalic, atraumatic, pink conjunctiva,  anicteric sclera  Neck - Supple, no mass  Lungs -  Bilateral   air entry, no rhonchi, no wheeze  Cardiovascular - Heart sounds are normal.     Abdomen - soft, not distended, nontender,   Neurologic -oriented  Skin - diffuse rash with areas of new blisters    Extremities - right AKA  , left AKA,vac in place  MEDICATIONS:      senna  1 tablet Oral BID    docusate sodium  100 mg Oral Daily    naloxegol  12.5 mg Oral QAM AC    pantoprazole  40 mg Oral BID AC    sodium chloride flush  5-40 mL IntraVENous 2 times per day    [Held by provider] enoxaparin  40 mg SubCUTAneous Daily    methylPREDNISolone  0.5 mg/kg IntraVENous Daily    cetirizine  10 mg Oral Daily    [Held by provider] aspirin  81 mg Oral Daily    atorvastatin  40 mg Oral Daily    [Held by provider] doxepin  50 mg Oral QPM    fenofibrate  160 mg Oral Daily    multivitamin  1 tablet Oral Daily    sertraline  50 mg Oral Daily    [Held by provider] tiZANidine  4 mg Oral TID      sodium  chloride      sodium chloride       sodium chloride, morphine **OR** morphine, sodium chloride flush, sodium chloride, potassium chloride **OR** potassium alternative oral replacement **OR** potassium chloride, magnesium sulfate, ondansetron **OR** ondansetron, polyethylene glycol, acetaminophen **OR** acetaminophen, diphenhydrAMINE      LABS:     CBC:   Recent Labs     04/03/25  0655 04/03/25  1202 04/04/25  0822 04/05/25  0509   WBC 5.7  --  5.8 5.3   HGB 11.5* 12.4 13.3 13.5     --  232 238     BMP:    Recent Labs     04/03/25  0655 04/04/25  0932 04/05/25  0509    143 136   K 4.2 3.9 4.9    106 104   CO2 23 24 23   BUN 21 15 14   CREATININE 0.6 0.5 0.5   GLUCOSE 105 69* 100     Calcium:  Recent Labs     04/05/25  0509   CALCIUM 7.7*     Ionized Calcium:Invalid input(s): \"IONCA\"  Magnesium:  Recent Labs     04/05/25  0509   MG 1.7     Phosphorus:No results for input(s): \"PHOS\" in the last 72 hours.  BNP:No results for input(s): \"BNP\" in the last 72 hours.  Glucose:  Recent Labs     04/04/25  1235 04/04/25  2019 04/05/25  0609   POCGLU 89 90 75     HgbA1C: No results for input(s): \"LABA1C\" in the last 72 hours.  INR: No results for input(s): \"INR\" in the last 72 hours.  Hepatic:   Recent Labs     04/03/25  0655   ALKPHOS 43   ALT 12   AST 37*   BILITOT 0.7   BILIDIR 0.5*     Amylase and Lipase:  Recent Labs     04/03/25  0151   LACTA 0.8     Lactic Acid:   Recent Labs     04/03/25  0151   LACTA 0.8     Troponin:   No results for input(s): \"CKTOTAL\", \"CKMB\", \"TROPONINI\" in the last 72 hours.    BNP: No results for input(s): \"BNP\" in the last 72 hours.    CULTURES:   UA:   No results for input(s): \"SPECGRAV\", \"PHUR\", \"COLORU\", \"CLARITYU\", \"MUCUS\", \"PROTEINU\", \"BLOODU\", \"RBCUA\", \"WBCUA\", \"BACTERIA\", \"NITRU\", \"GLUCOSEU\", \"BILIRUBINUR\", \"UROBILINOGEN\", \"KETUA\", \"LABCAST\", \"LABCASTTY\", \"AMORPHOS\" in the last 72 hours.    Invalid input(s): \"CRYSTALS\"    Micro:   Lab Results   Component Value Date/Time

## 2025-04-05 NOTE — PROGRESS NOTES
Orthopaedic Progress Note      SUBJECTIVE   Ms. Varela is post op day # 3     Patient s/p left above knee amputation. Has had minimal output to wound vac as well as minimal output to hemovac. No new ortho concerns. No acute events overnight.      OBJECTIVE      Physical    VITALS:  BP (!) 138/57   Pulse 90   Temp 97.6 °F (36.4 °C) (Oral)   Resp 18   Wt 89.2 kg (196 lb 10.4 oz)   SpO2 97%   BMI 30.80 kg/m²   I/O last 3 completed shifts:  In: 840 [P.O.:840]  Out: 1745 [Urine:1650; Drains:95]      LLE:  Hemovac has minimal bloody output at this time. Just slight blood noted in Prevena wound vac system. Wound vac clean, dry, and without leaks around the incision site. Compartments in thigh soft.       Data  CBC:   Lab Results   Component Value Date/Time    WBC 5.3 04/05/2025 05:09 AM    HGB 13.5 04/05/2025 05:09 AM     04/05/2025 05:09 AM     BMP:    Lab Results   Component Value Date/Time     04/05/2025 05:09 AM    K 4.9 04/05/2025 05:09 AM    K 4.5 03/31/2025 01:28 PM     04/05/2025 05:09 AM    CO2 23 04/05/2025 05:09 AM    BUN 14 04/05/2025 05:09 AM    CREATININE 0.5 04/05/2025 05:09 AM    CALCIUM 7.7 04/05/2025 05:09 AM    GLUCOSE 100 04/05/2025 05:09 AM    GLUCOSE 113 12/10/2020 10:01 AM     Uric Acid:  No components found for: \"URIC\"  PT/INR:    Lab Results   Component Value Date/Time    PROTIME 11.0 10/05/2016 03:39 PM    INR 0.97 03/04/2025 05:18 PM     Troponin:  No results found for: \"TROPONINI\"  Urine Culture:  No components found for: \"CURINE\"      Current Inpatient Medications    Current Facility-Administered Medications: senna (SENOKOT) tablet 8.6 mg, 1 tablet, Oral, BID  docusate sodium (COLACE) capsule 100 mg, 100 mg, Oral, Daily  naloxegol (MOVANTIK) tablet 12.5 mg, 12.5 mg, Oral, QAM AC  0.9 % sodium chloride infusion, , IntraVENous, PRN  pantoprazole (PROTONIX) tablet 40 mg, 40 mg, Oral, BID AC  morphine (PF) injection 2 mg, 2 mg, IntraVENous, Q2H PRN **OR** morphine injection

## 2025-04-06 LAB
ANION GAP SERPL CALC-SCNC: 11 MEQ/L (ref 8–16)
BUN SERPL-MCNC: 17 MG/DL (ref 8–23)
CALCIUM SERPL-MCNC: 7.9 MG/DL (ref 8.8–10.2)
CHLORIDE SERPL-SCNC: 104 MEQ/L (ref 98–111)
CO2 SERPL-SCNC: 22 MEQ/L (ref 22–29)
CREAT SERPL-MCNC: 0.5 MG/DL (ref 0.5–0.9)
DEPRECATED RDW RBC AUTO: 56 FL (ref 35–45)
EKG ATRIAL RATE: 115 BPM
EKG P AXIS: 68 DEGREES
EKG P-R INTERVAL: 124 MS
EKG Q-T INTERVAL: 356 MS
EKG QRS DURATION: 106 MS
EKG QTC CALCULATION (BAZETT): 492 MS
EKG R AXIS: 52 DEGREES
EKG T AXIS: 24 DEGREES
EKG VENTRICULAR RATE: 115 BPM
ERYTHROCYTE [DISTWIDTH] IN BLOOD BY AUTOMATED COUNT: 19.6 % (ref 11.5–14.5)
GFR SERPL CREATININE-BSD FRML MDRD: > 90 ML/MIN/1.73M2
GLUCOSE SERPL-MCNC: 139 MG/DL (ref 74–109)
HCT VFR BLD AUTO: 44.3 % (ref 37–47)
HGB BLD-MCNC: 14 GM/DL (ref 12–16)
MAGNESIUM SERPL-MCNC: 1.5 MG/DL (ref 1.6–2.6)
MCH RBC QN AUTO: 26.5 PG (ref 26–33)
MCHC RBC AUTO-ENTMCNC: 31.6 GM/DL (ref 32.2–35.5)
MCV RBC AUTO: 83.9 FL (ref 81–99)
PLATELET # BLD AUTO: 270 THOU/MM3 (ref 130–400)
PMV BLD AUTO: 10.1 FL (ref 9.4–12.4)
POTASSIUM SERPL-SCNC: 3.9 MEQ/L (ref 3.5–5.2)
RBC # BLD AUTO: 5.28 MILL/MM3 (ref 4.2–5.4)
SODIUM SERPL-SCNC: 137 MEQ/L (ref 135–145)
WBC # BLD AUTO: 7.1 THOU/MM3 (ref 4.8–10.8)

## 2025-04-06 PROCEDURE — 6360000002 HC RX W HCPCS

## 2025-04-06 PROCEDURE — 97163 PT EVAL HIGH COMPLEX 45 MIN: CPT

## 2025-04-06 PROCEDURE — 6370000000 HC RX 637 (ALT 250 FOR IP)

## 2025-04-06 PROCEDURE — 6370000000 HC RX 637 (ALT 250 FOR IP): Performed by: STUDENT IN AN ORGANIZED HEALTH CARE EDUCATION/TRAINING PROGRAM

## 2025-04-06 PROCEDURE — 85027 COMPLETE CBC AUTOMATED: CPT

## 2025-04-06 PROCEDURE — 83735 ASSAY OF MAGNESIUM: CPT

## 2025-04-06 PROCEDURE — 2060000000 HC ICU INTERMEDIATE R&B

## 2025-04-06 PROCEDURE — 93010 ELECTROCARDIOGRAM REPORT: CPT | Performed by: INTERNAL MEDICINE

## 2025-04-06 PROCEDURE — 93005 ELECTROCARDIOGRAM TRACING: CPT

## 2025-04-06 PROCEDURE — 51798 US URINE CAPACITY MEASURE: CPT

## 2025-04-06 PROCEDURE — 6360000002 HC RX W HCPCS: Performed by: STUDENT IN AN ORGANIZED HEALTH CARE EDUCATION/TRAINING PROGRAM

## 2025-04-06 PROCEDURE — 2500000003 HC RX 250 WO HCPCS

## 2025-04-06 PROCEDURE — 36415 COLL VENOUS BLD VENIPUNCTURE: CPT

## 2025-04-06 PROCEDURE — 99233 SBSQ HOSP IP/OBS HIGH 50: CPT | Performed by: STUDENT IN AN ORGANIZED HEALTH CARE EDUCATION/TRAINING PROGRAM

## 2025-04-06 PROCEDURE — 97530 THERAPEUTIC ACTIVITIES: CPT

## 2025-04-06 PROCEDURE — 80048 BASIC METABOLIC PNL TOTAL CA: CPT

## 2025-04-06 RX ORDER — BUMETANIDE 0.25 MG/ML
1 INJECTION, SOLUTION INTRAMUSCULAR; INTRAVENOUS ONCE
Status: COMPLETED | OUTPATIENT
Start: 2025-04-06 | End: 2025-04-06

## 2025-04-06 RX ORDER — OXYCODONE HYDROCHLORIDE 5 MG/1
5 TABLET ORAL EVERY 4 HOURS PRN
Refills: 0 | Status: DISCONTINUED | OUTPATIENT
Start: 2025-04-06 | End: 2025-04-08 | Stop reason: HOSPADM

## 2025-04-06 RX ORDER — OXYCODONE HYDROCHLORIDE 5 MG/1
10 TABLET ORAL EVERY 4 HOURS PRN
Refills: 0 | Status: DISCONTINUED | OUTPATIENT
Start: 2025-04-06 | End: 2025-04-08 | Stop reason: HOSPADM

## 2025-04-06 RX ORDER — MAGNESIUM SULFATE IN WATER 40 MG/ML
2000 INJECTION, SOLUTION INTRAVENOUS ONCE
Status: DISCONTINUED | OUTPATIENT
Start: 2025-04-06 | End: 2025-04-06

## 2025-04-06 RX ADMIN — PANTOPRAZOLE SODIUM 40 MG: 40 TABLET, DELAYED RELEASE ORAL at 05:27

## 2025-04-06 RX ADMIN — CETIRIZINE HYDROCHLORIDE 10 MG: 10 TABLET, FILM COATED ORAL at 08:13

## 2025-04-06 RX ADMIN — ENOXAPARIN SODIUM 40 MG: 100 INJECTION SUBCUTANEOUS at 08:13

## 2025-04-06 RX ADMIN — Medication 1 TABLET: at 08:13

## 2025-04-06 RX ADMIN — ASPIRIN 81 MG: 81 TABLET, CHEWABLE ORAL at 08:13

## 2025-04-06 RX ADMIN — MORPHINE SULFATE 2 MG: 2 INJECTION, SOLUTION INTRAMUSCULAR; INTRAVENOUS at 12:08

## 2025-04-06 RX ADMIN — MORPHINE SULFATE 4 MG: 4 INJECTION, SOLUTION INTRAMUSCULAR; INTRAVENOUS at 02:15

## 2025-04-06 RX ADMIN — WATER 45.35 MG: 1 INJECTION INTRAMUSCULAR; INTRAVENOUS; SUBCUTANEOUS at 20:25

## 2025-04-06 RX ADMIN — SENNOSIDES 8.6 MG: 8.6 TABLET, FILM COATED ORAL at 20:25

## 2025-04-06 RX ADMIN — DIPHENHYDRAMINE HYDROCHLORIDE 50 MG: 50 INJECTION INTRAMUSCULAR; INTRAVENOUS at 02:15

## 2025-04-06 RX ADMIN — DIPHENHYDRAMINE HYDROCHLORIDE 50 MG: 50 INJECTION INTRAMUSCULAR; INTRAVENOUS at 20:25

## 2025-04-06 RX ADMIN — PANTOPRAZOLE SODIUM 40 MG: 40 TABLET, DELAYED RELEASE ORAL at 15:42

## 2025-04-06 RX ADMIN — MORPHINE SULFATE 2 MG: 2 INJECTION, SOLUTION INTRAMUSCULAR; INTRAVENOUS at 15:39

## 2025-04-06 RX ADMIN — DOCUSATE SODIUM 100 MG: 100 CAPSULE, LIQUID FILLED ORAL at 08:13

## 2025-04-06 RX ADMIN — SERTRALINE 50 MG: 50 TABLET, FILM COATED ORAL at 08:13

## 2025-04-06 RX ADMIN — SODIUM CHLORIDE, PRESERVATIVE FREE 10 ML: 5 INJECTION INTRAVENOUS at 20:31

## 2025-04-06 RX ADMIN — MAGNESIUM SULFATE HEPTAHYDRATE 2000 MG: 40 INJECTION, SOLUTION INTRAVENOUS at 05:31

## 2025-04-06 RX ADMIN — SENNOSIDES 8.6 MG: 8.6 TABLET, FILM COATED ORAL at 08:13

## 2025-04-06 RX ADMIN — ATORVASTATIN CALCIUM 40 MG: 40 TABLET, FILM COATED ORAL at 08:13

## 2025-04-06 RX ADMIN — DIPHENHYDRAMINE HYDROCHLORIDE 50 MG: 50 INJECTION INTRAMUSCULAR; INTRAVENOUS at 12:03

## 2025-04-06 RX ADMIN — FENOFIBRATE 160 MG: 160 TABLET ORAL at 08:13

## 2025-04-06 RX ADMIN — OXYCODONE 10 MG: 5 TABLET ORAL at 22:52

## 2025-04-06 RX ADMIN — BUMETANIDE 1 MG: 0.25 INJECTION INTRAMUSCULAR; INTRAVENOUS at 10:51

## 2025-04-06 ASSESSMENT — PAIN DESCRIPTION - DESCRIPTORS
DESCRIPTORS: ACHING
DESCRIPTORS: BURNING

## 2025-04-06 ASSESSMENT — PAIN SCALES - GENERAL
PAINLEVEL_OUTOF10: 8
PAINLEVEL_OUTOF10: 4
PAINLEVEL_OUTOF10: 6
PAINLEVEL_OUTOF10: 8
PAINLEVEL_OUTOF10: 0

## 2025-04-06 ASSESSMENT — PAIN DESCRIPTION - LOCATION
LOCATION: GENERALIZED

## 2025-04-06 ASSESSMENT — PAIN - FUNCTIONAL ASSESSMENT: PAIN_FUNCTIONAL_ASSESSMENT: PREVENTS OR INTERFERES SOME ACTIVE ACTIVITIES AND ADLS

## 2025-04-06 NOTE — PROGRESS NOTES
[facility]  INPATIENT PHYSICAL THERAPY  EVALUATION  STRZ ICU STEPDOWN TELEMETRY 4K - 4K-20/020-A    Discharge Recommendations: Subacute/Skilled Nursing Facility  Equipment Recommendations: No               Time In: 1134  Time Out: 1210  Timed Code Treatment Minutes: 26 Minutes  Minutes: 36          Date: 2025  Patient Name: Kaycee Varela,  Gender:  female        MRN: [id number]  : 1960  (65 y.o.)      Referring Practitioner: Neptali Bills MD  Diagnosis: Maculopapular rash  Additional Pertinent Hx: Per EMR:65-year-old female PMH right BKA, HLD, anxiety/depression, neuropathy presented with diffuse maculopapular rash.  Background history of fractured left leg 4 weeks ago on 3/12/2025 in an accident, was thereafter placed in a nursing home.  Patient shortly after started developing a maculopapular rash that began in her groin and spread diffusely throughout her entire body.  Patient reported associated itchiness with multiple scabs on healing lesions.  Refractory to short course of steroids and SNF.  Patient has no known allergies, alcohol or drug use.  She denied fever/chills, chest pain, SOB, dysuria. Required left AKA due to splint propagation with consequent high infection risk, complicated by postop hypotension/blood loss requiring 2 units PRBC. Pt s/p L AKA .     Restrictions/Precautions:  Restrictions/Precautions: Weight Bearing, Fall Risk, Contact Precautions  Right Lower Extremity Weight Bearing: Non Weight Bearing  Left Lower Extremity Weight Bearing: Non Weight Bearing    Other Position/Activity Restrictions: B AKA, drains L LE, Contct precautions d/t Maculopapular rash, B hip flexor contracture           Subjective:  Chart Reviewed: Yes  Patient assessed for rehabilitation services?: Yes  Family/Caregiver Present: Yes ()  Subjective: RN approved evaluation, pt agreed as well. Her  stepped out for mobility assessment. Discussed importance of mobility but also

## 2025-04-06 NOTE — PLAN OF CARE
Problem: Chronic Conditions and Co-morbidities  Goal: Patient's chronic conditions and co-morbidity symptoms are monitored and maintained or improved  Outcome: Progressing  Flowsheets (Taken 4/6/2025 0259)  Care Plan - Patient's Chronic Conditions and Co-Morbidity Symptoms are Monitored and Maintained or Improved:   Monitor and assess patient's chronic conditions and comorbid symptoms for stability, deterioration, or improvement   Collaborate with multidisciplinary team to address chronic and comorbid conditions and prevent exacerbation or deterioration     Problem: Discharge Planning  Goal: Discharge to home or other facility with appropriate resources  Outcome: Progressing  Flowsheets (Taken 4/6/2025 0259)  Discharge to home or other facility with appropriate resources:   Identify barriers to discharge with patient and caregiver   Arrange for needed discharge resources and transportation as appropriate   Identify discharge learning needs (meds, wound care, etc)     Problem: Pain  Goal: Verbalizes/displays adequate comfort level or baseline comfort level  Outcome: Progressing  Flowsheets (Taken 4/6/2025 0259)  Verbalizes/displays adequate comfort level or baseline comfort level:   Encourage patient to monitor pain and request assistance   Assess pain using appropriate pain scale   Administer analgesics based on type and severity of pain and evaluate response   Implement non-pharmacological measures as appropriate and evaluate response     Problem: Safety - Adult  Goal: Free from fall injury  Outcome: Progressing  Flowsheets (Taken 4/6/2025 0259)  Free From Fall Injury: Instruct family/caregiver on patient safety     Problem: Skin/Tissue Integrity  Goal: Skin integrity remains intact  Description: 1.  Monitor for areas of redness and/or skin breakdown  2.  Assess vascular access sites hourly  3.  Every 4-6 hours minimum:  Change oxygen saturation probe site  4.  Every 4-6 hours:  If on nasal continuous positive

## 2025-04-06 NOTE — PROGRESS NOTES
Hospitalist Progress Note  Internal Medicine Resident      Patient: Kaycee Varela 65 y.o. female      Unit/Bed: -20/020-A    Admit Date: 3/31/2025    Summary statement: PMH right BKA, HLD, anxiety/depression who presented with 3 weeks of diffuse maculopapular rash in setting of recent Bactrim, Levaquin use concerning for bullous pemphigoid.  Required left AKA due to splint propagation with consequent high infection risk, complicated by postop hypotension/blood loss requiring 2 units PRBC.    ASSESSMENT AND PLAN  Active Problems  Suspected bullous pemphigoid: maculopapular rash, itchy, multiple scabs, recent history of Bactrim and Levaquin use.  Patient has previous history of blistering skin disorder.  Afebrile without leukocytosis.  Punch skin biopsy obtained via ID, sent to pathology, awaiting results  Analgesia regimen amended: Roxicodone 5 mg / 10 mg p.o. for moderate/severe pain with morphine 2 mg IV available for breakthrough pain refractory to oxycodone  Continuing methylprednisolone 0.5 mg/kg IV daily  GI prophylaxis Protonix 40 mg twice daily  Benadryl 50 mg IV every 6 hours as needed  Continue Zyrtec 10 mg p.o.   Infectious disease consulted and following, will consider antibiotics based on results of pathology report  Hypomagnesemia: Morning labs showed magnesium low at 1.5, attempted repletion per protocol however issues with IV precluded that, patient refused attempt to place new IV.  Education was provided on importance of electrolyte repletion, patient verbalized understanding but did not change decision.  Trend mag daily  Urinary retention, improved: patient has had intermittent issues with urinary retention, had required straight cath x1 which drained 700cc urine.  Documented urine output on 4/5 was around 900 cc.  Straight cath for >300cc retention   If requires straight cath again, consider urology consult   Sinus tachycardia, improved: Patient has had mild sinus tachycardia overnight on

## 2025-04-06 NOTE — PROGRESS NOTES
Patient called and stated that IV is hurting so bad again while Magnesium IV is running, requested to take the IV out. IV does not seem to be infiltrated but patient has very fragile skin with rashes, blisters and scabs all over. Patient's IV also went bad last night and this RN tried to put IV twice but unsuccessful. Charge RN was able to put one into the right wrist with a vein finder.    This RN stopped ongoing magnesium replacement and removed IV, called resource nurse to insert a new one. Ice packs placed. Patient requested not to put a new IV too soon because of being poked a lot. Explained the importance of replacing magnesium, patient verbalized understanding.

## 2025-04-06 NOTE — PROCEDURES
PROCEDURE NOTE  Date: 4/6/2025   Name: Kaycee Varela  YOB: 1960    ProceduresEKG completed, given to RN

## 2025-04-07 ENCOUNTER — APPOINTMENT (OUTPATIENT)
Age: 65
DRG: 580 | End: 2025-04-07
Attending: STUDENT IN AN ORGANIZED HEALTH CARE EDUCATION/TRAINING PROGRAM
Payer: MEDICARE

## 2025-04-07 LAB
ANION GAP SERPL CALC-SCNC: 11 MEQ/L (ref 8–16)
BUN SERPL-MCNC: 19 MG/DL (ref 8–23)
CA-I BLD ISE-SCNC: 0.93 MMOL/L (ref 1.12–1.32)
CALCIUM SERPL-MCNC: 8 MG/DL (ref 8.8–10.2)
CHLORIDE SERPL-SCNC: 105 MEQ/L (ref 98–111)
CO2 SERPL-SCNC: 24 MEQ/L (ref 22–29)
CREAT SERPL-MCNC: 0.6 MG/DL (ref 0.5–0.9)
DEPRECATED RDW RBC AUTO: 55.3 FL (ref 35–45)
ECHO AO ASC DIAM: 3 CM
ECHO AV CUSP MM: 2.4 CM
ECHO AV PEAK GRADIENT: 9 MMHG
ECHO AV PEAK VELOCITY: 1.5 M/S
ECHO AV VELOCITY RATIO: 0.87
ECHO LA AREA 2C: 13.6 CM2
ECHO LA AREA 4C: 14.2 CM2
ECHO LA DIAMETER: 3.3 CM
ECHO LA MAJOR AXIS: 5.4 CM
ECHO LA MINOR AXIS: 5 CM
ECHO LA VOL BP: 32 ML (ref 22–52)
ECHO LA VOL MOD A2C: 30 ML (ref 22–52)
ECHO LA VOL MOD A4C: 31 ML (ref 22–52)
ECHO LV E' LATERAL VELOCITY: 5.5 CM/S
ECHO LV E' SEPTAL VELOCITY: 4.6 CM/S
ECHO LV EF PHYSICIAN: 55 %
ECHO LV FRACTIONAL SHORTENING: 27 % (ref 28–44)
ECHO LV INTERNAL DIMENSION DIASTOLIC: 4.8 CM (ref 3.9–5.3)
ECHO LV INTERNAL DIMENSION SYSTOLIC: 3.5 CM
ECHO LV IVSD: 1 CM (ref 0.6–0.9)
ECHO LV MASS 2D: 158.8 G (ref 67–162)
ECHO LV POSTERIOR WALL DIASTOLIC: 0.9 CM (ref 0.6–0.9)
ECHO LV RELATIVE WALL THICKNESS RATIO: 0.38
ECHO LVOT PEAK GRADIENT: 7 MMHG
ECHO LVOT PEAK VELOCITY: 1.3 M/S
ECHO MV A VELOCITY: 1.5 M/S
ECHO MV E DECELERATION TIME (DT): 215 MS
ECHO MV E VELOCITY: 0.97 M/S
ECHO MV E/A RATIO: 0.65
ECHO MV E/E' LATERAL: 17.64
ECHO MV E/E' RATIO (AVERAGED): 19.36
ECHO MV E/E' SEPTAL: 21.09
ECHO PV MAX VELOCITY: 0.8 M/S
ECHO PV PEAK GRADIENT: 2 MMHG
ECHO RV INTERNAL DIMENSION: 2.4 CM
ECHO RV TAPSE: 1.9 CM (ref 1.7–?)
ECHO TV E WAVE: 0.5 M/S
ECHO TV REGURGITANT MAX VELOCITY: 2.06 M/S
ECHO TV REGURGITANT PEAK GRADIENT: 17 MMHG
EKG ATRIAL RATE: 103 BPM
EKG P AXIS: 63 DEGREES
EKG P-R INTERVAL: 130 MS
EKG Q-T INTERVAL: 374 MS
EKG QRS DURATION: 50 MS
EKG QTC CALCULATION (BAZETT): 489 MS
EKG R AXIS: 30 DEGREES
EKG VENTRICULAR RATE: 103 BPM
ERYTHROCYTE [DISTWIDTH] IN BLOOD BY AUTOMATED COUNT: 19.5 % (ref 11.5–14.5)
GFR SERPL CREATININE-BSD FRML MDRD: > 90 ML/MIN/1.73M2
GLUCOSE SERPL-MCNC: 111 MG/DL (ref 74–109)
HCT VFR BLD AUTO: 44.7 % (ref 37–47)
HGB BLD-MCNC: 14.3 GM/DL (ref 12–16)
MAGNESIUM SERPL-MCNC: 1.6 MG/DL (ref 1.6–2.6)
MCH RBC QN AUTO: 26.7 PG (ref 26–33)
MCHC RBC AUTO-ENTMCNC: 32 GM/DL (ref 32.2–35.5)
MCV RBC AUTO: 83.6 FL (ref 81–99)
PLATELET # BLD AUTO: 308 THOU/MM3 (ref 130–400)
PMV BLD AUTO: 10 FL (ref 9.4–12.4)
POTASSIUM SERPL-SCNC: 3.7 MEQ/L (ref 3.5–5.2)
RBC # BLD AUTO: 5.35 MILL/MM3 (ref 4.2–5.4)
SODIUM SERPL-SCNC: 140 MEQ/L (ref 135–145)
WBC # BLD AUTO: 7.3 THOU/MM3 (ref 4.8–10.8)

## 2025-04-07 PROCEDURE — 6370000000 HC RX 637 (ALT 250 FOR IP): Performed by: STUDENT IN AN ORGANIZED HEALTH CARE EDUCATION/TRAINING PROGRAM

## 2025-04-07 PROCEDURE — 6370000000 HC RX 637 (ALT 250 FOR IP): Performed by: INTERNAL MEDICINE

## 2025-04-07 PROCEDURE — 93010 ELECTROCARDIOGRAM REPORT: CPT | Performed by: NUCLEAR MEDICINE

## 2025-04-07 PROCEDURE — 6360000002 HC RX W HCPCS

## 2025-04-07 PROCEDURE — 93306 TTE W/DOPPLER COMPLETE: CPT | Performed by: INTERNAL MEDICINE

## 2025-04-07 PROCEDURE — 83735 ASSAY OF MAGNESIUM: CPT

## 2025-04-07 PROCEDURE — 82330 ASSAY OF CALCIUM: CPT

## 2025-04-07 PROCEDURE — 2060000000 HC ICU INTERMEDIATE R&B

## 2025-04-07 PROCEDURE — 2500000003 HC RX 250 WO HCPCS

## 2025-04-07 PROCEDURE — 6370000000 HC RX 637 (ALT 250 FOR IP)

## 2025-04-07 PROCEDURE — 93306 TTE W/DOPPLER COMPLETE: CPT

## 2025-04-07 PROCEDURE — 93005 ELECTROCARDIOGRAM TRACING: CPT | Performed by: STUDENT IN AN ORGANIZED HEALTH CARE EDUCATION/TRAINING PROGRAM

## 2025-04-07 PROCEDURE — 85027 COMPLETE CBC AUTOMATED: CPT

## 2025-04-07 PROCEDURE — 80048 BASIC METABOLIC PNL TOTAL CA: CPT

## 2025-04-07 PROCEDURE — 2580000003 HC RX 258

## 2025-04-07 PROCEDURE — 51798 US URINE CAPACITY MEASURE: CPT

## 2025-04-07 PROCEDURE — 36415 COLL VENOUS BLD VENIPUNCTURE: CPT

## 2025-04-07 PROCEDURE — 99233 SBSQ HOSP IP/OBS HIGH 50: CPT | Performed by: STUDENT IN AN ORGANIZED HEALTH CARE EDUCATION/TRAINING PROGRAM

## 2025-04-07 RX ORDER — CALCIUM GLUCONATE 20 MG/ML
2000 INJECTION, SOLUTION INTRAVENOUS ONCE
Status: COMPLETED | OUTPATIENT
Start: 2025-04-07 | End: 2025-04-07

## 2025-04-07 RX ORDER — OXYCODONE HYDROCHLORIDE 5 MG/1
5 TABLET ORAL EVERY 6 HOURS PRN
Qty: 28 TABLET | Refills: 0 | Status: SHIPPED | OUTPATIENT
Start: 2025-04-07 | End: 2025-04-08

## 2025-04-07 RX ORDER — PREDNISONE 20 MG/1
20 TABLET ORAL DAILY
Status: DISCONTINUED | OUTPATIENT
Start: 2025-04-07 | End: 2025-04-08 | Stop reason: HOSPADM

## 2025-04-07 RX ORDER — DOXYCYCLINE HYCLATE 100 MG
100 TABLET ORAL EVERY 12 HOURS SCHEDULED
Status: DISCONTINUED | OUTPATIENT
Start: 2025-04-07 | End: 2025-04-08 | Stop reason: HOSPADM

## 2025-04-07 RX ORDER — DIPHENHYDRAMINE HYDROCHLORIDE 50 MG/ML
50 INJECTION, SOLUTION INTRAMUSCULAR; INTRAVENOUS EVERY 6 HOURS
Status: DISCONTINUED | OUTPATIENT
Start: 2025-04-07 | End: 2025-04-07

## 2025-04-07 RX ORDER — DIPHENHYDRAMINE HCL 25 MG
25 TABLET ORAL EVERY 6 HOURS
Status: DISCONTINUED | OUTPATIENT
Start: 2025-04-07 | End: 2025-04-08 | Stop reason: HOSPADM

## 2025-04-07 RX ORDER — 0.9 % SODIUM CHLORIDE 0.9 %
500 INTRAVENOUS SOLUTION INTRAVENOUS ONCE
Status: COMPLETED | OUTPATIENT
Start: 2025-04-07 | End: 2025-04-07

## 2025-04-07 RX ADMIN — ATORVASTATIN CALCIUM 40 MG: 40 TABLET, FILM COATED ORAL at 08:50

## 2025-04-07 RX ADMIN — CALCIUM GLUCONATE 2000 MG: 20 INJECTION, SOLUTION INTRAVENOUS at 10:58

## 2025-04-07 RX ADMIN — ACETAMINOPHEN 650 MG: 325 TABLET ORAL at 12:14

## 2025-04-07 RX ADMIN — MAGNESIUM SULFATE HEPTAHYDRATE 2000 MG: 40 INJECTION, SOLUTION INTRAVENOUS at 08:52

## 2025-04-07 RX ADMIN — OXYCODONE 10 MG: 5 TABLET ORAL at 08:49

## 2025-04-07 RX ADMIN — PREDNISONE 20 MG: 20 TABLET ORAL at 17:49

## 2025-04-07 RX ADMIN — FENOFIBRATE 160 MG: 160 TABLET ORAL at 08:49

## 2025-04-07 RX ADMIN — OXYCODONE 10 MG: 5 TABLET ORAL at 20:24

## 2025-04-07 RX ADMIN — DIPHENHYDRAMINE HYDROCHLORIDE 25 MG: 25 TABLET ORAL at 15:55

## 2025-04-07 RX ADMIN — SODIUM CHLORIDE, PRESERVATIVE FREE 40 ML: 5 INJECTION INTRAVENOUS at 08:49

## 2025-04-07 RX ADMIN — MORPHINE SULFATE 2 MG: 2 INJECTION, SOLUTION INTRAMUSCULAR; INTRAVENOUS at 00:38

## 2025-04-07 RX ADMIN — CETIRIZINE HYDROCHLORIDE 10 MG: 10 TABLET, FILM COATED ORAL at 08:50

## 2025-04-07 RX ADMIN — DIPHENHYDRAMINE HYDROCHLORIDE 25 MG: 25 TABLET ORAL at 20:37

## 2025-04-07 RX ADMIN — DIPHENHYDRAMINE HYDROCHLORIDE 50 MG: 50 INJECTION INTRAMUSCULAR; INTRAVENOUS at 08:48

## 2025-04-07 RX ADMIN — MORPHINE SULFATE 2 MG: 2 INJECTION, SOLUTION INTRAMUSCULAR; INTRAVENOUS at 10:54

## 2025-04-07 RX ADMIN — Medication 1 TABLET: at 08:49

## 2025-04-07 RX ADMIN — PANTOPRAZOLE SODIUM 40 MG: 40 TABLET, DELAYED RELEASE ORAL at 06:18

## 2025-04-07 RX ADMIN — OXYCODONE 10 MG: 5 TABLET ORAL at 15:55

## 2025-04-07 RX ADMIN — ASPIRIN 81 MG: 81 TABLET, CHEWABLE ORAL at 08:50

## 2025-04-07 RX ADMIN — Medication 12.5 MG: at 06:18

## 2025-04-07 RX ADMIN — ENOXAPARIN SODIUM 40 MG: 100 INJECTION SUBCUTANEOUS at 08:48

## 2025-04-07 RX ADMIN — PANTOPRAZOLE SODIUM 40 MG: 40 TABLET, DELAYED RELEASE ORAL at 15:55

## 2025-04-07 RX ADMIN — SERTRALINE 50 MG: 50 TABLET, FILM COATED ORAL at 08:50

## 2025-04-07 RX ADMIN — SODIUM CHLORIDE 500 ML: 0.9 INJECTION, SOLUTION INTRAVENOUS at 01:30

## 2025-04-07 RX ADMIN — DOXYCYCLINE HYCLATE 100 MG: 100 TABLET, COATED ORAL at 15:52

## 2025-04-07 ASSESSMENT — PAIN DESCRIPTION - LOCATION
LOCATION: LEG
LOCATION: HEAD
LOCATION: GENERALIZED
LOCATION: BACK;LEG

## 2025-04-07 ASSESSMENT — PAIN DESCRIPTION - DESCRIPTORS
DESCRIPTORS: ACHING

## 2025-04-07 ASSESSMENT — PAIN SCALES - GENERAL
PAINLEVEL_OUTOF10: 8
PAINLEVEL_OUTOF10: 6
PAINLEVEL_OUTOF10: 2
PAINLEVEL_OUTOF10: 8

## 2025-04-07 ASSESSMENT — PAIN DESCRIPTION - ORIENTATION: ORIENTATION: RIGHT;LEFT

## 2025-04-07 ASSESSMENT — PAIN DESCRIPTION - FREQUENCY: FREQUENCY: CONTINUOUS

## 2025-04-07 ASSESSMENT — PAIN DESCRIPTION - ONSET: ONSET: ON-GOING

## 2025-04-07 ASSESSMENT — PAIN SCALES - WONG BAKER
WONGBAKER_NUMERICALRESPONSE: NO HURT

## 2025-04-07 ASSESSMENT — PAIN DESCRIPTION - PAIN TYPE
TYPE: SURGICAL PAIN;CHRONIC PAIN
TYPE: SURGICAL PAIN;CHRONIC PAIN

## 2025-04-07 NOTE — PROGRESS NOTES
Patient persistently tachycardic (broadly between 110-135) which has steadily increased tonight.  Pain has but managed and patient able to be made comfortable with current pain regimen.  Though in pain, is asymptomatic to elevated HR, BP stable.    No urination so far this shift, so bladder scan obtained which showed 88mL.   This was discussed with night shift resident who quickly responded in person.    500mL bolus ordered for patient.    Will reassess HR after fluid bolus.  HR improved to high 90s from 130s after fluid bolus completed.

## 2025-04-07 NOTE — PROCEDURES
PROCEDURE NOTE  Date: 4/7/2025   Name: Kaycee Varela  YOB: 1960    Procedures EKG completed. Given to Karen BARRY

## 2025-04-07 NOTE — PROGRESS NOTES
Hospitalist Progress Note  Internal Medicine Resident      Patient: Kaycee Varela 65 y.o. female      Unit/Bed: -20/020-A    Admit Date: 3/31/2025      ASSESSMENT AND PLAN  Active Problems  Bullous pemphigoid: Maculopapular rash, itchy, multiple scabs. Recent history of Bactrim, Levaquin use. Punch biopsy confirmed bullous pemphigoid.  Continue with methylprednisolone 0.5 mg/kg IV daily  Started on doxycycline 100 mg twice daily  Benadryl 50 mg IV every 6 hours  Pain regimen includes oxycodone, morphine pain panel  ID following  Hypomagnesemia: Magnesium 1.6 today.  Attempted repletion per protocol every 6 with IV precluded that.  Patient refused attempt to place new IV.  Trend mag daily  Urinary retention: Patient has had intermittent issues with urinary retention, which required straight cath x1 which drained 700cc urine.   Straight cath for greater than 300 cc retention  Sinus tachycardia: Patient with mild sinus tachycardia overnight 4/5.  Spontaneously resolved.  Postop hemorrhage: Noted hypotension with a MAP of 55 with 120 cc bloody output from drain.  Hypotension refractory to albumin, 2 L NS.  Hemoglobin dropped from 13.1-10.7.  Status post 1 unit PRBC.  Hemoglobin of 14.3 today.  Displaced left distal femur fracture status post left AKA 4/2: Had recent closed treatment of left distal femur fracture with orthopedic surgery after fall on 3/4/25.  Orthopedic surgery consulted due to concerns about the site, found to fracture has propagated due to tenting of the skin.  Status post left AKA 4/2 evening, with deep drain placement and wound VAC applied.  Resume Lovenox DVT prophylaxis, antiplatelet aspirin 81 mg daily  Drain with 10 cc last 24 hours.  Anticipate drain removal today  Wound VAC to be removed on postop day 7  Resolved Problems  Hyponatremia  NAGMA  SANJIV  Chronic Conditions (reviewed and stable unless otherwise stated)  Right BKA: Noted  Left leg fracture: Due to accident 4 weeks ago, led to

## 2025-04-07 NOTE — PROGRESS NOTES
[facility]  PHYSICAL THERAPY MISSED TREATMENT NOTE  STRZ ICU STEPDOWN TELEMETRY 4K    Date: 2025  Patient Name: Kaycee Varela        MRN: [id number]   : 1960  (65 y.o.)  Gender: female   Referring Practitioner: Neptali Bills MD            REASON FOR MISSED TREATMENT:  Missed Treat.      X2 attempts, pt. politely declining therapy despite encouragement. Will re-attempt at next available date.

## 2025-04-07 NOTE — PROGRESS NOTES
[facility]  OCCUPATIONAL THERAPY MISSED TREATMENT NOTE  STRZ ICU STEPDOWN TELEMETRY 4K  4K-20/020-A      Date: 2025  Patient Name: Kaycee Varela        CSN: [id number]   : 1960  (65 y.o.)  Gender: female   Referring Practitioner: Neptali Bills MD            REASON FOR MISSED TREATMENT:  OT attempted at this time, although declines due overall pain, had recently received pain meds although reported it wasn't helping. Will check back as able

## 2025-04-07 NOTE — PROGRESS NOTES
Orthopaedic Progress Note      SUBJECTIVE   Ms. Varela is post op day #5 L AKA    Seen at bedside this morning  No new concerns, generally doing well  Stable on exam   Pain appears well controlled  Drain and vac intact  No phantom pain, no significant pain to extremity     OBJECTIVE      Physical    VITALS:  BP (!) 143/68   Pulse 99   Temp 98 °F (36.7 °C) (Oral)   Resp 20   Wt 89.2 kg (196 lb 10.4 oz)   SpO2 94%   BMI 30.80 kg/m²   I/O last 3 completed shifts:  In: 225 [P.O.:225]  Out: 1280 [Urine:1250; Drains:30]    4/10 pain  Gen: alert and oriented  Head: normorcephalic, atraumatic  Resp: unlabored, NC  Pelvis: stable  LLE drain intact, vac intact, no significant pooling at sponge, stable, hip ROM intact, skin manifestations stable       Data  CBC:   Lab Results   Component Value Date/Time    WBC 7.3 04/07/2025 04:13 AM    HGB 14.3 04/07/2025 04:13 AM     04/07/2025 04:13 AM     BMP:    Lab Results   Component Value Date/Time     04/07/2025 04:13 AM    K 3.7 04/07/2025 04:13 AM    K 4.5 03/31/2025 01:28 PM     04/07/2025 04:13 AM    CO2 24 04/07/2025 04:13 AM    BUN 19 04/07/2025 04:13 AM    CREATININE 0.6 04/07/2025 04:13 AM    CALCIUM 8.0 04/07/2025 04:13 AM    GLUCOSE 111 04/07/2025 04:13 AM    GLUCOSE 113 12/10/2020 10:01 AM     Uric Acid:  No components found for: \"URIC\"  PT/INR:    Lab Results   Component Value Date/Time    PROTIME 11.0 10/05/2016 03:39 PM    INR 0.97 03/04/2025 05:18 PM     Troponin:  No results found for: \"TROPONINI\"  Urine Culture:  No components found for: \"CURINE\"      Current Inpatient Medications    Current Facility-Administered Medications: oxyCODONE (ROXICODONE) immediate release tablet 5 mg, 5 mg, Oral, Q4H PRN **OR** oxyCODONE (ROXICODONE) immediate release tablet 10 mg, 10 mg, Oral, Q4H PRN  senna (SENOKOT) tablet 8.6 mg, 1 tablet, Oral, BID  docusate sodium (COLACE) capsule 100 mg, 100 mg, Oral, Daily  naloxegol (MOVANTIK) tablet 12.5 mg, 12.5 mg, Oral,

## 2025-04-07 NOTE — PROGRESS NOTES
Progress note: Infectious diseases    Patient - Kaycee Varela,  Age - 65 y.o.    - 1960      Room Number - 4K-20/020-A   MRN -  [id number]   [facility] # - [id number]  Date of Admission -  3/31/2025 11:36 AM    SUBJECTIVE:   No new issues.  Pathology report consistent with Bullous pemphigoid  OBJECTIVE   VITALS    weight is 89.2 kg (196 lb 10.4 oz). Her oral temperature is 97.4 °F (36.3 °C). Her blood pressure is 135/77 and her pulse is 96. Her respiration is 20 and oxygen saturation is 95%.       Wt Readings from Last 3 Encounters:   25 89.2 kg (196 lb 10.4 oz)   25 86.2 kg (190 lb 0.6 oz)   25 90.7 kg (200 lb)       I/O (24 Hours)    Intake/Output Summary (Last 24 hours) at 2025 1135  Last data filed at 2025 2000  Gross per 24 hour   Intake 50 ml   Output 410 ml   Net -360 ml       General Appearance  chronically sick looking, not in distress  HEENT - normocephalic, atraumatic, pink conjunctiva,  anicteric sclera  Neck - Supple, no mass  Lungs -  Bilateral   air entry, no rhonchi, no wheeze  Cardiovascular - Heart sounds are normal.     Abdomen - soft, not distended, nontender,   Neurologic -oriented  Skin - diffuse rash with areas of new blisters    Extremities - right AKA  , left AKA,vac in place  MEDICATIONS:      calcium gluconate  2,000 mg IntraVENous Once    diphenhydrAMINE  50 mg IntraVENous Q6H    senna  1 tablet Oral BID    docusate sodium  100 mg Oral Daily    naloxegol  12.5 mg Oral QAM AC    pantoprazole  40 mg Oral BID AC    sodium chloride flush  5-40 mL IntraVENous 2 times per day    enoxaparin  40 mg SubCUTAneous Daily    methylPREDNISolone  0.5 mg/kg IntraVENous Daily    cetirizine  10 mg Oral Daily    aspirin  81 mg Oral Daily    atorvastatin  40 mg Oral Daily    [Held by provider] doxepin  50 mg Oral QPM    fenofibrate  160 mg Oral Daily    multivitamin  1 tablet Oral Daily     sertraline  50 mg Oral Daily    [Held by provider] tiZANidine  4 mg Oral TID      sodium chloride      sodium chloride       oxyCODONE **OR** oxyCODONE, sodium chloride, morphine **OR** [DISCONTINUED] morphine, sodium chloride flush, sodium chloride, potassium chloride **OR** potassium alternative oral replacement **OR** potassium chloride, magnesium sulfate, ondansetron **OR** ondansetron, polyethylene glycol, acetaminophen **OR** acetaminophen      LABS:     CBC:   Recent Labs     04/05/25  0509 04/06/25 0403 04/07/25  0413   WBC 5.3 7.1 7.3   HGB 13.5 14.0 14.3    270 308     BMP:    Recent Labs     04/05/25  0509 04/06/25 0403 04/07/25 0413    137 140   K 4.9 3.9 3.7    104 105   CO2 23 22 24   BUN 14 17 19   CREATININE 0.5 0.5 0.6   GLUCOSE 100 139* 111*     Calcium:  Recent Labs     04/07/25 0413   CALCIUM 8.0*     Ionized Calcium:Invalid input(s): \"IONCA\"  Magnesium:  Recent Labs     04/07/25  0413   MG 1.6     Phosphorus:No results for input(s): \"PHOS\" in the last 72 hours.  BNP:No results for input(s): \"BNP\" in the last 72 hours.  Glucose:  Recent Labs     04/04/25  1235 04/04/25  2019 04/05/25  0609   POCGLU 89 90 75     HgbA1C: No results for input(s): \"LABA1C\" in the last 72 hours.  INR: No results for input(s): \"INR\" in the last 72 hours.  Hepatic:   No results for input(s): \"ALKPHOS\", \"ALT\", \"AST\", \"BILITOT\", \"BILIDIR\", \"LABALBU\" in the last 72 hours.    Invalid input(s): \"PROT\"    Amylase and Lipase:  No results for input(s): \"LACTA\", \"AMYLASE\" in the last 72 hours.    Lactic Acid:   No results for input(s): \"LACTA\" in the last 72 hours.    Troponin:   No results for input(s): \"CKTOTAL\", \"CKMB\", \"TROPONINI\" in the last 72 hours.    BNP: No results for input(s): \"BNP\" in the last 72 hours.    CULTURES:   UA:   No results for input(s): \"SPECGRAV\", \"PHUR\", \"COLORU\", \"CLARITYU\", \"MUCUS\", \"PROTEINU\", \"BLOODU\", \"RBCUA\", \"WBCUA\", \"BACTERIA\", \"NITRU\", \"GLUCOSEU\", \"BILIRUBINUR\",

## 2025-04-08 ENCOUNTER — TELEPHONE (OUTPATIENT)
Dept: WOUND CARE | Age: 65
End: 2025-04-08

## 2025-04-08 VITALS
DIASTOLIC BLOOD PRESSURE: 60 MMHG | OXYGEN SATURATION: 95 % | WEIGHT: 196.65 LBS | TEMPERATURE: 97.7 F | SYSTOLIC BLOOD PRESSURE: 131 MMHG | HEART RATE: 102 BPM | BODY MASS INDEX: 30.8 KG/M2 | RESPIRATION RATE: 18 BRPM

## 2025-04-08 DIAGNOSIS — L12.0 BULLOUS PEMPHIGOID: Primary | ICD-10-CM

## 2025-04-08 LAB
ANION GAP SERPL CALC-SCNC: 12 MEQ/L (ref 8–16)
BUN SERPL-MCNC: 25 MG/DL (ref 8–23)
CALCIUM SERPL-MCNC: 7.8 MG/DL (ref 8.8–10.2)
CHLORIDE SERPL-SCNC: 104 MEQ/L (ref 98–111)
CO2 SERPL-SCNC: 23 MEQ/L (ref 22–29)
CREAT SERPL-MCNC: 0.5 MG/DL (ref 0.5–0.9)
DEPRECATED RDW RBC AUTO: 54.8 FL (ref 35–45)
ERYTHROCYTE [DISTWIDTH] IN BLOOD BY AUTOMATED COUNT: 19.2 % (ref 11.5–14.5)
GFR SERPL CREATININE-BSD FRML MDRD: > 90 ML/MIN/1.73M2
GLUCOSE SERPL-MCNC: 102 MG/DL (ref 74–109)
HCT VFR BLD AUTO: 43.2 % (ref 37–47)
HGB BLD-MCNC: 13.9 GM/DL (ref 12–16)
MAGNESIUM SERPL-MCNC: 1.9 MG/DL (ref 1.6–2.6)
MCH RBC QN AUTO: 26.8 PG (ref 26–33)
MCHC RBC AUTO-ENTMCNC: 32.2 GM/DL (ref 32.2–35.5)
MCV RBC AUTO: 83.2 FL (ref 81–99)
PLATELET # BLD AUTO: 333 THOU/MM3 (ref 130–400)
PMV BLD AUTO: 10 FL (ref 9.4–12.4)
POTASSIUM SERPL-SCNC: 3.9 MEQ/L (ref 3.5–5.2)
RBC # BLD AUTO: 5.19 MILL/MM3 (ref 4.2–5.4)
SODIUM SERPL-SCNC: 139 MEQ/L (ref 135–145)
WBC # BLD AUTO: 6.2 THOU/MM3 (ref 4.8–10.8)

## 2025-04-08 PROCEDURE — 99239 HOSP IP/OBS DSCHRG MGMT >30: CPT | Performed by: STUDENT IN AN ORGANIZED HEALTH CARE EDUCATION/TRAINING PROGRAM

## 2025-04-08 PROCEDURE — 6370000000 HC RX 637 (ALT 250 FOR IP): Performed by: STUDENT IN AN ORGANIZED HEALTH CARE EDUCATION/TRAINING PROGRAM

## 2025-04-08 PROCEDURE — 6370000000 HC RX 637 (ALT 250 FOR IP)

## 2025-04-08 PROCEDURE — 36415 COLL VENOUS BLD VENIPUNCTURE: CPT

## 2025-04-08 PROCEDURE — 51798 US URINE CAPACITY MEASURE: CPT

## 2025-04-08 PROCEDURE — 83735 ASSAY OF MAGNESIUM: CPT

## 2025-04-08 PROCEDURE — 6370000000 HC RX 637 (ALT 250 FOR IP): Performed by: INTERNAL MEDICINE

## 2025-04-08 PROCEDURE — 80048 BASIC METABOLIC PNL TOTAL CA: CPT

## 2025-04-08 PROCEDURE — 6360000002 HC RX W HCPCS

## 2025-04-08 PROCEDURE — 85027 COMPLETE CBC AUTOMATED: CPT

## 2025-04-08 RX ORDER — DOXYCYCLINE HYCLATE 100 MG
100 TABLET ORAL EVERY 12 HOURS SCHEDULED
Qty: 60 TABLET | Refills: 0 | DISCHARGE
Start: 2025-04-08 | End: 2025-05-08

## 2025-04-08 RX ORDER — PREDNISONE 20 MG/1
20 TABLET ORAL DAILY
Qty: 30 TABLET | Refills: 0 | Status: CANCELLED | DISCHARGE
Start: 2025-04-08 | End: 2025-05-08

## 2025-04-08 RX ORDER — OXYCODONE HYDROCHLORIDE 5 MG/1
5 TABLET ORAL EVERY 6 HOURS PRN
Qty: 40 TABLET | Refills: 0 | Status: SHIPPED | OUTPATIENT
Start: 2025-04-08 | End: 2025-04-18

## 2025-04-08 RX ORDER — CETIRIZINE HYDROCHLORIDE 10 MG/1
10 TABLET ORAL DAILY
Refills: 0 | DISCHARGE
Start: 2025-04-09 | End: 2025-05-09

## 2025-04-08 RX ORDER — M-VIT,TX,IRON,MINS/CALC/FOLIC 27MG-0.4MG
1 TABLET ORAL DAILY
DISCHARGE
Start: 2025-04-08

## 2025-04-08 RX ORDER — PANTOPRAZOLE SODIUM 40 MG/1
40 TABLET, DELAYED RELEASE ORAL
DISCHARGE
Start: 2025-04-08

## 2025-04-08 RX ORDER — OXYCODONE HYDROCHLORIDE 5 MG/1
5 TABLET ORAL EVERY 6 HOURS PRN
Qty: 28 TABLET | Refills: 0 | Status: SHIPPED | OUTPATIENT
Start: 2025-04-08 | End: 2025-04-08

## 2025-04-08 RX ADMIN — ATORVASTATIN CALCIUM 40 MG: 40 TABLET, FILM COATED ORAL at 08:46

## 2025-04-08 RX ADMIN — DOXYCYCLINE HYCLATE 100 MG: 100 TABLET, COATED ORAL at 08:46

## 2025-04-08 RX ADMIN — DIPHENHYDRAMINE HYDROCHLORIDE 25 MG: 25 TABLET ORAL at 03:00

## 2025-04-08 RX ADMIN — PREDNISONE 20 MG: 20 TABLET ORAL at 08:46

## 2025-04-08 RX ADMIN — OXYCODONE 5 MG: 5 TABLET ORAL at 08:45

## 2025-04-08 RX ADMIN — Medication 1 TABLET: at 08:46

## 2025-04-08 RX ADMIN — Medication 12.5 MG: at 06:09

## 2025-04-08 RX ADMIN — SENNOSIDES 8.6 MG: 8.6 TABLET, FILM COATED ORAL at 08:48

## 2025-04-08 RX ADMIN — CETIRIZINE HYDROCHLORIDE 10 MG: 10 TABLET, FILM COATED ORAL at 08:46

## 2025-04-08 RX ADMIN — FENOFIBRATE 160 MG: 160 TABLET ORAL at 08:46

## 2025-04-08 RX ADMIN — PANTOPRAZOLE SODIUM 40 MG: 40 TABLET, DELAYED RELEASE ORAL at 06:09

## 2025-04-08 RX ADMIN — ASPIRIN 81 MG: 81 TABLET, CHEWABLE ORAL at 08:45

## 2025-04-08 RX ADMIN — OXYCODONE 5 MG: 5 TABLET ORAL at 03:00

## 2025-04-08 RX ADMIN — DIPHENHYDRAMINE HYDROCHLORIDE 25 MG: 25 TABLET ORAL at 12:40

## 2025-04-08 RX ADMIN — ENOXAPARIN SODIUM 40 MG: 100 INJECTION SUBCUTANEOUS at 08:46

## 2025-04-08 RX ADMIN — DOCUSATE SODIUM 100 MG: 100 CAPSULE, LIQUID FILLED ORAL at 08:48

## 2025-04-08 RX ADMIN — SERTRALINE 50 MG: 50 TABLET, FILM COATED ORAL at 08:46

## 2025-04-08 ASSESSMENT — PAIN DESCRIPTION - FREQUENCY: FREQUENCY: CONTINUOUS

## 2025-04-08 ASSESSMENT — PAIN DESCRIPTION - ONSET: ONSET: ON-GOING

## 2025-04-08 ASSESSMENT — PAIN DESCRIPTION - PAIN TYPE: TYPE: ACUTE PAIN;CHRONIC PAIN

## 2025-04-08 ASSESSMENT — PAIN DESCRIPTION - DESCRIPTORS
DESCRIPTORS: DISCOMFORT
DESCRIPTORS: ACHING

## 2025-04-08 ASSESSMENT — PAIN SCALES - GENERAL
PAINLEVEL_OUTOF10: 6
PAINLEVEL_OUTOF10: 0
PAINLEVEL_OUTOF10: 6

## 2025-04-08 ASSESSMENT — PAIN SCALES - WONG BAKER: WONGBAKER_NUMERICALRESPONSE: NO HURT

## 2025-04-08 ASSESSMENT — PAIN DESCRIPTION - LOCATION
LOCATION: GENERALIZED
LOCATION: GENERALIZED

## 2025-04-08 ASSESSMENT — PAIN - FUNCTIONAL ASSESSMENT: PAIN_FUNCTIONAL_ASSESSMENT: PREVENTS OR INTERFERES WITH ALL ACTIVE AND SOME PASSIVE ACTIVITIES

## 2025-04-08 NOTE — PROGRESS NOTES
Patient discharge to [facility]. Called and gave Kasia BARRY report. Faxed over AVS, last dose MAR, SOWMYA and placed copies in blue folder with scripts. Patient belongings packed and sent to EMS to nursing home.

## 2025-04-08 NOTE — CARE COORDINATION
4/8/25, 10:12 AM EDT    DISCHARGE PLANNING EVALUATION    Reached out to provider about potential discharge today. She is agreeable to  setting up transport for afternoon and she will check with ID and Ortho for clearance. Faxed transport forms to [facility] requesting 3pm, awaiting confirmation call.       10:32 AM- Transport is set up for 3pm pending ortho and ID approval. Updated patient and Holli at facility. Discharge packet placed on chart.     1:21 PM- Discussed with RN that hospitalist has not confirmed IV antibiotic duration yet from ID. She will reach out to ID and figure it out. Made her aware of 3pm transport.     4/8/25, 1:25 PM EDT    Patient goals/plan/ treatment preferences discussed by  and .  Patient goals/plan/ treatment preferences reviewed with patient/ family.  Patient/ family verbalize understanding of discharge plan and are in agreement with goal/plan/treatment preferences.  Understanding was demonstrated using the teach back method.  AVS provided by RN at time of discharge, which includes all necessary medical information pertaining to the patients current course of illness, treatment, post-discharge goals of care, and treatment preferences.     Services At/After Discharge: Skilled Nursing Facility (SNF), Transport, Nursing service, OT, and PT- [facility]      Kaycee will be discharged today to [facility] where she will be skilled under her Medicare benefit. Transport is set up for 3pm today with [facility] by demetria.

## 2025-04-08 NOTE — PROGRESS NOTES
CLINICAL PHARMACY: DISCHARGE MED RECONCILIATION/REVIEW    [facility] Patient?: Yes  Total # of Interventions Recommended: 2 -    -   Total # Interventions Accepted: 1  Intervention Severity:   - Level 1 Intervention Present?: No   - Level 2 #: 0   - Level 3 #: 2   Time Spent (min): 30    Additional Documentation:

## 2025-04-08 NOTE — PROGRESS NOTES
Orthopaedic Progress Note      SUBJECTIVE   Ms. Varela is post op day #6  L AKA    Seen at bedside this morning  No new concerns, generally doing well  Stable on exam   No family bedside   Pain appears well controlled  Vac intact   No phantom pain, no significant pain to extremity     OBJECTIVE      Physical    VITALS:  /66   Pulse 98   Temp 97.8 °F (36.6 °C) (Oral)   Resp 16   Wt 89.2 kg (196 lb 10.4 oz)   SpO2 95%   BMI 30.80 kg/m²   I/O last 3 completed shifts:  In: 876.4 [P.O.:240; IV Piggyback:636.4]  Out: 410 [Urine:400; Drains:10]    4/10 pain  Gen: alert and oriented  Head: normorcephalic, atraumatic  Resp: unlabored, NC  Pelvis: stable  LLE  vac intact, no significant pooling at sponge, stable, hip ROM intact, skin manifestations stable       Data  CBC:   Lab Results   Component Value Date/Time    WBC 6.2 04/08/2025 04:28 AM    HGB 13.9 04/08/2025 04:28 AM     04/08/2025 04:28 AM     BMP:    Lab Results   Component Value Date/Time     04/08/2025 04:28 AM    K 3.9 04/08/2025 04:28 AM    K 4.5 03/31/2025 01:28 PM     04/08/2025 04:28 AM    CO2 23 04/08/2025 04:28 AM    BUN 25 04/08/2025 04:28 AM    CREATININE 0.5 04/08/2025 04:28 AM    CALCIUM 7.8 04/08/2025 04:28 AM    GLUCOSE 102 04/08/2025 04:28 AM    GLUCOSE 113 12/10/2020 10:01 AM     Uric Acid:  No components found for: \"URIC\"  PT/INR:    Lab Results   Component Value Date/Time    PROTIME 11.0 10/05/2016 03:39 PM    INR 0.97 03/04/2025 05:18 PM     Troponin:  No results found for: \"TROPONINI\"  Urine Culture:  No components found for: \"CURINE\"      Current Inpatient Medications    Current Facility-Administered Medications: doxycycline hyclate (VIBRA-TABS) tablet 100 mg, 100 mg, Oral, 2 times per day  diphenhydrAMINE (BENADRYL) tablet 25 mg, 25 mg, Oral, Q6H  predniSONE (DELTASONE) tablet 20 mg, 20 mg, Oral, Daily  oxyCODONE (ROXICODONE) immediate release tablet 5 mg, 5 mg, Oral, Q4H PRN **OR** oxyCODONE (ROXICODONE)

## 2025-04-08 NOTE — PLAN OF CARE
Problem: Chronic Conditions and Co-morbidities  Goal: Patient's chronic conditions and co-morbidity symptoms are monitored and maintained or improved  4/8/2025 0955 by Wilmer Cleveland, RN  Outcome: Progressing  Flowsheets (Taken 4/8/2025 0955)  Care Plan - Patient's Chronic Conditions and Co-Morbidity Symptoms are Monitored and Maintained or Improved:   Monitor and assess patient's chronic conditions and comorbid symptoms for stability, deterioration, or improvement   Collaborate with multidisciplinary team to address chronic and comorbid conditions and prevent exacerbation or deterioration   Update acute care plan with appropriate goals if chronic or comorbid symptoms are exacerbated and prevent overall improvement and discharge     Problem: Discharge Planning  Goal: Discharge to home or other facility with appropriate resources  4/8/2025 0955 by Wilmer Cleveland, RN  Outcome: Progressing  Flowsheets (Taken 4/8/2025 0955)  Discharge to home or other facility with appropriate resources:   Identify barriers to discharge with patient and caregiver   Identify discharge learning needs (meds, wound care, etc)   Arrange for needed discharge resources and transportation as appropriate   Refer to discharge planning if patient needs post-hospital services based on physician order or complex needs related to functional status, cognitive ability or social support system     Problem: Pain  Goal: Verbalizes/displays adequate comfort level or baseline comfort level  4/8/2025 0955 by Wilmer Cleveland, RN  Outcome: Progressing  Flowsheets (Taken 4/8/2025 0955)  Verbalizes/displays adequate comfort level or baseline comfort level:   Encourage patient to monitor pain and request assistance   Assess pain using appropriate pain scale   Administer analgesics based on type and severity of pain and evaluate response   Implement non-pharmacological measures as appropriate and evaluate response   Consider cultural and social influences  on pain and pain management   Notify Licensed Independent Practitioner if interventions unsuccessful or patient reports new pain     Problem: Safety - Adult  Goal: Free from fall injury  4/8/2025 0955 by Wilmer Cleveland RN  Outcome: Progressing  Flowsheets (Taken 4/8/2025 0955)  Free From Fall Injury: Instruct family/caregiver on patient safety     Problem: Skin/Tissue Integrity  Goal: Skin integrity remains intact  Description: 1.  Monitor for areas of redness and/or skin breakdown  2.  Assess vascular access sites hourly  3.  Every 4-6 hours minimum:  Change oxygen saturation probe site  4.  Every 4-6 hours:  If on nasal continuous positive airway pressure, respiratory therapy assess nares and determine need for appliance change or resting period  4/8/2025 0955 by Wilmer Cleveland, RN  Outcome: Progressing  Flowsheets (Taken 4/8/2025 0955)  Skin Integrity Remains Intact:   Monitor for areas of redness and/or skin breakdown   Assess vascular access sites hourly     Problem: ABCDS Injury Assessment  Goal: Absence of physical injury  4/8/2025 0955 by Wilmer Cleveland, RN  Outcome: Progressing  Flowsheets (Taken 4/8/2025 0955)  Absence of Physical Injury: Implement safety measures based on patient assessment   Care plan reviewed with patient.  Patient verbalizes understanding of the care plan and contributed to goal setting.

## 2025-04-08 NOTE — PROGRESS NOTES
Progress note: Infectious diseases    Patient - Kaycee Varela,  Age - 65 y.o.    - 1960      Room Number - 4K-20/020-A   MRN -  [id number]   [facility] # - [id number]  Date of Admission -  3/31/2025 11:36 AM    SUBJECTIVE:   No new issues.  Plan for ECF  OBJECTIVE   VITALS    weight is 89.2 kg (196 lb 10.4 oz). Her oral temperature is 97.7 °F (36.5 °C). Her blood pressure is 131/60 and her pulse is 102 (abnormal). Her respiration is 18 and oxygen saturation is 95%.       Wt Readings from Last 3 Encounters:   25 89.2 kg (196 lb 10.4 oz)   25 86.2 kg (190 lb 0.6 oz)   25 90.7 kg (200 lb)       I/O (24 Hours)    Intake/Output Summary (Last 24 hours) at 2025 1430  Last data filed at 2025 1102  Gross per 24 hour   Intake 826.38 ml   Output 100 ml   Net 726.38 ml       General Appearance  chronically sick looking, not in distress  HEENT - normocephalic, atraumatic,pale  conjunctiva,  anicteric sclera  Neck - Supple, no mass  Lungs -  Bilateral   air entry, no rhonchi, no wheeze  Cardiovascular - Heart sounds are normal.     Abdomen - soft, not distended, nontender.  Neurologic -oriented  Skin - generalized rash with areas of open blisters , erosinons and crusting  Extremities - right AKA  , left AKA,vac in place  MEDICATIONS:      doxycycline hyclate  100 mg Oral 2 times per day    diphenhydrAMINE  25 mg Oral Q6H    predniSONE  20 mg Oral Daily    senna  1 tablet Oral BID    docusate sodium  100 mg Oral Daily    naloxegol  12.5 mg Oral QAM AC    pantoprazole  40 mg Oral BID AC    sodium chloride flush  5-40 mL IntraVENous 2 times per day    enoxaparin  40 mg SubCUTAneous Daily    cetirizine  10 mg Oral Daily    aspirin  81 mg Oral Daily    atorvastatin  40 mg Oral Daily    [Held by provider] doxepin  50 mg Oral QPM    fenofibrate  160 mg Oral Daily    multivitamin  1 tablet Oral Daily    sertraline  50 mg  fracture of femur M97.8XXA, Z96.659    Primary hypertension I10    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    History of total knee replacement, right Z96.651    Enterocolitis due to Clostridium difficile, not specified as recurrent A04.72    Recurrent major depressive disorder F33.9    Aftercare following knee joint replacement surgery Z47.1, Z96.659    Primary insomnia F51.01    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 with presence of antibiotic-impregnated cement spacer Z89.529    Moderate malnutrition E44.0    Ventricular tachycardia (HCC) I47.20    Type 2 diabetes mellitus E11.9    Closed fracture of distal end of left femur, initial encounter S72.402A    Maculopapular rash R21    Skin disease L98.9    SANJIV (acute kidney injury) N17.9    Hyponatremia E87.1    Metabolic acidosis, normal anion gap (NAG) E87.20    Hx of right BKA (HCC) Z89.511    Depression with anxiety F41.8    Neuropathy G62.9    Bullous pemphigoid L12.0    Open fracture of left distal femur ([facility]) S72.402B         ASSESSMENT/PLAN   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  Plan for discharge  Need to schedule follow up with dermatology      Rolf Escalona MD, 4/8/2025 2:30 PM    Declined

## 2025-04-08 NOTE — DISCHARGE INSTRUCTIONS
Dr Still Discharge Instructions  -dry dressing changes every other day to LLE  -range of motion as tolerated left leg  -no lotions creams to incision line  -follow up 2 weeks

## 2025-04-08 NOTE — PROGRESS NOTES
Physician Progress Note      PATIENT:               YAIMA GONCALVES  [facility] #:                  [id number]  :                       1960  ADMIT DATE:       3/31/2025 11:36 AM  DISCH DATE:  RESPONDING  PROVIDER #:        Santo Colindres MD          QUERY TEXT:    A mental status change is documented in /3 Significant event following return   from PACU. Please specify the underlying cause:    The clinical indicators include:  - PACU- PACU- Pt received: 50mcg Fentanyl X4  & Dilaudid  -4/3 SIG EVENT  Notified by RN of low blood pressure.  Patient noted to have a MAP of 55.    Previously noted to have bladder scan of 730 mL, straight cathed with an   output of 700 mL.  Received 1 L fluid bolus.  Patient sleeping, easily arouses   to stimulation, able to state where she is.  Patient had AKA earlier today.    Received Zyrtec, doxepin, Zanaflex, fentanyl, Dilaudid earlier in the evening.    Will give additional IV fluid bolus.  Hypotension and clinical presentation   could be in the setting of medications, decompression of bladder, possibly   sepsis however patient is afebrile, not tachyc  hemoglobin 10.7, was 13.1 earlier in the day.  Lactic acid 0.8.  WBC 8.1.    Will give 1 unit PRBC.  - Reviewed ABG results, pH 7.26, pCO2 54, pO2 70.  Unable to do BiPAP as patient is not alert enough to remove mask if she were   to become nauseous and vomit  -4/3 RN: Patient's BP improving with systolics high 90s-110s. Patient still   lethargic but seems to be improving. She is alert and oriented x4 with delayed   responses.  -4/3:  sitter to sit at bedside.  Options provided:  -- Drug-induced encephalopathy related to Fentanyl and Dilaudid.  -- Metabolic encephalopathy  -- Toxic metabolic encephalopathy  -- Other - I will add my own diagnosis  -- Disagree - Not applicable / Not valid  -- Disagree - Clinically unable to determine / Unknown  -- Refer to Clinical Documentation Reviewer    PROVIDER RESPONSE TEXT:    Provider  clarify the following documentation:    The clinical indicators include:  -4/2 Ortho:  displacement of a left distal femur fracture. There is concern   that her fracture has propagated and now tenting at the skin, high risk for   further infection considering her implant. The necessity of surgical   intervention was discussed at length with the patient. Surgery recommended for   anatomic restoration and increased functional outcomes post operatively.  -4/2 OP:  open left distal femur periprosthetic fracture=- LEFT LEG AMPUTATION   ABOVE KNEE  -4/3 ID: Extremities - right AKA , left AKA,vac in place  -4/3 IM:    Displaced left distal femur fracture s/p left AKA  Options provided:  -- Displacement of a left distal femur fracture POA-is a post op complication.  -- Displacement of a left distal femur fracture POA- is not related to the   procedure, but is related to other incidental risk factor, Please specify   other incidental risk factor.  -- Other - I will add my own diagnosis  -- Disagree - Not applicable / Not valid  -- Disagree - Clinically unable to determine / Unknown  -- Refer to Clinical Documentation Reviewer    PROVIDER RESPONSE TEXT:    Provider is clinically unable to determine a response to this query.    Query created by: Darline Clark on 4/4/2025 7:33 AM      Electronically signed by:  Santo Colindres MD 4/8/2025 12:25 PM

## 2025-04-09 NOTE — PROGRESS NOTES
Physician Progress Note      PATIENT:               YAIMA GONCALVES  [facility] #:                  [id number]  :                       1960  ADMIT DATE:       3/31/2025 11:36 AM  DISCH DATE:        2025 3:16 PM  RESPONDING  PROVIDER #:        Santo Colindres MD          QUERY TEXT:    Based on your medical judgment, please clarify these findings and document if   any of the following are being evaluated and/or treated:    The clinical indicators include:  4/3 Significant Event: Notified by RN of low blood pressure.  Patient noted to   have a MAP of 55.  Previously noted to have bladder scan of 730 mL, straight   cathed with an output of 700 mL.  Received 1 L fluid bolus.  Patient sleeping,   easily arouses to stimulation, able to state where she is.  Patient had AKA   earlier today.  Received Zyrtec, doxepin, Zanaflex, fentanyl, Dilaudid earlier   in the evening.  Will give additional IV fluid bolus.  Hypotension and   clinical presentation could be in the setting of medications, decompression of   bladder, possibly sepsis however patient   hemoglobin 10.7, was 13.1 earlier in the day.  Lactic acid 0.8.  WBC 8.1.    Will give 1 unit PRBC.  Reviewed ABG results, pH 7.26, pCO2 54, pO2 70.  Unable to do BiPAP as patient   is not alert enough to remove mask if she were to become nauseous and vomit.  -4/3 IM:   Postoperative hemorrhage: Patient had noted hypotension MAP 55 with   more than 200 cc bloody output from drain, hypotension refractory to albumin   and 2 L NS, hemoglobin drop from 13.1 earlier in the day to 10.7 upon recheck,   delivered 1 unit PRBC.  -Treatment: 1 UPRBC & 0.9 NS 2000 ml bolus.  Options provided:  -- Hypovolemic Shock  -- Hemorrhagic Shock  -- Hypovolemia without Shock  -- Hypotension without Shock  -- Other - I will add my own diagnosis  -- Disagree - Not applicable / Not valid  -- Disagree - Clinically unable to determine / Unknown  -- Refer to Clinical Documentation  4/9/2025 6:26 AM      QUERY TEXT:    Please clarify the following documentation:    The clinical indicators include:  -4/2 Ortho:  displacement of a left distal femur fracture. There is concern   that her fracture has propagated and now tenting at the skin, high risk for   further infection considering her implant. The necessity of surgical   intervention was discussed at length with the patient. Surgery recommended for   anatomic restoration and increased functional outcomes post operatively.  -4/2 OP:  open left distal femur periprosthetic fracture=- LEFT LEG AMPUTATION   ABOVE KNEE  -4/3 ID: Extremities - right AKA , left AKA,vac in place  -4/3 IM:    Displaced left distal femur fracture s/p left AKA  Options provided:  -- Displacement of a left distal femur fracture POA-is a post op complication.  -- Displacement of a left distal femur fracture POA- is not related to the   procedure, but is related to other incidental risk factor, Please specify   other incidental risk factor.  -- Other - I will add my own diagnosis  -- Disagree - Not applicable / Not valid  -- Disagree - Clinically unable to determine / Unknown  -- Refer to Clinical Documentation Reviewer    PROVIDER RESPONSE TEXT:    Provider is clinically unable to determine a response to this query.  defer to ortho    Query created by: Darline Clark on 4/9/2025 6:26 AM      Electronically signed by:  Santo Colindres MD 4/9/2025 7:01 AM

## 2025-04-09 NOTE — PROGRESS NOTES
Physician Progress Note      PATIENT:               YAIMA GONCALVES  [facility] #:                  [id number]  :                       1960  ADMIT DATE:       3/31/2025 11:36 AM  DISCH DATE:        2025 3:16 PM  RESPONDING  PROVIDER #:        Rebekah Rich PA-C          QUERY TEXT:    Rebekah Schreiber,  Please clarify the following documentation:    The clinical indicators include:  - Ortho:  displacement of a left distal femur fracture. There is concern   that her fracture has propagated and now tenting at the skin, high risk for   further infection considering her implant. The necessity of surgical   intervention was discussed at length with the patient. Surgery recommended for   anatomic restoration and increased functional outcomes post operatively.  - OP:  open left distal femur periprosthetic fracture=- LEFT LEG AMPUTATION   ABOVE KNEE  -4/3 ID: Extremities - right AKA , left AKA,vac in place  -4/3 IM:    Displaced left distal femur fracture s/p left AKA   DC:   Left leg fracture: Due to accident 4 weeks ago, led to left AKA due   to complication with splint as described above  Options provided:  -- Displacement of a left distal femur fracture POA-is a post op complication.  -- Displacement of a left distal femur fracture POA- is not related to the   procedure, but is related to other incidental risk factor, Please specify   other incidental risk factor.  -- Other - I will add my own diagnosis  -- Disagree - Not applicable / Not valid  -- Disagree - Clinically unable to determine / Unknown  -- Refer to Clinical Documentation Reviewer    PROVIDER RESPONSE TEXT:    Provider is clinically unable to determine a response to this query.  no operative course with index fracture, fx propagation is part of natural   history of closed treatment option as disucssed with patient and family upon   index eval    Query created by: Darline Clark on 2025 7:08 AM      Electronically signed

## 2025-04-09 NOTE — DISCHARGE SUMMARY
capsule  Commonly known as: SINEQUAN  Take 1 capsule by mouth every evening  What changed: when to take this     pantoprazole 40 MG tablet  Commonly known as: PROTONIX  Take 1 tablet by mouth 2 times daily (before meals)  What changed: when to take this     therapeutic multivitamin-minerals tablet  Take 1 tablet by mouth daily  What changed: Another medication with the same name was removed. Continue taking this medication, and follow the directions you see here.            CONTINUE taking these medications      aspirin 81 MG tablet     atorvastatin 40 MG tablet  Commonly known as: LIPITOR  TAKE 1 TABLET DAILY FOR HIGH CHOLESTEROL     diphenhydrAMINE 25 MG capsule  Commonly known as: BENADRYL     docusate sodium 100 MG capsule  Commonly known as: COLACE     fenofibrate 160 MG tablet  TAKE 1 TABLET DAILY     MegaRed Advanced 4 in 1 Caps     oxyCODONE 5 MG immediate release tablet  Commonly known as: ROXICODONE  Take 1 tablet by mouth every 6 hours as needed for Pain for up to 10 days. Max Daily Amount: 20 mg     Probiotic 1-250 BILLION-MG Caps     sertraline 50 MG tablet  Commonly known as: ZOLOFT  Take 1 tablet by mouth daily     tiZANidine 4 MG tablet  Commonly known as: Zanaflex  Take 1 tablet by mouth in the morning, at noon, and at bedtime            STOP taking these medications      acetaminophen 500 MG Caps capsule  Commonly known as: TYLENOL     enoxaparin 40 MG/0.4ML  Commonly known as: LOVENOX     fluocinonide 0.05 % cream  Commonly known as: LIDEX     gabapentin 600 MG tablet  Commonly known as: Neurontin     KRILL OIL PO     Levaquin 750 MG tablet  Generic drug: levoFLOXacin     mupirocin 2 % ointment  Commonly known as: BACTROBAN            ASK your doctor about these medications      predniSONE 20 MG tablet  Commonly known as: DELTASONE  Ask about: Should I take this medication?               Where to Get Your Medications        You can get these medications from any pharmacy    Bring a paper  prescription for each of these medications  oxyCODONE 5 MG immediate release tablet       Information about where to get these medications is not yet available    Ask your nurse or doctor about these medications  cetirizine 10 MG tablet  doxycycline hyclate 100 MG tablet  pantoprazole 40 MG tablet  therapeutic multivitamin-minerals tablet          Time Spent on discharge is 35 minutes in the examination, evaluation, counseling and review of medications and discharge plan.    Thank you Jose Lazaro DO for the opportunity to be involved in this patient's care.      Signed:    Electronically signed by Santo Colindres MD IM PGY-1 on 4/8/25 at 10:40 PM EDT     Case was discussed with Attending, Dr. Neptali Bills

## 2025-04-10 ENCOUNTER — CARE COORDINATION (OUTPATIENT)
Dept: CARE COORDINATION | Age: 65
End: 2025-04-10

## 2025-04-23 ENCOUNTER — TELEPHONE (OUTPATIENT)
Dept: FAMILY MEDICINE CLINIC | Age: 65
End: 2025-04-23

## 2025-04-23 DIAGNOSIS — E78.5 HYPERLIPIDEMIA, UNSPECIFIED HYPERLIPIDEMIA TYPE: Primary | ICD-10-CM

## 2025-04-23 DIAGNOSIS — Z51.81 MEDICATION MONITORING ENCOUNTER: ICD-10-CM

## 2025-04-23 DIAGNOSIS — E11.9 CONTROLLED TYPE 2 DIABETES MELLITUS WITHOUT COMPLICATION, WITHOUT LONG-TERM CURRENT USE OF INSULIN (HCC): ICD-10-CM

## 2025-04-23 DIAGNOSIS — K75.81 NASH (NONALCOHOLIC STEATOHEPATITIS): ICD-10-CM

## 2025-04-23 DIAGNOSIS — E55.9 VITAMIN D DEFICIENCY: ICD-10-CM

## 2025-04-23 NOTE — TELEPHONE ENCOUNTER
Last A1C was 11/7/2023.  Please place new order to be mailed to the patients home to be completed.

## 2025-04-28 ENCOUNTER — TELEPHONE (OUTPATIENT)
Dept: WOUND CARE | Age: 65
End: 2025-04-28

## 2025-04-28 NOTE — TELEPHONE ENCOUNTER
Spoke with Dr Escalona regarding this patient. He states she does not need to follow up in wound clinic, she needs to be seen by a dermatologist. Called daniel at [facility] and told her this. She voices understanding.

## 2025-04-28 NOTE — TELEPHONE ENCOUNTER
----- Message from Mary SIMENTAL sent at 4/28/2025 10:41 AM EDT -----   [phone] ASK FOR JOSE LUIS(ALENA IF JOSE LUIS NOT AVAILABLE) CALLING FROM [facility] REGARDING APPT WITH DR CARPENTER ON WED 4/30. NURSE IS ASKING IF SHE STILL NEEDS TO COME TO THAT APPT IF NO LONGER HAS THE WOUND VAC?

## 2025-05-15 ENCOUNTER — HOSPITAL ENCOUNTER (INPATIENT)
Age: 65
LOS: 11 days | DRG: 595 | End: 2025-05-26
Attending: EMERGENCY MEDICINE | Admitting: STUDENT IN AN ORGANIZED HEALTH CARE EDUCATION/TRAINING PROGRAM
Payer: MEDICARE

## 2025-05-15 DIAGNOSIS — L12.0 BULLOUS PEMPHIGOID (HCC): Primary | ICD-10-CM

## 2025-05-15 PROBLEM — T14.8XXA WOUND INFECTION: Status: ACTIVE | Noted: 2025-05-15

## 2025-05-15 PROBLEM — L08.9 WOUND INFECTION: Status: ACTIVE | Noted: 2025-05-15

## 2025-05-15 PROBLEM — Z89.611: Status: ACTIVE | Noted: 2025-03-31

## 2025-05-15 PROBLEM — Z89.612: Status: ACTIVE | Noted: 2025-03-31

## 2025-05-15 PROBLEM — Z86.79 HISTORY OF VENTRICULAR TACHYCARDIA: Status: ACTIVE | Noted: 2024-05-30

## 2025-05-15 PROBLEM — N17.9 AKI (ACUTE KIDNEY INJURY): Status: RESOLVED | Noted: 2025-03-31 | Resolved: 2025-05-15

## 2025-05-15 LAB
ACANTHOCYTES: ABNORMAL
ALBUMIN SERPL BCG-MCNC: 2.6 G/DL (ref 3.4–4.9)
ALP SERPL-CCNC: 180 U/L (ref 38–126)
ALT SERPL W/O P-5'-P-CCNC: 151 U/L (ref 10–35)
ANION GAP SERPL CALC-SCNC: 13 MEQ/L (ref 8–16)
ANISOCYTOSIS BLD QL SMEAR: PRESENT
APTT PPP: 26.2 SECONDS (ref 22–38)
AST SERPL-CCNC: 209 U/L (ref 10–35)
BASOPHILS ABSOLUTE: 0 THOU/MM3 (ref 0–0.1)
BASOPHILS NFR BLD AUTO: 0.1 %
BILIRUB SERPL-MCNC: 1 MG/DL (ref 0.3–1.2)
BUN SERPL-MCNC: 27 MG/DL (ref 8–23)
BURR CELLS BLD QL SMEAR: ABNORMAL
CALCIUM SERPL-MCNC: 8.1 MG/DL (ref 8.8–10.2)
CHLORIDE SERPL-SCNC: 99 MEQ/L (ref 98–111)
CO2 SERPL-SCNC: 21 MEQ/L (ref 22–29)
CREAT SERPL-MCNC: 0.5 MG/DL (ref 0.5–0.9)
CRP SERPL-MCNC: 2.15 MG/DL (ref 0–0.5)
DEPRECATED RDW RBC AUTO: 65.2 FL (ref 35–45)
ELLIPTOCYTES: ABNORMAL
EOSINOPHIL NFR BLD AUTO: 0 %
EOSINOPHILS ABSOLUTE: 0 THOU/MM3 (ref 0–0.4)
ERYTHROCYTE [DISTWIDTH] IN BLOOD BY AUTOMATED COUNT: 22.1 % (ref 11.5–14.5)
ERYTHROCYTE [SEDIMENTATION RATE] IN BLOOD BY WESTERGREN METHOD: 0 MM/HR (ref 0–20)
GFR SERPL CREATININE-BSD FRML MDRD: > 90 ML/MIN/1.73M2
GLUCOSE SERPL-MCNC: 117 MG/DL (ref 74–109)
HCT VFR BLD AUTO: 47.4 % (ref 37–47)
HGB BLD-MCNC: 15.4 GM/DL (ref 12–16)
IMM GRANULOCYTES # BLD AUTO: 0.07 THOU/MM3 (ref 0–0.07)
IMM GRANULOCYTES NFR BLD AUTO: 0.6 %
INR PPP: 1.08 (ref 0.85–1.13)
LACTIC ACID, SEPSIS: 2 MMOL/L (ref 0.5–1.9)
LACTIC ACID, SEPSIS: 2.7 MMOL/L (ref 0.5–1.9)
LYMPHOCYTES ABSOLUTE: 0.7 THOU/MM3 (ref 1–4.8)
LYMPHOCYTES NFR BLD AUTO: 5.8 %
MCH RBC QN AUTO: 27.3 PG (ref 26–33)
MCHC RBC AUTO-ENTMCNC: 32.5 GM/DL (ref 32.2–35.5)
MCV RBC AUTO: 84 FL (ref 81–99)
MONOCYTES ABSOLUTE: 0.5 THOU/MM3 (ref 0.4–1.3)
MONOCYTES NFR BLD AUTO: 4.3 %
NEUTROPHILS ABSOLUTE: 10.3 THOU/MM3 (ref 1.8–7.7)
NEUTROPHILS NFR BLD AUTO: 89.2 %
NRBC BLD AUTO-RTO: 0 /100 WBC
OSMOLALITY SERPL CALC.SUM OF ELEC: 272.5 MOSMOL/KG (ref 275–300)
PLATELET # BLD AUTO: 253 THOU/MM3 (ref 130–400)
PLATELET BLD QL SMEAR: ADEQUATE
PMV BLD AUTO: 10 FL (ref 9.4–12.4)
POTASSIUM SERPL-SCNC: 4.4 MEQ/L (ref 3.5–5.2)
PROCALCITONIN SERPL IA-MCNC: 0.1 NG/ML (ref 0.01–0.09)
PROT SERPL-MCNC: 5.1 G/DL (ref 6.4–8.3)
RBC # BLD AUTO: 5.64 MILL/MM3 (ref 4.2–5.4)
SCAN OF BLOOD SMEAR: NORMAL
SODIUM SERPL-SCNC: 133 MEQ/L (ref 135–145)
WBC # BLD AUTO: 11.6 THOU/MM3 (ref 4.8–10.8)

## 2025-05-15 PROCEDURE — 96375 TX/PRO/DX INJ NEW DRUG ADDON: CPT

## 2025-05-15 PROCEDURE — 99223 1ST HOSP IP/OBS HIGH 75: CPT

## 2025-05-15 PROCEDURE — 87147 CULTURE TYPE IMMUNOLOGIC: CPT

## 2025-05-15 PROCEDURE — 85610 PROTHROMBIN TIME: CPT

## 2025-05-15 PROCEDURE — 2580000003 HC RX 258

## 2025-05-15 PROCEDURE — 87040 BLOOD CULTURE FOR BACTERIA: CPT

## 2025-05-15 PROCEDURE — 87077 CULTURE AEROBIC IDENTIFY: CPT

## 2025-05-15 PROCEDURE — 87075 CULTR BACTERIA EXCEPT BLOOD: CPT

## 2025-05-15 PROCEDURE — 87186 SC STD MICRODIL/AGAR DIL: CPT

## 2025-05-15 PROCEDURE — 6370000000 HC RX 637 (ALT 250 FOR IP)

## 2025-05-15 PROCEDURE — 96365 THER/PROPH/DIAG IV INF INIT: CPT

## 2025-05-15 PROCEDURE — 84145 PROCALCITONIN (PCT): CPT

## 2025-05-15 PROCEDURE — 85730 THROMBOPLASTIN TIME PARTIAL: CPT

## 2025-05-15 PROCEDURE — 87205 SMEAR GRAM STAIN: CPT

## 2025-05-15 PROCEDURE — 86140 C-REACTIVE PROTEIN: CPT

## 2025-05-15 PROCEDURE — 85025 COMPLETE CBC W/AUTO DIFF WBC: CPT

## 2025-05-15 PROCEDURE — 6360000002 HC RX W HCPCS

## 2025-05-15 PROCEDURE — 80053 COMPREHEN METABOLIC PANEL: CPT

## 2025-05-15 PROCEDURE — 2580000003 HC RX 258: Performed by: NURSE PRACTITIONER

## 2025-05-15 PROCEDURE — 99285 EMERGENCY DEPT VISIT HI MDM: CPT

## 2025-05-15 PROCEDURE — 1200000000 HC SEMI PRIVATE

## 2025-05-15 PROCEDURE — 85651 RBC SED RATE NONAUTOMATED: CPT

## 2025-05-15 PROCEDURE — 36415 COLL VENOUS BLD VENIPUNCTURE: CPT

## 2025-05-15 PROCEDURE — 83605 ASSAY OF LACTIC ACID: CPT

## 2025-05-15 PROCEDURE — 87070 CULTURE OTHR SPECIMN AEROBIC: CPT

## 2025-05-15 PROCEDURE — 6360000002 HC RX W HCPCS: Performed by: NURSE PRACTITIONER

## 2025-05-15 RX ORDER — ONDANSETRON 4 MG/1
4 TABLET, ORALLY DISINTEGRATING ORAL EVERY 8 HOURS PRN
Status: DISCONTINUED | OUTPATIENT
Start: 2025-05-15 | End: 2025-05-26 | Stop reason: HOSPADM

## 2025-05-15 RX ORDER — ATORVASTATIN CALCIUM 40 MG/1
40 TABLET, FILM COATED ORAL NIGHTLY
Status: DISCONTINUED | OUTPATIENT
Start: 2025-05-16 | End: 2025-05-25

## 2025-05-15 RX ORDER — POTASSIUM CHLORIDE 1500 MG/1
40 TABLET, EXTENDED RELEASE ORAL PRN
Status: DISCONTINUED | OUTPATIENT
Start: 2025-05-15 | End: 2025-05-26 | Stop reason: HOSPADM

## 2025-05-15 RX ORDER — SODIUM CHLORIDE 9 MG/ML
INJECTION, SOLUTION INTRAVENOUS PRN
Status: DISCONTINUED | OUTPATIENT
Start: 2025-05-15 | End: 2025-05-26 | Stop reason: HOSPADM

## 2025-05-15 RX ORDER — FENOFIBRATE 160 MG/1
160 TABLET ORAL DAILY
Status: DISCONTINUED | OUTPATIENT
Start: 2025-05-16 | End: 2025-05-25

## 2025-05-15 RX ORDER — POLYETHYLENE GLYCOL 3350 17 G/17G
17 POWDER, FOR SOLUTION ORAL DAILY PRN
Status: DISCONTINUED | OUTPATIENT
Start: 2025-05-15 | End: 2025-05-26 | Stop reason: HOSPADM

## 2025-05-15 RX ORDER — ONDANSETRON 2 MG/ML
4 INJECTION INTRAMUSCULAR; INTRAVENOUS EVERY 6 HOURS PRN
Status: DISCONTINUED | OUTPATIENT
Start: 2025-05-15 | End: 2025-05-26 | Stop reason: HOSPADM

## 2025-05-15 RX ORDER — ENOXAPARIN SODIUM 100 MG/ML
40 INJECTION SUBCUTANEOUS EVERY 12 HOURS
Status: DISCONTINUED | OUTPATIENT
Start: 2025-05-15 | End: 2025-05-16

## 2025-05-15 RX ORDER — ASPIRIN 81 MG/1
81 TABLET ORAL DAILY
Status: DISCONTINUED | OUTPATIENT
Start: 2025-05-16 | End: 2025-05-25

## 2025-05-15 RX ORDER — ACETAMINOPHEN 650 MG/1
650 SUPPOSITORY RECTAL EVERY 6 HOURS PRN
Status: DISCONTINUED | OUTPATIENT
Start: 2025-05-15 | End: 2025-05-26 | Stop reason: HOSPADM

## 2025-05-15 RX ORDER — PREDNISONE 20 MG/1
20 TABLET ORAL DAILY
Status: COMPLETED | OUTPATIENT
Start: 2025-05-16 | End: 2025-05-19

## 2025-05-15 RX ORDER — POTASSIUM CHLORIDE 7.45 MG/ML
10 INJECTION INTRAVENOUS PRN
Status: DISCONTINUED | OUTPATIENT
Start: 2025-05-15 | End: 2025-05-26 | Stop reason: HOSPADM

## 2025-05-15 RX ORDER — MAGNESIUM SULFATE IN WATER 40 MG/ML
2000 INJECTION, SOLUTION INTRAVENOUS PRN
Status: DISCONTINUED | OUTPATIENT
Start: 2025-05-15 | End: 2025-05-26 | Stop reason: HOSPADM

## 2025-05-15 RX ORDER — ACETAMINOPHEN 325 MG/1
650 TABLET ORAL EVERY 6 HOURS PRN
Status: DISCONTINUED | OUTPATIENT
Start: 2025-05-15 | End: 2025-05-26 | Stop reason: HOSPADM

## 2025-05-15 RX ORDER — PANTOPRAZOLE SODIUM 40 MG/1
40 TABLET, DELAYED RELEASE ORAL
Status: DISCONTINUED | OUTPATIENT
Start: 2025-05-16 | End: 2025-05-26 | Stop reason: HOSPADM

## 2025-05-15 RX ORDER — 0.9 % SODIUM CHLORIDE 0.9 %
1000 INTRAVENOUS SOLUTION INTRAVENOUS ONCE
Status: COMPLETED | OUTPATIENT
Start: 2025-05-15 | End: 2025-05-15

## 2025-05-15 RX ORDER — SODIUM CHLORIDE 0.9 % (FLUSH) 0.9 %
5-40 SYRINGE (ML) INJECTION PRN
Status: DISCONTINUED | OUTPATIENT
Start: 2025-05-15 | End: 2025-05-26 | Stop reason: HOSPADM

## 2025-05-15 RX ORDER — SODIUM CHLORIDE 9 MG/ML
INJECTION, SOLUTION INTRAVENOUS CONTINUOUS
Status: ACTIVE | OUTPATIENT
Start: 2025-05-15 | End: 2025-05-16

## 2025-05-15 RX ORDER — SODIUM CHLORIDE 0.9 % (FLUSH) 0.9 %
5-40 SYRINGE (ML) INJECTION EVERY 12 HOURS SCHEDULED
Status: DISCONTINUED | OUTPATIENT
Start: 2025-05-15 | End: 2025-05-26 | Stop reason: HOSPADM

## 2025-05-15 RX ORDER — DOXEPIN HYDROCHLORIDE 50 MG/1
50 CAPSULE ORAL
Status: DISCONTINUED | OUTPATIENT
Start: 2025-05-15 | End: 2025-05-15

## 2025-05-15 RX ADMIN — SODIUM CHLORIDE 1000 ML: 0.9 INJECTION, SOLUTION INTRAVENOUS at 16:55

## 2025-05-15 RX ADMIN — ENOXAPARIN SODIUM 40 MG: 100 INJECTION SUBCUTANEOUS at 22:28

## 2025-05-15 RX ADMIN — TIZANIDINE 4 MG: 4 TABLET ORAL at 22:28

## 2025-05-15 RX ADMIN — PIPERACILLIN AND TAZOBACTAM 3375 MG: 3; .375 INJECTION, POWDER, FOR SOLUTION INTRAVENOUS at 22:30

## 2025-05-15 RX ADMIN — VANCOMYCIN HYDROCHLORIDE 2250 MG: 1 INJECTION, POWDER, LYOPHILIZED, FOR SOLUTION INTRAVENOUS at 17:38

## 2025-05-15 RX ADMIN — HYDROMORPHONE HYDROCHLORIDE 0.5 MG: 1 INJECTION, SOLUTION INTRAMUSCULAR; INTRAVENOUS; SUBCUTANEOUS at 21:07

## 2025-05-15 RX ADMIN — SODIUM CHLORIDE: 0.9 INJECTION, SOLUTION INTRAVENOUS at 21:06

## 2025-05-15 RX ADMIN — PIPERACILLIN AND TAZOBACTAM 4500 MG: 4; .5 INJECTION, POWDER, LYOPHILIZED, FOR SOLUTION INTRAVENOUS at 16:55

## 2025-05-15 ASSESSMENT — PAIN SCALES - GENERAL
PAINLEVEL_OUTOF10: 4
PAINLEVEL_OUTOF10: 8
PAINLEVEL_OUTOF10: 5
PAINLEVEL_OUTOF10: 7

## 2025-05-15 ASSESSMENT — PAIN - FUNCTIONAL ASSESSMENT
PAIN_FUNCTIONAL_ASSESSMENT: ACTIVITIES ARE NOT PREVENTED
PAIN_FUNCTIONAL_ASSESSMENT: NONE - DENIES PAIN
PAIN_FUNCTIONAL_ASSESSMENT: PREVENTS OR INTERFERES SOME ACTIVE ACTIVITIES AND ADLS
PAIN_FUNCTIONAL_ASSESSMENT: NONE - DENIES PAIN
PAIN_FUNCTIONAL_ASSESSMENT: PREVENTS OR INTERFERES SOME ACTIVE ACTIVITIES AND ADLS
PAIN_FUNCTIONAL_ASSESSMENT: NONE - DENIES PAIN

## 2025-05-15 ASSESSMENT — PAIN DESCRIPTION - LOCATION
LOCATION: GENERALIZED
LOCATION: CHEST;BACK;BUTTOCKS

## 2025-05-15 ASSESSMENT — PAIN DESCRIPTION - PAIN TYPE: TYPE: ACUTE PAIN

## 2025-05-15 ASSESSMENT — PAIN DESCRIPTION - ORIENTATION: ORIENTATION: LEFT;RIGHT

## 2025-05-15 ASSESSMENT — PAIN DESCRIPTION - DESCRIPTORS
DESCRIPTORS: SORE
DESCRIPTORS: SORE
DESCRIPTORS: SORE;TENDER

## 2025-05-15 NOTE — ED TRIAGE NOTES
Pt presents to the ER from the Dermatology office with c/o multiple open wounds. Pt's  states she has had them since March. They were concerned that diaphoretic and that infection has spread. Pt denies fevers or pain. Pt is alert and oriented, respirations even and unlabored. VSS

## 2025-05-15 NOTE — ED PROVIDER NOTES
PATIENT NAME: Kaycee Varela  MRN: [id number]  : 1960  CHANDLER: 5/15/2025    I have seen and examined the patient myself and I have reviewed the advanced practice clinician's chart. Please refer to advanced practice clinician's chart for detailed history of present illness, physical exam and medical decision making. I agree with [facility] level's assessment and plan.     MEDICAL DEDISION MAKING (MDM)     Kaycee Varela is a 65 y.o. female who presents with wound check.     PMH of bullous pemphigoid  (confirmed by Path of skin punch biopsy 2025) likely due to Bactrim use. Admitted 3/31/25-25 with ID consult and she received steroid IV-PO. Discharged to SNF with Doxycycline with ID outpatient follow up.     Seen by Derm today who was concerned there is diffuse wound infection especially to left axilla and left side of torso with discharge. I feel patient overall is improving when I compare multiple daily picture during her hospitalization, but her left side torso has multiple wounds with yellow discharge now.     Exam: AVSS. Heart and lungs CTAB. Soft abdomen without tenderness. Neurologically intact. S/p B/L BKA status. Skin: diffuse healing bullous pemphigoid with some eschars. Left axilla and torso have multiple wounds with odorous discharge.       Left lateral torso wounds      Left axilla wounds.     ED treatment includes pain control, empirically patient was given Zosyn and vancomycin.  Overall patient's wounds are improving although left side torso wound showing infection with odorous discharge.  Labs are reassuring: WBC 11.6.  Normal CRP and ESR (steroid use vs no significant infection).     Discussed with Dr. Escalona (ID), OK to admit here at [facility].  No indication for burn center transfer. D/w and admitted to hospital medicine service    Vitals:    05/15/25 1504 05/15/25 1607 05/15/25 1656 05/15/25 1738   BP: 109/78 114/82 98/72 102/88   Pulse: 94 (!) 101 94 72   Resp: 17 18 17 18   Temp: 97.9 °F (36.6 °C)

## 2025-05-15 NOTE — ED NOTES
ED to inpatient nurses report      Chief Complaint:  Chief Complaint   Patient presents with    Wound Check     Present to ED from: nursing home    MOA:     LOC: alert and orientated to name, place, date  Mobility: Fully dependent  Oxygen Baseline: RA    Current needs required: RA     Code Status:   Prior    What abnormal results were found and what did you give/do to treat them? wounds  Any procedures or intervention occur? See MAR    Mental Status:  Level of Consciousness: Alert (0)    Psych Assessment:        Vitals:  Patient Vitals for the past 24 hrs:   BP Temp Temp src Pulse Resp SpO2 Height Weight   05/15/25 1738 102/88 -- -- 72 18 90 % -- --   05/15/25 1656 98/72 -- -- 94 17 94 % -- --   05/15/25 1607 114/82 -- -- (!) 101 18 91 % -- --   05/15/25 1504 109/78 97.9 °F (36.6 °C) Oral 94 17 95 % 1.702 m (5' 7\") 88.9 kg (196 lb)        LDAs:   Peripheral IV 05/15/25 Left Cephalic (Active)   Site Assessment Clean, dry & intact 05/15/25 1738   Line Status Infusing 05/15/25 1738   Phlebitis Assessment No symptoms 05/15/25 1738   Infiltration Assessment 0 05/15/25 1738   Dressing Status Clean, dry & intact 05/15/25 1738   Dressing Type Transparent 05/15/25 1607   Dressing Intervention New 05/15/25 1607       Ambulatory Status:  Presents to emergency department  because of falls (Syncope, seizure, or loss of consciousness): No, Age > 70: No, Altered Mental Status, Intoxication with alcohol or substance confusion (Disorientation, impaired judgment, poor safety awaremess, or inability to follow instructions): No, Impaired Mobility: Ambulates or transfers with assistive devices or assistance; Unable to ambulate or transer.: Yes    Diagnosis:  DISPOSITION Admitted 05/15/2025 06:20:03 PM   Final diagnoses:   Bullous pemphigoid (HCC)        Consults:  IP CONSULT TO INFECTIOUS DISEASES     Pain Score:  Pain Assessment  Pain Assessment: None - Denies Pain    C-SSRS:   Risk of Suicide: No Risk    Sepsis Screening:       [place]

## 2025-05-15 NOTE — ED NOTES
This Rn in room. Pt resting on cot and given glass of water per request. Respirations even and unlabored. Call light within reach. Will monitor.

## 2025-05-15 NOTE — ED NOTES
Pt updated on IP bed assignment. Pt resting in bed, respirations even and unlabored. No needs expressed at this time. Call light within reach. VSS

## 2025-05-15 NOTE — ED NOTES
Pt resting in bed, respirations even and unlabored. No needs expressed at this time. Call light within reach. VSS

## 2025-05-15 NOTE — ED PROVIDER NOTES
[facility] EMERGENCY DEPARTMENT      EMERGENCY MEDICINE     Pt Name: Kaycee Varela  MRN: [id number]  Birthdate 1960  Date of evaluation: 5/15/2025  Provider: DEREK Natarajan CNP    CHIEF COMPLAINT       Chief Complaint   Patient presents with    Wound Check     HISTORY OF PRESENT ILLNESS   Kaycee Varela is a pleasant 65 y.o. female with a past medical history of hyperlipidemia, hypertension, diabetes, bilateral above-the-knee amputee.  The patient is referred to the emergency department for evaluation by her dermatologist.  Concern for diffuse wound infection especially to the left axilla and left side of torso with malodorous drainage.  The patient was admitted to this facility 3/31-4/8 after developing a diffuse maculopapular rash which was presumed secondary to recent Bactrim use.  The rash was ultimately confirmed as bullous pemphigoid after punch biopsy.  She received treatment with cetirizine and prednisone.  Ultimately discharged to a skilled nursing facility on a 30-day course of doxycycline.  Here, she denies any fever or chills.  No nausea or vomiting.  Denies any other systemic complaints.    PASTMEDICAL HISTORY     Past Medical History:   Diagnosis Date    Arthritis     Atypical ductal hyperplasia, breast     left breast, 2016, lumpectomy, Tamoxifen, H    Cancer (HCC)     Hx of breast biopsy 01/08/2016    Hyperlipidemia     Hypertension     Type II or unspecified type diabetes mellitus without mention of complication, not stated as uncontrolled        Patient Active Problem List   Diagnosis Code    Type 2 diabetes mellitus with other specified complication, unspecified whether long term insulin use (HCC) E11.69    Hyperlipemia E78.5    THORNTON (nonalcoholic steatohepatitis) K75.81    Obesity E66.9    Tobacco use Z72.0    Vitamin D deficiency E55.9    Dyshidrotic eczema L30.1    Displaced articular fracture of head of right femur, sequela S72.061S    Neida-prosthetic supracondylar fracture of

## 2025-05-15 NOTE — ED NOTES
Second abx started. No needs expressed at this time. Respirations even and unlabored, call light within reach. VSS

## 2025-05-16 LAB
ANION GAP SERPL CALC-SCNC: 11 MEQ/L (ref 8–16)
ANISOCYTOSIS BLD QL SMEAR: PRESENT
AUTO DIFF PNL BLD: ABNORMAL
BASOPHILS ABSOLUTE: 0 THOU/MM3 (ref 0–0.1)
BASOPHILS NFR BLD AUTO: 0.4 %
BUN SERPL-MCNC: 21 MG/DL (ref 8–23)
BURR CELLS BLD QL SMEAR: ABNORMAL
CALCIUM SERPL-MCNC: 7.3 MG/DL (ref 8.8–10.2)
CHLORIDE SERPL-SCNC: 107 MEQ/L (ref 98–111)
CO2 SERPL-SCNC: 16 MEQ/L (ref 22–29)
CREAT SERPL-MCNC: 0.4 MG/DL (ref 0.5–0.9)
DEPRECATED RDW RBC AUTO: 65.9 FL (ref 35–45)
EOSINOPHIL NFR BLD AUTO: 1.1 %
EOSINOPHILS ABSOLUTE: 0.1 THOU/MM3 (ref 0–0.4)
ERYTHROCYTE [DISTWIDTH] IN BLOOD BY AUTOMATED COUNT: 22 % (ref 11.5–14.5)
GFR SERPL CREATININE-BSD FRML MDRD: > 90 ML/MIN/1.73M2
GLUCOSE BLD STRIP.AUTO-MCNC: 59 MG/DL (ref 70–108)
GLUCOSE BLD STRIP.AUTO-MCNC: 60 MG/DL (ref 70–108)
GLUCOSE BLD STRIP.AUTO-MCNC: 73 MG/DL (ref 70–108)
GLUCOSE BLD STRIP.AUTO-MCNC: 77 MG/DL (ref 70–108)
GLUCOSE BLD STRIP.AUTO-MCNC: 80 MG/DL (ref 70–108)
GLUCOSE BLD STRIP.AUTO-MCNC: 98 MG/DL (ref 70–108)
GLUCOSE SERPL-MCNC: 54 MG/DL (ref 74–109)
HCT VFR BLD AUTO: 42.4 % (ref 37–47)
HGB BLD-MCNC: 13.7 GM/DL (ref 12–16)
IMM GRANULOCYTES # BLD AUTO: 0.14 THOU/MM3 (ref 0–0.07)
IMM GRANULOCYTES NFR BLD AUTO: 1.8 %
LYMPHOCYTES ABSOLUTE: 1.2 THOU/MM3 (ref 1–4.8)
LYMPHOCYTES NFR BLD AUTO: 16.3 %
MCH RBC QN AUTO: 27.3 PG (ref 26–33)
MCHC RBC AUTO-ENTMCNC: 32.3 GM/DL (ref 32.2–35.5)
MCV RBC AUTO: 84.6 FL (ref 81–99)
MONOCYTES ABSOLUTE: 0.3 THOU/MM3 (ref 0.4–1.3)
MONOCYTES NFR BLD AUTO: 4.3 %
NEUTROPHILS ABSOLUTE: 5.8 THOU/MM3 (ref 1.8–7.7)
NEUTROPHILS NFR BLD AUTO: 76.1 %
NRBC BLD AUTO-RTO: 0 /100 WBC
PATHOLOGIST REVIEW: ABNORMAL
PLATELET # BLD AUTO: 141 THOU/MM3 (ref 130–400)
PLATELET BLD QL SMEAR: ADEQUATE
PMV BLD AUTO: 11.5 FL (ref 9.4–12.4)
POIKILOCYTES: ABNORMAL
POLYCHROMASIA BLD QL SMEAR: ABNORMAL
POTASSIUM SERPL-SCNC: 4.2 MEQ/L (ref 3.5–5.2)
RBC # BLD AUTO: 5.01 MILL/MM3 (ref 4.2–5.4)
SCAN OF BLOOD SMEAR: NORMAL
SCHISTOCYTES BLD QL SMEAR: ABNORMAL
SODIUM SERPL-SCNC: 134 MEQ/L (ref 135–145)
WBC # BLD AUTO: 7.6 THOU/MM3 (ref 4.8–10.8)

## 2025-05-16 PROCEDURE — 83605 ASSAY OF LACTIC ACID: CPT

## 2025-05-16 PROCEDURE — 6360000002 HC RX W HCPCS

## 2025-05-16 PROCEDURE — 6370000000 HC RX 637 (ALT 250 FOR IP)

## 2025-05-16 PROCEDURE — 80048 BASIC METABOLIC PNL TOTAL CA: CPT

## 2025-05-16 PROCEDURE — 1200000000 HC SEMI PRIVATE

## 2025-05-16 PROCEDURE — 80053 COMPREHEN METABOLIC PANEL: CPT

## 2025-05-16 PROCEDURE — 93005 ELECTROCARDIOGRAM TRACING: CPT

## 2025-05-16 PROCEDURE — 2580000003 HC RX 258

## 2025-05-16 PROCEDURE — 36415 COLL VENOUS BLD VENIPUNCTURE: CPT

## 2025-05-16 PROCEDURE — 99233 SBSQ HOSP IP/OBS HIGH 50: CPT | Performed by: STUDENT IN AN ORGANIZED HEALTH CARE EDUCATION/TRAINING PROGRAM

## 2025-05-16 PROCEDURE — 82948 REAGENT STRIP/BLOOD GLUCOSE: CPT

## 2025-05-16 PROCEDURE — 85025 COMPLETE CBC W/AUTO DIFF WBC: CPT

## 2025-05-16 RX ORDER — GLUCAGON 1 MG/ML
1 KIT INJECTION PRN
Status: DISCONTINUED | OUTPATIENT
Start: 2025-05-16 | End: 2025-05-26 | Stop reason: HOSPADM

## 2025-05-16 RX ORDER — ENOXAPARIN SODIUM 100 MG/ML
40 INJECTION SUBCUTANEOUS DAILY
Status: DISCONTINUED | OUTPATIENT
Start: 2025-05-17 | End: 2025-05-26 | Stop reason: HOSPADM

## 2025-05-16 RX ORDER — DEXTROSE MONOHYDRATE 100 MG/ML
INJECTION, SOLUTION INTRAVENOUS CONTINUOUS PRN
Status: DISCONTINUED | OUTPATIENT
Start: 2025-05-16 | End: 2025-05-26 | Stop reason: HOSPADM

## 2025-05-16 RX ORDER — 0.9 % SODIUM CHLORIDE 0.9 %
1000 INTRAVENOUS SOLUTION INTRAVENOUS ONCE
Status: COMPLETED | OUTPATIENT
Start: 2025-05-16 | End: 2025-05-17

## 2025-05-16 RX ADMIN — Medication 1500 MG: at 17:01

## 2025-05-16 RX ADMIN — TIZANIDINE 4 MG: 4 TABLET ORAL at 22:10

## 2025-05-16 RX ADMIN — Medication 1500 MG: at 05:30

## 2025-05-16 RX ADMIN — ENOXAPARIN SODIUM 40 MG: 100 INJECTION SUBCUTANEOUS at 07:41

## 2025-05-16 RX ADMIN — PIPERACILLIN AND TAZOBACTAM 3375 MG: 3; .375 INJECTION, POWDER, FOR SOLUTION INTRAVENOUS at 14:30

## 2025-05-16 RX ADMIN — PREDNISONE 20 MG: 20 TABLET ORAL at 07:41

## 2025-05-16 RX ADMIN — ASPIRIN 81 MG: 81 TABLET, COATED ORAL at 07:41

## 2025-05-16 RX ADMIN — PIPERACILLIN AND TAZOBACTAM 3375 MG: 3; .375 INJECTION, POWDER, FOR SOLUTION INTRAVENOUS at 07:08

## 2025-05-16 RX ADMIN — PANTOPRAZOLE SODIUM 40 MG: 40 TABLET, DELAYED RELEASE ORAL at 07:06

## 2025-05-16 RX ADMIN — SODIUM CHLORIDE: 0.9 INJECTION, SOLUTION INTRAVENOUS at 05:31

## 2025-05-16 RX ADMIN — HYDROMORPHONE HYDROCHLORIDE 0.5 MG: 1 INJECTION, SOLUTION INTRAMUSCULAR; INTRAVENOUS; SUBCUTANEOUS at 17:59

## 2025-05-16 RX ADMIN — ATORVASTATIN CALCIUM 40 MG: 40 TABLET, FILM COATED ORAL at 22:10

## 2025-05-16 RX ADMIN — TIZANIDINE 4 MG: 4 TABLET ORAL at 14:29

## 2025-05-16 RX ADMIN — HYDROMORPHONE HYDROCHLORIDE 0.5 MG: 1 INJECTION, SOLUTION INTRAMUSCULAR; INTRAVENOUS; SUBCUTANEOUS at 07:40

## 2025-05-16 RX ADMIN — FENOFIBRATE 160 MG: 160 TABLET ORAL at 07:41

## 2025-05-16 RX ADMIN — PANTOPRAZOLE SODIUM 40 MG: 40 TABLET, DELAYED RELEASE ORAL at 14:29

## 2025-05-16 RX ADMIN — HYDROMORPHONE HYDROCHLORIDE 0.5 MG: 1 INJECTION, SOLUTION INTRAMUSCULAR; INTRAVENOUS; SUBCUTANEOUS at 11:46

## 2025-05-16 RX ADMIN — TIZANIDINE 4 MG: 4 TABLET ORAL at 07:41

## 2025-05-16 ASSESSMENT — PAIN SCALES - GENERAL
PAINLEVEL_OUTOF10: 8
PAINLEVEL_OUTOF10: 6
PAINLEVEL_OUTOF10: 8
PAINLEVEL_OUTOF10: 10
PAINLEVEL_OUTOF10: 8
PAINLEVEL_OUTOF10: 5
PAINLEVEL_OUTOF10: 3
PAINLEVEL_OUTOF10: 8

## 2025-05-16 NOTE — CARE COORDINATION
5/16/25, 10:31 AM EDT  Discharge Planning Evaluation  Social work consult received, patient from [facility].    Patient preference is to return to [facility].    The patient's current payor source at the facility is Medicare.   Medicare skilled days available: yes  Medicare does the patient have a three midnight qualifying stay? N/a  Insurance precert:  no  Spoke with Holli at the facility.  Patient bed hold: Holli states they will ask  to pay to hold bed however, they plan to take pt back at discharge, they do have rooms available. Holli states they also plan to have a conversation with pt and  about becoming a long term resident due to pts condition.   Anticipated transport plan: stretcher.  Patient's Healthcare Decision Maker: Legal Next of Kin     Readmission Risk Low 0-14, Mod 15-19), High > 20: Readmission Risk Score: 25.2    Current PCP: Jose Lazaro, DO  PCP verified by CM? Yes    Patient Orientation: Alert and Oriented    Patient Cognition: Alert  History Provided by: Patient, Significant Other    Advance Directives:      Code Status: Full Code   Patient's Primary Decision Maker is: Legal Next of Kin    Primary Decision Maker: Luis Carlos Zeng - Spouse - [phone]    Secondary Decision Maker: Adelita Person Child - [phone]     Discharge Planning:    Patient lives with:  (from SNF) Type of Home: Skilled Nursing Facility  Primary Care Giver:  (from SNF and )  Patient Support Systems include: Spouse/Significant Other   Current Financial resources: Medicare  Current community resources: ECF/Home Care  Current services prior to admission: Skilled Nursing Facility, Transportation            Current DME:              Type of Home Care services:  None    ADLS  Prior functional level: Assistance with the following: (pt is total care at SNF)  Current functional level: Assistance with the following: (Total care)    Family can provide assistance at DC:  (from SNF)  Would you like Case

## 2025-05-16 NOTE — CONSULTS
CONSULTATION NOTE :ID       Patient - Kaycee Vaerla,  Age - 65 y.o.    - 1960      Room Number - 6A-07/007-A   N -  [id number]   [facility] # - [id number]  Date of Admission -  5/15/2025  2:58 PM  Patient's PCP: Jose Lazaro DO     Requesting Physician: Flori Yo DO    REASON FOR CONSULTATION   Infected skin wound  CHIEF COMPLAINT   Skin wound with foul-smelling drainage    HISTORY OF PRESENT ILLNESS       This is a very pleasant 65 y.o. female who was admitted to the hospital with a chief complaints of foul-smelling drainage from her skin open wound.  She has history of bullous pemphigoid for which she has been following with dermatology.  She was noted to have foul-smelling drainage from the wound on her left axilla patient has wounds on her groin axilla and abdomen related to bullous pemphigoid she has been using a nonstick dressing however it has been holding the drainage contributing to the order she denies any fever or chills is very painful patient has above-the-knee bilateral amputation.  She was started on broad-spectrum antibiotics.  Infectious disease consultation was requested she has history of osteoarthritis diabetes with neuropathy.    PAST MEDICAL  HISTORY       Past Medical History:   Diagnosis Date    Arthritis     Atypical ductal hyperplasia, breast     left breast, , lumpectomy, Tamoxifen, [facility]    Cancer (HCC)     Hx of breast biopsy 2016    Hyperlipidemia     Hypertension     Type II or unspecified type diabetes mellitus without mention of complication, not stated as uncontrolled        PAST SURGICAL HISTORY     Past Surgical History:   Procedure Laterality Date    BLADDER SUSPENSION      BREAST BIOPSY Left     atypical hyperlasia, , [facility]    BREAST LUMPECTOMY Left     atypical hyperplasia, 2016, [facility]    COLONOSCOPY  2020    HERNIA REPAIR  10/17/2016    JOINT REPLACEMENT Left     knee    KNEE SURGERY      Right knee    LEG

## 2025-05-16 NOTE — H&P
Hospitalist History & Physical     Patient: Kaycee Varela 65 y.o. female : 1960  Date of Admission: 5/15/2025  CODE STATUS: Full Code    Date of Service: Pt seen/examined on 05/15/25 and Admitted to Inpatient with expected LOS greater than two midnights due to medical therapy.     ASSESSMENT AND PLAN    Active Problems:    Wound infection superimposed on bullous pemphigoid  Patient was admitted on 3/31 for blistering eruption and maculopapular rash.  Punch biopsy confirmed bullous pemphigoid.  Patient was placed on IV methylprednisolone daily with Roxicodone and Benadryl and cetirizine.  Patient was discharged to follow-up with dermatology outpatient. Does not appear as though patient was discharged with steroids.  Upon presentation to dermatology today, concerns for wound infection and told to present to the emergency department.  New onset yellow discharge.  CBC shows new onset leukocytosis with neutrophilia and left shift.  CBC shows hyponatremia, hypocalcemia, hypoalbuminemia.  Procalcitonin 0.10.  Lactic acid 2.7 repeat 2.0.  CRP 2.15 and sed rate 0.  Could be falsely low due to possible prednisone use.  ID consulted out of ED.  Recommending admission with broad-spectrum antibiotic coverage.  Wound culture ordered.   Restart prednisone 20 mg  Zosyn and vancomycin started in the emergency department.  Continue at this time until ID provides recommendations.    Type 2 diabetes mellitus   Admission glucose 117  Managed at home with lifestyle changes.   A1c ordered.   DM adjusted diet. Consider low correction sliding scale insulin. Monitor with daily BMP.     Chronic Problems (Reviewed and stable unless noted. Increase case complexity)    Primary hypertension: Managed without medication    Hyperlipemia: Managed with atorvastatin, fenofibrate    GERD: Managed with pantoprazole    Anxiety/depression managed with sertraline, doxepin    History of ventricular tachycardia    History of amputation of both

## 2025-05-16 NOTE — CARE COORDINATION
Case Management Assessment Initial Evaluation    Date/Time of Evaluation: 5/16/2025 9:03 AM  Assessment Completed by: Yareli Hahn RN    If patient is discharged prior to next notation, then this note serves as note for discharge by case management.    Patient Name: Kaycee Varela                   YOB: 1960  Diagnosis: Bullous pemphigoid (HCC) [L12.0]                   Date / Time: 5/15/2025  2:58 PM  Location: [facility]07/007     Patient Admission Status: Inpatient   Readmission Risk Low 0-14, Mod 15-19), High > 20: Readmission Risk Score: 25.2    Current PCP: Jose Lazaro,   Health Care Decision Makers:   Primary Decision Maker: Luis Carlos Zeng - Spouse - [phone]    Secondary Decision Maker: Adelita Person - Zackery Child - [phone]    Additional Case Management Notes: Admitted through ED with wound infection. CRP 2.15. Wound culture, blood cultures pending. Pain management. Zosyn iv q8hr. Vancomycin iv q12hr. Wound care. Consulted ID.     Procedures: None    Imaging: None    Patient Goals/Plan/Treatment Preferences: Pt is from [facility]. SW consulted and full CM assessment deferred to [facility].

## 2025-05-16 NOTE — FLOWSHEET NOTE
05/16/25 0049   Treatment Team Notification   Reason for Communication Evaluate   Name of Team Member Notified Rozina Pacheco   Treatment Team Role Advanced Practice Nurse   Method of Communication Secure Message   Response No new orders   Notification Time 0049     Notified NP at this time stating patient arrived from her facility to the hospital with patterson catheter. Unclear of reasoning for use at this time, patient did not specify. This RN stated that patient has multiple open wounds near area. Asked for orders to continue patterson at this time or if it should be removed. NP stated okay to leave for now, day shift to reassess.

## 2025-05-17 ENCOUNTER — APPOINTMENT (OUTPATIENT)
Dept: GENERAL RADIOLOGY | Age: 65
DRG: 595 | End: 2025-05-17
Payer: MEDICARE

## 2025-05-17 LAB
ACANTHOCYTES: ABNORMAL
ACB COMPLEX DNA BLD POS QL NAA+NON-PROBE: NOT DETECTED
ALBUMIN SERPL BCG-MCNC: 2.1 G/DL (ref 3.4–4.9)
ALP SERPL-CCNC: 146 U/L (ref 38–126)
ALT SERPL W/O P-5'-P-CCNC: 114 U/L (ref 10–35)
ANION GAP SERPL CALC-SCNC: 10 MEQ/L (ref 8–16)
ANION GAP SERPL CALC-SCNC: 11 MEQ/L (ref 8–16)
ANISOCYTOSIS BLD QL SMEAR: PRESENT
AST SERPL-CCNC: 191 U/L (ref 10–35)
B FRAGILIS DNA BLD POS QL NAA+NON-PROBE: NOT DETECTED
BASOPHILS ABSOLUTE: 0 THOU/MM3 (ref 0–0.1)
BASOPHILS NFR BLD AUTO: 0.1 %
BILIRUB SERPL-MCNC: 1 MG/DL (ref 0.3–1.2)
BLACTX-M ISLT/SPM QL: ABNORMAL
BLAIMP ISLT/SPM QL: ABNORMAL
BLAKPC ISLT/SPM QL: ABNORMAL
BLAOXA-48-LIKE ISLT/SPM QL: ABNORMAL
BLAVIM ISLT/SPM QL: ABNORMAL
BOTTLE TYPE: ABNORMAL
BUN SERPL-MCNC: 22 MG/DL (ref 8–23)
BUN SERPL-MCNC: 22 MG/DL (ref 8–23)
BURR CELLS BLD QL SMEAR: ABNORMAL
C ALBICANS DNA BLD POS QL NAA+NON-PROBE: NOT DETECTED
C AURIS DNA BLD POS QL NAA+NON-PROBE: NOT DETECTED
C GATTII+NEOFOR DNA BLD POS QL NAA+N-PRB: NOT DETECTED
C GLABRATA DNA BLD POS QL NAA+NON-PROBE: NOT DETECTED
C KRUSEI DNA BLD POS QL NAA+NON-PROBE: NOT DETECTED
C PARAP DNA BLD POS QL NAA+NON-PROBE: NOT DETECTED
C TROPICLS DNA BLD POS QL NAA+NON-PROBE: NOT DETECTED
CALCIUM SERPL-MCNC: 7.8 MG/DL (ref 8.8–10.2)
CALCIUM SERPL-MCNC: 7.8 MG/DL (ref 8.8–10.2)
CHLORIDE SERPL-SCNC: 107 MEQ/L (ref 98–111)
CHLORIDE SERPL-SCNC: 108 MEQ/L (ref 98–111)
CO2 SERPL-SCNC: 19 MEQ/L (ref 22–29)
CO2 SERPL-SCNC: 20 MEQ/L (ref 22–29)
COAG NEG STAPH DNA BLD QL NAA+PROBE: DETECTED
COLISTIN RES MCR-1 ISLT/SPM QL: ABNORMAL
CREAT SERPL-MCNC: 0.5 MG/DL (ref 0.5–0.9)
CREAT SERPL-MCNC: 0.5 MG/DL (ref 0.5–0.9)
DEPRECATED MEAN GLUCOSE BLD GHB EST-ACNC: 126 MG/DL (ref 70–126)
DEPRECATED RDW RBC AUTO: 66.8 FL (ref 35–45)
DEPRECATED RDW RBC AUTO: 68.2 FL (ref 35–45)
E CLOAC COMP DNA BLD POS NAA+NON-PROBE: NOT DETECTED
E COLI DNA BLD POS QL NAA+NON-PROBE: NOT DETECTED
E FAECALIS DNA BLD POS QL NAA+NON-PROBE: NOT DETECTED
E FAECIUM DNA BLD POS QL NAA+NON-PROBE: NOT DETECTED
ELLIPTOCYTES: ABNORMAL
ENTEROBACTERALES DNA BLD POS NAA+N-PRB: NOT DETECTED
EOSINOPHIL NFR BLD AUTO: 1.4 %
EOSINOPHILS ABSOLUTE: 0.1 THOU/MM3 (ref 0–0.4)
ERYTHROCYTE [DISTWIDTH] IN BLOOD BY AUTOMATED COUNT: 21.8 % (ref 11.5–14.5)
ERYTHROCYTE [DISTWIDTH] IN BLOOD BY AUTOMATED COUNT: 22 % (ref 11.5–14.5)
GFR SERPL CREATININE-BSD FRML MDRD: > 90 ML/MIN/1.73M2
GFR SERPL CREATININE-BSD FRML MDRD: > 90 ML/MIN/1.73M2
GLUCOSE BLD STRIP.AUTO-MCNC: 51 MG/DL (ref 70–108)
GLUCOSE BLD STRIP.AUTO-MCNC: 67 MG/DL (ref 70–108)
GLUCOSE BLD STRIP.AUTO-MCNC: 85 MG/DL (ref 70–108)
GLUCOSE BLD STRIP.AUTO-MCNC: 86 MG/DL (ref 70–108)
GLUCOSE BLD STRIP.AUTO-MCNC: 94 MG/DL (ref 70–108)
GLUCOSE BLD STRIP.AUTO-MCNC: 97 MG/DL (ref 70–108)
GLUCOSE SERPL-MCNC: 91 MG/DL (ref 74–109)
GLUCOSE SERPL-MCNC: 93 MG/DL (ref 74–109)
GP B STREP DNA SPEC QL NAA+PROBE: NOT DETECTED
GP B STREP DNA SPEC QL NAA+PROBE: NOT DETECTED
HAEM INFLU DNA BLD POS QL NAA+NON-PROBE: NOT DETECTED
HBA1C MFR BLD HPLC: 6.2 % (ref 4–6)
HCT VFR BLD AUTO: 43.2 % (ref 37–47)
HCT VFR BLD AUTO: 43.7 % (ref 37–47)
HGB BLD-MCNC: 13.5 GM/DL (ref 12–16)
HGB BLD-MCNC: 13.9 GM/DL (ref 12–16)
IMM GRANULOCYTES # BLD AUTO: 0.06 THOU/MM3 (ref 0–0.07)
IMM GRANULOCYTES NFR BLD AUTO: 0.8 %
K OXYTOCA DNA BLD POS QL NAA+NON-PROBE: NOT DETECTED
K OXYTOCA DNA BLD POS QL NAA+NON-PROBE: NOT DETECTED
KLEBSIELLA SP DNA BLD POS QL NAA+NON-PRB: NOT DETECTED
L MONOCYTOG DNA BLD POS QL NAA+NON-PROBE: NOT DETECTED
LACTATE SERPL-SCNC: 1 MMOL/L (ref 0.5–2)
LACTATE SERPL-SCNC: 2.5 MMOL/L (ref 0.5–2)
LYMPHOCYTES ABSOLUTE: 1.2 THOU/MM3 (ref 1–4.8)
LYMPHOCYTES NFR BLD AUTO: 16.6 %
MCH RBC QN AUTO: 26.8 PG (ref 26–33)
MCH RBC QN AUTO: 27.7 PG (ref 26–33)
MCHC RBC AUTO-ENTMCNC: 31.3 GM/DL (ref 32.2–35.5)
MCHC RBC AUTO-ENTMCNC: 31.8 GM/DL (ref 32.2–35.5)
MCV RBC AUTO: 85.7 FL (ref 81–99)
MCV RBC AUTO: 87.1 FL (ref 81–99)
MECA ISLT/SPM QL: DETECTED
MECA+MECC+MREJ ISLT/SPM QL: ABNORMAL
MONOCYTES ABSOLUTE: 0.4 THOU/MM3 (ref 0.4–1.3)
MONOCYTES NFR BLD AUTO: 5 %
N MEN DNA BLD POS QL NAA+NON-PROBE: NOT DETECTED
NDM: ABNORMAL
NEUTROPHILS ABSOLUTE: 5.6 THOU/MM3 (ref 1.8–7.7)
NEUTROPHILS NFR BLD AUTO: 76.1 %
NRBC BLD AUTO-RTO: 0 /100 WBC
P AERUGINOSA DNA BLD POS NAA+NON-PROBE: NOT DETECTED
PLATELET # BLD AUTO: 187 THOU/MM3 (ref 130–400)
PLATELET # BLD AUTO: 187 THOU/MM3 (ref 130–400)
PLATELET BLD QL SMEAR: ADEQUATE
PMV BLD AUTO: 10.1 FL (ref 9.4–12.4)
PMV BLD AUTO: 10.3 FL (ref 9.4–12.4)
POIKILOCYTES: ABNORMAL
POLYCHROMASIA BLD QL SMEAR: ABNORMAL
POTASSIUM SERPL-SCNC: 3.7 MEQ/L (ref 3.5–5.2)
POTASSIUM SERPL-SCNC: 3.8 MEQ/L (ref 3.5–5.2)
PROT SERPL-MCNC: 4.2 G/DL (ref 6.4–8.3)
PROTEUS SPP: NOT DETECTED
RBC # BLD AUTO: 5.02 MILL/MM3 (ref 4.2–5.4)
RBC # BLD AUTO: 5.04 MILL/MM3 (ref 4.2–5.4)
S AUREUS DNA BLD POS QL NAA+NON-PROBE: NOT DETECTED
S EPIDERMIDIS DNA BLD POS QL NAA+NON-PRB: DETECTED
S LUGDUNENSIS DNA BLD POS QL NAA+NON-PRB: NOT DETECTED
S MALTOPHILIA DNA BLD POS QL NAA+NON-PRB: NOT DETECTED
S MARCESCENS DNA BLD POS NAA+NON-PROBE: NOT DETECTED
S PYO DNA THROAT QL NAA+PROBE: NOT DETECTED
SALMONELLA DNA BLD POS QL NAA+NON-PROBE: NOT DETECTED
SCAN OF BLOOD SMEAR: NORMAL
SCHISTOCYTES BLD QL SMEAR: ABNORMAL
SODIUM SERPL-SCNC: 137 MEQ/L (ref 135–145)
SODIUM SERPL-SCNC: 138 MEQ/L (ref 135–145)
SOURCE OF BLOOD CULTURE: ABNORMAL
STREPTOCOCCUS DNA BLD QL NAA+PROBE: NOT DETECTED
VANA+VANB ISLT/SPM QL: ABNORMAL
WBC # BLD AUTO: 7.2 THOU/MM3 (ref 4.8–10.8)
WBC # BLD AUTO: 7.3 THOU/MM3 (ref 4.8–10.8)

## 2025-05-17 PROCEDURE — 6370000000 HC RX 637 (ALT 250 FOR IP)

## 2025-05-17 PROCEDURE — 6360000002 HC RX W HCPCS

## 2025-05-17 PROCEDURE — 36415 COLL VENOUS BLD VENIPUNCTURE: CPT

## 2025-05-17 PROCEDURE — 85027 COMPLETE CBC AUTOMATED: CPT

## 2025-05-17 PROCEDURE — 2580000003 HC RX 258

## 2025-05-17 PROCEDURE — 2500000003 HC RX 250 WO HCPCS

## 2025-05-17 PROCEDURE — 6370000000 HC RX 637 (ALT 250 FOR IP): Performed by: INTERNAL MEDICINE

## 2025-05-17 PROCEDURE — 83605 ASSAY OF LACTIC ACID: CPT

## 2025-05-17 PROCEDURE — 87801 DETECT AGNT MULT DNA AMPLI: CPT

## 2025-05-17 PROCEDURE — 83036 HEMOGLOBIN GLYCOSYLATED A1C: CPT

## 2025-05-17 PROCEDURE — 93005 ELECTROCARDIOGRAM TRACING: CPT | Performed by: NURSE PRACTITIONER

## 2025-05-17 PROCEDURE — 2060000000 HC ICU INTERMEDIATE R&B

## 2025-05-17 PROCEDURE — 80048 BASIC METABOLIC PNL TOTAL CA: CPT

## 2025-05-17 PROCEDURE — 99233 SBSQ HOSP IP/OBS HIGH 50: CPT | Performed by: STUDENT IN AN ORGANIZED HEALTH CARE EDUCATION/TRAINING PROGRAM

## 2025-05-17 PROCEDURE — 82948 REAGENT STRIP/BLOOD GLUCOSE: CPT

## 2025-05-17 RX ORDER — HYDROCODONE BITARTRATE AND ACETAMINOPHEN 5; 325 MG/1; MG/1
1 TABLET ORAL EVERY 6 HOURS PRN
Status: DISPENSED | OUTPATIENT
Start: 2025-05-17 | End: 2025-05-20

## 2025-05-17 RX ADMIN — ENOXAPARIN SODIUM 40 MG: 100 INJECTION SUBCUTANEOUS at 08:20

## 2025-05-17 RX ADMIN — PIPERACILLIN AND TAZOBACTAM 3375 MG: 3; .375 INJECTION, POWDER, FOR SOLUTION INTRAVENOUS at 15:19

## 2025-05-17 RX ADMIN — Medication 16 G: at 08:55

## 2025-05-17 RX ADMIN — PREDNISONE 20 MG: 20 TABLET ORAL at 08:20

## 2025-05-17 RX ADMIN — PIPERACILLIN AND TAZOBACTAM 3375 MG: 3; .375 INJECTION, POWDER, FOR SOLUTION INTRAVENOUS at 08:32

## 2025-05-17 RX ADMIN — TIZANIDINE 4 MG: 4 TABLET ORAL at 15:12

## 2025-05-17 RX ADMIN — TIZANIDINE 4 MG: 4 TABLET ORAL at 23:36

## 2025-05-17 RX ADMIN — SODIUM CHLORIDE 1000 ML: 0.9 INJECTION, SOLUTION INTRAVENOUS at 00:13

## 2025-05-17 RX ADMIN — PANTOPRAZOLE SODIUM 40 MG: 40 TABLET, DELAYED RELEASE ORAL at 15:12

## 2025-05-17 RX ADMIN — SODIUM CHLORIDE, PRESERVATIVE FREE 10 ML: 5 INJECTION INTRAVENOUS at 08:21

## 2025-05-17 RX ADMIN — Medication 1500 MG: at 05:35

## 2025-05-17 RX ADMIN — PANTOPRAZOLE SODIUM 40 MG: 40 TABLET, DELAYED RELEASE ORAL at 05:36

## 2025-05-17 RX ADMIN — ATORVASTATIN CALCIUM 40 MG: 40 TABLET, FILM COATED ORAL at 23:36

## 2025-05-17 RX ADMIN — PIPERACILLIN AND TAZOBACTAM 3375 MG: 3; .375 INJECTION, POWDER, FOR SOLUTION INTRAVENOUS at 01:00

## 2025-05-17 RX ADMIN — HYDROCODONE BITARTRATE AND ACETAMINOPHEN 1 TABLET: 5; 325 TABLET ORAL at 15:12

## 2025-05-17 RX ADMIN — PIPERACILLIN AND TAZOBACTAM 3375 MG: 3; .375 INJECTION, POWDER, FOR SOLUTION INTRAVENOUS at 23:44

## 2025-05-17 RX ADMIN — TIZANIDINE 4 MG: 4 TABLET ORAL at 08:20

## 2025-05-17 RX ADMIN — ASPIRIN 81 MG: 81 TABLET, COATED ORAL at 08:20

## 2025-05-17 RX ADMIN — SODIUM CHLORIDE, PRESERVATIVE FREE 10 ML: 5 INJECTION INTRAVENOUS at 23:55

## 2025-05-17 RX ADMIN — HYDROMORPHONE HYDROCHLORIDE 0.5 MG: 1 INJECTION, SOLUTION INTRAMUSCULAR; INTRAVENOUS; SUBCUTANEOUS at 11:51

## 2025-05-17 RX ADMIN — FENOFIBRATE 160 MG: 160 TABLET ORAL at 08:20

## 2025-05-17 RX ADMIN — HYDROCODONE BITARTRATE AND ACETAMINOPHEN 1 TABLET: 5; 325 TABLET ORAL at 23:36

## 2025-05-17 ASSESSMENT — PAIN DESCRIPTION - DESCRIPTORS
DESCRIPTORS: SORE;TENDER;SHARP
DESCRIPTORS: ACHING;DISCOMFORT;NAGGING
DESCRIPTORS: ACHING;SORE;TENDER

## 2025-05-17 ASSESSMENT — PAIN DESCRIPTION - ORIENTATION
ORIENTATION: LEFT;RIGHT
ORIENTATION: MID
ORIENTATION: RIGHT;LEFT
ORIENTATION: OTHER (COMMENT)

## 2025-05-17 ASSESSMENT — PAIN DESCRIPTION - LOCATION
LOCATION: BACK;LEG
LOCATION: BACK;BUTTOCKS
LOCATION: GENERALIZED
LOCATION: BACK;BUTTOCKS

## 2025-05-17 ASSESSMENT — PAIN SCALES - GENERAL
PAINLEVEL_OUTOF10: 6
PAINLEVEL_OUTOF10: 7
PAINLEVEL_OUTOF10: 7
PAINLEVEL_OUTOF10: 3

## 2025-05-17 ASSESSMENT — PAIN - FUNCTIONAL ASSESSMENT: PAIN_FUNCTIONAL_ASSESSMENT: PREVENTS OR INTERFERES SOME ACTIVE ACTIVITIES AND ADLS

## 2025-05-17 NOTE — PROCEDURES
PROCEDURE NOTE  Date: 5/17/2025   Name: Kaycee Varela  YOB: 1960    Procedures: EKG Completed. Handed to RN.

## 2025-05-17 NOTE — FLOWSHEET NOTE
Patient transferred via bed to [facility] in stable condition. 0.9% NS bolus infusing in right arm. Pt alert and oriented x4, Transferred by Joesph, house supervisor, and ASHA Campos.

## 2025-05-18 ENCOUNTER — APPOINTMENT (OUTPATIENT)
Dept: GENERAL RADIOLOGY | Age: 65
DRG: 595 | End: 2025-05-18
Payer: MEDICARE

## 2025-05-18 LAB
ANION GAP SERPL CALC-SCNC: 11 MEQ/L (ref 8–16)
BACTERIA BLD AEROBE CULT: ABNORMAL
BACTERIA BLD AEROBE CULT: ABNORMAL
BACTERIA SPEC AEROBE CULT: ABNORMAL
BACTERIA SPEC ANAEROBE CULT: ABNORMAL
BACTERIA URNS QL MICRO: ABNORMAL /HPF
BILIRUB UR QL STRIP.AUTO: ABNORMAL
BUN SERPL-MCNC: 21 MG/DL (ref 8–23)
CA-I BLD ISE-SCNC: 1.07 MMOL/L (ref 1.12–1.32)
CALCIUM SERPL-MCNC: 7.6 MG/DL (ref 8.8–10.2)
CASTS #/AREA URNS LPF: ABNORMAL /LPF
CASTS 2: ABNORMAL /LPF
CHARACTER UR: ABNORMAL
CHLORIDE SERPL-SCNC: 111 MEQ/L (ref 98–111)
CO2 SERPL-SCNC: 17 MEQ/L (ref 22–29)
COLOR, UA: ABNORMAL
CREAT SERPL-MCNC: 0.3 MG/DL (ref 0.5–0.9)
CRYSTALS URNS MICRO: ABNORMAL
DEPRECATED RDW RBC AUTO: 67 FL (ref 35–45)
EKG ATRIAL RATE: 116 BPM
EKG ATRIAL RATE: 98 BPM
EKG P AXIS: 57 DEGREES
EKG P AXIS: 62 DEGREES
EKG P-R INTERVAL: 134 MS
EKG P-R INTERVAL: 142 MS
EKG Q-T INTERVAL: 396 MS
EKG Q-T INTERVAL: 434 MS
EKG QRS DURATION: 112 MS
EKG QRS DURATION: 120 MS
EKG QTC CALCULATION (BAZETT): 500 MS
EKG QTC CALCULATION (BAZETT): 603 MS
EKG R AXIS: 115 DEGREES
EKG R AXIS: 86 DEGREES
EKG T AXIS: 20 DEGREES
EKG T AXIS: 25 DEGREES
EKG VENTRICULAR RATE: 116 BPM
EKG VENTRICULAR RATE: 96 BPM
EPITHELIAL CELLS, UA: ABNORMAL /HPF
ERYTHROCYTE [DISTWIDTH] IN BLOOD BY AUTOMATED COUNT: 22.4 % (ref 11.5–14.5)
GFR SERPL CREATININE-BSD FRML MDRD: > 90 ML/MIN/1.73M2
GLUCOSE BLD STRIP.AUTO-MCNC: 111 MG/DL (ref 70–108)
GLUCOSE BLD STRIP.AUTO-MCNC: 148 MG/DL (ref 70–108)
GLUCOSE BLD STRIP.AUTO-MCNC: 48 MG/DL (ref 70–108)
GLUCOSE BLD STRIP.AUTO-MCNC: 94 MG/DL (ref 70–108)
GLUCOSE BLD STRIP.AUTO-MCNC: 96 MG/DL (ref 70–108)
GLUCOSE SERPL-MCNC: 64 MG/DL (ref 74–109)
GLUCOSE UR QL STRIP.AUTO: NEGATIVE MG/DL
GRAM STN SPEC: ABNORMAL
HCT VFR BLD AUTO: 39.4 % (ref 37–47)
HGB BLD-MCNC: 12.9 GM/DL (ref 12–16)
HGB UR QL STRIP.AUTO: ABNORMAL
KETONES UR QL STRIP.AUTO: NEGATIVE
MCH RBC QN AUTO: 27.7 PG (ref 26–33)
MCHC RBC AUTO-ENTMCNC: 32.7 GM/DL (ref 32.2–35.5)
MCV RBC AUTO: 84.7 FL (ref 81–99)
MISCELLANEOUS 2: ABNORMAL
NITRITE UR QL STRIP: POSITIVE
ORGANISM: ABNORMAL
PH UR STRIP.AUTO: 5.5 [PH] (ref 5–9)
PLATELET # BLD AUTO: 137 THOU/MM3 (ref 130–400)
PMV BLD AUTO: 9.9 FL (ref 9.4–12.4)
POTASSIUM SERPL-SCNC: 4 MEQ/L (ref 3.5–5.2)
PROT UR STRIP.AUTO-MCNC: 100 MG/DL
RBC # BLD AUTO: 4.65 MILL/MM3 (ref 4.2–5.4)
RBC URINE: ABNORMAL /HPF
RENAL EPI CELLS #/AREA URNS HPF: ABNORMAL /[HPF]
SCAN OF BLOOD SMEAR: NORMAL
SODIUM SERPL-SCNC: 139 MEQ/L (ref 135–145)
SP GR UR REFRACT.AUTO: > 1.03 (ref 1–1.03)
UROBILINOGEN, URINE: 1 EU/DL (ref 0–1)
WBC # BLD AUTO: 5.6 THOU/MM3 (ref 4.8–10.8)
WBC #/AREA URNS HPF: > 100 /HPF
WBC #/AREA URNS HPF: ABNORMAL /[HPF]
YEAST LIKE FUNGI URNS QL MICRO: ABNORMAL

## 2025-05-18 PROCEDURE — 71045 X-RAY EXAM CHEST 1 VIEW: CPT

## 2025-05-18 PROCEDURE — 6360000002 HC RX W HCPCS

## 2025-05-18 PROCEDURE — 99233 SBSQ HOSP IP/OBS HIGH 50: CPT | Performed by: STUDENT IN AN ORGANIZED HEALTH CARE EDUCATION/TRAINING PROGRAM

## 2025-05-18 PROCEDURE — 87086 URINE CULTURE/COLONY COUNT: CPT

## 2025-05-18 PROCEDURE — 2060000000 HC ICU INTERMEDIATE R&B

## 2025-05-18 PROCEDURE — 6370000000 HC RX 637 (ALT 250 FOR IP): Performed by: INTERNAL MEDICINE

## 2025-05-18 PROCEDURE — 6370000000 HC RX 637 (ALT 250 FOR IP)

## 2025-05-18 PROCEDURE — 85027 COMPLETE CBC AUTOMATED: CPT

## 2025-05-18 PROCEDURE — 80048 BASIC METABOLIC PNL TOTAL CA: CPT

## 2025-05-18 PROCEDURE — 2500000003 HC RX 250 WO HCPCS

## 2025-05-18 PROCEDURE — 82948 REAGENT STRIP/BLOOD GLUCOSE: CPT

## 2025-05-18 PROCEDURE — 81001 URINALYSIS AUTO W/SCOPE: CPT

## 2025-05-18 PROCEDURE — 82330 ASSAY OF CALCIUM: CPT

## 2025-05-18 PROCEDURE — 93010 ELECTROCARDIOGRAM REPORT: CPT | Performed by: INTERNAL MEDICINE

## 2025-05-18 PROCEDURE — 2580000003 HC RX 258

## 2025-05-18 PROCEDURE — 36415 COLL VENOUS BLD VENIPUNCTURE: CPT

## 2025-05-18 PROCEDURE — 2580000003 HC RX 258: Performed by: STUDENT IN AN ORGANIZED HEALTH CARE EDUCATION/TRAINING PROGRAM

## 2025-05-18 RX ORDER — 0.9 % SODIUM CHLORIDE 0.9 %
1000 INTRAVENOUS SOLUTION INTRAVENOUS ONCE
Status: COMPLETED | OUTPATIENT
Start: 2025-05-18 | End: 2025-05-18

## 2025-05-18 RX ADMIN — SODIUM CHLORIDE, PRESERVATIVE FREE 10 ML: 5 INJECTION INTRAVENOUS at 21:17

## 2025-05-18 RX ADMIN — ATORVASTATIN CALCIUM 40 MG: 40 TABLET, FILM COATED ORAL at 21:16

## 2025-05-18 RX ADMIN — TIZANIDINE 4 MG: 4 TABLET ORAL at 21:16

## 2025-05-18 RX ADMIN — ENOXAPARIN SODIUM 40 MG: 100 INJECTION SUBCUTANEOUS at 07:42

## 2025-05-18 RX ADMIN — ASPIRIN 81 MG: 81 TABLET, COATED ORAL at 07:42

## 2025-05-18 RX ADMIN — PANTOPRAZOLE SODIUM 40 MG: 40 TABLET, DELAYED RELEASE ORAL at 07:02

## 2025-05-18 RX ADMIN — FENOFIBRATE 160 MG: 160 TABLET ORAL at 07:43

## 2025-05-18 RX ADMIN — SODIUM CHLORIDE, PRESERVATIVE FREE 10 ML: 5 INJECTION INTRAVENOUS at 07:43

## 2025-05-18 RX ADMIN — TIZANIDINE 4 MG: 4 TABLET ORAL at 14:32

## 2025-05-18 RX ADMIN — PIPERACILLIN AND TAZOBACTAM 3375 MG: 3; .375 INJECTION, POWDER, FOR SOLUTION INTRAVENOUS at 23:45

## 2025-05-18 RX ADMIN — PREDNISONE 20 MG: 20 TABLET ORAL at 07:42

## 2025-05-18 RX ADMIN — PIPERACILLIN AND TAZOBACTAM 3375 MG: 3; .375 INJECTION, POWDER, FOR SOLUTION INTRAVENOUS at 07:02

## 2025-05-18 RX ADMIN — PIPERACILLIN AND TAZOBACTAM 3375 MG: 3; .375 INJECTION, POWDER, FOR SOLUTION INTRAVENOUS at 14:34

## 2025-05-18 RX ADMIN — HYDROCODONE BITARTRATE AND ACETAMINOPHEN 1 TABLET: 5; 325 TABLET ORAL at 09:15

## 2025-05-18 RX ADMIN — TIZANIDINE 4 MG: 4 TABLET ORAL at 07:42

## 2025-05-18 RX ADMIN — PANTOPRAZOLE SODIUM 40 MG: 40 TABLET, DELAYED RELEASE ORAL at 14:32

## 2025-05-18 RX ADMIN — HYDROCODONE BITARTRATE AND ACETAMINOPHEN 1 TABLET: 5; 325 TABLET ORAL at 23:40

## 2025-05-18 RX ADMIN — SODIUM CHLORIDE 1000 ML: 0.9 INJECTION, SOLUTION INTRAVENOUS at 12:58

## 2025-05-18 RX ADMIN — HYDROMORPHONE HYDROCHLORIDE 0.5 MG: 1 INJECTION, SOLUTION INTRAMUSCULAR; INTRAVENOUS; SUBCUTANEOUS at 04:55

## 2025-05-18 ASSESSMENT — PAIN DESCRIPTION - FREQUENCY: FREQUENCY: CONTINUOUS

## 2025-05-18 ASSESSMENT — PAIN SCALES - GENERAL
PAINLEVEL_OUTOF10: 7
PAINLEVEL_OUTOF10: 8
PAINLEVEL_OUTOF10: 0
PAINLEVEL_OUTOF10: 6
PAINLEVEL_OUTOF10: 4

## 2025-05-18 ASSESSMENT — PAIN DESCRIPTION - LOCATION
LOCATION: GENERALIZED

## 2025-05-18 ASSESSMENT — PAIN DESCRIPTION - DESCRIPTORS
DESCRIPTORS: ACHING;DISCOMFORT
DESCRIPTORS: ACHING

## 2025-05-18 ASSESSMENT — PAIN - FUNCTIONAL ASSESSMENT
PAIN_FUNCTIONAL_ASSESSMENT: PREVENTS OR INTERFERES SOME ACTIVE ACTIVITIES AND ADLS

## 2025-05-18 ASSESSMENT — PAIN DESCRIPTION - ORIENTATION
ORIENTATION: OTHER (COMMENT)
ORIENTATION: OTHER (COMMENT)
ORIENTATION: ANTERIOR;POSTERIOR;RIGHT;LEFT

## 2025-05-18 ASSESSMENT — PAIN DESCRIPTION - ONSET: ONSET: ON-GOING

## 2025-05-18 ASSESSMENT — PAIN DESCRIPTION - PAIN TYPE: TYPE: ACUTE PAIN

## 2025-05-19 LAB
ANION GAP SERPL CALC-SCNC: 12 MEQ/L (ref 8–16)
BUN SERPL-MCNC: 20 MG/DL (ref 8–23)
CALCIUM SERPL-MCNC: 7.6 MG/DL (ref 8.8–10.2)
CHLORIDE SERPL-SCNC: 111 MEQ/L (ref 98–111)
CO2 SERPL-SCNC: 16 MEQ/L (ref 22–29)
CREAT SERPL-MCNC: 0.3 MG/DL (ref 0.5–0.9)
DEPRECATED RDW RBC AUTO: 67.8 FL (ref 35–45)
ERYTHROCYTE [DISTWIDTH] IN BLOOD BY AUTOMATED COUNT: 22.5 % (ref 11.5–14.5)
GFR SERPL CREATININE-BSD FRML MDRD: > 90 ML/MIN/1.73M2
GLUCOSE BLD STRIP.AUTO-MCNC: 101 MG/DL (ref 70–108)
GLUCOSE BLD STRIP.AUTO-MCNC: 114 MG/DL (ref 70–108)
GLUCOSE BLD STRIP.AUTO-MCNC: 146 MG/DL (ref 70–108)
GLUCOSE BLD STRIP.AUTO-MCNC: 61 MG/DL (ref 70–108)
GLUCOSE BLD STRIP.AUTO-MCNC: 67 MG/DL (ref 70–108)
GLUCOSE BLD STRIP.AUTO-MCNC: 96 MG/DL (ref 70–108)
GLUCOSE SERPL-MCNC: 75 MG/DL (ref 74–109)
HCT VFR BLD AUTO: 38.9 % (ref 37–47)
HGB BLD-MCNC: 12.4 GM/DL (ref 12–16)
MCH RBC QN AUTO: 27.2 PG (ref 26–33)
MCHC RBC AUTO-ENTMCNC: 31.9 GM/DL (ref 32.2–35.5)
MCV RBC AUTO: 85.3 FL (ref 81–99)
PLATELET # BLD AUTO: 117 THOU/MM3 (ref 130–400)
PMV BLD AUTO: 10.7 FL (ref 9.4–12.4)
POTASSIUM SERPL-SCNC: 4.2 MEQ/L (ref 3.5–5.2)
RBC # BLD AUTO: 4.56 MILL/MM3 (ref 4.2–5.4)
SODIUM SERPL-SCNC: 139 MEQ/L (ref 135–145)
WBC # BLD AUTO: 5.2 THOU/MM3 (ref 4.8–10.8)

## 2025-05-19 PROCEDURE — 36415 COLL VENOUS BLD VENIPUNCTURE: CPT

## 2025-05-19 PROCEDURE — 80048 BASIC METABOLIC PNL TOTAL CA: CPT

## 2025-05-19 PROCEDURE — 85027 COMPLETE CBC AUTOMATED: CPT

## 2025-05-19 PROCEDURE — 6360000002 HC RX W HCPCS

## 2025-05-19 PROCEDURE — 82948 REAGENT STRIP/BLOOD GLUCOSE: CPT

## 2025-05-19 PROCEDURE — 2500000003 HC RX 250 WO HCPCS

## 2025-05-19 PROCEDURE — 6370000000 HC RX 637 (ALT 250 FOR IP)

## 2025-05-19 PROCEDURE — 6360000002 HC RX W HCPCS: Performed by: STUDENT IN AN ORGANIZED HEALTH CARE EDUCATION/TRAINING PROGRAM

## 2025-05-19 PROCEDURE — 2580000003 HC RX 258

## 2025-05-19 PROCEDURE — 2060000000 HC ICU INTERMEDIATE R&B

## 2025-05-19 PROCEDURE — 2580000003 HC RX 258: Performed by: STUDENT IN AN ORGANIZED HEALTH CARE EDUCATION/TRAINING PROGRAM

## 2025-05-19 PROCEDURE — 99233 SBSQ HOSP IP/OBS HIGH 50: CPT | Performed by: STUDENT IN AN ORGANIZED HEALTH CARE EDUCATION/TRAINING PROGRAM

## 2025-05-19 RX ORDER — CALCIUM GLUCONATE 20 MG/ML
2000 INJECTION, SOLUTION INTRAVENOUS ONCE
Status: COMPLETED | OUTPATIENT
Start: 2025-05-19 | End: 2025-05-19

## 2025-05-19 RX ORDER — SODIUM CHLORIDE 9 MG/ML
INJECTION, SOLUTION INTRAVENOUS ONCE
Status: COMPLETED | OUTPATIENT
Start: 2025-05-19 | End: 2025-05-19

## 2025-05-19 RX ADMIN — HYDROMORPHONE HYDROCHLORIDE 0.5 MG: 1 INJECTION, SOLUTION INTRAMUSCULAR; INTRAVENOUS; SUBCUTANEOUS at 21:53

## 2025-05-19 RX ADMIN — PANTOPRAZOLE SODIUM 40 MG: 40 TABLET, DELAYED RELEASE ORAL at 15:15

## 2025-05-19 RX ADMIN — CALCIUM GLUCONATE 2000 MG: 20 INJECTION, SOLUTION INTRAVENOUS at 15:16

## 2025-05-19 RX ADMIN — ATORVASTATIN CALCIUM 40 MG: 40 TABLET, FILM COATED ORAL at 20:47

## 2025-05-19 RX ADMIN — TIZANIDINE 4 MG: 4 TABLET ORAL at 20:47

## 2025-05-19 RX ADMIN — HYDROMORPHONE HYDROCHLORIDE 0.5 MG: 1 INJECTION, SOLUTION INTRAMUSCULAR; INTRAVENOUS; SUBCUTANEOUS at 02:56

## 2025-05-19 RX ADMIN — TIZANIDINE 4 MG: 4 TABLET ORAL at 15:15

## 2025-05-19 RX ADMIN — PANTOPRAZOLE SODIUM 40 MG: 40 TABLET, DELAYED RELEASE ORAL at 06:44

## 2025-05-19 RX ADMIN — ASPIRIN 81 MG: 81 TABLET, COATED ORAL at 07:43

## 2025-05-19 RX ADMIN — ENOXAPARIN SODIUM 40 MG: 100 INJECTION SUBCUTANEOUS at 07:43

## 2025-05-19 RX ADMIN — FENOFIBRATE 160 MG: 160 TABLET ORAL at 07:43

## 2025-05-19 RX ADMIN — PIPERACILLIN AND TAZOBACTAM 3375 MG: 3; .375 INJECTION, POWDER, FOR SOLUTION INTRAVENOUS at 15:18

## 2025-05-19 RX ADMIN — PIPERACILLIN AND TAZOBACTAM 3375 MG: 3; .375 INJECTION, POWDER, FOR SOLUTION INTRAVENOUS at 23:38

## 2025-05-19 RX ADMIN — Medication 16 G: at 06:46

## 2025-05-19 RX ADMIN — SODIUM CHLORIDE, PRESERVATIVE FREE 10 ML: 5 INJECTION INTRAVENOUS at 07:43

## 2025-05-19 RX ADMIN — PREDNISONE 20 MG: 20 TABLET ORAL at 07:43

## 2025-05-19 RX ADMIN — SODIUM CHLORIDE, PRESERVATIVE FREE 10 ML: 5 INJECTION INTRAVENOUS at 21:54

## 2025-05-19 RX ADMIN — SODIUM CHLORIDE: 0.9 INJECTION, SOLUTION INTRAVENOUS at 09:24

## 2025-05-19 RX ADMIN — HYDROMORPHONE HYDROCHLORIDE 0.5 MG: 1 INJECTION, SOLUTION INTRAMUSCULAR; INTRAVENOUS; SUBCUTANEOUS at 11:56

## 2025-05-19 RX ADMIN — PIPERACILLIN AND TAZOBACTAM 3375 MG: 3; .375 INJECTION, POWDER, FOR SOLUTION INTRAVENOUS at 06:50

## 2025-05-19 ASSESSMENT — PAIN SCALES - GENERAL
PAINLEVEL_OUTOF10: 0
PAINLEVEL_OUTOF10: 8
PAINLEVEL_OUTOF10: 10
PAINLEVEL_OUTOF10: 0
PAINLEVEL_OUTOF10: 9
PAINLEVEL_OUTOF10: 0

## 2025-05-19 ASSESSMENT — PAIN DESCRIPTION - LOCATION
LOCATION: GENERALIZED
LOCATION: BUTTOCKS
LOCATION: BUTTOCKS

## 2025-05-19 ASSESSMENT — PAIN DESCRIPTION - DESCRIPTORS
DESCRIPTORS: ACHING;DISCOMFORT
DESCRIPTORS: SHARP
DESCRIPTORS: ACHING

## 2025-05-19 ASSESSMENT — PAIN DESCRIPTION - FREQUENCY: FREQUENCY: CONTINUOUS

## 2025-05-19 ASSESSMENT — PAIN DESCRIPTION - ONSET: ONSET: ON-GOING

## 2025-05-19 ASSESSMENT — PAIN DESCRIPTION - PAIN TYPE
TYPE: ACUTE PAIN
TYPE: ACUTE PAIN

## 2025-05-19 ASSESSMENT — PAIN - FUNCTIONAL ASSESSMENT
PAIN_FUNCTIONAL_ASSESSMENT: PREVENTS OR INTERFERES SOME ACTIVE ACTIVITIES AND ADLS

## 2025-05-19 ASSESSMENT — PAIN DESCRIPTION - ORIENTATION
ORIENTATION: MID
ORIENTATION: RIGHT;LEFT

## 2025-05-19 NOTE — PALLIATIVE CARE
Follow Up / Progress Note        Patient:   Kaycee Varela  YOB: 1960  Age:  65 y.o.  Room:  [facility]03[facility]  MRN:  [id number]         Family/Patient Discussion:  Patient requested  to be present at bedside for code status discussion.  will be available at bedside tomorrow. Will call in AM to find time he will be available to meet.       Plan/Follow-Up:  Palliative care will follow up with patient and  at bedside tomorrow         Electronically signed by Mateo Donaldson RN on 5/19/2025 at 3:49 PM             Palliative Care Office: [phone]

## 2025-05-19 NOTE — CARE COORDINATION
5/19/25, 2:58 PM EDT    DISCHARGE PLANNING EVALUATION    Met with patient regarding discharge plan.  She told SW that her  plans to take her home at discharge.  Asked if she has everything she would need at home.  She does not have a fatimah lift.  She asked that SW come back to discuss when her spouse is here.  Went to room and they had other staff in the room.

## 2025-05-19 NOTE — CARE COORDINATION
5/19/25, 8:08 AM EDT    DISCHARGE BARRIERS        Patient transferred to 4A3. Report given to unit SW, Rosi BUSTOS, regarding discharge plan for this patient.

## 2025-05-19 NOTE — PALLIATIVE CARE
Initial Evaluation        Patient:   Kaycee Varela  YOB: 1960  Age:  65 y.o.  Room:  [address] [place] [address]-  MRN:  [id number]   Acct: [id number]    Date of Admission:  5/15/2025  2:58 PM  Date of Service:  5/19/2025  Completed By:  Mateo Donaldson RN        Reason for Palliative Care Evaluation:-   Goals of Care and code status     Current Concerns   Did not assess at this [facility]     Palliative Performance Scale   50%  Mainly sit/lie; Extensive disease; Can't do any work; Considerable assist; intake normal or reduced; LOC full/confusion     History    Patient admitted 5/15 from [facility] with wound infection on underlying bullous pemphigoid. Other chronic conditions include DM, GERD, HTN, anxiety/depression, amputation of both lower extremeties.      Goals of Care Discussions and Plan         Family/Patient Discussion:- Case reviewed with primary RN, patient eating breakfast at this time, will attempt to see when patient available.     Plan/Follow-Up:-   Will see later today when patient available.    Treatment Limitations: did not assess at this time    Code Status:Full Code No additional code details    ACP documents on file:  -None    Healthcare Power of /Healthcare Surrogate Decision Makers:  Per [place] Code 2133.08: [place] Hierarchy of Surrogate Decision Makers              Electronically signed by Mateo Donaldson RN on 5/19/2025 at 10:15 AM           Palliative Care Office: [phone]

## 2025-05-19 NOTE — CARE COORDINATION
5/19/25, 2:51 PM EDT    DISCHARGE ON GOING EVALUATION    Kaycee Varela       [facility] day: 4  Location: 4A-03/003-A Reason for admit: Bullous pemphigoid (HCC) [L12.0]     Procedures: none    Imaging since last note:   5/18 CXR Linear opacity along the right lateral chest is either a skinfold   versus some peripheral scarring/atelectasis. No focal areas of consolidation       Barriers to Discharge: Urine (+) Candida albicans, blood culture (+) Staphylococcus. Diabetes management, telemetry, Palliative Care eval, ID, Wound Ostomy following. Asa, Lovenox, pain and nausea control, Protonix, IV Zosyn, electrolyte replacement protocols.     PCP: Jose Lazaro,   Readmission Risk Score: 25.8    Patient Goals/Plan/Treatment Preferences: Pt is from [facility]. SW following.

## 2025-05-20 ENCOUNTER — APPOINTMENT (OUTPATIENT)
Dept: INTERVENTIONAL RADIOLOGY/VASCULAR | Age: 65
DRG: 595 | End: 2025-05-20
Payer: MEDICARE

## 2025-05-20 LAB
BACTERIA BLD AEROBE CULT: NORMAL
BACTERIA UR CULT: ABNORMAL
GLUCOSE BLD STRIP.AUTO-MCNC: 57 MG/DL (ref 70–108)
GLUCOSE BLD STRIP.AUTO-MCNC: 60 MG/DL (ref 70–108)
GLUCOSE BLD STRIP.AUTO-MCNC: 68 MG/DL (ref 70–108)
GLUCOSE BLD STRIP.AUTO-MCNC: 71 MG/DL (ref 70–108)
GLUCOSE BLD STRIP.AUTO-MCNC: 92 MG/DL (ref 70–108)
ORGANISM: ABNORMAL

## 2025-05-20 PROCEDURE — 02HV33Z INSERTION OF INFUSION DEVICE INTO SUPERIOR VENA CAVA, PERCUTANEOUS APPROACH: ICD-10-PCS | Performed by: RADIOLOGY

## 2025-05-20 PROCEDURE — 77001 FLUOROGUIDE FOR VEIN DEVICE: CPT

## 2025-05-20 PROCEDURE — 6360000002 HC RX W HCPCS

## 2025-05-20 PROCEDURE — C1751 CATH, INF, PER/CENT/MIDLINE: HCPCS

## 2025-05-20 PROCEDURE — 6360000002 HC RX W HCPCS: Performed by: RADIOLOGY

## 2025-05-20 PROCEDURE — 6370000000 HC RX 637 (ALT 250 FOR IP)

## 2025-05-20 PROCEDURE — 2580000003 HC RX 258

## 2025-05-20 PROCEDURE — 2060000000 HC ICU INTERMEDIATE R&B

## 2025-05-20 PROCEDURE — 99233 SBSQ HOSP IP/OBS HIGH 50: CPT | Performed by: STUDENT IN AN ORGANIZED HEALTH CARE EDUCATION/TRAINING PROGRAM

## 2025-05-20 PROCEDURE — 6370000000 HC RX 637 (ALT 250 FOR IP): Performed by: INTERNAL MEDICINE

## 2025-05-20 PROCEDURE — 36556 INSERT NON-TUNNEL CV CATH: CPT

## 2025-05-20 PROCEDURE — 76937 US GUIDE VASCULAR ACCESS: CPT

## 2025-05-20 PROCEDURE — 2500000003 HC RX 250 WO HCPCS: Performed by: STUDENT IN AN ORGANIZED HEALTH CARE EDUCATION/TRAINING PROGRAM

## 2025-05-20 PROCEDURE — 82948 REAGENT STRIP/BLOOD GLUCOSE: CPT

## 2025-05-20 RX ORDER — SODIUM CHLORIDE 0.9 % (FLUSH) 0.9 %
5-40 SYRINGE (ML) INJECTION EVERY 12 HOURS SCHEDULED
Status: DISCONTINUED | OUTPATIENT
Start: 2025-05-20 | End: 2025-05-26 | Stop reason: HOSPADM

## 2025-05-20 RX ORDER — SODIUM CHLORIDE 9 MG/ML
INJECTION, SOLUTION INTRAVENOUS PRN
Status: DISCONTINUED | OUTPATIENT
Start: 2025-05-20 | End: 2025-05-26 | Stop reason: HOSPADM

## 2025-05-20 RX ORDER — LIDOCAINE HYDROCHLORIDE 20 MG/ML
INJECTION, SOLUTION INFILTRATION; PERINEURAL PRN
Status: DISCONTINUED | OUTPATIENT
Start: 2025-05-20 | End: 2025-05-26 | Stop reason: HOSPADM

## 2025-05-20 RX ORDER — SODIUM CHLORIDE 0.9 % (FLUSH) 0.9 %
5-40 SYRINGE (ML) INJECTION PRN
Status: DISCONTINUED | OUTPATIENT
Start: 2025-05-20 | End: 2025-05-26 | Stop reason: HOSPADM

## 2025-05-20 RX ADMIN — TIZANIDINE 4 MG: 4 TABLET ORAL at 16:45

## 2025-05-20 RX ADMIN — FENOFIBRATE 160 MG: 160 TABLET ORAL at 10:05

## 2025-05-20 RX ADMIN — PANTOPRAZOLE SODIUM 40 MG: 40 TABLET, DELAYED RELEASE ORAL at 16:45

## 2025-05-20 RX ADMIN — ASPIRIN 81 MG: 81 TABLET, COATED ORAL at 10:05

## 2025-05-20 RX ADMIN — HYDROCODONE BITARTRATE AND ACETAMINOPHEN 1 TABLET: 5; 325 TABLET ORAL at 07:16

## 2025-05-20 RX ADMIN — TIZANIDINE 4 MG: 4 TABLET ORAL at 10:05

## 2025-05-20 RX ADMIN — LIDOCAINE HYDROCHLORIDE 7 ML: 20 INJECTION, SOLUTION INFILTRATION; PERINEURAL at 15:16

## 2025-05-20 RX ADMIN — TIZANIDINE 4 MG: 4 TABLET ORAL at 20:50

## 2025-05-20 RX ADMIN — SODIUM CHLORIDE, PRESERVATIVE FREE 10 ML: 5 INJECTION INTRAVENOUS at 20:50

## 2025-05-20 RX ADMIN — PIPERACILLIN AND TAZOBACTAM 3375 MG: 3; .375 INJECTION, POWDER, FOR SOLUTION INTRAVENOUS at 16:48

## 2025-05-20 RX ADMIN — ATORVASTATIN CALCIUM 40 MG: 40 TABLET, FILM COATED ORAL at 20:50

## 2025-05-20 RX ADMIN — PANTOPRAZOLE SODIUM 40 MG: 40 TABLET, DELAYED RELEASE ORAL at 07:17

## 2025-05-20 ASSESSMENT — PAIN DESCRIPTION - LOCATION: LOCATION: GENERALIZED

## 2025-05-20 ASSESSMENT — PAIN - FUNCTIONAL ASSESSMENT: PAIN_FUNCTIONAL_ASSESSMENT: PREVENTS OR INTERFERES SOME ACTIVE ACTIVITIES AND ADLS

## 2025-05-20 ASSESSMENT — PAIN DESCRIPTION - DESCRIPTORS: DESCRIPTORS: ACHING

## 2025-05-20 ASSESSMENT — PAIN SCALES - GENERAL: PAINLEVEL_OUTOF10: 10

## 2025-05-20 NOTE — PALLIATIVE CARE
Follow Up / Progress Note        Patient:   Kaycee Varela  YOB: 1960  Age:  65 y.o.  Room:  [address] [place] [address]-  MRN:  [id number]         Family/Patient Discussion:  Spoke with patient and  Harjit. Harjit plans to be here at 100pm today for code status/goals of care discussion with patient and palliative care.      Plan/Follow-Up:  Meeting set for 100pm today         Electronically signed by Mateo Donaldson RN on 5/20/2025 at 10:00 AM             Palliative Care Office: [phone]

## 2025-05-20 NOTE — PALLIATIVE CARE
Advance Care Planning     Palliative Team Advance Care Planning (ACP) Conversation    Date of Conversation: 05/20/25    Individuals present for the conversation: Patient with decision making capacity and Spouse Harjit     ACP documents on file prior to discussion:  -None    Previously completed document/s not on file: Patient / participant reports that there are no previously executed ACP documents.    Healthcare Decision Maker:    Primary Decision Maker: Luis Carlos Zeng - Spouse - [phone]    Secondary Decision Maker: Adelita Person - Step Child - [phone]     Conversation Summary:  Met with patient and spouse Harjit at bedside. Introduced palliative care and advanced care planning. Goals of care and code status discussed. Education provided on cardiopulmonary resuscitation including potential risks and outcomes. Education provided on FULL code, DNRCC-A, DNRCC. Patient stated she wishes for DNRCC-A. Further discussion on intubation and mechanical ventilation for respiratory distress situations. Patient stated she would accept mechanical ventilation for respiratory distress. Education provided on [place] portable DNR form. Will work to provide patient and  with signed copy. Dr. Yo updated on conversation, orders received. Primary RN aware.     Resuscitation Status: DNRCC-A    Documentation Completed:  -Portable DNR    I spent 30 minutes with the patient and/or surrogate decision maker discussing the patient's wishes and goals.      Mateo Donaldson RN

## 2025-05-20 NOTE — CARE COORDINATION
5/20/25, 11:36 AM EDT    DISCHARGE PLANNING EVALUATION    Spoke with Kaycee this morning.  She is now agreeable to going to an ECF at discharge.  She asked SW to call her spouse Luis Carlos for options.  [facility] would like [facility] and then [facility].  Made referrals to both.  [facility] would be their preference.      Update 2:12 pm: [facility]  Called [facility] to ask if they have received the referral.      Update 2:42 pm: Spoke with Syeda at [facility].  They are still looking over patients information.  They are aware she has 41 Medicare days left.

## 2025-05-20 NOTE — H&P
Formulation and discussion of sedation / procedure plans, risks, benefits, side effects and alternatives with patient and/or responsible adult completed.    History and Physical reviewed and unchanged.    Electronically signed by Gonzalo Short MD on 5/20/25 at 3:15 PM EDT

## 2025-05-20 NOTE — PALLIATIVE CARE
Follow Up / Progress Note        Patient:   Kaycee Varela  YOB: 1960  Age:  65 y.o.  Room:  [facility]03/[facility]-  MRN:  [id number]               Plan/Follow-Up:  [facility] DNR signed and faxed to medical records. Copies placed on paper chart and in room for patient and spouse.          Electronically signed by Mateo Donaldson RN on 5/20/2025 at 4:01 PM             Palliative Care Office: [phone]

## 2025-05-20 NOTE — OP NOTE
PICC Line Insertion:      Pre-Procedure Diagnosis:  poor IV access , bullous pemphigoid     Procedure Performed: PICC line insertion.  IV team to follow     Anesthesia: local     Findings: successful    Immediate Complications:  None    Estimated Blood Loss: minimal    SEE DICTATED PROCEDURE NOTE FOR COMPLETE DETAILS.    Gonzalo Short MD   5/20/2025 3:15 PM

## 2025-05-21 LAB
ANION GAP SERPL CALC-SCNC: 9 MEQ/L (ref 8–16)
BUN SERPL-MCNC: 13 MG/DL (ref 8–23)
CA-I BLD ISE-SCNC: 1.14 MMOL/L (ref 1.12–1.32)
CALCIUM SERPL-MCNC: 7.6 MG/DL (ref 8.8–10.2)
CHLORIDE SERPL-SCNC: 107 MEQ/L (ref 98–111)
CO2 SERPL-SCNC: 23 MEQ/L (ref 22–29)
CREAT SERPL-MCNC: 0.3 MG/DL (ref 0.5–0.9)
DEPRECATED RDW RBC AUTO: 68.2 FL (ref 35–45)
ERYTHROCYTE [DISTWIDTH] IN BLOOD BY AUTOMATED COUNT: 22.6 % (ref 11.5–14.5)
GFR SERPL CREATININE-BSD FRML MDRD: > 90 ML/MIN/1.73M2
GLUCOSE BLD STRIP.AUTO-MCNC: 64 MG/DL (ref 70–108)
GLUCOSE BLD STRIP.AUTO-MCNC: 70 MG/DL (ref 70–108)
GLUCOSE BLD STRIP.AUTO-MCNC: 73 MG/DL (ref 70–108)
GLUCOSE BLD STRIP.AUTO-MCNC: 73 MG/DL (ref 70–108)
GLUCOSE BLD STRIP.AUTO-MCNC: 80 MG/DL (ref 70–108)
GLUCOSE BLD STRIP.AUTO-MCNC: 87 MG/DL (ref 70–108)
GLUCOSE SERPL-MCNC: 70 MG/DL (ref 74–109)
HCT VFR BLD AUTO: 36.7 % (ref 37–47)
HGB BLD-MCNC: 11.9 GM/DL (ref 12–16)
MCH RBC QN AUTO: 27 PG (ref 26–33)
MCHC RBC AUTO-ENTMCNC: 32.4 GM/DL (ref 32.2–35.5)
MCV RBC AUTO: 83.2 FL (ref 81–99)
PLATELET # BLD AUTO: 122 THOU/MM3 (ref 130–400)
PMV BLD AUTO: 10.3 FL (ref 9.4–12.4)
POTASSIUM SERPL-SCNC: 3.3 MEQ/L (ref 3.5–5.2)
POTASSIUM SERPL-SCNC: 4.1 MEQ/L (ref 3.5–5.2)
RBC # BLD AUTO: 4.41 MILL/MM3 (ref 4.2–5.4)
SODIUM SERPL-SCNC: 139 MEQ/L (ref 135–145)
WBC # BLD AUTO: 4.3 THOU/MM3 (ref 4.8–10.8)

## 2025-05-21 PROCEDURE — 6370000000 HC RX 637 (ALT 250 FOR IP)

## 2025-05-21 PROCEDURE — 36415 COLL VENOUS BLD VENIPUNCTURE: CPT

## 2025-05-21 PROCEDURE — 6360000002 HC RX W HCPCS

## 2025-05-21 PROCEDURE — 2580000003 HC RX 258

## 2025-05-21 PROCEDURE — 2060000000 HC ICU INTERMEDIATE R&B

## 2025-05-21 PROCEDURE — 99232 SBSQ HOSP IP/OBS MODERATE 35: CPT | Performed by: STUDENT IN AN ORGANIZED HEALTH CARE EDUCATION/TRAINING PROGRAM

## 2025-05-21 PROCEDURE — 84132 ASSAY OF SERUM POTASSIUM: CPT

## 2025-05-21 PROCEDURE — 85027 COMPLETE CBC AUTOMATED: CPT

## 2025-05-21 PROCEDURE — 2500000003 HC RX 250 WO HCPCS

## 2025-05-21 PROCEDURE — 6370000000 HC RX 637 (ALT 250 FOR IP): Performed by: STUDENT IN AN ORGANIZED HEALTH CARE EDUCATION/TRAINING PROGRAM

## 2025-05-21 PROCEDURE — 82330 ASSAY OF CALCIUM: CPT

## 2025-05-21 PROCEDURE — 2500000003 HC RX 250 WO HCPCS: Performed by: STUDENT IN AN ORGANIZED HEALTH CARE EDUCATION/TRAINING PROGRAM

## 2025-05-21 PROCEDURE — 2580000003 HC RX 258: Performed by: STUDENT IN AN ORGANIZED HEALTH CARE EDUCATION/TRAINING PROGRAM

## 2025-05-21 PROCEDURE — 80048 BASIC METABOLIC PNL TOTAL CA: CPT

## 2025-05-21 PROCEDURE — 82948 REAGENT STRIP/BLOOD GLUCOSE: CPT

## 2025-05-21 RX ORDER — MIDODRINE HYDROCHLORIDE 5 MG/1
5 TABLET ORAL
Status: DISCONTINUED | OUTPATIENT
Start: 2025-05-21 | End: 2025-05-22

## 2025-05-21 RX ORDER — 0.9 % SODIUM CHLORIDE 0.9 %
500 INTRAVENOUS SOLUTION INTRAVENOUS ONCE
Status: COMPLETED | OUTPATIENT
Start: 2025-05-21 | End: 2025-05-21

## 2025-05-21 RX ORDER — MIDODRINE HYDROCHLORIDE 2.5 MG/1
2.5 TABLET ORAL
Status: DISCONTINUED | OUTPATIENT
Start: 2025-05-21 | End: 2025-05-21

## 2025-05-21 RX ADMIN — PIPERACILLIN AND TAZOBACTAM 3375 MG: 3; .375 INJECTION, POWDER, FOR SOLUTION INTRAVENOUS at 12:15

## 2025-05-21 RX ADMIN — TIZANIDINE 4 MG: 4 TABLET ORAL at 14:59

## 2025-05-21 RX ADMIN — SODIUM CHLORIDE, PRESERVATIVE FREE 10 ML: 5 INJECTION INTRAVENOUS at 19:49

## 2025-05-21 RX ADMIN — PIPERACILLIN AND TAZOBACTAM 3375 MG: 3; .375 INJECTION, POWDER, FOR SOLUTION INTRAVENOUS at 21:30

## 2025-05-21 RX ADMIN — HYDROMORPHONE HYDROCHLORIDE 0.25 MG: 1 INJECTION, SOLUTION INTRAMUSCULAR; INTRAVENOUS; SUBCUTANEOUS at 08:34

## 2025-05-21 RX ADMIN — PANTOPRAZOLE SODIUM 40 MG: 40 TABLET, DELAYED RELEASE ORAL at 04:31

## 2025-05-21 RX ADMIN — SODIUM CHLORIDE, PRESERVATIVE FREE 10 ML: 5 INJECTION INTRAVENOUS at 08:34

## 2025-05-21 RX ADMIN — HYDROMORPHONE HYDROCHLORIDE 0.5 MG: 1 INJECTION, SOLUTION INTRAMUSCULAR; INTRAVENOUS; SUBCUTANEOUS at 04:31

## 2025-05-21 RX ADMIN — TIZANIDINE 4 MG: 4 TABLET ORAL at 19:49

## 2025-05-21 RX ADMIN — PIPERACILLIN AND TAZOBACTAM 3375 MG: 3; .375 INJECTION, POWDER, FOR SOLUTION INTRAVENOUS at 00:00

## 2025-05-21 RX ADMIN — POTASSIUM CHLORIDE 40 MEQ: 1500 TABLET, EXTENDED RELEASE ORAL at 06:47

## 2025-05-21 RX ADMIN — MIDODRINE HYDROCHLORIDE 5 MG: 5 TABLET ORAL at 16:44

## 2025-05-21 RX ADMIN — SODIUM CHLORIDE 500 ML: 0.9 INJECTION, SOLUTION INTRAVENOUS at 15:41

## 2025-05-21 RX ADMIN — SODIUM CHLORIDE 500 ML: 0.9 INJECTION, SOLUTION INTRAVENOUS at 19:49

## 2025-05-21 RX ADMIN — TIZANIDINE 4 MG: 4 TABLET ORAL at 08:34

## 2025-05-21 RX ADMIN — FENOFIBRATE 160 MG: 160 TABLET ORAL at 08:34

## 2025-05-21 RX ADMIN — MIDODRINE HYDROCHLORIDE 2.5 MG: 2.5 TABLET ORAL at 12:11

## 2025-05-21 RX ADMIN — ENOXAPARIN SODIUM 40 MG: 100 INJECTION SUBCUTANEOUS at 08:34

## 2025-05-21 RX ADMIN — HYDROMORPHONE HYDROCHLORIDE 0.5 MG: 1 INJECTION, SOLUTION INTRAMUSCULAR; INTRAVENOUS; SUBCUTANEOUS at 18:47

## 2025-05-21 RX ADMIN — ASPIRIN 81 MG: 81 TABLET, COATED ORAL at 08:34

## 2025-05-21 RX ADMIN — PIPERACILLIN AND TAZOBACTAM 3375 MG: 3; .375 INJECTION, POWDER, FOR SOLUTION INTRAVENOUS at 06:39

## 2025-05-21 RX ADMIN — Medication 16 G: at 12:28

## 2025-05-21 RX ADMIN — ATORVASTATIN CALCIUM 40 MG: 40 TABLET, FILM COATED ORAL at 19:49

## 2025-05-21 RX ADMIN — PANTOPRAZOLE SODIUM 40 MG: 40 TABLET, DELAYED RELEASE ORAL at 16:44

## 2025-05-21 ASSESSMENT — PAIN DESCRIPTION - LOCATION
LOCATION: GENERALIZED

## 2025-05-21 ASSESSMENT — PAIN DESCRIPTION - DESCRIPTORS
DESCRIPTORS: ACHING;DISCOMFORT
DESCRIPTORS: ACHING;DISCOMFORT;SORE
DESCRIPTORS: ACHING
DESCRIPTORS: ACHING

## 2025-05-21 ASSESSMENT — PAIN SCALES - GENERAL
PAINLEVEL_OUTOF10: 8
PAINLEVEL_OUTOF10: 8
PAINLEVEL_OUTOF10: 7
PAINLEVEL_OUTOF10: 3
PAINLEVEL_OUTOF10: 6
PAINLEVEL_OUTOF10: 9
PAINLEVEL_OUTOF10: 6

## 2025-05-21 ASSESSMENT — PAIN DESCRIPTION - FREQUENCY: FREQUENCY: INTERMITTENT

## 2025-05-21 ASSESSMENT — PAIN DESCRIPTION - ORIENTATION
ORIENTATION: RIGHT;LEFT
ORIENTATION: RIGHT;LEFT

## 2025-05-21 ASSESSMENT — PAIN DESCRIPTION - PAIN TYPE
TYPE: ACUTE PAIN
TYPE: ACUTE PAIN

## 2025-05-21 ASSESSMENT — PAIN - FUNCTIONAL ASSESSMENT
PAIN_FUNCTIONAL_ASSESSMENT: PREVENTS OR INTERFERES SOME ACTIVE ACTIVITIES AND ADLS

## 2025-05-21 ASSESSMENT — PAIN DESCRIPTION - ONSET: ONSET: ON-GOING

## 2025-05-21 ASSESSMENT — PAIN SCALES - WONG BAKER: WONGBAKER_NUMERICALRESPONSE: HURTS A LITTLE BIT

## 2025-05-21 NOTE — CARE COORDINATION
5/21/25, 8:37 AM EDT    DISCHARGE PLANNING EVALUATION    Patient has been accepted to [facility].      Update 3:22 pm: Made Kaycee aware this morning that she has been accepted to [facility].  She asked that SW call her spouse to update him.  Called Luis Carlos and made him aware she will be discharged tomorrow.  He asked that SW set up ambulance transport for after noon.  Set up for tomorrow at 1 pm.  He is aware.  Called Syeda at [facility] to make her aware of discharge plan for tomorrow.

## 2025-05-21 NOTE — CARE COORDINATION
5/21/25, 11:12 AM EDT    DISCHARGE ON GOING EVALUATION    Kaycee Varela       [facility] day: 6  Location: -03/003-A Reason for admit: Bullous pemphigoid (HCC) [L12.0]     Procedures: 5/20 PICC    Imaging since last note: no new    Barriers to Discharge: Hospitalist and ID following. BP down to 74/41 (54) this am. Midodrine ordered. Urine culture grew Candida. IV Zosyn.     PCP: Jose Lazaro,   Readmission Risk Score: 24.7    Patient Goals/Plan/Treatment Preferences: Planning [facility], see SW notes from today.

## 2025-05-21 NOTE — DISCHARGE INSTR - COC
Continuity of Care Form    Patient Name: Kaycee Varela   :  1960  MRN:  [id number]    Admit date:  5/15/2025  Discharge date:  ***    Code Status Order: DNR-CCA   Advance Directives:     Admitting Physician:  Flori Yo DO  PCP: Jose Lazaro DO    Discharging Nurse: ***  Discharging Hospital Unit/Room#: 4A-03/003-A  Discharging Unit Phone Number: ***    Emergency Contact:   Extended Emergency Contact Information  Primary Emergency Contact: Luis Carlos Zeng  Address: [address] [place] [address] [address]           [place] [address] [place] [facility]  Home Phone: [phone]  Relation: Spouse  Secondary Emergency Contact: Ana Burkett  Mobile Phone: [phone]  Relation: Brother/Sister    Past Surgical History:  Past Surgical History:   Procedure Laterality Date    BLADDER SUSPENSION      BREAST BIOPSY Left     atypical hyperlasia, , [facility]    BREAST LUMPECTOMY Left     atypical hyperplasia, , [facility]    COLONOSCOPY  2020    HERNIA REPAIR  10/17/2016    JOINT REPLACEMENT Left     knee    KNEE SURGERY      Right knee    LEG SURGERY Right 3/14/2023    Right Above Knee Amputation performed by Rigo Fischer MD at [facility]    LEG SURGERY Left 2025    LEFT LEG AMPUTATION ABOVE KNEE performed by Jf Still DO at [facility]    REVISION TOTAL KNEE ARTHROPLASTY Right 05/10/2022    RIGHT KNEE TOTAL ARTHROPLASTY REVISION performed by Rigo Fischer MD at [facility]    REVISION TOTAL KNEE ARTHROPLASTY Right 2022    Right Knee Incision and Drainage of Total Knee With Poly Exchange performed by Rigo Fischer MD at [facility]    REVISION TOTAL KNEE ARTHROPLASTY Right 2022    STAGE 1 RIGHT KNEE performed by Rigo Fischer MD at [facility]    TUBAL LIGATION      WISDOM TOOTH EXTRACTION         Immunization History:   Immunization History   Administered Date(s) Administered    COVID-19, MODERNA Bivalent, (age 12y+), IM, 50 mcg/0.5 mL 2022    COVID-19, MODERNA Booster BLUE border, (age 18y+), IM,

## 2025-05-22 LAB
ANION GAP SERPL CALC-SCNC: 10 MEQ/L (ref 8–16)
BUN SERPL-MCNC: 12 MG/DL (ref 8–23)
CA-I BLD ISE-SCNC: 1.08 MMOL/L (ref 1.12–1.32)
CALCIUM SERPL-MCNC: 7.6 MG/DL (ref 8.8–10.2)
CHLORIDE SERPL-SCNC: 108 MEQ/L (ref 98–111)
CO2 SERPL-SCNC: 22 MEQ/L (ref 22–29)
CREAT SERPL-MCNC: 0.3 MG/DL (ref 0.5–0.9)
DEPRECATED RDW RBC AUTO: 69.7 FL (ref 35–45)
ERYTHROCYTE [DISTWIDTH] IN BLOOD BY AUTOMATED COUNT: 22.9 % (ref 11.5–14.5)
GFR SERPL CREATININE-BSD FRML MDRD: > 90 ML/MIN/1.73M2
GLUCOSE BLD STRIP.AUTO-MCNC: 74 MG/DL (ref 70–108)
GLUCOSE BLD STRIP.AUTO-MCNC: 79 MG/DL (ref 70–108)
GLUCOSE BLD STRIP.AUTO-MCNC: 82 MG/DL (ref 70–108)
GLUCOSE BLD STRIP.AUTO-MCNC: 83 MG/DL (ref 70–108)
GLUCOSE BLD STRIP.AUTO-MCNC: 85 MG/DL (ref 70–108)
GLUCOSE SERPL-MCNC: 104 MG/DL (ref 74–109)
HCT VFR BLD AUTO: 37.9 % (ref 37–47)
HGB BLD-MCNC: 12.5 GM/DL (ref 12–16)
MCH RBC QN AUTO: 28 PG (ref 26–33)
MCHC RBC AUTO-ENTMCNC: 33 GM/DL (ref 32.2–35.5)
MCV RBC AUTO: 84.8 FL (ref 81–99)
PLATELET # BLD AUTO: 108 THOU/MM3 (ref 130–400)
PMV BLD AUTO: 11.4 FL (ref 9.4–12.4)
POTASSIUM SERPL-SCNC: 3.8 MEQ/L (ref 3.5–5.2)
RBC # BLD AUTO: 4.47 MILL/MM3 (ref 4.2–5.4)
SODIUM SERPL-SCNC: 140 MEQ/L (ref 135–145)
WBC # BLD AUTO: 4.3 THOU/MM3 (ref 4.8–10.8)

## 2025-05-22 PROCEDURE — 82330 ASSAY OF CALCIUM: CPT

## 2025-05-22 PROCEDURE — 99233 SBSQ HOSP IP/OBS HIGH 50: CPT | Performed by: STUDENT IN AN ORGANIZED HEALTH CARE EDUCATION/TRAINING PROGRAM

## 2025-05-22 PROCEDURE — 36415 COLL VENOUS BLD VENIPUNCTURE: CPT

## 2025-05-22 PROCEDURE — 80048 BASIC METABOLIC PNL TOTAL CA: CPT

## 2025-05-22 PROCEDURE — 6370000000 HC RX 637 (ALT 250 FOR IP)

## 2025-05-22 PROCEDURE — 2060000000 HC ICU INTERMEDIATE R&B

## 2025-05-22 PROCEDURE — 2580000003 HC RX 258

## 2025-05-22 PROCEDURE — 2500000003 HC RX 250 WO HCPCS: Performed by: STUDENT IN AN ORGANIZED HEALTH CARE EDUCATION/TRAINING PROGRAM

## 2025-05-22 PROCEDURE — 85027 COMPLETE CBC AUTOMATED: CPT

## 2025-05-22 PROCEDURE — 6360000002 HC RX W HCPCS

## 2025-05-22 PROCEDURE — 2580000003 HC RX 258: Performed by: STUDENT IN AN ORGANIZED HEALTH CARE EDUCATION/TRAINING PROGRAM

## 2025-05-22 PROCEDURE — 6360000002 HC RX W HCPCS: Performed by: STUDENT IN AN ORGANIZED HEALTH CARE EDUCATION/TRAINING PROGRAM

## 2025-05-22 PROCEDURE — 82948 REAGENT STRIP/BLOOD GLUCOSE: CPT

## 2025-05-22 RX ORDER — MIDODRINE HYDROCHLORIDE 5 MG/1
5 TABLET ORAL ONCE
Status: COMPLETED | OUTPATIENT
Start: 2025-05-22 | End: 2025-05-22

## 2025-05-22 RX ORDER — 0.9 % SODIUM CHLORIDE 0.9 %
500 INTRAVENOUS SOLUTION INTRAVENOUS ONCE
Status: COMPLETED | OUTPATIENT
Start: 2025-05-22 | End: 2025-05-22

## 2025-05-22 RX ORDER — KETOROLAC TROMETHAMINE 30 MG/ML
15 INJECTION, SOLUTION INTRAMUSCULAR; INTRAVENOUS ONCE
Status: COMPLETED | OUTPATIENT
Start: 2025-05-22 | End: 2025-05-22

## 2025-05-22 RX ORDER — 0.9 % SODIUM CHLORIDE 0.9 %
1000 INTRAVENOUS SOLUTION INTRAVENOUS ONCE
Status: COMPLETED | OUTPATIENT
Start: 2025-05-22 | End: 2025-05-22

## 2025-05-22 RX ORDER — MIDODRINE HYDROCHLORIDE 10 MG/1
10 TABLET ORAL
Status: DISCONTINUED | OUTPATIENT
Start: 2025-05-22 | End: 2025-05-24

## 2025-05-22 RX ADMIN — MIDODRINE HYDROCHLORIDE 10 MG: 10 TABLET ORAL at 13:11

## 2025-05-22 RX ADMIN — FENOFIBRATE 160 MG: 160 TABLET ORAL at 08:21

## 2025-05-22 RX ADMIN — PANTOPRAZOLE SODIUM 40 MG: 40 TABLET, DELAYED RELEASE ORAL at 05:31

## 2025-05-22 RX ADMIN — KETOROLAC TROMETHAMINE 15 MG: 30 INJECTION, SOLUTION INTRAMUSCULAR at 08:35

## 2025-05-22 RX ADMIN — TIZANIDINE 4 MG: 4 TABLET ORAL at 15:48

## 2025-05-22 RX ADMIN — PANTOPRAZOLE SODIUM 40 MG: 40 TABLET, DELAYED RELEASE ORAL at 15:48

## 2025-05-22 RX ADMIN — MIDODRINE HYDROCHLORIDE 10 MG: 10 TABLET ORAL at 17:53

## 2025-05-22 RX ADMIN — SODIUM CHLORIDE 500 ML: 0.9 INJECTION, SOLUTION INTRAVENOUS at 06:36

## 2025-05-22 RX ADMIN — SODIUM CHLORIDE, PRESERVATIVE FREE 10 ML: 5 INJECTION INTRAVENOUS at 19:59

## 2025-05-22 RX ADMIN — PIPERACILLIN AND TAZOBACTAM 3375 MG: 3; .375 INJECTION, POWDER, FOR SOLUTION INTRAVENOUS at 05:26

## 2025-05-22 RX ADMIN — PIPERACILLIN AND TAZOBACTAM 3375 MG: 3; .375 INJECTION, POWDER, FOR SOLUTION INTRAVENOUS at 21:34

## 2025-05-22 RX ADMIN — MIDODRINE HYDROCHLORIDE 5 MG: 5 TABLET ORAL at 06:37

## 2025-05-22 RX ADMIN — SODIUM CHLORIDE 1000 ML: 0.9 INJECTION, SOLUTION INTRAVENOUS at 15:52

## 2025-05-22 RX ADMIN — ACETAMINOPHEN 650 MG: 325 TABLET ORAL at 05:36

## 2025-05-22 RX ADMIN — TIZANIDINE 4 MG: 4 TABLET ORAL at 08:21

## 2025-05-22 RX ADMIN — ATORVASTATIN CALCIUM 40 MG: 40 TABLET, FILM COATED ORAL at 19:58

## 2025-05-22 RX ADMIN — PIPERACILLIN AND TAZOBACTAM 3375 MG: 3; .375 INJECTION, POWDER, FOR SOLUTION INTRAVENOUS at 13:14

## 2025-05-22 RX ADMIN — MIDODRINE HYDROCHLORIDE 10 MG: 10 TABLET ORAL at 08:21

## 2025-05-22 RX ADMIN — TIZANIDINE 4 MG: 4 TABLET ORAL at 19:58

## 2025-05-22 RX ADMIN — ASPIRIN 81 MG: 81 TABLET, COATED ORAL at 08:21

## 2025-05-22 ASSESSMENT — PAIN DESCRIPTION - LOCATION
LOCATION: BUTTOCKS
LOCATION: GENERALIZED
LOCATION: GENERALIZED
LOCATION: BUTTOCKS
LOCATION: GENERALIZED
LOCATION: BUTTOCKS

## 2025-05-22 ASSESSMENT — PAIN DESCRIPTION - FREQUENCY
FREQUENCY: CONTINUOUS
FREQUENCY: INTERMITTENT
FREQUENCY: CONTINUOUS
FREQUENCY: INTERMITTENT
FREQUENCY: CONTINUOUS

## 2025-05-22 ASSESSMENT — PAIN - FUNCTIONAL ASSESSMENT
PAIN_FUNCTIONAL_ASSESSMENT: PREVENTS OR INTERFERES WITH ALL ACTIVE AND SOME PASSIVE ACTIVITIES
PAIN_FUNCTIONAL_ASSESSMENT: PREVENTS OR INTERFERES SOME ACTIVE ACTIVITIES AND ADLS
PAIN_FUNCTIONAL_ASSESSMENT: PREVENTS OR INTERFERES WITH ALL ACTIVE AND SOME PASSIVE ACTIVITIES

## 2025-05-22 ASSESSMENT — PAIN DESCRIPTION - ONSET
ONSET: ON-GOING

## 2025-05-22 ASSESSMENT — PAIN DESCRIPTION - DESCRIPTORS
DESCRIPTORS: ACHING;DISCOMFORT
DESCRIPTORS: ACHING;DISCOMFORT
DESCRIPTORS: JABBING;DISCOMFORT
DESCRIPTORS: ACHING;DISCOMFORT
DESCRIPTORS: ACHING;DISCOMFORT
DESCRIPTORS: BURNING;ACHING

## 2025-05-22 ASSESSMENT — PAIN DESCRIPTION - PAIN TYPE
TYPE: CHRONIC PAIN
TYPE: ACUTE PAIN;CHRONIC PAIN
TYPE: ACUTE PAIN;CHRONIC PAIN
TYPE: ACUTE PAIN
TYPE: ACUTE PAIN

## 2025-05-22 ASSESSMENT — PAIN SCALES - GENERAL
PAINLEVEL_OUTOF10: 10
PAINLEVEL_OUTOF10: 3
PAINLEVEL_OUTOF10: 8
PAINLEVEL_OUTOF10: 3
PAINLEVEL_OUTOF10: 0
PAINLEVEL_OUTOF10: 8
PAINLEVEL_OUTOF10: 0
PAINLEVEL_OUTOF10: 8
PAINLEVEL_OUTOF10: 10
PAINLEVEL_OUTOF10: 0

## 2025-05-22 ASSESSMENT — PAIN DESCRIPTION - ORIENTATION
ORIENTATION: RIGHT;LEFT

## 2025-05-22 NOTE — CARE COORDINATION
5/22/25, 11:17 AM EDT    DISCHARGE PLANNING EVALUATION    Kaycee will not be discharged today.  She is a potential discharge for tomorrow.  Updated patient and her spouse (by phone).  [facility] and [facility]

## 2025-05-23 LAB
ANION GAP SERPL CALC-SCNC: 11 MEQ/L (ref 8–16)
BUN SERPL-MCNC: 11 MG/DL (ref 8–23)
CALCIUM SERPL-MCNC: 7.3 MG/DL (ref 8.8–10.2)
CHLORIDE SERPL-SCNC: 110 MEQ/L (ref 98–111)
CO2 SERPL-SCNC: 20 MEQ/L (ref 22–29)
CREAT SERPL-MCNC: 0.2 MG/DL (ref 0.5–0.9)
DEPRECATED RDW RBC AUTO: 70.7 FL (ref 35–45)
ERYTHROCYTE [DISTWIDTH] IN BLOOD BY AUTOMATED COUNT: 22.8 % (ref 11.5–14.5)
GFR SERPL CREATININE-BSD FRML MDRD: > 90 ML/MIN/1.73M2
GLUCOSE BLD STRIP.AUTO-MCNC: 73 MG/DL (ref 70–108)
GLUCOSE BLD STRIP.AUTO-MCNC: 82 MG/DL (ref 70–108)
GLUCOSE BLD STRIP.AUTO-MCNC: 86 MG/DL (ref 70–108)
GLUCOSE BLD STRIP.AUTO-MCNC: 89 MG/DL (ref 70–108)
GLUCOSE SERPL-MCNC: 85 MG/DL (ref 74–109)
HCT VFR BLD AUTO: 33.5 % (ref 37–47)
HGB BLD-MCNC: 10.7 GM/DL (ref 12–16)
MCH RBC QN AUTO: 27.2 PG (ref 26–33)
MCHC RBC AUTO-ENTMCNC: 31.9 GM/DL (ref 32.2–35.5)
MCV RBC AUTO: 85 FL (ref 81–99)
PLATELET # BLD AUTO: 115 THOU/MM3 (ref 130–400)
PMV BLD AUTO: 12 FL (ref 9.4–12.4)
POTASSIUM SERPL-SCNC: 3.2 MEQ/L (ref 3.5–5.2)
RBC # BLD AUTO: 3.94 MILL/MM3 (ref 4.2–5.4)
SODIUM SERPL-SCNC: 141 MEQ/L (ref 135–145)
WBC # BLD AUTO: 4.7 THOU/MM3 (ref 4.8–10.8)

## 2025-05-23 PROCEDURE — 6370000000 HC RX 637 (ALT 250 FOR IP)

## 2025-05-23 PROCEDURE — 99233 SBSQ HOSP IP/OBS HIGH 50: CPT | Performed by: STUDENT IN AN ORGANIZED HEALTH CARE EDUCATION/TRAINING PROGRAM

## 2025-05-23 PROCEDURE — 6360000002 HC RX W HCPCS

## 2025-05-23 PROCEDURE — 6360000002 HC RX W HCPCS: Performed by: STUDENT IN AN ORGANIZED HEALTH CARE EDUCATION/TRAINING PROGRAM

## 2025-05-23 PROCEDURE — 2580000003 HC RX 258

## 2025-05-23 PROCEDURE — 85027 COMPLETE CBC AUTOMATED: CPT

## 2025-05-23 PROCEDURE — 82948 REAGENT STRIP/BLOOD GLUCOSE: CPT

## 2025-05-23 PROCEDURE — 2060000000 HC ICU INTERMEDIATE R&B

## 2025-05-23 PROCEDURE — 2500000003 HC RX 250 WO HCPCS

## 2025-05-23 PROCEDURE — P9041 ALBUMIN (HUMAN),5%, 50ML: HCPCS

## 2025-05-23 PROCEDURE — 36415 COLL VENOUS BLD VENIPUNCTURE: CPT

## 2025-05-23 PROCEDURE — 80048 BASIC METABOLIC PNL TOTAL CA: CPT

## 2025-05-23 RX ORDER — CALCIUM GLUCONATE 20 MG/ML
2000 INJECTION, SOLUTION INTRAVENOUS ONCE
Status: COMPLETED | OUTPATIENT
Start: 2025-05-23 | End: 2025-05-23

## 2025-05-23 RX ORDER — ALBUMIN HUMAN 50 G/1000ML
50 SOLUTION INTRAVENOUS ONCE
Status: COMPLETED | OUTPATIENT
Start: 2025-05-23 | End: 2025-05-23

## 2025-05-23 RX ORDER — 0.9 % SODIUM CHLORIDE 0.9 %
500 INTRAVENOUS SOLUTION INTRAVENOUS ONCE
Status: DISCONTINUED | OUTPATIENT
Start: 2025-05-23 | End: 2025-05-23

## 2025-05-23 RX ORDER — 0.9 % SODIUM CHLORIDE 0.9 %
500 INTRAVENOUS SOLUTION INTRAVENOUS ONCE
Status: COMPLETED | OUTPATIENT
Start: 2025-05-23 | End: 2025-05-23

## 2025-05-23 RX ADMIN — FENOFIBRATE 160 MG: 160 TABLET ORAL at 08:34

## 2025-05-23 RX ADMIN — ENOXAPARIN SODIUM 40 MG: 100 INJECTION SUBCUTANEOUS at 08:34

## 2025-05-23 RX ADMIN — TIZANIDINE 4 MG: 4 TABLET ORAL at 22:20

## 2025-05-23 RX ADMIN — ACETAMINOPHEN 650 MG: 325 TABLET ORAL at 22:19

## 2025-05-23 RX ADMIN — MIDODRINE HYDROCHLORIDE 10 MG: 10 TABLET ORAL at 12:11

## 2025-05-23 RX ADMIN — HYDROMORPHONE HYDROCHLORIDE 0.25 MG: 1 INJECTION, SOLUTION INTRAMUSCULAR; INTRAVENOUS; SUBCUTANEOUS at 23:24

## 2025-05-23 RX ADMIN — HYDROMORPHONE HYDROCHLORIDE 0.5 MG: 1 INJECTION, SOLUTION INTRAMUSCULAR; INTRAVENOUS; SUBCUTANEOUS at 16:50

## 2025-05-23 RX ADMIN — TIZANIDINE 4 MG: 4 TABLET ORAL at 16:50

## 2025-05-23 RX ADMIN — SODIUM CHLORIDE 500 ML: 0.9 INJECTION, SOLUTION INTRAVENOUS at 06:16

## 2025-05-23 RX ADMIN — CALCIUM GLUCONATE 2000 MG: 20 INJECTION, SOLUTION INTRAVENOUS at 14:34

## 2025-05-23 RX ADMIN — SODIUM CHLORIDE, PRESERVATIVE FREE 10 ML: 5 INJECTION INTRAVENOUS at 22:23

## 2025-05-23 RX ADMIN — TIZANIDINE 4 MG: 4 TABLET ORAL at 08:34

## 2025-05-23 RX ADMIN — ATORVASTATIN CALCIUM 40 MG: 40 TABLET, FILM COATED ORAL at 22:20

## 2025-05-23 RX ADMIN — PANTOPRAZOLE SODIUM 40 MG: 40 TABLET, DELAYED RELEASE ORAL at 04:32

## 2025-05-23 RX ADMIN — ALBUMIN (HUMAN) 50 G: 12.5 INJECTION, SOLUTION INTRAVENOUS at 04:20

## 2025-05-23 RX ADMIN — PANTOPRAZOLE SODIUM 40 MG: 40 TABLET, DELAYED RELEASE ORAL at 16:50

## 2025-05-23 RX ADMIN — ACETAMINOPHEN 650 MG: 325 TABLET ORAL at 01:23

## 2025-05-23 RX ADMIN — MIDODRINE HYDROCHLORIDE 10 MG: 10 TABLET ORAL at 16:50

## 2025-05-23 RX ADMIN — MIDODRINE HYDROCHLORIDE 10 MG: 10 TABLET ORAL at 08:34

## 2025-05-23 RX ADMIN — ASPIRIN 81 MG: 81 TABLET, COATED ORAL at 08:34

## 2025-05-23 RX ADMIN — POTASSIUM CHLORIDE 40 MEQ: 1500 TABLET, EXTENDED RELEASE ORAL at 16:50

## 2025-05-23 ASSESSMENT — PAIN DESCRIPTION - ONSET
ONSET: ON-GOING

## 2025-05-23 ASSESSMENT — PAIN SCALES - GENERAL
PAINLEVEL_OUTOF10: 3
PAINLEVEL_OUTOF10: 10
PAINLEVEL_OUTOF10: 3
PAINLEVEL_OUTOF10: 5
PAINLEVEL_OUTOF10: 8
PAINLEVEL_OUTOF10: 9
PAINLEVEL_OUTOF10: 8

## 2025-05-23 ASSESSMENT — PAIN - FUNCTIONAL ASSESSMENT
PAIN_FUNCTIONAL_ASSESSMENT: PREVENTS OR INTERFERES WITH ALL ACTIVE AND SOME PASSIVE ACTIVITIES
PAIN_FUNCTIONAL_ASSESSMENT: PREVENTS OR INTERFERES WITH MANY ACTIVE NOT PASSIVE ACTIVITIES
PAIN_FUNCTIONAL_ASSESSMENT: PREVENTS OR INTERFERES WITH ALL ACTIVE AND SOME PASSIVE ACTIVITIES
PAIN_FUNCTIONAL_ASSESSMENT: PREVENTS OR INTERFERES WITH ALL ACTIVE AND SOME PASSIVE ACTIVITIES
PAIN_FUNCTIONAL_ASSESSMENT: PREVENTS OR INTERFERES SOME ACTIVE ACTIVITIES AND ADLS
PAIN_FUNCTIONAL_ASSESSMENT: PREVENTS OR INTERFERES WITH MANY ACTIVE NOT PASSIVE ACTIVITIES

## 2025-05-23 ASSESSMENT — PAIN DESCRIPTION - LOCATION
LOCATION: GENERALIZED
LOCATION: BACK;COCCYX
LOCATION: GENERALIZED

## 2025-05-23 ASSESSMENT — PAIN DESCRIPTION - DESCRIPTORS
DESCRIPTORS: ACHING;DISCOMFORT
DESCRIPTORS: ACHING;DISCOMFORT
DESCRIPTORS: ACHING;SORE

## 2025-05-23 ASSESSMENT — PAIN DESCRIPTION - ORIENTATION
ORIENTATION: RIGHT;LEFT;MID
ORIENTATION: MID

## 2025-05-23 ASSESSMENT — PAIN DESCRIPTION - FREQUENCY
FREQUENCY: CONTINUOUS
FREQUENCY: INTERMITTENT
FREQUENCY: INTERMITTENT

## 2025-05-23 ASSESSMENT — PAIN DESCRIPTION - PAIN TYPE
TYPE: CHRONIC PAIN
TYPE: ACUTE PAIN;CHRONIC PAIN
TYPE: ACUTE PAIN;CHRONIC PAIN

## 2025-05-23 NOTE — CARE COORDINATION
5/23/25, 2:14 PM EDT    DISCHARGE PLANNING EVALUATION    Patient will not be discharged over the weekend.  Spoke with Syeda at [facility] and they will plan on taking her on Tuesday if she is medically stable.

## 2025-05-23 NOTE — CARE COORDINATION
5/23/25, 2:18 PM EDT    DISCHARGE ON GOING EVALUATION    Kaycee Varela       [facility] day: 8  Location: -03/003-A Reason for admit: Bullous pemphigoid (HCC) [L12.0]     Procedures:   5/20 PICC     Imaging since last note: none    Barriers to Discharge: Palliative Care consult for pain control, diabetes management, Wound Ostomy following, Asa, Lovenox, Midodrine, Zofran prn. Pt with periods of hypotension.     PCP: Jose Lazaro DO  Readmission Risk Score: 24    Patient Goals/Plan/Treatment Preferences: Plan is [facility]. SW following.

## 2025-05-24 LAB
ANION GAP SERPL CALC-SCNC: 9 MEQ/L (ref 8–16)
BUN SERPL-MCNC: 12 MG/DL (ref 8–23)
CA-I BLD ISE-SCNC: 1.21 MMOL/L (ref 1.12–1.32)
CALCIUM SERPL-MCNC: 7.9 MG/DL (ref 8.8–10.2)
CHLORIDE SERPL-SCNC: 112 MEQ/L (ref 98–111)
CO2 SERPL-SCNC: 21 MEQ/L (ref 22–29)
CREAT SERPL-MCNC: 0.2 MG/DL (ref 0.5–0.9)
DEPRECATED RDW RBC AUTO: 71 FL (ref 35–45)
ERYTHROCYTE [DISTWIDTH] IN BLOOD BY AUTOMATED COUNT: 23.3 % (ref 11.5–14.5)
GFR SERPL CREATININE-BSD FRML MDRD: > 90 ML/MIN/1.73M2
GLUCOSE BLD STRIP.AUTO-MCNC: 109 MG/DL (ref 70–108)
GLUCOSE BLD STRIP.AUTO-MCNC: 83 MG/DL (ref 70–108)
GLUCOSE BLD STRIP.AUTO-MCNC: 86 MG/DL (ref 70–108)
GLUCOSE BLD STRIP.AUTO-MCNC: 90 MG/DL (ref 70–108)
GLUCOSE SERPL-MCNC: 86 MG/DL (ref 74–109)
HCT VFR BLD AUTO: 35.3 % (ref 37–47)
HGB BLD-MCNC: 11.4 GM/DL (ref 12–16)
MCH RBC QN AUTO: 27.5 PG (ref 26–33)
MCHC RBC AUTO-ENTMCNC: 32.3 GM/DL (ref 32.2–35.5)
MCV RBC AUTO: 85.1 FL (ref 81–99)
PLATELET # BLD AUTO: 92 THOU/MM3 (ref 130–400)
PMV BLD AUTO: 10.6 FL (ref 9.4–12.4)
POTASSIUM SERPL-SCNC: 3.7 MEQ/L (ref 3.5–5.2)
RBC # BLD AUTO: 4.15 MILL/MM3 (ref 4.2–5.4)
SCAN OF BLOOD SMEAR: NORMAL
SODIUM SERPL-SCNC: 142 MEQ/L (ref 135–145)
WBC # BLD AUTO: 4 THOU/MM3 (ref 4.8–10.8)

## 2025-05-24 PROCEDURE — 99232 SBSQ HOSP IP/OBS MODERATE 35: CPT | Performed by: INTERNAL MEDICINE

## 2025-05-24 PROCEDURE — 2500000003 HC RX 250 WO HCPCS: Performed by: STUDENT IN AN ORGANIZED HEALTH CARE EDUCATION/TRAINING PROGRAM

## 2025-05-24 PROCEDURE — 2060000000 HC ICU INTERMEDIATE R&B

## 2025-05-24 PROCEDURE — 6370000000 HC RX 637 (ALT 250 FOR IP): Performed by: INTERNAL MEDICINE

## 2025-05-24 PROCEDURE — 2500000003 HC RX 250 WO HCPCS

## 2025-05-24 PROCEDURE — 80048 BASIC METABOLIC PNL TOTAL CA: CPT

## 2025-05-24 PROCEDURE — 6370000000 HC RX 637 (ALT 250 FOR IP)

## 2025-05-24 PROCEDURE — 6360000002 HC RX W HCPCS

## 2025-05-24 PROCEDURE — 85027 COMPLETE CBC AUTOMATED: CPT

## 2025-05-24 PROCEDURE — 82330 ASSAY OF CALCIUM: CPT

## 2025-05-24 PROCEDURE — 82948 REAGENT STRIP/BLOOD GLUCOSE: CPT

## 2025-05-24 PROCEDURE — 36415 COLL VENOUS BLD VENIPUNCTURE: CPT

## 2025-05-24 RX ORDER — TRAMADOL HYDROCHLORIDE 50 MG/1
50 TABLET ORAL EVERY 6 HOURS PRN
Status: DISCONTINUED | OUTPATIENT
Start: 2025-05-24 | End: 2025-05-26

## 2025-05-24 RX ORDER — GABAPENTIN 100 MG/1
100 CAPSULE ORAL 2 TIMES DAILY
Status: DISCONTINUED | OUTPATIENT
Start: 2025-05-24 | End: 2025-05-25

## 2025-05-24 RX ADMIN — HYDROMORPHONE HYDROCHLORIDE 0.5 MG: 1 INJECTION, SOLUTION INTRAMUSCULAR; INTRAVENOUS; SUBCUTANEOUS at 18:37

## 2025-05-24 RX ADMIN — MIDODRINE HYDROCHLORIDE 15 MG: 10 TABLET ORAL at 17:10

## 2025-05-24 RX ADMIN — HYDROMORPHONE HYDROCHLORIDE 0.5 MG: 1 INJECTION, SOLUTION INTRAMUSCULAR; INTRAVENOUS; SUBCUTANEOUS at 12:32

## 2025-05-24 RX ADMIN — GABAPENTIN 100 MG: 100 CAPSULE ORAL at 20:20

## 2025-05-24 RX ADMIN — PANTOPRAZOLE SODIUM 40 MG: 40 TABLET, DELAYED RELEASE ORAL at 06:10

## 2025-05-24 RX ADMIN — FENOFIBRATE 160 MG: 160 TABLET ORAL at 08:41

## 2025-05-24 RX ADMIN — ATORVASTATIN CALCIUM 40 MG: 40 TABLET, FILM COATED ORAL at 20:20

## 2025-05-24 RX ADMIN — ASPIRIN 81 MG: 81 TABLET, COATED ORAL at 08:40

## 2025-05-24 RX ADMIN — TRAMADOL HYDROCHLORIDE 50 MG: 50 TABLET, COATED ORAL at 11:19

## 2025-05-24 RX ADMIN — TRAMADOL HYDROCHLORIDE 50 MG: 50 TABLET, COATED ORAL at 17:09

## 2025-05-24 RX ADMIN — PANTOPRAZOLE SODIUM 40 MG: 40 TABLET, DELAYED RELEASE ORAL at 17:10

## 2025-05-24 RX ADMIN — MIDODRINE HYDROCHLORIDE 10 MG: 10 TABLET ORAL at 08:41

## 2025-05-24 RX ADMIN — GABAPENTIN 100 MG: 100 CAPSULE ORAL at 11:19

## 2025-05-24 RX ADMIN — TIZANIDINE 4 MG: 4 TABLET ORAL at 20:20

## 2025-05-24 RX ADMIN — ACETAMINOPHEN 650 MG: 325 TABLET ORAL at 17:10

## 2025-05-24 RX ADMIN — ACETAMINOPHEN 650 MG: 325 TABLET ORAL at 08:42

## 2025-05-24 RX ADMIN — HYDROMORPHONE HYDROCHLORIDE 0.25 MG: 1 INJECTION, SOLUTION INTRAMUSCULAR; INTRAVENOUS; SUBCUTANEOUS at 08:39

## 2025-05-24 RX ADMIN — SODIUM CHLORIDE, PRESERVATIVE FREE 10 ML: 5 INJECTION INTRAVENOUS at 20:30

## 2025-05-24 RX ADMIN — SODIUM CHLORIDE, PRESERVATIVE FREE 10 ML: 5 INJECTION INTRAVENOUS at 08:53

## 2025-05-24 RX ADMIN — ENOXAPARIN SODIUM 40 MG: 100 INJECTION SUBCUTANEOUS at 08:40

## 2025-05-24 RX ADMIN — MIDODRINE HYDROCHLORIDE 10 MG: 10 TABLET ORAL at 12:58

## 2025-05-24 ASSESSMENT — PAIN SCALES - GENERAL
PAINLEVEL_OUTOF10: 10
PAINLEVEL_OUTOF10: 6
PAINLEVEL_OUTOF10: 3

## 2025-05-24 ASSESSMENT — PAIN DESCRIPTION - LOCATION
LOCATION: OTHER (COMMENT)
LOCATION: GENERALIZED

## 2025-05-24 ASSESSMENT — PAIN DESCRIPTION - DESCRIPTORS: DESCRIPTORS: ACHING

## 2025-05-24 NOTE — PLAN OF CARE
Problem: Chronic Conditions and Co-morbidities  Goal: Patient's chronic conditions and co-morbidity symptoms are monitored and maintained or improved  5/21/2025 2259 by Duong Ruiz, RN  Outcome: Progressing  Flowsheets (Taken 5/21/2025 1937)  Care Plan - Patient's Chronic Conditions and Co-Morbidity Symptoms are Monitored and Maintained or Improved:   Monitor and assess patient's chronic conditions and comorbid symptoms for stability, deterioration, or improvement   Collaborate with multidisciplinary team to address chronic and comorbid conditions and prevent exacerbation or deterioration   Update acute care plan with appropriate goals if chronic or comorbid symptoms are exacerbated and prevent overall improvement and discharge     Problem: Discharge Planning  Goal: Discharge to home or other facility with appropriate resources  5/21/2025 2259 by Duong Ruiz, RN  Outcome: Progressing  Flowsheets (Taken 5/21/2025 1937)  Discharge to home or other facility with appropriate resources:   Identify barriers to discharge with patient and caregiver   Arrange for needed discharge resources and transportation as appropriate   Identify discharge learning needs (meds, wound care, etc)     Problem: Pain  Goal: Verbalizes/displays adequate comfort level or baseline comfort level  5/21/2025 2259 by Duong Ruiz, RN  Outcome: Progressing  Flowsheets (Taken 5/21/2025 1937)  Verbalizes/displays adequate comfort level or baseline comfort level:   Encourage patient to monitor pain and request assistance   Assess pain using appropriate pain scale   Administer analgesics based on type and severity of pain and evaluate response   Implement non-pharmacological measures as appropriate and evaluate response     Problem: Safety - Adult  Goal: Free from fall injury  5/21/2025 2259 by Duong Ruiz, RN  Outcome: Progressing  Flowsheets (Taken 5/21/2025 1937)  Free From Fall Injury: Instruct family/caregiver on patient safety   
  Problem: Chronic Conditions and Co-morbidities  Goal: Patient's chronic conditions and co-morbidity symptoms are monitored and maintained or improved  5/23/2025 1108 by Shamika Lang RN  Outcome: Progressing  Flowsheets (Taken 5/23/2025 1108)  Care Plan - Patient's Chronic Conditions and Co-Morbidity Symptoms are Monitored and Maintained or Improved:   Monitor and assess patient's chronic conditions and comorbid symptoms for stability, deterioration, or improvement   Collaborate with multidisciplinary team to address chronic and comorbid conditions and prevent exacerbation or deterioration   Update acute care plan with appropriate goals if chronic or comorbid symptoms are exacerbated and prevent overall improvement and discharge     Problem: Discharge Planning  Goal: Discharge to home or other facility with appropriate resources  5/23/2025 1108 by Shamika Lang RN  Outcome: Progressing  Flowsheets (Taken 5/23/2025 1108)  Discharge to home or other facility with appropriate resources:   Identify barriers to discharge with patient and caregiver   Arrange for needed discharge resources and transportation as appropriate   Arrange for interpreters to assist at discharge as needed     Problem: Pain  Goal: Verbalizes/displays adequate comfort level or baseline comfort level  5/23/2025 1108 by Shamika Lang RN  Outcome: Progressing  Flowsheets (Taken 5/23/2025 1108)  Verbalizes/displays adequate comfort level or baseline comfort level:   Encourage patient to monitor pain and request assistance   Assess pain using appropriate pain scale     Problem: Safety - Adult  Goal: Free from fall injury  5/23/2025 1108 by Shamika Lang, RN  Outcome: Progressing  Flowsheets (Taken 5/23/2025 1108)  Free From Fall Injury: Instruct family/caregiver on patient safety     Problem: Skin/Tissue Integrity  Goal: Skin integrity remains intact  Description: 1.  Monitor for areas of redness and/or skin breakdown2.  Assess vascular access 
  Problem: Chronic Conditions and Co-morbidities  Goal: Patient's chronic conditions and co-morbidity symptoms are monitored and maintained or improved  5/24/2025 0928 by Parker Mirza RN  Outcome: Progressing  5/24/2025 0209 by Nadia Smith RN  Flowsheets (Taken 5/23/2025 2222)  Care Plan - Patient's Chronic Conditions and Co-Morbidity Symptoms are Monitored and Maintained or Improved:   Monitor and assess patient's chronic conditions and comorbid symptoms for stability, deterioration, or improvement   Update acute care plan with appropriate goals if chronic or comorbid symptoms are exacerbated and prevent overall improvement and discharge     Problem: Discharge Planning  Goal: Discharge to home or other facility with appropriate resources  5/24/2025 0928 by Parker Mirza RN  Outcome: Progressing  5/24/2025 0209 by Nadia Smith RN  Flowsheets (Taken 5/23/2025 2222)  Discharge to home or other facility with appropriate resources:   Identify barriers to discharge with patient and caregiver   Arrange for needed discharge resources and transportation as appropriate   Identify discharge learning needs (meds, wound care, etc)     Problem: Pain  Goal: Verbalizes/displays adequate comfort level or baseline comfort level  5/24/2025 0928 by Parker Mirza RN  Outcome: Progressing  5/24/2025 0209 by Nadia Smith RN  Flowsheets (Taken 5/23/2025 2209)  Verbalizes/displays adequate comfort level or baseline comfort level:   Assess pain using appropriate pain scale   Encourage patient to monitor pain and request assistance   Administer analgesics based on type and severity of pain and evaluate response   Consider cultural and social influences on pain and pain management     Problem: Safety - Adult  Goal: Free from fall injury  5/24/2025 0928 by Parker Mirza, RN  Outcome: Progressing  5/24/2025 0209 by aNdia Smith RN  Flowsheets (Taken 5/23/2025 1108 by Shamika Lang RN)  Free From Fall Injury: Instruct 
  Problem: Chronic Conditions and Co-morbidities  Goal: Patient's chronic conditions and co-morbidity symptoms are monitored and maintained or improved  5/24/2025 0928 by Parker Mirza RN  Outcome: Progressing  5/24/2025 0928 by Parker Mirza RN  Outcome: Progressing  5/24/2025 0209 by Nadia Smith RN  Flowsheets (Taken 5/23/2025 2222)  Care Plan - Patient's Chronic Conditions and Co-Morbidity Symptoms are Monitored and Maintained or Improved:   Monitor and assess patient's chronic conditions and comorbid symptoms for stability, deterioration, or improvement   Update acute care plan with appropriate goals if chronic or comorbid symptoms are exacerbated and prevent overall improvement and discharge     Problem: Discharge Planning  Goal: Discharge to home or other facility with appropriate resources  5/24/2025 0928 by Parker Mirza RN  Outcome: Progressing  5/24/2025 0928 by Parker Mirza RN  Outcome: Progressing  5/24/2025 0209 by Nadia Smith RN  Flowsheets (Taken 5/23/2025 2222)  Discharge to home or other facility with appropriate resources:   Identify barriers to discharge with patient and caregiver   Arrange for needed discharge resources and transportation as appropriate   Identify discharge learning needs (meds, wound care, etc)     Problem: Pain  Goal: Verbalizes/displays adequate comfort level or baseline comfort level  5/24/2025 0928 by Parker Mirza RN  Outcome: Progressing  5/24/2025 0928 by Parker Mirza RN  Outcome: Progressing  5/24/2025 0209 by Nadia Smith RN  Flowsheets (Taken 5/23/2025 2209)  Verbalizes/displays adequate comfort level or baseline comfort level:   Assess pain using appropriate pain scale   Encourage patient to monitor pain and request assistance   Administer analgesics based on type and severity of pain and evaluate response   Consider cultural and social influences on pain and pain management     Problem: Safety - Adult  Goal: Free from fall 
  Problem: Chronic Conditions and Co-morbidities  Goal: Patient's chronic conditions and co-morbidity symptoms are monitored and maintained or improved  Flowsheets (Taken 5/23/2025 2222)  Care Plan - Patient's Chronic Conditions and Co-Morbidity Symptoms are Monitored and Maintained or Improved:   Monitor and assess patient's chronic conditions and comorbid symptoms for stability, deterioration, or improvement   Update acute care plan with appropriate goals if chronic or comorbid symptoms are exacerbated and prevent overall improvement and discharge     Problem: Discharge Planning  Goal: Discharge to home or other facility with appropriate resources  Flowsheets (Taken 5/23/2025 2222)  Discharge to home or other facility with appropriate resources:   Identify barriers to discharge with patient and caregiver   Arrange for needed discharge resources and transportation as appropriate   Identify discharge learning needs (meds, wound care, etc)     Problem: Pain  Goal: Verbalizes/displays adequate comfort level or baseline comfort level  Flowsheets (Taken 5/23/2025 2209)  Verbalizes/displays adequate comfort level or baseline comfort level:   Assess pain using appropriate pain scale   Encourage patient to monitor pain and request assistance   Administer analgesics based on type and severity of pain and evaluate response   Consider cultural and social influences on pain and pain management     Problem: Safety - Adult  Goal: Free from fall injury  Flowsheets (Taken 5/23/2025 1108 by Shamika Lang RN)  Free From Fall Injury: Instruct family/caregiver on patient safety     Problem: Skin/Tissue Integrity  Goal: Skin integrity remains intact  Description: 1.  Monitor for areas of redness and/or skin breakdown2.  Assess vascular access sites hourly3.  Every 4-6 hours minimum:  Change oxygen saturation probe site4.  Every 4-6 hours:  If on nasal continuous positive airway pressure, respiratory therapy assess nares and determine 
  Problem: Chronic Conditions and Co-morbidities  Goal: Patient's chronic conditions and co-morbidity symptoms are monitored and maintained or improved  Outcome: Progressing     Problem: Discharge Planning  Goal: Discharge to home or other facility with appropriate resources  Outcome: Progressing     Problem: Pain  Goal: Verbalizes/displays adequate comfort level or baseline comfort level  Outcome: Progressing     Problem: Safety - Adult  Goal: Free from fall injury  Outcome: Progressing     Problem: Skin/Tissue Integrity  Goal: Skin integrity remains intact  Description: 1.  Monitor for areas of redness and/or skin breakdown2.  Assess vascular access sites hourly3.  Every 4-6 hours minimum:  Change oxygen saturation probe site4.  Every 4-6 hours:  If on nasal continuous positive airway pressure, respiratory therapy assess nares and determine need for appliance change or resting period  Outcome: Progressing     Problem: Respiratory - Adult  Goal: Achieves optimal ventilation and oxygenation  Outcome: Progressing     Problem: Cardiovascular - Adult  Goal: Maintains optimal cardiac output and hemodynamic stability  Outcome: Progressing     Problem: Skin/Tissue Integrity - Adult  Goal: Skin integrity remains intact  Description: 1.  Monitor for areas of redness and/or skin breakdown2.  Assess vascular access sites hourly3.  Every 4-6 hours minimum:  Change oxygen saturation probe site4.  Every 4-6 hours:  If on nasal continuous positive airway pressure, respiratory therapy assess nares and determine need for appliance change or resting period  Outcome: Progressing     Problem: Infection - Adult  Goal: Absence of infection at discharge  Outcome: Progressing  Goal: Absence of infection during hospitalization  Outcome: Progressing     Problem: Metabolic/Fluid and Electrolytes - Adult  Goal: Electrolytes maintained within normal limits  Outcome: Progressing     Problem: Hematologic - Adult  Goal: Maintains hematologic 
  Problem: Chronic Conditions and Co-morbidities  Goal: Patient's chronic conditions and co-morbidity symptoms are monitored and maintained or improved  Outcome: Progressing  Flowsheets (Taken 5/15/2025 1950 by Scott Shepherd, RN)  Care Plan - Patient's Chronic Conditions and Co-Morbidity Symptoms are Monitored and Maintained or Improved:   Monitor and assess patient's chronic conditions and comorbid symptoms for stability, deterioration, or improvement   Collaborate with multidisciplinary team to address chronic and comorbid conditions and prevent exacerbation or deterioration   Update acute care plan with appropriate goals if chronic or comorbid symptoms are exacerbated and prevent overall improvement and discharge     Problem: Discharge Planning  Goal: Discharge to home or other facility with appropriate resources  5/17/2025 0148 by Chrissy Cornell, RN  Outcome: Progressing  Flowsheets (Taken 5/17/2025 0148)  Discharge to home or other facility with appropriate resources: Identify barriers to discharge with patient and caregiver  5/16/2025 1543 by Karen Winters RN  Outcome: Progressing  Flowsheets (Taken 5/16/2025 1543)  Discharge to home or other facility with appropriate resources: Identify barriers to discharge with patient and caregiver     Problem: Pain  Goal: Verbalizes/displays adequate comfort level or baseline comfort level  5/17/2025 0148 by Chrissy Cornell, RN  Outcome: Progressing  Flowsheets (Taken 5/17/2025 0148)  Verbalizes/displays adequate comfort level or baseline comfort level:   Encourage patient to monitor pain and request assistance   Administer analgesics based on type and severity of pain and evaluate response   Consider cultural and social influences on pain and pain management   Assess pain using appropriate pain scale   Implement non-pharmacological measures as appropriate and evaluate response   Notify Licensed Independent Practitioner if interventions unsuccessful or patient 
  Problem: Chronic Conditions and Co-morbidities  Goal: Patient's chronic conditions and co-morbidity symptoms are monitored and maintained or improved  Outcome: Progressing  Flowsheets (Taken 5/18/2025 1243)  Care Plan - Patient's Chronic Conditions and Co-Morbidity Symptoms are Monitored and Maintained or Improved:   Monitor and assess patient's chronic conditions and comorbid symptoms for stability, deterioration, or improvement   Collaborate with multidisciplinary team to address chronic and comorbid conditions and prevent exacerbation or deterioration   Update acute care plan with appropriate goals if chronic or comorbid symptoms are exacerbated and prevent overall improvement and discharge     Problem: Discharge Planning  Goal: Discharge to home or other facility with appropriate resources  Outcome: Progressing  Flowsheets (Taken 5/18/2025 1243)  Discharge to home or other facility with appropriate resources:   Identify barriers to discharge with patient and caregiver   Arrange for needed discharge resources and transportation as appropriate   Identify discharge learning needs (meds, wound care, etc)   Refer to discharge planning if patient needs post-hospital services based on physician order or complex needs related to functional status, cognitive ability or social support system     Problem: Pain  Goal: Verbalizes/displays adequate comfort level or baseline comfort level  Outcome: Progressing  Flowsheets (Taken 5/18/2025 1243)  Verbalizes/displays adequate comfort level or baseline comfort level:   Encourage patient to monitor pain and request assistance   Assess pain using appropriate pain scale   Implement non-pharmacological measures as appropriate and evaluate response   Administer analgesics based on type and severity of pain and evaluate response   Consider cultural and social influences on pain and pain management   Notify Licensed Independent Practitioner if interventions unsuccessful or patient 
  Problem: Chronic Conditions and Co-morbidities  Goal: Patient's chronic conditions and co-morbidity symptoms are monitored and maintained or improved  Outcome: Progressing  Flowsheets (Taken 5/21/2025 1025)  Care Plan - Patient's Chronic Conditions and Co-Morbidity Symptoms are Monitored and Maintained or Improved:   Monitor and assess patient's chronic conditions and comorbid symptoms for stability, deterioration, or improvement   Collaborate with multidisciplinary team to address chronic and comorbid conditions and prevent exacerbation or deterioration   Update acute care plan with appropriate goals if chronic or comorbid symptoms are exacerbated and prevent overall improvement and discharge     Problem: Discharge Planning  Goal: Discharge to home or other facility with appropriate resources  Outcome: Progressing  Flowsheets (Taken 5/18/2025 2108 by Duong Ruiz, RN)  Discharge to home or other facility with appropriate resources:   Identify barriers to discharge with patient and caregiver   Arrange for needed discharge resources and transportation as appropriate   Identify discharge learning needs (meds, wound care, etc)     Problem: Pain  Goal: Verbalizes/displays adequate comfort level or baseline comfort level  Outcome: Progressing  Flowsheets (Taken 5/18/2025 2108 by Duong Ruiz, RN)  Verbalizes/displays adequate comfort level or baseline comfort level:   Encourage patient to monitor pain and request assistance   Assess pain using appropriate pain scale   Administer analgesics based on type and severity of pain and evaluate response   Implement non-pharmacological measures as appropriate and evaluate response     Problem: Safety - Adult  Goal: Free from fall injury  Outcome: Progressing  Flowsheets (Taken 5/21/2025 1025)  Free From Fall Injury:   Instruct family/caregiver on patient safety   Based on caregiver fall risk screen, instruct family/caregiver to ask for assistance with transferring infant 
  Problem: Discharge Planning  Goal: Discharge to home or other facility with appropriate resources  Outcome: Progressing  Flowsheets (Taken 5/16/2025 1543)  Discharge to home or other facility with appropriate resources: Identify barriers to discharge with patient and caregiver     Problem: Pain  Goal: Verbalizes/displays adequate comfort level or baseline comfort level  Outcome: Progressing  Flowsheets (Taken 5/16/2025 1543)  Verbalizes/displays adequate comfort level or baseline comfort level:   Encourage patient to monitor pain and request assistance   Administer analgesics based on type and severity of pain and evaluate response   Assess pain using appropriate pain scale   Implement non-pharmacological measures as appropriate and evaluate response     Problem: Skin/Tissue Integrity  Goal: Skin integrity remains intact  Outcome: Progressing  Flowsheets (Taken 5/16/2025 1543)  Skin Integrity Remains Intact: Monitor for areas of redness and/or skin breakdown     
  Problem: Skin/Tissue Integrity  Goal: Skin integrity remains intact  Description: 1.  Monitor for areas of redness and/or skin breakdown2.  Assess vascular access sites hourly3.  Every 4-6 hours minimum:  Change oxygen saturation probe site4.  Every 4-6 hours:  If on nasal continuous positive airway pressure, respiratory therapy assess nares and determine need for appliance change or resting period  5/19/2025 0011 by Duong Ruiz, RN  Outcome: Progressing  Flowsheets (Taken 5/18/2025 2108)  Skin Integrity Remains Intact: Monitor for areas of redness and/or skin breakdown     Problem: Respiratory - Adult  Goal: Achieves optimal ventilation and oxygenation  Outcome: Progressing  Flowsheets (Taken 5/18/2025 2108)  Achieves optimal ventilation and oxygenation:   Assess for changes in respiratory status   Assess for changes in mentation and behavior   Position to facilitate oxygenation and minimize respiratory effort   Oxygen supplementation based on oxygen saturation or arterial blood gases   Initiate smoking cessation protocol as indicated   Encourage broncho-pulmonary hygiene including cough, deep breathe, incentive spirometry   Assess the need for suctioning and aspirate as needed   Assess and instruct to report shortness of breath or any respiratory difficulty   Respiratory therapy support as indicated     Problem: Cardiovascular - Adult  Goal: Maintains optimal cardiac output and hemodynamic stability  Outcome: Progressing  Flowsheets (Taken 5/18/2025 2108)  Maintains optimal cardiac output and hemodynamic stability: Monitor blood pressure and heart rate     Problem: Skin/Tissue Integrity - Adult  Goal: Skin integrity remains intact  Description: 1.  Monitor for areas of redness and/or skin breakdown2.  Assess vascular access sites hourly3.  Every 4-6 hours minimum:  Change oxygen saturation probe site4.  Every 4-6 hours:  If on nasal continuous positive airway pressure, respiratory therapy assess nares and 
nasal continuous positive airway pressure, respiratory therapy assess nares and determine need for appliance change or resting period  5/22/2025 2235 by Duong Ruiz, RN  Outcome: Progressing  Flowsheets (Taken 5/22/2025 1948)  Skin Integrity Remains Intact: Monitor for areas of redness and/or skin breakdown     Problem: Infection - Adult  Goal: Absence of infection at discharge  5/22/2025 2235 by Duong Ruiz, RN  Outcome: Progressing  Flowsheets (Taken 5/22/2025 1948)  Absence of infection at discharge:   Assess and monitor for signs and symptoms of infection   Monitor lab/diagnostic results   Monitor all insertion sites i.e., indwelling lines, tubes and drains   Administer medications as ordered   Instruct and encourage patient and family to use good hand hygiene technique   Identify and instruct in appropriate isolation precautions for identified infection/condition     Problem: Infection - Adult  Goal: Absence of infection during hospitalization  5/22/2025 2235 by Duong Ruiz, RN  Outcome: Progressing  Flowsheets (Taken 5/22/2025 1948)  Absence of infection during hospitalization:   Assess and monitor for signs and symptoms of infection   Monitor lab/diagnostic results   Monitor all insertion sites i.e., indwelling lines, tubes and drains   Administer medications as ordered   Instruct and encourage patient and family to use good hand hygiene technique   Identify and instruct in appropriate isolation precautions for identified infection/condition     Problem: Metabolic/Fluid and Electrolytes - Adult  Goal: Electrolytes maintained within normal limits  5/22/2025 2235 by Duong Ruiz, RN  Outcome: Progressing  Flowsheets (Taken 5/22/2025 1948)  Electrolytes maintained within normal limits:   Monitor labs and assess patient for signs and symptoms of electrolyte imbalances   Administer electrolyte replacement as ordered   Monitor response to electrolyte replacements, including repeat lab results as

## 2025-05-25 LAB
ANION GAP SERPL CALC-SCNC: 12 MEQ/L (ref 8–16)
BUN SERPL-MCNC: 15 MG/DL (ref 8–23)
CA-I BLD ISE-SCNC: 1.17 MMOL/L (ref 1.12–1.32)
CALCIUM SERPL-MCNC: 7.7 MG/DL (ref 8.8–10.2)
CHLORIDE SERPL-SCNC: 112 MEQ/L (ref 98–111)
CO2 SERPL-SCNC: 20 MEQ/L (ref 22–29)
CREAT SERPL-MCNC: 0.4 MG/DL (ref 0.5–0.9)
DEPRECATED RDW RBC AUTO: 71.7 FL (ref 35–45)
EKG ATRIAL RATE: 118 BPM
EKG P-R INTERVAL: 104 MS
EKG Q-T INTERVAL: 346 MS
EKG QRS DURATION: 112 MS
EKG QTC CALCULATION (BAZETT): 484 MS
EKG R AXIS: 67 DEGREES
EKG T AXIS: -111 DEGREES
EKG VENTRICULAR RATE: 118 BPM
ERYTHROCYTE [DISTWIDTH] IN BLOOD BY AUTOMATED COUNT: 24 % (ref 11.5–14.5)
GFR SERPL CREATININE-BSD FRML MDRD: > 90 ML/MIN/1.73M2
GLUCOSE BLD STRIP.AUTO-MCNC: 108 MG/DL (ref 70–108)
GLUCOSE BLD STRIP.AUTO-MCNC: 135 MG/DL (ref 70–108)
GLUCOSE BLD STRIP.AUTO-MCNC: 144 MG/DL (ref 70–108)
GLUCOSE BLD STRIP.AUTO-MCNC: 34 MG/DL (ref 70–108)
GLUCOSE BLD STRIP.AUTO-MCNC: 56 MG/DL (ref 70–108)
GLUCOSE BLD STRIP.AUTO-MCNC: 56 MG/DL (ref 70–108)
GLUCOSE SERPL-MCNC: 68 MG/DL (ref 74–109)
HCT VFR BLD AUTO: 39.2 % (ref 37–47)
HGB BLD-MCNC: 12.7 GM/DL (ref 12–16)
MAGNESIUM SERPL-MCNC: 1.3 MG/DL (ref 1.6–2.6)
MCH RBC QN AUTO: 27.5 PG (ref 26–33)
MCHC RBC AUTO-ENTMCNC: 32.4 GM/DL (ref 32.2–35.5)
MCV RBC AUTO: 85 FL (ref 81–99)
PLATELET # BLD AUTO: 115 THOU/MM3 (ref 130–400)
PMV BLD AUTO: 11.9 FL (ref 9.4–12.4)
POTASSIUM SERPL-SCNC: 3.6 MEQ/L (ref 3.5–5.2)
RBC # BLD AUTO: 4.61 MILL/MM3 (ref 4.2–5.4)
SODIUM SERPL-SCNC: 144 MEQ/L (ref 135–145)
WBC # BLD AUTO: 5.2 THOU/MM3 (ref 4.8–10.8)

## 2025-05-25 PROCEDURE — 1200000000 HC SEMI PRIVATE

## 2025-05-25 PROCEDURE — 6370000000 HC RX 637 (ALT 250 FOR IP)

## 2025-05-25 PROCEDURE — 2580000003 HC RX 258: Performed by: INTERNAL MEDICINE

## 2025-05-25 PROCEDURE — 2500000003 HC RX 250 WO HCPCS: Performed by: STUDENT IN AN ORGANIZED HEALTH CARE EDUCATION/TRAINING PROGRAM

## 2025-05-25 PROCEDURE — 93010 ELECTROCARDIOGRAM REPORT: CPT | Performed by: INTERNAL MEDICINE

## 2025-05-25 PROCEDURE — 6360000002 HC RX W HCPCS: Performed by: NURSE PRACTITIONER

## 2025-05-25 PROCEDURE — 36592 COLLECT BLOOD FROM PICC: CPT

## 2025-05-25 PROCEDURE — 6360000002 HC RX W HCPCS: Performed by: INTERNAL MEDICINE

## 2025-05-25 PROCEDURE — 82330 ASSAY OF CALCIUM: CPT

## 2025-05-25 PROCEDURE — 83735 ASSAY OF MAGNESIUM: CPT

## 2025-05-25 PROCEDURE — 80048 BASIC METABOLIC PNL TOTAL CA: CPT

## 2025-05-25 PROCEDURE — 93005 ELECTROCARDIOGRAM TRACING: CPT | Performed by: INTERNAL MEDICINE

## 2025-05-25 PROCEDURE — 99233 SBSQ HOSP IP/OBS HIGH 50: CPT | Performed by: INTERNAL MEDICINE

## 2025-05-25 PROCEDURE — 2500000003 HC RX 250 WO HCPCS

## 2025-05-25 PROCEDURE — 6370000000 HC RX 637 (ALT 250 FOR IP): Performed by: INTERNAL MEDICINE

## 2025-05-25 PROCEDURE — 36415 COLL VENOUS BLD VENIPUNCTURE: CPT

## 2025-05-25 PROCEDURE — 82948 REAGENT STRIP/BLOOD GLUCOSE: CPT

## 2025-05-25 PROCEDURE — 85027 COMPLETE CBC AUTOMATED: CPT

## 2025-05-25 PROCEDURE — 2580000003 HC RX 258

## 2025-05-25 RX ORDER — CALCIUM GLUCONATE 20 MG/ML
2000 INJECTION, SOLUTION INTRAVENOUS PRN
Status: DISCONTINUED | OUTPATIENT
Start: 2025-05-25 | End: 2025-05-26 | Stop reason: HOSPADM

## 2025-05-25 RX ORDER — MORPHINE SULFATE 2 MG/ML
1 INJECTION, SOLUTION INTRAMUSCULAR; INTRAVENOUS
Status: DISCONTINUED | OUTPATIENT
Start: 2025-05-25 | End: 2025-05-26

## 2025-05-25 RX ORDER — LORAZEPAM 2 MG/ML
1 INJECTION INTRAMUSCULAR EVERY 4 HOURS PRN
Status: DISCONTINUED | OUTPATIENT
Start: 2025-05-25 | End: 2025-05-26

## 2025-05-25 RX ORDER — MORPHINE SULFATE 2 MG/ML
2 INJECTION, SOLUTION INTRAMUSCULAR; INTRAVENOUS
Status: COMPLETED | OUTPATIENT
Start: 2025-05-25 | End: 2025-05-25

## 2025-05-25 RX ORDER — MORPHINE SULFATE 2 MG/ML
2 INJECTION, SOLUTION INTRAMUSCULAR; INTRAVENOUS ONCE
Status: DISCONTINUED | OUTPATIENT
Start: 2025-05-25 | End: 2025-05-25

## 2025-05-25 RX ORDER — 0.9 % SODIUM CHLORIDE 0.9 %
500 INTRAVENOUS SOLUTION INTRAVENOUS ONCE
Status: COMPLETED | OUTPATIENT
Start: 2025-05-25 | End: 2025-05-25

## 2025-05-25 RX ORDER — SCOPOLAMINE 1 MG/3D
1 PATCH, EXTENDED RELEASE TRANSDERMAL
Status: DISCONTINUED | OUTPATIENT
Start: 2025-05-25 | End: 2025-05-26 | Stop reason: HOSPADM

## 2025-05-25 RX ORDER — SODIUM CHLORIDE 9 MG/ML
INJECTION, SOLUTION INTRAVENOUS CONTINUOUS
Status: DISCONTINUED | OUTPATIENT
Start: 2025-05-25 | End: 2025-05-25

## 2025-05-25 RX ADMIN — Medication 16 G: at 09:21

## 2025-05-25 RX ADMIN — MORPHINE SULFATE 2 MG: 2 INJECTION, SOLUTION INTRAMUSCULAR; INTRAVENOUS at 23:16

## 2025-05-25 RX ADMIN — TIZANIDINE 4 MG: 4 TABLET ORAL at 13:02

## 2025-05-25 RX ADMIN — SODIUM CHLORIDE: 0.9 INJECTION, SOLUTION INTRAVENOUS at 11:22

## 2025-05-25 RX ADMIN — MORPHINE SULFATE 1 MG: 2 INJECTION, SOLUTION INTRAMUSCULAR; INTRAVENOUS at 21:27

## 2025-05-25 RX ADMIN — SODIUM CHLORIDE, PRESERVATIVE FREE 10 ML: 5 INJECTION INTRAVENOUS at 21:30

## 2025-05-25 RX ADMIN — FENOFIBRATE 160 MG: 160 TABLET ORAL at 09:28

## 2025-05-25 RX ADMIN — ASPIRIN 81 MG: 81 TABLET, COATED ORAL at 13:01

## 2025-05-25 RX ADMIN — MORPHINE SULFATE 2 MG: 2 INJECTION, SOLUTION INTRAMUSCULAR; INTRAVENOUS at 22:57

## 2025-05-25 RX ADMIN — SODIUM CHLORIDE, PRESERVATIVE FREE 10 ML: 5 INJECTION INTRAVENOUS at 09:29

## 2025-05-25 RX ADMIN — DEXTROSE 250 ML: 10 SOLUTION INTRAVENOUS at 09:58

## 2025-05-25 RX ADMIN — SODIUM CHLORIDE, PRESERVATIVE FREE 10 ML: 5 INJECTION INTRAVENOUS at 21:28

## 2025-05-25 RX ADMIN — MORPHINE SULFATE 1 MG: 2 INJECTION, SOLUTION INTRAMUSCULAR; INTRAVENOUS at 18:49

## 2025-05-25 RX ADMIN — MIDODRINE HYDROCHLORIDE 15 MG: 10 TABLET ORAL at 18:57

## 2025-05-25 RX ADMIN — SODIUM CHLORIDE 500 ML: 0.9 INJECTION, SOLUTION INTRAVENOUS at 09:22

## 2025-05-25 RX ADMIN — MIDODRINE HYDROCHLORIDE 15 MG: 10 TABLET ORAL at 13:02

## 2025-05-25 RX ADMIN — MIDODRINE HYDROCHLORIDE 15 MG: 10 TABLET ORAL at 09:23

## 2025-05-25 ASSESSMENT — PAIN DESCRIPTION - LOCATION
LOCATION: GENERALIZED

## 2025-05-25 ASSESSMENT — PAIN - FUNCTIONAL ASSESSMENT
PAIN_FUNCTIONAL_ASSESSMENT: INTOLERABLE, UNABLE TO DO ANY ACTIVE OR PASSIVE ACTIVITIES
PAIN_FUNCTIONAL_ASSESSMENT: PREVENTS OR INTERFERES WITH ALL ACTIVE AND SOME PASSIVE ACTIVITIES

## 2025-05-25 ASSESSMENT — PAIN DESCRIPTION - DESCRIPTORS
DESCRIPTORS: BURNING;SORE
DESCRIPTORS: BURNING;SORE
DESCRIPTORS: ACHING
DESCRIPTORS: ACHING

## 2025-05-25 ASSESSMENT — PAIN SCALES - GENERAL
PAINLEVEL_OUTOF10: 7
PAINLEVEL_OUTOF10: 10
PAINLEVEL_OUTOF10: 0

## 2025-05-25 ASSESSMENT — PAIN DESCRIPTION - ORIENTATION
ORIENTATION: MID
ORIENTATION: MID

## 2025-05-25 ASSESSMENT — PAIN DESCRIPTION - FREQUENCY
FREQUENCY: CONTINUOUS
FREQUENCY: CONTINUOUS

## 2025-05-25 ASSESSMENT — PAIN DESCRIPTION - PAIN TYPE
TYPE: ACUTE PAIN
TYPE: ACUTE PAIN

## 2025-05-25 ASSESSMENT — PAIN DESCRIPTION - ONSET
ONSET: ON-GOING
ONSET: ON-GOING

## 2025-05-25 NOTE — PROCEDURES
PROCEDURE NOTE  Date: 5/25/2025   Name: Kaycee Varela  YOB: 1960    Procedures  EKG completed

## 2025-05-26 VITALS
HEART RATE: 145 BPM | HEIGHT: 67 IN | WEIGHT: 196 LBS | SYSTOLIC BLOOD PRESSURE: 86 MMHG | OXYGEN SATURATION: 91 % | DIASTOLIC BLOOD PRESSURE: 60 MMHG | RESPIRATION RATE: 36 BRPM | TEMPERATURE: 98 F | BODY MASS INDEX: 30.76 KG/M2

## 2025-05-26 LAB
GLUCOSE BLD STRIP.AUTO-MCNC: 41 MG/DL (ref 70–108)
GLUCOSE BLD STRIP.AUTO-MCNC: 41 MG/DL (ref 70–108)
GLUCOSE BLD STRIP.AUTO-MCNC: 44 MG/DL (ref 70–108)
GLUCOSE BLD STRIP.AUTO-MCNC: 70 MG/DL (ref 70–108)

## 2025-05-26 PROCEDURE — 6360000002 HC RX W HCPCS: Performed by: INTERNAL MEDICINE

## 2025-05-26 PROCEDURE — 2500000003 HC RX 250 WO HCPCS: Performed by: STUDENT IN AN ORGANIZED HEALTH CARE EDUCATION/TRAINING PROGRAM

## 2025-05-26 PROCEDURE — 82948 REAGENT STRIP/BLOOD GLUCOSE: CPT

## 2025-05-26 PROCEDURE — 6360000002 HC RX W HCPCS

## 2025-05-26 PROCEDURE — 2580000003 HC RX 258

## 2025-05-26 PROCEDURE — 99239 HOSP IP/OBS DSCHRG MGMT >30: CPT | Performed by: STUDENT IN AN ORGANIZED HEALTH CARE EDUCATION/TRAINING PROGRAM

## 2025-05-26 PROCEDURE — 6370000000 HC RX 637 (ALT 250 FOR IP): Performed by: INTERNAL MEDICINE

## 2025-05-26 PROCEDURE — 6360000002 HC RX W HCPCS: Performed by: STUDENT IN AN ORGANIZED HEALTH CARE EDUCATION/TRAINING PROGRAM

## 2025-05-26 PROCEDURE — 6370000000 HC RX 637 (ALT 250 FOR IP)

## 2025-05-26 PROCEDURE — 6360000002 HC RX W HCPCS: Performed by: NURSE PRACTITIONER

## 2025-05-26 PROCEDURE — 2500000003 HC RX 250 WO HCPCS

## 2025-05-26 RX ORDER — DIPHENHYDRAMINE HYDROCHLORIDE 50 MG/ML
25 INJECTION, SOLUTION INTRAMUSCULAR; INTRAVENOUS EVERY 6 HOURS PRN
Status: DISCONTINUED | OUTPATIENT
Start: 2025-05-26 | End: 2025-05-26 | Stop reason: HOSPADM

## 2025-05-26 RX ORDER — OXYCODONE HYDROCHLORIDE 5 MG/1
5 TABLET ORAL
Status: DISCONTINUED | OUTPATIENT
Start: 2025-05-26 | End: 2025-05-26 | Stop reason: HOSPADM

## 2025-05-26 RX ORDER — MORPHINE SULFATE 2 MG/ML
2 INJECTION, SOLUTION INTRAMUSCULAR; INTRAVENOUS
Status: DISCONTINUED | OUTPATIENT
Start: 2025-05-26 | End: 2025-05-26 | Stop reason: HOSPADM

## 2025-05-26 RX ORDER — MIDAZOLAM HYDROCHLORIDE 1 MG/ML
1 INJECTION, SOLUTION INTRAMUSCULAR; INTRAVENOUS EVERY 4 HOURS PRN
Status: DISCONTINUED | OUTPATIENT
Start: 2025-05-26 | End: 2025-05-26 | Stop reason: HOSPADM

## 2025-05-26 RX ORDER — LORAZEPAM 1 MG/1
1 TABLET ORAL EVERY 4 HOURS PRN
Status: DISCONTINUED | OUTPATIENT
Start: 2025-05-26 | End: 2025-05-26 | Stop reason: HOSPADM

## 2025-05-26 RX ADMIN — MORPHINE SULFATE 1 MG: 2 INJECTION, SOLUTION INTRAMUSCULAR; INTRAVENOUS at 07:40

## 2025-05-26 RX ADMIN — DIPHENHYDRAMINE HYDROCHLORIDE 25 MG: 50 INJECTION INTRAMUSCULAR; INTRAVENOUS at 01:49

## 2025-05-26 RX ADMIN — SODIUM CHLORIDE, PRESERVATIVE FREE 10 ML: 5 INJECTION INTRAVENOUS at 07:47

## 2025-05-26 RX ADMIN — SODIUM CHLORIDE, PRESERVATIVE FREE 10 ML: 5 INJECTION INTRAVENOUS at 07:48

## 2025-05-26 RX ADMIN — MORPHINE SULFATE 1 MG: 2 INJECTION, SOLUTION INTRAMUSCULAR; INTRAVENOUS at 04:40

## 2025-05-26 RX ADMIN — MORPHINE SULFATE 2 MG: 2 INJECTION, SOLUTION INTRAMUSCULAR; INTRAVENOUS at 12:19

## 2025-05-26 RX ADMIN — DEXTROSE 125 ML: 10 SOLUTION INTRAVENOUS at 08:54

## 2025-05-26 RX ADMIN — ONDANSETRON 4 MG: 2 INJECTION, SOLUTION INTRAMUSCULAR; INTRAVENOUS at 00:25

## 2025-05-26 RX ADMIN — TRAMADOL HYDROCHLORIDE 50 MG: 50 TABLET, COATED ORAL at 08:36

## 2025-05-26 RX ADMIN — ENOXAPARIN SODIUM 40 MG: 100 INJECTION SUBCUTANEOUS at 07:43

## 2025-05-26 RX ADMIN — DIPHENHYDRAMINE HYDROCHLORIDE 25 MG: 50 INJECTION INTRAMUSCULAR; INTRAVENOUS at 08:36

## 2025-05-26 RX ADMIN — ONDANSETRON 4 MG: 2 INJECTION, SOLUTION INTRAMUSCULAR; INTRAVENOUS at 07:40

## 2025-05-26 RX ADMIN — PANTOPRAZOLE SODIUM 40 MG: 40 TABLET, DELAYED RELEASE ORAL at 07:43

## 2025-05-26 RX ADMIN — MIDAZOLAM 1 MG: 1 INJECTION INTRAMUSCULAR; INTRAVENOUS at 08:49

## 2025-05-26 ASSESSMENT — PAIN SCALES - GENERAL
PAINLEVEL_OUTOF10: 10
PAINLEVEL_OUTOF10: 10

## 2025-05-26 ASSESSMENT — PAIN DESCRIPTION - LOCATION
LOCATION: GENERALIZED

## 2025-05-26 ASSESSMENT — PAIN DESCRIPTION - DESCRIPTORS
DESCRIPTORS: ACHING
DESCRIPTORS: ACHING

## 2025-05-26 ASSESSMENT — PAIN DESCRIPTION - ORIENTATION
ORIENTATION: MID
ORIENTATION: MID

## 2025-05-26 ASSESSMENT — PAIN SCALES - WONG BAKER: WONGBAKER_NUMERICALRESPONSE: HURTS WORST

## 2025-05-26 NOTE — DISCHARGE SUMMARY
DEATH SUMMARY      Patient:  Kaycee Varela  YOB: 1960  MRN: [id number]   Acct: [id number]    Primary Care Physician: Jose Lazaro DO    Admit date:  5/15/2025    Discharge date:      Admission Condition:fair    Discharge Diagnoses:   Wound infection  Principal Problem:    Wound infection  Active Problems:    Primary hypertension    Type 2 diabetes mellitus with other specified complication, unspecified whether long term insulin use (HCC)    Hyperlipemia    History of ventricular tachycardia    Maculopapular rash    History of amputation of both lower extremities (HCC)    Bullous pemphigoid (HCC)  Resolved Problems:    * No resolved hospital problems. *      Consultations:-  [] NONE [] Cardiology  [] Nephrology  [] Hemo onco   [] GI   [x] ID  [] Endocrine  [] Pulm    [] Neuro    [] Psych   [] Urology  [] ENT   [] G SURGERY   []Ortho    []CV surg    [x] Palliative  [] Hospice [] Pain management   []    []TCU   [] PT/OT  OTHERS:-    Significant Diagnostic Studies:    No results found.     INTIAL H AND P AND HOSPITAL COURSE:-  Per initial H&P \"Kaycee Varela is a 65 y.o. female with PMHx of type 2 diabetes, bullous pemphigoid, hyperlipidemia, hypertension who presents to [facility] with wound infection. Patient was admitted on 03/31 due to maculopapular rash that had punch biopsy test positive for bullous pemphigoid. Patient was started on methylprednisolone and ID was consulted. Patient also had a left distal femur fracture from previous fall that appeared to have tenting of skin, upon orthopedics evaluation left AKA was determined best route. Patient was discharged to SNF. Patient was to follow-up with dermatology. Patient had appointment with dermatology today who stated that patient likely had a wound infection superimposed on bullous pemphigoid rash. Reported increased yellow discharge. Patient reports increased pain. Patient was treated with pain control

## 2025-05-26 NOTE — PROGRESS NOTES
Progress note: Infectious diseases    Patient - Kaycee Varela,  Age - 65 y.o.    - 1960      Room Number - 4A-03/003-A   MRN -  [id number]   [facility] # - [id number]  Date of Admission -  5/15/2025  2:58 PM    SUBJECTIVE:   She was complaining of pain.  She is getting IV Dilaudid however it does not seem to control her pain.  OBJECTIVE   VITALS    height is 1.702 m (5' 7\") and weight is 88.9 kg (196 lb). Her oral temperature is 98.4 °F (36.9 °C). Her blood pressure is 119/63 and her pulse is 98. Her respiration is 16 and oxygen saturation is 96%.       Wt Readings from Last 3 Encounters:   05/15/25 88.9 kg (196 lb)   25 89.2 kg (196 lb 10.4 oz)   25 86.2 kg (190 lb 0.6 oz)       I/O (24 Hours)    Intake/Output Summary (Last 24 hours) at 2025 1321  Last data filed at 2025 0706  Gross per 24 hour   Intake 3194.52 ml   Output 375 ml   Net 2819.52 ml       General Appearance  Awake, alert, oriented, in pain  HEENT - normocephalic, atraumatic, slightly pale conjunctiva,  anicteric sclera  Neck - Supple, no mass  Lungs -  Bilateral   air entry, no rhonchi, no wheeze  Cardiovascular - Heart sounds are normal.     Abdomen - soft, not distended, nontender,   Neurologic -oriented  Skin -multiple open wounds   extremities - No edema, no cyanosis, clubbing     MEDICATIONS:      enoxaparin  40 mg SubCUTAneous Daily    aspirin  81 mg Oral Daily    atorvastatin  40 mg Oral Nightly    fenofibrate  160 mg Oral Daily    pantoprazole  40 mg Oral BID AC    tiZANidine  4 mg Oral TID    sodium chloride flush  5-40 mL IntraVENous 2 times per day    piperacillin-tazobactam  3,375 mg IntraVENous Q8H    vancomycin (VANCOCIN) intermittent dosing (placeholder)   Other RX Placeholder    vancomycin  1,500 mg IntraVENous Q12H    predniSONE  20 mg Oral Daily      dextrose      sodium chloride       glucose, dextrose bolus **OR** 
                                                                                          Progress note: Infectious diseases    Patient - Kaycee Varela,  Age - 65 y.o.    - 1960      Room Number - 4A-03/003-A   MRN -  [id number]   [facility] # - [id number]  Date of Admission -  5/15/2025  2:58 PM    SUBJECTIVE:   Patient seen and evaluated this morning at bedside.  She reports worsening of diffuse pain.  Left arm sores had increased drainage overnight.  OBJECTIVE   VITALS    height is 1.702 m (5' 7\") and weight is 88.9 kg (196 lb). Her oral temperature is 98.5 °F (36.9 °C). Her blood pressure is 81/45 (abnormal) and her pulse is 98. Her respiration is 16 and oxygen saturation is 96%.       Wt Readings from Last 3 Encounters:   05/15/25 88.9 kg (196 lb)   25 89.2 kg (196 lb 10.4 oz)   25 86.2 kg (190 lb 0.6 oz)       I/O (24 Hours)    Intake/Output Summary (Last 24 hours) at 2025 0821  Last data filed at 2025 0328  Gross per 24 hour   Intake 1408.45 ml   Output 325 ml   Net 1083.45 ml       General Appearance  Awake, alert, oriented,  not  In acute distress  HEENT - normocephalic, atraumatic, pink conjunctiva,  anicteric sclera  Neck - Supple, no mass  Lungs -  Bilateral air entry, no rhonchi, no wheeze  Cardiovascular - Heart sounds are normal.  Regular rate and rhythm without murmur, gallop or rub.  Abdomen - soft, not distended, nontender,   Neurologic -alert and oriented  Skin -multiple open wounds.  Active draining from wounds of left armpit  Extremities -bilateral AKA    MEDICATIONS:      midodrine  10 mg Oral TID WC    piperacillin-tazobactam  3,375 mg IntraVENous Q8H    sodium chloride flush  5-40 mL IntraVENous 2 times per day    enoxaparin  40 mg SubCUTAneous Daily    aspirin  81 mg Oral Daily    atorvastatin  40 mg Oral Nightly    fenofibrate  160 mg Oral Daily    pantoprazole  40 mg Oral BID AC    tiZANidine  4 mg Oral TID    sodium chloride flush  5-40 mL IntraVENous 2 times per 
                                                                                          Progress note: Infectious diseases    Patient - Kaycee Varela,  Age - 65 y.o.    - 1960      Room Number - 4A-03/003-A   MRN -  [id number]   [facility] # - [id number]  Date of Admission -  5/15/2025  2:58 PM    SUBJECTIVE:   Not eating well.  Her pain is not well-controlled as she is getting hypotension after pain medication.  I spoke with her  who is thinking about comfort care measures.  Would not want her to be on ventilator, no chest compressions.  OBJECTIVE   VITALS    height is 1.702 m (5' 7\") and weight is 88.9 kg (196 lb). Her oral temperature is 97.7 °F (36.5 °C). Her blood pressure is 100/73 and her pulse is 99. Her respiration is 20 and oxygen saturation is 98%.       Wt Readings from Last 3 Encounters:   05/15/25 88.9 kg (196 lb)   25 89.2 kg (196 lb 10.4 oz)   25 86.2 kg (190 lb 0.6 oz)       I/O (24 Hours)    Intake/Output Summary (Last 24 hours) at 2025 1154  Last data filed at 2025 1121  Gross per 24 hour   Intake 754.58 ml   Output --   Net 754.58 ml       General Appearance  Awake, alert, chronically sick looking  HEENT - normocephalic, atraumatic, pale conjunctiva,  anicteric sclera  Neck - Supple, no mass  Lungs -  Bilateral air entry,   Cardiovascular - Heart sounds are normal.     Abdomen - soft, not distended, nontender,   Neurologic -alert and oriented  Skin -multiple open wounds.minimal drainage  Extremities -bilateral AKA    MEDICATIONS:      midodrine  15 mg Oral TID WC    sodium chloride flush  5-40 mL IntraVENous 2 times per day    enoxaparin  40 mg SubCUTAneous Daily    aspirin  81 mg Oral Daily    atorvastatin  40 mg Oral Nightly    fenofibrate  160 mg Oral Daily    pantoprazole  40 mg Oral BID AC    tiZANidine  4 mg Oral TID    sodium chloride flush  5-40 mL IntraVENous 2 times per day      sodium chloride 150 mL/hr at 25 1122    sodium chloride      dextrose 
                                                                                          Progress note: Infectious diseases    Patient - Kaycee Varela,  Age - 65 y.o.    - 1960      Room Number - 4A-03/003-A   MRN -  [id number]   [facility] # - [id number]  Date of Admission -  5/15/2025  2:58 PM    SUBJECTIVE:   Seen and evaluated bedside this morning.  Patient denies any acute issues overnight.  She reports pain is ongoing but controlled.  Not eating well  OBJECTIVE   VITALS    height is 1.702 m (5' 7\") and weight is 88.9 kg (196 lb). Her oral temperature is 98.2 °F (36.8 °C). Her blood pressure is 95/59 (abnormal) and her pulse is 109 (abnormal). Her respiration is 20 and oxygen saturation is 93%.       Wt Readings from Last 3 Encounters:   05/15/25 88.9 kg (196 lb)   25 89.2 kg (196 lb 10.4 oz)   25 86.2 kg (190 lb 0.6 oz)       I/O (24 Hours)    Intake/Output Summary (Last 24 hours) at 2025 0908  Last data filed at 2025 0545  Gross per 24 hour   Intake 3322.05 ml   Output 225 ml   Net 3097.05 ml       General Appearance  Awake, alert, oriented,  not  In acute distress  HEENT - normocephalic, atraumatic, pale conjunctiva,  anicteric sclera  Neck - Supple, no mass  Lungs -  Bilateral air entry,   Cardiovascular - Heart sounds are normal.  Regular rate and rhythm without murmur, gallop or rub.  Abdomen - soft, not distended, nontender,   Neurologic -alert and oriented  Skin -multiple open wounds.minimal drainage  Extremities -bilateral AKA    MEDICATIONS:      midodrine  10 mg Oral TID WC    sodium chloride flush  5-40 mL IntraVENous 2 times per day    enoxaparin  40 mg SubCUTAneous Daily    aspirin  81 mg Oral Daily    atorvastatin  40 mg Oral Nightly    fenofibrate  160 mg Oral Daily    pantoprazole  40 mg Oral BID AC    tiZANidine  4 mg Oral TID    sodium chloride flush  5-40 mL IntraVENous 2 times per day      sodium chloride      dextrose      sodium chloride       sodium chloride 
                                                                                          Progress note: Infectious diseases    Patient - Kaycee Varela,  Age - 65 y.o.    - 1960      Room Number - 4A-03/003-A   MRN -  [id number]   [facility] # - [id number]  Date of Admission -  5/15/2025  2:58 PM    SUBJECTIVE:   Patient seen and evaluated this morning at bedside.  She denies any acute events overnight.  Patient reports pain is controlled at this time.  Patient denies any chest pain, shortness of breath, shake, fever, chills, nausea, vomiting, constipation, diarrhea, dysuria.  OBJECTIVE   VITALS    height is 1.702 m (5' 7\") and weight is 88.9 kg (196 lb). Her axillary temperature is 98.2 °F (36.8 °C). Her blood pressure is 103/51 (abnormal) and her pulse is 98. Her respiration is 18 and oxygen saturation is 98%.       Wt Readings from Last 3 Encounters:   05/15/25 88.9 kg (196 lb)   25 89.2 kg (196 lb 10.4 oz)   25 86.2 kg (190 lb 0.6 oz)       I/O (24 Hours)    Intake/Output Summary (Last 24 hours) at 2025 0905  Last data filed at 2025 0700  Gross per 24 hour   Intake 172.76 ml   Output 550 ml   Net -377.24 ml       General Appearance  Awake, alert, oriented,  not  In acute distress  HEENT - normocephalic, atraumatic, pink conjunctiva,  anicteric sclera  Neck - Supple, no mass  Lungs -  Bilateral air entry, no rhonchi, no wheeze  Cardiovascular - Heart sounds are normal.  Regular rate and rhythm without murmur, gallop or rub.  Abdomen - soft, not distended, nontender,   Neurologic -alert and oriented  Skin -multiple open wounds that have improved  Extremities -bilateral AKA    MEDICATIONS:      piperacillin-tazobactam  3,375 mg IntraVENous Q8H    sodium chloride flush  5-40 mL IntraVENous 2 times per day    enoxaparin  40 mg SubCUTAneous Daily    aspirin  81 mg Oral Daily    atorvastatin  40 mg Oral Nightly    fenofibrate  160 mg Oral Daily    pantoprazole  40 mg Oral BID AC    tiZANidine  4 
                                                                                          Progress note: Infectious diseases    Patient - Kaycee Varela,  Age - 65 y.o.    - 1960      Room Number - 4A-03/003-A   MRN -  [id number]   [facility] # - [id number]  Date of Admission -  5/15/2025  2:58 PM    SUBJECTIVE:   Patient seen and evaluated bedside this morning.  She reports diffuse controlled pain at this time.  She reports that it is \"present but not prominent\".  She denies any lightheadedness, shakes, fever, chills, nausea, vomiting, diarrhea, constipation, shortness of breath, cough.  OBJECTIVE   VITALS    height is 1.702 m (5' 7\") and weight is 88.9 kg (196 lb). Her oral temperature is 97.9 °F (36.6 °C). Her blood pressure is 77/45 (abnormal) and her pulse is 68. Her respiration is 16 and oxygen saturation is 97%.       Wt Readings from Last 3 Encounters:   05/15/25 88.9 kg (196 lb)   25 89.2 kg (196 lb 10.4 oz)   25 86.2 kg (190 lb 0.6 oz)       I/O (24 Hours)    Intake/Output Summary (Last 24 hours) at 2025 0803  Last data filed at 2025 0726  Gross per 24 hour   Intake 1373.89 ml   Output 650 ml   Net 723.89 ml       General Appearance  Awake, alert, oriented,  not  In acute distress  HEENT - normocephalic, atraumatic, pink conjunctiva,  anicteric sclera  Neck - Supple, no mass  Lungs -  Bilateral air entry, no rhonchi, no wheeze  Cardiovascular - Heart sounds are normal.  Regular rate and rhythm without murmur, gallop or rub.  Abdomen - soft, not distended, nontender,   Neurologic -alert and oriented  Skin -multiple open wounds  Extremities - No edema, no cyanosis, clubbing     MEDICATIONS:      enoxaparin  40 mg SubCUTAneous Daily    aspirin  81 mg Oral Daily    atorvastatin  40 mg Oral Nightly    fenofibrate  160 mg Oral Daily    pantoprazole  40 mg Oral BID AC    tiZANidine  4 mg Oral TID    sodium chloride flush  5-40 mL IntraVENous 2 times per day    piperacillin-tazobactam  3,375 
                                                                                          Progress note: Infectious diseases    Patient - Kaycee Varela,  Age - 65 y.o.    - 1960      Room Number - 4A-03/003-A   MRN -  [id number]   [facility] # - [id number]  Date of Admission -  5/15/2025  2:58 PM    SUBJECTIVE:   Patient seen and evaluated bedside this morning.  Patient reports ongoing diffuse pain that remains controlled.  She denied any lightheadedness, shakes, fever, chills, nausea, vomiting, diarrhea, constipation, shortness of breath, cough.  Patient reports plans for further conversations with palliative care regarding goals of care to be held later with  present.  OBJECTIVE   VITALS    height is 1.702 m (5' 7\") and weight is 88.9 kg (196 lb). Her oral temperature is 97.9 °F (36.6 °C). Her blood pressure is 99/53 (abnormal) and her pulse is 59. Her respiration is 18 and oxygen saturation is 94%.       Wt Readings from Last 3 Encounters:   05/15/25 88.9 kg (196 lb)   25 89.2 kg (196 lb 10.4 oz)   25 86.2 kg (190 lb 0.6 oz)       I/O (24 Hours)    Intake/Output Summary (Last 24 hours) at 2025 0851  Last data filed at 2025 2154  Gross per 24 hour   Intake 1276.89 ml   Output --   Net 1276.89 ml       General Appearance  Awake, alert, oriented,  not  In acute distress  HEENT - normocephalic, atraumatic, pink conjunctiva,  anicteric sclera  Neck - Supple, no mass  Lungs -  Bilateral air entry, no rhonchi, no wheeze  Cardiovascular - Heart sounds are normal.  Regular rate and rhythm without murmur, gallop or rub.  Abdomen - soft, not distended, nontender,   Neurologic -alert and oriented  Skin -multiple open wounds  Extremities - bilateral above knee amputation    MEDICATIONS:      enoxaparin  40 mg SubCUTAneous Daily    aspirin  81 mg Oral Daily    atorvastatin  40 mg Oral Nightly    fenofibrate  160 mg Oral Daily    pantoprazole  40 mg Oral BID AC    tiZANidine  4 mg Oral TID    
                                                                       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 Punch biopsy confirmed bullous pemphigoid. Patient was placed on IV methylprednisolone daily with Roxicodone and Benadryl and cetirizine. Patient was discharged to follow-up with dermatology outpatient. Does not appear as though patient was discharged with steroids. Upon presentation to dermatology outpatient, concerns for wound infection and told to go to the ED. New onset yellow discharge. CBC showed new onset leukocytosis with neutrophilia and left shift. Procalcitonin 0.10. Lactic acid 2.7 repeat 2.0. CRP 2.15 and sed rate 0. Could be falsely low due to possible prednisone use. ID following. Continue Zosyn and prednisone.  NAGMA: S/p IV fluid bolus. Unable to get labs today. Will consult IR for PICC line placement. Continue to monitor daily BMP.  Hypocalcemia: S/p 2g calcium gluconate on 5/19. Unable to get labs today. Unable to get labs today. Will consult IR for PICC line placement. Continue to monitor daily BMP.  Goals of care: Patient reports she wants to be comfortable and that she is in too much pain. She states she does not want to do this anymore. Palliative care saw patient and her  today. She would like to change her code status to DNR-CCA.   Resolved Problems  Acute respiratory failure: Patient had rapid response called overnight 5/17/25 for hypotension, tachypnea, and tachycardia. Unclear if patient became hypoxic however she was placed on 2 L nasal cannula. Chest x-ray showed no acute findings.  Currently saturating well on room air.  Chronic 
                                                                       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 Punch biopsy confirmed bullous pemphigoid. Patient was placed on IV methylprednisolone daily with Roxicodone and Benadryl and cetirizine. Patient was discharged to follow-up with dermatology outpatient. Does not appear as though patient was discharged with steroids. Upon presentation to dermatology outpatient, concerns for wound infection and told to go to the ED. New onset yellow discharge. CBC showed new onset leukocytosis with neutrophilia and left shift. Procalcitonin 0.10. Lactic acid 2.7 repeat 2.0. CRP 2.15 and sed rate 0. Could be falsely low due to possible prednisone use. ID following. Continue Zosyn and prednisone.  NAGMA: CO2 level 17 today. Will give IV fluid bolus. Continue to monitor daily BMP.  Hypocalcemia: Calcium level 7.6 today. Will order ionized calcium level. Replace as needed. Continue to monitor daily BMP.  Goals of care: Patient reports she wants to be comfortable and that she is in too much pain. She states she does not want to do this anymore. Will consult palliative care for further goals of care/code status.   Resolved Problems  Acute respiratory failure: Patient had rapid response called overnight 5/17/25 for hypotension, tachypnea, and tachycardia. Unclear if patient became hypoxic however she was placed on 2 L nasal cannula. Chest x-ray showed no acute findings.  Currently saturating well on room air.  Chronic Conditions (reviewed and stable unless otherwise stated)  Primary hypertension: Managed without medication.  Hyperlipidemia: Continue 
                                                                       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 Punch biopsy confirmed bullous pemphigoid. Patient was placed on IV methylprednisolone daily with Roxicodone and Benadryl and cetirizine. Patient was discharged to follow-up with dermatology outpatient. Does not appear as though patient was discharged with steroids. Upon presentation to dermatology outpatient, concerns for wound infection and told to go to the ED. New onset yellow discharge. CBC showed new onset leukocytosis with neutrophilia and left shift. Procalcitonin 0.10. Lactic acid 2.7 repeat 2.0. CRP 2.15 and sed rate 0. Could be falsely low due to possible prednisone use. Wound culture ordered. ID following, discontinued vancomycin today. Continue Zosyn and prednisone.  Acute respiratory failure: Patient had rapid response called overnight for hypotension, tachypnea, and tachycardia. Unclear if patient became hypoxic however she was placed on 2 L nasal cannula. Will order chest x-ray. Maintain SpO2 greater than 90%. Wean as tolerated.  Goals of care: Patient reports she wants to be comfortable and that she is in too much pain. She states she does not want to do this anymore. Will consult palliative care for further goals of care/code status.   Chronic Conditions (reviewed and stable unless otherwise stated)  Primary hypertension: Managed without medication.  Hyperlipidemia: Continue Lipitor 40 mg oral nightly and fenofibrate 160 mg oral daily.  Type 2 diabetes mellitus: Will order hemoglobin A1c level.  Hypoglycemia protocol in place.  POCT glucose checks.  GERD: 
                                                                       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 Punch biopsy confirmed bullous pemphigoid. Patient was placed on IV methylprednisolone daily with Roxicodone and Benadryl and cetirizine. Patient was discharged to follow-up with dermatology outpatient. Does not appear as though patient was discharged with steroids. Upon presentation to dermatology outpatient, concerns for wound infection and told to go to the ED. New onset yellow discharge. CBC showed new onset leukocytosis with neutrophilia and left shift. Procalcitonin 0.10. Lactic acid 2.7 repeat 2.0. CRP 2.15 and sed rate 0. Could be falsely low due to possible prednisone use. ID following. Continue Zosyn and prednisone.  NAGMA: CO2 level 16 today. Will give IV fluid bolus. Continue to monitor daily BMP.  Hypocalcemia: Calcium level 7.6 today.  Ionized calcium 1.07 yesterday.  Will give calcium gluconate IV 2 g once today.  Continue to monitor daily BMP.  Goals of care: Patient reports she wants to be comfortable and that she is in too much pain. She states she does not want to do this anymore.  Palliative care to see patient today.  Resolved Problems  Acute respiratory failure: Patient had rapid response called overnight 5/17/25 for hypotension, tachypnea, and tachycardia. Unclear if patient became hypoxic however she was placed on 2 L nasal cannula. Chest x-ray showed no acute findings.  Currently saturating well on room air.  Chronic Conditions (reviewed and stable unless otherwise stated)  Hypertension, history of primary hypertension: Will give IV fluid bolus.  Not 
                                                                       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 Patient was admitted on 3/31/25 for blistering eruption and maculopapular rash. Punch biopsy confirmed bullous pemphigoid. Patient was placed on IV methylprednisolone daily with Roxicodone and Benadryl and cetirizine. Patient was discharged to follow-up with dermatology outpatient. Does not appear as though patient was discharged with steroids. Upon presentation to dermatology outpatient, concerns for wound infection and told to go to the ED. New onset yellow discharge. CBC showed new onset leukocytosis with neutrophilia and left shift. Procalcitonin 0.10. Lactic acid 2.7 repeat 2.0. CRP 2.15 and sed rate 0. Could be falsely low due to possible prednisone use. ID following. Completed course of Zosyn and Prednisone.   Hypotension, history of primary hypertension: Not on any medications outpatient. Patient has received fluid boluses and is currently on Midodrine 10 mg oral TID. Unable to give pain medication due to soft blood pressures. Consulted palliative care, Dr. Kilgore for further recommendation. Continue to monitor symptoms.          DUE TO ONGOING HYPOTENSION, spoke with spouse and his family , along with dr. Escalona.  They want to keep her comfortable and no heroic measures, no ICU or extensive procedures.  So cancelled ICU transfer and changed the code status to DNR /CC , and initiate her comfort measures at this time, and transfer to medical floor.     Can consider hospice consult over the next 24- 48  hrs based on the clinical course, spouse  Is in agreement.         .  Goals of care: Patient reports she wants to be comfortable and 
                                                                       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 Punch biopsy confirmed bullous pemphigoid. Patient was placed on IV methylprednisolone daily with Roxicodone and Benadryl and cetirizine. Patient was discharged to follow-up with dermatology outpatient. Does not appear as though patient was discharged with steroids. Upon presentation to dermatology outpatient, concerns for wound infection and told to go to the ED. New onset yellow discharge. CBC showed new onset leukocytosis with neutrophilia and left shift. Procalcitonin 0.10. Lactic acid 2.7 repeat 2.0. CRP 2.15 and sed rate 0. Could be falsely low due to possible prednisone use. ID following. Continue prednisone. Plan to complete IV Zosyn therapy today, 5/21.  Hypokalemia: Potassium level 3.3 today. On potassium replacement protocol. Will recheck level in the afternoon.  Continue to monitor with daily BMP.  Hypocalcemia: S/p 2g calcium gluconate on 5/19. Calcium 7.6 today. Will order ionized calcium level. Replace as needed. Continue to monitor daily BMP.  Goals of care: Patient reports she wants to be comfortable and that she is in too much pain. She states she does not want to do this anymore. Palliative care saw patient and her  on 5/20 code status changed to DNR-CCA.   Resolved Problems  Acute respiratory failure: Patient had rapid response called overnight 5/17/25 for hypotension, tachypnea, and tachycardia. Unclear if patient became hypoxic however she was placed on 2 L nasal cannula. Chest x-ray showed no acute findings.  Currently saturating well on room 
                                                                       Hospitalist Progress Note      Patient:  Kaycee Varela    Unit/Bed:4A-03/003-A  YOB: 1960  MRN: [id number]   Acct: [id number]   PCP: Jose Lazaro DO  Date of Admission: 5/15/2025    ASSESSMENT AND PLAN    Active Problems  Wound infection superimposed on bullous pemphigoid: Improving. Patient was admitted on 3/31/25 for blistering eruption and maculopapular rash. Punch biopsy confirmed bullous pemphigoid. Patient was placed on IV methylprednisolone daily with Roxicodone and Benadryl and cetirizine. Patient was discharged to follow-up with dermatology outpatient. Does not appear as though patient was discharged with steroids. Upon presentation to dermatology outpatient, concerns for wound infection and told to go to the ED. New onset yellow discharge. CBC showed new onset leukocytosis with neutrophilia and left shift. Procalcitonin 0.10. Lactic acid 2.7 repeat 2.0. CRP 2.15 and sed rate 0. Could be falsely low due to possible prednisone use. ID following. Completed course of Zosyn and Prednisone.   Hypotension, history of primary hypertension: Not on any medications outpatient. Patient has received fluid boluses and is currently on Midodrine 10 mg oral TID. Unable to give pain medication due to soft blood pressures. Consulted palliative care, Dr. Kilgore for further recommendation. Continue to monitor symptoms.  Hypocalcemia: S/p 2g calcium gluconate on 5/19. Lab from today pending. Ionized calcium level 1.08 yesterday afternoon. Will order calcium gluconate 2 g once today. Continue to monitor daily BMP.  Thrombocytopenia: Platelet level 108 yesterday. Today's labs pending. Continue to monitor daily CBC.  Goals of care: Patient reports she wants to be comfortable and that she is in too much pain. She states she does not want to do this anymore. Palliative care saw patient and her  on 5/20 code status changed to 
                                                                       Hospitalist Progress Note      Patient:  Kaycee Varela    Unit/Bed:4A-03/003-A  YOB: 1960  MRN: [id number]   Acct: [id number]   PCP: Jose Lazaro DO  Date of Admission: 5/15/2025    ASSESSMENT AND PLAN    Active Problems  Wound infection superimposed on bullous pemphigoid: Patient was admitted on 3/31/25 for blistering eruption and maculopapular rash. Punch biopsy confirmed bullous pemphigoid. Patient was placed on IV methylprednisolone daily with Roxicodone and Benadryl and cetirizine. Patient was discharged to follow-up with dermatology outpatient. Does not appear as though patient was discharged with steroids. Upon presentation to dermatology outpatient, concerns for wound infection and told to go to the ED. New onset yellow discharge. CBC showed new onset leukocytosis with neutrophilia and left shift. Procalcitonin 0.10. Lactic acid 2.7 repeat 2.0. CRP 2.15 and sed rate 0. Could be falsely low due to possible prednisone use. ID following. Completed course of Zosyn and Prednisone.   Hypotension, history of primary hypertension: Not on any medications outpatient.  Patient has received total of 1 L fluid bolus today. Also midodrine increased from 5 mg oral TID to 10 mg oral TID.  Continue to monitor symptoms.  Hypocalcemia: S/p 2g calcium gluconate on 5/19. Calcium 7.6 today. Will order ionized calcium level. Replace as needed. Continue to monitor daily BMP.  Thrombocytopenia: Platelet level 108 today. Continue to monitor daily CBC.  Goals of care: Patient reports she wants to be comfortable and that she is in too much pain. She states she does not want to do this anymore. Palliative care saw patient and her  on 5/20 code status changed to DNR-CCA.   Resolved Problems  Acute respiratory failure: Patient had rapid response called overnight 5/17/25 for hypotension, tachypnea, and tachycardia. Unclear if patient 
                                                                       Hospitalist Progress Note      Patient:  Kaycee Varela    Unit/Bed:6A-07/007-A  YOB: 1960  MRN: [id number]   Acct: [id number]   PCP: Jose Lazaro DO  Date of Admission: 5/15/2025    ASSESSMENT AND PLAN    Active Problems  Wound infection superimposed on bullous pemphigoid: Patient was admitted on 3/31/25 for blistering eruption and maculopapular rash. Punch biopsy confirmed bullous pemphigoid. Patient was placed on IV methylprednisolone daily with Roxicodone and Benadryl and cetirizine. Patient was discharged to follow-up with dermatology outpatient. Does not appear as though patient was discharged with steroids. Upon presentation to dermatology today, concerns for wound infection and told to present to the emergency department. New onset yellow discharge. CBC showed new onset leukocytosis with neutrophilia and left shift. Procalcitonin 0.10. Lactic acid 2.7 repeat 2.0. CRP 2.15 and sed rate 0. Could be falsely low due to possible prednisone use. ID consulted in ED. Wound culture ordered. Continue vancomycin, Zosyn, and prednisone.  Chronic Conditions (reviewed and stable unless otherwise stated)  Primary hypertension: Managed without medication.  Hyperlipidemia: Continue Lipitor 40 mg oral nightly and fenofibrate 160 mg oral daily.  Type 2 diabetes mellitus: Will order hemoglobin A1c level.  GERD: Continue Protonix 40 mg oral twice daily.  Anxiety/depression: Patient previously was on Zoloft and doxepin.  Not taking anymore.  Hx of Vtach: Noted.  Hx of amputation of both lower extremities: Noted.      LDA: []CVC / []PICC / []Midline / []Keita / []Drains / []Mediport / [x]None  Antibiotics: Vancomycin and Zosyn  Steroids: Prednisone  Labs (still needed?): [x]Yes / []No  IVF (still needed?): []Yes / [x]No    Level of care: []Step Down / [x]Med-Surg  Bed Status: [x]Inpatient / []Observation  Telemetry: [x]Yes / []No  PT/OT: 
    [facility] Nurse  Progress Note       Kaycee Varela  AGE: 65 y.o.   GENDER: female  : 1960  UNIT: 4A-03/003-A  TODAY'S DATE:  2025  ADMISSION DATE: 5/15/2025  2:58 PM    Subjective     Reason for C Evaluation and Assessment:     Multiple open wounds related to bullous pemphigoid         Kaycee Varela is a 65 y.o. female referred by:   [] Physician/ Resident/ PA/ DEREK-CNP  [x] Nursing  [] Other:     Plan     Consult received for multiple open wounds related to bullous pemphigoid. Dr Escalona following wounds. Recommend to contact provider for clarification regarding dressing recommendations. Defer treatment plan to provider. Call as needed.     
    Pharmacist Review and Automatic Dose Adjustment of Prophylactic Enoxaparin      The reviewing pharmacist has made an adjustment to the ordered enoxaparin dose or converted to UFH per the approved [facility] protocol and table as identified below.        Kaycee Varela is a 65 y.o. female.     Recent Labs     05/15/25  1553 05/16/25  0737   CREATININE 0.5 0.4*       Estimated Creatinine Clearance: 158 mL/min (A) (based on SCr of 0.4 mg/dL (L)).    Height:   Ht Readings from Last 1 Encounters:   05/15/25 1.702 m (5' 7\")     Weight:  Wt Readings from Last 1 Encounters:   05/15/25 88.9 kg (196 lb)               Plan: Based upon the patient's weight and renal function, the enoxaparin dose has been changed/converted to Lovenox 40 mg daily       Thank you,  Enedina Park, [facility]  5/16/2025, 1:29 PM    
  Cs 59 gave  apple juice and yogurt,   Rechecked BS 73          
  Pharmacy Note - Extended Infusion Beta-Lactam Dose Adjustment    Piperacillin/Tazobactam 3375 mg q8h intermittent infusion ordered for the treatment of Skin and soft tissue infection. Per [facility] Extended Infusion Beta-Lactam Policy, this will be changed to 3375 mg q8h extended infusion    Estimated Creatinine Clearance: Estimated Creatinine Clearance: 211 mL/min (A) (based on SCr of 0.3 mg/dL (L)).    BMI: Body mass index is 30.7 kg/m².    Rationale for Adjustment: Dose adjusted per [facility] Extended Infusion Policy based on renal function and indication. The above medication is renally eliminated and demonstrates time-dependent effects on bacterial eradication. Extended-infusion dosing strategy aims to enhance microbiologic and clinical efficacy.     Pharmacy will monitor renal function daily and adjust dose as necessary.    Please call with any questions.    Thank you,    Ryan Fields, PharmD, BCPS  5/21/2025  8:48 AM      
  Pharmacy Note - Extended Infusion Beta-Lactam Dose Adjustment    Piperacillin/Tazobactam 4500 mg q8h extended infusion ordered for the treatment of Skin and soft tissue infection. Per [facility] Extended Infusion Beta-Lactam Policy, this will be changed to 3375 mg q8h extended infusion    Received LD 4500 mg @ 1655.    Estimated Creatinine Clearance: Estimated Creatinine Clearance: 127 mL/min (based on SCr of 0.5 mg/dL).    Dialysis Status, SANJIV, CKD: Normal    BMI: Body mass index is 30.7 kg/m².    Rationale for Adjustment: Dose adjusted per [facility] Extended Infusion Policy based on renal function and indication. The above medication is renally eliminated and demonstrates time-dependent effects on bacterial eradication. Extended-infusion dosing strategy aims to enhance microbiologic and clinical efficacy.     Pharmacy will monitor renal function daily and adjust dose as necessary.    Please call with any questions.    Thank you,  Car Whitney, PharmD 5/15/2025, 8:22 PM        
1454 Pt in specials radiology for PICC insertion. Discussed procedure with pt and pt verbalizes understanding.   1501 Pt moved to table.  1505 Dr Short to speak to pt.  1512 5 fr dual PICC placed in right IJ per Dr Short. Pt tolerated well.  1513 Catheter sutured to right chest.  1516 Tegaderm with CHG applied to site. Site without redness, swelling or hematoma.  1525 Pt transferred to  per bed. Condition unchanged.  
Comprehensive Nutrition Assessment    Type and Reason for Visit:  Initial, Positive nutrition screen, Wound    Nutrition Recommendations/Plan:   Recommend weight when able (suspect bedscale weight inaccurate)  Recommend MVI.  ONS modified (dislikes shake ONS): Tino BID.  Yogurt TID, Cottage cheese/sugar-free jelly/jam BID.  Continue current diet.  Minimal oral intake d/t mouth sores, poor appetite, and needing assistance with feeding d/t blisters on fingers and hands (nursing notified). Encouraged oral intake, good protein sources, and ONS use.      Malnutrition Assessment:  Malnutrition Status:  At risk for malnutrition (05/19/25 1217)    Context:  Acute Illness     Findings of the 6 clinical characteristics of malnutrition:  Energy Intake:  75% or less of estimated energy requirements for 7 or more days (for the last 1 month)  Weight Loss:  Unable to assess (only stated weight on admit, suspect bedscale inaccurate)     Body Fat Loss:  No body fat loss     Muscle Mass Loss:  No muscle mass loss    Fluid Accumulation:  No fluid accumulation     Strength:  Not Performed    Nutrition Assessment:     Pt. nutritionally compromised AEB poor appetite/intake for the last 1 month.  At risk for further nutrition compromise r/t increased nutrient needs to support wound healing, mouth sores, hand/finger sores making it hard to feed herself, admit with bullous pemphigoid, wound infection, hypocalcemia, and underlying medical condition (hx: bilateral AKA (rt 3/2023, lf 4/2025), DM, HLD, breast cancer, HTN, smoking).       Nutrition Related Findings:    Pt. Report/Treatments/Miscellaneous: Patient seen earlier this morning, breakfast tray in front of her untouched, reports her  had been feeding her for the last month d/t blisters/sores on her hands and fingers (nursing staff notified to assist patient).  She reports she does have blisters/sores in her mouth as well.  Has been choosing soft to chew foods.  Dislikes 
[facility]   Pharmacy Pharmacokinetic Monitoring Service - Vancomycin     Kaycee Varela is a 65 y.o. female starting on vancomycin therapy for SSTI x 7 days. Pharmacy consulted by Dedrick Wei for monitoring and adjustment.    Target Concentration: Goal AUC/CHETNA 400-600 mg*hr/L    Additional Antimicrobials: Zosyn    Pertinent Laboratory Values:   Wt Readings from Last 1 Encounters:   05/15/25 88.9 kg (196 lb)     Temp Readings from Last 1 Encounters:   05/15/25 97.4 °F (36.3 °C) (Oral)     Estimated Creatinine Clearance: 127 mL/min (based on SCr of 0.5 mg/dL).  Recent Labs     05/15/25  1553   CREATININE 0.5   BUN 27*   WBC 11.6*     Pertinent Cultures:  Date Source Results   5/15/25 BC x2 Sent   5/15/25 wound sent   MRSA Nasal Swab: N/A. Non-respiratory infection.    Plan:  Dosing recommendations based on Bayesian software  Patient received vancomycin 2250 mg on 5/15/25 at 1738. Begin vancomycin 1500 mg IV q12h for anticipated AUC of 554 and trough concentration of 16.6 at steady state  Renal labs as indicated   Vancomycin concentration not ordered at this time  Pharmacy will monitor renal function and adjust therapy as indicated    Thank you for the consult,  Kaia Pedro, PharmD 5/15/2025 8:05 PM      
Patient absent of vital signs. Time of death 12:30 verified with Carlie Ford RN. Family at bedside. Contacted house supervisor, attending and Dr Kilgore.  
Patient's chronic patterson catheter exchanged per protocol at this time. Urine sent for culture.  
Pharmacy Note  BioFire Result    Kaycee Varela is a 65 y.o. female, with a positive blood culture result:    First Gram stain result: negative x 48h, called lab and no update    BioFire BCID result: Staphylococcus epidermidis mecA detected    BioFire BCID and gram stain congruent? No     Suspected source? SSTI    Patient chart has been reviewed for signs/symptoms of infection: Yes  BP (!) 91/49   Pulse (!) 104   Temp 97.9 °F (36.6 °C) (Oral)   Resp 16   Ht 1.702 m (5' 7\")   Wt 88.9 kg (196 lb)   SpO2 92%   BMI 30.70 kg/m²   Lab Results   Component Value Date    WBC 7.2 05/17/2025     Allergies reviewed  Sulfa antibiotics and Adhesive tape    Renal function reviewed  Estimated Creatinine Clearance: 127 mL/min (based on SCr of 0.5 mg/dL).    Current antibiotic regimen: Zosyn     Possible contaminate  Updated Dr. Escalona and he agreed to wait and monitor before restarting vancomycin    Kaia Pedro [facility]  5/17/2025 7:19 PM    
Prayer and encouragement    05/20/25 1415   Encounter Summary   Encounter Overview/Reason Spiritual/Emotional Needs   Service Provided For Patient   Referral/Consult From Rounding   Support System Family members   Last Encounter  05/20/25   Complexity of Encounter Low   Begin Time 0830   End Time  0835   Total Time Calculated 5 min   Spiritual/Emotional needs   Type Spiritual Support   Assessment/Intervention/Outcome   Assessment Calm   Intervention Empowerment       
Pt transferred to 4A-03, this RN called and gave report to ASHA Pickard. Pt transferred via bed in stable condition at this time.    Electronically signed by Mabel Roberts RN on 5/17/2025 at 11:20 AM   
Pt's BG 56.  15 gm CHO given (4 oz apple juice).  Repeat BG remained 56.  4 glucose tabs given (16 gm CHO).  Repeat BG 34.  D10 250 ml bolus given.  Repeat .    
RRT responded to Rapid response. Our support was not needed per Dr. Lees.   
Spiritual Health History and Assessment/Progress Note  [facility]    (P) Spiritual/Emotional Needs,  ,  ,      Name: Kaycee Varela MRN: [id number]    Age: 65 y.o.     Sex: female   Language: English   Mu-ism: None   Wound infection     Date: 5/25/2025            Total Time Calculated: (P) 20 min              Spiritual Assessment began in [facility] NEUROSCIENC[facility] 4A        Referral/Consult From: (P) Nurse   Encounter Overview/Reason: (P) Spiritual/Emotional Needs  Service Provided For: (P) Patient and family together    Myriam, Belief, Meaning:   Patient identifies as spiritual  Family/Friends identify as spiritual      Importance and Influence:  Patient has spiritual/personal beliefs that influence decisions regarding their health  Family/Friends have spiritual/personal beliefs that influence decisions regarding the patient's health    Community:  Patient is connected with a spiritual community  Family/Friends are connected with a spiritual community:    Assessment and Plan of Care:     Kaycee has a painful wound infection.  At the time of my visit, her  was in the room with her. Kaycee erica in part through prayer and requested a prayer today that she might be saved.    Patient Interventions include: Facilitated expression of thoughts and feelings and Affirmed coping skills/support systems  Family/Friends Interventions include: Facilitated expression of thoughts and feelings and Affirmed coping skills/support systems    Patient Plan of Care: Spiritual Care available upon further referral  Family/Friends Plan of Care: Spiritual Care available upon further referral    Electronically signed by TYSHAWN HAHN on 5/25/2025 at 5:02 PM   
Spiritual Health History and Assessment/Progress Note  [facility]    (P) Grief, Loss, and Adjustments,  , (P) Grief and loss, Death,      Name: Kaycee Varela MRN: [id number]    Age: 65 y.o.     Sex: female   Language: English   Congregational: None   Wound infection     Date: 2025            Total Time Calculated: (P) 15 min              Spiritual Assessment continued in [facility] ONC MED 5K        Referral/Consult From: (P) Nurse   Encounter Overview/Reason: (P) Grief, Loss, and Adjustments  Service Provided For: (P) Family    Myriam, Belief, Meaning:   Patient Other: patient   Family/Friends have beliefs or practices that help with coping during difficult times      Importance and Influence:  Patient Other: patient   Family/Friends have no beliefs influential to healthcare decision-making identified during this visit    Community:  Patient Other: patient   Family/Friends feel well-supported. Support system includes: Friends and Extended family    Assessment and Plan of Care:     This is a spiritual health encounter with patient's family, including spouse, upon patient's expiration. Family shared that patient was not especially spiritual but found connection with family and friends. They expressed the mixed emotions of being relieved that patient is no longer suffering, but heartbroken for themselves at her passing.  provided empathic listening and facilitated storytelling.  team will remain available to support patient/family, PRN.     Patient Interventions include: Other: no patient interventions at this time  Family/Friends Interventions include: Facilitated expression of thoughts and feelings and Facilitated life review and/or legacy    Patient Plan of Care: Other: patient   Family/Friends Plan of Care: Spiritual Care available upon further referral    Electronically signed by Chaplain Apolinar on 2025 at 1:06 PM    
Spiritual Health History and Assessment/Progress Note  [facility]    Initial Encounter,  ,  ,      Name: Kaycee Varela MRN: [id number]    Age: 65 y.o.     Sex: female   Language: English   Protestant: None   Wound infection     Date: 5/19/2025            Total Time Calculated: 30 min              Spiritual Assessment began in [facility] NEUROSCIENCES 4A        Referral/Consult From: Rounding   Encounter Overview/Reason: Initial Encounter  Service Provided For: Patient    Myriam, Belief, Meaning:   Patient has beliefs or practices that help with coping during difficult times  Family/Friends No family/friends present      Importance and Influence:  Patient has spiritual/personal beliefs that influence decisions regarding their health  Family/Friends No family/friends present    Community:  Patient feels well-supported. Support system includes: Extended family  Family/Friends No family/friends present    Assessment and Plan of Care:   PT  is a 65 yr old single woman,  introduced himself and pt was open to a visit. Through active listening pt shared that \"she has a skin infection that been going on for about 2-3 months, it is quite painful and has been very overwhelming.\" Pt shared that \"she lost her son Blake who was her only son,. He was working for electric company in [place] and he was electrocuted. Pt shared \" he was her only son, and you never get over the loss of child.\"  asked if she wanted  to pray, pt requested prayer for healing and strength to endure pain.  prayed per the pt wishes.  Patient Interventions include: Provided sacramental/Anglican ritual  Family/Friends Interventions include: No family/friends present    Patient Plan of Care: Spiritual Care available upon further referral  Family/Friends Plan of Care: No family/friends present    Electronically signed by Chaplain Toribio on 5/19/2025 at 2:46 PM    
Spoke with patient's sister Ana and she stated all family had left the hospital and it was ok to transport patient from room. House Supervisor aware  
  Electronically signed by Dr. Lucia Walker        No results found.    Electronically signed by David Mo MD on 5/24/2025 at 2:33 PM

## 2025-05-26 NOTE — SIGNIFICANT EVENT
Complaint of pain with dressing change.   Patient was found tearful \"they promised me I would not be in any pain\"  Patient is currently DNRCC  I called  Luis Carlos Zeng with update, message was left.   Patient is alert and oriented. I did explain concerns secondary to hypotension and giving additional medications that may drop blood pressure lower. \"I don't care I understand I may die\"  \"Keep me comfortable\"

## 2025-05-26 NOTE — DEATH NOTES
[facility]  Notice of Patient Passing      Patient Name- Kaycee Varela   Acct Number- [id number]   Attending Physician- No att. providers found    Admitted on-5/15/2025  2:58 PM     On 5/26/2025 at 1230 patient was found in 5k21 with:   Absence of vital signs.   Absence of neurological response.    Confirmed time of death at 1230.   Physician or On-call Physician notified of time of death- yes    Family present at time of death- yes,    Spiritual care present at time of death- yes, [place]    Physician was notified and orders were obtained to release the body.   Post-Mortem documentation completed; form printed, signed, and given to admitting.    Swetha Rapp RN RN Nursing Supervisor/ Manager  5/26/25   6:48 PM

## (undated) DEVICE — SUTURE VICRYL + SZ 0 L18IN ABSRB TIE VCP106G

## (undated) DEVICE — SPONGE GZ W4XL4IN COT 12 PLY TYP VII WVN C FLD DSGN STERILE

## (undated) DEVICE — PAD,ABDOMINAL,5"X9",ST,LF,25/BX: Brand: MEDLINE INDUSTRIES, INC.

## (undated) DEVICE — SOLUTION IV IRRIG 1000ML POUR BTL 2F7114

## (undated) DEVICE — VORTEX VACUUM MIXING SYSTEM: Brand: VORTEX

## (undated) DEVICE — BANDAGE COMPR W6INXL5YD SELF ADH COHESIVE CO FLX

## (undated) DEVICE — DUAL CUT SAGITTAL BLADE

## (undated) DEVICE — FAN SPRAY KIT: Brand: PULSAVAC®

## (undated) DEVICE — SUTURE VCRL + SZ 2-0 L27IN ABSRB UD CP-1 1/2 CIR REV CUT VCP266H

## (undated) DEVICE — SUTURE VICRYL + SZ 2-0 L27IN ABSRB UD CP-1 1/2 CIR REV CUT VCP266H

## (undated) DEVICE — BANDAGE COMPR W6INXL5YD WHT BGE POLY COT M E WRP WV HK AND

## (undated) DEVICE — DRESSING,GAUZE,XEROFORM,CURAD,5"X9",ST: Brand: CURAD

## (undated) DEVICE — BANDAGE COMPR M W4INXL10YD WHT BGE VELC E MTRX HK AND LOOP

## (undated) DEVICE — PENCIL SMK EVAC ALL IN 1 DSGN ENH VISIBILITY IMPROVED AIR

## (undated) DEVICE — Device

## (undated) DEVICE — GLOVE ORANGE PI 7 1/2   MSG9075

## (undated) DEVICE — BANDAGE COMPR E SGL LAYERED CLSR BGE W/ CLP W4INXL15FT

## (undated) DEVICE — SUTURE VIC + LEN CP1 0 70CM VCP267H

## (undated) DEVICE — SUTURE ABSORBABLE BRAIDED 0 CP-1 27 IN COAT UD VICRYL + VCP267H

## (undated) DEVICE — 450 ML BOTTLE OF 0.05% CHLORHEXIDINE GLUCONATE IN 99.95% STERILE WATER FOR IRRIGATION, USP AND APPLICATOR.: Brand: IRRISEPT ANTIMICROBIAL WOUND LAVAGE

## (undated) DEVICE — GLOVE SURG SZ 9 THK91MIL LTX FREE SYN POLYISOPRENE ANTI

## (undated) DEVICE — LIQUIBAND RAPID ADHESIVE 36/CS 0.8ML: Brand: MEDLINE

## (undated) DEVICE — SUTURE ETHLN SZ 3-0 L30IN NONABSORBABLE BLK FSL L30MM 3/8 1671H

## (undated) DEVICE — SOLUTION IRRIG 3000ML 0.9% SOD CHL USP UROMATIC PLAS CONT

## (undated) DEVICE — SUTURE ETHIBOND EXCEL SZ 2 L30IN NONABSORBABLE GRN L40MM V-37 MX69G

## (undated) DEVICE — SPONGE LAP W18XL18IN WHT COT 4 PLY FLD STRUNG RADPQ DISP ST

## (undated) DEVICE — DRAIN SURG 10FR PVC TB W/ TRCR MID PERF NO RESVR HUBLESS

## (undated) DEVICE — BRAVA®  PROTECTIVE SHEET. 4 X 4 IN: Brand: BRAVA

## (undated) DEVICE — DRAPE,HIP,W/POUCHES,STERILE: Brand: MEDLINE

## (undated) DEVICE — PADDING CAST W6INXL4YD COT LO LINTING WYTEX

## (undated) DEVICE — PACK-MAJOR

## (undated) DEVICE — PREVENA PLUS INCISION MANAGEMENT SYSTEM: Brand: PREVENA PLUS™

## (undated) DEVICE — DRAPE,U/ SHT,SPLIT,PLAS,STERIL: Brand: MEDLINE

## (undated) DEVICE — SPONGE LAP W18XL18IN WHT COT 4 PLY FLD STRUNG RADPQ DISP ST 2 PER PACK

## (undated) DEVICE — SMITH & NEPHEW CEM MIX BOWL                                    W/SPATULA DISPOSABLE

## (undated) DEVICE — SUTURE FIBERWIRE SZ 5 L38IN NONABSORBABLE BLU L48MM 1/2 AR7211

## (undated) DEVICE — ADHESIVE SKIN CLSR 0.7ML TOP DERMBND ADV

## (undated) DEVICE — SYRINGE MED 10ML LUERLOCK TIP W/O SFTY DISP

## (undated) DEVICE — PADDING,UNDERCAST,COTTON, 4"X4YD STERILE: Brand: MEDLINE

## (undated) DEVICE — BAG,BANDED,W/RUBBERBAND,STERILE,30X36: Brand: MEDLINE

## (undated) DEVICE — GLOVE ORANGE PI 8 1/2   MSG9085

## (undated) DEVICE — HEWSON SUTURE RETRIEVER: Brand: HEWSON SUTURE RETRIEVER

## (undated) DEVICE — DECANTER FLD 9IN ST BG FOR ASEP TRNSF OF FLD

## (undated) DEVICE — Z INACTIVE USE 2854097 SPONGE GZ W4XL4IN COT 12 PLY TYP VII WVN C FLD DSGN

## (undated) DEVICE — NEEDLE 1/2IN TRCR PNT MAYO CATGUT #6

## (undated) DEVICE — SPONGE GZ W4XL4IN COT 12 PLY TYP VII WVN C FLD DSGN

## (undated) DEVICE — GLOVE ORANGE PI 8   MSG9080

## (undated) DEVICE — BANDAGE,GAUZE,4.5"X4.1YD,STERILE,LF: Brand: MEDLINE

## (undated) DEVICE — BASIC SINGLE BASIN BTC-LF: Brand: MEDLINE INDUSTRIES, INC.

## (undated) DEVICE — KIT EVAC 400CC PVC RADPQ Y CONN

## (undated) DEVICE — ROYAL SILK SURGICAL GOWN, XXL: Brand: CONVERTORS

## (undated) DEVICE — BLADE SAW W25XL90MM THK147MM S STL SAG HVY DUTY BVL EDGE NO

## (undated) DEVICE — CONTAINER,SPECIMEN,PNEU TUBE,4OZ,OR STRL: Brand: MEDLINE

## (undated) DEVICE — PACK PROCEDURE SURG ORTH BASIC SRHP LF

## (undated) DEVICE — SYSTEM SKIN CLSR 22CM DERMBND PRINEO

## (undated) DEVICE — WAX SURG 2.5GM HEMSTAT BNE BEESWAX PARAFFIN ISO PALMITATE

## (undated) DEVICE — GLOVE PROTCT PF XL ANTIMICROBIAL A4 CUT RESIST SCEPTER LTX

## (undated) DEVICE — OSCILLATING SAW BLADE 1.35MM                                    FANNED STRYKER 2000

## (undated) DEVICE — EVACUATOR SURG 100CC SIL BLB SUCT RESVR FOR CLS WND DRNGE

## (undated) DEVICE — STRYKER PERFORMANCE SERIES SAGITTAL BLADE: Brand: STRYKER PERFORMANCE SERIES

## (undated) DEVICE — SUTURE VCRL SZ 1 L36IN ABSRB UD CTX L48MM 1/2 CIR J977H